# Patient Record
Sex: MALE | Race: WHITE | NOT HISPANIC OR LATINO | Employment: OTHER | URBAN - METROPOLITAN AREA
[De-identification: names, ages, dates, MRNs, and addresses within clinical notes are randomized per-mention and may not be internally consistent; named-entity substitution may affect disease eponyms.]

---

## 2017-01-03 RX ORDER — AMLODIPINE BESYLATE 5 MG/1
5 TABLET ORAL DAILY
Qty: 90 TABLET | Refills: 1 | Status: SHIPPED | OUTPATIENT
Start: 2017-01-03 | End: 2017-07-07 | Stop reason: SDUPTHER

## 2017-01-03 RX ORDER — GLIMEPIRIDE 4 MG/1
4 TABLET ORAL
Qty: 90 TABLET | Refills: 1 | Status: SHIPPED | OUTPATIENT
Start: 2017-01-03 | End: 2017-04-06 | Stop reason: SDUPTHER

## 2017-01-04 ENCOUNTER — HOSPITAL ENCOUNTER (OUTPATIENT)
Dept: CARDIOLOGY | Facility: HOSPITAL | Age: 72
Discharge: HOME | End: 2017-01-04
Attending: INTERNAL MEDICINE | Admitting: INTERNAL MEDICINE

## 2017-01-04 VITALS
DIASTOLIC BLOOD PRESSURE: 82 MMHG | SYSTOLIC BLOOD PRESSURE: 179 MMHG | WEIGHT: 213.85 LBS | BODY MASS INDEX: 30.61 KG/M2 | HEIGHT: 70 IN

## 2017-01-04 PROCEDURE — 93306 TTE W/DOPPLER COMPLETE: CPT

## 2017-01-27 ENCOUNTER — OFFICE VISIT (OUTPATIENT)
Dept: DIABETES SERVICES | Facility: HOSPITAL | Age: 72
End: 2017-01-27

## 2017-01-27 PROCEDURE — G0109 DIAB MANAGE TRN IND/GROUP: HCPCS | Performed by: DIETITIAN, REGISTERED

## 2017-02-22 ENCOUNTER — APPOINTMENT (OUTPATIENT)
Dept: PREADMISSION TESTING | Facility: HOSPITAL | Age: 72
End: 2017-02-22

## 2017-02-22 VITALS — WEIGHT: 216.93 LBS | HEIGHT: 69 IN | BODY MASS INDEX: 32.13 KG/M2

## 2017-02-22 LAB
ANION GAP SERPL CALCULATED.3IONS-SCNC: 7 MMOL/L (ref 3–11)
BACTERIA UR QL AUTO: ABNORMAL /HPF
BASOPHILS # BLD AUTO: 0.02 10*3/MM3 (ref 0–0.2)
BASOPHILS NFR BLD AUTO: 0.3 % (ref 0–1)
BILIRUB UR QL STRIP: NEGATIVE
BUN BLD-MCNC: 25 MG/DL (ref 9–23)
BUN/CREAT SERPL: 20.8 (ref 7–25)
CALCIUM SPEC-SCNC: 9.6 MG/DL (ref 8.7–10.4)
CHLORIDE SERPL-SCNC: 106 MMOL/L (ref 99–109)
CLARITY UR: CLEAR
CO2 SERPL-SCNC: 28 MMOL/L (ref 20–31)
COLOR UR: YELLOW
CREAT BLD-MCNC: 1.2 MG/DL (ref 0.6–1.3)
DEPRECATED RDW RBC AUTO: 45.3 FL (ref 37–54)
EOSINOPHIL # BLD AUTO: 0.13 10*3/MM3 (ref 0.1–0.3)
EOSINOPHIL NFR BLD AUTO: 1.8 % (ref 0–3)
ERYTHROCYTE [DISTWIDTH] IN BLOOD BY AUTOMATED COUNT: 13.8 % (ref 11.3–14.5)
GFR SERPL CREATININE-BSD FRML MDRD: 60 ML/MIN/1.73
GLUCOSE BLD-MCNC: 161 MG/DL (ref 70–100)
GLUCOSE UR STRIP-MCNC: ABNORMAL MG/DL
HBA1C MFR BLD: 7.6 % (ref 4.8–5.6)
HCT VFR BLD AUTO: 40.1 % (ref 38.9–50.9)
HGB BLD-MCNC: 13 G/DL (ref 13.1–17.5)
HGB UR QL STRIP.AUTO: NEGATIVE
HYALINE CASTS UR QL AUTO: ABNORMAL /LPF
IMM GRANULOCYTES # BLD: 0.02 10*3/MM3 (ref 0–0.03)
IMM GRANULOCYTES NFR BLD: 0.3 % (ref 0–0.6)
KETONES UR QL STRIP: NEGATIVE
LEUKOCYTE ESTERASE UR QL STRIP.AUTO: NEGATIVE
LYMPHOCYTES # BLD AUTO: 2.4 10*3/MM3 (ref 0.6–4.8)
LYMPHOCYTES NFR BLD AUTO: 33 % (ref 24–44)
MCH RBC QN AUTO: 29.1 PG (ref 27–31)
MCHC RBC AUTO-ENTMCNC: 32.4 G/DL (ref 32–36)
MCV RBC AUTO: 89.7 FL (ref 80–99)
MONOCYTES # BLD AUTO: 0.5 10*3/MM3 (ref 0–1)
MONOCYTES NFR BLD AUTO: 6.9 % (ref 0–12)
NEUTROPHILS # BLD AUTO: 4.2 10*3/MM3 (ref 1.5–8.3)
NEUTROPHILS NFR BLD AUTO: 57.7 % (ref 41–71)
NITRITE UR QL STRIP: NEGATIVE
PH UR STRIP.AUTO: <=5 [PH] (ref 5–8)
PLATELET # BLD AUTO: 158 10*3/MM3 (ref 150–450)
PMV BLD AUTO: 11.6 FL (ref 6–12)
POTASSIUM BLD-SCNC: 4.5 MMOL/L (ref 3.5–5.5)
PROT UR QL STRIP: ABNORMAL
RBC # BLD AUTO: 4.47 10*6/MM3 (ref 4.2–5.76)
RBC # UR: ABNORMAL /HPF
REF LAB TEST METHOD: ABNORMAL
SODIUM BLD-SCNC: 141 MMOL/L (ref 132–146)
SP GR UR STRIP: 1.03 (ref 1–1.03)
SQUAMOUS #/AREA URNS HPF: ABNORMAL /HPF
UROBILINOGEN UR QL STRIP: ABNORMAL
WBC NRBC COR # BLD: 7.27 10*3/MM3 (ref 3.5–10.8)
WBC UR QL AUTO: ABNORMAL /HPF

## 2017-02-22 PROCEDURE — 93010 ELECTROCARDIOGRAM REPORT: CPT | Performed by: INTERNAL MEDICINE

## 2017-02-22 PROCEDURE — 80048 BASIC METABOLIC PNL TOTAL CA: CPT | Performed by: ORTHOPAEDIC SURGERY

## 2017-02-22 PROCEDURE — 36415 COLL VENOUS BLD VENIPUNCTURE: CPT

## 2017-02-22 PROCEDURE — 93005 ELECTROCARDIOGRAM TRACING: CPT

## 2017-02-22 PROCEDURE — 81001 URINALYSIS AUTO W/SCOPE: CPT | Performed by: ORTHOPAEDIC SURGERY

## 2017-02-22 PROCEDURE — 83036 HEMOGLOBIN GLYCOSYLATED A1C: CPT | Performed by: ORTHOPAEDIC SURGERY

## 2017-02-22 PROCEDURE — 85025 COMPLETE CBC W/AUTO DIFF WBC: CPT | Performed by: ORTHOPAEDIC SURGERY

## 2017-02-22 RX ORDER — ACETAMINOPHEN 500 MG
500 TABLET ORAL EVERY 6 HOURS PRN
COMMUNITY
End: 2017-07-07

## 2017-02-22 NOTE — PAT
EKG CLEARED BY DR. CALLAHAN    A1C LEVEL JAN 2017 WAS 8.5-PT WAS STARTED ON INSULIN TO HELP BRING IT DOWN PRIOR TO SURGERY.

## 2017-03-01 RX ORDER — SIMVASTATIN 10 MG
TABLET ORAL
Qty: 90 TABLET | Refills: 0 | Status: SHIPPED | OUTPATIENT
Start: 2017-03-01 | End: 2017-05-22 | Stop reason: SDUPTHER

## 2017-03-07 ENCOUNTER — APPOINTMENT (OUTPATIENT)
Dept: GENERAL RADIOLOGY | Facility: HOSPITAL | Age: 72
End: 2017-03-07

## 2017-03-07 ENCOUNTER — ANESTHESIA EVENT (OUTPATIENT)
Dept: PERIOP | Facility: HOSPITAL | Age: 72
End: 2017-03-07

## 2017-03-07 ENCOUNTER — ANESTHESIA (OUTPATIENT)
Dept: PERIOP | Facility: HOSPITAL | Age: 72
End: 2017-03-07

## 2017-03-07 PROBLEM — M16.10 HIP ARTHRITIS: Status: ACTIVE | Noted: 2017-03-07

## 2017-03-07 PROBLEM — Z96.641 STATUS POST TOTAL REPLACEMENT OF RIGHT HIP: Status: ACTIVE | Noted: 2017-03-07

## 2017-03-07 PROCEDURE — 73502 X-RAY EXAM HIP UNI 2-3 VIEWS: CPT

## 2017-03-07 PROCEDURE — 25010000002 PHENYLEPHRINE PER 1 ML: Performed by: NURSE ANESTHETIST, CERTIFIED REGISTERED

## 2017-03-07 PROCEDURE — 25010000002 ONDANSETRON PER 1 MG: Performed by: NURSE ANESTHETIST, CERTIFIED REGISTERED

## 2017-03-07 PROCEDURE — 25010000002 DEXAMETHASONE PER 1 MG: Performed by: NURSE ANESTHETIST, CERTIFIED REGISTERED

## 2017-03-07 PROCEDURE — 25010000002 PROPOFOL 1000 MG/ML EMULSION: Performed by: NURSE ANESTHETIST, CERTIFIED REGISTERED

## 2017-03-07 PROCEDURE — 25010000003 CEFAZOLIN IN DEXTROSE 2-4 GM/100ML-% SOLUTION: Performed by: ORTHOPAEDIC SURGERY

## 2017-03-07 RX ORDER — DEXAMETHASONE SODIUM PHOSPHATE 4 MG/ML
INJECTION, SOLUTION INTRA-ARTICULAR; INTRALESIONAL; INTRAMUSCULAR; INTRAVENOUS; SOFT TISSUE AS NEEDED
Status: DISCONTINUED | OUTPATIENT
Start: 2017-03-07 | End: 2017-03-07 | Stop reason: SURG

## 2017-03-07 RX ORDER — ONDANSETRON 2 MG/ML
INJECTION INTRAMUSCULAR; INTRAVENOUS AS NEEDED
Status: DISCONTINUED | OUTPATIENT
Start: 2017-03-07 | End: 2017-03-07 | Stop reason: SURG

## 2017-03-07 RX ORDER — BUPIVACAINE HYDROCHLORIDE 5 MG/ML
INJECTION, SOLUTION EPIDURAL; INTRACAUDAL AS NEEDED
Status: DISCONTINUED | OUTPATIENT
Start: 2017-03-07 | End: 2017-03-07 | Stop reason: SURG

## 2017-03-07 RX ADMIN — TRANEXAMIC ACID 980 MG: 100 INJECTION, SOLUTION INTRAVENOUS at 08:02

## 2017-03-07 RX ADMIN — PHENYLEPHRINE HYDROCHLORIDE 0.2 MCG/KG/MIN: 10 INJECTION INTRAVENOUS at 08:12

## 2017-03-07 RX ADMIN — ONDANSETRON 4 MG: 2 INJECTION INTRAMUSCULAR; INTRAVENOUS at 09:23

## 2017-03-07 RX ADMIN — PROPOFOL 50 MCG/KG/MIN: 10 INJECTION, EMULSION INTRAVENOUS at 08:02

## 2017-03-07 RX ADMIN — TRANEXAMIC ACID 980 MG: 100 INJECTION, SOLUTION INTRAVENOUS at 09:43

## 2017-03-07 RX ADMIN — BUPIVACAINE HYDROCHLORIDE 2.2 ML: 5 INJECTION, SOLUTION EPIDURAL; INTRACAUDAL; PERINEURAL at 07:56

## 2017-03-07 RX ADMIN — CEFAZOLIN SODIUM 2 G: 2 INJECTION, SOLUTION INTRAVENOUS at 07:50

## 2017-03-07 RX ADMIN — DEXAMETHASONE SODIUM PHOSPHATE 8 MG: 4 INJECTION, SOLUTION INTRAMUSCULAR; INTRAVENOUS at 08:02

## 2017-03-08 PROBLEM — D62 ACUTE BLOOD LOSS ANEMIA: Status: ACTIVE | Noted: 2017-03-08

## 2017-03-08 PROBLEM — D72.829 LEUKOCYTOSIS: Status: ACTIVE | Noted: 2017-03-08

## 2017-03-10 ENCOUNTER — TRANSITIONAL CARE MANAGEMENT TELEPHONE ENCOUNTER (OUTPATIENT)
Dept: INTERNAL MEDICINE | Facility: CLINIC | Age: 72
End: 2017-03-10

## 2017-04-06 RX ORDER — GLIMEPIRIDE 4 MG/1
TABLET ORAL
Qty: 90 TABLET | Refills: 0 | Status: SHIPPED | OUTPATIENT
Start: 2017-04-06 | End: 2017-05-22 | Stop reason: SDUPTHER

## 2017-04-20 RX ORDER — LISINOPRIL 20 MG/1
TABLET ORAL
Qty: 180 TABLET | Refills: 1 | Status: SHIPPED | OUTPATIENT
Start: 2017-04-20 | End: 2017-07-07 | Stop reason: SDUPTHER

## 2017-04-27 ENCOUNTER — OFFICE VISIT (OUTPATIENT)
Dept: ORTHOPEDIC SURGERY | Facility: CLINIC | Age: 72
End: 2017-04-27

## 2017-04-27 VITALS
WEIGHT: 207 LBS | HEART RATE: 78 BPM | DIASTOLIC BLOOD PRESSURE: 63 MMHG | BODY MASS INDEX: 29.63 KG/M2 | HEIGHT: 70 IN | SYSTOLIC BLOOD PRESSURE: 160 MMHG

## 2017-04-27 DIAGNOSIS — Z47.1 AFTERCARE FOLLOWING LEFT HIP JOINT REPLACEMENT SURGERY: Primary | ICD-10-CM

## 2017-04-27 DIAGNOSIS — Z96.642 AFTERCARE FOLLOWING LEFT HIP JOINT REPLACEMENT SURGERY: Primary | ICD-10-CM

## 2017-04-27 PROCEDURE — 99024 POSTOP FOLLOW-UP VISIT: CPT | Performed by: PHYSICIAN ASSISTANT

## 2017-04-27 NOTE — PROGRESS NOTES
Subjective     Follow-up of the Left Hip (1 yr f/u (L) JOSE A)      Gabriela Mar is a 71 y.o. male.  Here for routine follow-up left total hip.     History of Present Illness   Patient returns today for routine follow-up left total hip March 7, 2017 with Dr. Suarez.  He reports no pain or problems from the hip he has been doing outpatient physical therapy with improved motion and strength.  He has been very pleased with his outcome.  Allergies   Allergen Reactions   • Bactrim [Sulfamethoxazole-Trimethoprim] Other (See Comments)     Joint pain   • Sulfa Antibiotics      Makes bones hurt       Current Outpatient Prescriptions on File Prior to Visit   Medication Sig Dispense Refill   • amLODIPine (NORVASC) 5 MG tablet Take 1 tablet by mouth Daily. 90 tablet 1   • aspirin (ASPIRIN LOW DOSE) 81 MG chewable tablet Chew 1 tablet Daily. Resume in 1 month     • Cholecalciferol (VITAMIN D) 1000 UNITS tablet Take 1 capsule by mouth Daily.     • Cyanocobalamin (VITAMIN B-12 ER) 1000 MCG tablet controlled-release Take 1,000 mcg by mouth Daily.     • glimepiride (AMARYL) 4 MG tablet TAKE ONE TABLET BY MOUTH IN THE MORNING BEFORE BREAKFEST. 90 tablet 0   • Insulin Glargine (TOUJEO SOLOSTAR) 300 UNIT/ML solution pen-injector Inject 10 Units under the skin every night.     • lisinopril (PRINIVIL,ZESTRIL) 20 MG tablet TAKE ONE TABLET BY MOUTH TWICE DAILY 180 tablet 1   • metFORMIN (GLUCOPHAGE) 1000 MG tablet TAKE ONE AND ONE-HALF TABLETS BY MOUTH IN THE MORNING AND ONE IN THE EVENING (Patient taking differently: TAKE ONE AND ONE-HALF TABLETS BY MOUTH IN THE EVENING  AND ONE IN THE MORNING) 235 tablet 0   • Multiple Vitamins-Minerals (MULTI VITAMIN/MINERALS PO) Take 1 tablet by mouth Daily.     • omeprazole (PRILOSEC) 20 MG capsule Take 1 capsule by mouth Daily.     • simvastatin (ZOCOR) 10 MG tablet TAKE ONE TABLET BY MOUTH AT BEDTIME 90 tablet 0   • SITagliptin (JANUVIA) 100 MG tablet Take 1 tablet by mouth Daily. 90 tablet 1   •  acetaminophen (TYLENOL) 500 MG tablet Take 500 mg by mouth Every 6 (Six) Hours As Needed for mild pain (1-3).     • aspirin  MG EC tablet Take 1 tablet by mouth Daily. For 1 month 30 tablet 0   • docusate sodium (COLACE) 100 MG capsule Take 1 capsule by mouth 2 (Two) Times a Day. 60 capsule 0     No current facility-administered medications on file prior to visit.      Social History     Social History   • Marital status:      Spouse name: N/A   • Number of children: N/A   • Years of education: N/A     Occupational History   • Not on file.     Social History Main Topics   • Smoking status: Former Smoker     Packs/day: 2.00     Years: 35.00     Types: Cigarettes   • Smokeless tobacco: Never Used      Comment: QUIT 20 PLUS YEARS AGO    • Alcohol use No      Comment: stopped drinking   • Drug use: No   • Sexual activity: Defer     Other Topics Concern   • Not on file     Social History Narrative     Past Surgical History:   Procedure Laterality Date   • COLONOSCOPY      2015   • ENDOSCOPY      2011   • JOINT REPLACEMENT      LEFT HIP 2016-MARCH    • SUBMAXILLARY CYST  EXCISION      Right back   • TONSILLECTOMY     • TOTAL HIP ARTHROPLASTY Right 3/7/2017    Procedure: RIGHT TOTAL HIP ARTHROPLASTY;  Surgeon: Reynaldo Suarez MD;  Location: [facility];  Service:    • VASECTOMY     • WRIST GANGLION EXCISION       Family History   Problem Relation Age of Onset   • Diabetes Mother    • Cancer Father      colon   • Diabetes Father    • Hypertension Father    • Kidney failure Sister    • Diabetes Sister    • Cancer Brother      lung   • Hypertension Brother    • Diabetes Brother    • Coronary artery disease Brother    • Cancer Brother      colon   • Hypertension Brother      Past Medical History:   Diagnosis Date   • Arthritis    • Diabetes mellitus     DIAGNOSED 10 YEARS AGO; CHECKS FSBG TID   • Esophagitis, reflux    • Hyperlipidemia    • Hypertension    • Obesity    • Pharyngitis    • Wears eyeglasses   "        Review of Systems   Constitutional: Negative.    HENT: Negative.    Eyes: Negative.    Respiratory: Negative.    Cardiovascular: Negative.    Gastrointestinal: Negative.    Endocrine: Negative.    Genitourinary: Negative.    Musculoskeletal: Positive for back pain.        Muscle weakness   Skin: Negative.    Allergic/Immunologic: Negative.    Neurological: Negative.    Hematological: Negative.    Psychiatric/Behavioral: Negative.        Objective   /63  Pulse 78  Ht 70\" (177.8 cm)  Wt 207 lb (93.9 kg)  BMI 29.7 kg/m2      Physical Exam  Body habitus: normal weight for height  Gait: normal  Ortho Exam  Left hip exam:  -----------------  Patient has 90 degrees flexion, 20° internal rotation 30° external rotation   5 out of 5 strength in hip flexors, adductor's, abductor's  5 out of 5 strength in anterior tib, gastroc/soleus  No lower extremity edema  Normal sensation  Ambulating with no assistive device    Assessment/Plan   The encounter diagnosis was Aftercare following left hip joint replacement surgery.   Doing well status post left total hip arthroplasty March 7, 2017.  Patient reports no pain or problems from the hip.  He has been very happy with the outcome.  On exam, he has no pain with hip rotation and good strength.  Recommendation today is that he finish up his formal physical therapy and I encouraged him to continue his exercises at home.  He'll return in 1 year with repeat x-rays of the left hip or sooner if needed.        "

## 2017-04-28 ENCOUNTER — OFFICE VISIT (OUTPATIENT)
Dept: INTERNAL MEDICINE | Facility: CLINIC | Age: 72
End: 2017-04-28

## 2017-04-28 VITALS
OXYGEN SATURATION: 99 % | SYSTOLIC BLOOD PRESSURE: 110 MMHG | BODY MASS INDEX: 30.26 KG/M2 | HEART RATE: 75 BPM | HEIGHT: 70 IN | WEIGHT: 211.4 LBS | DIASTOLIC BLOOD PRESSURE: 62 MMHG

## 2017-04-28 DIAGNOSIS — E78.2 MIXED HYPERLIPIDEMIA: ICD-10-CM

## 2017-04-28 DIAGNOSIS — Z23 ENCOUNTER FOR IMMUNIZATION: ICD-10-CM

## 2017-04-28 DIAGNOSIS — Z87.891 H/O TOBACCO USE, PRESENTING HAZARDS TO HEALTH: ICD-10-CM

## 2017-04-28 DIAGNOSIS — D62 ACUTE BLOOD LOSS ANEMIA: ICD-10-CM

## 2017-04-28 DIAGNOSIS — I10 ESSENTIAL HYPERTENSION: ICD-10-CM

## 2017-04-28 DIAGNOSIS — Z00.00 MEDICARE ANNUAL WELLNESS VISIT, SUBSEQUENT: Primary | ICD-10-CM

## 2017-04-28 DIAGNOSIS — IMO0002 UNCONTROLLED TYPE 2 DIABETES MELLITUS WITH OTHER SPECIFIED COMPLICATION: ICD-10-CM

## 2017-04-28 LAB
POC CREATININE URINE: 200
POC MICROALBUMIN URINE: 80

## 2017-04-28 PROCEDURE — 99212 OFFICE O/P EST SF 10 MIN: CPT | Performed by: INTERNAL MEDICINE

## 2017-04-28 PROCEDURE — 90732 PPSV23 VACC 2 YRS+ SUBQ/IM: CPT | Performed by: INTERNAL MEDICINE

## 2017-04-28 PROCEDURE — G0009 ADMIN PNEUMOCOCCAL VACCINE: HCPCS | Performed by: INTERNAL MEDICINE

## 2017-04-28 PROCEDURE — 82044 UR ALBUMIN SEMIQUANTITATIVE: CPT | Performed by: INTERNAL MEDICINE

## 2017-04-28 PROCEDURE — G0439 PPPS, SUBSEQ VISIT: HCPCS | Performed by: INTERNAL MEDICINE

## 2017-04-28 RX ORDER — INSULIN GLARGINE 100 [IU]/ML
10 INJECTION, SOLUTION SUBCUTANEOUS NIGHTLY
Qty: 3 PEN | Refills: 5 | Status: SHIPPED | OUTPATIENT
Start: 2017-04-28 | End: 2017-07-07 | Stop reason: SDUPTHER

## 2017-05-03 ENCOUNTER — HOSPITAL ENCOUNTER (OUTPATIENT)
Dept: ULTRASOUND IMAGING | Facility: HOSPITAL | Age: 72
Discharge: HOME | End: 2017-05-03
Attending: INTERNAL MEDICINE | Admitting: INTERNAL MEDICINE

## 2017-05-03 DIAGNOSIS — Z87.891 H/O TOBACCO USE, PRESENTING HAZARDS TO HEALTH: ICD-10-CM

## 2017-05-03 PROCEDURE — 76706 US ABDL AORTA SCREEN AAA: CPT

## 2017-05-22 RX ORDER — GLIMEPIRIDE 4 MG/1
TABLET ORAL
Qty: 90 TABLET | Refills: 0 | Status: SHIPPED | OUTPATIENT
Start: 2017-05-22 | End: 2017-07-07 | Stop reason: SDUPTHER

## 2017-05-22 RX ORDER — SIMVASTATIN 10 MG
TABLET ORAL
Qty: 90 TABLET | Refills: 0 | Status: SHIPPED | OUTPATIENT
Start: 2017-05-22 | End: 2017-09-02 | Stop reason: SDUPTHER

## 2017-07-07 ENCOUNTER — OFFICE VISIT (OUTPATIENT)
Dept: INTERNAL MEDICINE | Facility: CLINIC | Age: 72
End: 2017-07-07

## 2017-07-07 VITALS
HEART RATE: 79 BPM | WEIGHT: 213.4 LBS | OXYGEN SATURATION: 96 % | SYSTOLIC BLOOD PRESSURE: 142 MMHG | BODY MASS INDEX: 30.55 KG/M2 | DIASTOLIC BLOOD PRESSURE: 70 MMHG | HEIGHT: 70 IN

## 2017-07-07 DIAGNOSIS — G89.29 CHRONIC BILATERAL LOW BACK PAIN WITHOUT SCIATICA: ICD-10-CM

## 2017-07-07 DIAGNOSIS — IMO0002 UNCONTROLLED TYPE 2 DIABETES MELLITUS WITH OTHER SPECIFIED COMPLICATION: Primary | ICD-10-CM

## 2017-07-07 DIAGNOSIS — I10 ESSENTIAL HYPERTENSION: ICD-10-CM

## 2017-07-07 DIAGNOSIS — E78.2 MIXED HYPERLIPIDEMIA: ICD-10-CM

## 2017-07-07 DIAGNOSIS — M54.50 CHRONIC BILATERAL LOW BACK PAIN WITHOUT SCIATICA: ICD-10-CM

## 2017-07-07 LAB
GLUCOSE BLDC GLUCOMTR-MCNC: 206 MG/DL (ref 70–130)
HBA1C MFR BLD: 9.1 %

## 2017-07-07 PROCEDURE — 99213 OFFICE O/P EST LOW 20 MIN: CPT | Performed by: INTERNAL MEDICINE

## 2017-07-07 PROCEDURE — 83036 HEMOGLOBIN GLYCOSYLATED A1C: CPT | Performed by: INTERNAL MEDICINE

## 2017-07-07 PROCEDURE — 82947 ASSAY GLUCOSE BLOOD QUANT: CPT | Performed by: INTERNAL MEDICINE

## 2017-07-07 RX ORDER — INSULIN GLARGINE 100 [IU]/ML
20 INJECTION, SOLUTION SUBCUTANEOUS NIGHTLY
Qty: 3 PEN | Refills: 5 | Status: SHIPPED | OUTPATIENT
Start: 2017-07-07 | End: 2017-07-07 | Stop reason: SDUPTHER

## 2017-07-07 RX ORDER — AMLODIPINE BESYLATE 5 MG/1
5 TABLET ORAL DAILY
Qty: 90 TABLET | Refills: 1 | Status: SHIPPED | OUTPATIENT
Start: 2017-07-07 | End: 2017-10-09 | Stop reason: SDUPTHER

## 2017-07-07 RX ORDER — LISINOPRIL 20 MG/1
20 TABLET ORAL 2 TIMES DAILY
Qty: 180 TABLET | Refills: 1 | Status: SHIPPED | OUTPATIENT
Start: 2017-07-07 | End: 2017-10-09 | Stop reason: SDUPTHER

## 2017-07-07 RX ORDER — INSULIN GLARGINE 100 [IU]/ML
20 INJECTION, SOLUTION SUBCUTANEOUS NIGHTLY
Qty: 3 PEN | Refills: 5 | Status: SHIPPED | OUTPATIENT
Start: 2017-07-07 | End: 2017-10-20 | Stop reason: SDUPTHER

## 2017-07-07 RX ORDER — GLIMEPIRIDE 4 MG/1
4 TABLET ORAL
Qty: 90 TABLET | Refills: 1 | Status: SHIPPED | OUTPATIENT
Start: 2017-07-07 | End: 2017-10-09 | Stop reason: SDUPTHER

## 2017-07-07 NOTE — PROGRESS NOTES
Follow-up (10 week)  for diabetes.    Subjective   Gabriela Mar is a 71 y.o. male is here today for follow-up.    History of Present Illness   Mr. Mar is here for a follow up on his back pain, and DM2.  He is here for a follow up on his sugars, on the tresiba, , feels like its not working for him like the other insulins did.sugars > 200 for the most part.  Has moved and is closer to [facility]      Current Outpatient Prescriptions:   •  amLODIPine (NORVASC) 5 MG tablet, Take 1 tablet by mouth Daily., Disp: 90 tablet, Rfl: 1  •  aspirin (ASPIRIN LOW DOSE) 81 MG chewable tablet, Chew 1 tablet Daily. Resume in 1 month, Disp: , Rfl:   •  Cholecalciferol (VITAMIN D) 1000 UNITS tablet, Take 1 capsule by mouth Daily., Disp: , Rfl:   •  Cyanocobalamin (VITAMIN B-12 ER) 1000 MCG tablet controlled-release, Take 1,000 mcg by mouth Daily., Disp: , Rfl:   •  glimepiride (AMARYL) 4 MG tablet, Take 1 tablet by mouth Every Morning Before Breakfast., Disp: 90 tablet, Rfl: 1  •  Insulin Glargine (BASAGLAR KWIKPEN) 100 UNIT/ML injection pen, Inject 20 Units under the skin Every Night., Disp: 3 pen, Rfl: 5  •  lisinopril (PRINIVIL,ZESTRIL) 20 MG tablet, Take 1 tablet by mouth 2 (Two) Times a Day., Disp: 180 tablet, Rfl: 1  •  metFORMIN (GLUCOPHAGE) 1000 MG tablet, TAKE ONE AND ONE-HALF TABLETS BY MOUTH IN THE MORNING AND ONE IN THE EVENING, Disp: 235 tablet, Rfl: 0  •  Multiple Vitamins-Minerals (MULTI VITAMIN/MINERALS PO), Take 1 tablet by mouth Daily., Disp: , Rfl:   •  omeprazole (PRILOSEC) 20 MG capsule, Take 1 capsule by mouth Daily., Disp: , Rfl:   •  simvastatin (ZOCOR) 10 MG tablet, TAKE ONE TABLET BY MOUTH AT BEDTIME, Disp: 90 tablet, Rfl: 0  •  SITagliptin (JANUVIA) 100 MG tablet, Take 1 tablet by mouth Daily., Disp: 90 tablet, Rfl: 1      The following portions of the patient's history were reviewed and updated as appropriate: allergies, current medications, past family history, past medical history, past social  "history, past surgical history and problem list.    Review of Systems   Constitutional: Negative.  Negative for chills and fever.   HENT: Negative for ear discharge, ear pain, sinus pressure and sore throat.    Respiratory: Negative for cough, chest tightness and shortness of breath.    Cardiovascular: Negative for chest pain, palpitations and leg swelling.   Gastrointestinal: Negative for diarrhea, nausea and vomiting.   Musculoskeletal: Negative for arthralgias, back pain and myalgias.   Neurological: Negative for dizziness, syncope and headaches.   Psychiatric/Behavioral: Negative for confusion and sleep disturbance.       Objective   /70  Pulse 79  Ht 70\" (177.8 cm)  Wt 213 lb 6.4 oz (96.8 kg)  SpO2 96%  BMI 30.62 kg/m2  Physical Exam   Constitutional: He is oriented to person, place, and time. He appears well-developed and well-nourished.   HENT:   Head: Normocephalic and atraumatic.   Right Ear: External ear normal.   Left Ear: External ear normal.   Mouth/Throat: No oropharyngeal exudate.   Eyes: Conjunctivae are normal. Pupils are equal, round, and reactive to light.   Neck: Neck supple. No thyromegaly present.   Cardiovascular: Normal rate, regular rhythm and intact distal pulses.    Pulmonary/Chest: Effort normal and breath sounds normal.   Abdominal: Soft. Bowel sounds are normal. He exhibits no distension. There is no tenderness.   Musculoskeletal: He exhibits no edema.   Neurological: He is alert and oriented to person, place, and time. No cranial nerve deficit.   Skin: Skin is warm and dry.   Psychiatric: He has a normal mood and affect. Judgment normal.   Nursing note and vitals reviewed.        Results for orders placed or performed in visit on 07/07/17   POC Glycosylated Hemoglobin (Hb A1C)   Result Value Ref Range    Hemoglobin A1C 9.1 %   POC Glucose Fingerstick   Result Value Ref Range    Glucose 206 (A) 70 - 130 mg/dL             Assessment/Plan   Diagnoses and all orders for this " visit:    Uncontrolled type 2 diabetes mellitus with other specified complication  -     Discontinue: Insulin Glargine (BASAGLAR KWIKPEN) 100 UNIT/ML injection pen; Inject 20 Units under the skin Every Night.  -     glimepiride (AMARYL) 4 MG tablet; Take 1 tablet by mouth Every Morning Before Breakfast.  -     POC Glycosylated Hemoglobin (Hb A1C)  -     POC Glucose Fingerstick  -     Insulin Glargine (BASAGLAR KWIKPEN) 100 UNIT/ML injection pen; Inject 20 Units under the skin Every Night.    Essential hypertension  -     lisinopril (PRINIVIL,ZESTRIL) 20 MG tablet; Take 1 tablet by mouth 2 (Two) Times a Day.  -     amLODIPine (NORVASC) 5 MG tablet; Take 1 tablet by mouth Daily.    Mixed hyperlipidemia    Chronic bilateral low back pain without sciatica  Comments:  start exercises, stretches etc.                 Return in about 2 months (around 9/7/2017) for Next scheduled follow up with fasting labs.

## 2017-09-02 RX ORDER — SIMVASTATIN 10 MG
TABLET ORAL
Qty: 90 TABLET | Refills: 0 | Status: SHIPPED | OUTPATIENT
Start: 2017-09-02 | End: 2017-11-27 | Stop reason: SDUPTHER

## 2017-09-07 ENCOUNTER — OFFICE VISIT (OUTPATIENT)
Dept: INTERNAL MEDICINE | Facility: CLINIC | Age: 72
End: 2017-09-07

## 2017-09-07 VITALS
BODY MASS INDEX: 30.37 KG/M2 | HEART RATE: 81 BPM | OXYGEN SATURATION: 95 % | WEIGHT: 211.64 LBS | DIASTOLIC BLOOD PRESSURE: 64 MMHG | SYSTOLIC BLOOD PRESSURE: 150 MMHG

## 2017-09-07 DIAGNOSIS — I10 ESSENTIAL HYPERTENSION: ICD-10-CM

## 2017-09-07 DIAGNOSIS — E78.2 MIXED HYPERLIPIDEMIA: ICD-10-CM

## 2017-09-07 DIAGNOSIS — IMO0002 UNCONTROLLED TYPE 2 DIABETES MELLITUS WITH OTHER SPECIFIED COMPLICATION: Primary | ICD-10-CM

## 2017-09-07 LAB
ALBUMIN SERPL-MCNC: 4.4 G/DL (ref 3.2–4.8)
ALBUMIN/GLOB SERPL: 1.6 G/DL (ref 1.5–2.5)
ALP SERPL-CCNC: 83 U/L (ref 25–100)
ALT SERPL W P-5'-P-CCNC: 56 U/L (ref 7–40)
ANION GAP SERPL CALCULATED.3IONS-SCNC: 8 MMOL/L (ref 3–11)
ARTICHOKE IGE QN: 70 MG/DL (ref 0–130)
AST SERPL-CCNC: 53 U/L (ref 0–33)
BILIRUB SERPL-MCNC: 0.4 MG/DL (ref 0.3–1.2)
BUN BLD-MCNC: 16 MG/DL (ref 9–23)
BUN/CREAT SERPL: 14.5 (ref 7–25)
CALCIUM SPEC-SCNC: 9.3 MG/DL (ref 8.7–10.4)
CHLORIDE SERPL-SCNC: 102 MMOL/L (ref 99–109)
CHOLEST SERPL-MCNC: 137 MG/DL (ref 0–200)
CO2 SERPL-SCNC: 28 MMOL/L (ref 20–31)
CREAT BLD-MCNC: 1.1 MG/DL (ref 0.6–1.3)
GFR SERPL CREATININE-BSD FRML MDRD: 66 ML/MIN/1.73
GLOBULIN UR ELPH-MCNC: 2.8 GM/DL
GLUCOSE BLD-MCNC: 187 MG/DL (ref 70–100)
HDLC SERPL-MCNC: 38 MG/DL (ref 40–60)
POTASSIUM BLD-SCNC: 4.6 MMOL/L (ref 3.5–5.5)
PROT SERPL-MCNC: 7.2 G/DL (ref 5.7–8.2)
SODIUM BLD-SCNC: 138 MMOL/L (ref 132–146)
TRIGL SERPL-MCNC: 130 MG/DL (ref 0–150)

## 2017-09-07 PROCEDURE — 80053 COMPREHEN METABOLIC PANEL: CPT | Performed by: INTERNAL MEDICINE

## 2017-09-07 PROCEDURE — 99214 OFFICE O/P EST MOD 30 MIN: CPT | Performed by: INTERNAL MEDICINE

## 2017-09-07 PROCEDURE — 80061 LIPID PANEL: CPT | Performed by: INTERNAL MEDICINE

## 2017-09-07 NOTE — PROGRESS NOTES
Follow-up (hypertension, hyperlipidemia, Type 2 diabetes)    Subjective   Gabriela Mar is a 71 y.o. male is here today for follow-up.    History of Present Illness   Here for f/u on his DM2, has been a bad year, brother dxed with lymphoma, and on Chemo. Has run out of januvia and insulin.  SUgars have been running between 180- 220, but had lunch at [employer] plate made his sugar go up yo 318.  Reports that he had to fax his rx to [facility], and has not received his meds.  Here for f/u on his HLP and HTN.denies medication side effects.      Current Outpatient Prescriptions:   •  amLODIPine (NORVASC) 5 MG tablet, Take 1 tablet by mouth Daily., Disp: 90 tablet, Rfl: 1  •  aspirin (ASPIRIN LOW DOSE) 81 MG chewable tablet, Chew 1 tablet Daily. Resume in 1 month, Disp: , Rfl:   •  Cholecalciferol (VITAMIN D) 1000 UNITS tablet, Take 1 capsule by mouth Daily., Disp: , Rfl:   •  Cyanocobalamin (VITAMIN B-12 ER) 1000 MCG tablet controlled-release, Take 1,000 mcg by mouth Daily., Disp: , Rfl:   •  glimepiride (AMARYL) 4 MG tablet, Take 1 tablet by mouth Every Morning Before Breakfast., Disp: 90 tablet, Rfl: 1  •  Insulin Glargine (BASAGLAR KWIKPEN) 100 UNIT/ML injection pen, Inject 20 Units under the skin Every Night., Disp: 3 pen, Rfl: 5  •  lisinopril (PRINIVIL,ZESTRIL) 20 MG tablet, Take 1 tablet by mouth 2 (Two) Times a Day., Disp: 180 tablet, Rfl: 1  •  metFORMIN (GLUCOPHAGE) 1000 MG tablet, TAKE ONE AND ONE-HALF TABLETS BY MOUTH IN THE MORNING AND ONE IN THE EVENING, Disp: 235 tablet, Rfl: 0  •  Multiple Vitamins-Minerals (MULTI VITAMIN/MINERALS PO), Take 1 tablet by mouth Daily., Disp: , Rfl:   •  omeprazole (PRILOSEC) 20 MG capsule, Take 1 capsule by mouth Daily., Disp: , Rfl:   •  simvastatin (ZOCOR) 10 MG tablet, TAKE ONE TABLET BY MOUTH AT BEDTIME, Disp: 90 tablet, Rfl: 0  •  SITagliptin (JANUVIA) 100 MG tablet, Take 1 tablet by mouth Daily., Disp: 90 tablet, Rfl: 1      The following portions of  the patient's history were reviewed and updated as appropriate: allergies, current medications, past family history, past medical history, past social history, past surgical history and problem list.    Review of Systems   Constitutional: Positive for fatigue. Negative for chills and fever.   HENT: Negative for ear discharge, ear pain, sinus pressure and sore throat.    Respiratory: Negative for cough, chest tightness and shortness of breath.    Cardiovascular: Negative for chest pain, palpitations and leg swelling.   Gastrointestinal: Negative for diarrhea, nausea and vomiting.   Musculoskeletal: Positive for back pain. Negative for arthralgias and myalgias.   Neurological: Negative for dizziness, syncope and headaches.   Psychiatric/Behavioral: Negative for confusion and sleep disturbance.       Objective   /64  Pulse 81  Wt 211 lb 10.3 oz (96 kg)  SpO2 95%  BMI 30.37 kg/m2  Physical Exam   Constitutional: He is oriented to person, place, and time. He appears well-developed and well-nourished.   HENT:   Head: Normocephalic and atraumatic.   Right Ear: External ear normal.   Left Ear: External ear normal.   Mouth/Throat: No oropharyngeal exudate.   Eyes: Conjunctivae are normal. Pupils are equal, round, and reactive to light.   Neck: Neck supple. No thyromegaly present.   Cardiovascular: Normal rate and regular rhythm.    Pulmonary/Chest: Effort normal and breath sounds normal.   Abdominal: Soft. Bowel sounds are normal. He exhibits no distension. There is no tenderness.   Musculoskeletal: He exhibits no edema.   Neurological: He is alert and oriented to person, place, and time. No cranial nerve deficit.   Skin: Skin is warm and dry.   Psychiatric: He has a normal mood and affect. Judgment normal.   Nursing note and vitals reviewed.        Results for orders placed or performed in visit on 07/07/17   POC Glycosylated Hemoglobin (Hb A1C)   Result Value Ref Range    Hemoglobin A1C 9.1 %   POC Glucose  Fingerstick   Result Value Ref Range    Glucose 206 (A) 70 - 130 mg/dL             Assessment/Plan   Diagnoses and all orders for this visit:    Uncontrolled type 2 diabetes mellitus with other specified complication  Comments:  given Januvia 100mg x 6 weekis and toujeo x 1 mo, will be getting his rx from [facility] as well. Check A1C at follow up.    Mixed hyperlipidemia  -     Comprehensive Metabolic Panel  -     Lipid Panel    Essential hypertension  Comments:  better on recheck, 142/66, continue to monitor.,                 Return in about 6 weeks (around 10/19/2017).

## 2017-10-09 DIAGNOSIS — I10 ESSENTIAL HYPERTENSION: ICD-10-CM

## 2017-10-09 RX ORDER — GLIMEPIRIDE 4 MG/1
4 TABLET ORAL
Qty: 90 TABLET | Refills: 1 | Status: SHIPPED | OUTPATIENT
Start: 2017-10-09 | End: 2018-01-10 | Stop reason: SDUPTHER

## 2017-10-09 RX ORDER — LISINOPRIL 20 MG/1
20 TABLET ORAL 2 TIMES DAILY
Qty: 180 TABLET | Refills: 1 | Status: SHIPPED | OUTPATIENT
Start: 2017-10-09 | End: 2018-08-27

## 2017-10-09 RX ORDER — AMLODIPINE BESYLATE 5 MG/1
5 TABLET ORAL DAILY
Qty: 90 TABLET | Refills: 1 | Status: SHIPPED | OUTPATIENT
Start: 2017-10-09 | End: 2018-03-23 | Stop reason: SDUPTHER

## 2017-10-20 ENCOUNTER — TELEPHONE (OUTPATIENT)
Dept: INTERNAL MEDICINE | Facility: CLINIC | Age: 72
End: 2017-10-20

## 2017-10-20 ENCOUNTER — OFFICE VISIT (OUTPATIENT)
Dept: INTERNAL MEDICINE | Facility: CLINIC | Age: 72
End: 2017-10-20

## 2017-10-20 VITALS
OXYGEN SATURATION: 97 % | HEIGHT: 70 IN | RESPIRATION RATE: 18 BRPM | BODY MASS INDEX: 30.64 KG/M2 | DIASTOLIC BLOOD PRESSURE: 64 MMHG | HEART RATE: 89 BPM | WEIGHT: 214 LBS | SYSTOLIC BLOOD PRESSURE: 130 MMHG

## 2017-10-20 DIAGNOSIS — E11.69 UNCONTROLLED TYPE 2 DIABETES MELLITUS WITH OTHER SPECIFIED COMPLICATION, UNSPECIFIED LONG TERM INSULIN USE STATUS: Primary | ICD-10-CM

## 2017-10-20 DIAGNOSIS — E11.65 UNCONTROLLED TYPE 2 DIABETES MELLITUS WITH OTHER SPECIFIED COMPLICATION, UNSPECIFIED LONG TERM INSULIN USE STATUS: Primary | ICD-10-CM

## 2017-10-20 LAB — HBA1C MFR BLD: 9.2 %

## 2017-10-20 PROCEDURE — 83036 HEMOGLOBIN GLYCOSYLATED A1C: CPT | Performed by: INTERNAL MEDICINE

## 2017-10-20 PROCEDURE — 99213 OFFICE O/P EST LOW 20 MIN: CPT | Performed by: INTERNAL MEDICINE

## 2017-10-20 RX ORDER — INSULIN GLARGINE 100 [IU]/ML
15 INJECTION, SOLUTION SUBCUTANEOUS 2 TIMES DAILY
Qty: 4 PEN | Refills: 5 | Status: SHIPPED | OUTPATIENT
Start: 2017-10-20 | End: 2017-12-01

## 2017-10-20 NOTE — PROGRESS NOTES
Diabetes (6 week follow up . )    Subjective   Gabriela Mar is a 71 y.o. male is here today for follow-up.    History of Present Illness   Mr. Mar is here for a follow up on his Uncontrolled DM2.  He now has his Januvia and insulin. But notes his eating habits are nor compliant, he eats one big meal daily, and more carbs also.    Current Outpatient Prescriptions:   •  amLODIPine (NORVASC) 5 MG tablet, Take 1 tablet by mouth Daily., Disp: 90 tablet, Rfl: 1  •  aspirin (ASPIRIN LOW DOSE) 81 MG chewable tablet, Chew 1 tablet Daily. Resume in 1 month, Disp: , Rfl:   •  Cholecalciferol (VITAMIN D) 1000 UNITS tablet, Take 1 capsule by mouth Daily., Disp: , Rfl:   •  Cyanocobalamin (VITAMIN B-12 ER) 1000 MCG tablet controlled-release, Take 1,000 mcg by mouth Daily., Disp: , Rfl:   •  glimepiride (AMARYL) 4 MG tablet, Take 1 tablet by mouth Every Morning Before Breakfast., Disp: 90 tablet, Rfl: 1  •  Insulin Glargine (BASAGLAR KWIKPEN) 100 UNIT/ML injection pen, Inject 15 Units under the skin 2 (Two) Times a Day. With breakfast an dinner, Disp: 4 pen, Rfl: 5  •  lisinopril (PRINIVIL,ZESTRIL) 20 MG tablet, Take 1 tablet by mouth 2 (Two) Times a Day., Disp: 180 tablet, Rfl: 1  •  metFORMIN (GLUCOPHAGE) 1000 MG tablet, TAKE ONE AND ONE-HALF TABLETS BY MOUTH IN THE MORNING AND THEN TAKE ONE TABLET BY MOUTH IN THE EVENING, Disp: 235 tablet, Rfl: 0  •  Multiple Vitamins-Minerals (MULTI VITAMIN/MINERALS PO), Take 1 tablet by mouth Daily., Disp: , Rfl:   •  omeprazole (PRILOSEC) 20 MG capsule, Take 1 capsule by mouth Daily., Disp: , Rfl:   •  simvastatin (ZOCOR) 10 MG tablet, TAKE ONE TABLET BY MOUTH AT BEDTIME, Disp: 90 tablet, Rfl: 0  •  SITagliptin (JANUVIA) 100 MG tablet, Take 1 tablet by mouth Daily., Disp: 90 tablet, Rfl: 1      The following portions of the patient's history were reviewed and updated as appropriate: allergies, current medications, past family history, past medical history, past social history, past  "surgical history and problem list.    Review of Systems   Constitutional: Negative.  Negative for chills and fever.   HENT: Negative for ear discharge, ear pain, sinus pressure and sore throat.    Respiratory: Negative for cough, chest tightness and shortness of breath.    Cardiovascular: Negative for chest pain, palpitations and leg swelling.   Gastrointestinal: Negative for diarrhea, nausea and vomiting.   Musculoskeletal: Negative for arthralgias, back pain and myalgias.   Neurological: Negative for dizziness, syncope and headaches.   Psychiatric/Behavioral: Negative for confusion and sleep disturbance.       Objective   /64  Pulse 89  Resp 18  Ht 70\" (177.8 cm)  Wt 214 lb (97.1 kg)  SpO2 97%  BMI 30.71 kg/m2  Physical Exam   Constitutional: He is oriented to person, place, and time. He appears well-developed and well-nourished.   HENT:   Head: Normocephalic and atraumatic.   Mouth/Throat: No oropharyngeal exudate.   Eyes: Conjunctivae are normal. Pupils are equal, round, and reactive to light.   Neck: Neck supple. No thyromegaly present.   Cardiovascular: Normal rate and regular rhythm.    Pulmonary/Chest: Effort normal and breath sounds normal.   Abdominal: Soft. Bowel sounds are normal. He exhibits no distension. There is no tenderness.   Musculoskeletal: He exhibits tenderness (back). He exhibits no edema.   Neurological: He is alert and oriented to person, place, and time. No cranial nerve deficit.   Skin: Skin is warm and dry.   Psychiatric: He has a normal mood and affect. Judgment normal.   Nursing note and vitals reviewed.        Results for orders placed or performed in visit on 10/20/17   POC Glycosylated Hemoglobin (Hb A1C)   Result Value Ref Range    Hemoglobin A1C 9.2 %             Assessment/Plan   Diagnoses and all orders for this visit:    Uncontrolled type 2 diabetes mellitus with other specified complication, unspecified long term insulin use status  -     POC Glycosylated " Hemoglobin (Hb A1C)  -     Insulin Glargine (BASAGLAR KWIKPEN) 100 UNIT/ML injection pen; Inject 15 Units under the skin 2 (Two) Times a Day. With breakfast an dinner      Adv. To eat breakfast and dinner and not just 1 meal daily.Increase basaglar to bid, with meals, he will check if available at [facility].           Return in about 6 weeks (around 12/1/2017).

## 2017-10-20 NOTE — TELEPHONE ENCOUNTER
PHARMACY NEEDS CLARIFICATION ON PATIENTS INSULIN GLARGINE. THERE ARE TWO TYPES OF LANTUS AND BASAGLAR. THEY NEED TO KNOW WHICH ONE WE ARE GIVING PATIENT.

## 2017-11-27 RX ORDER — SIMVASTATIN 10 MG
TABLET ORAL
Qty: 90 TABLET | Refills: 0 | Status: SHIPPED | OUTPATIENT
Start: 2017-11-27 | End: 2017-12-01

## 2017-12-01 ENCOUNTER — OFFICE VISIT (OUTPATIENT)
Dept: INTERNAL MEDICINE | Facility: CLINIC | Age: 72
End: 2017-12-01

## 2017-12-01 VITALS
DIASTOLIC BLOOD PRESSURE: 70 MMHG | WEIGHT: 215.6 LBS | BODY MASS INDEX: 30.94 KG/M2 | OXYGEN SATURATION: 98 % | SYSTOLIC BLOOD PRESSURE: 140 MMHG | HEART RATE: 79 BPM

## 2017-12-01 DIAGNOSIS — E11.65 UNCONTROLLED TYPE 2 DIABETES MELLITUS WITH OTHER SPECIFIED COMPLICATION, UNSPECIFIED LONG TERM INSULIN USE STATUS: Primary | ICD-10-CM

## 2017-12-01 DIAGNOSIS — E11.69 UNCONTROLLED TYPE 2 DIABETES MELLITUS WITH OTHER SPECIFIED COMPLICATION, UNSPECIFIED LONG TERM INSULIN USE STATUS: Primary | ICD-10-CM

## 2017-12-01 DIAGNOSIS — E78.2 MIXED HYPERLIPIDEMIA: ICD-10-CM

## 2017-12-01 LAB — HBA1C MFR BLD: 9.4 %

## 2017-12-01 PROCEDURE — 83036 HEMOGLOBIN GLYCOSYLATED A1C: CPT | Performed by: INTERNAL MEDICINE

## 2017-12-01 PROCEDURE — 99214 OFFICE O/P EST MOD 30 MIN: CPT | Performed by: INTERNAL MEDICINE

## 2017-12-01 RX ORDER — LOVASTATIN 10 MG/1
10 TABLET ORAL NIGHTLY
Qty: 30 TABLET | Refills: 5 | Status: SHIPPED | OUTPATIENT
Start: 2017-12-01 | End: 2018-02-26

## 2017-12-01 RX ORDER — SYRINGE-NEEDLE,INSULIN,0.5 ML 27GX1/2"
1 SYRINGE, EMPTY DISPOSABLE MISCELLANEOUS 2 TIMES DAILY
Qty: 100 EACH | Refills: 5 | Status: SHIPPED | OUTPATIENT
Start: 2017-12-01 | End: 2022-05-26

## 2017-12-01 RX ORDER — PIOGLITAZONEHYDROCHLORIDE 15 MG/1
15 TABLET ORAL DAILY
Qty: 30 TABLET | Refills: 5 | Status: SHIPPED | OUTPATIENT
Start: 2017-12-01 | End: 2018-05-01

## 2017-12-01 NOTE — PROGRESS NOTES
"Diabetes (6 week fu with A1c)    Subjective   Gabriela Mar is a 72 y.o. male is here today for follow-up.    History of Present Illness   Having dental issues., stressed.  Has not been taking insulin x 10 days, as he ran out last week. Unable to get it from [facility].      Current Outpatient Prescriptions:   •  amLODIPine (NORVASC) 5 MG tablet, Take 1 tablet by mouth Daily., Disp: 90 tablet, Rfl: 1  •  aspirin (ASPIRIN LOW DOSE) 81 MG chewable tablet, Chew 1 tablet Daily. Resume in 1 month, Disp: , Rfl:   •  Cholecalciferol (VITAMIN D) 1000 UNITS tablet, Take 1 capsule by mouth Daily., Disp: , Rfl:   •  Cyanocobalamin (VITAMIN B-12 ER) 1000 MCG tablet controlled-release, Take 1,000 mcg by mouth Daily., Disp: , Rfl:   •  glimepiride (AMARYL) 4 MG tablet, Take 1 tablet by mouth Every Morning Before Breakfast., Disp: 90 tablet, Rfl: 1  •  lisinopril (PRINIVIL,ZESTRIL) 20 MG tablet, Take 1 tablet by mouth 2 (Two) Times a Day., Disp: 180 tablet, Rfl: 1  •  metFORMIN (GLUCOPHAGE) 1000 MG tablet, TAKE ONE AND ONE-HALF TABLETS BY MOUTH IN THE MORNING AND THEN TAKE ONE TABLET BY MOUTH IN THE EVENING, Disp: 235 tablet, Rfl: 0  •  Multiple Vitamins-Minerals (MULTI VITAMIN/MINERALS PO), Take 1 tablet by mouth Daily., Disp: , Rfl:   •  omeprazole (PRILOSEC) 20 MG capsule, Take 1 capsule by mouth Daily., Disp: , Rfl:   •  SITagliptin (JANUVIA) 100 MG tablet, Take 1 tablet by mouth Daily., Disp: 90 tablet, Rfl: 1  •  insulin NPH (humuLIN N,novoLIN N) 100 UNIT/ML injection, Inject 15 Units under the skin 2 (Two) Times a Day Before Meals., Disp: 10 mL, Rfl: 3  •  Insulin Syringe 28G X 1/2\" 1 ML misc, 1 each 2 (Two) Times a Day., Disp: 100 each, Rfl: 5  •  lovastatin (MEVACOR) 10 MG tablet, Take 1 tablet by mouth Every Night., Disp: 30 tablet, Rfl: 5  •  pioglitazone (ACTOS) 15 MG tablet, Take 1 tablet by mouth Daily., Disp: 30 tablet, Rfl: 5      The following portions of the patient's history were reviewed and updated as " appropriate: allergies, current medications, past family history, past medical history, past social history, past surgical history and problem list.    Review of Systems   Constitutional: Negative.  Negative for chills and fever.   HENT: Negative for ear discharge, ear pain, sinus pressure and sore throat.    Respiratory: Negative for cough, chest tightness and shortness of breath.    Cardiovascular: Negative for chest pain, palpitations and leg swelling.   Gastrointestinal: Negative for diarrhea, nausea and vomiting.   Musculoskeletal: Negative for arthralgias, back pain and myalgias.   Neurological: Negative for dizziness, syncope and headaches.   Psychiatric/Behavioral: Negative for confusion and sleep disturbance.       Objective   /70  Pulse 79  Wt 215 lb 9.6 oz (97.8 kg)  SpO2 98% Comment: ra  BMI 30.94 kg/m2  Physical Exam   Constitutional: He is oriented to person, place, and time. He appears well-developed and well-nourished.   HENT:   Head: Normocephalic and atraumatic.   Right Ear: External ear normal.   Left Ear: External ear normal.   Mouth/Throat: No oropharyngeal exudate.   Eyes: Conjunctivae are normal. Pupils are equal, round, and reactive to light.   Neck: Neck supple. No thyromegaly present.   Cardiovascular: Normal rate and regular rhythm.    Pulmonary/Chest: Effort normal and breath sounds normal.   Abdominal: Soft. Bowel sounds are normal. He exhibits no distension. There is no tenderness.   Musculoskeletal: He exhibits no edema.   Neurological: He is alert and oriented to person, place, and time. No cranial nerve deficit.   Skin: Skin is warm and dry.   Psychiatric: He has a normal mood and affect. Judgment normal.   Nursing note and vitals reviewed.        Results for orders placed or performed in visit on 12/01/17   POC Glycosylated Hemoglobin (Hb A1C)   Result Value Ref Range    Hemoglobin A1C 9.4 %             Assessment/Plan   Diagnoses and all orders for this  "visit:    Uncontrolled type 2 diabetes mellitus with other specified complication, unspecified long term insulin use status  -     POC Glycosylated Hemoglobin (Hb A1C)  -     pioglitazone (ACTOS) 15 MG tablet; Take 1 tablet by mouth Daily.  -     insulin NPH (humuLIN N,novoLIN N) 100 UNIT/ML injection; Inject 15 Units under the skin 2 (Two) Times a Day Before Meals.  -     Insulin Syringe 28G X 1/2\" 1 ML misc; 1 each 2 (Two) Times a Day.    Mixed hyperlipidemia  -     lovastatin (MEVACOR) 10 MG tablet; Take 1 tablet by mouth Every Night.             Start actos, change insulin to nph to make it more affordable.    Return in about 10 weeks (around 2/9/2018) for Recheck.  "

## 2018-01-10 RX ORDER — GLIMEPIRIDE 4 MG/1
TABLET ORAL
Qty: 90 TABLET | Refills: 0 | Status: SHIPPED | OUTPATIENT
Start: 2018-01-10 | End: 2018-02-23 | Stop reason: SDUPTHER

## 2018-02-09 ENCOUNTER — OFFICE VISIT (OUTPATIENT)
Dept: INTERNAL MEDICINE | Facility: CLINIC | Age: 73
End: 2018-02-09

## 2018-02-09 VITALS
DIASTOLIC BLOOD PRESSURE: 70 MMHG | BODY MASS INDEX: 31.88 KG/M2 | OXYGEN SATURATION: 99 % | WEIGHT: 222.2 LBS | SYSTOLIC BLOOD PRESSURE: 158 MMHG | HEART RATE: 88 BPM

## 2018-02-09 DIAGNOSIS — E11.69 UNCONTROLLED TYPE 2 DIABETES MELLITUS WITH OTHER SPECIFIED COMPLICATION, UNSPECIFIED LONG TERM INSULIN USE STATUS: Primary | ICD-10-CM

## 2018-02-09 DIAGNOSIS — E11.65 UNCONTROLLED TYPE 2 DIABETES MELLITUS WITH OTHER SPECIFIED COMPLICATION, UNSPECIFIED LONG TERM INSULIN USE STATUS: Primary | ICD-10-CM

## 2018-02-09 PROCEDURE — 99213 OFFICE O/P EST LOW 20 MIN: CPT | Performed by: INTERNAL MEDICINE

## 2018-02-09 NOTE — PROGRESS NOTES
"Follow-up and Diabetes    Subjective   Gabriela Mar is a 72 y.o. male is here today for follow-up.    History of Present Illness   He feels his sugars are staying high.  On the Novolin N, for cost reasons., started with 15 units bid, and increased to 20 , then 25 units bid now.  Sugars are better, this AM was 205. He just has 1 meal a day.    Current Outpatient Prescriptions:   •  amLODIPine (NORVASC) 5 MG tablet, Take 1 tablet by mouth Daily., Disp: 90 tablet, Rfl: 1  •  aspirin (ASPIRIN LOW DOSE) 81 MG chewable tablet, Chew 1 tablet Daily. Resume in 1 month, Disp: , Rfl:   •  Cholecalciferol (VITAMIN D) 1000 UNITS tablet, Take 1 capsule by mouth Daily., Disp: , Rfl:   •  Cyanocobalamin (VITAMIN B-12 ER) 1000 MCG tablet controlled-release, Take 1,000 mcg by mouth Daily., Disp: , Rfl:   •  glimepiride (AMARYL) 4 MG tablet, TAKE ONE TABLET BY MOUTH IN THE MORNING BEFORE  BREAKFAST, Disp: 90 tablet, Rfl: 0  •  insulin NPH (humuLIN N,novoLIN N) 100 UNIT/ML injection, Inject 15 Units under the skin 2 (Two) Times a Day Before Meals., Disp: 10 mL, Rfl: 3  •  Insulin Syringe 28G X 1/2\" 1 ML misc, 1 each 2 (Two) Times a Day., Disp: 100 each, Rfl: 5  •  lisinopril (PRINIVIL,ZESTRIL) 20 MG tablet, Take 1 tablet by mouth 2 (Two) Times a Day., Disp: 180 tablet, Rfl: 1  •  lovastatin (MEVACOR) 10 MG tablet, Take 1 tablet by mouth Every Night., Disp: 30 tablet, Rfl: 5  •  metFORMIN (GLUCOPHAGE) 1000 MG tablet, TAKE ONE AND ONE-HALF TABLETS BY MOUTH IN THE MORNING AND ONE IN THE EVENING, Disp: 235 tablet, Rfl: 0  •  Multiple Vitamins-Minerals (MULTI VITAMIN/MINERALS PO), Take 1 tablet by mouth Daily., Disp: , Rfl:   •  omeprazole (PRILOSEC) 20 MG capsule, Take 1 capsule by mouth Daily., Disp: , Rfl:   •  pioglitazone (ACTOS) 15 MG tablet, Take 1 tablet by mouth Daily., Disp: 30 tablet, Rfl: 5  •  SITagliptin (JANUVIA) 100 MG tablet, Take 1 tablet by mouth Daily., Disp: 90 tablet, Rfl: 1      The following portions of the " patient's history were reviewed and updated as appropriate: allergies, current medications, past family history, past medical history, past social history, past surgical history and problem list.    Review of Systems   Constitutional: Negative for activity change and appetite change.   Respiratory: Negative for cough and shortness of breath.    Cardiovascular: Negative for chest pain and leg swelling.       Objective   /70  Pulse 88  Wt 101 kg (222 lb 3.2 oz)  SpO2 99%  BMI 31.88 kg/m2  Physical Exam   Constitutional: He is oriented to person, place, and time. He appears well-developed and well-nourished.   HENT:   Head: Normocephalic and atraumatic.   Mouth/Throat: No oropharyngeal exudate.   Eyes: Conjunctivae are normal. Pupils are equal, round, and reactive to light.   Neck: Neck supple.   Cardiovascular: Normal rate and regular rhythm.    Pulmonary/Chest: Effort normal and breath sounds normal.   Musculoskeletal: He exhibits no edema.   Neurological: He is alert and oriented to person, place, and time. No cranial nerve deficit.   Skin: Skin is warm and dry.   Psychiatric: He has a normal mood and affect. Judgment normal.   Nursing note and vitals reviewed.        Results for orders placed or performed in visit on 12/01/17   POC Glycosylated Hemoglobin (Hb A1C)   Result Value Ref Range    Hemoglobin A1C 9.4 %             Assessment/Plan   Diagnoses and all orders for this visit:    Uncontrolled type 2 diabetes mellitus with other specified complication, unspecified long term insulin use status    Called pharmacy to have the pioglitazone rx ready, adv. Pt to start today.  Continue novolin N @ 25U bid  Recheck labs in 6 weeks.             Return in about 6 weeks (around 3/23/2018) for Next scheduled follow up.with fasting labs.

## 2018-02-23 RX ORDER — GLIMEPIRIDE 4 MG/1
TABLET ORAL
Qty: 90 TABLET | Refills: 0 | Status: SHIPPED | OUTPATIENT
Start: 2018-02-23 | End: 2018-04-09 | Stop reason: SDUPTHER

## 2018-02-26 RX ORDER — SIMVASTATIN 10 MG
TABLET ORAL
Qty: 90 TABLET | Refills: 1 | Status: SHIPPED | OUTPATIENT
Start: 2018-02-26 | End: 2018-08-27 | Stop reason: SDUPTHER

## 2018-02-26 NOTE — TELEPHONE ENCOUNTER
Called pt, per pt he is taking simvastatin not lovastatin and does need it refilled, med refilled electronically pt is within protocol, med list updated.

## 2018-03-23 ENCOUNTER — OFFICE VISIT (OUTPATIENT)
Dept: INTERNAL MEDICINE | Facility: CLINIC | Age: 73
End: 2018-03-23

## 2018-03-23 VITALS
SYSTOLIC BLOOD PRESSURE: 160 MMHG | DIASTOLIC BLOOD PRESSURE: 58 MMHG | BODY MASS INDEX: 32.07 KG/M2 | OXYGEN SATURATION: 98 % | HEART RATE: 95 BPM | WEIGHT: 224 LBS | HEIGHT: 70 IN

## 2018-03-23 DIAGNOSIS — E11.65 UNCONTROLLED TYPE 2 DIABETES MELLITUS WITH OTHER SPECIFIED COMPLICATION, UNSPECIFIED LONG TERM INSULIN USE STATUS: Primary | ICD-10-CM

## 2018-03-23 DIAGNOSIS — J40 BRONCHITIS: ICD-10-CM

## 2018-03-23 DIAGNOSIS — E78.2 MIXED HYPERLIPIDEMIA: ICD-10-CM

## 2018-03-23 DIAGNOSIS — R06.09 DYSPNEA ON EXERTION: ICD-10-CM

## 2018-03-23 DIAGNOSIS — E11.69 UNCONTROLLED TYPE 2 DIABETES MELLITUS WITH OTHER SPECIFIED COMPLICATION, UNSPECIFIED LONG TERM INSULIN USE STATUS: Primary | ICD-10-CM

## 2018-03-23 DIAGNOSIS — I10 ESSENTIAL HYPERTENSION: ICD-10-CM

## 2018-03-23 LAB
ALBUMIN SERPL-MCNC: 4.1 G/DL (ref 3.2–4.8)
ALBUMIN/GLOB SERPL: 1.5 G/DL (ref 1.5–2.5)
ALP SERPL-CCNC: 82 U/L (ref 25–100)
ALT SERPL W P-5'-P-CCNC: 29 U/L (ref 7–40)
ANION GAP SERPL CALCULATED.3IONS-SCNC: 10 MMOL/L (ref 3–11)
ARTICHOKE IGE QN: 47 MG/DL (ref 0–130)
AST SERPL-CCNC: 31 U/L (ref 0–33)
BILIRUB SERPL-MCNC: 0.3 MG/DL (ref 0.3–1.2)
BNP SERPL-MCNC: 24 PG/ML (ref 0–100)
BUN BLD-MCNC: 24 MG/DL (ref 9–23)
BUN/CREAT SERPL: 18.5 (ref 7–25)
CALCIUM SPEC-SCNC: 8.7 MG/DL (ref 8.7–10.4)
CHLORIDE SERPL-SCNC: 101 MMOL/L (ref 99–109)
CHOLEST SERPL-MCNC: 99 MG/DL (ref 0–200)
CO2 SERPL-SCNC: 28 MMOL/L (ref 20–31)
CREAT BLD-MCNC: 1.3 MG/DL (ref 0.6–1.3)
DEPRECATED RDW RBC AUTO: 46.6 FL (ref 37–54)
ERYTHROCYTE [DISTWIDTH] IN BLOOD BY AUTOMATED COUNT: 13.9 % (ref 11.3–14.5)
GFR SERPL CREATININE-BSD FRML MDRD: 54 ML/MIN/1.73
GLOBULIN UR ELPH-MCNC: 2.8 GM/DL
GLUCOSE BLD-MCNC: 80 MG/DL (ref 70–100)
GLUCOSE BLDC GLUCOMTR-MCNC: 75 MG/DL (ref 70–130)
HBA1C MFR BLD: 8.3 %
HCT VFR BLD AUTO: 37.3 % (ref 38.9–50.9)
HDLC SERPL-MCNC: 44 MG/DL (ref 40–60)
HGB BLD-MCNC: 11.7 G/DL (ref 13.1–17.5)
MCH RBC QN AUTO: 28.7 PG (ref 27–31)
MCHC RBC AUTO-ENTMCNC: 31.4 G/DL (ref 32–36)
MCV RBC AUTO: 91.4 FL (ref 80–99)
PLATELET # BLD AUTO: 228 10*3/MM3 (ref 150–450)
PMV BLD AUTO: 11.9 FL (ref 6–12)
POTASSIUM BLD-SCNC: 4.8 MMOL/L (ref 3.5–5.5)
PROT SERPL-MCNC: 6.9 G/DL (ref 5.7–8.2)
RBC # BLD AUTO: 4.08 10*6/MM3 (ref 4.2–5.76)
SODIUM BLD-SCNC: 139 MMOL/L (ref 132–146)
TRIGL SERPL-MCNC: 86 MG/DL (ref 0–150)
TSH SERPL DL<=0.05 MIU/L-ACNC: 1.66 MIU/ML (ref 0.35–5.35)
WBC NRBC COR # BLD: 10.61 10*3/MM3 (ref 3.5–10.8)

## 2018-03-23 PROCEDURE — 85027 COMPLETE CBC AUTOMATED: CPT | Performed by: INTERNAL MEDICINE

## 2018-03-23 PROCEDURE — 99214 OFFICE O/P EST MOD 30 MIN: CPT | Performed by: INTERNAL MEDICINE

## 2018-03-23 PROCEDURE — 80061 LIPID PANEL: CPT | Performed by: INTERNAL MEDICINE

## 2018-03-23 PROCEDURE — 83880 ASSAY OF NATRIURETIC PEPTIDE: CPT | Performed by: INTERNAL MEDICINE

## 2018-03-23 PROCEDURE — 84443 ASSAY THYROID STIM HORMONE: CPT | Performed by: INTERNAL MEDICINE

## 2018-03-23 PROCEDURE — 80053 COMPREHEN METABOLIC PANEL: CPT | Performed by: INTERNAL MEDICINE

## 2018-03-23 PROCEDURE — 83036 HEMOGLOBIN GLYCOSYLATED A1C: CPT | Performed by: INTERNAL MEDICINE

## 2018-03-23 PROCEDURE — 82947 ASSAY GLUCOSE BLOOD QUANT: CPT | Performed by: INTERNAL MEDICINE

## 2018-03-23 RX ORDER — AZITHROMYCIN 250 MG/1
TABLET, FILM COATED ORAL
Qty: 6 TABLET | Refills: 0 | Status: SHIPPED | OUTPATIENT
Start: 2018-03-23 | End: 2018-05-01

## 2018-03-23 RX ORDER — AMLODIPINE BESYLATE 5 MG/1
5 TABLET ORAL 2 TIMES DAILY
Qty: 180 TABLET | Refills: 1 | Status: SHIPPED | OUTPATIENT
Start: 2018-03-23 | End: 2018-08-27 | Stop reason: SDUPTHER

## 2018-03-23 RX ORDER — CETIRIZINE HYDROCHLORIDE 10 MG/1
10 TABLET ORAL DAILY
Qty: 30 TABLET | Refills: 2 | Status: SHIPPED | OUTPATIENT
Start: 2018-03-23 | End: 2018-11-29

## 2018-03-23 NOTE — PROGRESS NOTES
"Diabetes (type 2. 6 week fu)    Subjective   Gabriela Mar is a 72 y.o. male is here today for follow-up.    History of Present Illness   Here for follow up on his Diabetes, eating better. Sugars are low in the morning, appx 70-85.  Had increased his insulin to 25 Unts Bid, and also taking the pioglitazone.    Also notes his breathing is worse, more dyspnea on exertion.Unable to exercise or walk to the mailbox, has never had allergies before    Current Outpatient Prescriptions:   •  amLODIPine (NORVASC) 5 MG tablet, Take 1 tablet by mouth 2 (Two) Times a Day., Disp: 180 tablet, Rfl: 1  •  aspirin (ASPIRIN LOW DOSE) 81 MG chewable tablet, Chew 1 tablet Daily. Resume in 1 month, Disp: , Rfl:   •  Cholecalciferol (VITAMIN D) 1000 UNITS tablet, Take 1 capsule by mouth Daily., Disp: , Rfl:   •  Cyanocobalamin (VITAMIN B-12 ER) 1000 MCG tablet controlled-release, Take 1,000 mcg by mouth Daily., Disp: , Rfl:   •  glimepiride (AMARYL) 4 MG tablet, TAKE ONE TABLET BY MOUTH IN THE MORNING BEFORE  BREAKFAST, Disp: 90 tablet, Rfl: 0  •  insulin NPH (humuLIN N,novoLIN N) 100 UNIT/ML injection, Inject 25 Units under the skin 2 (Two) Times a Day Before Meals., Disp: 20 mL, Rfl: 5  •  Insulin Syringe 28G X 1/2\" 1 ML misc, 1 each 2 (Two) Times a Day., Disp: 100 each, Rfl: 5  •  lisinopril (PRINIVIL,ZESTRIL) 20 MG tablet, Take 1 tablet by mouth 2 (Two) Times a Day., Disp: 180 tablet, Rfl: 1  •  metFORMIN (GLUCOPHAGE) 1000 MG tablet, TAKE ONE AND ONE-HALF TABLETS BY MOUTH IN THE MORNING AND ONE IN THE EVENING, Disp: 235 tablet, Rfl: 0  •  Multiple Vitamins-Minerals (MULTI VITAMIN/MINERALS PO), Take 1 tablet by mouth Daily., Disp: , Rfl:   •  omeprazole (PRILOSEC) 20 MG capsule, Take 1 capsule by mouth Daily., Disp: , Rfl:   •  pioglitazone (ACTOS) 15 MG tablet, Take 1 tablet by mouth Daily., Disp: 30 tablet, Rfl: 5  •  simvastatin (ZOCOR) 10 MG tablet, TAKE ONE TABLET BY MOUTH AT BEDTIME, Disp: 90 tablet, Rfl: 1  •  azithromycin " "(ZITHROMAX Z-SCARLETT) 250 MG tablet, Take 2 tablets the first day, then 1 tablet daily for 4 days., Disp: 6 tablet, Rfl: 0  •  cetirizine (zyrTEC) 10 MG tablet, Take 1 tablet by mouth Daily., Disp: 30 tablet, Rfl: 2      The following portions of the patient's history were reviewed and updated as appropriate: allergies, current medications, past family history, past medical history, past social history, past surgical history and problem list.    Review of Systems   Constitutional: Positive for activity change and fatigue. Negative for chills and fever.   HENT: Negative for ear discharge, ear pain, sinus pressure and sore throat.    Respiratory: Positive for cough and shortness of breath. Negative for chest tightness.    Cardiovascular: Negative for chest pain, palpitations and leg swelling.   Gastrointestinal: Negative for diarrhea, nausea and vomiting.   Musculoskeletal: Negative for arthralgias, back pain and myalgias.   Neurological: Negative for dizziness and headaches.   Psychiatric/Behavioral: Negative for confusion and sleep disturbance.       Objective   /58   Pulse 95   Ht 177.8 cm (70\")   Wt 102 kg (224 lb)   SpO2 98%   BMI 32.14 kg/m²   Physical Exam   Constitutional: He is oriented to person, place, and time. He appears well-developed and well-nourished.   HENT:   Head: Normocephalic and atraumatic.   Right Ear: Tympanic membrane is bulging.   Left Ear: Tympanic membrane is bulging.   Mouth/Throat: Posterior oropharyngeal erythema present. No oropharyngeal exudate or tonsillar abscesses.   Eyes: Pupils are equal, round, and reactive to light.   Neck: Normal range of motion.   Cardiovascular: Normal rate and regular rhythm.    Pulmonary/Chest: Effort normal and breath sounds normal. No stridor.   Abdominal: Soft. Bowel sounds are normal.   Lymphadenopathy:     He has no cervical adenopathy.   Neurological: He is alert and oriented to person, place, and time.   Skin: Skin is warm and dry. "   Psychiatric: He has a normal mood and affect.         Results for orders placed or performed in visit on 03/23/18   CBC (No Diff)   Result Value Ref Range    WBC 10.61 3.50 - 10.80 10*3/mm3    RBC 4.08 (L) 4.20 - 5.76 10*6/mm3    Hemoglobin 11.7 (L) 13.1 - 17.5 g/dL    Hematocrit 37.3 (L) 38.9 - 50.9 %    MCV 91.4 80.0 - 99.0 fL    MCH 28.7 27.0 - 31.0 pg    MCHC 31.4 (L) 32.0 - 36.0 g/dL    RDW 13.9 11.3 - 14.5 %    RDW-SD 46.6 37.0 - 54.0 fl    MPV 11.9 6.0 - 12.0 fL    Platelets 228 150 - 450 10*3/mm3   Comprehensive Metabolic Panel   Result Value Ref Range    Glucose 80 70 - 100 mg/dL    BUN 24 (H) 9 - 23 mg/dL    Creatinine 1.30 0.60 - 1.30 mg/dL    Sodium 139 132 - 146 mmol/L    Potassium 4.8 3.5 - 5.5 mmol/L    Chloride 101 99 - 109 mmol/L    CO2 28.0 20.0 - 31.0 mmol/L    Calcium 8.7 8.7 - 10.4 mg/dL    Total Protein 6.9 5.7 - 8.2 g/dL    Albumin 4.10 3.20 - 4.80 g/dL    ALT (SGPT) 29 7 - 40 U/L    AST (SGOT) 31 0 - 33 U/L    Alkaline Phosphatase 82 25 - 100 U/L    Total Bilirubin 0.3 0.3 - 1.2 mg/dL    eGFR Non African Amer 54 (L) >60 mL/min/1.73    Globulin 2.8 gm/dL    A/G Ratio 1.5 1.5 - 2.5 g/dL    BUN/Creatinine Ratio 18.5 7.0 - 25.0    Anion Gap 10.0 3.0 - 11.0 mmol/L   Lipid Panel   Result Value Ref Range    Total Cholesterol 99 0 - 200 mg/dL    Triglycerides 86 0 - 150 mg/dL    HDL Cholesterol 44 40 - 60 mg/dL    LDL Cholesterol  47 0 - 130 mg/dL   TSH   Result Value Ref Range    TSH 1.659 0.350 - 5.350 mIU/mL   BNP   Result Value Ref Range    BNP 24.0 0.0 - 100.0 pg/mL   POC Glycosylated Hemoglobin (Hb A1C)   Result Value Ref Range    Hemoglobin A1C 8.3 %   POC Glucose Fingerstick   Result Value Ref Range    Glucose 75 70 - 130 mg/dL             Assessment/Plan   Diagnoses and all orders for this visit:    Uncontrolled type 2 diabetes mellitus with other specified complication, unspecified long term insulin use status  -     POC Glycosylated Hemoglobin (Hb A1C)  -     POC Glucose Fingerstick  -      insulin NPH (humuLIN N,novoLIN N) 100 UNIT/ML injection; Inject 25 Units under the skin 2 (Two) Times a Day Before Meals.    Bronchitis  Comments:  vs asthma/ allergies, meds as below.    Dyspnea on exertion  -     BNP    Essential hypertension  -     CBC (No Diff)  -     Comprehensive Metabolic Panel  -     amLODIPine (NORVASC) 5 MG tablet; Take 1 tablet by mouth 2 (Two) Times a Day.    Mixed hyperlipidemia  -     Lipid Panel  -     TSH    Other orders  -     azithromycin (ZITHROMAX Z-SCARLETT) 250 MG tablet; Take 2 tablets the first day, then 1 tablet daily for 4 days.  -     cetirizine (zyrTEC) 10 MG tablet; Take 1 tablet by mouth Daily.             Return in about 1 month (around 4/23/2018) for Next scheduled follow up.

## 2018-04-09 RX ORDER — GLIMEPIRIDE 4 MG/1
TABLET ORAL
Qty: 90 TABLET | Refills: 0 | Status: SHIPPED | OUTPATIENT
Start: 2018-04-09 | End: 2018-05-25 | Stop reason: SDUPTHER

## 2018-04-26 ENCOUNTER — OFFICE VISIT (OUTPATIENT)
Dept: ORTHOPEDIC SURGERY | Facility: CLINIC | Age: 73
End: 2018-04-26

## 2018-04-26 VITALS
HEIGHT: 70 IN | DIASTOLIC BLOOD PRESSURE: 70 MMHG | HEART RATE: 92 BPM | BODY MASS INDEX: 32.95 KG/M2 | SYSTOLIC BLOOD PRESSURE: 159 MMHG | WEIGHT: 230.16 LBS

## 2018-04-26 DIAGNOSIS — Z96.643 STATUS POST TOTAL REPLACEMENT OF BOTH HIPS: Primary | ICD-10-CM

## 2018-04-26 PROCEDURE — 99213 OFFICE O/P EST LOW 20 MIN: CPT | Performed by: PHYSICIAN ASSISTANT

## 2018-04-26 NOTE — PROGRESS NOTES
"        [facility] Note    Subjective     Patient: Gabriela Mar  : 1945    Primary Care Provider: Krystina Gee MD    Requesting Provider: As above    Follow-up of the Left Hip (1 year follow up, 2 years status post: Left total hip arthroplasty 3/1/2016) and Follow-up of the Right Hip (1 year status post: Right total hip arthroplasty 3/7/2017)      History    Chief Complaint:  comes in for follow-up bilateral total hip arthroplasty.    History of Present Illness: This is a very pleasant 72-year-old male presenting today for routine annual follow-up bilateral total hip arthroplasty with Dr. purnima Suarez.  He reports no pain or problems from the hips.  He is having back problems that is followed by Dr. Mark.  He has been very pleased with the outcome of his hip replacements.    Current Outpatient Prescriptions on File Prior to Visit   Medication Sig Dispense Refill   • amLODIPine (NORVASC) 5 MG tablet Take 1 tablet by mouth 2 (Two) Times a Day. 180 tablet 1   • aspirin (ASPIRIN LOW DOSE) 81 MG chewable tablet Chew 1 tablet Daily. Resume in 1 month     • azithromycin (ZITHROMAX Z-SCARLETT) 250 MG tablet Take 2 tablets the first day, then 1 tablet daily for 4 days. 6 tablet 0   • cetirizine (zyrTEC) 10 MG tablet Take 1 tablet by mouth Daily. 30 tablet 2   • Cholecalciferol (VITAMIN D) 1000 UNITS tablet Take 1 capsule by mouth Daily.     • Cyanocobalamin (VITAMIN B-12 ER) 1000 MCG tablet controlled-release Take 1,000 mcg by mouth Daily.     • glimepiride (AMARYL) 4 MG tablet TAKE 1 TABLET BY MOUTH IN THE MORNING BEFORE  BREAKFAST 90 tablet 0   • insulin NPH (humuLIN N,novoLIN N) 100 UNIT/ML injection Inject 25 Units under the skin 2 (Two) Times a Day Before Meals. 20 mL 5   • Insulin Syringe 28G X 1/2\" 1 ML misc 1 each 2 (Two) Times a Day. 100 each 5   • lisinopril (PRINIVIL,ZESTRIL) 20 MG tablet Take 1 tablet by mouth 2 (Two) Times a Day. 180 tablet 1   • metFORMIN (GLUCOPHAGE) 1000 MG " tablet TAKE ONE AND ONE-HALF TABLETS BY MOUTH IN THE MORNING AND ONE IN THE EVENING 235 tablet 0   • Multiple Vitamins-Minerals (MULTI VITAMIN/MINERALS PO) Take 1 tablet by mouth Daily.     • omeprazole (PRILOSEC) 20 MG capsule Take 1 capsule by mouth Daily.     • pioglitazone (ACTOS) 15 MG tablet Take 1 tablet by mouth Daily. 30 tablet 5   • simvastatin (ZOCOR) 10 MG tablet TAKE ONE TABLET BY MOUTH AT BEDTIME 90 tablet 1     No current facility-administered medications on file prior to visit.       Allergies   Allergen Reactions   • Bactrim [Sulfamethoxazole-Trimethoprim] Other (See Comments)     Joint pain   • Sulfa Antibiotics      Makes bones hurt        Past Medical History:   Diagnosis Date   • Arthritis    • Diabetes mellitus     DIAGNOSED 10 YEARS AGO; CHECKS FSBG TID   • Esophagitis, reflux    • Hyperlipidemia    • Hypertension    • Obesity    • Pharyngitis    • Wears eyeglasses      Past Surgical History:   Procedure Laterality Date   • COLONOSCOPY      2015   • ENDOSCOPY      2011   • JOINT REPLACEMENT      LEFT HIP 2016-MARCH    • SUBMAXILLARY CYST  EXCISION      Right back   • TONSILLECTOMY     • TOTAL HIP ARTHROPLASTY Right 3/7/2017    Procedure: RIGHT TOTAL HIP ARTHROPLASTY;  Surgeon: Reynaldo Suarez MD;  Location: [facility];  Service:    • VASECTOMY     • WRIST GANGLION EXCISION       Family History   Problem Relation Age of Onset   • Diabetes Mother    • Cancer Father      colon   • Diabetes Father    • Hypertension Father    • Kidney failure Sister    • Diabetes Sister    • Cancer Brother      lung   • Hypertension Brother    • Diabetes Brother    • Coronary artery disease Brother    • Cancer Brother      colon   • Hypertension Brother       Social History     Social History   • Marital status:      Spouse name: N/A   • Number of children: N/A   • Years of education: N/A     Occupational History   • Not on file.     Social History Main Topics   • Smoking status: Former Smoker      "Packs/day: 2.00     Years: 35.00     Types: Cigarettes   • Smokeless tobacco: Never Used      Comment: QUIT 20 PLUS YEARS AGO    • Alcohol use No      Comment: stopped drinking   • Drug use: No   • Sexual activity: Defer     Other Topics Concern   • Not on file     Social History Narrative   • No narrative on file        Review of Systems    The following portions of the patient's history were reviewed and updated as appropriate: allergies, current medications, past family history, past medical history, past social history, past surgical history and problem list.      Objective      Physical Exam  /70   Pulse 92   Ht 177.8 cm (70\")   Wt 104 kg (230 lb 2.6 oz)   BMI 33.02 kg/m²     Body mass index is 33.02 kg/m².    GENERAL: Body habitus: obese    Lower extremity edema: Left: trace; Right: trace    Varicose veins:  Left: moderate; Right: moderate    Gait: normal     Mental Status:  awake and alert; oriented to person, place, and time    Voice:  clear  SKIN:  Normal    Hair Growth:  Right:normal; Left:  normal  HEENT: Head: Normocephalic, atraumatic,  without obvious abnormality.  eye: normal external eye, no icterus   PULM:  Repiratory effort normal    Ortho Exam  Right  hip    RANGE OF MOTION:   FLEXION CONTRACTURE: None   FLEXION: 110 degrees   INTERNAL ROTATION: 20 degrees at 90 degrees of flexion   EXTERNAL ROTATION: 40 degrees at 90 degrees of flexion    PAIN WITH HIP MOTION: no  PAIN WITH LOGROLL: no  STINCHFIELD TEST: negative    STRENGTH:  5/5 hip adduction, abduction, flexion. 5/5 strength knee flexion, extension. 5/5 strength ankle dorsiflexion and plantarflexion.     GREATER TROCHANTER BURSAL PAIN:  No    SENSATION TO LIGHT TOUCH:  DEEP PERONEAL/SUPERFICIAL PERONEAL/SURAL/SAPHENOUS/TIBIAL:  intact      Left hip exam:    RANGE OF MOTION:   FLEXION CONTRACTURE: None   FLEXION: 110 degrees   INTERNAL ROTATION: 20 degrees at 90 degrees of flexion   EXTERNAL ROTATION: 40 degrees at 90 degrees of " flexion    PAIN WITH HIP MOTION: no  PAIN WITH LOGROLL: no  STINCHFIELD TEST: negative    STRENGTH:  5/5 hip adduction, abduction, flexion. 5/5 strength knee flexion, extension. 5/5 strength ankle dorsiflexion and plantarflexion.     GREATER TROCHANTER BURSAL PAIN:  No    SENSATION TO LIGHT TOUCH:  DEEP PERONEAL/SUPERFICIAL PERONEAL/SURAL/SAPHENOUS/TIBIAL:  intact      Medical Decision Making    Data Review:   ordered and reviewed x-rays today    Assessment:  1. Status post total replacement of both hips        Plan:  Doing well status post bilateral total hip arthroplasty.  I reviewed x-rays and clinical findings with the patient.  On exam, he has good range of motion both hips with no reproducible pain.  X-rays show well-positioned total hip arthroplasties with no evidence of osteolysis, subsidence or fracture.  Plan is continued observation.  He'll return in 2 years with repeat x-rays of bilateral hips or sooner if needed.      Devendra Caraballo MA  04/26/18  1:13 PM

## 2018-05-01 ENCOUNTER — OFFICE VISIT (OUTPATIENT)
Dept: INTERNAL MEDICINE | Facility: CLINIC | Age: 73
End: 2018-05-01

## 2018-05-01 ENCOUNTER — HOSPITAL ENCOUNTER (OUTPATIENT)
Dept: GENERAL RADIOLOGY | Facility: HOSPITAL | Age: 73
Discharge: HOME | End: 2018-05-01
Attending: INTERNAL MEDICINE | Admitting: INTERNAL MEDICINE

## 2018-05-01 ENCOUNTER — TELEPHONE (OUTPATIENT)
Dept: INTERNAL MEDICINE | Facility: CLINIC | Age: 73
End: 2018-05-01

## 2018-05-01 VITALS
BODY MASS INDEX: 33.93 KG/M2 | HEIGHT: 70 IN | OXYGEN SATURATION: 98 % | DIASTOLIC BLOOD PRESSURE: 56 MMHG | SYSTOLIC BLOOD PRESSURE: 138 MMHG | HEART RATE: 84 BPM | WEIGHT: 237 LBS

## 2018-05-01 DIAGNOSIS — E11.65 UNCONTROLLED TYPE 2 DIABETES MELLITUS WITH OTHER SPECIFIED COMPLICATION, UNSPECIFIED LONG TERM INSULIN USE STATUS: ICD-10-CM

## 2018-05-01 DIAGNOSIS — R06.09 DYSPNEA ON EXERTION: ICD-10-CM

## 2018-05-01 DIAGNOSIS — E11.69 UNCONTROLLED TYPE 2 DIABETES MELLITUS WITH OTHER SPECIFIED COMPLICATION, UNSPECIFIED LONG TERM INSULIN USE STATUS: Primary | ICD-10-CM

## 2018-05-01 DIAGNOSIS — E11.65 UNCONTROLLED TYPE 2 DIABETES MELLITUS WITH OTHER SPECIFIED COMPLICATION, UNSPECIFIED LONG TERM INSULIN USE STATUS: Primary | ICD-10-CM

## 2018-05-01 DIAGNOSIS — M54.50 CHRONIC RIGHT-SIDED LOW BACK PAIN WITHOUT SCIATICA: ICD-10-CM

## 2018-05-01 DIAGNOSIS — G89.29 CHRONIC RIGHT-SIDED LOW BACK PAIN WITHOUT SCIATICA: ICD-10-CM

## 2018-05-01 DIAGNOSIS — I10 ESSENTIAL HYPERTENSION: ICD-10-CM

## 2018-05-01 DIAGNOSIS — E11.69 UNCONTROLLED TYPE 2 DIABETES MELLITUS WITH OTHER SPECIFIED COMPLICATION, UNSPECIFIED LONG TERM INSULIN USE STATUS: ICD-10-CM

## 2018-05-01 LAB
HBA1C MFR BLD: 7.9 %
POC CREATININE URINE: 200
POC MICROALBUMIN URINE: 80

## 2018-05-01 PROCEDURE — 82570 ASSAY OF URINE CREATININE: CPT | Performed by: INTERNAL MEDICINE

## 2018-05-01 PROCEDURE — 83036 HEMOGLOBIN GLYCOSYLATED A1C: CPT | Performed by: INTERNAL MEDICINE

## 2018-05-01 PROCEDURE — 82044 UR ALBUMIN SEMIQUANTITATIVE: CPT | Performed by: INTERNAL MEDICINE

## 2018-05-01 PROCEDURE — 99213 OFFICE O/P EST LOW 20 MIN: CPT | Performed by: INTERNAL MEDICINE

## 2018-05-01 PROCEDURE — 71046 X-RAY EXAM CHEST 2 VIEWS: CPT

## 2018-05-01 RX ORDER — BACLOFEN 10 MG/1
10 TABLET ORAL 3 TIMES DAILY
Qty: 60 TABLET | Refills: 0 | Status: ON HOLD | OUTPATIENT
Start: 2018-05-01 | End: 2019-12-06 | Stop reason: SDUPTHER

## 2018-05-01 NOTE — TELEPHONE ENCOUNTER
LORRAINE FROM [facility] CALLED NEEDING VERIFICATION ON INSULIN DOASGE AMOUNT FOR THE INSULIN NPH PRESCRIPTION. BEST CALL BACK # [phone]

## 2018-05-01 NOTE — PROGRESS NOTES
"Diabetes (Type 2. 1 mo fu); Hypertension (1 mo fu); Hyperlipidemia (1 mo fu); Bronchitis (1 mo fu); and Dyspnea on exertion (1 mo fu)    Subjective   Gabriela Mar is a 72 y.o. male is here today for follow-up.    History of Present Illness   Wakes up in the morning with the sugars in the 50s and 60s.. Ate a sausage wrap with about 30gm, but his sugars are staying low.  Also has gained 15 lbs, feels like eating so much.  Was shaky in the am, and has to eat an AM snack.  Cough is better, still soa with activity.    Back now acting up, worse when standing in 1 spot.        Current Outpatient Prescriptions:   •  amLODIPine (NORVASC) 5 MG tablet, Take 1 tablet by mouth 2 (Two) Times a Day., Disp: 180 tablet, Rfl: 1  •  aspirin (ASPIRIN LOW DOSE) 81 MG chewable tablet, Chew 1 tablet Daily. Resume in 1 month, Disp: , Rfl:   •  baclofen (LIORESAL) 10 MG tablet, Take 1 tablet by mouth 3 (Three) Times a Day., Disp: 60 tablet, Rfl: 0  •  cetirizine (zyrTEC) 10 MG tablet, Take 1 tablet by mouth Daily., Disp: 30 tablet, Rfl: 2  •  Cholecalciferol (VITAMIN D) 1000 UNITS tablet, Take 1 capsule by mouth Daily., Disp: , Rfl:   •  Cyanocobalamin (VITAMIN B-12 ER) 1000 MCG tablet controlled-release, Take 1,000 mcg by mouth Daily., Disp: , Rfl:   •  glimepiride (AMARYL) 4 MG tablet, TAKE 1 TABLET BY MOUTH IN THE MORNING BEFORE  BREAKFAST, Disp: 90 tablet, Rfl: 0  •  insulin NPH (humuLIN N,novoLIN N) 100 UNIT/ML injection, 30 Units in the morning and 25 units in the evening., Disp: 20 mL, Rfl: 5  •  Insulin Syringe 28G X 1/2\" 1 ML misc, 1 each 2 (Two) Times a Day., Disp: 100 each, Rfl: 5  •  lisinopril (PRINIVIL,ZESTRIL) 20 MG tablet, Take 1 tablet by mouth 2 (Two) Times a Day., Disp: 180 tablet, Rfl: 1  •  metFORMIN (GLUCOPHAGE) 1000 MG tablet, TAKE ONE AND ONE-HALF TABLETS BY MOUTH IN THE MORNING AND ONE IN THE EVENING, Disp: 235 tablet, Rfl: 0  •  Multiple Vitamins-Minerals (MULTI VITAMIN/MINERALS PO), Take 1 tablet by mouth " "Daily., Disp: , Rfl:   •  omeprazole (PRILOSEC) 20 MG capsule, Take 1 capsule by mouth Daily., Disp: , Rfl:   •  simvastatin (ZOCOR) 10 MG tablet, TAKE ONE TABLET BY MOUTH AT BEDTIME, Disp: 90 tablet, Rfl: 1      The following portions of the patient's history were reviewed and updated as appropriate: allergies, current medications, past family history, past medical history, past social history, past surgical history and problem list.    Review of Systems   Constitutional: Positive for unexpected weight change (gain). Negative for chills and fever.   HENT: Negative for ear discharge, ear pain, sinus pressure and sore throat.    Respiratory: Negative for cough, chest tightness and shortness of breath.    Cardiovascular: Negative for chest pain, palpitations and leg swelling.   Gastrointestinal: Negative for diarrhea, nausea and vomiting.   Musculoskeletal: Positive for back pain. Negative for arthralgias and myalgias.   Neurological: Negative for dizziness, syncope and headaches.   Psychiatric/Behavioral: Negative for confusion and sleep disturbance.       Objective   /56   Pulse 84   Ht 177.8 cm (70\")   Wt 108 kg (237 lb)   SpO2 98%   BMI 34.01 kg/m²   Physical Exam   Constitutional: He is oriented to person, place, and time. He appears well-developed and well-nourished.   HENT:   Head: Normocephalic and atraumatic.   Mouth/Throat: No oropharyngeal exudate.   Eyes: Conjunctivae are normal. Pupils are equal, round, and reactive to light.   Neck: Neck supple. No thyromegaly present.   Cardiovascular: Normal rate and regular rhythm.    No murmur heard.  Pulmonary/Chest: Effort normal and breath sounds normal. He has no wheezes. He has no rales.   Abdominal: Soft. Bowel sounds are normal. He exhibits no distension. There is no tenderness.   Musculoskeletal: He exhibits edema (3+ edema) and tenderness.   Neurological: He is alert and oriented to person, place, and time. No cranial nerve deficit.   Skin: Skin " is warm and dry.   Psychiatric: He has a normal mood and affect. Judgment normal.   Nursing note and vitals reviewed.        Results for orders placed or performed in visit on 05/01/18   POCT microalbumin   Result Value Ref Range    Microalbumin, Urine 80     Creatinine, Urine 200    POC Glycosylated Hemoglobin (Hb A1C)   Result Value Ref Range    Hemoglobin A1C 7.9 %             Assessment/Plan   Diagnoses and all orders for this visit:    Uncontrolled type 2 diabetes mellitus with other specified complication, unspecified long term insulin use status  Comments:  D/C Actos, increase Insulin to 30 am and 25 pm, recheck in 3 mos and if not better, take 1/2 tab of actos.  Orders:  -     POCT microalbumin  -     POC Glycosylated Hemoglobin (Hb A1C)  -     Discontinue: insulin NPH (humuLIN N,novoLIN N) 100 UNIT/ML injection; 30 Units in the morning and 2 units in the evening.    Essential hypertension  Comments:  better on recheck.    Dyspnea on exertion  -     XR Chest PA & Lateral    Chronic right-sided low back pain without sciatica  Comments:  home exercise - sheet given, start aleve, baclofen 10 tid prn  Orders:  -     baclofen (LIORESAL) 10 MG tablet; Take 1 tablet by mouth 3 (Three) Times a Day.                 Return in about 3 months (around 8/1/2018) for Medicare Wellness 8/27/18 at 12.30 pm.

## 2018-05-06 RX ORDER — FUROSEMIDE 20 MG/1
20 TABLET ORAL DAILY
Qty: 7 TABLET | Refills: 0 | Status: SHIPPED | OUTPATIENT
Start: 2018-05-06 | End: 2018-05-21 | Stop reason: SDUPTHER

## 2018-05-21 ENCOUNTER — OFFICE VISIT (OUTPATIENT)
Dept: INTERNAL MEDICINE | Facility: CLINIC | Age: 73
End: 2018-05-21

## 2018-05-21 VITALS
SYSTOLIC BLOOD PRESSURE: 172 MMHG | DIASTOLIC BLOOD PRESSURE: 80 MMHG | HEART RATE: 82 BPM | OXYGEN SATURATION: 99 % | BODY MASS INDEX: 33.19 KG/M2 | WEIGHT: 231.3 LBS

## 2018-05-21 DIAGNOSIS — I50.23 ACUTE ON CHRONIC SYSTOLIC CONGESTIVE HEART FAILURE (HCC): Primary | ICD-10-CM

## 2018-05-21 LAB
ANION GAP SERPL CALCULATED.3IONS-SCNC: 9 MMOL/L (ref 3–11)
BUN BLD-MCNC: 19 MG/DL (ref 9–23)
BUN/CREAT SERPL: 15.8 (ref 7–25)
CALCIUM SPEC-SCNC: 9.2 MG/DL (ref 8.7–10.4)
CHLORIDE SERPL-SCNC: 105 MMOL/L (ref 99–109)
CO2 SERPL-SCNC: 27 MMOL/L (ref 20–31)
CREAT BLD-MCNC: 1.2 MG/DL (ref 0.6–1.3)
GFR SERPL CREATININE-BSD FRML MDRD: 59 ML/MIN/1.73
GLUCOSE BLD-MCNC: 113 MG/DL (ref 70–100)
POTASSIUM BLD-SCNC: 5.4 MMOL/L (ref 3.5–5.5)
SODIUM BLD-SCNC: 141 MMOL/L (ref 132–146)

## 2018-05-21 PROCEDURE — 80048 BASIC METABOLIC PNL TOTAL CA: CPT | Performed by: INTERNAL MEDICINE

## 2018-05-21 PROCEDURE — 99213 OFFICE O/P EST LOW 20 MIN: CPT | Performed by: INTERNAL MEDICINE

## 2018-05-21 RX ORDER — FUROSEMIDE 20 MG/1
20 TABLET ORAL DAILY
Qty: 10 TABLET | Refills: 0 | Status: SHIPPED | OUTPATIENT
Start: 2018-05-21 | End: 2018-07-05 | Stop reason: SDUPTHER

## 2018-05-21 NOTE — PROGRESS NOTES
"Shortness of Breath (Follow Up)    Subjective   Gabriela Mar is a 72 y.o. male is here today for follow-up.    History of Present Illness   Soa is significantly better, could tell from day 3 that his breathing was better on the lasix. Day 7 was even better . Today he reports breathing back to baseline, continues to have swelling in his ankles.  He tries to cook to some extent and does eat canned soup.    Current Outpatient Prescriptions:   •  amLODIPine (NORVASC) 5 MG tablet, Take 1 tablet by mouth 2 (Two) Times a Day., Disp: 180 tablet, Rfl: 1  •  aspirin (ASPIRIN LOW DOSE) 81 MG chewable tablet, Chew 1 tablet Daily. Resume in 1 month, Disp: , Rfl:   •  baclofen (LIORESAL) 10 MG tablet, Take 1 tablet by mouth 3 (Three) Times a Day., Disp: 60 tablet, Rfl: 0  •  cetirizine (zyrTEC) 10 MG tablet, Take 1 tablet by mouth Daily., Disp: 30 tablet, Rfl: 2  •  Cholecalciferol (VITAMIN D) 1000 UNITS tablet, Take 1 capsule by mouth Daily., Disp: , Rfl:   •  Cyanocobalamin (VITAMIN B-12 ER) 1000 MCG tablet controlled-release, Take 1,000 mcg by mouth Daily., Disp: , Rfl:   •  furosemide (LASIX) 20 MG tablet, Take 1 tablet by mouth Daily., Disp: 10 tablet, Rfl: 0  •  glimepiride (AMARYL) 4 MG tablet, TAKE 1 TABLET BY MOUTH IN THE MORNING BEFORE  BREAKFAST, Disp: 90 tablet, Rfl: 0  •  insulin NPH (humuLIN N,novoLIN N) 100 UNIT/ML injection, 30 Units in the morning and 25 units in the evening., Disp: 20 mL, Rfl: 5  •  Insulin Syringe 28G X 1/2\" 1 ML misc, 1 each 2 (Two) Times a Day., Disp: 100 each, Rfl: 5  •  lisinopril (PRINIVIL,ZESTRIL) 20 MG tablet, Take 1 tablet by mouth 2 (Two) Times a Day., Disp: 180 tablet, Rfl: 1  •  metFORMIN (GLUCOPHAGE) 1000 MG tablet, TAKE ONE AND ONE-HALF TABLETS BY MOUTH IN THE MORNING AND ONE IN THE EVENING, Disp: 235 tablet, Rfl: 0  •  Multiple Vitamins-Minerals (MULTI VITAMIN/MINERALS PO), Take 1 tablet by mouth Daily., Disp: , Rfl:   •  omeprazole (PRILOSEC) 20 MG capsule, Take 1 capsule by " mouth Daily., Disp: , Rfl:   •  simvastatin (ZOCOR) 10 MG tablet, TAKE ONE TABLET BY MOUTH AT BEDTIME, Disp: 90 tablet, Rfl: 1      The following portions of the patient's history were reviewed and updated as appropriate: allergies, current medications, past family history, past medical history, past social history, past surgical history and problem list.    Review of Systems   Constitutional: Negative.  Negative for chills and fever.   HENT: Negative for ear discharge, ear pain, sinus pressure and sore throat.    Respiratory: Positive for shortness of breath (better). Negative for cough and chest tightness.    Cardiovascular: Positive for leg swelling. Negative for chest pain and palpitations.   Gastrointestinal: Negative for diarrhea, nausea and vomiting.   Musculoskeletal: Negative for arthralgias, back pain and myalgias.   Neurological: Negative for dizziness, syncope and headaches.   Psychiatric/Behavioral: Negative for confusion and sleep disturbance.       Objective   /80   Pulse 82   Wt 105 kg (231 lb 4.8 oz)   SpO2 99%   BMI 33.19 kg/m²   Physical Exam   Constitutional: He is oriented to person, place, and time. He appears well-developed and well-nourished.   HENT:   Head: Normocephalic and atraumatic.   Mouth/Throat: No oropharyngeal exudate.   Eyes: Conjunctivae are normal. Pupils are equal, round, and reactive to light.   Neck: Neck supple. No thyromegaly present.   Cardiovascular: Normal rate and regular rhythm.    No murmur heard.  Pulmonary/Chest: Effort normal and breath sounds normal. He has no wheezes. He has no rales.   Musculoskeletal: He exhibits edema (3+ edema better) and tenderness.   Neurological: He is alert and oriented to person, place, and time. No cranial nerve deficit.   Psychiatric: He has a normal mood and affect. Judgment normal.   Nursing note and vitals reviewed.        Results for orders placed or performed in visit on 05/21/18   Basic Metabolic Panel   Result Value Ref  Range    Glucose 113 (H) 70 - 100 mg/dL    BUN 19 9 - 23 mg/dL    Creatinine 1.20 0.60 - 1.30 mg/dL    Sodium 141 132 - 146 mmol/L    Potassium 5.4 3.5 - 5.5 mmol/L    Chloride 105 99 - 109 mmol/L    CO2 27.0 20.0 - 31.0 mmol/L    Calcium 9.2 8.7 - 10.4 mg/dL    eGFR Non African Amer 59 (L) >60 mL/min/1.73    BUN/Creatinine Ratio 15.8 7.0 - 25.0    Anion Gap 9.0 3.0 - 11.0 mmol/L             Assessment/Plan   Diagnoses and all orders for this visit:    Acute on chronic systolic congestive heart failure  Comments:  Much better after his lasix, and is now back to baseline.  Orders:  -     furosemide (LASIX) 20 MG tablet; Take 1 tablet by mouth Daily.  -     Basic Metabolic Panel      Echo 12/16-, EF- 50%       Return for Next scheduled follow up.

## 2018-05-25 RX ORDER — GLIMEPIRIDE 4 MG/1
TABLET ORAL
Qty: 90 TABLET | Refills: 0 | Status: SHIPPED | OUTPATIENT
Start: 2018-05-25 | End: 2018-07-11 | Stop reason: SDUPTHER

## 2018-07-05 DIAGNOSIS — I50.23 ACUTE ON CHRONIC SYSTOLIC CONGESTIVE HEART FAILURE (HCC): ICD-10-CM

## 2018-07-05 RX ORDER — FUROSEMIDE 20 MG/1
TABLET ORAL
Qty: 10 TABLET | Refills: 0 | Status: SHIPPED | OUTPATIENT
Start: 2018-07-05 | End: 2018-08-27 | Stop reason: SDUPTHER

## 2018-07-05 NOTE — TELEPHONE ENCOUNTER
LOV: 05/21/18  Pls advise on refill, dispense quantity was listed as 10, is pt still supposed to be on this?

## 2018-07-11 RX ORDER — LISINOPRIL 20 MG/1
TABLET ORAL
Qty: 180 TABLET | Refills: 1 | Status: SHIPPED | OUTPATIENT
Start: 2018-07-11 | End: 2019-01-02 | Stop reason: SDUPTHER

## 2018-07-11 RX ORDER — GLIMEPIRIDE 4 MG/1
TABLET ORAL
Qty: 90 TABLET | Refills: 0 | Status: SHIPPED | OUTPATIENT
Start: 2018-07-11 | End: 2018-08-27 | Stop reason: SDUPTHER

## 2018-08-14 ENCOUNTER — OFFICE VISIT (OUTPATIENT)
Dept: INTERNAL MEDICINE | Facility: CLINIC | Age: 73
End: 2018-08-14

## 2018-08-14 VITALS
BODY MASS INDEX: 33.36 KG/M2 | WEIGHT: 233 LBS | DIASTOLIC BLOOD PRESSURE: 62 MMHG | HEART RATE: 104 BPM | SYSTOLIC BLOOD PRESSURE: 142 MMHG | OXYGEN SATURATION: 99 % | HEIGHT: 70 IN

## 2018-08-14 DIAGNOSIS — Z01.818 PRE-OP EXAMINATION: Primary | ICD-10-CM

## 2018-08-14 LAB
ALBUMIN SERPL-MCNC: 4.3 G/DL (ref 3.2–4.8)
ALBUMIN/GLOB SERPL: 1.7 G/DL (ref 1.5–2.5)
ALP SERPL-CCNC: 79 U/L (ref 25–100)
ALT SERPL W P-5'-P-CCNC: 45 U/L (ref 7–40)
ANION GAP SERPL CALCULATED.3IONS-SCNC: 12 MMOL/L (ref 3–11)
AST SERPL-CCNC: 41 U/L (ref 0–33)
BASOPHILS # BLD AUTO: 0.03 10*3/MM3 (ref 0–0.2)
BASOPHILS NFR BLD AUTO: 0.3 % (ref 0–1)
BILIRUB SERPL-MCNC: 0.3 MG/DL (ref 0.3–1.2)
BUN BLD-MCNC: 18 MG/DL (ref 9–23)
BUN/CREAT SERPL: 13 (ref 7–25)
CALCIUM SPEC-SCNC: 9.1 MG/DL (ref 8.7–10.4)
CHLORIDE SERPL-SCNC: 104 MMOL/L (ref 99–109)
CO2 SERPL-SCNC: 25 MMOL/L (ref 20–31)
CREAT BLD-MCNC: 1.38 MG/DL (ref 0.6–1.3)
DEPRECATED RDW RBC AUTO: 47.8 FL (ref 37–54)
EOSINOPHIL # BLD AUTO: 0.17 10*3/MM3 (ref 0–0.3)
EOSINOPHIL NFR BLD AUTO: 1.9 % (ref 0–3)
ERYTHROCYTE [DISTWIDTH] IN BLOOD BY AUTOMATED COUNT: 14.6 % (ref 11.3–14.5)
GFR SERPL CREATININE-BSD FRML MDRD: 51 ML/MIN/1.73
GLOBULIN UR ELPH-MCNC: 2.6 GM/DL
GLUCOSE BLD-MCNC: 82 MG/DL (ref 70–100)
HCT VFR BLD AUTO: 37.3 % (ref 38.9–50.9)
HGB BLD-MCNC: 11.5 G/DL (ref 13.1–17.5)
IMM GRANULOCYTES # BLD: 0.02 10*3/MM3 (ref 0–0.03)
IMM GRANULOCYTES NFR BLD: 0.2 % (ref 0–0.6)
LYMPHOCYTES # BLD AUTO: 2.29 10*3/MM3 (ref 0.6–4.8)
LYMPHOCYTES NFR BLD AUTO: 26.1 % (ref 24–44)
MCH RBC QN AUTO: 27.6 PG (ref 27–31)
MCHC RBC AUTO-ENTMCNC: 30.8 G/DL (ref 32–36)
MCV RBC AUTO: 89.7 FL (ref 80–99)
MONOCYTES # BLD AUTO: 0.78 10*3/MM3 (ref 0–1)
MONOCYTES NFR BLD AUTO: 8.9 % (ref 0–12)
NEUTROPHILS # BLD AUTO: 5.49 10*3/MM3 (ref 1.5–8.3)
NEUTROPHILS NFR BLD AUTO: 62.8 % (ref 41–71)
PLATELET # BLD AUTO: 157 10*3/MM3 (ref 150–450)
PMV BLD AUTO: 11.8 FL (ref 6–12)
POTASSIUM BLD-SCNC: 4.5 MMOL/L (ref 3.5–5.5)
PROT SERPL-MCNC: 6.9 G/DL (ref 5.7–8.2)
RBC # BLD AUTO: 4.16 10*6/MM3 (ref 4.2–5.76)
SODIUM BLD-SCNC: 141 MMOL/L (ref 132–146)
WBC NRBC COR # BLD: 8.76 10*3/MM3 (ref 3.5–10.8)

## 2018-08-14 PROCEDURE — 99213 OFFICE O/P EST LOW 20 MIN: CPT | Performed by: PHYSICIAN ASSISTANT

## 2018-08-14 PROCEDURE — 85025 COMPLETE CBC W/AUTO DIFF WBC: CPT | Performed by: PHYSICIAN ASSISTANT

## 2018-08-14 PROCEDURE — 80053 COMPREHEN METABOLIC PANEL: CPT | Performed by: PHYSICIAN ASSISTANT

## 2018-08-14 NOTE — PROGRESS NOTES
Chief Complaint   Patient presents with   • Pre-op Exam     Cataract surgery scheduled on 08/22/18 at [facility]       Subjective   Gabriela Mar is a 72 y.o. male.       History of Present Illness     Pre-Op Evaluation  Gabriela Mar is a 72 y.o. male who presents to the office today for a preoperative consultation at the request of surgeon Dr Hernández who plans on performing bilateral cataract surgery (right first, left to follow on August 22. This consultation is requested for the specific conditions prompting preoperative evaluation (i.e. because of potential affect on operative risk): DM type 2, hypertension. Planned anesthesia is IV sedation. The patient has the following known anesthesia issues: none. Patient has a bleeding risk of: no recent abnormal bleeding, no remote history of abnormal bleeding, use of Ca-channel blockers (see med list) and no DVT or PE.. Patient does have objections to receiving blood products if needed.    He checks his blood pressure at home and it is consistently controlled. His blood pressure was 135/75 before he left the house today.    Past Medical History:   Diagnosis Date   • Arthritis    • Diabetes mellitus (CMS/HCC)     DIAGNOSED 10 YEARS AGO; CHECKS FSBG TID   • Esophagitis, reflux    • Hyperlipidemia    • Hypertension    • Obesity    • Pharyngitis    • Wears eyeglasses      Family History   Problem Relation Age of Onset   • Diabetes Mother    • Cancer Father         colon   • Diabetes Father    • Hypertension Father    • Kidney failure Sister    • Diabetes Sister    • Cancer Brother         lung   • Hypertension Brother    • Diabetes Brother    • Coronary artery disease Brother    • Cancer Brother         colon   • Hypertension Brother      Social History     Social History   • Marital status:      Spouse name: N/A   • Number of children: N/A   • Years of education: N/A     Occupational History   • Not on file.     Social History Main Topics   • Smoking  "status: Former Smoker     Packs/day: 2.00     Years: 35.00     Types: Cigarettes   • Smokeless tobacco: Never Used      Comment: QUIT 20 PLUS YEARS AGO    • Alcohol use No      Comment: stopped drinking   • Drug use: No   • Sexual activity: Defer     Other Topics Concern   • Not on file     Social History Narrative   • No narrative on file         Current Outpatient Prescriptions:   •  amLODIPine (NORVASC) 5 MG tablet, Take 1 tablet by mouth 2 (Two) Times a Day., Disp: 180 tablet, Rfl: 1  •  aspirin (ASPIRIN LOW DOSE) 81 MG chewable tablet, Chew 1 tablet Daily. Resume in 1 month, Disp: , Rfl:   •  baclofen (LIORESAL) 10 MG tablet, Take 1 tablet by mouth 3 (Three) Times a Day., Disp: 60 tablet, Rfl: 0  •  cetirizine (zyrTEC) 10 MG tablet, Take 1 tablet by mouth Daily., Disp: 30 tablet, Rfl: 2  •  Cholecalciferol (VITAMIN D) 1000 UNITS tablet, Take 1 capsule by mouth Daily., Disp: , Rfl:   •  Cyanocobalamin (VITAMIN B-12 ER) 1000 MCG tablet controlled-release, Take 1,000 mcg by mouth Daily., Disp: , Rfl:   •  furosemide (LASIX) 20 MG tablet, TAKE 1 TABLET BY MOUTH ONCE DAILY, Disp: 10 tablet, Rfl: 0  •  glimepiride (AMARYL) 4 MG tablet, TAKE 1 TABLET BY MOUTH IN THE MORNING BEFORE  BREAKFAST, Disp: 90 tablet, Rfl: 0  •  insulin NPH (humuLIN N,novoLIN N) 100 UNIT/ML injection, 30 Units in the morning and 25 units in the evening., Disp: 20 mL, Rfl: 5  •  Insulin Syringe 28G X 1/2\" 1 ML misc, 1 each 2 (Two) Times a Day., Disp: 100 each, Rfl: 5  •  lisinopril (PRINIVIL,ZESTRIL) 20 MG tablet, Take 1 tablet by mouth 2 (Two) Times a Day., Disp: 180 tablet, Rfl: 1  •  lisinopril (PRINIVIL,ZESTRIL) 20 MG tablet, TAKE ONE TABLET BY MOUTH TWICE DAILY, Disp: 180 tablet, Rfl: 1  •  metFORMIN (GLUCOPHAGE) 1000 MG tablet, TAKE 1 & 1/2 (ONE & ONE-HALF) TABLETS BY MOUTH IN THE MORNING AND 1 IN THE EVENING, Disp: 235 tablet, Rfl: 0  •  Multiple Vitamins-Minerals (MULTI VITAMIN/MINERALS PO), Take 1 tablet by mouth Daily., Disp: , Rfl: " "  •  omeprazole (PRILOSEC) 20 MG capsule, Take 1 capsule by mouth Daily., Disp: , Rfl:   •  simvastatin (ZOCOR) 10 MG tablet, TAKE ONE TABLET BY MOUTH AT BEDTIME, Disp: 90 tablet, Rfl: 1     PMFSH  The following portions of the patient's history were reviewed and updated as appropriate: allergies, current medications, past family history, past medical history, past social history, past surgical history and problem list.    Review of Systems   Constitutional: Negative for appetite change, fever and unexpected weight change.   HENT: Negative for ear pain, facial swelling and sore throat.    Eyes: Negative for pain and visual disturbance.   Respiratory: Negative for chest tightness, shortness of breath and wheezing.    Cardiovascular: Negative for chest pain and palpitations.   Gastrointestinal: Negative for abdominal pain and blood in stool.   Endocrine: Negative.    Genitourinary: Negative for difficulty urinating and hematuria.   Musculoskeletal: Negative for joint swelling.   Neurological: Negative for tremors, seizures and syncope.   Hematological: Negative for adenopathy.   Psychiatric/Behavioral: Negative.        Objective   /62   Pulse 104   Ht 177.8 cm (70\")   Wt 106 kg (233 lb)   SpO2 99%   BMI 33.43 kg/m²       Physical Exam   Constitutional: He is oriented to person, place, and time. He appears well-developed and well-nourished. No distress.   HENT:   Head: Normocephalic and atraumatic. Hair is normal.   Right Ear: Hearing, tympanic membrane, external ear and ear canal normal.   Left Ear: Hearing, tympanic membrane, external ear and ear canal normal.   Nose: No sinus tenderness or nasal deformity.   Mouth/Throat: Uvula is midline, oropharynx is clear and moist and mucous membranes are normal. No oral lesions. No uvula swelling.   Eyes: Pupils are equal, round, and reactive to light. Conjunctivae, EOM and lids are normal. Right eye exhibits no discharge. Left eye exhibits no discharge. No scleral " icterus. Right eye exhibits normal extraocular motion and no nystagmus. Left eye exhibits normal extraocular motion and no nystagmus.   Fundoscopic exam:       The right eye shows red reflex.        The left eye shows red reflex.   Neck: Normal range of motion. Neck supple. No JVD present. No tracheal deviation present. No thyromegaly present.   Cardiovascular: Normal rate, regular rhythm, normal heart sounds, intact distal pulses and normal pulses.  Exam reveals no gallop.    No murmur heard.  Pulmonary/Chest: Effort normal and breath sounds normal. No respiratory distress. He has no wheezes. He has no rales. He exhibits no tenderness.   Abdominal: Soft. Bowel sounds are normal. He exhibits no distension and no mass. There is no tenderness. There is no guarding. No hernia.   Genitourinary: Rectum normal and prostate normal.   Musculoskeletal: Normal range of motion. He exhibits no edema, tenderness or deformity.   Lymphadenopathy:     He has no cervical adenopathy.   Neurological: He is alert and oriented to person, place, and time. He has normal reflexes. He displays normal reflexes. No cranial nerve deficit. He exhibits normal muscle tone. Coordination normal.   Skin: Skin is warm and dry. No rash noted. He is not diaphoretic.   Psychiatric: He has a normal mood and affect. His behavior is normal. Judgment and thought content normal.   Nursing note and vitals reviewed.      ECG 12 Lead  Date/Time: 8/16/2018 9:46 PM  Performed by: SIDDHARTHA CUELLAR  Authorized by: SIDDHARTHA CUELLAR   Comparison: compared with previous ECG from 2/22/2017  Similar to previous ECG  Rhythm: sinus rhythm  Rate: normal  Conduction: right bundle branch block  ST Segments: ST segments normal  T Waves: T waves normal  QRS axis: normal  Clinical impression: non-specific ECG            ASSESSMENT/PLAN    Problem List Items Addressed This Visit        Other    Pre-op examination - Primary     1. Patient is at low risk for trey-op cardiovascular  complications with stable BP and heart rate.  2. Patient is at low risk for trey-op pulmonary complications with no h/o lung disease nor tob use.  3. Labs stable as above; proceed with surgery as planned and please call with questions/concerns           Relevant Orders    CBC & Differential (Completed)    Comprehensive Metabolic Panel (Completed)    CBC Auto Differential (Completed)               No Follow-up on file.

## 2018-08-16 PROBLEM — Z01.818 PRE-OP EXAMINATION: Status: ACTIVE | Noted: 2018-08-16

## 2018-08-16 PROCEDURE — 93000 ELECTROCARDIOGRAM COMPLETE: CPT | Performed by: PHYSICIAN ASSISTANT

## 2018-08-17 NOTE — ASSESSMENT & PLAN NOTE
1. Patient is at low risk for trey-op cardiovascular complications with stable BP and heart rate.  2. Patient is at low risk for trey-op pulmonary complications with no h/o lung disease nor tob use.  3. Labs stable as above; proceed with surgery as planned and please call with questions/concerns

## 2018-08-20 NOTE — PROGRESS NOTES
I have reviewed the notes, assessments, and/or procedures performed by Kimberlyn Reynoso PA-C, I concur with her/his documentation of Gabriela Mar.

## 2018-08-27 ENCOUNTER — OFFICE VISIT (OUTPATIENT)
Dept: INTERNAL MEDICINE | Facility: CLINIC | Age: 73
End: 2018-08-27

## 2018-08-27 VITALS
BODY MASS INDEX: 33.93 KG/M2 | DIASTOLIC BLOOD PRESSURE: 60 MMHG | HEART RATE: 91 BPM | SYSTOLIC BLOOD PRESSURE: 138 MMHG | HEIGHT: 70 IN | WEIGHT: 237 LBS | OXYGEN SATURATION: 98 %

## 2018-08-27 DIAGNOSIS — E78.2 MIXED HYPERLIPIDEMIA: ICD-10-CM

## 2018-08-27 DIAGNOSIS — E11.65 UNCONTROLLED TYPE 2 DIABETES MELLITUS WITH OTHER SPECIFIED COMPLICATION, UNSPECIFIED LONG TERM INSULIN USE STATUS: ICD-10-CM

## 2018-08-27 DIAGNOSIS — I10 ESSENTIAL HYPERTENSION: ICD-10-CM

## 2018-08-27 DIAGNOSIS — E55.9 VITAMIN D DEFICIENCY: ICD-10-CM

## 2018-08-27 DIAGNOSIS — E11.69 UNCONTROLLED TYPE 2 DIABETES MELLITUS WITH OTHER SPECIFIED COMPLICATION, UNSPECIFIED LONG TERM INSULIN USE STATUS: ICD-10-CM

## 2018-08-27 DIAGNOSIS — Z00.00 MEDICARE ANNUAL WELLNESS VISIT, SUBSEQUENT: Primary | ICD-10-CM

## 2018-08-27 DIAGNOSIS — I50.23 ACUTE ON CHRONIC SYSTOLIC CONGESTIVE HEART FAILURE (HCC): ICD-10-CM

## 2018-08-27 LAB
25(OH)D3 SERPL-MCNC: 33.1 NG/ML
ARTICHOKE IGE QN: 52 MG/DL (ref 0–130)
CHOLEST SERPL-MCNC: 113 MG/DL (ref 0–200)
HBA1C MFR BLD: 8.3 %
HDLC SERPL-MCNC: 40 MG/DL (ref 40–60)
TRIGL SERPL-MCNC: 128 MG/DL (ref 0–150)
TSH SERPL DL<=0.05 MIU/L-ACNC: 1.41 MIU/ML (ref 0.35–5.35)

## 2018-08-27 PROCEDURE — 84443 ASSAY THYROID STIM HORMONE: CPT | Performed by: INTERNAL MEDICINE

## 2018-08-27 PROCEDURE — 99213 OFFICE O/P EST LOW 20 MIN: CPT | Performed by: INTERNAL MEDICINE

## 2018-08-27 PROCEDURE — 82306 VITAMIN D 25 HYDROXY: CPT | Performed by: INTERNAL MEDICINE

## 2018-08-27 PROCEDURE — G0439 PPPS, SUBSEQ VISIT: HCPCS | Performed by: INTERNAL MEDICINE

## 2018-08-27 PROCEDURE — 80061 LIPID PANEL: CPT | Performed by: INTERNAL MEDICINE

## 2018-08-27 PROCEDURE — 83036 HEMOGLOBIN GLYCOSYLATED A1C: CPT | Performed by: INTERNAL MEDICINE

## 2018-08-27 RX ORDER — FUROSEMIDE 20 MG/1
20 TABLET ORAL EVERY OTHER DAY
Qty: 15 TABLET | Refills: 5 | Status: SHIPPED | OUTPATIENT
Start: 2018-08-27 | End: 2019-02-26 | Stop reason: SDUPTHER

## 2018-08-27 RX ORDER — GLIMEPIRIDE 4 MG/1
TABLET ORAL
Qty: 90 TABLET | Refills: 0 | Status: SHIPPED | OUTPATIENT
Start: 2018-08-27 | End: 2018-08-27 | Stop reason: SDUPTHER

## 2018-08-27 RX ORDER — SIMVASTATIN 10 MG
TABLET ORAL
Qty: 90 TABLET | Refills: 1 | Status: SHIPPED | OUTPATIENT
Start: 2018-08-27 | End: 2019-02-25 | Stop reason: SDUPTHER

## 2018-08-27 RX ORDER — AMLODIPINE BESYLATE 5 MG/1
5 TABLET ORAL 2 TIMES DAILY
Qty: 180 TABLET | Refills: 1 | Status: SHIPPED | OUTPATIENT
Start: 2018-08-27 | End: 2019-02-26 | Stop reason: SDUPTHER

## 2018-08-27 RX ORDER — GLIMEPIRIDE 4 MG/1
4 TABLET ORAL
Qty: 90 TABLET | Refills: 1 | Status: SHIPPED | OUTPATIENT
Start: 2018-08-27 | End: 2018-08-29 | Stop reason: SDUPTHER

## 2018-08-27 RX ORDER — POTASSIUM CHLORIDE 750 MG/1
10 TABLET, FILM COATED, EXTENDED RELEASE ORAL EVERY OTHER DAY
Qty: 15 TABLET | Refills: 5 | Status: SHIPPED | OUTPATIENT
Start: 2018-08-27 | End: 2019-02-26 | Stop reason: SDUPTHER

## 2018-08-27 RX ORDER — FUROSEMIDE 20 MG/1
TABLET ORAL
Qty: 10 TABLET | Refills: 0 | Status: SHIPPED | OUTPATIENT
Start: 2018-08-27 | End: 2018-08-27 | Stop reason: SDUPTHER

## 2018-08-27 NOTE — PROGRESS NOTES
QUICK REFERENCE INFORMATION:  The ABCs of the Annual Wellness Visit    Subsequent Medicare Wellness Visit    HEALTH RISK ASSESSMENT    1945    Recent Hospitalizations:  No hospitalization(s) within the last year..        Current Medical Providers:  Patient Care Team:  Krystina Gee MD as PCP - General  Krystina Gee MD as PCP - Family Medicine  Krystina Gee MD as PCP - Claims Attributed        Smoking Status:  History   Smoking Status   • Former Smoker   • Packs/day: 2.00   • Years: 35.00   • Types: Cigarettes   Smokeless Tobacco   • Never Used     Comment: QUIT 20 PLUS YEARS AGO        Alcohol Consumption:  History   Alcohol Use No     Comment: stopped drinking       Depression Screen:   PHQ-2/PHQ-9 Depression Screening 8/27/2018   Little interest or pleasure in doing things 0   Feeling down, depressed, or hopeless 0   Trouble falling or staying asleep, or sleeping too much -   Feeling tired or having little energy -   Poor appetite or overeating -   Feeling bad about yourself - or that you are a failure or have let yourself or your family down -   Trouble concentrating on things, such as reading the newspaper or watching television -   Moving or speaking so slowly that other people could have noticed. Or the opposite - being so fidgety or restless that you have been moving around a lot more than usual -   Thoughts that you would be better off dead, or of hurting yourself in some way -   Total Score 0   If you checked off any problems, how difficult have these problems made it for you to do your work, take care of things at home, or get along with other people? -       Health Habits and Functional and Cognitive Screening:  Functional & Cognitive Status 8/27/2018   Do you have difficulty preparing food and eating? No   Do you have difficulty bathing yourself, getting dressed or grooming yourself? No   Do you have difficulty using the toilet? No   Do you have difficulty moving around from place to  place? No   Do you have trouble with steps or getting out of a bed or a chair? No   In the past year have you fallen or experienced a near fall? No   Current Diet Well Balanced Diet   Dental Exam Up to date   Eye Exam Up to date   Exercise (times per week) 0 times per week   Current Exercise Activities Include None   Do you need help using the phone?  No   Are you deaf or do you have serious difficulty hearing?  No   Do you need help with transportation? No   Do you need help shopping? No   Do you need help preparing meals?  No   Do you need help with housework?  No   Do you need help with laundry? No   Do you need help taking your medications? No   Do you need help managing money? No   Do you ever drive or ride in a car without wearing a seat belt? No   Have you felt unusual stress, anger or loneliness in the last month? No   Who do you live with? Alone   If you need help, do you have trouble finding someone available to you? No   Have you been bothered in the last four weeks by sexual problems? No   Do you have difficulty concentrating, remembering or making decisions? No           Does the patient have evidence of cognitive impairment? No    Aspirin use counseling: Taking ASA appropriately as indicated      Recent Lab Results:  CMP:  Lab Results   Component Value Date    BUN 18 08/14/2018    CREATININE 1.38 (H) 08/14/2018    EGFRIFNONA 51 (L) 08/14/2018    BCR 13.0 08/14/2018     08/14/2018    K 4.5 08/14/2018    CO2 25.0 08/14/2018    CALCIUM 9.1 08/14/2018    ALBUMIN 4.30 08/14/2018    BILITOT 0.3 08/14/2018    ALKPHOS 79 08/14/2018    AST 41 (H) 08/14/2018    ALT 45 (H) 08/14/2018     Lipid Panel:  Lab Results   Component Value Date    CHOL 113 08/27/2018    TRIG 128 08/27/2018    HDL 40 08/27/2018     HbA1c:  Lab Results   Component Value Date    HGBA1C 8.3 08/27/2018       Visual Acuity:  No exam data present    Age-appropriate Screening Schedule:  Refer to the list below for future screening  "recommendations based on patient's age, sex and/or medical conditions. Orders for these recommended tests are listed in the plan section. The patient has been provided with a written plan.    Health Maintenance   Topic Date Due   • INFLUENZA VACCINE  08/01/2018   • HEMOGLOBIN A1C  02/27/2019   • URINE MICROALBUMIN  05/01/2019   • DIABETIC EYE EXAM  07/12/2019   • DIABETIC FOOT EXAM  08/27/2019   • LIPID PANEL  08/27/2019   • COLONOSCOPY  01/08/2024   • PNEUMOCOCCAL VACCINES (65+ LOW/MEDIUM RISK)  Completed   • TDAP/TD VACCINES  Excluded   • ZOSTER VACCINE  Excluded        Subjective   History of Present Illness    Gabriela Mar is a 72 y.o. male who presents for an Subsequent Wellness Visit.  And follow up on his HTN, HLP, DM2, and is s/p recent cataract surgery.    The following portions of the patient's history were reviewed and updated as appropriate: allergies, current medications, past family history, past medical history, past social history, past surgical history and problem list.    Outpatient Medications Prior to Visit   Medication Sig Dispense Refill   • aspirin (ASPIRIN LOW DOSE) 81 MG chewable tablet Chew 1 tablet Daily. Resume in 1 month     • baclofen (LIORESAL) 10 MG tablet Take 1 tablet by mouth 3 (Three) Times a Day. 60 tablet 0   • cetirizine (zyrTEC) 10 MG tablet Take 1 tablet by mouth Daily. 30 tablet 2   • Cholecalciferol (VITAMIN D) 1000 UNITS tablet Take 1 capsule by mouth Daily.     • Cyanocobalamin (VITAMIN B-12 ER) 1000 MCG tablet controlled-release Take 1,000 mcg by mouth Daily.     • Insulin Syringe 28G X 1/2\" 1 ML misc 1 each 2 (Two) Times a Day. 100 each 5   • lisinopril (PRINIVIL,ZESTRIL) 20 MG tablet TAKE ONE TABLET BY MOUTH TWICE DAILY 180 tablet 1   • metFORMIN (GLUCOPHAGE) 1000 MG tablet TAKE 1 & 1/2 (ONE & ONE-HALF) TABLETS BY MOUTH IN THE MORNING AND 1 IN THE EVENING 235 tablet 0   • Multiple Vitamins-Minerals (MULTI VITAMIN/MINERALS PO) Take 1 tablet by mouth Daily.     • " omeprazole (PRILOSEC) 20 MG capsule Take 1 capsule by mouth Daily.     • simvastatin (ZOCOR) 10 MG tablet TAKE 1 TABLET BY MOUTH AT BEDTIME 90 tablet 1   • amLODIPine (NORVASC) 5 MG tablet Take 1 tablet by mouth 2 (Two) Times a Day. 180 tablet 1   • furosemide (LASIX) 20 MG tablet TAKE 1 TABLET BY MOUTH ONCE DAILY 10 tablet 0   • glimepiride (AMARYL) 4 MG tablet TAKE 1 TABLET BY MOUTH IN THE MORNING BEFORE  BREAKFAST 90 tablet 0   • insulin NPH (humuLIN N,novoLIN N) 100 UNIT/ML injection 30 Units in the morning and 25 units in the evening. 20 mL 5   • lisinopril (PRINIVIL,ZESTRIL) 20 MG tablet Take 1 tablet by mouth 2 (Two) Times a Day. 180 tablet 1     No facility-administered medications prior to visit.        Patient Active Problem List   Diagnosis   • Hyperlipidemia   • Sciatica   • Hypertension   • Esophageal reflux   • Obesity   • Osteoarthritis   • Type 2 diabetes mellitus, uncontrolled (CMS/HCC)   • Vitamin D deficiency   • Vitamin B deficiency   • Hip arthritis   • Status post total replacement of right hip   • Acute blood loss anemia, mild, asymptomatic   • Leukocytosis, mild, likely reactive   • Chronic bilateral low back pain without sciatica   • Acute on chronic systolic congestive heart failure (CMS/HCC)   • Pre-op examination       Advance Care Planning:  has an advance directive - a copy HAS NOT been provided. Have asked the patient to send this to us to add to record.    Identification of Risk Factors:  Risk factors include: weight , unhealthy diet, cardiovascular risk and increased fall risk.    Review of Systems   Constitutional: Positive for fatigue. Negative for chills and fever.   HENT: Negative for ear discharge, ear pain, sinus pressure and sore throat.    Eyes: Positive for redness (s/p cataract surgery). Negative for pain.   Respiratory: Negative for cough, chest tightness and shortness of breath.    Cardiovascular: Positive for leg swelling. Negative for chest pain and palpitations.    Gastrointestinal: Negative for diarrhea, nausea and vomiting.   Genitourinary: Negative for frequency and urgency.   Musculoskeletal: Negative for arthralgias, back pain and myalgias.   Neurological: Negative for dizziness, syncope and headaches.   Psychiatric/Behavioral: Negative for confusion and sleep disturbance.       Compared to one year ago, the patient feels his physical health is better.  Compared to one year ago, the patient feels his mental health is better.    Objective     Physical Exam   Constitutional: He is oriented to person, place, and time. He appears well-developed and well-nourished.   HENT:   Head: Normocephalic and atraumatic.   Right Ear: External ear normal.   Left Ear: External ear normal.   Mouth/Throat: No oropharyngeal exudate.   Eyes: Pupils are equal, round, and reactive to light. Conjunctivae are normal.   Rt eye s/p cataract surgery   Neck: Neck supple. No thyromegaly present.   Cardiovascular: Normal rate, regular rhythm and intact distal pulses.  Exam reveals no friction rub.    No murmur heard.  Pulmonary/Chest: Effort normal and breath sounds normal. He has no wheezes. He has no rales.   Abdominal: Soft. Bowel sounds are normal. He exhibits no distension. There is no tenderness. No hernia (shallow umbilicus).   Musculoskeletal: He exhibits edema. He exhibits no tenderness.    Gabriela had a diabetic foot exam performed today.   During the foot exam he had a monofilament test performed (normal).  Vascular Status -  His right foot exhibits abnormal foot vasculature  and abnormal foot edema. His left foot exhibits abnormal foot vasculature  and abnormal foot edema.  Lymphadenopathy:     He has no cervical adenopathy.   Neurological: He is alert and oriented to person, place, and time. He displays normal reflexes. No cranial nerve deficit. He exhibits normal muscle tone. Coordination normal.   Skin: Skin is warm and dry.   Psychiatric: He has a normal mood and affect. Judgment  "normal.   Nursing note and vitals reviewed.      Vitals:    08/27/18 1257   BP: 138/60   Pulse: 91   SpO2: 98%   Weight: 108 kg (237 lb)   Height: 177.8 cm (70\")       Patient's Body mass index is 34.01 kg/m². BMI is above normal parameters. Recommendations include: exercise counseling and nutrition counseling.    Gabriela was seen today for wellness exam.    Diagnoses and all orders for this visit:    Medicare annual wellness visit, subsequent    Essential hypertension  Comments:  stable, continue current meds.  Orders:  -     amLODIPine (NORVASC) 5 MG tablet; Take 1 tablet by mouth 2 (Two) Times a Day.    Mixed hyperlipidemia  -     Lipid Panel  -     TSH    Uncontrolled type 2 diabetes mellitus with other specified complication, unspecified long term insulin use status (CMS/formerly [facility])  Comments:  Off the Oni on Novolin, worse at 8.3.  Orders:  -     POC Glycosylated Hemoglobin (Hb A1C)  -     insulin NPH (humuLIN N,novoLIN N) 100 UNIT/ML injection; 35 Units in the morning and 40 units in the evening.  -     glimepiride (AMARYL) 4 MG tablet; Take 1 tablet by mouth Every Morning Before Breakfast.    Vitamin D deficiency  -     Vitamin D 25 Hydroxy    Uncontrolled type 2 diabetes mellitus with other specified complication, unspecified long term insulin use status (CMS/formerly [facility])  Comments:  increase Insulin to 35 am and 40 pm, as his pm is his post meal insulin.  recheck in 3 mos.  Orders:  -     POC Glycosylated Hemoglobin (Hb A1C)  -     insulin NPH (humuLIN N,novoLIN N) 100 UNIT/ML injection; 35 Units in the morning and 40 units in the evening.  -     glimepiride (AMARYL) 4 MG tablet; Take 1 tablet by mouth Every Morning Before Breakfast.    Acute on chronic systolic congestive heart failure (CMS/formerly [facility])  Comments:  Much better after his lasix, and is now back to baseline.  Orders:  -     furosemide (LASIX) 20 MG tablet; Take 1 tablet by mouth Every Other Day.  -     potassium chloride (K-DUR) 10 MEQ CR tablet; Take 1 tablet by " mouth Every Other Day. Take with lasix      Assessment/Plan   Patient Self-Management and Personalized Health Advice  The patient has been provided with information about: diet, exercise, weight management, prevention of cardiac or vascular disease, the relationship between weight and GERD, fall prevention and supplements and preventive services including:   · Advance directive, Counseling for cardiovascular disease risk reduction, Exercise counseling provided, Fall Risk assessment done, Glaucoma screening recommended, Nutrition counseling provided, Zostavax vaccine (Herpes Zoster).    Visit Diagnoses:    ICD-10-CM ICD-9-CM   1. Medicare annual wellness visit, subsequent Z00.00 V70.0   2. Essential hypertension I10 401.9   3. Mixed hyperlipidemia E78.2 272.2   4. Uncontrolled type 2 diabetes mellitus with other specified complication, unspecified long term insulin use status (CMS/[facility]) E11.69 250.82    E11.65    5. Vitamin D deficiency E55.9 268.9   6. Uncontrolled type 2 diabetes mellitus with other specified complication, unspecified long term insulin use status (CMS/HCC) E11.69 250.82    E11.65    7. Acute on chronic systolic congestive heart failure (CMS/[facility]) I50.23 428.23     428.0       Orders Placed This Encounter   Procedures   • Lipid Panel   • TSH   • Vitamin D 25 Hydroxy   • POC Glycosylated Hemoglobin (Hb A1C)       Outpatient Encounter Prescriptions as of 8/27/2018   Medication Sig Dispense Refill   • amLODIPine (NORVASC) 5 MG tablet Take 1 tablet by mouth 2 (Two) Times a Day. 180 tablet 1   • aspirin (ASPIRIN LOW DOSE) 81 MG chewable tablet Chew 1 tablet Daily. Resume in 1 month     • baclofen (LIORESAL) 10 MG tablet Take 1 tablet by mouth 3 (Three) Times a Day. 60 tablet 0   • cetirizine (zyrTEC) 10 MG tablet Take 1 tablet by mouth Daily. 30 tablet 2   • Cholecalciferol (VITAMIN D) 1000 UNITS tablet Take 1 capsule by mouth Daily.     • Cyanocobalamin (VITAMIN B-12 ER) 1000 MCG tablet controlled-release  "Take 1,000 mcg by mouth Daily.     • furosemide (LASIX) 20 MG tablet Take 1 tablet by mouth Every Other Day. 15 tablet 5   • glimepiride (AMARYL) 4 MG tablet Take 1 tablet by mouth Every Morning Before Breakfast. 90 tablet 1   • insulin NPH (humuLIN N,novoLIN N) 100 UNIT/ML injection 35 Units in the morning and 40 units in the evening. 30 mL 5   • Insulin Syringe 28G X 1/2\" 1 ML misc 1 each 2 (Two) Times a Day. 100 each 5   • lisinopril (PRINIVIL,ZESTRIL) 20 MG tablet TAKE ONE TABLET BY MOUTH TWICE DAILY 180 tablet 1   • metFORMIN (GLUCOPHAGE) 1000 MG tablet TAKE 1 & 1/2 (ONE & ONE-HALF) TABLETS BY MOUTH IN THE MORNING AND 1 IN THE EVENING 235 tablet 0   • Multiple Vitamins-Minerals (MULTI VITAMIN/MINERALS PO) Take 1 tablet by mouth Daily.     • omeprazole (PRILOSEC) 20 MG capsule Take 1 capsule by mouth Daily.     • simvastatin (ZOCOR) 10 MG tablet TAKE 1 TABLET BY MOUTH AT BEDTIME 90 tablet 1   • [DISCONTINUED] amLODIPine (NORVASC) 5 MG tablet Take 1 tablet by mouth 2 (Two) Times a Day. 180 tablet 1   • [DISCONTINUED] furosemide (LASIX) 20 MG tablet TAKE 1 TABLET BY MOUTH ONCE DAILY 10 tablet 0   • [DISCONTINUED] glimepiride (AMARYL) 4 MG tablet TAKE 1 TABLET BY MOUTH IN THE MORNING BEFORE  BREAKFAST 90 tablet 0   • [DISCONTINUED] insulin NPH (humuLIN N,novoLIN N) 100 UNIT/ML injection 30 Units in the morning and 25 units in the evening. 20 mL 5   • [DISCONTINUED] lisinopril (PRINIVIL,ZESTRIL) 20 MG tablet Take 1 tablet by mouth 2 (Two) Times a Day. 180 tablet 1   • potassium chloride (K-DUR) 10 MEQ CR tablet Take 1 tablet by mouth Every Other Day. Take with lasix 15 tablet 5   • [DISCONTINUED] furosemide (LASIX) 20 MG tablet TAKE 1 TABLET BY MOUTH ONCE DAILY 10 tablet 0   • [DISCONTINUED] glimepiride (AMARYL) 4 MG tablet TAKE 1 TABLET BY MOUTH IN THE MORNING BEFORE  BREAKFAST 90 tablet 0   • [DISCONTINUED] simvastatin (ZOCOR) 10 MG tablet TAKE ONE TABLET BY MOUTH AT BEDTIME 90 tablet 1     No " facility-administered encounter medications on file as of 8/27/2018.        Reviewed use of high risk medication in the elderly: yes  Reviewed for potential of harmful drug interactions in the elderly: yes    Follow Up:  Return in about 3 months (around 11/27/2018) for Next scheduled follow up.     An After Visit Summary and PPPS with all of these plans were given to the patient.

## 2018-08-29 DIAGNOSIS — E11.65 UNCONTROLLED TYPE 2 DIABETES MELLITUS WITH OTHER SPECIFIED COMPLICATION, UNSPECIFIED LONG TERM INSULIN USE STATUS: ICD-10-CM

## 2018-08-29 DIAGNOSIS — E11.69 UNCONTROLLED TYPE 2 DIABETES MELLITUS WITH OTHER SPECIFIED COMPLICATION, UNSPECIFIED LONG TERM INSULIN USE STATUS: ICD-10-CM

## 2018-08-29 RX ORDER — GLIMEPIRIDE 4 MG/1
4 TABLET ORAL
Qty: 90 TABLET | Refills: 1 | Status: SHIPPED | OUTPATIENT
Start: 2018-08-29 | End: 2019-05-17 | Stop reason: SDUPTHER

## 2018-08-29 RX ORDER — GLIMEPIRIDE 4 MG/1
4 TABLET ORAL
Qty: 90 TABLET | Refills: 1 | Status: CANCELLED | OUTPATIENT
Start: 2018-08-29

## 2018-11-29 ENCOUNTER — OFFICE VISIT (OUTPATIENT)
Dept: INTERNAL MEDICINE | Facility: CLINIC | Age: 73
End: 2018-11-29

## 2018-11-29 VITALS
OXYGEN SATURATION: 97 % | BODY MASS INDEX: 33.86 KG/M2 | WEIGHT: 236 LBS | DIASTOLIC BLOOD PRESSURE: 72 MMHG | HEART RATE: 85 BPM | SYSTOLIC BLOOD PRESSURE: 150 MMHG

## 2018-11-29 DIAGNOSIS — E11.65 UNCONTROLLED TYPE 2 DIABETES MELLITUS WITH HYPERGLYCEMIA (HCC): Primary | ICD-10-CM

## 2018-11-29 LAB
GLUCOSE BLDC GLUCOMTR-MCNC: 279 MG/DL (ref 70–130)
HBA1C MFR BLD: 8.5 %

## 2018-11-29 PROCEDURE — 83036 HEMOGLOBIN GLYCOSYLATED A1C: CPT | Performed by: INTERNAL MEDICINE

## 2018-11-29 PROCEDURE — 82947 ASSAY GLUCOSE BLOOD QUANT: CPT | Performed by: INTERNAL MEDICINE

## 2018-11-29 PROCEDURE — 99213 OFFICE O/P EST LOW 20 MIN: CPT | Performed by: INTERNAL MEDICINE

## 2019-01-02 RX ORDER — LISINOPRIL 20 MG/1
TABLET ORAL
Qty: 180 TABLET | Refills: 1 | Status: SHIPPED | OUTPATIENT
Start: 2019-01-02 | End: 2019-07-01 | Stop reason: SDUPTHER

## 2019-02-25 RX ORDER — SIMVASTATIN 10 MG
TABLET ORAL
Qty: 90 TABLET | Refills: 1 | Status: SHIPPED | OUTPATIENT
Start: 2019-02-25 | End: 2019-09-03 | Stop reason: SDUPTHER

## 2019-02-26 ENCOUNTER — OFFICE VISIT (OUTPATIENT)
Dept: INTERNAL MEDICINE | Facility: CLINIC | Age: 74
End: 2019-02-26

## 2019-02-26 VITALS
SYSTOLIC BLOOD PRESSURE: 130 MMHG | HEIGHT: 70 IN | HEART RATE: 75 BPM | OXYGEN SATURATION: 99 % | WEIGHT: 230.8 LBS | BODY MASS INDEX: 33.04 KG/M2 | DIASTOLIC BLOOD PRESSURE: 62 MMHG

## 2019-02-26 DIAGNOSIS — E78.49 OTHER HYPERLIPIDEMIA: ICD-10-CM

## 2019-02-26 DIAGNOSIS — E11.65 UNCONTROLLED TYPE 2 DIABETES MELLITUS WITH HYPERGLYCEMIA (HCC): Primary | ICD-10-CM

## 2019-02-26 DIAGNOSIS — I10 ESSENTIAL HYPERTENSION: ICD-10-CM

## 2019-02-26 DIAGNOSIS — I50.23 ACUTE ON CHRONIC SYSTOLIC CONGESTIVE HEART FAILURE (HCC): ICD-10-CM

## 2019-02-26 LAB
ALBUMIN SERPL-MCNC: 4.72 G/DL (ref 3.2–4.8)
ALBUMIN/GLOB SERPL: 2 G/DL (ref 1.5–2.5)
ALP SERPL-CCNC: 86 U/L (ref 25–100)
ALT SERPL W P-5'-P-CCNC: 38 U/L (ref 7–40)
ANION GAP SERPL CALCULATED.3IONS-SCNC: 12 MMOL/L (ref 3–11)
ARTICHOKE IGE QN: 56 MG/DL (ref 0–130)
AST SERPL-CCNC: 39 U/L (ref 0–33)
BILIRUB SERPL-MCNC: 0.4 MG/DL (ref 0.3–1.2)
BUN BLD-MCNC: 25 MG/DL (ref 9–23)
BUN/CREAT SERPL: 16.9 (ref 7–25)
CALCIUM SPEC-SCNC: 9.5 MG/DL (ref 8.7–10.4)
CHLORIDE SERPL-SCNC: 104 MMOL/L (ref 99–109)
CHOLEST SERPL-MCNC: 116 MG/DL (ref 0–200)
CO2 SERPL-SCNC: 26 MMOL/L (ref 20–31)
CREAT BLD-MCNC: 1.48 MG/DL (ref 0.6–1.3)
GFR SERPL CREATININE-BSD FRML MDRD: 46 ML/MIN/1.73
GLOBULIN UR ELPH-MCNC: 2.4 GM/DL
GLUCOSE BLD-MCNC: 58 MG/DL (ref 70–100)
GLUCOSE BLDC GLUCOMTR-MCNC: 99 MG/DL (ref 70–130)
HBA1C MFR BLD: 8.3 %
HDLC SERPL-MCNC: 36 MG/DL (ref 40–60)
POTASSIUM BLD-SCNC: 4.9 MMOL/L (ref 3.5–5.5)
PROT SERPL-MCNC: 7.1 G/DL (ref 5.7–8.2)
SODIUM BLD-SCNC: 142 MMOL/L (ref 132–146)
TRIGL SERPL-MCNC: 120 MG/DL (ref 0–150)

## 2019-02-26 PROCEDURE — 83036 HEMOGLOBIN GLYCOSYLATED A1C: CPT | Performed by: INTERNAL MEDICINE

## 2019-02-26 PROCEDURE — 80053 COMPREHEN METABOLIC PANEL: CPT | Performed by: INTERNAL MEDICINE

## 2019-02-26 PROCEDURE — 99214 OFFICE O/P EST MOD 30 MIN: CPT | Performed by: INTERNAL MEDICINE

## 2019-02-26 PROCEDURE — 82947 ASSAY GLUCOSE BLOOD QUANT: CPT | Performed by: INTERNAL MEDICINE

## 2019-02-26 PROCEDURE — 80061 LIPID PANEL: CPT | Performed by: INTERNAL MEDICINE

## 2019-02-26 RX ORDER — POTASSIUM CHLORIDE 750 MG/1
10 TABLET, FILM COATED, EXTENDED RELEASE ORAL EVERY OTHER DAY
Qty: 15 TABLET | Refills: 5 | Status: SHIPPED | OUTPATIENT
Start: 2019-02-26 | End: 2019-09-25 | Stop reason: SDUPTHER

## 2019-02-26 RX ORDER — FUROSEMIDE 20 MG/1
20 TABLET ORAL EVERY OTHER DAY
Qty: 15 TABLET | Refills: 5 | Status: SHIPPED | OUTPATIENT
Start: 2019-02-26 | End: 2019-09-25 | Stop reason: SDUPTHER

## 2019-02-26 RX ORDER — AMLODIPINE BESYLATE 10 MG/1
5 TABLET ORAL 2 TIMES DAILY
Qty: 60 TABLET | Refills: 5 | Status: SHIPPED | OUTPATIENT
Start: 2019-02-26 | End: 2019-05-17

## 2019-02-26 NOTE — PROGRESS NOTES
"Diabetes    Subjective   Gabriela Mar is a 73 y.o. male is here today for follow-up.    History of Present Illness   Here for a follow up on his DM2,HTN  and HLP.  Breathing better on the qod lasix.  Denies any edema or soa.  HAs been eating better and exercising more.      Current Outpatient Medications:   •  amLODIPine (NORVASC) 10 MG tablet, Take 0.5 tablets by mouth 2 (Two) Times a Day., Disp: 60 tablet, Rfl: 5  •  aspirin (ASPIRIN LOW DOSE) 81 MG chewable tablet, Chew 1 tablet Daily. Resume in 1 month, Disp: , Rfl:   •  baclofen (LIORESAL) 10 MG tablet, Take 1 tablet by mouth 3 (Three) Times a Day., Disp: 60 tablet, Rfl: 0  •  Cholecalciferol (VITAMIN D) 1000 UNITS tablet, Take 1 capsule by mouth Daily., Disp: , Rfl:   •  Cyanocobalamin (VITAMIN B-12 ER) 1000 MCG tablet controlled-release, Take 1,000 mcg by mouth Daily., Disp: , Rfl:   •  furosemide (LASIX) 20 MG tablet, Take 1 tablet by mouth Every Other Day., Disp: 15 tablet, Rfl: 5  •  glimepiride (AMARYL) 4 MG tablet, Take 1 tablet by mouth Every Morning Before Breakfast., Disp: 90 tablet, Rfl: 1  •  insulin NPH (humuLIN N,novoLIN N) 100 UNIT/ML injection, 35 Units in the morning and 45 units in the evening., Disp: 30 mL, Rfl: 5  •  Insulin Syringe 28G X 1/2\" 1 ML misc, 1 each 2 (Two) Times a Day., Disp: 100 each, Rfl: 5  •  lisinopril (PRINIVIL,ZESTRIL) 20 MG tablet, TAKE 1 TABLET BY MOUTH TWICE DAILY, Disp: 180 tablet, Rfl: 1  •  metFORMIN (GLUCOPHAGE) 1000 MG tablet, Take 1.5 tabs in the AM and 1 tab PO with dinner, Disp: 235 tablet, Rfl: 1  •  Multiple Vitamins-Minerals (MULTI VITAMIN/MINERALS PO), Take 1 tablet by mouth Daily., Disp: , Rfl:   •  omeprazole (PRILOSEC) 20 MG capsule, Take 1 capsule by mouth Daily., Disp: , Rfl:   •  potassium chloride (K-DUR) 10 MEQ CR tablet, Take 1 tablet by mouth Every Other Day. Take with lasix, Disp: 15 tablet, Rfl: 5  •  simvastatin (ZOCOR) 10 MG tablet, TAKE 1 TABLET BY MOUTH AT BEDTIME, Disp: 90 tablet, Rfl: " "1      The following portions of the patient's history were reviewed and updated as appropriate: allergies, current medications, past family history, past medical history, past social history, past surgical history and problem list.    Review of Systems   Constitutional: Negative.  Negative for chills and fever.   HENT: Negative for ear discharge, ear pain, sinus pressure and sore throat.    Respiratory: Negative for cough, chest tightness and shortness of breath.    Cardiovascular: Negative for chest pain, palpitations and leg swelling.   Gastrointestinal: Negative for diarrhea, nausea and vomiting.   Musculoskeletal: Negative for arthralgias, back pain and myalgias.   Neurological: Negative for dizziness, syncope and headaches.   Psychiatric/Behavioral: Negative for confusion and sleep disturbance.       Objective   /62   Pulse 75   Ht 177.8 cm (70\")   Wt 105 kg (230 lb 12.8 oz)   SpO2 99% Comment: ra  BMI 33.12 kg/m²   Physical Exam   Constitutional: He is oriented to person, place, and time. He appears well-developed and well-nourished.   HENT:   Head: Normocephalic and atraumatic.   Right Ear: External ear normal.   Left Ear: External ear normal.   Mouth/Throat: No oropharyngeal exudate.   Eyes: Conjunctivae are normal. Pupils are equal, round, and reactive to light.   Neck: Neck supple. No thyromegaly present.   Cardiovascular: Normal rate, regular rhythm and intact distal pulses.   Pulmonary/Chest: Effort normal and breath sounds normal.   Abdominal: Soft. Bowel sounds are normal. He exhibits no distension. There is no tenderness.   Musculoskeletal: He exhibits no edema.   Neurological: He is alert and oriented to person, place, and time. No cranial nerve deficit.   Skin: Skin is warm and dry.   Psychiatric: He has a normal mood and affect. Judgment normal.   Nursing note and vitals reviewed.        Results for orders placed or performed in visit on 02/26/19   POCT Glucose   Result Value Ref Range "    Glucose 99 70 - 130 mg/dL   POC Glycosylated Hemoglobin (Hb A1C)   Result Value Ref Range    Hemoglobin A1C 8.3 %             Assessment/Plan   Diagnoses and all orders for this visit:    Uncontrolled type 2 diabetes mellitus with hyperglycemia (CMS/[facility])  Comments:  increase insulin to 45 units with dinner, and 30 in AM.  Orders:  -     POCT Glucose  -     POC Glycosylated Hemoglobin (Hb A1C)  -     metFORMIN (GLUCOPHAGE) 1000 MG tablet; Take 1.5 tabs in the AM and 1 tab PO with dinner  -     insulin NPH (humuLIN N,novoLIN N) 100 UNIT/ML injection; 35 Units in the morning and 45 units in the evening.    Essential hypertension  Comments:  stable, continue current meds.  Orders:  -     amLODIPine (NORVASC) 10 MG tablet; Take 0.5 tablets by mouth 2 (Two) Times a Day.    Acute on chronic systolic congestive heart failure (CMS/[facility])  Comments:  stable, take lasix qod.  Orders:  -     furosemide (LASIX) 20 MG tablet; Take 1 tablet by mouth Every Other Day.  -     potassium chloride (K-DUR) 10 MEQ CR tablet; Take 1 tablet by mouth Every Other Day. Take with lasix    Other hyperlipidemia  -     Comprehensive Metabolic Panel  -     Lipid Panel                 Return in about 3 months (around 5/26/2019).

## 2019-03-25 DIAGNOSIS — I10 ESSENTIAL HYPERTENSION: ICD-10-CM

## 2019-03-25 RX ORDER — AMLODIPINE BESYLATE 5 MG/1
TABLET ORAL
Qty: 180 TABLET | Refills: 0 | Status: SHIPPED | OUTPATIENT
Start: 2019-03-25 | End: 2019-05-17 | Stop reason: SDUPTHER

## 2019-05-17 ENCOUNTER — OFFICE VISIT (OUTPATIENT)
Dept: INTERNAL MEDICINE | Facility: CLINIC | Age: 74
End: 2019-05-17

## 2019-05-17 VITALS
TEMPERATURE: 98.1 F | HEART RATE: 79 BPM | SYSTOLIC BLOOD PRESSURE: 132 MMHG | WEIGHT: 222 LBS | BODY MASS INDEX: 31.85 KG/M2 | DIASTOLIC BLOOD PRESSURE: 62 MMHG | OXYGEN SATURATION: 91 % | RESPIRATION RATE: 18 BRPM

## 2019-05-17 DIAGNOSIS — E11.65 TYPE 2 DIABETES MELLITUS WITH HYPERGLYCEMIA, WITHOUT LONG-TERM CURRENT USE OF INSULIN (HCC): ICD-10-CM

## 2019-05-17 DIAGNOSIS — E11.9 TYPE 2 DIABETES MELLITUS WITHOUT COMPLICATION, UNSPECIFIED WHETHER LONG TERM INSULIN USE (HCC): Primary | ICD-10-CM

## 2019-05-17 DIAGNOSIS — I10 ESSENTIAL HYPERTENSION: ICD-10-CM

## 2019-05-17 LAB — HBA1C MFR BLD: 7.7 %

## 2019-05-17 PROCEDURE — 83036 HEMOGLOBIN GLYCOSYLATED A1C: CPT | Performed by: INTERNAL MEDICINE

## 2019-05-17 PROCEDURE — 99213 OFFICE O/P EST LOW 20 MIN: CPT | Performed by: INTERNAL MEDICINE

## 2019-05-17 RX ORDER — AMLODIPINE BESYLATE 5 MG/1
5 TABLET ORAL 2 TIMES DAILY
Qty: 180 TABLET | Refills: 1 | Status: SHIPPED | OUTPATIENT
Start: 2019-05-17 | End: 2020-01-07

## 2019-05-17 RX ORDER — GLIMEPIRIDE 4 MG/1
4 TABLET ORAL
Qty: 90 TABLET | Refills: 1 | Status: SHIPPED | OUTPATIENT
Start: 2019-05-17 | End: 2019-12-20

## 2019-05-17 NOTE — PROGRESS NOTES
"Follow-up (3mo f/u); Diabetes; and Hypertension    Subjective   Gabriela Mar is a 73 y.o. male is here today for follow-up.    History of Present Illness    is here for a f/u on his HTN and Dm2. Reports its hard to cut his amlodipine to 1/2.  He lost weight in the last few mos. Visits his bro and family, who eat better.needs refills.      Current Outpatient Medications:   •  amLODIPine (NORVASC) 5 MG tablet, Take 1 tablet by mouth 2 (Two) Times a Day., Disp: 180 tablet, Rfl: 1  •  aspirin (ASPIRIN LOW DOSE) 81 MG chewable tablet, Chew 1 tablet Daily. Resume in 1 month, Disp: , Rfl:   •  baclofen (LIORESAL) 10 MG tablet, Take 1 tablet by mouth 3 (Three) Times a Day., Disp: 60 tablet, Rfl: 0  •  Cholecalciferol (VITAMIN D) 1000 UNITS tablet, Take 1 capsule by mouth Daily., Disp: , Rfl:   •  Cyanocobalamin (VITAMIN B-12 ER) 1000 MCG tablet controlled-release, Take 1,000 mcg by mouth Daily., Disp: , Rfl:   •  furosemide (LASIX) 20 MG tablet, Take 1 tablet by mouth Every Other Day., Disp: 15 tablet, Rfl: 5  •  glimepiride (AMARYL) 4 MG tablet, Take 1 tablet by mouth Daily With Dinner., Disp: 90 tablet, Rfl: 1  •  insulin NPH (humuLIN N,novoLIN N) 100 UNIT/ML injection, 35 Units in the morning and 45 units in the evening., Disp: 30 mL, Rfl: 5  •  Insulin Syringe 28G X 1/2\" 1 ML misc, 1 each 2 (Two) Times a Day., Disp: 100 each, Rfl: 5  •  lisinopril (PRINIVIL,ZESTRIL) 20 MG tablet, TAKE 1 TABLET BY MOUTH TWICE DAILY, Disp: 180 tablet, Rfl: 1  •  metFORMIN (GLUCOPHAGE) 1000 MG tablet, Take 1.5 tabs in the AM and 1 tab PO with dinner, Disp: 235 tablet, Rfl: 1  •  Multiple Vitamins-Minerals (MULTI VITAMIN/MINERALS PO), Take 1 tablet by mouth Daily., Disp: , Rfl:   •  omeprazole (PRILOSEC) 20 MG capsule, Take 1 capsule by mouth Daily., Disp: , Rfl:   •  potassium chloride (K-DUR) 10 MEQ CR tablet, Take 1 tablet by mouth Every Other Day. Take with lasix, Disp: 15 tablet, Rfl: 5  •  simvastatin (ZOCOR) 10 MG tablet, " TAKE 1 TABLET BY MOUTH AT BEDTIME, Disp: 90 tablet, Rfl: 1      The following portions of the patient's history were reviewed and updated as appropriate: allergies, current medications, past family history, past medical history, past social history, past surgical history and problem list.    Review of Systems   Constitutional: Negative.  Negative for chills and fever.   HENT: Negative for ear discharge, ear pain, sinus pressure and sore throat.    Respiratory: Negative for cough, chest tightness and shortness of breath.    Cardiovascular: Negative for chest pain, palpitations and leg swelling.   Gastrointestinal: Negative for diarrhea, nausea and vomiting.   Musculoskeletal: Negative for arthralgias, back pain and myalgias.   Neurological: Negative for dizziness, syncope and headaches.   Psychiatric/Behavioral: Negative for confusion and sleep disturbance.       Objective   /62 (BP Location: Right arm, Patient Position: Sitting, Cuff Size: Adult)   Pulse 79   Temp 98.1 °F (36.7 °C) (Oral)   Resp 18   Wt 101 kg (222 lb)   SpO2 91%   BMI 31.85 kg/m²   Physical Exam   Constitutional: He is oriented to person, place, and time. He appears well-developed and well-nourished.   HENT:   Head: Normocephalic and atraumatic.   Mouth/Throat: No oropharyngeal exudate.   Eyes: Conjunctivae are normal. Pupils are equal, round, and reactive to light.   Neck: Neck supple. No thyromegaly present.   Cardiovascular: Normal rate and regular rhythm.   Pulmonary/Chest: Effort normal and breath sounds normal.   Abdominal: Soft. Bowel sounds are normal. He exhibits no distension. There is no tenderness.   Musculoskeletal: He exhibits no edema.   Neurological: He is alert and oriented to person, place, and time. No cranial nerve deficit.   Skin: Skin is warm and dry.   Psychiatric: He has a normal mood and affect. Judgment normal.   Nursing note and vitals reviewed.        Results for orders placed or performed in visit on  05/17/19   POC Glycosylated Hemoglobin (Hb A1C)   Result Value Ref Range    Hemoglobin A1C 7.7 %             Assessment/Plan   Diagnoses and all orders for this visit:    Type 2 diabetes mellitus without complication, unspecified whether long term insulin use (CMS/[facility])  -     POC Glycosylated Hemoglobin (Hb A1C)    Essential hypertension  Comments:  stable, continue current meds.  Orders:  -     amLODIPine (NORVASC) 5 MG tablet; Take 1 tablet by mouth 2 (Two) Times a Day.    Type 2 diabetes mellitus with hyperglycemia, without long-term current use of insulin (CMS/[facility])  -     glimepiride (AMARYL) 4 MG tablet; Take 1 tablet by mouth Daily With Dinner.      Procedure c-scope due. Adv. To call and schedule (has received a letter)           Return for Medicare Wellness, please change to mid september from 8/30 as he will be out of town- ok for 2 x 15.

## 2019-06-24 DIAGNOSIS — Z12.11 SCREENING FOR COLON CANCER: Primary | ICD-10-CM

## 2019-07-01 ENCOUNTER — OUTSIDE FACILITY SERVICE (OUTPATIENT)
Dept: GASTROENTEROLOGY | Facility: CLINIC | Age: 74
End: 2019-07-01

## 2019-07-01 ENCOUNTER — LAB REQUISITION (OUTPATIENT)
Dept: LAB | Facility: HOSPITAL | Age: 74
End: 2019-07-01

## 2019-07-01 DIAGNOSIS — Z80.0 FAMILY HISTORY OF MALIGNANT NEOPLASM OF DIGESTIVE ORGAN: ICD-10-CM

## 2019-07-01 DIAGNOSIS — Z86.010 HISTORY OF COLONIC POLYPS: ICD-10-CM

## 2019-07-01 DIAGNOSIS — Z12.11 ENCOUNTER FOR SCREENING FOR MALIGNANT NEOPLASM OF COLON: ICD-10-CM

## 2019-07-01 DIAGNOSIS — D12.4 BENIGN NEOPLASM OF DESCENDING COLON: ICD-10-CM

## 2019-07-01 DIAGNOSIS — K57.30 DIVERTICULOSIS OF LARGE INTESTINE WITHOUT PERFORATION OR ABSCESS WITHOUT BLEEDING: ICD-10-CM

## 2019-07-01 PROCEDURE — G0500 MOD SEDAT ENDO SERVICE >5YRS: HCPCS | Performed by: INTERNAL MEDICINE

## 2019-07-01 PROCEDURE — 45385 COLONOSCOPY W/LESION REMOVAL: CPT | Performed by: INTERNAL MEDICINE

## 2019-07-01 PROCEDURE — 88305 TISSUE EXAM BY PATHOLOGIST: CPT | Performed by: INTERNAL MEDICINE

## 2019-07-01 RX ORDER — LISINOPRIL 20 MG/1
TABLET ORAL
Qty: 180 TABLET | Refills: 1 | Status: SHIPPED | OUTPATIENT
Start: 2019-07-01 | End: 2019-12-06 | Stop reason: HOSPADM

## 2019-07-02 LAB
CYTO UR: NORMAL
LAB AP CASE REPORT: NORMAL
LAB AP CLINICAL INFORMATION: NORMAL
PATH REPORT.FINAL DX SPEC: NORMAL
PATH REPORT.GROSS SPEC: NORMAL

## 2019-07-05 ENCOUNTER — TELEPHONE (OUTPATIENT)
Dept: GASTROENTEROLOGY | Facility: CLINIC | Age: 74
End: 2019-07-05

## 2019-07-05 NOTE — TELEPHONE ENCOUNTER
----- Message from Clifford Dorantes MD sent at 7/2/2019  7:41 PM EDT -----  Please call Gabriela and inform him he had an adenoma. Follow up in 5 years is indicated. Dr. Dorantes

## 2019-09-03 DIAGNOSIS — E11.65 UNCONTROLLED TYPE 2 DIABETES MELLITUS WITH HYPERGLYCEMIA (HCC): ICD-10-CM

## 2019-09-04 RX ORDER — SIMVASTATIN 10 MG
TABLET ORAL
Qty: 90 TABLET | Refills: 1 | Status: SHIPPED | OUTPATIENT
Start: 2019-09-04 | End: 2020-03-16 | Stop reason: SDUPTHER

## 2019-09-25 ENCOUNTER — OFFICE VISIT (OUTPATIENT)
Dept: INTERNAL MEDICINE | Facility: CLINIC | Age: 74
End: 2019-09-25

## 2019-09-25 VITALS
HEART RATE: 73 BPM | BODY MASS INDEX: 32.47 KG/M2 | HEIGHT: 70 IN | WEIGHT: 226.8 LBS | SYSTOLIC BLOOD PRESSURE: 124 MMHG | OXYGEN SATURATION: 99 % | DIASTOLIC BLOOD PRESSURE: 62 MMHG

## 2019-09-25 DIAGNOSIS — I10 ESSENTIAL HYPERTENSION: ICD-10-CM

## 2019-09-25 DIAGNOSIS — I50.23 ACUTE ON CHRONIC SYSTOLIC CONGESTIVE HEART FAILURE (HCC): ICD-10-CM

## 2019-09-25 DIAGNOSIS — N40.0 BENIGN PROSTATIC HYPERPLASIA WITHOUT LOWER URINARY TRACT SYMPTOMS: ICD-10-CM

## 2019-09-25 DIAGNOSIS — R18.8 OTHER ASCITES: ICD-10-CM

## 2019-09-25 DIAGNOSIS — E55.9 VITAMIN D DEFICIENCY: ICD-10-CM

## 2019-09-25 DIAGNOSIS — E11.65 UNCONTROLLED TYPE 2 DIABETES MELLITUS WITH HYPERGLYCEMIA (HCC): Primary | ICD-10-CM

## 2019-09-25 DIAGNOSIS — E78.2 MIXED HYPERLIPIDEMIA: ICD-10-CM

## 2019-09-25 PROBLEM — Z01.818 PRE-OP EXAMINATION: Status: RESOLVED | Noted: 2018-08-16 | Resolved: 2019-09-25

## 2019-09-25 LAB
ALBUMIN SERPL-MCNC: 4.5 G/DL (ref 3.5–5.2)
ALBUMIN/GLOB SERPL: 1.7 G/DL
ALP SERPL-CCNC: 80 U/L (ref 39–117)
ALT SERPL W P-5'-P-CCNC: 30 U/L (ref 1–41)
ANION GAP SERPL CALCULATED.3IONS-SCNC: 11.9 MMOL/L (ref 5–15)
AST SERPL-CCNC: 34 U/L (ref 1–40)
BILIRUB SERPL-MCNC: 0.3 MG/DL (ref 0.2–1.2)
BUN BLD-MCNC: 19 MG/DL (ref 8–23)
BUN/CREAT SERPL: 15.1 (ref 7–25)
CALCIUM SPEC-SCNC: 9.2 MG/DL (ref 8.6–10.5)
CHLORIDE SERPL-SCNC: 105 MMOL/L (ref 98–107)
CHOLEST SERPL-MCNC: 112 MG/DL (ref 0–200)
CO2 SERPL-SCNC: 27.1 MMOL/L (ref 22–29)
CREAT BLD-MCNC: 1.26 MG/DL (ref 0.76–1.27)
DEPRECATED RDW RBC AUTO: 47.2 FL (ref 37–54)
ERYTHROCYTE [DISTWIDTH] IN BLOOD BY AUTOMATED COUNT: 14.6 % (ref 12.3–15.4)
GFR SERPL CREATININE-BSD FRML MDRD: 56 ML/MIN/1.73
GLOBULIN UR ELPH-MCNC: 2.7 GM/DL
GLUCOSE BLD-MCNC: 72 MG/DL (ref 65–99)
GLUCOSE BLDC GLUCOMTR-MCNC: 100 MG/DL (ref 70–130)
HBA1C MFR BLD: 7.9 %
HCT VFR BLD AUTO: 36.4 % (ref 37.5–51)
HDLC SERPL-MCNC: 41 MG/DL (ref 40–60)
HGB BLD-MCNC: 11.5 G/DL (ref 13–17.7)
LDLC SERPL CALC-MCNC: 53 MG/DL (ref 0–100)
LDLC/HDLC SERPL: 1.29 {RATIO}
MCH RBC QN AUTO: 27.8 PG (ref 26.6–33)
MCHC RBC AUTO-ENTMCNC: 31.6 G/DL (ref 31.5–35.7)
MCV RBC AUTO: 88.1 FL (ref 79–97)
PLATELET # BLD AUTO: 147 10*3/MM3 (ref 140–450)
PMV BLD AUTO: 11.8 FL (ref 6–12)
POTASSIUM BLD-SCNC: 4.9 MMOL/L (ref 3.5–5.2)
PROT SERPL-MCNC: 7.2 G/DL (ref 6–8.5)
RBC # BLD AUTO: 4.13 10*6/MM3 (ref 4.14–5.8)
SODIUM BLD-SCNC: 144 MMOL/L (ref 136–145)
TRIGL SERPL-MCNC: 90 MG/DL (ref 0–150)
VLDLC SERPL-MCNC: 18 MG/DL (ref 5–40)
WBC NRBC COR # BLD: 7.09 10*3/MM3 (ref 3.4–10.8)

## 2019-09-25 PROCEDURE — 83880 ASSAY OF NATRIURETIC PEPTIDE: CPT | Performed by: INTERNAL MEDICINE

## 2019-09-25 PROCEDURE — 99214 OFFICE O/P EST MOD 30 MIN: CPT | Performed by: INTERNAL MEDICINE

## 2019-09-25 PROCEDURE — 80053 COMPREHEN METABOLIC PANEL: CPT | Performed by: INTERNAL MEDICINE

## 2019-09-25 PROCEDURE — 83036 HEMOGLOBIN GLYCOSYLATED A1C: CPT | Performed by: INTERNAL MEDICINE

## 2019-09-25 PROCEDURE — G0439 PPPS, SUBSEQ VISIT: HCPCS | Performed by: INTERNAL MEDICINE

## 2019-09-25 PROCEDURE — 82306 VITAMIN D 25 HYDROXY: CPT | Performed by: INTERNAL MEDICINE

## 2019-09-25 PROCEDURE — 84443 ASSAY THYROID STIM HORMONE: CPT | Performed by: INTERNAL MEDICINE

## 2019-09-25 PROCEDURE — 84153 ASSAY OF PSA TOTAL: CPT | Performed by: INTERNAL MEDICINE

## 2019-09-25 PROCEDURE — G0444 DEPRESSION SCREEN ANNUAL: HCPCS | Performed by: INTERNAL MEDICINE

## 2019-09-25 PROCEDURE — 80061 LIPID PANEL: CPT | Performed by: INTERNAL MEDICINE

## 2019-09-25 PROCEDURE — 85027 COMPLETE CBC AUTOMATED: CPT | Performed by: INTERNAL MEDICINE

## 2019-09-25 PROCEDURE — 82947 ASSAY GLUCOSE BLOOD QUANT: CPT | Performed by: INTERNAL MEDICINE

## 2019-09-25 RX ORDER — POTASSIUM CHLORIDE 750 MG/1
10 TABLET, FILM COATED, EXTENDED RELEASE ORAL EVERY OTHER DAY
Qty: 15 TABLET | Refills: 5 | Status: SHIPPED | OUTPATIENT
Start: 2019-09-25 | End: 2019-12-06 | Stop reason: HOSPADM

## 2019-09-25 RX ORDER — FUROSEMIDE 20 MG/1
TABLET ORAL
Qty: 20 TABLET | Refills: 5 | Status: SHIPPED | OUTPATIENT
Start: 2019-09-25 | End: 2019-12-06 | Stop reason: HOSPADM

## 2019-09-25 NOTE — PROGRESS NOTES
The ABCs of the Annual Wellness Visit  Subsequent Medicare Wellness Visit    Chief Complaint   Patient presents with   • Medicare Wellness-subsequent       Subjective   History of Present Illness:  Gabriela Mar is a 73 y.o. male who presents for a Subsequent Medicare Wellness Visit.    HEALTH RISK ASSESSMENT    Recent Hospitalizations:  No hospitalization(s) within the last year.    Current Medical Providers:  Patient Care Team:  Krystina Gee MD as PCP - General  Krystina Gee MD as PCP - Family Medicine  Krystina Gee MD as PCP - Claims Attributed  Evangelist Hernández MD as Consulting Physician (Ophthalmology)    Smoking Status:  Social History     Tobacco Use   Smoking Status Former Smoker   • Packs/day: 2.00   • Years: 35.00   • Pack years: 70.00   • Types: Cigarettes   Smokeless Tobacco Never Used   Tobacco Comment    QUIT 20 PLUS YEARS AGO        Alcohol Consumption:  Social History     Substance and Sexual Activity   Alcohol Use No    Comment: stopped drinking       Depression Screen:   PHQ-2/PHQ-9 Depression Screening 9/25/2019   Little interest or pleasure in doing things 0   Feeling down, depressed, or hopeless 0   Trouble falling or staying asleep, or sleeping too much -   Feeling tired or having little energy -   Poor appetite or overeating -   Feeling bad about yourself - or that you are a failure or have let yourself or your family down -   Trouble concentrating on things, such as reading the newspaper or watching television -   Moving or speaking so slowly that other people could have noticed. Or the opposite - being so fidgety or restless that you have been moving around a lot more than usual -   Thoughts that you would be better off dead, or of hurting yourself in some way -   Total Score 0   If you checked off any problems, how difficult have these problems made it for you to do your work, take care of things at home, or get along with other people? -       Fall Risk  Screen:  LEE Fall Risk Assessment was completed, and patient is at LOW risk for falls.Assessment completed on:9/25/2019    Health Habits and Functional and Cognitive Screening:  Functional & Cognitive Status 9/25/2019   Do you have difficulty preparing food and eating? No   Do you have difficulty bathing yourself, getting dressed or grooming yourself? No   Do you have difficulty using the toilet? No   Do you have difficulty moving around from place to place? No   Do you have trouble with steps or getting out of a bed or a chair? No   Current Diet Well Balanced Diet   Dental Exam Up to date   Eye Exam Up to date   Exercise (times per week) 0 times per week   Current Exercise Activities Include No Regular Exercise   Do you need help using the phone?  No   Are you deaf or do you have serious difficulty hearing?  No   Do you need help with transportation? No   Do you need help shopping? No   Do you need help preparing meals?  No   Do you need help with housework?  No   Do you need help with laundry? No   Do you need help taking your medications? No   Do you need help managing money? No   Do you ever drive or ride in a car without wearing a seat belt? No   Have you felt unusual stress, anger or loneliness in the last month? No   Who do you live with? -   If you need help, do you have trouble finding someone available to you? No   Have you been bothered in the last four weeks by sexual problems? No   Do you have difficulty concentrating, remembering or making decisions? No         Does the patient have evidence of cognitive impairment? No    Asprin use counseling:Does not need ASA (and currently is not on it)    Age-appropriate Screening Schedule:  Refer to the list below for future screening recommendations based on patient's age, sex and/or medical conditions. Orders for these recommended tests are listed in the plan section. The patient has been provided with a written plan.    Health Maintenance   Topic Date Due  "  • DIABETIC EYE EXAM  10/08/2019   • HEMOGLOBIN A1C  03/25/2020   • DIABETIC FOOT EXAM  09/25/2020   • LIPID PANEL  09/25/2020   • URINE MICROALBUMIN  09/25/2020   • COLONOSCOPY  07/01/2024   • PNEUMOCOCCAL VACCINES (65+ LOW/MEDIUM RISK)  Completed   • INFLUENZA VACCINE  Addressed   • TDAP/TD VACCINES  Discontinued   • ZOSTER VACCINE  Discontinued          The following portions of the patient's history were reviewed and updated as appropriate: allergies, current medications, past family history, past medical history, past social history, past surgical history and problem list.    Outpatient Medications Prior to Visit   Medication Sig Dispense Refill   • amLODIPine (NORVASC) 5 MG tablet Take 1 tablet by mouth 2 (Two) Times a Day. 180 tablet 1   • baclofen (LIORESAL) 10 MG tablet Take 1 tablet by mouth 3 (Three) Times a Day. 60 tablet 0   • Cholecalciferol (VITAMIN D) 1000 UNITS tablet Take 1 capsule by mouth Daily.     • Cyanocobalamin (VITAMIN B-12 ER) 1000 MCG tablet controlled-release Take 1,000 mcg by mouth Daily.     • glimepiride (AMARYL) 4 MG tablet Take 1 tablet by mouth Daily With Dinner. 90 tablet 1   • insulin NPH (humuLIN N,novoLIN N) 100 UNIT/ML injection 35 Units in the morning and 45 units in the evening. 30 mL 5   • Insulin Syringe 28G X 1/2\" 1 ML misc 1 each 2 (Two) Times a Day. 100 each 5   • lisinopril (PRINIVIL,ZESTRIL) 20 MG tablet TAKE 1 TABLET BY MOUTH TWICE DAILY 180 tablet 1   • metFORMIN (GLUCOPHAGE) 1000 MG tablet TAKE 1 & 1/2 (ONE & ONE-HALF) TABLETS BY MOUTH IN THE MORNING AND 1 WITH DINNER 235 tablet 1   • Multiple Vitamins-Minerals (MULTI VITAMIN/MINERALS PO) Take 1 tablet by mouth Daily.     • omeprazole (PRILOSEC) 20 MG capsule Take 1 capsule by mouth Daily.     • simvastatin (ZOCOR) 10 MG tablet TAKE 1 TABLET BY MOUTH AT BEDTIME 90 tablet 1   • aspirin (ASPIRIN LOW DOSE) 81 MG chewable tablet Chew 1 tablet Daily. Resume in 1 month     • furosemide (LASIX) 20 MG tablet Take 1 tablet " by mouth Every Other Day. 15 tablet 5   • potassium chloride (K-DUR) 10 MEQ CR tablet Take 1 tablet by mouth Every Other Day. Take with lasix 15 tablet 5   • Sod Picosulfate-Mag Ox-Cit Acd 10-3.5-12 MG-GM -GM/160ML solution Take 1 kit by mouth Take As Directed. Follow instructions that were mailed to your home. If you didn't receive these call [phone]. 2 bottle 0     No facility-administered medications prior to visit.        Patient Active Problem List   Diagnosis   • Hyperlipidemia   • Sciatica   • Hypertension   • Esophageal reflux   • Obesity   • Osteoarthritis   • Type 2 diabetes mellitus, uncontrolled (CMS/[facility])   • Vitamin D deficiency   • Vitamin B deficiency   • Hip arthritis   • Status post total replacement of right hip   • Acute blood loss anemia, mild, asymptomatic   • Leukocytosis, mild, likely reactive   • Chronic bilateral low back pain without sciatica   • Acute on chronic systolic congestive heart failure (CMS/[facility])       Advanced Care Planning:  Patient has an advance directive - a copy has not been provided. Have asked the patient to send this to us to add to record    Review of Systems   Constitutional: Negative for chills and fatigue.   HENT: Negative for congestion, ear pain and sore throat.    Eyes: Negative for pain, redness and visual disturbance.   Respiratory: Positive for chest tightness and shortness of breath. Negative for cough and wheezing.    Cardiovascular: Negative for chest pain, palpitations and leg swelling.        No orthopnea, or PND   Gastrointestinal: Negative for abdominal pain, diarrhea and nausea.   Endocrine: Negative for cold intolerance and heat intolerance.   Genitourinary: Negative for flank pain and urgency.   Musculoskeletal: Negative for arthralgias and gait problem.   Skin: Negative for pallor and rash.   Neurological: Negative for dizziness, weakness and headaches.   Psychiatric/Behavioral: Negative for dysphoric mood and sleep disturbance. The patient is  "not nervous/anxious.        Compared to one year ago, the patient feels his physical health is better.  Compared to one year ago, the patient feels his mental health is better.    Reviewed chart for potential of high risk medication in the elderly: yes  Reviewed chart for potential of harmful drug interactions in the elderly:yes    Objective         Vitals:    09/25/19 1017   BP: 124/62   Pulse: 73   SpO2: 99%  Comment: ra   Weight: 103 kg (226 lb 12.8 oz)   Height: 177.8 cm (70\")   PainSc: 0-No pain       Body mass index is 32.54 kg/m².  Discussed the patient's BMI with him. The BMI is above average; BMI management plan is completed.    Physical Exam   Constitutional: He is oriented to person, place, and time. He appears well-developed and well-nourished.   HENT:   Head: Normocephalic and atraumatic.   Right Ear: External ear normal.   Left Ear: External ear normal.   Mouth/Throat: No oropharyngeal exudate.   Eyes: Conjunctivae are normal. Pupils are equal, round, and reactive to light.   Neck: Neck supple. No thyromegaly present.   Cardiovascular: Normal rate, regular rhythm and intact distal pulses. Exam reveals no friction rub.   No murmur heard.  Pulmonary/Chest: Effort normal and breath sounds normal. No stridor. He has no wheezes.   Abdominal: Soft. Bowel sounds are normal. He exhibits no distension and no mass. There is no tenderness. There is no rebound.   Musculoskeletal: He exhibits no edema or tenderness.    Gabriela had a diabetic foot exam performed today.   During the foot exam he had a monofilament test performed (wnl).  Vascular Status -  His right foot exhibits normal foot vasculature  and no edema. His left foot exhibits normal foot vasculature  and no edema.  Lymphadenopathy:     He has no cervical adenopathy.   Neurological: He is alert and oriented to person, place, and time. He displays normal reflexes. No cranial nerve deficit. He exhibits normal muscle tone. Coordination normal.   Skin: Skin " is warm and dry. No erythema. No pallor.   Psychiatric: He has a normal mood and affect. His behavior is normal. Judgment and thought content normal.   Nursing note and vitals reviewed.      Lab Results   Component Value Date    TRIG 90 09/25/2019    HDL 41 09/25/2019    LDL 53 09/25/2019    VLDL 18 09/25/2019    HGBA1C 7.9 09/25/2019        Assessment/Plan   Medicare Risks and Personalized Health Plan  CMS Preventative Services Quick Reference  Advance Directive Discussion  Diabetic Lab Screening   Fall Risk  Glaucoma Risk  Hearing Problem  Immunizations Discussed/Encouraged (specific immunizations; Influenza )  Prostate Cancer Screening     The above risks/problems have been discussed with the patient.  Pertinent information has been shared with the patient in the After Visit Summary.  Follow up plans and orders are seen below in the Assessment/Plan Section.    Medicare Wellness-subsequent    Subjective   Gabriela Mar is a 73 y.o. male is here today for follow-up.  HPI  Feels it more soa. Thinks his chf is back.   Has been travelling more to help his brother who has health issues. Sugars are always higher when on the road trip.  Thinks it will be back to normal.    Current Outpatient Medications:   •  amLODIPine (NORVASC) 5 MG tablet, Take 1 tablet by mouth 2 (Two) Times a Day., Disp: 180 tablet, Rfl: 1  •  baclofen (LIORESAL) 10 MG tablet, Take 1 tablet by mouth 3 (Three) Times a Day., Disp: 60 tablet, Rfl: 0  •  Cholecalciferol (VITAMIN D) 1000 UNITS tablet, Take 1 capsule by mouth Daily., Disp: , Rfl:   •  Cyanocobalamin (VITAMIN B-12 ER) 1000 MCG tablet controlled-release, Take 1,000 mcg by mouth Daily., Disp: , Rfl:   •  furosemide (LASIX) 20 MG tablet, 1 PO daily for 7 days, then 1 PO QOD prn soa., Disp: 20 tablet, Rfl: 5  •  glimepiride (AMARYL) 4 MG tablet, Take 1 tablet by mouth Daily With Dinner., Disp: 90 tablet, Rfl: 1  •  insulin NPH (humuLIN N,novoLIN N) 100 UNIT/ML injection, 35 Units in the  "morning and 45 units in the evening., Disp: 30 mL, Rfl: 5  •  Insulin Syringe 28G X 1/2\" 1 ML misc, 1 each 2 (Two) Times a Day., Disp: 100 each, Rfl: 5  •  lisinopril (PRINIVIL,ZESTRIL) 20 MG tablet, TAKE 1 TABLET BY MOUTH TWICE DAILY, Disp: 180 tablet, Rfl: 1  •  metFORMIN (GLUCOPHAGE) 1000 MG tablet, TAKE 1 & 1/2 (ONE & ONE-HALF) TABLETS BY MOUTH IN THE MORNING AND 1 WITH DINNER, Disp: 235 tablet, Rfl: 1  •  Multiple Vitamins-Minerals (MULTI VITAMIN/MINERALS PO), Take 1 tablet by mouth Daily., Disp: , Rfl:   •  omeprazole (PRILOSEC) 20 MG capsule, Take 1 capsule by mouth Daily., Disp: , Rfl:   •  potassium chloride (K-DUR) 10 MEQ CR tablet, Take 1 tablet by mouth Every Other Day. Take with lasix, Disp: 15 tablet, Rfl: 5  •  simvastatin (ZOCOR) 10 MG tablet, TAKE 1 TABLET BY MOUTH AT BEDTIME, Disp: 90 tablet, Rfl: 1      The following portions of the patient's history were reviewed and updated as appropriate: allergies, current medications, past family history, past medical history, past social history, past surgical history and problem list.        Objective   /62   Pulse 73   Ht 177.8 cm (70\")   Wt 103 kg (226 lb 12.8 oz)   SpO2 99% Comment: ra  BMI 32.54 kg/m²         Results for orders placed or performed in visit on 09/25/19   CBC (No Diff)   Result Value Ref Range    WBC 7.09 3.40 - 10.80 10*3/mm3    RBC 4.13 (L) 4.14 - 5.80 10*6/mm3    Hemoglobin 11.5 (L) 13.0 - 17.7 g/dL    Hematocrit 36.4 (L) 37.5 - 51.0 %    MCV 88.1 79.0 - 97.0 fL    MCH 27.8 26.6 - 33.0 pg    MCHC 31.6 31.5 - 35.7 g/dL    RDW 14.6 12.3 - 15.4 %    RDW-SD 47.2 37.0 - 54.0 fl    MPV 11.8 6.0 - 12.0 fL    Platelets 147 140 - 450 10*3/mm3   Comprehensive Metabolic Panel   Result Value Ref Range    Glucose 72 65 - 99 mg/dL    BUN 19 8 - 23 mg/dL    Creatinine 1.26 0.76 - 1.27 mg/dL    Sodium 144 136 - 145 mmol/L    Potassium 4.9 3.5 - 5.2 mmol/L    Chloride 105 98 - 107 mmol/L    CO2 27.1 22.0 - 29.0 mmol/L    Calcium 9.2 8.6 - " 10.5 mg/dL    Total Protein 7.2 6.0 - 8.5 g/dL    Albumin 4.50 3.50 - 5.20 g/dL    ALT (SGPT) 30 1 - 41 U/L    AST (SGOT) 34 1 - 40 U/L    Alkaline Phosphatase 80 39 - 117 U/L    Total Bilirubin 0.3 0.2 - 1.2 mg/dL    eGFR Non African Amer 56 (L) >60 mL/min/1.73    Globulin 2.7 gm/dL    A/G Ratio 1.7 g/dL    BUN/Creatinine Ratio 15.1 7.0 - 25.0    Anion Gap 11.9 5.0 - 15.0 mmol/L   Lipid Panel   Result Value Ref Range    Total Cholesterol 112 0 - 200 mg/dL    Triglycerides 90 0 - 150 mg/dL    HDL Cholesterol 41 40 - 60 mg/dL    LDL Cholesterol  53 0 - 100 mg/dL    VLDL Cholesterol 18 5 - 40 mg/dL    LDL/HDL Ratio 1.29    Vitamin D 25 Hydroxy   Result Value Ref Range    25 Hydroxy, Vitamin D 44.8 30.0 - 100.0 ng/ml   TSH   Result Value Ref Range    TSH 2.130 0.270 - 4.200 uIU/mL   PSA DIAGNOSTIC   Result Value Ref Range    PSA 0.469 0.000 - 4.000 ng/mL   proBNP   Result Value Ref Range    proBNP 87.1 5.0 - 900.0 pg/mL   POCT Glucose   Result Value Ref Range    Glucose 100 70 - 130 mg/dL   POC Glycosylated Hemoglobin (Hb A1C)   Result Value Ref Range    Hemoglobin A1C 7.9 %             Assessment/Plan   Diagnoses and all orders for this visit:    Uncontrolled type 2 diabetes mellitus with hyperglycemia (CMS/[facility])  -     POCT Glucose  -     POC Glycosylated Hemoglobin (Hb A1C)    Acute on chronic systolic congestive heart failure (CMS/[facility])  Comments:  off lasix, restart at 1 daily for 7 days and then take qod prn.  Orders:  -     furosemide (LASIX) 20 MG tablet; 1 PO daily for 7 days, then 1 PO QOD prn soa.  -     CBC (No Diff)  -     Comprehensive Metabolic Panel  -     proBNP  -     potassium chloride (K-DUR) 10 MEQ CR tablet; Take 1 tablet by mouth Every Other Day. Take with lasix    Mixed hyperlipidemia  -     Lipid Panel  -     TSH    Essential hypertension    Vitamin D deficiency  -     Vitamin D 25 Hydroxy    Acute on chronic systolic congestive heart failure (CMS/[facility])  Comments:  stable, take lasix  qod.  Orders:  -     furosemide (LASIX) 20 MG tablet; 1 PO daily for 7 days, then 1 PO QOD prn soa.  -     CBC (No Diff)  -     Comprehensive Metabolic Panel  -     proBNP  -     potassium chloride (K-DUR) 10 MEQ CR tablet; Take 1 tablet by mouth Every Other Day. Take with lasix    Benign prostatic hyperplasia without lower urinary tract symptoms  -     PSA DIAGNOSTIC    Other ascites  -     US Abdomen Complete    exam worrisome for ascites, ? Due to chf vs other. Check US labs.       Last echo with EF 50% otw okay.      PHQ-2/PHQ-9 Depression screening reviewed with patient . Total time spent today for depression screening  with Gabriela Mar  was 15 minutes in counseling, along with plans for any diagnositc work-up and treatment.      Follow Up:  Return in about 3 months (around 12/25/2019) for Next scheduled follow up- 1st or 2nd week in January.     An After Visit Summary and PPPS were given to the patient.

## 2019-09-26 LAB
25(OH)D3 SERPL-MCNC: 44.8 NG/ML (ref 30–100)
NT-PROBNP SERPL-MCNC: 87.1 PG/ML (ref 5–900)
PSA SERPL-MCNC: 0.47 NG/ML (ref 0–4)
TSH SERPL DL<=0.05 MIU/L-ACNC: 2.13 UIU/ML (ref 0.27–4.2)

## 2019-10-15 ENCOUNTER — HOSPITAL ENCOUNTER (OUTPATIENT)
Dept: ULTRASOUND IMAGING | Facility: HOSPITAL | Age: 74
Discharge: HOME | End: 2019-10-15
Admitting: INTERNAL MEDICINE

## 2019-10-15 PROCEDURE — 76705 ECHO EXAM OF ABDOMEN: CPT

## 2019-10-28 DIAGNOSIS — E11.65 UNCONTROLLED TYPE 2 DIABETES MELLITUS WITH HYPERGLYCEMIA (HCC): ICD-10-CM

## 2019-12-04 ENCOUNTER — APPOINTMENT (OUTPATIENT)
Dept: CT IMAGING | Facility: HOSPITAL | Age: 74
End: 2019-12-04

## 2019-12-04 ENCOUNTER — APPOINTMENT (OUTPATIENT)
Dept: NUCLEAR MEDICINE | Facility: HOSPITAL | Age: 74
End: 2019-12-04

## 2019-12-04 ENCOUNTER — OFFICE VISIT (OUTPATIENT)
Dept: INTERNAL MEDICINE | Facility: CLINIC | Age: 74
End: 2019-12-04

## 2019-12-04 ENCOUNTER — APPOINTMENT (OUTPATIENT)
Dept: GENERAL RADIOLOGY | Facility: HOSPITAL | Age: 74
End: 2019-12-04

## 2019-12-04 ENCOUNTER — HOSPITAL ENCOUNTER (INPATIENT)
Facility: HOSPITAL | Age: 74
LOS: 2 days | Discharge: HOME | End: 2019-12-06
Attending: EMERGENCY MEDICINE | Admitting: INTERNAL MEDICINE

## 2019-12-04 VITALS
BODY MASS INDEX: 32.35 KG/M2 | TEMPERATURE: 98.2 F | OXYGEN SATURATION: 93 % | WEIGHT: 226 LBS | DIASTOLIC BLOOD PRESSURE: 60 MMHG | HEART RATE: 94 BPM | SYSTOLIC BLOOD PRESSURE: 140 MMHG | HEIGHT: 70 IN

## 2019-12-04 DIAGNOSIS — J18.9 PNEUMONIA OF BOTH LUNGS DUE TO INFECTIOUS ORGANISM, UNSPECIFIED PART OF LUNG: Primary | ICD-10-CM

## 2019-12-04 DIAGNOSIS — J06.9 UPPER RESPIRATORY TRACT INFECTION, UNSPECIFIED TYPE: Primary | ICD-10-CM

## 2019-12-04 DIAGNOSIS — J18.9 PNEUMONIA DUE TO INFECTIOUS ORGANISM, UNSPECIFIED LATERALITY, UNSPECIFIED PART OF LUNG: ICD-10-CM

## 2019-12-04 DIAGNOSIS — E11.9 TYPE 2 DIABETES MELLITUS WITHOUT COMPLICATION, UNSPECIFIED WHETHER LONG TERM INSULIN USE (HCC): ICD-10-CM

## 2019-12-04 DIAGNOSIS — Z76.89 REFERRED BY PRIMARY CARE PHYSICIAN: ICD-10-CM

## 2019-12-04 DIAGNOSIS — I50.23 ACUTE ON CHRONIC SYSTOLIC CONGESTIVE HEART FAILURE (HCC): ICD-10-CM

## 2019-12-04 DIAGNOSIS — M54.50 CHRONIC RIGHT-SIDED LOW BACK PAIN WITHOUT SCIATICA: ICD-10-CM

## 2019-12-04 DIAGNOSIS — R09.02 HYPOXIA: ICD-10-CM

## 2019-12-04 DIAGNOSIS — N28.9 RENAL INSUFFICIENCY: ICD-10-CM

## 2019-12-04 DIAGNOSIS — G89.29 CHRONIC RIGHT-SIDED LOW BACK PAIN WITHOUT SCIATICA: ICD-10-CM

## 2019-12-04 DIAGNOSIS — R77.8 ELEVATED TROPONIN: ICD-10-CM

## 2019-12-04 PROBLEM — J44.0 CHRONIC OBSTRUCTIVE PULMONARY DISEASE WITH ACUTE LOWER RESPIRATORY INFECTION (HCC): Status: ACTIVE | Noted: 2019-12-04

## 2019-12-04 LAB
ALBUMIN SERPL-MCNC: 3.9 G/DL (ref 3.5–5.2)
ALBUMIN/GLOB SERPL: 1.1 G/DL
ALP SERPL-CCNC: 77 U/L (ref 39–117)
ALT SERPL W P-5'-P-CCNC: 39 U/L (ref 1–41)
ANION GAP SERPL CALCULATED.3IONS-SCNC: 10 MMOL/L (ref 5–15)
AST SERPL-CCNC: 39 U/L (ref 1–40)
BASOPHILS # BLD AUTO: 0.05 10*3/MM3 (ref 0–0.2)
BASOPHILS NFR BLD AUTO: 0.6 % (ref 0–1.5)
BILIRUB SERPL-MCNC: 0.3 MG/DL (ref 0.2–1.2)
BUN BLD-MCNC: 33 MG/DL (ref 8–23)
BUN/CREAT SERPL: 18.3 (ref 7–25)
CALCIUM SPEC-SCNC: 8.6 MG/DL (ref 8.6–10.5)
CHLORIDE SERPL-SCNC: 104 MMOL/L (ref 98–107)
CO2 SERPL-SCNC: 22 MMOL/L (ref 22–29)
CREAT BLD-MCNC: 1.8 MG/DL (ref 0.76–1.27)
D-LACTATE SERPL-SCNC: 2.5 MMOL/L (ref 0.5–2)
DEPRECATED RDW RBC AUTO: 47.8 FL (ref 37–54)
EOSINOPHIL # BLD AUTO: 0.13 10*3/MM3 (ref 0–0.4)
EOSINOPHIL NFR BLD AUTO: 1.6 % (ref 0.3–6.2)
ERYTHROCYTE [DISTWIDTH] IN BLOOD BY AUTOMATED COUNT: 14.2 % (ref 12.3–15.4)
EXPIRATION DATE: NORMAL
FLUAV AG NPH QL: NEGATIVE
FLUBV AG NPH QL: NEGATIVE
GFR SERPL CREATININE-BSD FRML MDRD: 37 ML/MIN/1.73
GLOBULIN UR ELPH-MCNC: 3.4 GM/DL
GLUCOSE BLD-MCNC: 162 MG/DL (ref 65–99)
GLUCOSE BLDC GLUCOMTR-MCNC: 277 MG/DL (ref 70–130)
HCT VFR BLD AUTO: 39.2 % (ref 37.5–51)
HGB BLD-MCNC: 11.9 G/DL (ref 13–17.7)
HOLD SPECIMEN: NORMAL
HOLD SPECIMEN: NORMAL
IMM GRANULOCYTES # BLD AUTO: 0.02 10*3/MM3 (ref 0–0.05)
IMM GRANULOCYTES NFR BLD AUTO: 0.2 % (ref 0–0.5)
INTERNAL CONTROL: NORMAL
LYMPHOCYTES # BLD AUTO: 2.8 10*3/MM3 (ref 0.7–3.1)
LYMPHOCYTES NFR BLD AUTO: 34.8 % (ref 19.6–45.3)
Lab: NORMAL
MCH RBC QN AUTO: 28.1 PG (ref 26.6–33)
MCHC RBC AUTO-ENTMCNC: 30.4 G/DL (ref 31.5–35.7)
MCV RBC AUTO: 92.5 FL (ref 79–97)
MONOCYTES # BLD AUTO: 0.97 10*3/MM3 (ref 0.1–0.9)
MONOCYTES NFR BLD AUTO: 12 % (ref 5–12)
NEUTROPHILS # BLD AUTO: 4.08 10*3/MM3 (ref 1.7–7)
NEUTROPHILS NFR BLD AUTO: 50.8 % (ref 42.7–76)
NRBC BLD AUTO-RTO: 0 /100 WBC (ref 0–0.2)
NT-PROBNP SERPL-MCNC: 61.9 PG/ML (ref 5–1800)
PLATELET # BLD AUTO: 148 10*3/MM3 (ref 140–450)
PMV BLD AUTO: 11 FL (ref 6–12)
POTASSIUM BLD-SCNC: 4.4 MMOL/L (ref 3.5–5.2)
PROT SERPL-MCNC: 7.3 G/DL (ref 6–8.5)
RBC # BLD AUTO: 4.24 10*6/MM3 (ref 4.14–5.8)
SODIUM BLD-SCNC: 136 MMOL/L (ref 136–145)
TROPONIN T SERPL-MCNC: 0.03 NG/ML (ref 0–0.03)
TROPONIN T SERPL-MCNC: 0.04 NG/ML (ref 0–0.03)
WBC NRBC COR # BLD: 8.05 10*3/MM3 (ref 3.4–10.8)
WHOLE BLOOD HOLD SPECIMEN: NORMAL
WHOLE BLOOD HOLD SPECIMEN: NORMAL

## 2019-12-04 PROCEDURE — 94640 AIRWAY INHALATION TREATMENT: CPT

## 2019-12-04 PROCEDURE — 25010000002 METHYLPREDNISOLONE PER 125 MG: Performed by: NURSE PRACTITIONER

## 2019-12-04 PROCEDURE — 87804 INFLUENZA ASSAY W/OPTIC: CPT | Performed by: NURSE PRACTITIONER

## 2019-12-04 PROCEDURE — 0 XENON XE 133: Performed by: EMERGENCY MEDICINE

## 2019-12-04 PROCEDURE — 71250 CT THORAX DX C-: CPT

## 2019-12-04 PROCEDURE — 93005 ELECTROCARDIOGRAM TRACING: CPT | Performed by: EMERGENCY MEDICINE

## 2019-12-04 PROCEDURE — 71045 X-RAY EXAM CHEST 1 VIEW: CPT

## 2019-12-04 PROCEDURE — 87040 BLOOD CULTURE FOR BACTERIA: CPT | Performed by: NURSE PRACTITIONER

## 2019-12-04 PROCEDURE — 94640 AIRWAY INHALATION TREATMENT: CPT | Performed by: NURSE PRACTITIONER

## 2019-12-04 PROCEDURE — 80053 COMPREHEN METABOLIC PANEL: CPT | Performed by: EMERGENCY MEDICINE

## 2019-12-04 PROCEDURE — 93005 ELECTROCARDIOGRAM TRACING: CPT

## 2019-12-04 PROCEDURE — 83036 HEMOGLOBIN GLYCOSYLATED A1C: CPT | Performed by: NURSE PRACTITIONER

## 2019-12-04 PROCEDURE — 85025 COMPLETE CBC W/AUTO DIFF WBC: CPT | Performed by: EMERGENCY MEDICINE

## 2019-12-04 PROCEDURE — 99214 OFFICE O/P EST MOD 30 MIN: CPT | Performed by: NURSE PRACTITIONER

## 2019-12-04 PROCEDURE — 83605 ASSAY OF LACTIC ACID: CPT | Performed by: NURSE PRACTITIONER

## 2019-12-04 PROCEDURE — 25010000002 CEFTRIAXONE PER 250 MG: Performed by: NURSE PRACTITIONER

## 2019-12-04 PROCEDURE — 82947 ASSAY GLUCOSE BLOOD QUANT: CPT | Performed by: NURSE PRACTITIONER

## 2019-12-04 PROCEDURE — 99285 EMERGENCY DEPT VISIT HI MDM: CPT

## 2019-12-04 PROCEDURE — 0 TECHNETIUM ALBUMIN AGGREGATED: Performed by: EMERGENCY MEDICINE

## 2019-12-04 PROCEDURE — 83880 ASSAY OF NATRIURETIC PEPTIDE: CPT | Performed by: EMERGENCY MEDICINE

## 2019-12-04 PROCEDURE — 84484 ASSAY OF TROPONIN QUANT: CPT | Performed by: EMERGENCY MEDICINE

## 2019-12-04 PROCEDURE — 63710000001 INSULIN LISPRO (HUMAN) PER 5 UNITS: Performed by: NURSE PRACTITIONER

## 2019-12-04 PROCEDURE — A9540 TC99M MAA: HCPCS | Performed by: EMERGENCY MEDICINE

## 2019-12-04 PROCEDURE — 78582 LUNG VENTILAT&PERFUS IMAGING: CPT

## 2019-12-04 PROCEDURE — 99223 1ST HOSP IP/OBS HIGH 75: CPT | Performed by: FAMILY MEDICINE

## 2019-12-04 PROCEDURE — A9558 XE133 XENON 10MCI: HCPCS | Performed by: EMERGENCY MEDICINE

## 2019-12-04 RX ORDER — IPRATROPIUM BROMIDE AND ALBUTEROL SULFATE 2.5; .5 MG/3ML; MG/3ML
3 SOLUTION RESPIRATORY (INHALATION) ONCE
Status: DISCONTINUED | OUTPATIENT
Start: 2019-12-04 | End: 2019-12-04 | Stop reason: HOSPADM

## 2019-12-04 RX ORDER — IPRATROPIUM BROMIDE AND ALBUTEROL SULFATE 2.5; .5 MG/3ML; MG/3ML
3 SOLUTION RESPIRATORY (INHALATION) ONCE
Status: COMPLETED | OUTPATIENT
Start: 2019-12-04 | End: 2019-12-04

## 2019-12-04 RX ORDER — DEXTROSE MONOHYDRATE 25 G/50ML
25 INJECTION, SOLUTION INTRAVENOUS
Status: DISCONTINUED | OUTPATIENT
Start: 2019-12-04 | End: 2019-12-06 | Stop reason: HOSPADM

## 2019-12-04 RX ORDER — METHYLPREDNISOLONE SODIUM SUCCINATE 125 MG/2ML
125 INJECTION, POWDER, LYOPHILIZED, FOR SOLUTION INTRAMUSCULAR; INTRAVENOUS ONCE
Status: COMPLETED | OUTPATIENT
Start: 2019-12-04 | End: 2019-12-04

## 2019-12-04 RX ORDER — NICOTINE POLACRILEX 4 MG
15 LOZENGE BUCCAL
Status: DISCONTINUED | OUTPATIENT
Start: 2019-12-04 | End: 2019-12-06 | Stop reason: HOSPADM

## 2019-12-04 RX ORDER — SODIUM CHLORIDE 0.9 % (FLUSH) 0.9 %
10 SYRINGE (ML) INJECTION AS NEEDED
Status: DISCONTINUED | OUTPATIENT
Start: 2019-12-04 | End: 2019-12-06 | Stop reason: HOSPADM

## 2019-12-04 RX ADMIN — METHYLPREDNISOLONE SODIUM SUCCINATE 125 MG: 125 INJECTION, POWDER, FOR SOLUTION INTRAMUSCULAR; INTRAVENOUS at 19:26

## 2019-12-04 RX ADMIN — DOXYCYCLINE 100 MG: 100 INJECTION, POWDER, LYOPHILIZED, FOR SOLUTION INTRAVENOUS at 21:33

## 2019-12-04 RX ADMIN — IPRATROPIUM BROMIDE AND ALBUTEROL SULFATE 3 ML: 2.5; .5 SOLUTION RESPIRATORY (INHALATION) at 16:44

## 2019-12-04 RX ADMIN — Medication 1 DOSE: at 20:55

## 2019-12-04 RX ADMIN — IPRATROPIUM BROMIDE AND ALBUTEROL SULFATE 3 ML: 2.5; .5 SOLUTION RESPIRATORY (INHALATION) at 18:58

## 2019-12-04 RX ADMIN — CEFTRIAXONE 2 G: 2 INJECTION, POWDER, FOR SOLUTION INTRAMUSCULAR; INTRAVENOUS at 21:33

## 2019-12-04 RX ADMIN — XENON XE-133 7.48 MILLICURIE: 10 GAS RESPIRATORY (INHALATION) at 20:40

## 2019-12-04 NOTE — ED PROVIDER NOTES
Subjective   Gabriela Mar is a 74 y.o. male who presents to the ED with c/o shortness of breath. He states tat he was at the urgent care clinic when they noticed that his oxygen saturation was at 82% and advised him to come to the ED for evaluation of hypoxia. He states that his shortness of breath started approximately 3 days ago, but started worsening yesterday. He denies chest pain or pressure, but complains of rhinorrhea. The patient denies a medical history of COPD, lung disease, and emphysema. He also states that his last diagnosis of pneumonia was more than a decade ago. The patient has a history of a right bundle block. He denies any recent surgeries. He states that he quit smoking approximately 3 years ago, but lives with smokers. He notes that the nebulizer that he received today improved his symptoms. There are no other acute complaints at this time.          History provided by:  Patient  Shortness of Breath   Severity:  Moderate  Onset quality:  Sudden  Duration:  3 days  Timing:  Constant  Progression:  Worsening  Chronicity:  New  Relieved by: Nebulizer.  Associated symptoms: wheezing    Associated symptoms: no chest pain        Review of Systems   HENT: Positive for rhinorrhea.    Respiratory: Positive for shortness of breath and wheezing.    Cardiovascular: Negative for chest pain.   All other systems reviewed and are negative.      Past Medical History:   Diagnosis Date   • Arthritis    • Diabetes mellitus (CMS/HCC)     DIAGNOSED 10 YEARS AGO; CHECKS FSBG TID   • Esophagitis, reflux    • Hyperlipidemia    • Hypertension    • Obesity    • Pharyngitis    • Wears eyeglasses        Allergies   Allergen Reactions   • Bactrim [Sulfamethoxazole-Trimethoprim] Other (See Comments)     Joint pain   • Sulfa Antibiotics GI Intolerance     Makes bones hurt         Past Surgical History:   Procedure Laterality Date   • COLONOSCOPY      2015   • ENDOSCOPY      2011   • EYE SURGERY      cataract 8/22 and  10/18/18 - Dr. Hernández   • JOINT REPLACEMENT      LEFT HIP 2016-MARCH    • SUBMAXILLARY CYST  EXCISION      Right back   • TONSILLECTOMY     • TOTAL HIP ARTHROPLASTY Right 3/7/2017    Procedure: RIGHT TOTAL HIP ARTHROPLASTY;  Surgeon: Reynaldo Suarez MD;  Location: [facility];  Service:    • VASECTOMY     • WRIST GANGLION EXCISION         Family History   Problem Relation Age of Onset   • Diabetes Mother    • Cancer Father         colon   • Diabetes Father    • Hypertension Father    • Kidney failure Sister    • Diabetes Sister    • Cancer Brother         lung   • Hypertension Brother    • Diabetes Brother    • Coronary artery disease Brother    • Cancer Brother         colon   • Hypertension Brother        Social History     Socioeconomic History   • Marital status:      Spouse name: Not on file   • Number of children: Not on file   • Years of education: Not on file   • Highest education level: Not on file   Tobacco Use   • Smoking status: Former Smoker     Packs/day: 2.00     Years: 35.00     Pack years: 70.00     Types: Cigarettes   • Smokeless tobacco: Never Used   • Tobacco comment: QUIT 20 PLUS YEARS AGO    Substance and Sexual Activity   • Alcohol use: No     Comment: stopped drinking   • Drug use: No   • Sexual activity: Defer         Objective   Physical Exam   Constitutional: He is oriented to person, place, and time. He appears well-developed and well-nourished. No distress.   Frail elderly.   HENT:   Head: Normocephalic and atraumatic.   Nose: Nose normal.   Eyes: Conjunctivae are normal. No scleral icterus.   Neck: Normal range of motion. Neck supple.   Cardiovascular: Normal rate, regular rhythm and normal heart sounds.   Pulmonary/Chest: He has wheezes.   Bilateral wheezing   Abdominal: Soft. There is no tenderness.   Musculoskeletal: Normal range of motion.   Neurological: He is alert and oriented to person, place, and time.   Skin: Skin is warm and dry.   Psychiatric: He has a normal mood  and affect. His behavior is normal.   Nursing note and vitals reviewed.      Procedures         ED Course  ED Course as of Dec 04 2137   Wed Dec 04, 2019   2117 POC with Dr. Roman.    Hospitalist paged  []   2135 I spoke with Dr. Gomez and she will admit  [GABBY]      ED Course User Index  [JM] Marcos CALIN Dawson      Recent Results (from the past 24 hour(s))   POCT Influenza A/B    Collection Time: 12/04/19  3:56 PM   Result Value Ref Range    Rapid Influenza A Ag Negative Negative    Rapid Influenza B Ag Negative Negative    Internal Control Passed Passed    Lot Number [id number]     Expiration Date 12-    POC Glucose    Collection Time: 12/04/19  5:27 PM   Result Value Ref Range    Glucose 277 (A) 70 - 130 mg/dL   Comprehensive Metabolic Panel    Collection Time: 12/04/19  6:15 PM   Result Value Ref Range    Glucose 162 (H) 65 - 99 mg/dL    BUN 33 (H) 8 - 23 mg/dL    Creatinine 1.80 (H) 0.76 - 1.27 mg/dL    Sodium 136 136 - 145 mmol/L    Potassium 4.4 3.5 - 5.2 mmol/L    Chloride 104 98 - 107 mmol/L    CO2 22.0 22.0 - 29.0 mmol/L    Calcium 8.6 8.6 - 10.5 mg/dL    Total Protein 7.3 6.0 - 8.5 g/dL    Albumin 3.90 3.50 - 5.20 g/dL    ALT (SGPT) 39 1 - 41 U/L    AST (SGOT) 39 1 - 40 U/L    Alkaline Phosphatase 77 39 - 117 U/L    Total Bilirubin 0.3 0.2 - 1.2 mg/dL    eGFR Non African Amer 37 (L) >60 mL/min/1.73    Globulin 3.4 gm/dL    A/G Ratio 1.1 g/dL    BUN/Creatinine Ratio 18.3 7.0 - 25.0    Anion Gap 10.0 5.0 - 15.0 mmol/L   BNP    Collection Time: 12/04/19  6:15 PM   Result Value Ref Range    proBNP 61.9 5.0-1,800.0 pg/mL   Troponin    Collection Time: 12/04/19  6:15 PM   Result Value Ref Range    Troponin T 0.033 (C) 0.000 - 0.030 ng/mL   Light Blue Top    Collection Time: 12/04/19  6:15 PM   Result Value Ref Range    Extra Tube hold for add-on    Green Top (Gel)    Collection Time: 12/04/19  6:15 PM   Result Value Ref Range    Extra Tube Hold for add-ons.    Lavender Top    Collection Time: 12/04/19   6:15 PM   Result Value Ref Range    Extra Tube hold for add-on    Gold Top - SST    Collection Time: 12/04/19  6:15 PM   Result Value Ref Range    Extra Tube Hold for add-ons.    CBC Auto Differential    Collection Time: 12/04/19  6:15 PM   Result Value Ref Range    WBC 8.05 3.40 - 10.80 10*3/mm3    RBC 4.24 4.14 - 5.80 10*6/mm3    Hemoglobin 11.9 (L) 13.0 - 17.7 g/dL    Hematocrit 39.2 37.5 - 51.0 %    MCV 92.5 79.0 - 97.0 fL    MCH 28.1 26.6 - 33.0 pg    MCHC 30.4 (L) 31.5 - 35.7 g/dL    RDW 14.2 12.3 - 15.4 %    RDW-SD 47.8 37.0 - 54.0 fl    MPV 11.0 6.0 - 12.0 fL    Platelets 148 140 - 450 10*3/mm3    Neutrophil % 50.8 42.7 - 76.0 %    Lymphocyte % 34.8 19.6 - 45.3 %    Monocyte % 12.0 5.0 - 12.0 %    Eosinophil % 1.6 0.3 - 6.2 %    Basophil % 0.6 0.0 - 1.5 %    Immature Grans % 0.2 0.0 - 0.5 %    Neutrophils, Absolute 4.08 1.70 - 7.00 10*3/mm3    Lymphocytes, Absolute 2.80 0.70 - 3.10 10*3/mm3    Monocytes, Absolute 0.97 (H) 0.10 - 0.90 10*3/mm3    Eosinophils, Absolute 0.13 0.00 - 0.40 10*3/mm3    Basophils, Absolute 0.05 0.00 - 0.20 10*3/mm3    Immature Grans, Absolute 0.02 0.00 - 0.05 10*3/mm3    nRBC 0.0 0.0 - 0.2 /100 WBC   Troponin    Collection Time: 12/04/19  8:09 PM   Result Value Ref Range    Troponin T 0.040 (C) 0.000 - 0.030 ng/mL     Note: In addition to lab results from this visit, the labs listed above may include labs taken at another facility or during a different encounter within the last 24 hours. Please correlate lab times with ED admission and discharge times for further clarification of the services performed during this visit.    NM Lung Ventilation Perfusion   Final Result   Normal ventilation perfusion lung scan.      Signer Name: Mike Infante MD    Signed: 12/4/2019 9:11 PM    Workstation Name: [email or web address]     [facility]      CT Chest Without Contrast   Final Result   1.  Patchy bronchovascular airspace infiltrate in the right lower lobe. Minimal left  lower lobe infiltrate.   2.  No airspace consolidation or pleural effusion.   3.  Suspected chronic liver disease.      Signer Name: Mike Infante MD    Signed: 12/4/2019 8:39 PM    Workstation Name: [email or web address]     [facility]      XR Chest 1 View   Preliminary Result   No evidence of active chest disease.       DICTATED:   12/04/2019   EDITED/ls :   12/04/2019                 Vitals:    12/04/19 1911 12/04/19 1915 12/04/19 1930 12/04/19 1931   BP:  120/54 104/53    Pulse: 85 88  87   Resp:       Temp:       SpO2: 95%   92%   Weight:       Height:         Medications   sodium chloride 0.9 % flush 10 mL (not administered)   cefTRIAXone (ROCEPHIN) 2 g/100 mL 0.9% NS VTB (CISCO) (2 g Intravenous New Bag 12/4/19 2133)   doxycycline (VIBRAMYCIN) 100 mg/100 mL 0.9% NS MBP (100 mg Intravenous New Bag 12/4/19 2133)   ipratropium-albuterol (DUO-NEB) nebulizer solution 3 mL (3 mL Nebulization Given 12/4/19 1858)   methylPREDNISolone sodium succinate (SOLU-Medrol) injection 125 mg (125 mg Intravenous Given 12/4/19 1926)   xenon xe 133 gas 10 mCi 7.48 millicurie (7.48 millicuries Inhalation Given 12/4/19 2040)   technetium albumin aggregated (MAA) solution 1 dose (1 dose Intravenous Given 12/4/19 2055)     ECG/EMG Results (last 24 hours)     Procedure Component Value Units Date/Time    ECG 12 Lead [[id number]] Collected:  12/04/19 1808     Updated:  12/04/19 1847    ECG 12 Lead [[id number]] Collected:  12/04/19 1901     Updated:  12/04/19 1903        ECG 12 Lead   Final Result   Test Reason : SOA Protocol   Blood Pressure : **/** mmHG   Vent. Rate : 089 BPM     Atrial Rate : 089 BPM      P-R Int : 160 ms          QRS Dur : 130 ms       QT Int : 410 ms       P-R-T Axes : 080 -35 042 degrees      QTc Int : 498 ms      Sinus rhythm with fusion complexes   Left axis deviation   Right bundle branch block   Abnormal ECG   When compared with ECG of 22-FEB-2017 10:19,   fusion complexes are now present    Confirmed by ALEXIS HERMOSILLO MD ([id number]) on 12/4/2019 8:31:59 PM      Referred By:  ERMSHYAM           Confirmed By:ALEXIS HERMOSILLO MD      ECG 12 Lead         ECG 12 Lead                               MDM    Final diagnoses:   Pneumonia of both lungs due to infectious organism, unspecified part of lung   Hypoxia   Referred by primary care physician   Renal insufficiency   Elevated troponin       Documentation assistance provided by susana Gallegos.  Information recorded by the scribe was done at my direction and has been verified and validated by me.     Bhavna Gallegos  12/04/19 1908       Samir Rodríguez APRN  12/04/19 [id number]

## 2019-12-04 NOTE — PROGRESS NOTES
Chief Complaint   Patient presents with   • URI     x3 days   • Wheezing       History of Present Illness  74 y.o.male presents for URI, wheezing.   Onset sat sinus drainage frontal sinus headache cont couple days. Progressing worse sinuses running. Had Amoxil 500mg 1 daily for 3 days. Yesterday worse.  Chest congestion worse this morning. Low grade temp 99.5. Feel like can't cough up + soa.       Review of Systems   Constitutional: Positive for fever. Negative for chills.   HENT: Positive for congestion, sinus pressure and sore throat. Negative for ear pain, sneezing and swollen glands.    Respiratory: Positive for cough, shortness of breath and wheezing.    Cardiovascular: Negative for chest pain, palpitations and leg swelling.   Neurological: Negative for dizziness.         [facility]  The following portions of the patient's history were reviewed and updated as appropriate: allergies, current medications, past family history, past medical history, past social history, past surgical history and problem list.     Social hx:  Tobacco former smoker  Alcohol  Past Medical History:   Diagnosis Date   • Arthritis    • Diabetes mellitus (CMS/HCC)     DIAGNOSED 10 YEARS AGO; CHECKS FSBG TID   • Esophagitis, reflux    • Hyperlipidemia    • Hypertension    • Obesity    • Pharyngitis    • Wears eyeglasses       Past Surgical History:   Procedure Laterality Date   • COLONOSCOPY      2015   • ENDOSCOPY      2011   • EYE SURGERY      cataract 8/22 and 10/18/18 - Dr. Hernández   • JOINT REPLACEMENT      LEFT HIP 2016-MARCH    • SUBMAXILLARY CYST  EXCISION      Right back   • TONSILLECTOMY     • TOTAL HIP ARTHROPLASTY Right 3/7/2017    Procedure: RIGHT TOTAL HIP ARTHROPLASTY;  Surgeon: Reynaldo Suarez MD;  Location: [facility];  Service:    • VASECTOMY     • WRIST GANGLION EXCISION        Allergies   Allergen Reactions   • Bactrim [Sulfamethoxazole-Trimethoprim] Other (See Comments)     Joint pain   • Sulfa Antibiotics      Makes  "bones hurt        Family History   Problem Relation Age of Onset   • Diabetes Mother    • Cancer Father         colon   • Diabetes Father    • Hypertension Father    • Kidney failure Sister    • Diabetes Sister    • Cancer Brother         lung   • Hypertension Brother    • Diabetes Brother    • Coronary artery disease Brother    • Cancer Brother         colon   • Hypertension Brother             Current Outpatient Medications:   •  amLODIPine (NORVASC) 5 MG tablet, Take 1 tablet by mouth 2 (Two) Times a Day., Disp: 180 tablet, Rfl: 1  •  baclofen (LIORESAL) 10 MG tablet, Take 1 tablet by mouth 3 (Three) Times a Day., Disp: 60 tablet, Rfl: 0  •  Cholecalciferol (VITAMIN D) 1000 UNITS tablet, Take 1 capsule by mouth Daily., Disp: , Rfl:   •  Cyanocobalamin (VITAMIN B-12 ER) 1000 MCG tablet controlled-release, Take 1,000 mcg by mouth Daily., Disp: , Rfl:   •  furosemide (LASIX) 20 MG tablet, 1 PO daily for 7 days, then 1 PO QOD prn soa., Disp: 20 tablet, Rfl: 5  •  glimepiride (AMARYL) 4 MG tablet, Take 1 tablet by mouth Daily With Dinner., Disp: 90 tablet, Rfl: 1  •  insulin NPH (humuLIN N,novoLIN N) 100 UNIT/ML injection, 35 Units in the morning and 45 units in the evening., Disp: 30 mL, Rfl: 5  •  Insulin Syringe 28G X 1/2\" 1 ML misc, 1 each 2 (Two) Times a Day., Disp: 100 each, Rfl: 5  •  lisinopril (PRINIVIL,ZESTRIL) 20 MG tablet, TAKE 1 TABLET BY MOUTH TWICE DAILY, Disp: 180 tablet, Rfl: 1  •  metFORMIN (GLUCOPHAGE) 1000 MG tablet, TAKE 1 & 1/2 (ONE & ONE-HALF) TABLETS BY MOUTH IN THE MORNING AND 1 WITH DINNER, Disp: 235 tablet, Rfl: 1  •  metFORMIN (GLUCOPHAGE) 1000 MG tablet, TAKE 1 & 1/2 (ONE & ONE-HALF) TABLETS BY MOUTH IN THE MORNING AND 1 WITH DINNER, Disp: 235 tablet, Rfl: 1  •  Multiple Vitamins-Minerals (MULTI VITAMIN/MINERALS PO), Take 1 tablet by mouth Daily., Disp: , Rfl:   •  omeprazole (PRILOSEC) 20 MG capsule, Take 1 capsule by mouth Daily., Disp: , Rfl:   •  potassium chloride (K-DUR) 10 MEQ CR " "tablet, Take 1 tablet by mouth Every Other Day. Take with lasix, Disp: 15 tablet, Rfl: 5  •  simvastatin (ZOCOR) 10 MG tablet, TAKE 1 TABLET BY MOUTH AT BEDTIME, Disp: 90 tablet, Rfl: 1    VITALS:  /60   Pulse 94   Temp 98.2 °F (36.8 °C)   Ht 177.8 cm (70\")   Wt 103 kg (226 lb)   SpO2 93%   BMI 32.43 kg/m²     Physical Exam   Constitutional: He appears well-developed and well-nourished. He appears distressed.   HENT:   Head: Normocephalic.   Right Ear: Tympanic membrane, external ear and ear canal normal.   Left Ear: Tympanic membrane, external ear and ear canal normal.   Nose: Mucosal edema, rhinorrhea and congestion present. Right sinus exhibits no maxillary sinus tenderness and no frontal sinus tenderness. Left sinus exhibits no maxillary sinus tenderness and no frontal sinus tenderness.   Mouth/Throat: Oropharynx is clear and moist and mucous membranes are normal.   Eyes: Pupils are equal, round, and reactive to light.   Cardiovascular: Normal rate, regular rhythm and normal heart sounds.   Pulmonary/Chest: Effort normal. He has wheezes in the right middle field, the right lower field, the left middle field and the left lower field. He has rhonchi in the right middle field and the right lower field. He has rales in the right middle field, the right lower field, the left middle field and the left lower field.   Neurological: He is alert.   Skin: He is diaphoretic.   Vitals reviewed.      LABS  Results for orders placed or performed in visit on 12/04/19   POCT Influenza A/B   Result Value Ref Range    Rapid Influenza A Ag Negative Negative    Rapid Influenza B Ag Negative Negative    Internal Control Passed Passed    Lot Number [id number]     Expiration Date 12-        ASSESSMENT/PLAN  Gabriela was seen today for uri and wheezing.    Diagnoses and all orders for this visit:    Upper respiratory tract infection, unspecified type  -     POCT Influenza A/B  -     ipratropium-albuterol (DUO-NEB) " nebulizer solution 3 mL    Hypoxia    Pneumonia due to infectious organism, unspecified laterality, unspecified part of lung    presentation suspicious for pna; jordon rhonchi rales wheezing on initial presentation with initial o2 sat 84%. duoneb administered. Some improvement in sx. Ambulatory sat again down to 84%. Placed on 2 L nc. Encouraged pt need emergency room eval. He is hesitant to go.  Left on oxygen for 10-15 minutes. Removed. o2 sat stayed at 94% for about 80 feet. Then sat back down to 83%. Placed on o2 2lNC. Pt agreeable to emergency room eval but declines ambulance transport. Form signed by pt declined. Report called to [facility] ER. Pt called family member to come pick him up and take him directly to [facility] ED.    Discussed presentation and plan of care with pcp; Dr. Gee in to see pt. Agreeable with plan of care.    ADDENDUM: assisted pt to car by wheelchair.  Pt became more short of air wheezing shaky. Pt decided would be agreeable to go by ambulance. Assisted pt back to room; called 911. Placed pt back on 2 L NC. Transported by ambulance to [facility]. Blood glucose 277.    I discussed the patients findings and my recommendations with patient.  Patient was encouraged to keep me informed of any acute changes, lack of improvement, or any new concerning symptoms.    Patient voiced understanding of all instructions and denied further questions.      FOLLOW-UP  Return if symptoms worsen or fail to improve, for Recheck with pcp 2 days post hosp.    Electronically signed by:    CALIN Wolf  12/04/2019

## 2019-12-05 PROBLEM — N17.9 AKI (ACUTE KIDNEY INJURY): Status: ACTIVE | Noted: 2019-12-05

## 2019-12-05 PROBLEM — J18.9 CAP (COMMUNITY ACQUIRED PNEUMONIA): Status: ACTIVE | Noted: 2019-12-05

## 2019-12-05 PROBLEM — R77.8 ELEVATED TROPONIN: Status: ACTIVE | Noted: 2019-12-05

## 2019-12-05 PROBLEM — R79.89 ELEVATED TROPONIN: Status: ACTIVE | Noted: 2019-12-05

## 2019-12-05 LAB
ALBUMIN SERPL-MCNC: 3.7 G/DL (ref 3.5–5.2)
ALBUMIN/GLOB SERPL: 1.1 G/DL
ALP SERPL-CCNC: 72 U/L (ref 39–117)
ALT SERPL W P-5'-P-CCNC: 34 U/L (ref 1–41)
ANION GAP SERPL CALCULATED.3IONS-SCNC: 13 MMOL/L (ref 5–15)
ANION GAP SERPL CALCULATED.3IONS-SCNC: 14 MMOL/L (ref 5–15)
ARTERIAL PATENCY WRIST A: ABNORMAL
AST SERPL-CCNC: 31 U/L (ref 1–40)
ATMOSPHERIC PRESS: ABNORMAL MM[HG]
BASE EXCESS BLDA CALC-SCNC: -4.6 MMOL/L (ref 0–2)
BASOPHILS # BLD AUTO: 0.01 10*3/MM3 (ref 0–0.2)
BASOPHILS NFR BLD AUTO: 0.2 % (ref 0–1.5)
BDY SITE: ABNORMAL
BILIRUB SERPL-MCNC: 0.2 MG/DL (ref 0.2–1.2)
BODY TEMPERATURE: 37 C
BUN BLD-MCNC: 37 MG/DL (ref 8–23)
BUN BLD-MCNC: 40 MG/DL (ref 8–23)
BUN/CREAT SERPL: 17.5 (ref 7–25)
BUN/CREAT SERPL: 23.5 (ref 7–25)
CALCIUM SPEC-SCNC: 8.2 MG/DL (ref 8.6–10.5)
CALCIUM SPEC-SCNC: 8.5 MG/DL (ref 8.6–10.5)
CHLORIDE SERPL-SCNC: 102 MMOL/L (ref 98–107)
CHLORIDE SERPL-SCNC: 99 MMOL/L (ref 98–107)
CO2 BLDA-SCNC: 22.3 MMOL/L (ref 22–33)
CO2 SERPL-SCNC: 20 MMOL/L (ref 22–29)
CO2 SERPL-SCNC: 21 MMOL/L (ref 22–29)
COHGB MFR BLD: 0.1 % (ref 0–2)
CREAT BLD-MCNC: 1.7 MG/DL (ref 0.76–1.27)
CREAT BLD-MCNC: 2.11 MG/DL (ref 0.76–1.27)
D-LACTATE SERPL-SCNC: 2.8 MMOL/L (ref 0.5–2)
DEPRECATED RDW RBC AUTO: 47.6 FL (ref 37–54)
EOSINOPHIL # BLD AUTO: 0 10*3/MM3 (ref 0–0.4)
EOSINOPHIL NFR BLD AUTO: 0 % (ref 0.3–6.2)
ERYTHROCYTE [DISTWIDTH] IN BLOOD BY AUTOMATED COUNT: 14.1 % (ref 12.3–15.4)
GFR SERPL CREATININE-BSD FRML MDRD: 31 ML/MIN/1.73
GFR SERPL CREATININE-BSD FRML MDRD: 39 ML/MIN/1.73
GLOBULIN UR ELPH-MCNC: 3.4 GM/DL
GLUCOSE BLD-MCNC: 450 MG/DL (ref 65–99)
GLUCOSE BLD-MCNC: 459 MG/DL (ref 65–99)
GLUCOSE BLDC GLUCOMTR-MCNC: 250 MG/DL (ref 70–130)
GLUCOSE BLDC GLUCOMTR-MCNC: 372 MG/DL (ref 70–130)
GLUCOSE BLDC GLUCOMTR-MCNC: 375 MG/DL (ref 70–130)
GLUCOSE BLDC GLUCOMTR-MCNC: 378 MG/DL (ref 70–130)
GLUCOSE BLDC GLUCOMTR-MCNC: 421 MG/DL (ref 70–130)
GLUCOSE BLDC GLUCOMTR-MCNC: 456 MG/DL (ref 70–130)
GLUCOSE BLDC GLUCOMTR-MCNC: 459 MG/DL (ref 70–130)
GLUCOSE BLDC GLUCOMTR-MCNC: 463 MG/DL (ref 70–130)
GLUCOSE BLDC GLUCOMTR-MCNC: 497 MG/DL (ref 70–130)
HBA1C MFR BLD: 9.2 % (ref 4.8–5.6)
HCO3 BLDA-SCNC: 21 MMOL/L (ref 20–26)
HCT VFR BLD AUTO: 35.7 % (ref 37.5–51)
HCT VFR BLD CALC: 36.2 %
HGB BLD-MCNC: 11.1 G/DL (ref 13–17.7)
HGB BLDA-MCNC: 11.8 G/DL (ref 13.5–17.5)
HOLD SPECIMEN: NORMAL
HOROWITZ INDEX BLD+IHG-RTO: 28 %
IMM GRANULOCYTES # BLD AUTO: 0.02 10*3/MM3 (ref 0–0.05)
IMM GRANULOCYTES NFR BLD AUTO: 0.4 % (ref 0–0.5)
LYMPHOCYTES # BLD AUTO: 0.88 10*3/MM3 (ref 0.7–3.1)
LYMPHOCYTES NFR BLD AUTO: 17.3 % (ref 19.6–45.3)
MCH RBC QN AUTO: 28.4 PG (ref 26.6–33)
MCHC RBC AUTO-ENTMCNC: 31.1 G/DL (ref 31.5–35.7)
MCV RBC AUTO: 91.3 FL (ref 79–97)
METHGB BLD QL: 1.7 % (ref 0–1.5)
MODALITY: ABNORMAL
MONOCYTES # BLD AUTO: 0.07 10*3/MM3 (ref 0.1–0.9)
MONOCYTES NFR BLD AUTO: 1.4 % (ref 5–12)
NEUTROPHILS # BLD AUTO: 4.12 10*3/MM3 (ref 1.7–7)
NEUTROPHILS NFR BLD AUTO: 80.7 % (ref 42.7–76)
NOTE: ABNORMAL
NRBC BLD AUTO-RTO: 0 /100 WBC (ref 0–0.2)
OXYHGB MFR BLDV: 94.3 % (ref 94–99)
PCO2 BLDA: 40 MM HG
PCO2 TEMP ADJ BLD: 40 MM HG (ref 35–48)
PH BLDA: 7.33 PH UNITS (ref 7.35–7.45)
PH, TEMP CORRECTED: 7.33 PH UNITS
PLATELET # BLD AUTO: 127 10*3/MM3 (ref 140–450)
PMV BLD AUTO: 11.3 FL (ref 6–12)
PO2 BLDA: 91.3 MM HG (ref 83–108)
PO2 TEMP ADJ BLD: 91.3 MM HG (ref 83–108)
POTASSIUM BLD-SCNC: 5.6 MMOL/L (ref 3.5–5.2)
POTASSIUM BLD-SCNC: 6 MMOL/L (ref 3.5–5.2)
PROT SERPL-MCNC: 7.1 G/DL (ref 6–8.5)
RBC # BLD AUTO: 3.91 10*6/MM3 (ref 4.14–5.8)
SODIUM BLD-SCNC: 133 MMOL/L (ref 136–145)
SODIUM BLD-SCNC: 136 MMOL/L (ref 136–145)
TROPONIN T SERPL-MCNC: 0.02 NG/ML (ref 0–0.03)
TROPONIN T SERPL-MCNC: 0.02 NG/ML (ref 0–0.03)
TROPONIN T SERPL-MCNC: <0.01 NG/ML (ref 0–0.03)
VENTILATOR MODE: ABNORMAL
WBC NRBC COR # BLD: 5.1 10*3/MM3 (ref 3.4–10.8)

## 2019-12-05 PROCEDURE — 82962 GLUCOSE BLOOD TEST: CPT

## 2019-12-05 PROCEDURE — 85025 COMPLETE CBC W/AUTO DIFF WBC: CPT | Performed by: NURSE PRACTITIONER

## 2019-12-05 PROCEDURE — 25010000002 METHYLPREDNISOLONE PER 125 MG: Performed by: FAMILY MEDICINE

## 2019-12-05 PROCEDURE — 99232 SBSQ HOSP IP/OBS MODERATE 35: CPT | Performed by: INTERNAL MEDICINE

## 2019-12-05 PROCEDURE — 94799 UNLISTED PULMONARY SVC/PX: CPT

## 2019-12-05 PROCEDURE — 36600 WITHDRAWAL OF ARTERIAL BLOOD: CPT

## 2019-12-05 PROCEDURE — 25010000002 CEFTRIAXONE PER 250 MG: Performed by: NURSE PRACTITIONER

## 2019-12-05 PROCEDURE — 63710000001 INSULIN ISOPHANE HUMAN PER 5 UNITS: Performed by: NURSE PRACTITIONER

## 2019-12-05 PROCEDURE — 63710000001 INSULIN LISPRO (HUMAN) PER 5 UNITS: Performed by: NURSE PRACTITIONER

## 2019-12-05 PROCEDURE — 82805 BLOOD GASES W/O2 SATURATION: CPT

## 2019-12-05 PROCEDURE — 25010000002 METHYLPREDNISOLONE PER 125 MG: Performed by: INTERNAL MEDICINE

## 2019-12-05 PROCEDURE — 63710000001 PREDNISONE PER 1 MG: Performed by: INTERNAL MEDICINE

## 2019-12-05 PROCEDURE — 83605 ASSAY OF LACTIC ACID: CPT | Performed by: NURSE PRACTITIONER

## 2019-12-05 PROCEDURE — 63710000001 PREDNISONE PER 5 MG: Performed by: INTERNAL MEDICINE

## 2019-12-05 PROCEDURE — 84484 ASSAY OF TROPONIN QUANT: CPT | Performed by: FAMILY MEDICINE

## 2019-12-05 PROCEDURE — 80053 COMPREHEN METABOLIC PANEL: CPT | Performed by: NURSE PRACTITIONER

## 2019-12-05 PROCEDURE — 25010000002 HEPARIN (PORCINE) PER 1000 UNITS: Performed by: NURSE PRACTITIONER

## 2019-12-05 PROCEDURE — 63710000001 INSULIN DETEMIR PER 5 UNITS: Performed by: INTERNAL MEDICINE

## 2019-12-05 RX ORDER — IPRATROPIUM BROMIDE AND ALBUTEROL SULFATE 2.5; .5 MG/3ML; MG/3ML
3 SOLUTION RESPIRATORY (INHALATION)
Status: DISCONTINUED | OUTPATIENT
Start: 2019-12-05 | End: 2019-12-06 | Stop reason: HOSPADM

## 2019-12-05 RX ORDER — SODIUM CHLORIDE 0.9 % (FLUSH) 0.9 %
10 SYRINGE (ML) INJECTION AS NEEDED
Status: DISCONTINUED | OUTPATIENT
Start: 2019-12-05 | End: 2019-12-06 | Stop reason: HOSPADM

## 2019-12-05 RX ORDER — DOCUSATE SODIUM 100 MG/1
100 CAPSULE, LIQUID FILLED ORAL 2 TIMES DAILY PRN
Status: DISCONTINUED | OUTPATIENT
Start: 2019-12-05 | End: 2019-12-06 | Stop reason: HOSPADM

## 2019-12-05 RX ORDER — METHYLPREDNISOLONE SODIUM SUCCINATE 125 MG/2ML
80 INJECTION, POWDER, LYOPHILIZED, FOR SOLUTION INTRAMUSCULAR; INTRAVENOUS EVERY 8 HOURS
Status: DISCONTINUED | OUTPATIENT
Start: 2019-12-05 | End: 2019-12-05

## 2019-12-05 RX ORDER — ATORVASTATIN CALCIUM 10 MG/1
10 TABLET, FILM COATED ORAL NIGHTLY
Status: DISCONTINUED | OUTPATIENT
Start: 2019-12-05 | End: 2019-12-06 | Stop reason: HOSPADM

## 2019-12-05 RX ORDER — PANTOPRAZOLE SODIUM 40 MG/1
40 TABLET, DELAYED RELEASE ORAL EVERY MORNING
Status: DISCONTINUED | OUTPATIENT
Start: 2019-12-05 | End: 2019-12-06 | Stop reason: HOSPADM

## 2019-12-05 RX ORDER — METHYLPREDNISOLONE SODIUM SUCCINATE 125 MG/2ML
60 INJECTION, POWDER, LYOPHILIZED, FOR SOLUTION INTRAMUSCULAR; INTRAVENOUS EVERY 8 HOURS
Status: DISCONTINUED | OUTPATIENT
Start: 2019-12-05 | End: 2019-12-05

## 2019-12-05 RX ORDER — CHOLECALCIFEROL (VITAMIN D3) 125 MCG
5 CAPSULE ORAL NIGHTLY PRN
Status: DISCONTINUED | OUTPATIENT
Start: 2019-12-05 | End: 2019-12-06 | Stop reason: HOSPADM

## 2019-12-05 RX ORDER — METHYLPREDNISOLONE SODIUM SUCCINATE 40 MG/ML
40 INJECTION, POWDER, LYOPHILIZED, FOR SOLUTION INTRAMUSCULAR; INTRAVENOUS EVERY 8 HOURS
Status: DISCONTINUED | OUTPATIENT
Start: 2019-12-05 | End: 2019-12-05

## 2019-12-05 RX ORDER — ACETAMINOPHEN 160 MG/5ML
650 SOLUTION ORAL EVERY 4 HOURS PRN
Status: DISCONTINUED | OUTPATIENT
Start: 2019-12-05 | End: 2019-12-06 | Stop reason: HOSPADM

## 2019-12-05 RX ORDER — AMLODIPINE BESYLATE 5 MG/1
5 TABLET ORAL 2 TIMES DAILY
Status: DISCONTINUED | OUTPATIENT
Start: 2019-12-05 | End: 2019-12-06 | Stop reason: HOSPADM

## 2019-12-05 RX ORDER — SODIUM CHLORIDE 0.9 % (FLUSH) 0.9 %
10 SYRINGE (ML) INJECTION EVERY 12 HOURS SCHEDULED
Status: DISCONTINUED | OUTPATIENT
Start: 2019-12-05 | End: 2019-12-06 | Stop reason: HOSPADM

## 2019-12-05 RX ORDER — ALUMINA, MAGNESIA, AND SIMETHICONE 2400; 2400; 240 MG/30ML; MG/30ML; MG/30ML
15 SUSPENSION ORAL EVERY 6 HOURS PRN
Status: DISCONTINUED | OUTPATIENT
Start: 2019-12-05 | End: 2019-12-06 | Stop reason: HOSPADM

## 2019-12-05 RX ORDER — ALBUTEROL SULFATE 2.5 MG/3ML
2.5 SOLUTION RESPIRATORY (INHALATION) EVERY 4 HOURS PRN
Status: DISCONTINUED | OUTPATIENT
Start: 2019-12-05 | End: 2019-12-06 | Stop reason: HOSPADM

## 2019-12-05 RX ORDER — ACETAMINOPHEN 325 MG/1
650 TABLET ORAL EVERY 4 HOURS PRN
Status: DISCONTINUED | OUTPATIENT
Start: 2019-12-05 | End: 2019-12-06 | Stop reason: HOSPADM

## 2019-12-05 RX ORDER — ACETAMINOPHEN 650 MG/1
650 SUPPOSITORY RECTAL EVERY 4 HOURS PRN
Status: DISCONTINUED | OUTPATIENT
Start: 2019-12-05 | End: 2019-12-06 | Stop reason: HOSPADM

## 2019-12-05 RX ORDER — SODIUM CHLORIDE 9 MG/ML
100 INJECTION, SOLUTION INTRAVENOUS CONTINUOUS
Status: ACTIVE | OUTPATIENT
Start: 2019-12-05 | End: 2019-12-05

## 2019-12-05 RX ORDER — HEPARIN SODIUM 5000 [USP'U]/ML
5000 INJECTION, SOLUTION INTRAVENOUS; SUBCUTANEOUS EVERY 8 HOURS SCHEDULED
Status: DISCONTINUED | OUTPATIENT
Start: 2019-12-05 | End: 2019-12-06 | Stop reason: HOSPADM

## 2019-12-05 RX ADMIN — INSULIN HUMAN 15 UNITS: 100 INJECTION, SUSPENSION SUBCUTANEOUS at 09:25

## 2019-12-05 RX ADMIN — PREDNISONE 30 MG: 10 TABLET ORAL at 22:16

## 2019-12-05 RX ADMIN — INSULIN LISPRO 6 UNITS: 100 INJECTION, SOLUTION INTRAVENOUS; SUBCUTANEOUS at 09:25

## 2019-12-05 RX ADMIN — INSULIN LISPRO 10 UNITS: 100 INJECTION, SOLUTION INTRAVENOUS; SUBCUTANEOUS at 22:16

## 2019-12-05 RX ADMIN — INSULIN LISPRO 10 UNITS: 100 INJECTION, SOLUTION INTRAVENOUS; SUBCUTANEOUS at 17:26

## 2019-12-05 RX ADMIN — IPRATROPIUM BROMIDE AND ALBUTEROL SULFATE 3 ML: 2.5; .5 SOLUTION RESPIRATORY (INHALATION) at 12:29

## 2019-12-05 RX ADMIN — SODIUM CHLORIDE 100 ML/HR: 9 INJECTION, SOLUTION INTRAVENOUS at 01:10

## 2019-12-05 RX ADMIN — DOXYCYCLINE 100 MG: 100 INJECTION, POWDER, LYOPHILIZED, FOR SOLUTION INTRAVENOUS at 21:49

## 2019-12-05 RX ADMIN — HEPARIN SODIUM 5000 UNITS: 5000 INJECTION, SOLUTION INTRAVENOUS; SUBCUTANEOUS at 06:39

## 2019-12-05 RX ADMIN — METHYLPREDNISOLONE SODIUM SUCCINATE: 125 INJECTION, POWDER, FOR SOLUTION INTRAMUSCULAR; INTRAVENOUS at 06:39

## 2019-12-05 RX ADMIN — METHYLPREDNISOLONE SODIUM SUCCINATE 40 MG: 125 INJECTION, POWDER, FOR SOLUTION INTRAMUSCULAR; INTRAVENOUS at 14:30

## 2019-12-05 RX ADMIN — INSULIN LISPRO 4 UNITS: 100 INJECTION, SOLUTION INTRAVENOUS; SUBCUTANEOUS at 01:07

## 2019-12-05 RX ADMIN — ATORVASTATIN CALCIUM 10 MG: 10 TABLET, FILM COATED ORAL at 20:34

## 2019-12-05 RX ADMIN — SODIUM CHLORIDE, PRESERVATIVE FREE 10 ML: 5 INJECTION INTRAVENOUS at 01:11

## 2019-12-05 RX ADMIN — AMLODIPINE BESYLATE 5 MG: 5 TABLET ORAL at 20:33

## 2019-12-05 RX ADMIN — INSULIN LISPRO 6 UNITS: 100 INJECTION, SOLUTION INTRAVENOUS; SUBCUTANEOUS at 14:30

## 2019-12-05 RX ADMIN — DOXYCYCLINE 100 MG: 100 INJECTION, POWDER, LYOPHILIZED, FOR SOLUTION INTRAVENOUS at 09:24

## 2019-12-05 RX ADMIN — INSULIN DETEMIR 25 UNITS: 100 INJECTION, SOLUTION SUBCUTANEOUS at 20:35

## 2019-12-05 RX ADMIN — IPRATROPIUM BROMIDE AND ALBUTEROL SULFATE 3 ML: 2.5; .5 SOLUTION RESPIRATORY (INHALATION) at 03:34

## 2019-12-05 RX ADMIN — CEFTRIAXONE 1 G: 1 INJECTION, POWDER, FOR SOLUTION INTRAMUSCULAR; INTRAVENOUS at 20:33

## 2019-12-05 RX ADMIN — AMLODIPINE BESYLATE 5 MG: 5 TABLET ORAL at 09:25

## 2019-12-05 RX ADMIN — HEPARIN SODIUM 5000 UNITS: 5000 INJECTION, SOLUTION INTRAVENOUS; SUBCUTANEOUS at 14:29

## 2019-12-05 RX ADMIN — INSULIN LISPRO 7 UNITS: 100 INJECTION, SOLUTION INTRAVENOUS; SUBCUTANEOUS at 20:33

## 2019-12-05 RX ADMIN — PANTOPRAZOLE SODIUM 40 MG: 40 TABLET, DELAYED RELEASE ORAL at 06:40

## 2019-12-05 RX ADMIN — IPRATROPIUM BROMIDE AND ALBUTEROL SULFATE 3 ML: 2.5; .5 SOLUTION RESPIRATORY (INHALATION) at 17:08

## 2019-12-05 RX ADMIN — ACETAMINOPHEN 650 MG: 325 TABLET ORAL at 17:30

## 2019-12-05 RX ADMIN — HEPARIN SODIUM 5000 UNITS: 5000 INJECTION, SOLUTION INTRAVENOUS; SUBCUTANEOUS at 20:34

## 2019-12-05 RX ADMIN — IPRATROPIUM BROMIDE AND ALBUTEROL SULFATE 3 ML: 2.5; .5 SOLUTION RESPIRATORY (INHALATION) at 07:55

## 2019-12-05 NOTE — H&P
[facility] Medicine Services  HISTORY AND PHYSICAL    Patient Name: Gabriela Mar  : 1945  MRN: [id number]  Primary Care Physician: Krystina Gee MD  Date of admission: 2019      Subjective   Subjective     Chief Complaint:  Gabriela Mar is a 75 y/o male presenting to the ER from his PCP office due to oxygen sat of 82% on RA with increased shortness of breath, chills, sinus congestion, low grade fever,  and rhinorrhea for 4 days.     HPI:  Gabriela Mar is a 74 y.o. male presenting to the ER from his PCP office due to oxygen sat of 82% on RA with increased shortness of breath, chills, sinus congestion and rhinorrhea for 4 days. He reports dizziness with rising. Patient denies chest pain, heart palpitations, sweats, nausea, vomiting, abdominal pain, diarrhea. He has history of COPD, DM, HTN, Hyperlipidemia, Reflux. Patient reports pneumonia approximately 10 years ago. He is up to date on his pneumococcal vaccine. He quit smoking 3 years ago but is exposed to second hand smoke. Patient received a neb treatment that improved him shortness of breath.       Review of Systems   Constitutional: Positive for chills, fatigue and fever. Negative for diaphoresis.   HENT: Positive for congestion, rhinorrhea and sore throat. Negative for ear pain.    Eyes: Negative.    Respiratory: Positive for cough, shortness of breath and wheezing. Negative for chest tightness.    Cardiovascular: Positive for leg swelling (lasix and potassium PRN ). Negative for chest pain and palpitations.   Gastrointestinal: Negative for abdominal distention, abdominal pain, constipation, diarrhea, nausea and vomiting.   Endocrine: Negative.    Genitourinary: Negative for dysuria.   Musculoskeletal: Negative.    Skin: Negative.    Allergic/Immunologic: Negative.    Neurological: Positive for dizziness.   Hematological: Negative.    Psychiatric/Behavioral: Negative.           All other systems reviewed and  are negative.     Personal History     Past Medical History:   Diagnosis Date   • Arthritis    • Diabetes mellitus (CMS/HCC)     DIAGNOSED 10 YEARS AGO; CHECKS FSBG TID   • Esophagitis, reflux    • Hyperlipidemia    • Hypertension    • Obesity    • Pharyngitis    • Wears eyeglasses        Past Surgical History:   Procedure Laterality Date   • COLONOSCOPY      2015   • ENDOSCOPY      2011   • EYE SURGERY      cataract 8/22 and 10/18/18 - Dr. Hernández   • JOINT REPLACEMENT      LEFT HIP 2016-MARCH    • SUBMAXILLARY CYST  EXCISION      Right back   • TONSILLECTOMY     • TOTAL HIP ARTHROPLASTY Right 3/7/2017    Procedure: RIGHT TOTAL HIP ARTHROPLASTY;  Surgeon: Reynaldo Suarez MD;  Location: [facility];  Service:    • VASECTOMY     • WRIST GANGLION EXCISION         Family History: family history includes Cancer in his brother, brother, and father; Diabetes in his brother, father, mother, and sister; Hypertension in his brother, brother, and father; Kidney failure in his sister. Otherwise pertinent FHx was reviewed and unremarkable.     Social History:  reports that he has quit smoking. His smoking use included cigarettes. He has a 70.00 pack-year smoking history. He has never used smokeless tobacco. He reports that he does not drink alcohol or use drugs.  Social History     Social History Narrative   • Not on file       Medications:    Available home medication information reviewed.  Facility-Administered Medications Prior to Admission   Medication Dose Route Frequency Provider Last Rate Last Dose   • [COMPLETED] ipratropium-albuterol (DUO-NEB) nebulizer solution 3 mL  3 mL Nebulization Once Mirtha Kenney APRN   3 mL at 12/04/19 1644     Medications Prior to Admission   Medication Sig Dispense Refill Last Dose   • amLODIPine (NORVASC) 5 MG tablet Take 1 tablet by mouth 2 (Two) Times a Day. 180 tablet 1 Taking   • baclofen (LIORESAL) 10 MG tablet Take 1 tablet by mouth 3 (Three) Times a Day. 60 tablet 0 Taking   •  "Cholecalciferol (VITAMIN D) 1000 UNITS tablet Take 1 capsule by mouth Daily.   Taking   • Cyanocobalamin (VITAMIN B-12 ER) 1000 MCG tablet controlled-release Take 1,000 mcg by mouth Daily.   Taking   • furosemide (LASIX) 20 MG tablet 1 PO daily for 7 days, then 1 PO QOD prn soa. 20 tablet 5 Taking   • glimepiride (AMARYL) 4 MG tablet Take 1 tablet by mouth Daily With Dinner. 90 tablet 1 Taking   • insulin NPH (humuLIN N,novoLIN N) 100 UNIT/ML injection 35 Units in the morning and 45 units in the evening. 30 mL 5 Taking   • Insulin Syringe 28G X 1/2\" 1 ML misc 1 each 2 (Two) Times a Day. 100 each 5 Taking   • lisinopril (PRINIVIL,ZESTRIL) 20 MG tablet TAKE 1 TABLET BY MOUTH TWICE DAILY 180 tablet 1 Taking   • metFORMIN (GLUCOPHAGE) 1000 MG tablet TAKE 1 & 1/2 (ONE & ONE-HALF) TABLETS BY MOUTH IN THE MORNING AND 1 WITH DINNER 235 tablet 1 Taking   • metFORMIN (GLUCOPHAGE) 1000 MG tablet TAKE 1 & 1/2 (ONE & ONE-HALF) TABLETS BY MOUTH IN THE MORNING AND 1 WITH DINNER 235 tablet 1 Taking   • Multiple Vitamins-Minerals (MULTI VITAMIN/MINERALS PO) Take 1 tablet by mouth Daily.   Taking   • omeprazole (PRILOSEC) 20 MG capsule Take 1 capsule by mouth Daily.   Taking   • potassium chloride (K-DUR) 10 MEQ CR tablet Take 1 tablet by mouth Every Other Day. Take with lasix 15 tablet 5 Taking   • simvastatin (ZOCOR) 10 MG tablet TAKE 1 TABLET BY MOUTH AT BEDTIME 90 tablet 1 Taking       Allergies   Allergen Reactions   • Bactrim [Sulfamethoxazole-Trimethoprim] Other (See Comments)     Joint pain   • Sulfa Antibiotics GI Intolerance     Makes bones hurt         Objective   Objective     Vital Signs:   Temp:  [98.2 °F (36.8 °C)] 98.2 °F (36.8 °C)  Heart Rate:  [85-99] 86  Resp:  [18-22] 18  BP: ()/(53-69) 144/56        Physical Exam   Constitutional: He is oriented to person, place, and time. He appears well-developed and well-nourished.   HENT:   Head: Normocephalic and atraumatic.   Eyes: EOM are normal. Pupils are equal, " round, and reactive to light.   Neck: Neck supple. No JVD present.   Cardiovascular: Normal rate, regular rhythm, normal heart sounds and intact distal pulses. Exam reveals no gallop and no friction rub.   No murmur heard.  Pulmonary/Chest: Effort normal and breath sounds normal. He has no wheezes.   Abdominal: Soft. Bowel sounds are normal. There is no tenderness.   Musculoskeletal: Normal range of motion. He exhibits no edema.   Neurological: He is alert and oriented to person, place, and time.   Skin: Skin is warm and dry.   Psychiatric: He has a normal mood and affect.   Vitals reviewed.         Results Reviewed:  I have personally reviewed current lab and radiology data.    Results from last 7 days   Lab Units 12/04/19  1815   WBC 10*3/mm3 8.05   HEMOGLOBIN g/dL 11.9*   HEMATOCRIT % 39.2   PLATELETS 10*3/mm3 148     Results from last 7 days   Lab Units 12/04/19 2126 12/04/19 2009 12/04/19 1815   SODIUM mmol/L  --   --  136   POTASSIUM mmol/L  --   --  4.4   CHLORIDE mmol/L  --   --  104   CO2 mmol/L  --   --  22.0   BUN mg/dL  --   --  33*   CREATININE mg/dL  --   --  1.80*   GLUCOSE mg/dL  --   --  162*   CALCIUM mg/dL  --   --  8.6   ALT (SGPT) U/L  --   --  39   AST (SGOT) U/L  --   --  39   TROPONIN T ng/mL  --  0.040* 0.033*   PROBNP pg/mL  --   --  61.9   LACTATE mmol/L 2.5*  --   --      Estimated Creatinine Clearance: 42.6 mL/min (A) (by C-G formula based on SCr of 1.8 mg/dL (H)).  Brief Urine Lab Results     None        Imaging Results (Last 24 Hours)     Procedure Component Value Units Date/Time    XR Chest 1 View [[id number]] Collected:  12/04/19 1912     Updated:  12/04/19 2321    Narrative:          EXAMINATION: XR CHEST 1 VW - 12/04/2019     INDICATION: Shortness of air.     COMPARISON: 05/01/2018     FINDINGS: Heart is upper normal size. Vasculature appears normal. The  lungs appear well expanded and grossly clear. Slight coarsening of the  pulmonary interstitial markings is stable compared to  the 05/01/2018  study. No pneumothorax, edema or effusion is seen.           Impression:       No evidence of active chest disease.     DICTATED:   12/04/2019  EDITED/ls :   12/04/2019      This report was finalized on 12/4/2019 11:17 PM by DR. Bruce Monsalve MD.       NM Lung Ventilation Perfusion [[id number]] Collected:  12/04/19 2111     Updated:  12/04/19 2113    Narrative:       VENTILATION PERFUSION LUNG SCAN, 12/4/2019    HISTORY:   74-year-old male with 2-3 day history of shortness of air, cough and congestion.    DOSE:   *  7.48 mCi inhaled xenon-133.  *  5.33 mCi technetium labeled MAA intravenously.    COMPARISON:   CT chest and chest x-ray, 12/4/2019.    FINDINGS:   Physiologic distribution of activity throughout both lungs on ventilation and perfusion images. No unmatched perfusion defect to suggest pulmonary embolism.      Impression:       Normal ventilation perfusion lung scan.    Signer Name: Mike Infante MD   Signed: 12/4/2019 9:11 PM   Workstation Name: [facility]    [facility]    CT Chest Without Contrast [[id number]] Collected:  12/04/19 2039     Updated:  12/04/19 2041    Narrative:       CT CHEST, NONCONTRAST, 12/4/2019    HISTORY:  74-year-old male with 2-3 day history of shortness of air, cough and congestion.    TECHNIQUE:  CT imaging of the chest without IV contrast. Radiation dose reduction techniques included automated exposure control. Radiation audit for CT and nuclear cardiology exams in the last 12 months: 0.    FINDINGS:  Mild patchy airspace infiltrate is present in a bronchovascular distribution throughout the right lower lobe. Minimal infiltrate in the medial left lower lobe.    Remaining portions of both lungs are clear. No dense airspace consolidation or pleural effusion.    No suspicious mass or adenopathy within the mediastinum or pulmonary iesha. Heart size is normal, and there is no pericardial effusion. Dense atheromatous coronary artery  calcification. Lateral gynecomastia.    Limited upper abdominal images show lobulated hepatic contour suggesting potential chronic liver disease.      Impression:       1.  Patchy bronchovascular airspace infiltrate in the right lower lobe. Minimal left lower lobe infiltrate.  2.  No airspace consolidation or pleural effusion.  3.  Suspected chronic liver disease.    Signer Name: Mike Infante MD   Signed: 12/4/2019 8:39 PM   Workstation Name: [id number]    [facility]        Results for orders placed in visit on 12/14/16   Adult Transthoracic Echo Complete    Narrative · All left ventricular wall segments contract normally.  · Left ventricular function is normal. Estimated EF = 50%.          Assessment/Plan   Assessment / Plan     Active Hospital Problems    Diagnosis POA   • CAP (community acquired pneumonia) [J18.9] Unknown   • ZOHRA (acute kidney injury) (CMS/HCC) [N17.9] Unknown   • Pneumonia of both lungs due to infectious organism [J18.9] Yes   • Type 2 diabetes mellitus (CMS/HCC) [E11.9] Unknown   • Chronic obstructive pulmonary disease with acute lower respiratory infection (CMS/HCC) [J44.0] Unknown   • Type 2 diabetes mellitus, uncontrolled (CMS/HCC) [E11.65] Yes   • Essential hypertension [I10] Unknown     Impression: 12/20/2015 - reports due to stress of surgery and elevated sugars, adv. to stay on current regimen for now.  Impression: 09/16/2015 - check EKG- unchanged.  Impression: 09/05/2014 - increase lisinopril to bid.;        Community Acquired Pneumonia/COPD  -monitor lactic acid  -ABG  -doxycycline 100 mg IV BID  -Ceftriaxone 1 gram IV daily  -Blood cultures X2  - sputum culture  -duo nebs with albuterol nebs prn  -continuous pulse ox    ZOHRA  -monitor kidney function and avoid nephrotoxic medications.   -holding lasix and potassium for now  - ml per hour for 10 hours    Hypertension  -continue norvasc 5 mg daily  -holding lasix and potassium and lisinopril due  to acute kidney injury    DM  -A1c  -accucheck AC/HS  -lispro at the mild correction scale-adjust as needed  -hold all oral diabetes medications    Hyperlipidemia  -continue simvastatin 10 mg daily      DVT prophylaxis:  Heparin 5000 Units SQ q 8 hour and compression device    CODE STATUS:    Code Status and Medical Interventions:   Ordered at: 12/04/19 4062     Level Of Support Discussed With:    Patient     Code Status:    CPR     Medical Interventions (Level of Support Prior to Arrest):    Full       Admission Status:  I believe this patient meets INPATIENT status due to the need for care which can only be reasonably provided in an hospital setting due to acute hypoxic respiratory failure, pneumonia, acute kidney injury.  As such, I feel patient’s risk for adverse outcomes and need for care warrant INPATIENT evaluation and predict the patient’s care encounter to likely last beyond 2 midnights.      Electronically signed by CALIN Lane, 12/04/19, 11:38 PM.      Attending   Admission Attestation       I have seen and examined the patient, performing an independent face-to-face diagnostic evaluation with plan of care reviewed and developed with the advanced practice clinician (APC).      Brief Summary Statement:   Gabriela Mar is a 74 y.o. male with past medical history of COPD, T2 DM, HTN, osteoarthritis, and COPD who presented to [facility] ED with complaint of worsening shortness of breath.  Patient had been seen in outpatient clinic and noted to have O2 sats in the 83% with walking. And therefore was sent to [facility] ED for further evaluation.  CT of the chest was obtained and noted to have infiltrates and the right and left lower lobes.  Due to the level of hypoxemia VQ scan was obtained and was negative for PE. Had sinus drainage and headache and cough that started on Saturday. Worsened on Sunday night. Nonproductive cough worsened and developed wheezing. He smoked 2 ppd. Stopped smoking 23 years ago.  Elevated trop, will trend trop      Remainder of detailed HPI is as noted by APC and has been reviewed and/or edited by me for completeness.    Attending Physical Exam:  Constitutional: No acute distress, awake, alert  HENT: NCAT, mucous membranes moist  Respiratory: Clear to auscultation bilaterally, respiratory effort normal   Cardiovascular: RRR, no murmurs, rubs, or gallops, palpable pedal pulses bilaterally  Gastrointestinal: Positive bowel sounds, soft, nontender, nondistended  Musculoskeletal: No bilateral ankle edema  Psychiatric: Appropriate affect, cooperative  Neurologic: Oriented x 3, strength symmetric in all extremities, Cranial Nerves grossly intact to confrontation, speech clear  Skin: No rashes      Brief Assessment/Plan :  See detailed assessment and plan developed with APC which I have reviewed and/or edited for completeness.    Electronically signed by Lois Gomez DO, 12/05/19, 4:21 AM.

## 2019-12-05 NOTE — PROGRESS NOTES
[facility] Medicine Services  PROGRESS NOTE    Patient Name: Gabriela Mar  : 1945  MRN: [id number]    Date of Admission: 2019  Primary Care Physician: Krystina Gee MD    Subjective   Subjective     CC:  Dyspnea and cough    HPI:  Feels great.  Breathing well on RA at rest.  Nebs helped tremendously.     Review of Systems   Gen- No fevers, chills  CV- No chest pain, palpitations  Resp- No dyspnea. Mild cough.   GI- No N/V/D, abd pain          Objective   Objective     Vital Signs:   Temp:  [97.7 °F (36.5 °C)-98.2 °F (36.8 °C)] 97.8 °F (36.6 °C)  Heart Rate:  [] 83  Resp:  [16-22] 18  BP: ()/(53-69) 158/68        Physical Exam:  Gen:  WD/WN man up in chair, on RA, looks hearty and NAD  Neuro: alert and oriented, clear speech, follows commands, grossly nonfocal  HEENT:  NC/AT PERRL, OP benign  Neck:  Supple, no LAD  Heart RRR no murmur, rub, or gallop  Lungs No wheeze or crackles, nonlabored on RA, clear throughout.   Abd:  Soft, nontender, no rebound or guarding, pos BS  Extrem:  No c/c/e    Results Reviewed:    Results from last 7 days   Lab Units 19   WBC 10*3/mm3 5.10 8.05   HEMOGLOBIN g/dL 11.1* 11.9*   HEMATOCRIT % 35.7* 39.2   PLATELETS 10*3/mm3 127* 148     Results from last 7 days   Lab Units 19  0725 19  0423 12/04/19  2009 12/04/19  1815   SODIUM mmol/L  --  133*  --  136   POTASSIUM mmol/L  --  6.0*  --  4.4   CHLORIDE mmol/L  --  99  --  104   CO2 mmol/L  --  20.0*  --  22.0   BUN mg/dL  --  37*  --  33*   CREATININE mg/dL  --  2.11*  --  1.80*   GLUCOSE mg/dL  --  459*  --  162*   CALCIUM mg/dL  --  8.2*  --  8.6   ALT (SGPT) U/L  --  34  --  39   AST (SGOT) U/L  --  31  --  39   TROPONIN T ng/mL 0.020 0.023 0.040* 0.033*   PROBNP pg/mL  --   --   --  61.9     Estimated Creatinine Clearance: 35.9 mL/min (A) (by C-G formula based on SCr of 2.11 mg/dL (H)).    Microbiology Results Abnormal     None           Imaging Results (Last 24 Hours)     Procedure Component Value Units Date/Time    XR Chest 1 View [[id number]] Collected:  12/04/19 1912     Updated:  12/04/19 2321    Narrative:          EXAMINATION: XR CHEST 1 VW - 12/04/2019     INDICATION: Shortness of air.     COMPARISON: 05/01/2018     FINDINGS: Heart is upper normal size. Vasculature appears normal. The  lungs appear well expanded and grossly clear. Slight coarsening of the  pulmonary interstitial markings is stable compared to the 05/01/2018  study. No pneumothorax, edema or effusion is seen.           Impression:       No evidence of active chest disease.     DICTATED:   12/04/2019  EDITED/ls :   12/04/2019      This report was finalized on 12/4/2019 11:17 PM by DR. Bruce Monsalve MD.       NM Lung Ventilation Perfusion [[id number]] Collected:  12/04/19 2111     Updated:  12/04/19 2113    Narrative:       VENTILATION PERFUSION LUNG SCAN, 12/4/2019    HISTORY:   74-year-old male with 2-3 day history of shortness of air, cough and congestion.    DOSE:   *  7.48 mCi inhaled xenon-133.  *  5.33 mCi technetium labeled MAA intravenously.    COMPARISON:   CT chest and chest x-ray, 12/4/2019.    FINDINGS:   Physiologic distribution of activity throughout both lungs on ventilation and perfusion images. No unmatched perfusion defect to suggest pulmonary embolism.      Impression:       Normal ventilation perfusion lung scan.    Signer Name: Mike Infante MD   Signed: 12/4/2019 9:11 PM   Workstation Name: [facility]    [facility]    CT Chest Without Contrast [[id number]] Collected:  12/04/19 2039     Updated:  12/04/19 2041    Narrative:       CT CHEST, NONCONTRAST, 12/4/2019    HISTORY:  74-year-old male with 2-3 day history of shortness of air, cough and congestion.    TECHNIQUE:  CT imaging of the chest without IV contrast. Radiation dose reduction techniques included automated exposure control. Radiation audit for CT and nuclear  cardiology exams in the last 12 months: 0.    FINDINGS:  Mild patchy airspace infiltrate is present in a bronchovascular distribution throughout the right lower lobe. Minimal infiltrate in the medial left lower lobe.    Remaining portions of both lungs are clear. No dense airspace consolidation or pleural effusion.    No suspicious mass or adenopathy within the mediastinum or pulmonary iesha. Heart size is normal, and there is no pericardial effusion. Dense atheromatous coronary artery calcification. Lateral gynecomastia.    Limited upper abdominal images show lobulated hepatic contour suggesting potential chronic liver disease.      Impression:       1.  Patchy bronchovascular airspace infiltrate in the right lower lobe. Minimal left lower lobe infiltrate.  2.  No airspace consolidation or pleural effusion.  3.  Suspected chronic liver disease.    Signer Name: Mike Infante MD   Signed: 12/4/2019 8:39 PM   Workstation Name: [email or web address]    [facility]          Results for orders placed in visit on 12/14/16   Adult Transthoracic Echo Complete    Narrative · All left ventricular wall segments contract normally.  · Left ventricular function is normal. Estimated EF = 50%.          I have reviewed the medications:  Scheduled Meds:  amLODIPine 5 mg Oral BID   atorvastatin 10 mg Oral Nightly   cefTRIAXone 1 g Intravenous Q24H   And      doxycycline 100 mg Intravenous Q12H   heparin (porcine) 5,000 Units Subcutaneous Q8H   insulin lispro 0-7 Units Subcutaneous 4x Daily With Meals & Nightly   insulin NPH 15 Units Subcutaneous BID AC   ipratropium-albuterol 3 mL Nebulization Q4H - RT   methylPREDNISolone sodium succinate 40 mg Intravenous Q8H   pantoprazole 40 mg Oral QAM   sodium chloride 10 mL Intravenous Q12H     Continuous Infusions:   PRN Meds:.•  acetaminophen **OR** acetaminophen **OR** acetaminophen  •  albuterol  •  aluminum-magnesium hydroxide-simethicone  •  dextrose  •  dextrose  •   docusate sodium  •  glucagon (human recombinant)  •  melatonin  •  sodium chloride  •  sodium chloride      Assessment/Plan   Assessment / Plan     Active Hospital Problems    Diagnosis  POA   • CAP (community acquired pneumonia) [J18.9]  Unknown   • ZOHRA (acute kidney injury) (CMS/[facility]) [N17.9]  Unknown   • Elevated troponin [R79.89]  Unknown   • Pneumonia of both lungs due to infectious organism [J18.9]  Yes   • Type 2 diabetes mellitus (CMS/[facility]) [E11.9]  Unknown   • Chronic obstructive pulmonary disease with acute lower respiratory infection (CMS/[facility]) [J44.0]  Unknown   • Type 2 diabetes mellitus, uncontrolled (CMS/[facility]) [E11.65]  Yes   • Essential hypertension [I10]  Unknown      Resolved Hospital Problems   No resolved problems to display.        Brief Hospital Course to date:  Gabriela Mar is a 74 y.o. male with several days of chills, malaise, nasal congestion and dyspnea; sent from PCP for satsof 82%.      Community Acquired Pneumonia/COPD exac  CT scan:  Patchy infiltrate RLL  Tobacco history; quit 3 yrs ago  - doxy/Ceftriaxone day1  - cx pending  - much better after duonebs and steroids  - wean steroids as possible    ZOHRA and hyperkalemia  Home lisinopril, lasix, and Kcl:  holding  - baseline 1.3, now 2.0 with K 6.0  - recheck  - hydrate gently     Hypertension  -continue norvasc 5 mg daily  -holding lasix and potassium and lisinopril due to acute kidney injury     DM now on steroids  - increase bolus dosing      DVT prophylaxis:  Heparin 5000 Units SQ q 8 hour and compression device     Disposition: I expect the patient to be discharged tomorrow on steroids wean and oral abx.     CODE STATUS:   Code Status and Medical Interventions:   Ordered at: 12/04/19 1857     Level Of Support Discussed With:    Patient     Code Status:    CPR     Medical Interventions (Level of Support Prior to Arrest):    Full         Electronically signed by Sade Burgos MD, 12/05/19, 12:11 PM.

## 2019-12-05 NOTE — PROGRESS NOTES
Discharge Planning Assessment  [facility]     Patient Name: Gabriela Mar  MRN: [id number]  Today's Date: 12/5/2019    Admit Date: 12/4/2019    Discharge Needs Assessment     Row Name 12/05/19 0936       Living Environment    Lives With  child(tulio), adult    Name(s) of Who Lives With Patient  Son lives with patient    Current Living Arrangements  home/apartment/condo    Primary Care Provided by  self    Provides Primary Care For  no one    Family Caregiver if Needed  child(tulio), adult;other relative(s)    Family Caregiver Names  Orin Wolf [phone]    Quality of Family Relationships  supportive;helpful;involved    Able to Return to Prior Arrangements  yes       Resource/Environmental Concerns    Resource/Environmental Concerns  none    Transportation Concerns  car, none       Transition Planning    Patient/Family Anticipates Transition to  home    Patient/Family Anticipated Services at Transition  none    Transportation Anticipated  family or friend will provide;car, drives self       Discharge Needs Assessment    Readmission Within the Last 30 Days  no previous admission in last 30 days    Concerns to be Addressed  denies needs/concerns at this time    Equipment Currently Used at Home  none    Anticipated Changes Related to Illness  none    Provided post acute provider list?  Refused Patient established with his current PCP    Discharge Coordination/Progress  PCP is Krystina Marlen; [facility] [address]        Discharge Plan     Row Name 12/05/19 0938       Plan    Plan  Home    Patient/Family in Agreement with Plan  yes    Plan Comments  Spoke with patient.  He lives in [facility] [place], his son lives with him.  Independent, still drives.  No current DME or home care services.  Denied discharge needs, Orin Wolf [phone] to transport at discharge.     Final Discharge Disposition Code  01 - home or self-care        Destination      No service coordination in this encounter.      Durable  Medical Equipment      No service coordination in this encounter.      Dialysis/Infusion      No service coordination in this encounter.      Home Medical Care      No service coordination in this encounter.      Therapy      No service coordination in this encounter.      Community Resources      No service coordination in this encounter.          Demographic Summary     Row Name 12/05/19 0935       General Information    Arrived From  emergency department    Referral Source  admission list    Reason for Consult  discharge planning    Preferred Language  English     Used During This Interaction  no        Functional Status     Row Name 12/05/19 0935       Functional Status    Usual Activity Tolerance  good       Functional Status, IADL    Medications  independent    Meal Preparation  independent    Housekeeping  independent    Laundry  independent    Shopping  independent       Mental Status    General Appearance WDL  WDL       Employment/    Employment/ Comments  Medicare; [employer]        Psychosocial    No documentation.       Abuse/Neglect    No documentation.       Legal    No documentation.       Substance Abuse    No documentation.       Patient Forms    No documentation.           Rosa Isela Mae RN

## 2019-12-05 NOTE — NURSING NOTE
Call placed to APRN at 0235, advised critical Lactic Level of 2.8. Acknowledged, no new orders received.

## 2019-12-06 ENCOUNTER — TELEPHONE (OUTPATIENT)
Dept: INTERNAL MEDICINE | Facility: CLINIC | Age: 74
End: 2019-12-06

## 2019-12-06 VITALS
DIASTOLIC BLOOD PRESSURE: 70 MMHG | HEART RATE: 97 BPM | OXYGEN SATURATION: 94 % | SYSTOLIC BLOOD PRESSURE: 137 MMHG | RESPIRATION RATE: 16 BRPM | HEIGHT: 70 IN | BODY MASS INDEX: 32.38 KG/M2 | WEIGHT: 226.2 LBS | TEMPERATURE: 98 F

## 2019-12-06 PROBLEM — R79.89 ELEVATED TROPONIN: Status: RESOLVED | Noted: 2019-12-05 | Resolved: 2019-12-06

## 2019-12-06 PROBLEM — Z79.4 TYPE 2 DIABETES MELLITUS WITH HYPERGLYCEMIA, WITH LONG-TERM CURRENT USE OF INSULIN: Status: ACTIVE | Noted: 2019-12-06

## 2019-12-06 PROBLEM — E11.65 TYPE 2 DIABETES MELLITUS WITH HYPERGLYCEMIA, WITH LONG-TERM CURRENT USE OF INSULIN (HCC): Status: ACTIVE | Noted: 2019-12-06

## 2019-12-06 PROBLEM — R77.8 ELEVATED TROPONIN: Status: RESOLVED | Noted: 2019-12-05 | Resolved: 2019-12-06

## 2019-12-06 LAB
ANION GAP SERPL CALCULATED.3IONS-SCNC: 13 MMOL/L (ref 5–15)
BUN BLD-MCNC: 39 MG/DL (ref 8–23)
BUN/CREAT SERPL: 25.7 (ref 7–25)
CALCIUM SPEC-SCNC: 9 MG/DL (ref 8.6–10.5)
CHLORIDE SERPL-SCNC: 101 MMOL/L (ref 98–107)
CO2 SERPL-SCNC: 21 MMOL/L (ref 22–29)
CREAT BLD-MCNC: 1.52 MG/DL (ref 0.76–1.27)
DEPRECATED RDW RBC AUTO: 47.1 FL (ref 37–54)
ERYTHROCYTE [DISTWIDTH] IN BLOOD BY AUTOMATED COUNT: 14.3 % (ref 12.3–15.4)
GFR SERPL CREATININE-BSD FRML MDRD: 45 ML/MIN/1.73
GLUCOSE BLD-MCNC: 306 MG/DL (ref 65–99)
GLUCOSE BLDC GLUCOMTR-MCNC: 274 MG/DL (ref 70–130)
GLUCOSE BLDC GLUCOMTR-MCNC: 319 MG/DL (ref 70–130)
HCT VFR BLD AUTO: 37.6 % (ref 37.5–51)
HGB BLD-MCNC: 11.8 G/DL (ref 13–17.7)
MCH RBC QN AUTO: 28.2 PG (ref 26.6–33)
MCHC RBC AUTO-ENTMCNC: 31.4 G/DL (ref 31.5–35.7)
MCV RBC AUTO: 90 FL (ref 79–97)
PLATELET # BLD AUTO: 169 10*3/MM3 (ref 140–450)
PMV BLD AUTO: 11.6 FL (ref 6–12)
POTASSIUM BLD-SCNC: 4.6 MMOL/L (ref 3.5–5.2)
RBC # BLD AUTO: 4.18 10*6/MM3 (ref 4.14–5.8)
SODIUM BLD-SCNC: 135 MMOL/L (ref 136–145)
WBC NRBC COR # BLD: 14.85 10*3/MM3 (ref 3.4–10.8)

## 2019-12-06 PROCEDURE — 85027 COMPLETE CBC AUTOMATED: CPT | Performed by: PHYSICIAN ASSISTANT

## 2019-12-06 PROCEDURE — 99239 HOSP IP/OBS DSCHRG MGMT >30: CPT | Performed by: PHYSICIAN ASSISTANT

## 2019-12-06 PROCEDURE — 80048 BASIC METABOLIC PNL TOTAL CA: CPT | Performed by: PHYSICIAN ASSISTANT

## 2019-12-06 PROCEDURE — 63710000001 PREDNISONE PER 5 MG: Performed by: INTERNAL MEDICINE

## 2019-12-06 PROCEDURE — G0108 DIAB MANAGE TRN  PER INDIV: HCPCS

## 2019-12-06 PROCEDURE — 82962 GLUCOSE BLOOD TEST: CPT

## 2019-12-06 PROCEDURE — 63710000001 INSULIN DETEMIR PER 5 UNITS: Performed by: INTERNAL MEDICINE

## 2019-12-06 PROCEDURE — 63710000001 PREDNISONE PER 1 MG: Performed by: INTERNAL MEDICINE

## 2019-12-06 PROCEDURE — 25010000002 HEPARIN (PORCINE) PER 1000 UNITS: Performed by: NURSE PRACTITIONER

## 2019-12-06 PROCEDURE — 94799 UNLISTED PULMONARY SVC/PX: CPT

## 2019-12-06 RX ORDER — ALBUTEROL SULFATE 90 UG/1
2 AEROSOL, METERED RESPIRATORY (INHALATION) EVERY 4 HOURS PRN
Qty: 1 INHALER | Refills: 0 | Status: SHIPPED | OUTPATIENT
Start: 2019-12-06 | End: 2020-05-28

## 2019-12-06 RX ORDER — CEFUROXIME AXETIL 500 MG/1
500 TABLET ORAL 2 TIMES DAILY
Qty: 12 TABLET | Refills: 0 | Status: SHIPPED | OUTPATIENT
Start: 2019-12-06 | End: 2019-12-12

## 2019-12-06 RX ORDER — BACLOFEN 10 MG/1
10 TABLET ORAL 3 TIMES DAILY PRN
Qty: 60 TABLET | Refills: 0 | Status: SHIPPED | OUTPATIENT
Start: 2019-12-06 | End: 2022-03-28 | Stop reason: HOSPADM

## 2019-12-06 RX ORDER — PREDNISONE 10 MG/1
TABLET ORAL
Qty: 23 TABLET | Refills: 0 | Status: SHIPPED | OUTPATIENT
Start: 2019-12-06 | End: 2019-12-17

## 2019-12-06 RX ORDER — DOXYCYCLINE HYCLATE 100 MG/1
100 TABLET, DELAYED RELEASE ORAL 2 TIMES DAILY
Qty: 12 TABLET | Refills: 0 | Status: SHIPPED | OUTPATIENT
Start: 2019-12-06 | End: 2019-12-12

## 2019-12-06 RX ADMIN — IPRATROPIUM BROMIDE AND ALBUTEROL SULFATE 3 ML: 2.5; .5 SOLUTION RESPIRATORY (INHALATION) at 08:10

## 2019-12-06 RX ADMIN — INSULIN DETEMIR 25 UNITS: 100 INJECTION, SOLUTION SUBCUTANEOUS at 08:22

## 2019-12-06 RX ADMIN — INSULIN LISPRO 5 UNITS: 100 INJECTION, SOLUTION INTRAVENOUS; SUBCUTANEOUS at 08:21

## 2019-12-06 RX ADMIN — DOXYCYCLINE 100 MG: 100 INJECTION, POWDER, LYOPHILIZED, FOR SOLUTION INTRAVENOUS at 08:21

## 2019-12-06 RX ADMIN — PREDNISONE 30 MG: 10 TABLET ORAL at 08:21

## 2019-12-06 RX ADMIN — IPRATROPIUM BROMIDE AND ALBUTEROL SULFATE 3 ML: 2.5; .5 SOLUTION RESPIRATORY (INHALATION) at 12:04

## 2019-12-06 RX ADMIN — AMLODIPINE BESYLATE 5 MG: 5 TABLET ORAL at 08:21

## 2019-12-06 RX ADMIN — HEPARIN SODIUM 5000 UNITS: 5000 INJECTION, SOLUTION INTRAVENOUS; SUBCUTANEOUS at 05:35

## 2019-12-06 RX ADMIN — INSULIN LISPRO 5 UNITS: 100 INJECTION, SOLUTION INTRAVENOUS; SUBCUTANEOUS at 12:05

## 2019-12-06 RX ADMIN — PANTOPRAZOLE SODIUM 40 MG: 40 TABLET, DELAYED RELEASE ORAL at 05:35

## 2019-12-06 NOTE — CONSULTS
"Diabetes Education  Assessment/Teaching    Patient Name:  Gabriela Mar  YOB: 1945  MRN: [id number]  Admit Date:  12/4/2019      Assessment Date:  12/6/2019    Most Recent Value   General Information    Referral From:  A1c   Height  177.8 cm (70\")   Height Method  Stated   Weight  103 kg (226 lb 3.2 oz)   Weight Method  Standing scale   How would you rate your current health?  fair   Pregnancy Assessment   Diabetes History   What type of diabetes do you have?  Type 2   Length of Diabetes Diagnosis  10 + years   Current DM knowledge  fair   Have you had diabetes education/teaching in the past?  yes   When and where was your diabetes education?  per PCP office   Do you test your blood sugar at home?  yes   Frequency of checks  \" sometimes twice a day\"   Who performs the test?  self   Have you had low blood sugar? (<70mg/dl)  yes   How often do you have low blood sugar?  rare   Have you had high blood sugar? (>140mg/dl)  yes   How often do you have high blood sugar?  occasionally   How would you rate your diabetes control?  fair   Education Preferences   What areas of diabetes would you like to learn about?  avoiding high blood sugar, avoiding low blood sugar, diabetes complications, diet information, medications for diabetes, exercise information   Nutrition Information   Assessment Topics   Healthy Eating - Assessment  Needs education   Being Active - Assessment  Needs education   Taking Medication - Assessment  Needs education   Problem Solving - Assessment  Needs education   Reducing Risk - Assessment  Needs education   Healthy Coping - Assessment  Needs education   Monitoring - Assessment  Needs education   DM Goals            Most Recent Value   DM Education Needs   Meter  Has own   Frequency of Testing  AC   Blood Glucose Target Range  educated on blood glucose rangers per ADA recomendations    Medication  Oral, Insulin   Problem Solving  Hypoglycemia, Hyperglycemia, Sick days, Signs, Symptoms, " "Treatment   Reducing Risks  A1C testing   Physical Activity  Walking   Physical Activity Frequency  Rarely   Healthy Coping  Appropriate   Motivation  Moderate   Teaching Method  Explanation, Discussion, Handouts   Patient Response  Verbalized understanding, Needs reinforcement            Other Comments:  Patient consented to diabetes education.     Discussed and taught patient about type 2 diabetes self-management, risk factors, and importance of blood glucose control to reduce complications. Target blood glucose readings and A1c goals per ADA were reviewed. Reviewed with patient current A1c 9.2, its increase from.9 on 9/25/2019 and discussed its significance. Signs, symptoms, and treatment of hyperglycemia and hypoglycemia were discussed. Lifestyle changes such as physical activity with MD approval and healthy eating were encouraged. Stressed the importance of strict blood sugar control after surgery to prevent complications such as infection and to promote healing of incision. Encouraged pt to monitor blood sugar at home 4  times per day and to call PCP if blood sugar is trending rashmi. Encouraged to keep record of blood glucose readings to take to follow up appointment with PCP. Provided patient with copy of [email or web address]'s \"What is Diabetes\" handout, \"Blood Glucose Goals\" handout, and \"What is A1c\" handout. Additional information provided about the [facility] outpatient department and the services provided, along with ways of contacting us should the patient desire additional classes. Thank you for the consult, should patient needs change please re consult us. Thanks.        Electronically signed by:  Parag Ferrari RN  12/06/19 10:27 AM  "

## 2019-12-06 NOTE — PROGRESS NOTES
Continued Stay Note  [facility]     Patient Name: Gabriela Mar  MRN: [id number]  Today's Date: 12/6/2019    Admit Date: 12/4/2019    Discharge Plan     Row Name 12/06/19 1349       Plan    Plan  Home    Patient/Family in Agreement with Plan  yes    Plan Comments  Spoke with patient.  He denied further discharge needs, friend to transport.     Final Discharge Disposition Code  01 - home or self-care        Discharge Codes    No documentation.       Expected Discharge Date and Time     Expected Discharge Date Expected Discharge Time    Dec 6, 2019             Rosa Isela Mae RN

## 2019-12-06 NOTE — TELEPHONE ENCOUNTER
PATIENT WAS ADMITTED TO [facility] ON 12/4/19 FOR PNEUMONIA. SHE IS BEING DISCHARGED TODAY 12/6/19. PATIENT NEEDS A 1 WEEK HOSPITAL FOLLOW UP WITH DR. DUVAL. WHERE ON DR. DUVAL CAN WE SCHEDULE THIS PATIENT FOR A HOSPITAL FOLLOW UP. PHONE NUMBER FOR HOSPITAL [phone]. PATIENTS NUMBER [phone]

## 2019-12-06 NOTE — DISCHARGE SUMMARY
[facility] Medicine Services  DISCHARGE SUMMARY    Patient Name: Gabriela Mar  : 1945  MRN: [id number]    Date of Admission: 2019  6:01 PM  Date of Discharge: 2019  Primary Care Physician: Krystina Gee MD    Hospital Course     Presenting Problem:   Pneumonia of both lungs due to infectious organism, unspecified part of lung [J18.9]    Active Hospital Problems    Diagnosis  POA   • Type 2 diabetes mellitus with hyperglycemia, with long-term current use of insulin (CMS/[facility]) [E11.65, Z79.4]  Not Applicable   • CAP (community acquired pneumonia) [J18.9]  Yes   • ZOHRA (acute kidney injury) (CMS/[facility]) [N17.9]  Yes   • Pneumonia of both lungs due to infectious organism [J18.9]  Yes   • Chronic obstructive pulmonary disease with acute lower respiratory infection (CMS/[facility]) [J44.0]  Yes   • Essential hypertension [I10]  Yes      Resolved Hospital Problems    Diagnosis Date Resolved POA   • Elevated troponin [R79.89] 2019 Yes      Hospital Course:  Gabriela Mar is a 74 y.o. male with PMH significant for HTN, HLD, GERD, COPD (quit smoking in 2016 but is still exposed to secondhand smoke) and insulin-dependent DMII.  He presented to [facility] ED on 2019 after he was found to have an oxygen saturation of 82% on room air at his PCPs office.  Patient had presented to PCP for evaluation of rhinorrhea, sinus congestion, shortness of breath, chills and low-grade fever x4 days.    ED evaluation significant for troponin 0 0.33, creatinine 1.80 (baseline 1.0-1.2), WBCs 8.05 and 50% neutrophils. CXR showed no acute process.  A CT chest was obtained and showed a patchy bronchovascular airspace infiltrate in the right lower lobe as well as minimal left lower lobe infiltrate.  He was admitted to the hospital medicine service for bibasilar pneumonia and acute kidney injury.  He was started on IV doxycycline/Rocephin, steroids and nebulizer treatments.  He was  given IV fluids for ZOHRA.  He was placed on supplemental oxygen and this was weaned throughout his hospitalization.    Mr. kenyon has improved rapidly.  On day of discharge his creatinine is 1.52.  Recommend a repeat BMP in 5 to 7 days through PCPs office.  White blood cell count has increased to 14,000 however, this is felt to be steroid-induced and should be followed up in the next 5 to 7 days with PCP.    We will discharge home on 12/6/2019 in stable condition on oral Ceftin/doxycycline to complete a total 7 days of therapy. He will also discharge on PO steroid taper.     At discharge, I encouraged the patient to hold metformin, lisinopril and Lasix until follow-up with PCP to ensure that renal function had returned to normal prior to resuming nephrotoxic medications.    Hgb A1c 9.7% - will need to discuss this with PCP. Sugars were elevated throughout hospitalization, secondary to steroid therapy.     Day of Discharge     HPI:   Sitting up in chair. No fevers or chills. Shortness of breath is improved and he is on room air. He feels much better and is anxious to return home today.      Review of Systems  Gen- No fevers, chills  CV- No chest pain, palpitations  Resp- (+) cough and dyspnea are improving   GI- No N/V/D, abd pain    Otherwise ROS is negative except as mentioned in the HPI.    Vital Signs:   Temp:  [97.5 °F (36.4 °C)-98.3 °F (36.8 °C)] 98 °F (36.7 °C)  Heart Rate:  [] 100  Resp:  [16-18] 16  BP: (122-156)/(65-71) 137/70     Physical Exam:  Constitutional: No acute distress, awake, alert and conversant. Sitting upright in chair.   HENT: NCAT, mucous membranes moist  Respiratory: Mild bibasilar rales without wheezes or rhonchi. Normal respiratory effort on room air    Cardiovascular: RRR, no murmurs, rubs, or gallops, palpable pedal pulses bilaterally  Gastrointestinal: Positive bowel sounds, soft, nontender, nondistended  Musculoskeletal: No bilateral ankle edema  Psychiatric: Appropriate affect,  cooperative  Neurologic: Oriented x 3, strength symmetric in all extremities, Cranial Nerves grossly intact to confrontation, speech clear  Skin: No rashes    Pertinent  and/or Most Recent Results     Results from last 7 days   Lab Units 12/06/19  1037 12/05/19  1135 12/05/19  0423 12/04/19  1815   WBC 10*3/mm3 14.85*  --  5.10 8.05   HEMOGLOBIN g/dL 11.8*  --  11.1* 11.9*   HEMATOCRIT % 37.6  --  35.7* 39.2   PLATELETS 10*3/mm3 169  --  127* 148   SODIUM mmol/L 135* 136 133* 136   POTASSIUM mmol/L 4.6 5.6* 6.0* 4.4   CHLORIDE mmol/L 101 102 99 104   CO2 mmol/L 21.0* 21.0* 20.0* 22.0   BUN mg/dL 39* 40* 37* 33*   CREATININE mg/dL 1.52* 1.70* 2.11* 1.80*   GLUCOSE mg/dL 306* 450* 459* 162*   CALCIUM mg/dL 9.0 8.5* 8.2* 8.6     Results from last 7 days   Lab Units 12/05/19  0423 12/04/19  1815   BILIRUBIN mg/dL 0.2 0.3   ALK PHOS U/L 72 77   ALT (SGPT) U/L 34 39   AST (SGOT) U/L 31 39     Results from last 7 days   Lab Units 12/05/19  1135 12/05/19  0725 12/05/19 0423 12/05/19  0147 12/04/19 2126 12/04/19 2009 12/04/19  1815   HEMOGLOBIN A1C %  --   --   --   --   --   --  9.20*   PROBNP pg/mL  --   --   --   --   --   --  61.9   TROPONIN T ng/mL <0.010 0.020 0.023  --   --  0.040* 0.033*   LACTATE mmol/L  --   --   --  2.8* 2.5*  --   --      Brief Urine Lab Results     None        Microbiology Results Abnormal     Procedure Component Value - Date/Time    Blood Culture - Blood, Arm, Right [[id number]] Collected:  12/04/19 2101    Lab Status:  Preliminary result Specimen:  Blood from Arm, Right Updated:  12/05/19 2240     Blood Culture No growth at 24 hours    Blood Culture - Blood, Arm, Left [[id number]] Collected:  12/04/19 2128    Lab Status:  Preliminary result Specimen:  Blood from Arm, Left Updated:  12/05/19 2240     Blood Culture No growth at 24 hours        Imaging Results (All)     Procedure Component Value Units Date/Time    XR Chest 1 View [[id number]] Collected:  12/04/19 1912     Updated:  12/04/19 2321     Narrative:          EXAMINATION: XR CHEST 1 VW - 12/04/2019     INDICATION: Shortness of air.     COMPARISON: 05/01/2018     FINDINGS: Heart is upper normal size. Vasculature appears normal. The  lungs appear well expanded and grossly clear. Slight coarsening of the  pulmonary interstitial markings is stable compared to the 05/01/2018  study. No pneumothorax, edema or effusion is seen.           Impression:       No evidence of active chest disease.     DICTATED:   12/04/2019  EDITED/ls :   12/04/2019      This report was finalized on 12/4/2019 11:17 PM by DR. Bruce Monsalve MD.       NM Lung Ventilation Perfusion [[id number]] Collected:  12/04/19 2111     Updated:  12/04/19 2113    Narrative:       VENTILATION PERFUSION LUNG SCAN, 12/4/2019    HISTORY:   74-year-old male with 2-3 day history of shortness of air, cough and congestion.    DOSE:   *  7.48 mCi inhaled xenon-133.  *  5.33 mCi technetium labeled MAA intravenously.    COMPARISON:   CT chest and chest x-ray, 12/4/2019.    FINDINGS:   Physiologic distribution of activity throughout both lungs on ventilation and perfusion images. No unmatched perfusion defect to suggest pulmonary embolism.      Impression:       Normal ventilation perfusion lung scan.    Signer Name: Mike Infante MD   Signed: 12/4/2019 9:11 PM   Workstation Name: [facility]-    [facility]    CT Chest Without Contrast [[id number]] Collected:  12/04/19 2039     Updated:  12/04/19 2041    Narrative:       CT CHEST, NONCONTRAST, 12/4/2019    HISTORY:  74-year-old male with 2-3 day history of shortness of air, cough and congestion.    TECHNIQUE:  CT imaging of the chest without IV contrast. Radiation dose reduction techniques included automated exposure control. Radiation audit for CT and nuclear cardiology exams in the last 12 months: 0.    FINDINGS:  Mild patchy airspace infiltrate is present in a bronchovascular distribution throughout the right lower lobe.  Minimal infiltrate in the medial left lower lobe.    Remaining portions of both lungs are clear. No dense airspace consolidation or pleural effusion.    No suspicious mass or adenopathy within the mediastinum or pulmonary iesha. Heart size is normal, and there is no pericardial effusion. Dense atheromatous coronary artery calcification. Lateral gynecomastia.    Limited upper abdominal images show lobulated hepatic contour suggesting potential chronic liver disease.      Impression:       1.  Patchy bronchovascular airspace infiltrate in the right lower lobe. Minimal left lower lobe infiltrate.  2.  No airspace consolidation or pleural effusion.  3.  Suspected chronic liver disease.    Signer Name: Mike Infante MD   Signed: 12/4/2019 8:39 PM   Workstation Name: [email or web address]-    [facility]        Results for orders placed in visit on 12/14/16   Adult Transthoracic Echo Complete    Narrative · All left ventricular wall segments contract normally.  · Left ventricular function is normal. Estimated EF = 50%.         Order Current Status    Blood Culture - Blood, Arm, Left Preliminary result    Blood Culture - Blood, Arm, Right Preliminary result        Discharge Details        Discharge Medications      New Medications      Instructions Start Date   albuterol sulfate  (90 Base) MCG/ACT inhaler  Commonly known as:  PROVENTIL HFA;VENTOLIN HFA;PROAIR HFA   2 puffs, Inhalation, Every 4 Hours PRN      cefuroxime 500 MG tablet  Commonly known as:  CEFTIN   500 mg, Oral, 2 Times Daily      doxycycline 100 MG enteric coated tablet  Commonly known as:  DORYX   100 mg, Oral, 2 Times Daily      predniSONE 10 MG tablet  Commonly known as:  DELTASONE   3 tabs by mouth evening of 12/6, then 4 tabs daily x 2 days, 3 tabs daily x 2 days, 2 tabs daily x 2 days, 1 tab daily x 2 days then stop         Changes to Medications      Instructions Start Date   baclofen 10 MG tablet  Commonly known as:   "LIORESAL  What changed:    · when to take this  · reasons to take this   10 mg, Oral, 3 Times Daily PRN         Continue These Medications      Instructions Start Date   amLODIPine 5 MG tablet  Commonly known as:  NORVASC   5 mg, Oral, 2 Times Daily      glimepiride 4 MG tablet  Commonly known as:  AMARYL   4 mg, Oral, Daily With Dinner      insulin  UNIT/ML injection  Commonly known as:  humuLIN N,novoLIN N   35 Units in the morning and 45 units in the evening.      Insulin Syringe 28G X 1/2\" 1 ML misc   1 each, Does not apply, 2 Times Daily      MULTI VITAMIN/MINERALS PO   1 tablet, Oral, Daily      PrilOSEC 20 MG capsule  Generic drug:  omeprazole   1 capsule, Oral, Daily      simvastatin 10 MG tablet  Commonly known as:  ZOCOR   TAKE 1 TABLET BY MOUTH AT BEDTIME      Vitamin B-12 ER 1000 MCG tablet controlled-release   1,000 mcg, Oral, Daily      Vitamin D 1000 units tablet   1 capsule, Oral, Daily         Stop These Medications    furosemide 20 MG tablet  Commonly known as:  LASIX     lisinopril 20 MG tablet  Commonly known as:  PRINIVIL,ZESTRIL     metFORMIN 1000 MG tablet  Commonly known as:  GLUCOPHAGE     potassium chloride 10 MEQ CR tablet  Commonly known as:  K-DUR          Allergies   Allergen Reactions   • Bactrim [Sulfamethoxazole-Trimethoprim] Other (See Comments)     Joint pain   • Sulfa Antibiotics GI Intolerance     Makes bones hurt       Discharge Disposition:  Home or Self Care    Diet:  Hospital:  Diet Order   Procedures   • Diet Regular; Cardiac, Consistent Carbohydrate     CODE STATUS:    Code Status and Medical Interventions:   Ordered at: 12/04/19 6994     Level Of Support Discussed With:    Patient     Code Status:    CPR     Medical Interventions (Level of Support Prior to Arrest):    Full     Future Appointments   Date Time Provider Department Center   1/7/2020 10:15 AM Krystina Gee MD [facility] None   9/28/2020 10:45 AM Krystina Gee MD [facility] None     Time Spent " on Discharge: 40 minutes    Electronically signed by Fabiola Velásquez PA-C, 12/06/19, 11:49 AM.

## 2019-12-07 ENCOUNTER — READMISSION MANAGEMENT (OUTPATIENT)
Dept: CALL CENTER | Facility: HOSPITAL | Age: 74
End: 2019-12-07

## 2019-12-07 NOTE — OUTREACH NOTE
Prep Survey      Responses   Facility patient discharged from?  [facility]   Is patient eligible?  Yes   Discharge diagnosis  Pneumonia of both lungs due to infectious organism, unspecified part of    Does the patient have one of the following disease processes/diagnoses(primary or secondary)?  COPD/Pneumonia   Does the patient have Home health ordered?  No   Is there a DME ordered?  No   Prep survey completed?  Yes          Darling Tse RN

## 2019-12-09 ENCOUNTER — READMISSION MANAGEMENT (OUTPATIENT)
Dept: CALL CENTER | Facility: HOSPITAL | Age: 74
End: 2019-12-09

## 2019-12-09 ENCOUNTER — TRANSITIONAL CARE MANAGEMENT TELEPHONE ENCOUNTER (OUTPATIENT)
Dept: INTERNAL MEDICINE | Facility: CLINIC | Age: 74
End: 2019-12-09

## 2019-12-09 LAB
BACTERIA SPEC AEROBE CULT: NORMAL
BACTERIA SPEC AEROBE CULT: NORMAL

## 2019-12-09 NOTE — OUTREACH NOTE
"TCM call completed.  See TCM flowsheet for details.  Does have upcoming hospital follow up appt with Dr. Gee 12/17/19.  States \"doing great.\"  Is up and about and states got all medicines and has had no issues. Discussed also the medicines that were stopped.   Eating and drinking and no issues with bowel or bladder. Denies fever, chills, SOB, lightheadedness/dizziness, rapid heart rate/palpitations, n/v, or chest pain. States breathing is good.  Says blood sugars high since home.  Running high 200 or low 300. Encouraged to log blood sugars and bring to hosp f/u visit.   Denies any needs at present and appreciated the call.       "

## 2019-12-09 NOTE — OUTREACH NOTE
COPD/PN Week 1 Survey      Responses   Facility patient discharged from?  [place]   Does the patient have one of the following disease processes/diagnoses(primary or secondary)?  COPD/Pneumonia   Is there a successful TCM telephone encounter documented?  No   Was the primary reason for admission:  Pneumonia   Week 1 attempt successful?  Yes   Call start time  0903   Call end time  0906   Discharge diagnosis  Pneumonia of both lungs due to infectious organism, unspecified part of    Meds reviewed with patient/caregiver?  Yes   Is the patient having any side effects they believe may be caused by any medication additions or changes?  No   Does the patient have all medications ordered at discharge?  Yes   Is the patient taking all medications as directed (includes completed medication regime)?  Yes   Does the patient have a primary care provider?   Yes   Does the patient have an appointment with their PCP or pulmonologist within 7 days of discharge?  Yes   Has the patient kept scheduled appointments due by today?  Yes   Psychosocial issues?  No   Did the patient receive a copy of their discharge instructions?  Yes   Nursing interventions  Reviewed instructions with patient   What is the patient's perception of their health status since discharge?  Same   Nursing Interventions  Nurse provided patient education   Are the patient's immunizations up to date?   No   Nursing interventions  Educated on importance of maintaining up to date immunizations as advised by provider   Is the patient/caregiver able to teach back the hierarchy of who to call/visit for symptoms/problems? PCP, Specialist, Home health nurse, Urgent Care, ED, 911  Yes   Is the patient/caregiver able to teach back signs and symptoms of worsening condition:  Fever/chills, Shortness of breath, Chest pain   Is the patient/caregiver able to teach back importance of completing antibiotic course of treatment?  Yes   Week 1 call completed?  Yes   Wrap up  "additional comments  pt stated he is doing \"Great\" .           Colleen Burns RN  "

## 2019-12-17 ENCOUNTER — READMISSION MANAGEMENT (OUTPATIENT)
Dept: CALL CENTER | Facility: HOSPITAL | Age: 74
End: 2019-12-17

## 2019-12-17 ENCOUNTER — OFFICE VISIT (OUTPATIENT)
Dept: INTERNAL MEDICINE | Facility: CLINIC | Age: 74
End: 2019-12-17

## 2019-12-17 VITALS
BODY MASS INDEX: 31.41 KG/M2 | HEART RATE: 68 BPM | HEIGHT: 70 IN | SYSTOLIC BLOOD PRESSURE: 176 MMHG | WEIGHT: 219.4 LBS | OXYGEN SATURATION: 99 % | DIASTOLIC BLOOD PRESSURE: 80 MMHG

## 2019-12-17 DIAGNOSIS — I50.23 ACUTE ON CHRONIC SYSTOLIC CONGESTIVE HEART FAILURE (HCC): ICD-10-CM

## 2019-12-17 DIAGNOSIS — Z79.4 TYPE 2 DIABETES MELLITUS WITH HYPERGLYCEMIA, WITH LONG-TERM CURRENT USE OF INSULIN (HCC): ICD-10-CM

## 2019-12-17 DIAGNOSIS — E11.65 TYPE 2 DIABETES MELLITUS WITH HYPERGLYCEMIA, WITH LONG-TERM CURRENT USE OF INSULIN (HCC): ICD-10-CM

## 2019-12-17 DIAGNOSIS — J18.9 PNEUMONIA OF BOTH LUNGS DUE TO INFECTIOUS ORGANISM, UNSPECIFIED PART OF LUNG: ICD-10-CM

## 2019-12-17 DIAGNOSIS — I10 ESSENTIAL HYPERTENSION: ICD-10-CM

## 2019-12-17 DIAGNOSIS — N17.9 AKI (ACUTE KIDNEY INJURY) (HCC): Primary | ICD-10-CM

## 2019-12-17 LAB
ANION GAP SERPL CALCULATED.3IONS-SCNC: 11.4 MMOL/L (ref 5–15)
BUN BLD-MCNC: 19 MG/DL (ref 8–23)
BUN/CREAT SERPL: 11.7 (ref 7–25)
CALCIUM SPEC-SCNC: 9.4 MG/DL (ref 8.6–10.5)
CHLORIDE SERPL-SCNC: 96 MMOL/L (ref 98–107)
CO2 SERPL-SCNC: 28.6 MMOL/L (ref 22–29)
CREAT BLD-MCNC: 1.63 MG/DL (ref 0.76–1.27)
DEPRECATED RDW RBC AUTO: 44.1 FL (ref 37–54)
ERYTHROCYTE [DISTWIDTH] IN BLOOD BY AUTOMATED COUNT: 14 % (ref 12.3–15.4)
GFR SERPL CREATININE-BSD FRML MDRD: 41 ML/MIN/1.73
GLUCOSE BLD-MCNC: 66 MG/DL (ref 65–99)
HCT VFR BLD AUTO: 42.1 % (ref 37.5–51)
HGB BLD-MCNC: 14 G/DL (ref 13–17.7)
MCH RBC QN AUTO: 28.9 PG (ref 26.6–33)
MCHC RBC AUTO-ENTMCNC: 33.3 G/DL (ref 31.5–35.7)
MCV RBC AUTO: 86.8 FL (ref 79–97)
PLATELET # BLD AUTO: 170 10*3/MM3 (ref 140–450)
PMV BLD AUTO: 12.5 FL (ref 6–12)
POTASSIUM BLD-SCNC: 4.3 MMOL/L (ref 3.5–5.2)
RBC # BLD AUTO: 4.85 10*6/MM3 (ref 4.14–5.8)
SODIUM BLD-SCNC: 136 MMOL/L (ref 136–145)
WBC NRBC COR # BLD: 15.57 10*3/MM3 (ref 3.4–10.8)

## 2019-12-17 PROCEDURE — 85027 COMPLETE CBC AUTOMATED: CPT | Performed by: INTERNAL MEDICINE

## 2019-12-17 PROCEDURE — 80048 BASIC METABOLIC PNL TOTAL CA: CPT | Performed by: INTERNAL MEDICINE

## 2019-12-17 PROCEDURE — 99495 TRANSJ CARE MGMT MOD F2F 14D: CPT | Performed by: INTERNAL MEDICINE

## 2019-12-17 NOTE — PROGRESS NOTES
Transitional Care Follow Up Visit  Subjective     Gabriela Mar is a 74 y.o. male who presents for a transitional care management visit.    Within 48 business hours after discharge our office contacted him via telephone to coordinate his care and needs.      I reviewed and discussed the details of that call along with the discharge summary, hospital problems, inpatient lab results, inpatient diagnostic studies, and consultation reports with Gabriela.     Current outpatient and discharge medications have been reconciled for the patient.  Reviewed by: Krystina Gee MD      Date of TCM Phone Call 3/10/2017 12/9/2019   [facility]   Date of Admission 3/7/2017 12/4/2019   Date of Discharge 3/9/2017 12/6/2019   Discharge Disposition Home or Self Care Home or Self Care     Risk for Readmission (LACE) Score: 10 (12/6/2019  6:00 AM)      History of Present Illness   Sugars have been running high on the steroid taper, just got off 3 days ago.  Sugars this am was 200, and  163 around noon.  Has not eaten anything yet.   Breathing better.  Also called for Jury duty and has to call in for the same..   Brother has recurrent colon cancer.        Course During Hospital Stay:  presented to [facility] ED on 12/4/2019 after he was found to have an oxygen saturation of 82% on room air at his PCPs office.  Patient had presented to PCP for evaluation of rhinorrhea, sinus congestion, shortness of breath, chills and low-grade fever x4 days.     ED evaluation significant for troponin 0 0.33, creatinine 1.80 (baseline 1.0-1.2), WBCs 8.05 and 50% neutrophils. CXR showed no acute process.  A CT chest was obtained and showed a patchy bronchovascular airspace infiltrate in the right lower lobe as well as minimal left lower lobe infiltrate.  He was admitted to the hospital medicine service for bibasilar pneumonia and acute kidney injury.  He was started on IV doxycycline/Rocephin,  steroids and nebulizer treatments.  He was given IV fluids for ZOHRA.  He was placed on supplemental oxygen and this was weaned throughout his hospitalization.     Mr. kenyon has improved rapidly.  On day of discharge his creatinine is 1.52.  Recommend a repeat BMP in 5 to 7 days through PCPs office.  White blood cell count has increased to 14,000 however, this is felt to be steroid-induced and should be followed up in the next 5 to 7 days with PCP.     We will discharge home on 12/6/2019 in stable condition on oral Ceftin/doxycycline to complete a total 7 days of therapy. He will also discharge on PO steroid taper.      At discharge, I encouraged the patient to hold metformin, lisinopril and Lasix until follow-up with PCP to ensure that renal function had returned to normal prior to resuming nephrotoxic medications.     Hgb A1c 9.7% - will need to discuss this with PCP. Sugars were elevated throughout hospitalization, secondary to steroid therapy.         The following portions of the patient's history were reviewed and updated as appropriate: allergies, current medications, past family history, past medical history, past social history, past surgical history and problem list.    Review of Systems   Constitutional: Negative.  Negative for chills and fever.   HENT: Negative for ear discharge, ear pain, sinus pressure and sore throat.    Respiratory: Positive for cough and shortness of breath. Negative for chest tightness.    Cardiovascular: Negative for chest pain, palpitations and leg swelling.   Gastrointestinal: Negative for diarrhea, nausea and vomiting.   Musculoskeletal: Negative for arthralgias, back pain and myalgias.   Neurological: Negative for dizziness, syncope and headaches.   Psychiatric/Behavioral: Negative for confusion and sleep disturbance.       Objective   Physical Exam   Constitutional: He is oriented to person, place, and time. He appears well-developed and well-nourished.   HENT:   Head:  "Normocephalic and atraumatic.   Mouth/Throat: No oropharyngeal exudate.   Eyes: Pupils are equal, round, and reactive to light. Conjunctivae are normal.   Neck: Neck supple. No thyromegaly present.   Cardiovascular: Normal rate and regular rhythm.   Pulmonary/Chest: Effort normal and breath sounds normal.   Abdominal: Soft. Bowel sounds are normal. He exhibits no distension. There is no tenderness.   Musculoskeletal: He exhibits no edema.   Neurological: He is alert and oriented to person, place, and time. No cranial nerve deficit.   Skin: Skin is warm and dry.   Psychiatric: He has a normal mood and affect. Judgment normal.   Nursing note and vitals reviewed.          Current Outpatient Medications:   •  albuterol sulfate  (90 Base) MCG/ACT inhaler, Inhale 2 puffs Every 4 (Four) Hours As Needed for Wheezing or Shortness of Air., Disp: 1 inhaler, Rfl: 0  •  amLODIPine (NORVASC) 5 MG tablet, Take 1 tablet by mouth 2 (Two) Times a Day., Disp: 180 tablet, Rfl: 1  •  baclofen (LIORESAL) 10 MG tablet, Take 1 tablet by mouth 3 (Three) Times a Day As Needed for Muscle Spasms., Disp: 60 tablet, Rfl: 0  •  Cholecalciferol (VITAMIN D) 1000 UNITS tablet, Take 1 capsule by mouth Daily., Disp: , Rfl:   •  Cyanocobalamin (VITAMIN B-12 ER) 1000 MCG tablet controlled-release, Take 1,000 mcg by mouth Daily., Disp: , Rfl:   •  glimepiride (AMARYL) 4 MG tablet, Take 1 tablet by mouth Daily With Dinner., Disp: 90 tablet, Rfl: 1  •  insulin NPH (humuLIN N,novoLIN N) 100 UNIT/ML injection, 35 Units in the morning and 45 units in the evening., Disp: 30 mL, Rfl: 5  •  Insulin Syringe 28G X 1/2\" 1 ML misc, 1 each 2 (Two) Times a Day., Disp: 100 each, Rfl: 5  •  Multiple Vitamins-Minerals (MULTI VITAMIN/MINERALS PO), Take 1 tablet by mouth Daily., Disp: , Rfl:   •  omeprazole (PRILOSEC) 20 MG capsule, Take 1 capsule by mouth Daily., Disp: , Rfl:   •  simvastatin (ZOCOR) 10 MG tablet, TAKE 1 TABLET BY MOUTH AT BEDTIME, Disp: 90 tablet, " "Rfl: 1      /80   Pulse 68   Ht 177.8 cm (70\")   Wt 99.5 kg (219 lb 6.4 oz)   SpO2 99% Comment: ra  BMI 31.48 kg/m²       Results for orders placed or performed during the hospital encounter of 12/04/19   Blood Culture - Blood, Arm, Right   Result Value Ref Range    Blood Culture No growth at 5 days    Blood Culture - Blood, Arm, Left   Result Value Ref Range    Blood Culture No growth at 5 days    Comprehensive Metabolic Panel   Result Value Ref Range    Glucose 162 (H) 65 - 99 mg/dL    BUN 33 (H) 8 - 23 mg/dL    Creatinine 1.80 (H) 0.76 - 1.27 mg/dL    Sodium 136 136 - 145 mmol/L    Potassium 4.4 3.5 - 5.2 mmol/L    Chloride 104 98 - 107 mmol/L    CO2 22.0 22.0 - 29.0 mmol/L    Calcium 8.6 8.6 - 10.5 mg/dL    Total Protein 7.3 6.0 - 8.5 g/dL    Albumin 3.90 3.50 - 5.20 g/dL    ALT (SGPT) 39 1 - 41 U/L    AST (SGOT) 39 1 - 40 U/L    Alkaline Phosphatase 77 39 - 117 U/L    Total Bilirubin 0.3 0.2 - 1.2 mg/dL    eGFR Non African Amer 37 (L) >60 mL/min/1.73    Globulin 3.4 gm/dL    A/G Ratio 1.1 g/dL    BUN/Creatinine Ratio 18.3 7.0 - 25.0    Anion Gap 10.0 5.0 - 15.0 mmol/L   BNP   Result Value Ref Range    proBNP 61.9 5.0-1,800.0 pg/mL   Troponin   Result Value Ref Range    Troponin T 0.033 (C) 0.000 - 0.030 ng/mL   CBC Auto Differential   Result Value Ref Range    WBC 8.05 3.40 - 10.80 10*3/mm3    RBC 4.24 4.14 - 5.80 10*6/mm3    Hemoglobin 11.9 (L) 13.0 - 17.7 g/dL    Hematocrit 39.2 37.5 - 51.0 %    MCV 92.5 79.0 - 97.0 fL    MCH 28.1 26.6 - 33.0 pg    MCHC 30.4 (L) 31.5 - 35.7 g/dL    RDW 14.2 12.3 - 15.4 %    RDW-SD 47.8 37.0 - 54.0 fl    MPV 11.0 6.0 - 12.0 fL    Platelets 148 140 - 450 10*3/mm3    Neutrophil % 50.8 42.7 - 76.0 %    Lymphocyte % 34.8 19.6 - 45.3 %    Monocyte % 12.0 5.0 - 12.0 %    Eosinophil % 1.6 0.3 - 6.2 %    Basophil % 0.6 0.0 - 1.5 %    Immature Grans % 0.2 0.0 - 0.5 %    Neutrophils, Absolute 4.08 1.70 - 7.00 10*3/mm3    Lymphocytes, Absolute 2.80 0.70 - 3.10 10*3/mm3    " Monocytes, Absolute 0.97 (H) 0.10 - 0.90 10*3/mm3    Eosinophils, Absolute 0.13 0.00 - 0.40 10*3/mm3    Basophils, Absolute 0.05 0.00 - 0.20 10*3/mm3    Immature Grans, Absolute 0.02 0.00 - 0.05 10*3/mm3    nRBC 0.0 0.0 - 0.2 /100 WBC   Troponin   Result Value Ref Range    Troponin T 0.040 (C) 0.000 - 0.030 ng/mL   Lactic Acid, Plasma   Result Value Ref Range    Lactate 2.5 (C) 0.5 - 2.0 mmol/L   Lactic Acid, Reflex Timer (This will reflex a repeat order 3-3:15 hours after ordered.)   Result Value Ref Range    Extra Tube Hold for add-ons.    CBC Auto Differential   Result Value Ref Range    WBC 5.10 3.40 - 10.80 10*3/mm3    RBC 3.91 (L) 4.14 - 5.80 10*6/mm3    Hemoglobin 11.1 (L) 13.0 - 17.7 g/dL    Hematocrit 35.7 (L) 37.5 - 51.0 %    MCV 91.3 79.0 - 97.0 fL    MCH 28.4 26.6 - 33.0 pg    MCHC 31.1 (L) 31.5 - 35.7 g/dL    RDW 14.1 12.3 - 15.4 %    RDW-SD 47.6 37.0 - 54.0 fl    MPV 11.3 6.0 - 12.0 fL    Platelets 127 (L) 140 - 450 10*3/mm3    Neutrophil % 80.7 (H) 42.7 - 76.0 %    Lymphocyte % 17.3 (L) 19.6 - 45.3 %    Monocyte % 1.4 (L) 5.0 - 12.0 %    Eosinophil % 0.0 (L) 0.3 - 6.2 %    Basophil % 0.2 0.0 - 1.5 %    Immature Grans % 0.4 0.0 - 0.5 %    Neutrophils, Absolute 4.12 1.70 - 7.00 10*3/mm3    Lymphocytes, Absolute 0.88 0.70 - 3.10 10*3/mm3    Monocytes, Absolute 0.07 (L) 0.10 - 0.90 10*3/mm3    Eosinophils, Absolute 0.00 0.00 - 0.40 10*3/mm3    Basophils, Absolute 0.01 0.00 - 0.20 10*3/mm3    Immature Grans, Absolute 0.02 0.00 - 0.05 10*3/mm3    nRBC 0.0 0.0 - 0.2 /100 WBC   Comprehensive Metabolic Panel   Result Value Ref Range    Glucose 459 (C) 65 - 99 mg/dL    BUN 37 (H) 8 - 23 mg/dL    Creatinine 2.11 (H) 0.76 - 1.27 mg/dL    Sodium 133 (L) 136 - 145 mmol/L    Potassium 6.0 (H) 3.5 - 5.2 mmol/L    Chloride 99 98 - 107 mmol/L    CO2 20.0 (L) 22.0 - 29.0 mmol/L    Calcium 8.2 (L) 8.6 - 10.5 mg/dL    Total Protein 7.1 6.0 - 8.5 g/dL    Albumin 3.70 3.50 - 5.20 g/dL    ALT (SGPT) 34 1 - 41 U/L    AST  (SGOT) 31 1 - 40 U/L    Alkaline Phosphatase 72 39 - 117 U/L    Total Bilirubin 0.2 0.2 - 1.2 mg/dL    eGFR Non African Amer 31 (L) >60 mL/min/1.73    Globulin 3.4 gm/dL    A/G Ratio 1.1 g/dL    BUN/Creatinine Ratio 17.5 7.0 - 25.0    Anion Gap 14.0 5.0 - 15.0 mmol/L   Hemoglobin A1c   Result Value Ref Range    Hemoglobin A1C 9.20 (H) 4.80 - 5.60 %   Lactic Acid, Reflex   Result Value Ref Range    Lactate 2.8 (C) 0.5 - 2.0 mmol/L   Blood Gas, Arterial With Co-Ox   Result Value Ref Range    Site Right Radial     Sunny's Test N/A     pH, Arterial 7.329 (L) 7.350 - 7.450 pH units    pCO2, Arterial 40.0 mm Hg    pO2, Arterial 91.3 83.0 - 108.0 mm Hg    HCO3, Arterial 21.0 20.0 - 26.0 mmol/L    Base Excess, Arterial -4.6 (L) 0.0 - 2.0 mmol/L    Hemoglobin, Blood Gas 11.8 (L) 13.5 - 17.5 g/dL    Hematocrit, Blood Gas 36.2 %    Oxyhemoglobin 94.3 94 - 99 %    Methemoglobin 1.70 (H) 0.00 - 1.50 %    Carboxyhemoglobin 0.1 0 - 2 %    CO2 Content 22.3 22 - 33 mmol/L    Temperature 37.0 C    Barometric Pressure for Blood Gas      Modality Nasal Cannula     FIO2 28 %    Ventilator Mode        Note      pH, Temp Corrected 7.329 pH Units    pCO2, Temperature Corrected 40.0 35 - 48 mm Hg    pO2, Temperature Corrected 91.3 83 - 108 mm Hg   Troponin   Result Value Ref Range    Troponin T 0.023 0.000 - 0.030 ng/mL   Troponin   Result Value Ref Range    Troponin T 0.020 0.000 - 0.030 ng/mL   Troponin   Result Value Ref Range    Troponin T <0.010 0.000 - 0.030 ng/mL   Basic Metabolic Panel   Result Value Ref Range    Glucose 450 (C) 65 - 99 mg/dL    BUN 40 (H) 8 - 23 mg/dL    Creatinine 1.70 (H) 0.76 - 1.27 mg/dL    Sodium 136 136 - 145 mmol/L    Potassium 5.6 (H) 3.5 - 5.2 mmol/L    Chloride 102 98 - 107 mmol/L    CO2 21.0 (L) 22.0 - 29.0 mmol/L    Calcium 8.5 (L) 8.6 - 10.5 mg/dL    eGFR Non African Amer 39 (L) >60 mL/min/1.73    BUN/Creatinine Ratio 23.5 7.0 - 25.0    Anion Gap 13.0 5.0 - 15.0 mmol/L   Basic Metabolic Panel   Result  Value Ref Range    Glucose 306 (H) 65 - 99 mg/dL    BUN 39 (H) 8 - 23 mg/dL    Creatinine 1.52 (H) 0.76 - 1.27 mg/dL    Sodium 135 (L) 136 - 145 mmol/L    Potassium 4.6 3.5 - 5.2 mmol/L    Chloride 101 98 - 107 mmol/L    CO2 21.0 (L) 22.0 - 29.0 mmol/L    Calcium 9.0 8.6 - 10.5 mg/dL    eGFR Non African Amer 45 (L) >60 mL/min/1.73    BUN/Creatinine Ratio 25.7 (H) 7.0 - 25.0    Anion Gap 13.0 5.0 - 15.0 mmol/L   CBC (No Diff)   Result Value Ref Range    WBC 14.85 (H) 3.40 - 10.80 10*3/mm3    RBC 4.18 4.14 - 5.80 10*6/mm3    Hemoglobin 11.8 (L) 13.0 - 17.7 g/dL    Hematocrit 37.6 37.5 - 51.0 %    MCV 90.0 79.0 - 97.0 fL    MCH 28.2 26.6 - 33.0 pg    MCHC 31.4 (L) 31.5 - 35.7 g/dL    RDW 14.3 12.3 - 15.4 %    RDW-SD 47.1 37.0 - 54.0 fl    MPV 11.6 6.0 - 12.0 fL    Platelets 169 140 - 450 10*3/mm3   POC Glucose Once   Result Value Ref Range    Glucose 250 (H) 70 - 130 mg/dL   POC Glucose Once   Result Value Ref Range    Glucose 459 (C) 70 - 130 mg/dL   POC Glucose Once   Result Value Ref Range    Glucose 372 (H) 70 - 130 mg/dL   POC Glucose Once   Result Value Ref Range    Glucose 375 (H) 70 - 130 mg/dL   POC Glucose Once   Result Value Ref Range    Glucose 456 (C) 70 - 130 mg/dL   POC Glucose Once   Result Value Ref Range    Glucose 463 (C) 70 - 130 mg/dL   POC Glucose Once   Result Value Ref Range    Glucose 497 (C) 70 - 130 mg/dL   POC Glucose Once   Result Value Ref Range    Glucose 421 (C) 70 - 130 mg/dL   POC Glucose Once   Result Value Ref Range    Glucose 378 (H) 70 - 130 mg/dL   POC Glucose Once   Result Value Ref Range    Glucose 319 (H) 70 - 130 mg/dL   POC Glucose Once   Result Value Ref Range    Glucose 274 (H) 70 - 130 mg/dL   Light Blue Top   Result Value Ref Range    Extra Tube hold for add-on    Green Top (Gel)   Result Value Ref Range    Extra Tube Hold for add-ons.    Lavender Top   Result Value Ref Range    Extra Tube hold for add-on    Gold Top - SST   Result Value Ref Range    Extra Tube Hold for  add-ons.              Assessment/Plan   Diagnoses and all orders for this visit:    ZOHRA (acute kidney injury) (CMS/[facility])  Comments:  check labs  Orders:  -     Basic Metabolic Panel    Pneumonia of both lungs due to infectious organism, unspecified part of lung  -     CBC (No Diff)    Essential hypertension  Comments:  off lisinopril, restart once cr better.  Orders:  -     Basic Metabolic Panel    Acute on chronic systolic congestive heart failure (CMS/[facility])  Comments:  off lasix, reinstate if needed  Orders:  -     Basic Metabolic Panel    Type 2 diabetes mellitus with hyperglycemia, with long-term current use of insulin (CMS/[facility])  Comments:  continue 50 units bid and cut back if sugars start trending down, < 180 am and < 200 om.            Current outpatient and discharge medications have been reconciled for the patient.  Reviewed by: Krystina Gee MD      Return for Next scheduled follow up.

## 2019-12-20 DIAGNOSIS — E11.65 TYPE 2 DIABETES MELLITUS WITH HYPERGLYCEMIA, WITHOUT LONG-TERM CURRENT USE OF INSULIN (HCC): ICD-10-CM

## 2019-12-20 RX ORDER — GLIMEPIRIDE 4 MG/1
TABLET ORAL
Qty: 90 TABLET | Refills: 0 | Status: SHIPPED | OUTPATIENT
Start: 2019-12-20 | End: 2020-03-16 | Stop reason: SDUPTHER

## 2019-12-21 ENCOUNTER — TELEPHONE (OUTPATIENT)
Dept: INTERNAL MEDICINE | Facility: CLINIC | Age: 74
End: 2019-12-21

## 2019-12-21 NOTE — TELEPHONE ENCOUNTER
----- Message from Gabriela Mar sent at 12/18/2019  6:00 PM EST -----  Regarding: Non-Urgent Medical Question  Contact: [phone]  Attn:Dr. Gee--Requests to be excused from jury duty are to be faxed to [phone]--my jury id # is [id number]-this is the only way they can identify my name--Thank You

## 2019-12-26 ENCOUNTER — READMISSION MANAGEMENT (OUTPATIENT)
Dept: CALL CENTER | Facility: HOSPITAL | Age: 74
End: 2019-12-26

## 2019-12-26 NOTE — OUTREACH NOTE
COPD/PN Week 3 Survey      Responses   Facility patient discharged from?  [facility]   Does the patient have one of the following disease processes/diagnoses(primary or secondary)?  COPD/Pneumonia   Was the primary reason for admission:  Pneumonia   Week 3 attempt successful?  Yes   Call start time  1418   Call end time  1424   Discharge diagnosis  Pneumonia of both lungs due to infectious organism, unspecified part of    Is patient permission given to speak with other caregiver?  No   Meds reviewed with patient/caregiver?  Yes   Is the patient having any side effects they believe may be caused by any medication additions or changes?  No   Does the patient have all medications ordered at discharge?  Yes   Is the patient taking all medications as directed (includes completed medication regime)?  Yes   Does the patient have a primary care provider?   Yes   Does the patient have an appointment with their PCP or pulmonologist within 7 days of discharge?  Yes   Comments regarding PCP  Dr. Gee   Has the patient kept scheduled appointments due by today?  Yes   Did the patient receive a copy of their discharge instructions?  Yes   Nursing interventions  Reviewed instructions with patient   What is the patient's perception of their health status since discharge?  Improving   Nursing Interventions  Nurse provided patient education   Are the patient's immunizations up to date?   Yes [no to the flu vaccine. ]   Nursing interventions  Educated on importance of maintaining up to date immunizations as advised by provider   Is the patient/caregiver able to teach back the hierarchy of who to call/visit for symptoms/problems? PCP, Specialist, Home health nurse, Urgent Care, ED, 911  Yes   Is the patient/caregiver able to teach back signs and symptoms of worsening condition:  Fever/chills, Shortness of breath, Chest pain   Is the patient/caregiver able to teach back importance of completing antibiotic course of treatment?  Yes    Week 3 call completed?  Yes   Revoked  No further contact(revokes)-requires comment [He states he is doing very well. ]          Arlet Lu RN

## 2019-12-27 ENCOUNTER — EPISODE CHANGES (OUTPATIENT)
Dept: CASE MANAGEMENT | Facility: OTHER | Age: 74
End: 2019-12-27

## 2020-01-07 ENCOUNTER — OFFICE VISIT (OUTPATIENT)
Dept: INTERNAL MEDICINE | Facility: CLINIC | Age: 75
End: 2020-01-07

## 2020-01-07 ENCOUNTER — APPOINTMENT (OUTPATIENT)
Dept: LAB | Facility: HOSPITAL | Age: 75
End: 2020-01-07

## 2020-01-07 VITALS
OXYGEN SATURATION: 99 % | SYSTOLIC BLOOD PRESSURE: 158 MMHG | HEART RATE: 89 BPM | HEIGHT: 70 IN | DIASTOLIC BLOOD PRESSURE: 62 MMHG | WEIGHT: 232 LBS | BODY MASS INDEX: 33.21 KG/M2

## 2020-01-07 DIAGNOSIS — E11.65 TYPE 2 DIABETES MELLITUS WITH HYPERGLYCEMIA, WITH LONG-TERM CURRENT USE OF INSULIN (HCC): ICD-10-CM

## 2020-01-07 DIAGNOSIS — I10 ESSENTIAL HYPERTENSION: ICD-10-CM

## 2020-01-07 DIAGNOSIS — I10 ESSENTIAL HYPERTENSION: Primary | ICD-10-CM

## 2020-01-07 DIAGNOSIS — Z79.4 TYPE 2 DIABETES MELLITUS WITH HYPERGLYCEMIA, WITH LONG-TERM CURRENT USE OF INSULIN (HCC): ICD-10-CM

## 2020-01-07 DIAGNOSIS — J18.9 PNEUMONIA OF BOTH LUNGS DUE TO INFECTIOUS ORGANISM, UNSPECIFIED PART OF LUNG: ICD-10-CM

## 2020-01-07 DIAGNOSIS — N17.9 AKI (ACUTE KIDNEY INJURY) (HCC): ICD-10-CM

## 2020-01-07 LAB
ANION GAP SERPL CALCULATED.3IONS-SCNC: 12.6 MMOL/L (ref 5–15)
BASOPHILS # BLD AUTO: 0.05 10*3/MM3 (ref 0–0.2)
BASOPHILS NFR BLD AUTO: 0.7 % (ref 0–1.5)
BUN BLD-MCNC: 15 MG/DL (ref 8–23)
BUN/CREAT SERPL: 11.5 (ref 7–25)
CALCIUM SPEC-SCNC: 9.5 MG/DL (ref 8.6–10.5)
CHLORIDE SERPL-SCNC: 102 MMOL/L (ref 98–107)
CO2 SERPL-SCNC: 25.4 MMOL/L (ref 22–29)
CREAT BLD-MCNC: 1.31 MG/DL (ref 0.76–1.27)
DEPRECATED RDW RBC AUTO: 45.1 FL (ref 37–54)
EOSINOPHIL # BLD AUTO: 0.12 10*3/MM3 (ref 0–0.4)
EOSINOPHIL NFR BLD AUTO: 1.7 % (ref 0.3–6.2)
ERYTHROCYTE [DISTWIDTH] IN BLOOD BY AUTOMATED COUNT: 14 % (ref 12.3–15.4)
GFR SERPL CREATININE-BSD FRML MDRD: 53 ML/MIN/1.73
GLUCOSE BLD-MCNC: 64 MG/DL (ref 65–99)
HCT VFR BLD AUTO: 36.1 % (ref 37.5–51)
HGB BLD-MCNC: 11.7 G/DL (ref 13–17.7)
IMM GRANULOCYTES # BLD AUTO: 0.04 10*3/MM3 (ref 0–0.05)
IMM GRANULOCYTES NFR BLD AUTO: 0.6 % (ref 0–0.5)
LYMPHOCYTES # BLD AUTO: 2.37 10*3/MM3 (ref 0.7–3.1)
LYMPHOCYTES NFR BLD AUTO: 33.4 % (ref 19.6–45.3)
MCH RBC QN AUTO: 28.8 PG (ref 26.6–33)
MCHC RBC AUTO-ENTMCNC: 32.4 G/DL (ref 31.5–35.7)
MCV RBC AUTO: 88.9 FL (ref 79–97)
MONOCYTES # BLD AUTO: 0.72 10*3/MM3 (ref 0.1–0.9)
MONOCYTES NFR BLD AUTO: 10.1 % (ref 5–12)
NEUTROPHILS # BLD AUTO: 3.8 10*3/MM3 (ref 1.7–7)
NEUTROPHILS NFR BLD AUTO: 53.5 % (ref 42.7–76)
NRBC BLD AUTO-RTO: 0 /100 WBC (ref 0–0.2)
PLATELET # BLD AUTO: 147 10*3/MM3 (ref 140–450)
PMV BLD AUTO: 11.8 FL (ref 6–12)
POTASSIUM BLD-SCNC: 4.5 MMOL/L (ref 3.5–5.2)
RBC # BLD AUTO: 4.06 10*6/MM3 (ref 4.14–5.8)
SODIUM BLD-SCNC: 140 MMOL/L (ref 136–145)
WBC NRBC COR # BLD: 7.1 10*3/MM3 (ref 3.4–10.8)

## 2020-01-07 PROCEDURE — 85025 COMPLETE CBC W/AUTO DIFF WBC: CPT | Performed by: INTERNAL MEDICINE

## 2020-01-07 PROCEDURE — 99214 OFFICE O/P EST MOD 30 MIN: CPT | Performed by: INTERNAL MEDICINE

## 2020-01-07 PROCEDURE — 80048 BASIC METABOLIC PNL TOTAL CA: CPT | Performed by: INTERNAL MEDICINE

## 2020-01-07 RX ORDER — LISINOPRIL 20 MG/1
20 TABLET ORAL 2 TIMES DAILY
Qty: 180 TABLET | Refills: 1 | Status: SHIPPED | OUTPATIENT
Start: 2020-01-07 | End: 2020-01-20 | Stop reason: SDUPTHER

## 2020-01-07 RX ORDER — AMLODIPINE BESYLATE 10 MG/1
5 TABLET ORAL 2 TIMES DAILY
Qty: 90 TABLET | Refills: 1 | Status: SHIPPED | OUTPATIENT
Start: 2020-01-07 | End: 2020-04-15 | Stop reason: SDUPTHER

## 2020-01-07 NOTE — PROGRESS NOTES
"Diabetes (last A1c 1 month ago 9.2)    Subjective   Gabriela Mar is a 74 y.o. male is here today for follow-up.    History of Present Illness   Daughter and his ex wife, caught the flu. He has been doing okay though.  Was holding metformin, lisinopril, potassium and lasix when he got out of the hospital. After labs, he went back on metformin and lisinopril, not on lasix.  Brother passed away Saturday.   Sugars was 68 this am, and drank coffee and ate a cookie- and took 35 units.    Current Outpatient Medications:   •  albuterol sulfate  (90 Base) MCG/ACT inhaler, Inhale 2 puffs Every 4 (Four) Hours As Needed for Wheezing or Shortness of Air., Disp: 1 inhaler, Rfl: 0  •  amLODIPine (NORVASC) 10 MG tablet, Take 0.5 tablets by mouth 2 (Two) Times a Day., Disp: 90 tablet, Rfl: 1  •  baclofen (LIORESAL) 10 MG tablet, Take 1 tablet by mouth 3 (Three) Times a Day As Needed for Muscle Spasms., Disp: 60 tablet, Rfl: 0  •  Cholecalciferol (VITAMIN D) 1000 UNITS tablet, Take 1 capsule by mouth Daily., Disp: , Rfl:   •  Cyanocobalamin (VITAMIN B-12 ER) 1000 MCG tablet controlled-release, Take 1,000 mcg by mouth Daily., Disp: , Rfl:   •  glimepiride (AMARYL) 4 MG tablet, TAKE 1 TABLET BY MOUTH ONCE DAILY WITH  DINNER, Disp: 90 tablet, Rfl: 0  •  insulin NPH (humuLIN N,novoLIN N) 100 UNIT/ML injection, 35 Units in the morning and 45 units in the evening., Disp: 30 mL, Rfl: 5  •  Insulin Syringe 28G X 1/2\" 1 ML misc, 1 each 2 (Two) Times a Day., Disp: 100 each, Rfl: 5  •  Multiple Vitamins-Minerals (MULTI VITAMIN/MINERALS PO), Take 1 tablet by mouth Daily., Disp: , Rfl:   •  omeprazole (PRILOSEC) 20 MG capsule, Take 1 capsule by mouth Daily., Disp: , Rfl:   •  simvastatin (ZOCOR) 10 MG tablet, TAKE 1 TABLET BY MOUTH AT BEDTIME, Disp: 90 tablet, Rfl: 1  •  lisinopril (PRINIVIL,ZESTRIL) 20 MG tablet, Take 1 tablet by mouth 2 (Two) Times a Day., Disp: 180 tablet, Rfl: 1  •  metFORMIN (GLUCOPHAGE) 1000 MG tablet, Take 1.5 PO " "hs and 1 PO am, Disp: 75 tablet, Rfl: 5      The following portions of the patient's history were reviewed and updated as appropriate: allergies, current medications, past family history, past medical history, past social history, past surgical history and problem list.    Review of Systems   Constitutional: Negative.  Negative for chills and fever.   HENT: Negative for ear discharge, ear pain, sinus pressure and sore throat.    Respiratory: Negative for cough, chest tightness and shortness of breath.    Cardiovascular: Negative for chest pain, palpitations and leg swelling.   Gastrointestinal: Negative for diarrhea, nausea and vomiting.   Musculoskeletal: Negative for arthralgias, back pain and myalgias.   Neurological: Negative for dizziness, syncope and headaches.   Psychiatric/Behavioral: Negative for confusion and sleep disturbance.       Objective   /62   Pulse 89   Ht 177.8 cm (70\")   Wt 105 kg (232 lb)   SpO2 99% Comment: ra  BMI 33.29 kg/m²   Physical Exam   Constitutional: He is oriented to person, place, and time. He appears well-developed and well-nourished.   HENT:   Head: Normocephalic and atraumatic.   Right Ear: External ear normal.   Left Ear: External ear normal.   Mouth/Throat: No oropharyngeal exudate.   Eyes: Pupils are equal, round, and reactive to light. Conjunctivae are normal.   Neck: Neck supple. No thyromegaly present.   Cardiovascular: Normal rate and regular rhythm.   Pulmonary/Chest: Effort normal and breath sounds normal.   Abdominal: Soft. Bowel sounds are normal. He exhibits no distension. There is no tenderness.   Musculoskeletal: He exhibits no edema.   Neurological: He is alert and oriented to person, place, and time. No cranial nerve deficit.   Skin: Skin is warm and dry.   Psychiatric: He has a normal mood and affect. Judgment normal.   Nursing note and vitals reviewed.        Results for orders placed or performed in visit on 01/07/20   CBC Auto Differential   Result " Value Ref Range    WBC 7.10 3.40 - 10.80 10*3/mm3    RBC 4.06 (L) 4.14 - 5.80 10*6/mm3    Hemoglobin 11.7 (L) 13.0 - 17.7 g/dL    Hematocrit 36.1 (L) 37.5 - 51.0 %    MCV 88.9 79.0 - 97.0 fL    MCH 28.8 26.6 - 33.0 pg    MCHC 32.4 31.5 - 35.7 g/dL    RDW 14.0 12.3 - 15.4 %    RDW-SD 45.1 37.0 - 54.0 fl    MPV 11.8 6.0 - 12.0 fL    Platelets 147 140 - 450 10*3/mm3    Neutrophil % 53.5 42.7 - 76.0 %    Lymphocyte % 33.4 19.6 - 45.3 %    Monocyte % 10.1 5.0 - 12.0 %    Eosinophil % 1.7 0.3 - 6.2 %    Basophil % 0.7 0.0 - 1.5 %    Immature Grans % 0.6 (H) 0.0 - 0.5 %    Neutrophils, Absolute 3.80 1.70 - 7.00 10*3/mm3    Lymphocytes, Absolute 2.37 0.70 - 3.10 10*3/mm3    Monocytes, Absolute 0.72 0.10 - 0.90 10*3/mm3    Eosinophils, Absolute 0.12 0.00 - 0.40 10*3/mm3    Basophils, Absolute 0.05 0.00 - 0.20 10*3/mm3    Immature Grans, Absolute 0.04 0.00 - 0.05 10*3/mm3    nRBC 0.0 0.0 - 0.2 /100 WBC             Assessment/Plan   Diagnoses and all orders for this visit:    Essential hypertension  -     lisinopril (PRINIVIL,ZESTRIL) 20 MG tablet; Take 1 tablet by mouth 2 (Two) Times a Day.  -     amLODIPine (NORVASC) 10 MG tablet; Take 0.5 tablets by mouth 2 (Two) Times a Day.    Pneumonia of both lungs due to infectious organism, unspecified part of lung  -     CBC & Differential  -     CBC Auto Differential    Type 2 diabetes mellitus with hyperglycemia, with long-term current use of insulin (CMS/[facility])  Comments:  AM- 40 or 50 for glu > or < 200  PM 50 or 55 for glu < or >400  change glimepiride to with meal.continue metformin.  Orders:  -     metFORMIN (GLUCOPHAGE) 1000 MG tablet; Take 1.5 PO hs and 1 PO am    Essential hypertension  Comments:  stable, continue current meds.  Orders:  -     lisinopril (PRINIVIL,ZESTRIL) 20 MG tablet; Take 1 tablet by mouth 2 (Two) Times a Day.  -     amLODIPine (NORVASC) 10 MG tablet; Take 0.5 tablets by mouth 2 (Two) Times a Day.    ZOHRA (acute kidney injury) (CMS/[facility])  -     Basic  Metabolic Panel    wants to change pharmacy to [facility], as will become a member. Amlodipine was $48 at [facility].    Cont to hold lasix and potassium pending kidney injury, restart when returns to baseline.       cxr next visit with labs.    Return in about 3 weeks (around 1/28/2020) for Recheck, Labs today.

## 2020-01-20 DIAGNOSIS — I10 ESSENTIAL HYPERTENSION: ICD-10-CM

## 2020-01-20 RX ORDER — LISINOPRIL 20 MG/1
20 TABLET ORAL 2 TIMES DAILY
Qty: 180 TABLET | Refills: 1 | Status: SHIPPED | OUTPATIENT
Start: 2020-01-20 | End: 2020-08-03 | Stop reason: SDUPTHER

## 2020-01-29 ENCOUNTER — OFFICE VISIT (OUTPATIENT)
Dept: INTERNAL MEDICINE | Facility: CLINIC | Age: 75
End: 2020-01-29

## 2020-01-29 ENCOUNTER — APPOINTMENT (OUTPATIENT)
Dept: LAB | Facility: HOSPITAL | Age: 75
End: 2020-01-29

## 2020-01-29 VITALS
BODY MASS INDEX: 32.61 KG/M2 | HEIGHT: 70 IN | SYSTOLIC BLOOD PRESSURE: 132 MMHG | HEART RATE: 81 BPM | OXYGEN SATURATION: 98 % | WEIGHT: 227.8 LBS | DIASTOLIC BLOOD PRESSURE: 62 MMHG

## 2020-01-29 DIAGNOSIS — J18.9 PNEUMONIA OF BOTH LUNGS DUE TO INFECTIOUS ORGANISM, UNSPECIFIED PART OF LUNG: Primary | ICD-10-CM

## 2020-01-29 DIAGNOSIS — N17.9 AKI (ACUTE KIDNEY INJURY) (HCC): ICD-10-CM

## 2020-01-29 LAB
ANION GAP SERPL CALCULATED.3IONS-SCNC: 15.7 MMOL/L (ref 5–15)
BUN BLD-MCNC: 21 MG/DL (ref 8–23)
BUN/CREAT SERPL: 17.4 (ref 7–25)
CALCIUM SPEC-SCNC: 9.9 MG/DL (ref 8.6–10.5)
CHLORIDE SERPL-SCNC: 97 MMOL/L (ref 98–107)
CO2 SERPL-SCNC: 27.3 MMOL/L (ref 22–29)
CREAT BLD-MCNC: 1.21 MG/DL (ref 0.76–1.27)
GFR SERPL CREATININE-BSD FRML MDRD: 58 ML/MIN/1.73
GLUCOSE BLD-MCNC: 86 MG/DL (ref 65–99)
POTASSIUM BLD-SCNC: 5.1 MMOL/L (ref 3.5–5.2)
SODIUM BLD-SCNC: 140 MMOL/L (ref 136–145)

## 2020-01-29 PROCEDURE — 85027 COMPLETE CBC AUTOMATED: CPT | Performed by: INTERNAL MEDICINE

## 2020-01-29 PROCEDURE — 80048 BASIC METABOLIC PNL TOTAL CA: CPT | Performed by: INTERNAL MEDICINE

## 2020-01-29 PROCEDURE — 99213 OFFICE O/P EST LOW 20 MIN: CPT | Performed by: INTERNAL MEDICINE

## 2020-01-30 LAB
DEPRECATED RDW RBC AUTO: 43.9 FL (ref 37–54)
ERYTHROCYTE [DISTWIDTH] IN BLOOD BY AUTOMATED COUNT: 13.8 % (ref 12.3–15.4)
HCT VFR BLD AUTO: 39.7 % (ref 37.5–51)
HGB BLD-MCNC: 12.8 G/DL (ref 13–17.7)
MCH RBC QN AUTO: 28.1 PG (ref 26.6–33)
MCHC RBC AUTO-ENTMCNC: 32.2 G/DL (ref 31.5–35.7)
MCV RBC AUTO: 87.3 FL (ref 79–97)
PLATELET # BLD AUTO: 171 10*3/MM3 (ref 140–450)
PMV BLD AUTO: 11.8 FL (ref 6–12)
RBC # BLD AUTO: 4.55 10*6/MM3 (ref 4.14–5.8)
WBC NRBC COR # BLD: 9.18 10*3/MM3 (ref 3.4–10.8)

## 2020-02-11 ENCOUNTER — HOSPITAL ENCOUNTER (OUTPATIENT)
Dept: GENERAL RADIOLOGY | Facility: HOSPITAL | Age: 75
Discharge: HOME | End: 2020-02-11
Admitting: INTERNAL MEDICINE

## 2020-02-11 PROCEDURE — 71046 X-RAY EXAM CHEST 2 VIEWS: CPT

## 2020-02-14 ENCOUNTER — OFFICE VISIT (OUTPATIENT)
Dept: INTERNAL MEDICINE | Facility: CLINIC | Age: 75
End: 2020-02-14

## 2020-02-14 VITALS
OXYGEN SATURATION: 96 % | SYSTOLIC BLOOD PRESSURE: 168 MMHG | BODY MASS INDEX: 33.99 KG/M2 | HEART RATE: 80 BPM | WEIGHT: 237.4 LBS | DIASTOLIC BLOOD PRESSURE: 60 MMHG | HEIGHT: 70 IN

## 2020-02-14 DIAGNOSIS — J18.9 PERSISTENT PNEUMONIA: Primary | ICD-10-CM

## 2020-02-14 PROCEDURE — 99213 OFFICE O/P EST LOW 20 MIN: CPT | Performed by: INTERNAL MEDICINE

## 2020-02-14 PROCEDURE — 96372 THER/PROPH/DIAG INJ SC/IM: CPT | Performed by: INTERNAL MEDICINE

## 2020-02-14 RX ORDER — LEVOFLOXACIN 500 MG/1
500 TABLET, FILM COATED ORAL DAILY
Qty: 10 TABLET | Refills: 0 | Status: SHIPPED | OUTPATIENT
Start: 2020-02-14 | End: 2020-03-04

## 2020-02-14 RX ORDER — CEFTRIAXONE 1 G/1
1 INJECTION, POWDER, FOR SOLUTION INTRAMUSCULAR; INTRAVENOUS ONCE
Status: COMPLETED | OUTPATIENT
Start: 2020-02-14 | End: 2020-02-14

## 2020-02-14 RX ADMIN — CEFTRIAXONE 1 G: 1 INJECTION, POWDER, FOR SOLUTION INTRAMUSCULAR; INTRAVENOUS at 13:40

## 2020-02-14 NOTE — PROGRESS NOTES
"Pneumonia (fu cxr)    Subjective   Gabriela Mar is a 74 y.o. male is here today for follow-up.    History of Present Illness   S/p CXR, for a f/u on his pneumonia.  Reports he is not back to his baseline.  Completely forgot about his chest x-ray and finally had it 3 days ago.    Current Outpatient Medications:   •  albuterol sulfate  (90 Base) MCG/ACT inhaler, Inhale 2 puffs Every 4 (Four) Hours As Needed for Wheezing or Shortness of Air., Disp: 1 inhaler, Rfl: 0  •  amLODIPine (NORVASC) 10 MG tablet, Take 0.5 tablets by mouth 2 (Two) Times a Day., Disp: 90 tablet, Rfl: 1  •  baclofen (LIORESAL) 10 MG tablet, Take 1 tablet by mouth 3 (Three) Times a Day As Needed for Muscle Spasms., Disp: 60 tablet, Rfl: 0  •  Cholecalciferol (VITAMIN D) 1000 UNITS tablet, Take 1 capsule by mouth Daily., Disp: , Rfl:   •  Cyanocobalamin (VITAMIN B-12 ER) 1000 MCG tablet controlled-release, Take 1,000 mcg by mouth Daily., Disp: , Rfl:   •  glimepiride (AMARYL) 4 MG tablet, TAKE 1 TABLET BY MOUTH ONCE DAILY WITH  DINNER, Disp: 90 tablet, Rfl: 0  •  insulin NPH (humuLIN N,novoLIN N) 100 UNIT/ML injection, 35 Units in the morning and 45 units in the evening., Disp: 30 mL, Rfl: 5  •  Insulin Syringe 28G X 1/2\" 1 ML misc, 1 each 2 (Two) Times a Day., Disp: 100 each, Rfl: 5  •  lisinopril (PRINIVIL,ZESTRIL) 20 MG tablet, Take 1 tablet by mouth 2 (Two) Times a Day., Disp: 180 tablet, Rfl: 1  •  metFORMIN (GLUCOPHAGE) 1000 MG tablet, Take 1.5 PO hs and 1 PO am, Disp: 75 tablet, Rfl: 5  •  Multiple Vitamins-Minerals (MULTI VITAMIN/MINERALS PO), Take 1 tablet by mouth Daily., Disp: , Rfl:   •  omeprazole (PRILOSEC) 20 MG capsule, Take 1 capsule by mouth Daily., Disp: , Rfl:   •  simvastatin (ZOCOR) 10 MG tablet, TAKE 1 TABLET BY MOUTH AT BEDTIME, Disp: 90 tablet, Rfl: 1  •  levoFLOXacin (LEVAQUIN) 500 MG tablet, Take 1 tablet by mouth Daily., Disp: 10 tablet, Rfl: 0      The following portions of the patient's history were reviewed " "and updated as appropriate: allergies, current medications, past family history, past medical history, past social history, past surgical history and problem list.    Review of Systems   Constitutional: Negative.  Negative for chills and fever.   HENT: Negative for ear discharge, ear pain, sinus pressure and sore throat.    Respiratory: Positive for chest tightness and shortness of breath. Negative for cough.    Cardiovascular: Negative for chest pain, palpitations and leg swelling.   Gastrointestinal: Negative for diarrhea, nausea and vomiting.   Musculoskeletal: Negative for arthralgias, back pain and myalgias.   Neurological: Negative for dizziness, syncope and headaches.   Psychiatric/Behavioral: Negative for confusion and sleep disturbance.       Objective   /60   Pulse 80   Ht 177.8 cm (70\")   Wt 108 kg (237 lb 6.4 oz)   SpO2 96% Comment: ra  BMI 34.06 kg/m²   Physical Exam   Constitutional: He is oriented to person, place, and time. He appears well-developed and well-nourished.   HENT:   Head: Normocephalic and atraumatic.   Right Ear: External ear normal.   Left Ear: External ear normal.   Mouth/Throat: No oropharyngeal exudate.   Neck: Neck supple. No thyromegaly present.   Cardiovascular: Normal rate and regular rhythm.   Pulmonary/Chest: Effort normal. He has rales.   Abdominal: Soft. Bowel sounds are normal. He exhibits no distension. There is no tenderness.   Musculoskeletal: He exhibits no edema.   Neurological: He is alert and oriented to person, place, and time. No cranial nerve deficit.   Skin: Skin is warm and dry.   Psychiatric: He has a normal mood and affect. Judgment normal.   Nursing note and vitals reviewed.        Results for orders placed or performed in visit on 01/29/20   Basic Metabolic Panel   Result Value Ref Range    Glucose 86 65 - 99 mg/dL    BUN 21 8 - 23 mg/dL    Creatinine 1.21 0.76 - 1.27 mg/dL    Sodium 140 136 - 145 mmol/L    Potassium 5.1 3.5 - 5.2 mmol/L    " Chloride 97 (L) 98 - 107 mmol/L    CO2 27.3 22.0 - 29.0 mmol/L    Calcium 9.9 8.6 - 10.5 mg/dL    eGFR Non African Amer 58 (L) >60 mL/min/1.73    BUN/Creatinine Ratio 17.4 7.0 - 25.0    Anion Gap 15.7 (H) 5.0 - 15.0 mmol/L   CBC (No Diff)   Result Value Ref Range    WBC 9.18 3.40 - 10.80 10*3/mm3    RBC 4.55 4.14 - 5.80 10*6/mm3    Hemoglobin 12.8 (L) 13.0 - 17.7 g/dL    Hematocrit 39.7 37.5 - 51.0 %    MCV 87.3 79.0 - 97.0 fL    MCH 28.1 26.6 - 33.0 pg    MCHC 32.2 31.5 - 35.7 g/dL    RDW 13.8 12.3 - 15.4 %    RDW-SD 43.9 37.0 - 54.0 fl    MPV 11.8 6.0 - 12.0 fL    Platelets 171 140 - 450 10*3/mm3       Oxygen sats dropped to 90% from 96 after a short walk down the hallway.      Assessment/Plan   Diagnoses and all orders for this visit:    Persistent pneumonia  -     levoFLOXacin (LEVAQUIN) 500 MG tablet; Take 1 tablet by mouth Daily.  -     CT Chest Without Contrast  -     cefTRIAXone (ROCEPHIN) injection 1 g    refer to pulmonary if CT with persistent pneumonia.         Hold glimepiride when on levaquin.    Return for Next scheduled follow up.

## 2020-02-24 ENCOUNTER — HOSPITAL ENCOUNTER (OUTPATIENT)
Dept: CT IMAGING | Facility: HOSPITAL | Age: 75
Discharge: HOME | End: 2020-02-24
Admitting: INTERNAL MEDICINE

## 2020-02-24 PROCEDURE — 71250 CT THORAX DX C-: CPT

## 2020-03-04 ENCOUNTER — OFFICE VISIT (OUTPATIENT)
Dept: INTERNAL MEDICINE | Facility: CLINIC | Age: 75
End: 2020-03-04

## 2020-03-04 VITALS
SYSTOLIC BLOOD PRESSURE: 146 MMHG | OXYGEN SATURATION: 94 % | HEIGHT: 70 IN | TEMPERATURE: 97.7 F | BODY MASS INDEX: 32.24 KG/M2 | HEART RATE: 86 BPM | WEIGHT: 225.2 LBS | DIASTOLIC BLOOD PRESSURE: 62 MMHG

## 2020-03-04 DIAGNOSIS — J18.9 PERSISTENT PNEUMONIA: Primary | ICD-10-CM

## 2020-03-04 LAB
EXPIRATION DATE: NORMAL
FLUAV AG NPH QL: NEGATIVE
FLUBV AG NPH QL: NEGATIVE
INTERNAL CONTROL: NORMAL
Lab: NORMAL

## 2020-03-04 PROCEDURE — 96372 THER/PROPH/DIAG INJ SC/IM: CPT | Performed by: INTERNAL MEDICINE

## 2020-03-04 PROCEDURE — 99213 OFFICE O/P EST LOW 20 MIN: CPT | Performed by: INTERNAL MEDICINE

## 2020-03-04 PROCEDURE — 87804 INFLUENZA ASSAY W/OPTIC: CPT | Performed by: INTERNAL MEDICINE

## 2020-03-04 RX ORDER — GUAIFENESIN/DEXTROMETHORPHAN 100-10MG/5
5 SYRUP ORAL 3 TIMES DAILY PRN
Qty: 200 ML | Refills: 1 | Status: SHIPPED | OUTPATIENT
Start: 2020-03-04 | End: 2020-03-17

## 2020-03-04 RX ORDER — AZITHROMYCIN 250 MG/1
TABLET, FILM COATED ORAL
Qty: 6 TABLET | Refills: 0 | Status: SHIPPED | OUTPATIENT
Start: 2020-03-04 | End: 2020-03-16

## 2020-03-04 RX ORDER — METHYLPREDNISOLONE 4 MG/1
TABLET ORAL
Qty: 21 TABLET | Refills: 0 | Status: SHIPPED | OUTPATIENT
Start: 2020-03-04 | End: 2020-03-16

## 2020-03-04 RX ORDER — CEFDINIR 300 MG/1
300 CAPSULE ORAL 2 TIMES DAILY
Qty: 20 CAPSULE | Refills: 0 | Status: SHIPPED | OUTPATIENT
Start: 2020-03-04 | End: 2020-03-16

## 2020-03-04 RX ORDER — METHYLPREDNISOLONE ACETATE 40 MG/ML
40 INJECTION, SUSPENSION INTRA-ARTICULAR; INTRALESIONAL; INTRAMUSCULAR; SOFT TISSUE ONCE
Status: COMPLETED | OUTPATIENT
Start: 2020-03-04 | End: 2020-03-04

## 2020-03-04 RX ADMIN — METHYLPREDNISOLONE ACETATE 40 MG: 40 INJECTION, SUSPENSION INTRA-ARTICULAR; INTRALESIONAL; INTRAMUSCULAR; SOFT TISSUE at 16:55

## 2020-03-04 NOTE — PROGRESS NOTES
"Wheezing (since Saturday) and Fatigue    Subjective   Gabriela Mar is a 74 y.o. male is here today for follow-up.    History of Present Illness   Son recently dxed with pnemonia.  Was visiting him in the hospital last week.  He then started having congestion and cough + wheezing since sat- 3 days.  Hard time breathing with a moderate amount of activity.    Current Outpatient Medications:   •  albuterol sulfate  (90 Base) MCG/ACT inhaler, Inhale 2 puffs Every 4 (Four) Hours As Needed for Wheezing or Shortness of Air., Disp: 1 inhaler, Rfl: 0  •  amLODIPine (NORVASC) 10 MG tablet, Take 0.5 tablets by mouth 2 (Two) Times a Day., Disp: 90 tablet, Rfl: 1  •  baclofen (LIORESAL) 10 MG tablet, Take 1 tablet by mouth 3 (Three) Times a Day As Needed for Muscle Spasms., Disp: 60 tablet, Rfl: 0  •  Cholecalciferol (VITAMIN D) 1000 UNITS tablet, Take 1 capsule by mouth Daily., Disp: , Rfl:   •  Cyanocobalamin (VITAMIN B-12 ER) 1000 MCG tablet controlled-release, Take 1,000 mcg by mouth Daily., Disp: , Rfl:   •  glimepiride (AMARYL) 4 MG tablet, TAKE 1 TABLET BY MOUTH ONCE DAILY WITH  DINNER, Disp: 90 tablet, Rfl: 0  •  insulin NPH (humuLIN N,novoLIN N) 100 UNIT/ML injection, 35 Units in the morning and 45 units in the evening., Disp: 30 mL, Rfl: 5  •  Insulin Syringe 28G X 1/2\" 1 ML misc, 1 each 2 (Two) Times a Day., Disp: 100 each, Rfl: 5  •  lisinopril (PRINIVIL,ZESTRIL) 20 MG tablet, Take 1 tablet by mouth 2 (Two) Times a Day., Disp: 180 tablet, Rfl: 1  •  metFORMIN (GLUCOPHAGE) 1000 MG tablet, Take 1.5 PO hs and 1 PO am, Disp: 75 tablet, Rfl: 5  •  Multiple Vitamins-Minerals (MULTI VITAMIN/MINERALS PO), Take 1 tablet by mouth Daily., Disp: , Rfl:   •  omeprazole (PRILOSEC) 20 MG capsule, Take 1 capsule by mouth Daily., Disp: , Rfl:   •  simvastatin (ZOCOR) 10 MG tablet, TAKE 1 TABLET BY MOUTH AT BEDTIME, Disp: 90 tablet, Rfl: 1  •  azithromycin (ZITHROMAX) 250 MG tablet, Take 2 tablets the first day, then 1 tablet " "daily for 4 days., Disp: 6 tablet, Rfl: 0  •  cefdinir (OMNICEF) 300 MG capsule, Take 1 capsule by mouth 2 (Two) Times a Day., Disp: 20 capsule, Rfl: 0  •  guaiFENesin-dextromethorphan (ROBITUSSIN DM) 100-10 MG/5ML syrup, Take 5 mL by mouth 3 (Three) Times a Day As Needed for Cough., Disp: 200 mL, Rfl: 1  •  methylPREDNISolone (MEDROL, SCARLETT,) 4 MG tablet, follow package directions, Disp: 21 tablet, Rfl: 0      The following portions of the patient's history were reviewed and updated as appropriate: allergies, current medications, past family history, past medical history, past social history, past surgical history and problem list.    Review of Systems   Constitutional: Positive for activity change and appetite change. Negative for chills and fever.   HENT: Positive for congestion, sinus pressure and voice change. Negative for ear discharge, ear pain and sore throat.    Respiratory: Positive for cough, chest tightness and shortness of breath.    Cardiovascular: Negative for chest pain, palpitations and leg swelling.   Gastrointestinal: Negative for diarrhea, nausea and vomiting.   Musculoskeletal: Negative for arthralgias, back pain and myalgias.   Neurological: Negative for dizziness, syncope and headaches.   Psychiatric/Behavioral: Negative for confusion and sleep disturbance.       Objective   /62   Pulse 86   Temp 97.7 °F (36.5 °C)   Ht 177.8 cm (70\")   Wt 102 kg (225 lb 3.2 oz)   SpO2 94% Comment: ra  BMI 32.31 kg/m²   Physical Exam   Constitutional: He is oriented to person, place, and time. He appears well-developed and well-nourished.   HENT:   Head: Normocephalic and atraumatic.   Right Ear: External ear normal. Tympanic membrane is bulging.   Left Ear: External ear normal. Tympanic membrane is bulging.   Mouth/Throat: Posterior oropharyngeal erythema present. No oropharyngeal exudate or tonsillar abscesses.   Eyes: Pupils are equal, round, and reactive to light. Conjunctivae are normal.   Neck: " Normal range of motion. Neck supple. No thyromegaly present.   Cardiovascular: Normal rate and regular rhythm.   Pulmonary/Chest: Effort normal. Stridor present. He has wheezes. He has rales.   Abdominal: Soft. Bowel sounds are normal.   Musculoskeletal: He exhibits no edema.   Lymphadenopathy:     He has no cervical adenopathy.   Neurological: He is alert and oriented to person, place, and time. No cranial nerve deficit.   Skin: Skin is warm and dry.   Psychiatric: He has a normal mood and affect. Judgment normal.   Nursing note and vitals reviewed.        Results for orders placed or performed in visit on 03/04/20   POC Influenza A / B   Result Value Ref Range    Rapid Influenza A Ag Negative Negative    Rapid Influenza B Ag Negative Negative    Internal Control Passed Passed    Lot Number [id number]     Expiration Date 12/11/2021              Assessment/Plan   Diagnoses and all orders for this visit:    Persistent pneumonia  -     cefdinir (OMNICEF) 300 MG capsule; Take 1 capsule by mouth 2 (Two) Times a Day.  -     azithromycin (ZITHROMAX) 250 MG tablet; Take 2 tablets the first day, then 1 tablet daily for 4 days.  -     guaiFENesin-dextromethorphan (ROBITUSSIN DM) 100-10 MG/5ML syrup; Take 5 mL by mouth 3 (Three) Times a Day As Needed for Cough.  -     methylPREDNISolone acetate (DEPO-medrol) injection 40 mg  -     methylPREDNISolone (MEDROL, SCARLETT,) 4 MG tablet; follow package directions  -     POC Influenza A / B      S/p recent CT with clearing of airspace disease.  Hold of cxr, as not cleared from his last pneumonia.    meds as above. Use albuterol inhaler- qid and prn             Return in about 10 days (around 3/14/2020).

## 2020-03-16 ENCOUNTER — OFFICE VISIT (OUTPATIENT)
Dept: INTERNAL MEDICINE | Facility: CLINIC | Age: 75
End: 2020-03-16

## 2020-03-16 VITALS
BODY MASS INDEX: 32.78 KG/M2 | TEMPERATURE: 98.1 F | WEIGHT: 229 LBS | DIASTOLIC BLOOD PRESSURE: 68 MMHG | HEIGHT: 70 IN | HEART RATE: 90 BPM | OXYGEN SATURATION: 98 % | SYSTOLIC BLOOD PRESSURE: 132 MMHG

## 2020-03-16 DIAGNOSIS — J18.9 PNEUMONIA DUE TO INFECTIOUS ORGANISM, UNSPECIFIED LATERALITY, UNSPECIFIED PART OF LUNG: ICD-10-CM

## 2020-03-16 DIAGNOSIS — E11.65 TYPE 2 DIABETES MELLITUS WITH HYPERGLYCEMIA, WITHOUT LONG-TERM CURRENT USE OF INSULIN (HCC): ICD-10-CM

## 2020-03-16 DIAGNOSIS — E78.2 MIXED HYPERLIPIDEMIA: Primary | ICD-10-CM

## 2020-03-16 DIAGNOSIS — I10 ESSENTIAL HYPERTENSION: ICD-10-CM

## 2020-03-16 PROCEDURE — 99213 OFFICE O/P EST LOW 20 MIN: CPT | Performed by: INTERNAL MEDICINE

## 2020-03-16 RX ORDER — GLIMEPIRIDE 4 MG/1
4 TABLET ORAL
Qty: 90 TABLET | Refills: 1 | Status: SHIPPED | OUTPATIENT
Start: 2020-03-16 | End: 2020-09-21 | Stop reason: SDUPTHER

## 2020-03-16 RX ORDER — SIMVASTATIN 10 MG
10 TABLET ORAL
Qty: 90 TABLET | Refills: 1 | Status: SHIPPED | OUTPATIENT
Start: 2020-03-16 | End: 2020-09-08 | Stop reason: SDUPTHER

## 2020-03-16 NOTE — PROGRESS NOTES
"Pneumonia (feeling well)    Subjective   Gabriela Mar is a 74 y.o. male is here today for follow-up.    History of Present Illness   Feels better, cough and congestion is better.  Walking is okay- not soa any more.    Current Outpatient Medications:   •  albuterol sulfate  (90 Base) MCG/ACT inhaler, Inhale 2 puffs Every 4 (Four) Hours As Needed for Wheezing or Shortness of Air., Disp: 1 inhaler, Rfl: 0  •  amLODIPine (NORVASC) 10 MG tablet, Take 0.5 tablets by mouth 2 (Two) Times a Day., Disp: 90 tablet, Rfl: 1  •  baclofen (LIORESAL) 10 MG tablet, Take 1 tablet by mouth 3 (Three) Times a Day As Needed for Muscle Spasms., Disp: 60 tablet, Rfl: 0  •  Cholecalciferol (VITAMIN D) 1000 UNITS tablet, Take 1 capsule by mouth Daily., Disp: , Rfl:   •  Cyanocobalamin (VITAMIN B-12 ER) 1000 MCG tablet controlled-release, Take 1,000 mcg by mouth Daily., Disp: , Rfl:   •  glimepiride (AMARYL) 4 MG tablet, Take 1 tablet by mouth Daily With Dinner., Disp: 90 tablet, Rfl: 1  •  insulin NPH (humuLIN N,novoLIN N) 100 UNIT/ML injection, 35 Units in the morning and 45 units in the evening., Disp: 30 mL, Rfl: 5  •  Insulin Syringe 28G X 1/2\" 1 ML misc, 1 each 2 (Two) Times a Day., Disp: 100 each, Rfl: 5  •  lisinopril (PRINIVIL,ZESTRIL) 20 MG tablet, Take 1 tablet by mouth 2 (Two) Times a Day., Disp: 180 tablet, Rfl: 1  •  metFORMIN (GLUCOPHAGE) 1000 MG tablet, Take 1.5 PO hs and 1 PO am, Disp: 75 tablet, Rfl: 5  •  Multiple Vitamins-Minerals (MULTI VITAMIN/MINERALS PO), Take 1 tablet by mouth Daily., Disp: , Rfl:   •  omeprazole (PRILOSEC) 20 MG capsule, Take 1 capsule by mouth Daily., Disp: , Rfl:   •  simvastatin (ZOCOR) 10 MG tablet, Take 1 tablet by mouth every night at bedtime., Disp: 90 tablet, Rfl: 1  •  guaiFENesin-dextromethorphan (ROBITUSSIN DM) 100-10 MG/5ML syrup, Take 5 mL by mouth 3 (Three) Times a Day As Needed for Cough., Disp: 200 mL, Rfl: 1      The following portions of the patient's history were reviewed " "and updated as appropriate: allergies, current medications, past family history, past medical history, past social history, past surgical history and problem list.    Review of Systems   Constitutional: Positive for fatigue. Negative for chills and fever.   HENT: Negative for ear discharge, ear pain, sinus pressure and sore throat.    Respiratory: Negative for cough, chest tightness and shortness of breath (better).    Cardiovascular: Negative for chest pain, palpitations and leg swelling.   Gastrointestinal: Negative for diarrhea, nausea and vomiting.   Musculoskeletal: Negative for arthralgias, back pain and myalgias.   Neurological: Negative for dizziness, syncope and headaches.   Psychiatric/Behavioral: Negative for confusion and sleep disturbance.       Objective   /68   Pulse 90   Temp 98.1 °F (36.7 °C)   Ht 177.8 cm (70\")   Wt 104 kg (229 lb)   SpO2 98% Comment: ra  BMI 32.86 kg/m²   Physical Exam   Constitutional: He is oriented to person, place, and time. He appears well-developed and well-nourished.   HENT:   Head: Normocephalic and atraumatic.   Right Ear: Tympanic membrane is bulging.   Left Ear: Tympanic membrane is bulging.   Mouth/Throat: Posterior oropharyngeal erythema present. No oropharyngeal exudate or tonsillar abscesses.   Eyes: Pupils are equal, round, and reactive to light. Conjunctivae are normal.   Neck: Normal range of motion. Neck supple. No thyromegaly present.   Cardiovascular: Normal rate and regular rhythm.   Pulmonary/Chest: Effort normal and breath sounds normal. No stridor.   Abdominal: Soft. Bowel sounds are normal. He exhibits no distension. There is no tenderness.   Musculoskeletal: He exhibits no edema.   Lymphadenopathy:     He has cervical adenopathy.   Neurological: He is alert and oriented to person, place, and time. No cranial nerve deficit.   Skin: Skin is warm and dry.   Psychiatric: He has a normal mood and affect. Judgment normal.   Nursing note and vitals " reviewed.        Results for orders placed or performed in visit on 03/04/20   POC Influenza A / B   Result Value Ref Range    Rapid Influenza A Ag Negative Negative    Rapid Influenza B Ag Negative Negative    Internal Control Passed Passed    Lot Number [id number]     Expiration Date 12/11/2021              Assessment/Plan   Diagnoses and all orders for this visit:    Mixed hyperlipidemia  -     simvastatin (ZOCOR) 10 MG tablet; Take 1 tablet by mouth every night at bedtime.    Type 2 diabetes mellitus with hyperglycemia, without long-term current use of insulin (CMS/[facility])  -     glimepiride (AMARYL) 4 MG tablet; Take 1 tablet by mouth Daily With Dinner.    Essential hypertension  Comments:  better on recheck     Pneumonia due to infectious organism, unspecified laterality, unspecified part of lung  Comments:  much better, soa better. continue inhalers                 Return in about 4 weeks (around 4/13/2020) for Next scheduled follow up- please change 3/25 appt to next month.

## 2020-04-15 DIAGNOSIS — I10 ESSENTIAL HYPERTENSION: ICD-10-CM

## 2020-04-15 RX ORDER — AMLODIPINE BESYLATE 10 MG/1
5 TABLET ORAL 2 TIMES DAILY
Qty: 90 TABLET | Refills: 1 | Status: SHIPPED | OUTPATIENT
Start: 2020-04-15 | End: 2020-09-28 | Stop reason: SDUPTHER

## 2020-04-15 NOTE — TELEPHONE ENCOUNTER
amLODIPine (NORVASC) 10 MG tablet    R[facility]AN [place] [id number] - [facility] [address]

## 2020-04-21 DIAGNOSIS — I10 ESSENTIAL HYPERTENSION: ICD-10-CM

## 2020-04-21 RX ORDER — AMLODIPINE BESYLATE 10 MG/1
5 TABLET ORAL 2 TIMES DAILY
Qty: 90 TABLET | Refills: 1 | OUTPATIENT
Start: 2020-04-21

## 2020-05-27 ENCOUNTER — OFFICE VISIT (OUTPATIENT)
Dept: INTERNAL MEDICINE | Facility: CLINIC | Age: 75
End: 2020-05-27

## 2020-05-27 DIAGNOSIS — E78.2 MIXED HYPERLIPIDEMIA: ICD-10-CM

## 2020-05-27 DIAGNOSIS — I10 ESSENTIAL HYPERTENSION: ICD-10-CM

## 2020-05-27 DIAGNOSIS — G56.01 CARPAL TUNNEL SYNDROME OF RIGHT WRIST: Primary | ICD-10-CM

## 2020-05-27 DIAGNOSIS — Z79.4 TYPE 2 DIABETES MELLITUS WITH HYPERGLYCEMIA, WITH LONG-TERM CURRENT USE OF INSULIN (HCC): ICD-10-CM

## 2020-05-27 DIAGNOSIS — E11.65 TYPE 2 DIABETES MELLITUS WITH HYPERGLYCEMIA, WITH LONG-TERM CURRENT USE OF INSULIN (HCC): ICD-10-CM

## 2020-05-27 PROCEDURE — 99443 PR PHYS/QHP TELEPHONE EVALUATION 21-30 MIN: CPT | Performed by: INTERNAL MEDICINE

## 2020-05-27 NOTE — PROGRESS NOTES
"Hyperlipidemia; Hypertension; Diabetes; and Numbness (tingeling in thumb)    Subjective   Gabriela Mar is a 74 y.o. male is here today for follow-up.    History of Present Illness   Here for a telephone follow up on his DM2., htn, hlp. Notes right thumb and next 3 finger tips tingling. All the time, he notes he is on his computer a lot, using the mouse.  Sugars 80- 145 or 150, and at night stays < 170 after he eats.  BP is staying from 130- 145 systolic.      Current Outpatient Medications:   •  amLODIPine (NORVASC) 10 MG tablet, Take 0.5 tablets by mouth 2 (Two) Times a Day., Disp: 90 tablet, Rfl: 1  •  baclofen (LIORESAL) 10 MG tablet, Take 1 tablet by mouth 3 (Three) Times a Day As Needed for Muscle Spasms., Disp: 60 tablet, Rfl: 0  •  Cholecalciferol (VITAMIN D) 1000 UNITS tablet, Take 1 capsule by mouth Daily., Disp: , Rfl:   •  Cyanocobalamin (VITAMIN B-12 ER) 1000 MCG tablet controlled-release, Take 1,000 mcg by mouth Daily., Disp: , Rfl:   •  glimepiride (AMARYL) 4 MG tablet, Take 1 tablet by mouth Daily With Dinner., Disp: 90 tablet, Rfl: 1  •  insulin NPH (humuLIN N,novoLIN N) 100 UNIT/ML injection, 35 Units in the morning and 45 units in the evening., Disp: 30 mL, Rfl: 5  •  Insulin Syringe 28G X 1/2\" 1 ML misc, 1 each 2 (Two) Times a Day., Disp: 100 each, Rfl: 5  •  lisinopril (PRINIVIL,ZESTRIL) 20 MG tablet, Take 1 tablet by mouth 2 (Two) Times a Day., Disp: 180 tablet, Rfl: 1  •  metFORMIN (GLUCOPHAGE) 1000 MG tablet, Take 1.5 PO hs and 1 PO am, Disp: 75 tablet, Rfl: 5  •  Multiple Vitamins-Minerals (MULTI VITAMIN/MINERALS PO), Take 1 tablet by mouth Daily., Disp: , Rfl:   •  omeprazole (PRILOSEC) 20 MG capsule, Take 1 capsule by mouth Daily., Disp: , Rfl:   •  simvastatin (ZOCOR) 10 MG tablet, Take 1 tablet by mouth every night at bedtime., Disp: 90 tablet, Rfl: 1  •  albuterol sulfate  (90 Base) MCG/ACT inhaler, Inhale 2 puffs Every 4 (Four) Hours As Needed for Wheezing or Shortness of " Air., Disp: 1 inhaler, Rfl: 0      The following portions of the patient's history were reviewed and updated as appropriate: allergies, current medications, past family history, past medical history, past social history, past surgical history and problem list.    Review of Systems   Constitutional: Negative.  Negative for chills and fever.   HENT: Negative for ear discharge, ear pain, sinus pressure and sore throat.    Respiratory: Negative for cough, chest tightness and shortness of breath.    Cardiovascular: Negative for chest pain, palpitations and leg swelling.   Gastrointestinal: Negative for diarrhea, nausea and vomiting.   Musculoskeletal: Negative for arthralgias, back pain and myalgias.   Neurological: Positive for numbness (rt 1,2,3 finger tips). Negative for dizziness, syncope and headaches.   Psychiatric/Behavioral: Negative for confusion and sleep disturbance.       Objective   There were no vitals taken for this visit.  Physical Exam   Constitutional: He is oriented to person, place, and time.   Pulmonary/Chest: Effort normal. No respiratory distress.   Neurological: He is alert and oriented to person, place, and time.   Speech wnl   Psychiatric: He has a normal mood and affect. Judgment normal.         Results for orders placed or performed in visit on 03/04/20   POC Influenza A / B   Result Value Ref Range    Rapid Influenza A Ag Negative Negative    Rapid Influenza B Ag Negative Negative    Internal Control Passed Passed    Lot Number [id number]     Expiration Date 12/11/2021              Assessment/Plan   Diagnoses and all orders for this visit:    Carpal tunnel syndrome of right wrist  Comments:  wear splint and take tylenol for a week.  Splint when using mouse and bedtime. cinb.    Type 2 diabetes mellitus with hyperglycemia, with long-term current use of insulin (CMS/[facility])  -     Hemoglobin A1c    Mixed hyperlipidemia  -     Comprehensive Metabolic Panel  -     Lipid Panel    Essential  hypertension  -     Comprehensive Metabolic Panel    [facility], will proceed with neck and wrist xrays and hand surgery referral if needed.       This visit has been rescheduled as a phone visit to comply with patient safety concerns in accordance with CDC recommendations. Total time of discussion was 23 minutes.        Return in about 3 months (around 8/27/2020) for Next scheduled follow up.

## 2020-06-01 ENCOUNTER — APPOINTMENT (OUTPATIENT)
Dept: LAB | Facility: HOSPITAL | Age: 75
End: 2020-06-01

## 2020-06-01 LAB
ALBUMIN SERPL-MCNC: 4.5 G/DL (ref 3.5–5.2)
ALBUMIN/GLOB SERPL: 2 G/DL
ALP SERPL-CCNC: 66 U/L (ref 39–117)
ALT SERPL W P-5'-P-CCNC: 30 U/L (ref 1–41)
ANION GAP SERPL CALCULATED.3IONS-SCNC: 12.5 MMOL/L (ref 5–15)
AST SERPL-CCNC: 31 U/L (ref 1–40)
BILIRUB SERPL-MCNC: 0.3 MG/DL (ref 0.2–1.2)
BUN BLD-MCNC: 20 MG/DL (ref 8–23)
BUN/CREAT SERPL: 15.7 (ref 7–25)
CALCIUM SPEC-SCNC: 9.2 MG/DL (ref 8.6–10.5)
CHLORIDE SERPL-SCNC: 102 MMOL/L (ref 98–107)
CHOLEST SERPL-MCNC: 107 MG/DL (ref 0–200)
CO2 SERPL-SCNC: 26.5 MMOL/L (ref 22–29)
CREAT BLD-MCNC: 1.27 MG/DL (ref 0.76–1.27)
GFR SERPL CREATININE-BSD FRML MDRD: 55 ML/MIN/1.73
GLOBULIN UR ELPH-MCNC: 2.3 GM/DL
GLUCOSE BLD-MCNC: 155 MG/DL (ref 65–99)
HBA1C MFR BLD: 8.38 % (ref 4.8–5.6)
HDLC SERPL-MCNC: 36 MG/DL (ref 40–60)
LDLC SERPL CALC-MCNC: 42 MG/DL (ref 0–100)
LDLC/HDLC SERPL: 1.17 {RATIO}
POTASSIUM BLD-SCNC: 4.6 MMOL/L (ref 3.5–5.2)
PROT SERPL-MCNC: 6.8 G/DL (ref 6–8.5)
SODIUM BLD-SCNC: 141 MMOL/L (ref 136–145)
TRIGL SERPL-MCNC: 145 MG/DL (ref 0–150)
VLDLC SERPL-MCNC: 29 MG/DL (ref 5–40)

## 2020-06-01 PROCEDURE — 80053 COMPREHEN METABOLIC PANEL: CPT | Performed by: INTERNAL MEDICINE

## 2020-06-01 PROCEDURE — 80061 LIPID PANEL: CPT | Performed by: INTERNAL MEDICINE

## 2020-06-01 PROCEDURE — 83036 HEMOGLOBIN GLYCOSYLATED A1C: CPT | Performed by: INTERNAL MEDICINE

## 2020-08-03 DIAGNOSIS — I10 ESSENTIAL HYPERTENSION: ICD-10-CM

## 2020-08-03 RX ORDER — LISINOPRIL 20 MG/1
20 TABLET ORAL 2 TIMES DAILY
Qty: 180 TABLET | Refills: 1 | Status: SHIPPED | OUTPATIENT
Start: 2020-08-03 | End: 2020-09-08 | Stop reason: SDUPTHER

## 2020-08-03 NOTE — TELEPHONE ENCOUNTER
PATIENT IS REQUESTING A REFILL    lisinopril (PRINIVIL,ZESTRIL) 20 MG tablet    RANJAN [facility] PH: [phone]    HE IS OUT OF THE MEDICATION

## 2020-08-19 DIAGNOSIS — Z79.4 TYPE 2 DIABETES MELLITUS WITH HYPERGLYCEMIA, WITH LONG-TERM CURRENT USE OF INSULIN (HCC): ICD-10-CM

## 2020-08-19 DIAGNOSIS — E11.65 TYPE 2 DIABETES MELLITUS WITH HYPERGLYCEMIA, WITH LONG-TERM CURRENT USE OF INSULIN (HCC): ICD-10-CM

## 2020-09-08 ENCOUNTER — TELEPHONE (OUTPATIENT)
Dept: INTERNAL MEDICINE | Facility: CLINIC | Age: 75
End: 2020-09-08

## 2020-09-08 DIAGNOSIS — I10 ESSENTIAL HYPERTENSION: ICD-10-CM

## 2020-09-08 DIAGNOSIS — E78.2 MIXED HYPERLIPIDEMIA: ICD-10-CM

## 2020-09-08 RX ORDER — LISINOPRIL 20 MG/1
20 TABLET ORAL 2 TIMES DAILY
Qty: 180 TABLET | Refills: 1 | Status: SHIPPED | OUTPATIENT
Start: 2020-09-08 | End: 2021-01-06 | Stop reason: SDUPTHER

## 2020-09-08 RX ORDER — SIMVASTATIN 10 MG
10 TABLET ORAL
Qty: 90 TABLET | Refills: 1 | Status: SHIPPED | OUTPATIENT
Start: 2020-09-08 | End: 2020-11-24 | Stop reason: SDUPTHER

## 2020-09-08 NOTE — TELEPHONE ENCOUNTER
Requesting refill on:  1. lisinopril (PRINIVIL,ZESTRIL) 20 MG tablet  2. simvastatin (ZOCOR) 10 MG tablet    Send to:  RANJAN GUTIERREZ [address] [place] [address] [address] AT [facility] & [address] - [phone]  - [phone] FX     Patient is out of Lisinopril. The pharmacy that it was sent to previously told him there was an issue - please send new prescriptions of the 90 day supply.

## 2020-09-21 DIAGNOSIS — I10 ESSENTIAL HYPERTENSION: ICD-10-CM

## 2020-09-21 DIAGNOSIS — Z79.4 TYPE 2 DIABETES MELLITUS WITH HYPERGLYCEMIA, WITH LONG-TERM CURRENT USE OF INSULIN (HCC): ICD-10-CM

## 2020-09-21 DIAGNOSIS — E11.65 TYPE 2 DIABETES MELLITUS WITH HYPERGLYCEMIA, WITH LONG-TERM CURRENT USE OF INSULIN (HCC): ICD-10-CM

## 2020-09-21 DIAGNOSIS — E11.65 TYPE 2 DIABETES MELLITUS WITH HYPERGLYCEMIA, WITHOUT LONG-TERM CURRENT USE OF INSULIN (HCC): ICD-10-CM

## 2020-09-21 NOTE — TELEPHONE ENCOUNTER
Caller: Gabriela Mar    Relationship: Self    Best call back number:     Medication needed:   Requested Prescriptions     Pending Prescriptions Disp Refills   • metFORMIN (GLUCOPHAGE) 1000 MG tablet 75 tablet 0     Sig: TAKE ONE TABLET BY MOUTH IN THE MORNING AND 1 AND 1/2 TABLETS AT BEDTIME   • glimepiride (AMARYL) 4 MG tablet 90 tablet 1     Sig: Take 1 tablet by mouth Daily With Dinner.       When do you need the refill by: ASAP    What details did the patient provide when requesting the medication: PATIENT STATES THAT HE IS OUT OF THE MEDICATION.    Does the patient have less than a 3 day supply:  [x] Yes  [] No    What is the patient's preferred pharmacy: RANJ[facility] [address] [place] [address] [address] AT [facility] [address] - [phone] PH - [phone] FX

## 2020-09-22 RX ORDER — GLIMEPIRIDE 4 MG/1
4 TABLET ORAL
Qty: 90 TABLET | Refills: 1 | Status: SHIPPED | OUTPATIENT
Start: 2020-09-22 | End: 2020-11-24 | Stop reason: SDUPTHER

## 2020-09-28 ENCOUNTER — OFFICE VISIT (OUTPATIENT)
Dept: INTERNAL MEDICINE | Facility: CLINIC | Age: 75
End: 2020-09-28

## 2020-09-28 VITALS
DIASTOLIC BLOOD PRESSURE: 62 MMHG | OXYGEN SATURATION: 97 % | BODY MASS INDEX: 33.07 KG/M2 | HEIGHT: 70 IN | HEART RATE: 86 BPM | TEMPERATURE: 98.6 F | WEIGHT: 231 LBS | SYSTOLIC BLOOD PRESSURE: 148 MMHG

## 2020-09-28 DIAGNOSIS — R94.39 ABNORMAL NUCLEAR STRESS TEST: ICD-10-CM

## 2020-09-28 DIAGNOSIS — Z79.4 TYPE 2 DIABETES MELLITUS WITH HYPERGLYCEMIA, WITH LONG-TERM CURRENT USE OF INSULIN (HCC): ICD-10-CM

## 2020-09-28 DIAGNOSIS — E11.65 TYPE 2 DIABETES MELLITUS WITH HYPERGLYCEMIA, WITH LONG-TERM CURRENT USE OF INSULIN (HCC): ICD-10-CM

## 2020-09-28 DIAGNOSIS — Z00.00 MEDICARE ANNUAL WELLNESS VISIT, SUBSEQUENT: Primary | ICD-10-CM

## 2020-09-28 DIAGNOSIS — Z11.59 SCREENING FOR VIRAL DISEASE: ICD-10-CM

## 2020-09-28 DIAGNOSIS — E78.2 MIXED HYPERLIPIDEMIA: ICD-10-CM

## 2020-09-28 DIAGNOSIS — N40.0 BENIGN PROSTATIC HYPERPLASIA WITHOUT LOWER URINARY TRACT SYMPTOMS: ICD-10-CM

## 2020-09-28 DIAGNOSIS — E55.9 VITAMIN D DEFICIENCY: ICD-10-CM

## 2020-09-28 DIAGNOSIS — N17.9 AKI (ACUTE KIDNEY INJURY) (HCC): ICD-10-CM

## 2020-09-28 DIAGNOSIS — E11.65 UNCONTROLLED TYPE 2 DIABETES MELLITUS WITH HYPERGLYCEMIA (HCC): ICD-10-CM

## 2020-09-28 DIAGNOSIS — I45.4 BUNDLE BRANCH BLOCK: ICD-10-CM

## 2020-09-28 DIAGNOSIS — R06.09 DYSPNEA ON EXERTION: ICD-10-CM

## 2020-09-28 DIAGNOSIS — I10 ESSENTIAL HYPERTENSION: ICD-10-CM

## 2020-09-28 LAB
HBA1C MFR BLD: 8.3 %
POC CREATININE URINE: 300
POC MICROALBUMIN URINE: 30

## 2020-09-28 PROCEDURE — 99213 OFFICE O/P EST LOW 20 MIN: CPT | Performed by: INTERNAL MEDICINE

## 2020-09-28 PROCEDURE — 82044 UR ALBUMIN SEMIQUANTITATIVE: CPT | Performed by: INTERNAL MEDICINE

## 2020-09-28 PROCEDURE — G0444 DEPRESSION SCREEN ANNUAL: HCPCS | Performed by: INTERNAL MEDICINE

## 2020-09-28 PROCEDURE — 83036 HEMOGLOBIN GLYCOSYLATED A1C: CPT | Performed by: INTERNAL MEDICINE

## 2020-09-28 PROCEDURE — G0439 PPPS, SUBSEQ VISIT: HCPCS | Performed by: INTERNAL MEDICINE

## 2020-09-28 RX ORDER — AMLODIPINE BESYLATE 10 MG/1
5 TABLET ORAL 2 TIMES DAILY
Qty: 90 TABLET | Refills: 1 | Status: SHIPPED | OUTPATIENT
Start: 2020-09-28 | End: 2021-01-06 | Stop reason: SDUPTHER

## 2020-09-28 NOTE — PROGRESS NOTES
The ABCs of the Annual Wellness Visit  Subsequent Medicare Wellness Visit    Chief Complaint   Patient presents with   • Medicare Wellness-subsequent       Subjective   History of Present Illness:  Gabriela Mar is a 74 y.o. male who presents for a Subsequent Medicare Wellness Visit.    HEALTH RISK ASSESSMENT    Recent Hospitalizations:  No hospitalization(s) within the last year.    Current Medical Providers:  Patient Care Team:  Krystina Gee MD as PCP - General  Krystina Gee MD as PCP - Family Medicine  Krystina Gee MD as PCP - Claims Attributed  Evangelist Hernández MD as Consulting Physician (Ophthalmology)    Smoking Status:  Social History     Tobacco Use   Smoking Status Former Smoker   • Packs/day: 2.00   • Years: 35.00   • Pack years: 70.00   • Types: Cigarettes   Smokeless Tobacco Never Used   Tobacco Comment    QUIT 20 PLUS YEARS AGO        Alcohol Consumption:  Social History     Substance and Sexual Activity   Alcohol Use No    Comment: stopped drinking       Depression Screen:   PHQ-2/PHQ-9 Depression Screening 9/28/2020   Little interest or pleasure in doing things 0   Feeling down, depressed, or hopeless 0   Trouble falling or staying asleep, or sleeping too much -   Feeling tired or having little energy -   Poor appetite or overeating -   Feeling bad about yourself - or that you are a failure or have let yourself or your family down -   Trouble concentrating on things, such as reading the newspaper or watching television -   Moving or speaking so slowly that other people could have noticed. Or the opposite - being so fidgety or restless that you have been moving around a lot more than usual -   Thoughts that you would be better off dead, or of hurting yourself in some way -   Total Score 0   If you checked off any problems, how difficult have these problems made it for you to do your work, take care of things at home, or get along with other people? -       Fall Risk  Screen:  LEE Fall Risk Assessment was completed, and patient is at LOW risk for falls.Assessment completed on:9/28/2020    Health Habits and Functional and Cognitive Screening:  Functional & Cognitive Status 9/28/2020   Do you have difficulty preparing food and eating? No   Do you have difficulty bathing yourself, getting dressed or grooming yourself? No   Do you have difficulty using the toilet? No   Do you have difficulty moving around from place to place? No   Do you have trouble with steps or getting out of a bed or a chair? No   Current Diet Limited Junk Food   Dental Exam Not up to date   Eye Exam Up to date        Eye Exam Comment -   Exercise (times per week) 0 times per week   Current Exercise Activities Include None   Do you need help using the phone?  No   Are you deaf or do you have serious difficulty hearing?  No   Do you need help with transportation? No   Do you need help shopping? No   Do you need help preparing meals?  No   Do you need help with housework?  No   Do you need help with laundry? No   Do you need help taking your medications? No   Do you need help managing money? No   Do you ever drive or ride in a car without wearing a seat belt? No   Have you felt unusual stress, anger or loneliness in the last month? No   Who do you live with? Child   If you need help, do you have trouble finding someone available to you? No   Have you been bothered in the last four weeks by sexual problems? No   Do you have difficulty concentrating, remembering or making decisions? No         Does the patient have evidence of cognitive impairment? No    Asprin use counseling:Start ASA 81 mg daily     Age-appropriate Screening Schedule:  Refer to the list below for future screening recommendations based on patient's age, sex and/or medical conditions. Orders for these recommended tests are listed in the plan section. The patient has been provided with a written plan.    Health Maintenance   Topic Date Due   •  "DIABETIC EYE EXAM  12/22/2020 (Originally 10/8/2019)   • INFLUENZA VACCINE  09/28/2021 (Originally 8/1/2020)   • HEMOGLOBIN A1C  03/28/2021   • LIPID PANEL  06/01/2021   • DIABETIC FOOT EXAM  09/28/2021   • URINE MICROALBUMIN  09/28/2021   • COLONOSCOPY  07/01/2024   • TDAP/TD VACCINES  Discontinued   • ZOSTER VACCINE  Discontinued          The following portions of the patient's history were reviewed and updated as appropriate: allergies, current medications, past family history, past medical history, past social history, past surgical history and problem list.    Outpatient Medications Prior to Visit   Medication Sig Dispense Refill   • baclofen (LIORESAL) 10 MG tablet Take 1 tablet by mouth 3 (Three) Times a Day As Needed for Muscle Spasms. 60 tablet 0   • Cholecalciferol (VITAMIN D) 1000 UNITS tablet Take 1 capsule by mouth Daily.     • Cyanocobalamin (VITAMIN B-12 ER) 1000 MCG tablet controlled-release Take 1,000 mcg by mouth Daily.     • glimepiride (AMARYL) 4 MG tablet Take 1 tablet by mouth Daily With Dinner. 90 tablet 1   • Insulin Syringe 28G X 1/2\" 1 ML misc 1 each 2 (Two) Times a Day. 100 each 5   • lisinopril (PRINIVIL,ZESTRIL) 20 MG tablet Take 1 tablet by mouth 2 (Two) Times a Day. 180 tablet 1   • metFORMIN (GLUCOPHAGE) 1000 MG tablet TAKE ONE TABLET BY MOUTH IN THE MORNING AND 1 AND 1/2 TABLETS AT BEDTIME 75 tablet 5   • Multiple Vitamins-Minerals (MULTI VITAMIN/MINERALS PO) Take 1 tablet by mouth Daily.     • omeprazole (PRILOSEC) 20 MG capsule Take 1 capsule by mouth Daily.     • simvastatin (ZOCOR) 10 MG tablet Take 1 tablet by mouth every night at bedtime. 90 tablet 1   • amLODIPine (NORVASC) 10 MG tablet Take 0.5 tablets by mouth 2 (Two) Times a Day. 90 tablet 1   • insulin NPH (humuLIN N,novoLIN N) 100 UNIT/ML injection 35 Units in the morning and 45 units in the evening. 30 mL 5     No facility-administered medications prior to visit.        Patient Active Problem List   Diagnosis   • " Hyperlipidemia   • Sciatica   • Essential hypertension   • Esophageal reflux   • Obesity   • Osteoarthritis   • Vitamin D deficiency   • Vitamin B deficiency   • Hip arthritis   • Status post total replacement of right hip   • Acute blood loss anemia, mild, asymptomatic   • Leukocytosis, mild, likely reactive   • Chronic bilateral low back pain without sciatica   • Acute on chronic systolic congestive heart failure (CMS/HCC)   • Pneumonia of both lungs due to infectious organism   • Chronic obstructive pulmonary disease with acute lower respiratory infection (CMS/[facility])   • CAP (community acquired pneumonia)   • ZOHRA (acute kidney injury) (CMS/[facility])   • Type 2 diabetes mellitus with hyperglycemia, with long-term current use of insulin (CMS/[facility])       Advanced Care Planning:  ACP discussion was held with the patient during this visit. Patient does not have an advance directive, declines further assistance.    Review of Systems   Constitutional: Negative for chills and fatigue.   HENT: Negative for congestion, ear pain and sore throat.    Eyes: Negative for pain, redness and visual disturbance.   Respiratory: Positive for shortness of breath. Negative for cough.    Cardiovascular: Negative for chest pain, palpitations and leg swelling.   Gastrointestinal: Negative for abdominal pain, diarrhea and nausea.   Endocrine: Negative for cold intolerance and heat intolerance.   Genitourinary: Negative for flank pain and urgency.   Musculoskeletal: Negative for arthralgias and gait problem.   Skin: Negative for pallor and rash.   Neurological: Negative for dizziness, weakness and headaches.   Psychiatric/Behavioral: Negative for dysphoric mood and sleep disturbance. The patient is not nervous/anxious.        Compared to one year ago, the patient feels his physical health is better.  Compared to one year ago, the patient feels his mental health is better.    Reviewed chart for potential of high risk medication in the elderly:  "yes  Reviewed chart for potential of harmful drug interactions in the elderly:yes    Objective         Vitals:    09/28/20 1126   BP: 148/62   Pulse: 86   Temp: 98.6 °F (37 °C)   SpO2: 97%  Comment: ra   Weight: 105 kg (231 lb)   Height: 177.8 cm (70\")   PainSc: 0-No pain   PainLoc: Back       Body mass index is 33.15 kg/m².  Discussed the patient's BMI with him. The BMI is above average; BMI management plan is completed.    Physical Exam  Vitals signs and nursing note reviewed.   Constitutional:       Appearance: He is well-developed. He is obese.   HENT:      Head: Normocephalic and atraumatic.      Right Ear: Tympanic membrane and external ear normal.      Left Ear: Tympanic membrane and external ear normal.      Nose: Nose normal. No rhinorrhea.      Mouth/Throat:      Pharynx: No oropharyngeal exudate.   Eyes:      Conjunctiva/sclera: Conjunctivae normal.      Pupils: Pupils are equal, round, and reactive to light.   Neck:      Musculoskeletal: Neck supple.      Thyroid: No thyromegaly.      Vascular: No carotid bruit.   Cardiovascular:      Rate and Rhythm: Normal rate and regular rhythm.      Heart sounds: No murmur. No friction rub. No gallop.    Pulmonary:      Effort: Pulmonary effort is normal.      Breath sounds: Normal breath sounds. No wheezing or rales.   Chest:      Chest wall: No tenderness.   Abdominal:      General: Bowel sounds are normal. There is no distension.      Palpations: Abdomen is soft.      Tenderness: There is no abdominal tenderness.   Lymphadenopathy:      Cervical: No cervical adenopathy.   Skin:     General: Skin is warm and dry.      Findings: No lesion or rash.      Comments: DM foot exam and monofilament test done- wnl   Neurological:      Mental Status: He is alert and oriented to person, place, and time.      Cranial Nerves: No cranial nerve deficit.      Sensory: No sensory deficit.      Coordination: Coordination normal.      Gait: Gait normal.      Deep Tendon Reflexes: " "Reflexes normal.   Psychiatric:         Mood and Affect: Mood normal.         Behavior: Behavior normal.         Judgment: Judgment normal.         Lab Results   Component Value Date    HGBA1C 8.3 09/28/2020        Assessment/Plan   Medicare Risks and Personalized Health Plan  CMS Preventative Services Quick Reference  Advance Directive Discussion  Cardiovascular risk  Diabetic Lab Screening   Fall Risk  Hearing Problem  Immunizations Discussed/Encouraged (specific immunizations; declines flu shot )  Obesity/Overweight   Prostate Cancer Screening     The above risks/problems have been discussed with the patient.  Pertinent information has been shared with the patient in the After Visit Summary.  Follow up plans and orders are seen below in the Assessment/Plan Section.    Medicare Wellness-subsequent    Subjective   Gabriela Mar is a 74 y.o. male is here today for follow-up.  HPI  Here for a follow up on his dm2, htn, hlp, and his chronic soa.  Has some chronic back pain.khai with house work, and goes away when he sits down.  Continues to have soa with activity. Walking to the mailbox appx 150 feet away.    Current Outpatient Medications:   •  amLODIPine (NORVASC) 10 MG tablet, Take 0.5 tablets by mouth 2 (Two) Times a Day., Disp: 90 tablet, Rfl: 1  •  baclofen (LIORESAL) 10 MG tablet, Take 1 tablet by mouth 3 (Three) Times a Day As Needed for Muscle Spasms., Disp: 60 tablet, Rfl: 0  •  Cholecalciferol (VITAMIN D) 1000 UNITS tablet, Take 1 capsule by mouth Daily., Disp: , Rfl:   •  Cyanocobalamin (VITAMIN B-12 ER) 1000 MCG tablet controlled-release, Take 1,000 mcg by mouth Daily., Disp: , Rfl:   •  glimepiride (AMARYL) 4 MG tablet, Take 1 tablet by mouth Daily With Dinner., Disp: 90 tablet, Rfl: 1  •  insulin NPH (humuLIN N,novoLIN N) 100 UNIT/ML injection, 50 Units in the morning and 50 units in the evening., Disp: 30 mL, Rfl: 5  •  Insulin Syringe 28G X 1/2\" 1 ML misc, 1 each 2 (Two) Times a Day., Disp: 100 each, " "Rfl: 5  •  lisinopril (PRINIVIL,ZESTRIL) 20 MG tablet, Take 1 tablet by mouth 2 (Two) Times a Day., Disp: 180 tablet, Rfl: 1  •  metFORMIN (GLUCOPHAGE) 1000 MG tablet, TAKE ONE TABLET BY MOUTH IN THE MORNING AND 1 AND 1/2 TABLETS AT BEDTIME, Disp: 75 tablet, Rfl: 5  •  Multiple Vitamins-Minerals (MULTI VITAMIN/MINERALS PO), Take 1 tablet by mouth Daily., Disp: , Rfl:   •  omeprazole (PRILOSEC) 20 MG capsule, Take 1 capsule by mouth Daily., Disp: , Rfl:   •  simvastatin (ZOCOR) 10 MG tablet, Take 1 tablet by mouth every night at bedtime., Disp: 90 tablet, Rfl: 1      The following portions of the patient's history were reviewed and updated as appropriate: allergies, current medications, past family history, past medical history, past social history, past surgical history and problem list.        Objective   /62   Pulse 86   Temp 98.6 °F (37 °C)   Ht 177.8 cm (70\")   Wt 105 kg (231 lb)   SpO2 97% Comment: ra  BMI 33.15 kg/m²         Results for orders placed or performed in visit on 09/28/20   POC Glycosylated Hemoglobin (Hb A1C)    Specimen: Blood   Result Value Ref Range    Hemoglobin A1C 8.3 %   POCT microalbumin    Specimen: Urine   Result Value Ref Range    Microalbumin, Urine 30     Creatinine, Urine 300          Diabetic Foot Exam Performed and Monofilament Test Performed    Assessment/Plan   Diagnoses and all orders for this visit:    Medicare annual wellness visit, subsequent    Essential hypertension  -     amLODIPine (NORVASC) 10 MG tablet; Take 0.5 tablets by mouth 2 (Two) Times a Day.    Type 2 diabetes mellitus with hyperglycemia, with long-term current use of insulin (CMS/[facility])  -     POC Glycosylated Hemoglobin (Hb A1C)  -     POCT microalbumin    Dyspnea on exertion  -     Stress Test With Myocardial Perfusion One Day; Future    Bundle branch block  -     Stress Test With Myocardial Perfusion One Day; Future    Mixed hyperlipidemia  -     TSH; Future  -     Lipid Panel; Future    Vitamin D " deficiency  -     Vitamin D 25 Hydroxy; Future    ZOHRA (acute kidney injury) (CMS/HCC)  -     CBC (No Diff); Future  -     Comprehensive Metabolic Panel; Future    Screening for viral disease  -     Hepatitis C Antibody; Future    Benign prostatic hyperplasia without lower urinary tract symptoms  -     PSA DIAGNOSTIC; Future    Uncontrolled type 2 diabetes mellitus with hyperglycemia (CMS/HCC)  Comments:  increase insulin to 45 units with dinner, and 30 in AM.  Orders:  -     insulin NPH (humuLIN N,novoLIN N) 100 UNIT/ML injection; 50 Units in the morning and 50 units in the evening.               PHQ-2/PHQ-9 Depression screening reviewed with patient . Total time spent today for depression screening  with Gabriela Mar  was 15 minutes in counseling, along with plans for any diagnositc work-up and treatment.    Follow Up:  Return in about 3 months (around 12/28/2020) for Next scheduled follow up.     An After Visit Summary and PPPS were given to the patient.

## 2020-10-01 ENCOUNTER — LAB (OUTPATIENT)
Dept: LAB | Facility: HOSPITAL | Age: 75
End: 2020-10-01

## 2020-10-01 DIAGNOSIS — N17.9 AKI (ACUTE KIDNEY INJURY) (HCC): ICD-10-CM

## 2020-10-01 DIAGNOSIS — N40.0 BENIGN PROSTATIC HYPERPLASIA WITHOUT LOWER URINARY TRACT SYMPTOMS: ICD-10-CM

## 2020-10-01 DIAGNOSIS — E78.2 MIXED HYPERLIPIDEMIA: ICD-10-CM

## 2020-10-01 DIAGNOSIS — E55.9 VITAMIN D DEFICIENCY: ICD-10-CM

## 2020-10-01 DIAGNOSIS — Z11.59 SCREENING FOR VIRAL DISEASE: ICD-10-CM

## 2020-10-01 LAB
ALBUMIN SERPL-MCNC: 3.9 G/DL (ref 3.5–5.2)
ALBUMIN/GLOB SERPL: 1.3 G/DL
ALP SERPL-CCNC: 81 U/L (ref 39–117)
ALT SERPL W P-5'-P-CCNC: 39 U/L (ref 1–41)
ANION GAP SERPL CALCULATED.3IONS-SCNC: 12.3 MMOL/L (ref 5–15)
AST SERPL-CCNC: 45 U/L (ref 1–40)
BILIRUB SERPL-MCNC: 0.2 MG/DL (ref 0–1.2)
BUN SERPL-MCNC: 21 MG/DL (ref 8–23)
BUN/CREAT SERPL: 14.1 (ref 7–25)
CALCIUM SPEC-SCNC: 9.3 MG/DL (ref 8.6–10.5)
CHLORIDE SERPL-SCNC: 104 MMOL/L (ref 98–107)
CHOLEST SERPL-MCNC: 111 MG/DL (ref 0–200)
CO2 SERPL-SCNC: 26.7 MMOL/L (ref 22–29)
CREAT SERPL-MCNC: 1.49 MG/DL (ref 0.76–1.27)
DEPRECATED RDW RBC AUTO: 43.3 FL (ref 37–54)
ERYTHROCYTE [DISTWIDTH] IN BLOOD BY AUTOMATED COUNT: 13.8 % (ref 12.3–15.4)
GFR SERPL CREATININE-BSD FRML MDRD: 46 ML/MIN/1.73
GLOBULIN UR ELPH-MCNC: 3 GM/DL
GLUCOSE SERPL-MCNC: 177 MG/DL (ref 65–99)
HCT VFR BLD AUTO: 37.1 % (ref 37.5–51)
HDLC SERPL-MCNC: 38 MG/DL (ref 40–60)
HGB BLD-MCNC: 12.4 G/DL (ref 13–17.7)
LDLC SERPL CALC-MCNC: 44 MG/DL (ref 0–100)
LDLC/HDLC SERPL: 1.15 {RATIO}
MCH RBC QN AUTO: 29.1 PG (ref 26.6–33)
MCHC RBC AUTO-ENTMCNC: 33.4 G/DL (ref 31.5–35.7)
MCV RBC AUTO: 87.1 FL (ref 79–97)
PLATELET # BLD AUTO: 150 10*3/MM3 (ref 140–450)
PMV BLD AUTO: 12.3 FL (ref 6–12)
POTASSIUM SERPL-SCNC: 5.1 MMOL/L (ref 3.5–5.2)
PROT SERPL-MCNC: 6.9 G/DL (ref 6–8.5)
PSA SERPL-MCNC: 0.36 NG/ML (ref 0–4)
RBC # BLD AUTO: 4.26 10*6/MM3 (ref 4.14–5.8)
SODIUM SERPL-SCNC: 143 MMOL/L (ref 136–145)
TRIGL SERPL-MCNC: 146 MG/DL (ref 0–150)
TSH SERPL DL<=0.05 MIU/L-ACNC: 1.73 UIU/ML (ref 0.27–4.2)
VLDLC SERPL-MCNC: 29.2 MG/DL (ref 5–40)
WBC # BLD AUTO: 8.07 10*3/MM3 (ref 3.4–10.8)

## 2020-10-01 PROCEDURE — 80061 LIPID PANEL: CPT | Performed by: INTERNAL MEDICINE

## 2020-10-01 PROCEDURE — 84443 ASSAY THYROID STIM HORMONE: CPT | Performed by: INTERNAL MEDICINE

## 2020-10-01 PROCEDURE — 86803 HEPATITIS C AB TEST: CPT | Performed by: INTERNAL MEDICINE

## 2020-10-01 PROCEDURE — 82306 VITAMIN D 25 HYDROXY: CPT | Performed by: INTERNAL MEDICINE

## 2020-10-01 PROCEDURE — 84153 ASSAY OF PSA TOTAL: CPT | Performed by: INTERNAL MEDICINE

## 2020-10-01 PROCEDURE — 85027 COMPLETE CBC AUTOMATED: CPT | Performed by: INTERNAL MEDICINE

## 2020-10-01 PROCEDURE — 36415 COLL VENOUS BLD VENIPUNCTURE: CPT

## 2020-10-01 PROCEDURE — 80053 COMPREHEN METABOLIC PANEL: CPT | Performed by: INTERNAL MEDICINE

## 2020-10-02 LAB
25(OH)D3 SERPL-MCNC: 46.9 NG/ML (ref 30–100)
HCV AB SER DONR QL: NORMAL

## 2020-10-15 DIAGNOSIS — I10 ESSENTIAL HYPERTENSION: ICD-10-CM

## 2020-10-15 RX ORDER — AMLODIPINE BESYLATE 10 MG/1
5 TABLET ORAL 2 TIMES DAILY
Qty: 90 TABLET | Refills: 1 | OUTPATIENT
Start: 2020-10-15

## 2020-10-15 NOTE — TELEPHONE ENCOUNTER
PATIENT CALLED NEEDING REFILL ON AMLODIPINE 10 MG    [facility]

## 2020-10-18 ENCOUNTER — APPOINTMENT (OUTPATIENT)
Dept: PREADMISSION TESTING | Facility: HOSPITAL | Age: 75
End: 2020-10-18

## 2020-10-18 PROCEDURE — C9803 HOPD COVID-19 SPEC COLLECT: HCPCS

## 2020-10-18 PROCEDURE — U0004 COV-19 TEST NON-CDC HGH THRU: HCPCS

## 2020-10-19 LAB — SARS-COV-2 RNA RESP QL NAA+PROBE: NOT DETECTED

## 2020-10-21 ENCOUNTER — HOSPITAL ENCOUNTER (OUTPATIENT)
Dept: CARDIOLOGY | Facility: HOSPITAL | Age: 75
Discharge: HOME | End: 2020-10-21
Admitting: INTERNAL MEDICINE

## 2020-10-21 VITALS
WEIGHT: 231 LBS | SYSTOLIC BLOOD PRESSURE: 144 MMHG | HEART RATE: 84 BPM | DIASTOLIC BLOOD PRESSURE: 58 MMHG | BODY MASS INDEX: 33.07 KG/M2 | HEIGHT: 70 IN

## 2020-10-21 DIAGNOSIS — R06.09 DYSPNEA ON EXERTION: ICD-10-CM

## 2020-10-21 DIAGNOSIS — I45.4 BUNDLE BRANCH BLOCK: ICD-10-CM

## 2020-10-21 PROBLEM — I25.119 CORONARY ARTERY DISEASE INVOLVING NATIVE CORONARY ARTERY OF NATIVE HEART WITH ANGINA PECTORIS: Status: ACTIVE | Noted: 2020-10-21

## 2020-10-21 LAB
BH CV STRESS BP STAGE 1: NORMAL
BH CV STRESS DURATION MIN STAGE 1: 3
BH CV STRESS DURATION MIN STAGE 2: 3
BH CV STRESS DURATION SEC STAGE 1: 0
BH CV STRESS DURATION SEC STAGE 2: 0
BH CV STRESS GRADE STAGE 1: 10
BH CV STRESS GRADE STAGE 2: 12
BH CV STRESS HR STAGE 1: 114
BH CV STRESS METS STAGE 1: 5
BH CV STRESS METS STAGE 2: 7.5
BH CV STRESS O2 STAGE 1: 91
BH CV STRESS PROTOCOL 1: NORMAL
BH CV STRESS PROTOCOL 2 BP STAGE 3: 99
BH CV STRESS PROTOCOL 2 BP STAGE 4: 98
BH CV STRESS PROTOCOL 2 COMMENTS STAGE 1: NORMAL
BH CV STRESS PROTOCOL 2 DOSE REGADENOSON STAGE 1: 0.4
BH CV STRESS PROTOCOL 2 DURATION MIN STAGE 1: 1
BH CV STRESS PROTOCOL 2 DURATION MIN STAGE 2: 1
BH CV STRESS PROTOCOL 2 DURATION MIN STAGE 3: 1
BH CV STRESS PROTOCOL 2 DURATION MIN STAGE 4: 1
BH CV STRESS PROTOCOL 2 DURATION SEC STAGE 2: 0
BH CV STRESS PROTOCOL 2 HR STAGE 1: 104
BH CV STRESS PROTOCOL 2 HR STAGE 2: 100
BH CV STRESS PROTOCOL 2 HR STAGE 3: NORMAL
BH CV STRESS PROTOCOL 2 HR STAGE 4: NORMAL
BH CV STRESS PROTOCOL 2 O2 STAGE 1: 96
BH CV STRESS PROTOCOL 2 O2 STAGE 2: 97
BH CV STRESS PROTOCOL 2 O2 STAGE 3: 97
BH CV STRESS PROTOCOL 2 STAGE 1: 1
BH CV STRESS PROTOCOL 2 STAGE 2: 2
BH CV STRESS PROTOCOL 2 STAGE 3: 3
BH CV STRESS PROTOCOL 2 STAGE 4: 4
BH CV STRESS PROTOCOL 2: NORMAL
BH CV STRESS RECOVERY BP: NORMAL MMHG
BH CV STRESS RECOVERY HR: 96 BPM
BH CV STRESS SPEED STAGE 1: 1.7
BH CV STRESS SPEED STAGE 2: 2.5
BH CV STRESS STAGE 1: 1
BH CV STRESS STAGE 2: 2
LV EF NUC BP: 68 %
MAXIMAL PREDICTED HEART RATE: 145 BPM
PERCENT MAX PREDICTED HR: 82.76 %
STRESS BASELINE BP: NORMAL MMHG
STRESS BASELINE HR: 83 BPM
STRESS O2 SAT REST: 94 %
STRESS PERCENT HR: 97 %
STRESS POST EXERCISE DUR MIN: 4 MIN
STRESS POST EXERCISE DUR SEC: 0 SEC
STRESS POST O2 SAT PEAK: 89 %
STRESS POST PEAK BP: NORMAL MMHG
STRESS POST PEAK HR: 120 BPM
STRESS TARGET HR: 123 BPM

## 2020-10-21 PROCEDURE — 93017 CV STRESS TEST TRACING ONLY: CPT

## 2020-10-21 PROCEDURE — 78452 HT MUSCLE IMAGE SPECT MULT: CPT | Performed by: INTERNAL MEDICINE

## 2020-10-21 PROCEDURE — 25010000002 REGADENOSON 0.4 MG/5ML SOLUTION: Performed by: INTERNAL MEDICINE

## 2020-10-21 PROCEDURE — A9500 TC99M SESTAMIBI: HCPCS | Performed by: INTERNAL MEDICINE

## 2020-10-21 PROCEDURE — 78452 HT MUSCLE IMAGE SPECT MULT: CPT

## 2020-10-21 PROCEDURE — 93018 CV STRESS TEST I&R ONLY: CPT | Performed by: INTERNAL MEDICINE

## 2020-10-21 PROCEDURE — 0 TECHNETIUM SESTAMIBI: Performed by: INTERNAL MEDICINE

## 2020-10-21 RX ORDER — ASPIRIN 81 MG/1
81 TABLET ORAL DAILY
COMMUNITY
End: 2020-11-24

## 2020-10-21 RX ADMIN — TECHNETIUM TC 99M SESTAMIBI 1 DOSE: 1 INJECTION INTRAVENOUS at 10:55

## 2020-10-21 RX ADMIN — TECHNETIUM TC 99M SESTAMIBI 1 DOSE: 1 INJECTION INTRAVENOUS at 08:20

## 2020-10-21 RX ADMIN — REGADENOSON 0.4 MG: 0.08 INJECTION, SOLUTION INTRAVENOUS at 10:52

## 2020-10-23 ENCOUNTER — TELEPHONE (OUTPATIENT)
Dept: INTERNAL MEDICINE | Facility: CLINIC | Age: 75
End: 2020-10-23

## 2020-10-23 NOTE — TELEPHONE ENCOUNTER
----- Message from Krystina Gee MD sent at 10/23/2020  1:36 AM EDT -----  Please make sure patient is aware of the following [employer] message:    Your stress test is abnormal and shows that you may have a blockage.  You may need a heart cath, therefore I have placed a referral with the cardiologist ASAP.  Please discuss further evaluation with him.    thanks

## 2020-11-23 NOTE — PROGRESS NOTES
"[facility]  Cardiology Consultation Note     DATE: 11/24/2020  Requesting Provider: Krystina Gee MD  PCP: Krystina Gee MD    IDENTIFICATION: Gabriela Mar is a 75 y.o. male who resides in [place], [place].    REASON FOR CONSULTATION: Abnormal stress test         Dear Dr. Gee,    Thank you for referring Gabriela Mar to my office for evaluation of abnormal stress test.  The patient is a 75-year-old gentleman with a history of diabetes, hypertension, chronic kidney disease, and hyperlipidemia.  He reports that he has had shortness of breath over the last year which occurs with moderate exertion.  He has avoided certain activities knowing they will cause breathlessness.  He denies any chest pressure, jaw discomfort while active.  He denies orthopnea or PND.  The patient takes furosemide 20 mg as needed for swelling.  He is also taking high-dose amlodipine for blood pressure.    Due to his symptoms, you ordered a stress test.  This suggested apical ischemia.  Notably, coronary calcification was noted on CT attenuation correction images confirming the presence of coronary artery disease.  The patient has been taken off aspirin to \"protect his kidneys\".      Past Medical History, Past Surgical History, Family history, Social History, and Medications were all reviewed with the patient today and updated as necessary.       Current Outpatient Medications:   •  amLODIPine (NORVASC) 10 MG tablet, Take 0.5 tablets by mouth 2 (Two) Times a Day., Disp: 90 tablet, Rfl: 1  •  baclofen (LIORESAL) 10 MG tablet, Take 1 tablet by mouth 3 (Three) Times a Day As Needed for Muscle Spasms., Disp: 60 tablet, Rfl: 0  •  Cholecalciferol (VITAMIN D) 1000 UNITS tablet, Take 1 capsule by mouth Daily., Disp: , Rfl:   •  Cyanocobalamin (VITAMIN B-12 ER) 1000 MCG tablet controlled-release, Take 1,000 mcg by mouth Daily., Disp: , Rfl:   •  furosemide (LASIX) 20 MG tablet, Take 1 tablet by mouth As " "Needed (swelling)., Disp: , Rfl:   •  glimepiride (AMARYL) 4 MG tablet, Take 1 tablet by mouth Daily With Dinner., Disp: 90 tablet, Rfl: 1  •  insulin NPH (humuLIN N,novoLIN N) 100 UNIT/ML injection, 50 Units in the morning and 50 units in the evening., Disp: 30 mL, Rfl: 5  •  Insulin Syringe 28G X 1/2\" 1 ML misc, 1 each 2 (Two) Times a Day., Disp: 100 each, Rfl: 5  •  lisinopril (PRINIVIL,ZESTRIL) 20 MG tablet, Take 1 tablet by mouth 2 (Two) Times a Day., Disp: 180 tablet, Rfl: 1  •  metFORMIN (GLUCOPHAGE) 1000 MG tablet, TAKE ONE TABLET BY MOUTH IN THE MORNING AND 1 AND 1/2 TABLETS AT BEDTIME, Disp: 75 tablet, Rfl: 5  •  Multiple Vitamins-Minerals (MULTI VITAMIN/MINERALS PO), Take 1 tablet by mouth Daily., Disp: , Rfl:   •  omeprazole (PRILOSEC) 20 MG capsule, Take 1 capsule by mouth Daily., Disp: , Rfl:   •  potassium chloride (K-DUR,KLOR-CON) 10 MEQ CR tablet, Take 1 tablet by mouth As Needed (swelling)., Disp: , Rfl:   •  simvastatin (ZOCOR) 10 MG tablet, Take 1 tablet by mouth every night at bedtime., Disp: 90 tablet, Rfl: 1  •  metoprolol succinate XL (TOPROL-XL) 25 MG 24 hr tablet, Take 1 tablet by mouth Daily., Disp: 90 tablet, Rfl: 3    Allergies   Allergen Reactions   • Bactrim [Sulfamethoxazole-Trimethoprim] Other (See Comments)     Joint pain   • Sulfa Antibiotics GI Intolerance     Makes bones hurt           Past Medical History:   Diagnosis Date   • Arthritis    • CKD (chronic kidney disease), stage III    • Diabetes mellitus (CMS/HCC)    • Esophagitis, reflux    • Hyperlipidemia    • Hypertension    • Obesity    • Pharyngitis    • Wears eyeglasses        Past Surgical History:   Procedure Laterality Date   • COLONOSCOPY      2015   • ENDOSCOPY      2011   • EYE SURGERY      cataract 8/22 and 10/18/18 - Dr. Hernández   • JOINT REPLACEMENT      LEFT HIP 2016-MARCH    • SUBMAXILLARY CYST  EXCISION      Right back   • TONSILLECTOMY     • TOTAL HIP ARTHROPLASTY Right 3/7/2017    Procedure: RIGHT TOTAL HIP " "ARTHROPLASTY;  Surgeon: Reynaldo Suarez MD;  Location: [facility];  Service:    • VASECTOMY     • WRIST GANGLION EXCISION         Family History   Problem Relation Age of Onset   • Diabetes Mother    • Colon cancer Father    • Kidney failure Sister    • Diabetes Sister    • Colon cancer Brother    • Colon cancer Brother    • Lung cancer Brother    • Squamous cell carcinoma Brother         side of face   • Diabetes Sister        Social History     Tobacco Use   • Smoking status: Former Smoker     Packs/day: 2.00     Years: 35.00     Pack years: 70.00     Types: Cigarettes   • Smokeless tobacco: Never Used   • Tobacco comment: QUIT 20 PLUS YEARS AGO    Substance Use Topics   • Alcohol use: No     Comment: stopped drinking       Review of Systems   Constitution: Negative for fever.   HENT: Negative for ear pain and sore throat.    Cardiovascular: Positive for claudication. Negative for chest pain, leg swelling, orthopnea, paroxysmal nocturnal dyspnea and syncope.   Respiratory: Positive for shortness of breath. Negative for hemoptysis, sputum production and wheezing.    Skin: Negative for rash.   Musculoskeletal: Positive for neck pain.   Gastrointestinal: Negative for abdominal pain and vomiting.   Neurological: Negative for headaches.               /62 (BP Location: Right arm, Patient Position: Sitting)   Pulse 84   Ht 177.8 cm (70\")   Wt 107 kg (235 lb 3.2 oz)   SpO2 96%   BMI 33.75 kg/m²        Constitutional:       Appearance: Healthy appearance. Well-developed.   Eyes:      General: Lids are normal. No scleral icterus.     Conjunctiva/sclera: Conjunctivae normal.   HENT:      Head: Normocephalic and atraumatic.   Neck:      Musculoskeletal: Normal range of motion.      Thyroid: No thyromegaly.      Vascular: No carotid bruit or JVD.   Pulmonary:      Effort: Pulmonary effort is normal.      Breath sounds: Normal breath sounds. No wheezing. No rhonchi. No rales.   Cardiovascular:      Normal rate. " Regular rhythm.      Murmurs: There is no murmur.      No gallop. No rub.   Pulses:     Intact distal pulses.   Abdominal:      General: There is no distension.      Palpations: Abdomen is soft. There is no abdominal mass.   Skin:     General: Skin is warm and dry.      Findings: No rash.   Neurological:      General: No focal deficit present.      Mental Status: Alert and oriented to person, place, and time.      Gait: Gait is intact.   Psychiatric:         Attention and Perception: Attention normal.         Mood and Affect: Mood normal.         Behavior: Behavior normal.             ECG 12 Lead    Date/Time: 11/24/2020 2:03 PM  Performed by: Hay Singer IV, MD  Authorized by: Hay Singer IV, MD   Rhythm: sinus rhythm  Rate: normal  BPM: 84  Conduction: right bundle branch block  QRS axis: left    Clinical impression: abnormal EKG            Lab Results   Component Value Date    CHOL 111 10/01/2020    TRIG 146 10/01/2020    HDL 38 (L) 10/01/2020    LDL 44 10/01/2020     Lab Results   Component Value Date    HGBA1C 8.3 09/28/2020      Lab Results   Component Value Date    GLUCOSE 177 (H) 10/01/2020    BUN 21 10/01/2020    CREATININE 1.49 (H) 10/01/2020    EGFRIFNONA 46 (L) 10/01/2020    BCR 14.1 10/01/2020    K 5.1 10/01/2020    CO2 26.7 10/01/2020    CALCIUM 9.3 10/01/2020    ALBUMIN 3.90 10/01/2020    ALKPHOS 81 10/01/2020    AST 45 (H) 10/01/2020    ALT 39 10/01/2020      Lab Results   Component Value Date    WBC 8.07 10/01/2020    RBC 4.26 10/01/2020    HGB 12.4 (L) 10/01/2020    HCT 37.1 (L) 10/01/2020    MCV 87.1 10/01/2020     10/01/2020     Lab Results   Component Value Date    TSH 1.730 10/01/2020             Problem List Items Addressed This Visit        Cardiology Problems    Coronary artery disease involving native coronary artery of native heart with angina pectoris (CMS/HCC) - Primary    Overview     · Pharmacologic nuclear stress (10/21/2020): apical ischemia.   Normal LVEF.  Coronary calcification on CT attenuation correction images.         Current Assessment & Plan     · No anginal symptoms, but shortness of breath symptoms may be anginal equivalent  · Low risk stress test findings  · Will intensify antianginal medical regimen with addition of metoprolol succinate 25 mg daily  · Continue amlodipine  · Patient to contact office in several weeks to report changes in symptoms  · If symptoms worsening, recommend cardiac catheterization         Relevant Medications    metoprolol succinate XL (TOPROL-XL) 25 MG 24 hr tablet    Other Relevant Orders    CBC (No Diff)    Chronic diastolic congestive heart failure (CMS/HCC)    Overview     · Echocardiogram (01/04/2017): LVEF 50%. Trace MR and TR.          Current Assessment & Plan     · NYHA III symptoms for last year  · Obtain echocardiogram  · Start furosemide 20 mg daily if renal function allows  · Obtain CMP, proBNP today  · Patient report symptoms to my nurse in 2 weeks         Relevant Medications    furosemide (LASIX) 20 MG tablet    potassium chloride (K-DUR,KLOR-CON) 10 MEQ CR tablet    metoprolol succinate XL (TOPROL-XL) 25 MG 24 hr tablet    Other Relevant Orders    proBNP    Adult Transthoracic Echo Complete W/ Cont if Necessary Per Protocol    Hyperlipidemia LDL goal <70    Overview     • High intensity statin therapy indicated given the presence of CAD         Current Assessment & Plan     · Obtain CMP and lipids  · Consider changing statin therapy from simvastatin to atorvastatin given interaction with simvastatin and amlodipine         Relevant Orders    Comprehensive Metabolic Panel    Essential hypertension    Overview     • Target blood pressure <130/80 mmHg         Current Assessment & Plan     · Elevated at today's visit  · Start metoprolol succinate 25 mg daily         Relevant Medications    furosemide (LASIX) 20 MG tablet    metoprolol succinate XL (TOPROL-XL) 25 MG 24 hr tablet       Other    Type 2  diabetes mellitus with hyperglycemia, with long-term current use of insulin (CMS/[facility])    Current Assessment & Plan     · Consider GLP-1 receptor agonist therapy or SGLT2 inhibitor therapy for CV risk reduction  · Continue ACE inhibitor and statin therapy         Relevant Medications    metFORMIN (GLUCOPHAGE) 1000 MG tablet    glimepiride (AMARYL) 4 MG tablet    insulin NPH (humuLIN N,novoLIN N) 100 UNIT/ML injection    Other Relevant Orders    Hemoglobin A1c                   · Obtain proBNP, CMP, lipids, hemoglobin A1c  · Obtain echo  · Start metoprolol succinate 25 mg daily  · Consider switching simvastatin to atorvastatin due to interaction with amlodipine  · Increase furosemide dosing to 20 mg daily  · Patient to contact office in 2 weeks with update on symptoms  · If symptomatically not improving, will consider cardiac catheterization  Return in about 3 months (around 2/24/2021).          SORAYA Singer MD [facility], [facility]  Interventional and General Cardiology    11/24/20  17:50 EST

## 2020-11-24 ENCOUNTER — LAB (OUTPATIENT)
Dept: LAB | Facility: HOSPITAL | Age: 75
End: 2020-11-24

## 2020-11-24 ENCOUNTER — CONSULT (OUTPATIENT)
Dept: CARDIOLOGY | Facility: CLINIC | Age: 75
End: 2020-11-24

## 2020-11-24 VITALS
WEIGHT: 235.2 LBS | OXYGEN SATURATION: 96 % | HEART RATE: 84 BPM | SYSTOLIC BLOOD PRESSURE: 164 MMHG | DIASTOLIC BLOOD PRESSURE: 62 MMHG | HEIGHT: 70 IN | BODY MASS INDEX: 33.67 KG/M2

## 2020-11-24 DIAGNOSIS — E78.5 HYPERLIPIDEMIA LDL GOAL <70: ICD-10-CM

## 2020-11-24 DIAGNOSIS — Z79.4 TYPE 2 DIABETES MELLITUS WITH HYPERGLYCEMIA, WITH LONG-TERM CURRENT USE OF INSULIN (HCC): ICD-10-CM

## 2020-11-24 DIAGNOSIS — I10 ESSENTIAL HYPERTENSION: ICD-10-CM

## 2020-11-24 DIAGNOSIS — E11.65 TYPE 2 DIABETES MELLITUS WITH HYPERGLYCEMIA, WITH LONG-TERM CURRENT USE OF INSULIN (HCC): ICD-10-CM

## 2020-11-24 DIAGNOSIS — I50.32 CHRONIC DIASTOLIC CONGESTIVE HEART FAILURE (HCC): ICD-10-CM

## 2020-11-24 DIAGNOSIS — E78.2 MIXED HYPERLIPIDEMIA: ICD-10-CM

## 2020-11-24 DIAGNOSIS — I25.119 CORONARY ARTERY DISEASE INVOLVING NATIVE CORONARY ARTERY OF NATIVE HEART WITH ANGINA PECTORIS (HCC): Primary | ICD-10-CM

## 2020-11-24 DIAGNOSIS — I25.119 CORONARY ARTERY DISEASE INVOLVING NATIVE CORONARY ARTERY OF NATIVE HEART WITH ANGINA PECTORIS (HCC): ICD-10-CM

## 2020-11-24 PROBLEM — N17.9 AKI (ACUTE KIDNEY INJURY): Status: RESOLVED | Noted: 2019-12-05 | Resolved: 2020-11-24

## 2020-11-24 PROBLEM — I50.33 ACUTE ON CHRONIC DIASTOLIC CONGESTIVE HEART FAILURE (HCC): Status: ACTIVE | Noted: 2018-05-21

## 2020-11-24 PROBLEM — D62 ACUTE BLOOD LOSS ANEMIA: Status: RESOLVED | Noted: 2017-03-08 | Resolved: 2020-11-24

## 2020-11-24 PROBLEM — J18.9 PNEUMONIA OF BOTH LUNGS DUE TO INFECTIOUS ORGANISM: Status: RESOLVED | Noted: 2019-12-04 | Resolved: 2020-11-24

## 2020-11-24 PROBLEM — J18.9 CAP (COMMUNITY ACQUIRED PNEUMONIA): Status: RESOLVED | Noted: 2019-12-05 | Resolved: 2020-11-24

## 2020-11-24 PROBLEM — Z96.641 STATUS POST TOTAL REPLACEMENT OF RIGHT HIP: Status: RESOLVED | Noted: 2017-03-07 | Resolved: 2020-11-24

## 2020-11-24 PROBLEM — D72.829 LEUKOCYTOSIS: Status: RESOLVED | Noted: 2017-03-08 | Resolved: 2020-11-24

## 2020-11-24 PROCEDURE — 99204 OFFICE O/P NEW MOD 45 MIN: CPT | Performed by: INTERNAL MEDICINE

## 2020-11-24 PROCEDURE — 93000 ELECTROCARDIOGRAM COMPLETE: CPT | Performed by: INTERNAL MEDICINE

## 2020-11-24 PROCEDURE — 83880 ASSAY OF NATRIURETIC PEPTIDE: CPT

## 2020-11-24 PROCEDURE — 80053 COMPREHEN METABOLIC PANEL: CPT

## 2020-11-24 PROCEDURE — 85027 COMPLETE CBC AUTOMATED: CPT

## 2020-11-24 PROCEDURE — 36415 COLL VENOUS BLD VENIPUNCTURE: CPT

## 2020-11-24 PROCEDURE — 83036 HEMOGLOBIN GLYCOSYLATED A1C: CPT

## 2020-11-24 RX ORDER — METOPROLOL SUCCINATE 25 MG/1
25 TABLET, EXTENDED RELEASE ORAL DAILY
Qty: 90 TABLET | Refills: 3 | Status: SHIPPED | OUTPATIENT
Start: 2020-11-24 | End: 2021-01-06 | Stop reason: SDUPTHER

## 2020-11-24 RX ORDER — SIMVASTATIN 10 MG
10 TABLET ORAL NIGHTLY
Qty: 90 TABLET | Refills: 1
Start: 2020-11-24 | End: 2021-03-16

## 2020-11-24 RX ORDER — POTASSIUM CHLORIDE 750 MG/1
10 TABLET, EXTENDED RELEASE ORAL AS NEEDED
Start: 2020-11-24 | End: 2021-01-19 | Stop reason: SDUPTHER

## 2020-11-24 RX ORDER — FUROSEMIDE 20 MG/1
20 TABLET ORAL AS NEEDED
Start: 2020-11-24 | End: 2021-01-19 | Stop reason: SDUPTHER

## 2020-11-24 RX ORDER — POTASSIUM CHLORIDE 750 MG/1
10 TABLET, EXTENDED RELEASE ORAL AS NEEDED
COMMUNITY
End: 2020-11-24 | Stop reason: SDUPTHER

## 2020-11-24 RX ORDER — GLIMEPIRIDE 4 MG/1
4 TABLET ORAL
Qty: 90 TABLET | Refills: 1 | Status: SHIPPED | OUTPATIENT
Start: 2020-11-24 | End: 2021-09-20

## 2020-11-24 RX ORDER — FUROSEMIDE 20 MG/1
20 TABLET ORAL AS NEEDED
COMMUNITY
End: 2020-11-24 | Stop reason: SDUPTHER

## 2020-11-24 NOTE — ASSESSMENT & PLAN NOTE
· NYHA III symptoms for last year  · Obtain echocardiogram  · Start furosemide 20 mg daily if renal function allows  · Obtain CMP, proBNP today  · Patient report symptoms to my nurse in 2 weeks

## 2020-11-24 NOTE — ASSESSMENT & PLAN NOTE
· No anginal symptoms, but shortness of breath symptoms may be anginal equivalent  · Low risk stress test findings  · Will intensify antianginal medical regimen with addition of metoprolol succinate 25 mg daily  · Continue amlodipine  · Patient to contact office in several weeks to report changes in symptoms  · If symptoms worsening, recommend cardiac catheterization

## 2020-11-24 NOTE — ASSESSMENT & PLAN NOTE
· Obtain CMP and lipids  · Consider changing statin therapy from simvastatin to atorvastatin given interaction with simvastatin and amlodipine

## 2020-11-24 NOTE — ASSESSMENT & PLAN NOTE
· Consider GLP-1 receptor agonist therapy or SGLT2 inhibitor therapy for CV risk reduction  · Continue ACE inhibitor and statin therapy

## 2020-11-25 DIAGNOSIS — R79.89 ELEVATED SERUM CREATININE: Primary | ICD-10-CM

## 2020-11-25 LAB
ALBUMIN SERPL-MCNC: 4.1 G/DL (ref 3.5–5.2)
ALBUMIN/GLOB SERPL: 1.4 G/DL
ALP SERPL-CCNC: 79 U/L (ref 39–117)
ALT SERPL W P-5'-P-CCNC: 29 U/L (ref 1–41)
ANION GAP SERPL CALCULATED.3IONS-SCNC: 12 MMOL/L (ref 5–15)
AST SERPL-CCNC: 38 U/L (ref 1–40)
BILIRUB SERPL-MCNC: 0.3 MG/DL (ref 0–1.2)
BUN SERPL-MCNC: 21 MG/DL (ref 8–23)
BUN/CREAT SERPL: 15 (ref 7–25)
CALCIUM SPEC-SCNC: 9.3 MG/DL (ref 8.6–10.5)
CHLORIDE SERPL-SCNC: 105 MMOL/L (ref 98–107)
CO2 SERPL-SCNC: 25 MMOL/L (ref 22–29)
CREAT SERPL-MCNC: 1.4 MG/DL (ref 0.76–1.27)
DEPRECATED RDW RBC AUTO: 44.2 FL (ref 37–54)
ERYTHROCYTE [DISTWIDTH] IN BLOOD BY AUTOMATED COUNT: 13.9 % (ref 12.3–15.4)
GFR SERPL CREATININE-BSD FRML MDRD: 49 ML/MIN/1.73
GLOBULIN UR ELPH-MCNC: 3 GM/DL
GLUCOSE SERPL-MCNC: 78 MG/DL (ref 65–99)
HBA1C MFR BLD: 8.16 % (ref 4.8–5.6)
HCT VFR BLD AUTO: 38 % (ref 37.5–51)
HGB BLD-MCNC: 12.5 G/DL (ref 13–17.7)
MCH RBC QN AUTO: 29.1 PG (ref 26.6–33)
MCHC RBC AUTO-ENTMCNC: 32.9 G/DL (ref 31.5–35.7)
MCV RBC AUTO: 88.4 FL (ref 79–97)
NT-PROBNP SERPL-MCNC: 41.6 PG/ML (ref 0–1800)
PLATELET # BLD AUTO: 146 10*3/MM3 (ref 140–450)
PMV BLD AUTO: 12.1 FL (ref 6–12)
POTASSIUM SERPL-SCNC: 4.8 MMOL/L (ref 3.5–5.2)
PROT SERPL-MCNC: 7.1 G/DL (ref 6–8.5)
RBC # BLD AUTO: 4.3 10*6/MM3 (ref 4.14–5.8)
SODIUM SERPL-SCNC: 142 MMOL/L (ref 136–145)
WBC # BLD AUTO: 9.22 10*3/MM3 (ref 3.4–10.8)

## 2020-11-25 RX ORDER — EMPAGLIFLOZIN 10 MG/1
10 TABLET, FILM COATED ORAL DAILY
Qty: 90 TABLET | Refills: 1 | Status: SHIPPED | OUTPATIENT
Start: 2020-11-25 | End: 2021-07-23

## 2020-11-25 NOTE — TELEPHONE ENCOUNTER
----- Message from Hay Singer IV, MD sent at 11/25/2020  1:09 PM EST -----  Start Jardiance 10 mg daily.  Repeat BMP in 3 weeks.  Will hold off on starting furosemide

## 2020-12-15 ENCOUNTER — TELEPHONE (OUTPATIENT)
Dept: CARDIOLOGY | Facility: CLINIC | Age: 75
End: 2020-12-15

## 2020-12-15 NOTE — TELEPHONE ENCOUNTER
Patient called to report that he has not had any improvement in shortness of breath with exertion after beginning metoprolol 2 weeks ago. Patient denies any other cardiac symptoms. He was told to call office if there were no improvement in shortness of breath.     Please advise. Thanks.

## 2021-01-06 ENCOUNTER — OFFICE VISIT (OUTPATIENT)
Dept: INTERNAL MEDICINE | Facility: CLINIC | Age: 76
End: 2021-01-06

## 2021-01-06 VITALS
SYSTOLIC BLOOD PRESSURE: 160 MMHG | OXYGEN SATURATION: 97 % | HEART RATE: 92 BPM | HEIGHT: 70 IN | DIASTOLIC BLOOD PRESSURE: 62 MMHG | BODY MASS INDEX: 33.07 KG/M2 | WEIGHT: 231 LBS | TEMPERATURE: 98.8 F

## 2021-01-06 DIAGNOSIS — E11.65 TYPE 2 DIABETES MELLITUS WITH HYPERGLYCEMIA, WITH LONG-TERM CURRENT USE OF INSULIN (HCC): ICD-10-CM

## 2021-01-06 DIAGNOSIS — I50.32 CHRONIC DIASTOLIC CONGESTIVE HEART FAILURE (HCC): ICD-10-CM

## 2021-01-06 DIAGNOSIS — I25.119 CORONARY ARTERY DISEASE INVOLVING NATIVE CORONARY ARTERY OF NATIVE HEART WITH ANGINA PECTORIS (HCC): ICD-10-CM

## 2021-01-06 DIAGNOSIS — Z79.4 TYPE 2 DIABETES MELLITUS WITH HYPERGLYCEMIA, WITH LONG-TERM CURRENT USE OF INSULIN (HCC): ICD-10-CM

## 2021-01-06 DIAGNOSIS — I10 ESSENTIAL HYPERTENSION: ICD-10-CM

## 2021-01-06 DIAGNOSIS — R06.09 DYSPNEA ON EXERTION: Primary | ICD-10-CM

## 2021-01-06 LAB — HBA1C MFR BLD: 7.5 %

## 2021-01-06 PROCEDURE — 83036 HEMOGLOBIN GLYCOSYLATED A1C: CPT | Performed by: INTERNAL MEDICINE

## 2021-01-06 PROCEDURE — 99214 OFFICE O/P EST MOD 30 MIN: CPT | Performed by: INTERNAL MEDICINE

## 2021-01-06 RX ORDER — LISINOPRIL 20 MG/1
20 TABLET ORAL 2 TIMES DAILY
Qty: 180 TABLET | Refills: 1 | Status: SHIPPED | OUTPATIENT
Start: 2021-01-06 | End: 2021-04-07

## 2021-01-06 RX ORDER — AMLODIPINE BESYLATE 10 MG/1
5 TABLET ORAL 2 TIMES DAILY
Qty: 90 TABLET | Refills: 1 | Status: SHIPPED | OUTPATIENT
Start: 2021-01-06 | End: 2021-04-07

## 2021-01-06 RX ORDER — ALBUTEROL SULFATE 90 UG/1
2 AEROSOL, METERED RESPIRATORY (INHALATION) EVERY 4 HOURS PRN
Qty: 8 G | Refills: 3 | Status: SHIPPED | OUTPATIENT
Start: 2021-01-06 | End: 2021-03-22 | Stop reason: SDUPTHER

## 2021-01-06 RX ORDER — METOPROLOL SUCCINATE 25 MG/1
25 TABLET, EXTENDED RELEASE ORAL DAILY
Qty: 90 TABLET | Refills: 3 | Status: SHIPPED | OUTPATIENT
Start: 2021-01-06 | End: 2021-04-07

## 2021-01-06 NOTE — PROGRESS NOTES
"Hypertension and Diabetes (last A1c 11/24/20 at 8.16)    Subjective   Gabriela Mar is a 75 y.o. male is here today for follow-up.    History of Present Illness   Mr. Mar is here for a f/u on htn and dm2. Had a bug after thanksgiving. Soa, and diarrhea.   Took some amox that he had and used his inhaler, and recovered well.    Notes he was seen by Dr. Singer, and was started on toprol and Jardiance. Did not get the latter as too expensive( he doesn't have prescription coverage).  Also stopped the toprol as he didn't feel it did anything.        Current Outpatient Medications:   •  amLODIPine (NORVASC) 10 MG tablet, Take 0.5 tablets by mouth 2 (Two) Times a Day., Disp: 90 tablet, Rfl: 1  •  baclofen (LIORESAL) 10 MG tablet, Take 1 tablet by mouth 3 (Three) Times a Day As Needed for Muscle Spasms., Disp: 60 tablet, Rfl: 0  •  Cholecalciferol (VITAMIN D) 1000 UNITS tablet, Take 1 capsule by mouth Daily., Disp: , Rfl:   •  Cyanocobalamin (VITAMIN B-12 ER) 1000 MCG tablet controlled-release, Take 1,000 mcg by mouth Daily., Disp: , Rfl:   •  Empagliflozin (Jardiance) 10 MG tablet, Take 10 mg by mouth Daily., Disp: 90 tablet, Rfl: 1  •  furosemide (LASIX) 20 MG tablet, Take 1 tablet by mouth As Needed (swelling)., Disp: , Rfl:   •  glimepiride (AMARYL) 4 MG tablet, Take 1 tablet by mouth Daily With Dinner., Disp: 90 tablet, Rfl: 1  •  insulin NPH (humuLIN N,novoLIN N) 100 UNIT/ML injection, 50 Units in the morning and 50 units in the evening., Disp: 30 mL, Rfl: 5  •  Insulin Syringe 28G X 1/2\" 1 ML misc, 1 each 2 (Two) Times a Day., Disp: 100 each, Rfl: 5  •  lisinopril (PRINIVIL,ZESTRIL) 20 MG tablet, Take 1 tablet by mouth 2 (Two) Times a Day., Disp: 180 tablet, Rfl: 1  •  metFORMIN (GLUCOPHAGE) 1000 MG tablet, TAKE ONE TABLET BY MOUTH IN THE MORNING AND 1 AND 1/2 TABLETS AT BEDTIME, Disp: 75 tablet, Rfl: 5  •  metoprolol succinate XL (TOPROL-XL) 25 MG 24 hr tablet, Take 1 tablet by mouth Daily., Disp: 90 tablet, " "Rfl: 3  •  Multiple Vitamins-Minerals (MULTI VITAMIN/MINERALS PO), Take 1 tablet by mouth Daily., Disp: , Rfl:   •  omeprazole (PRILOSEC) 20 MG capsule, Take 1 capsule by mouth Daily., Disp: , Rfl:   •  potassium chloride (K-DUR,KLOR-CON) 10 MEQ CR tablet, Take 1 tablet by mouth As Needed (swelling)., Disp: , Rfl:   •  simvastatin (ZOCOR) 10 MG tablet, Take 1 tablet by mouth Every Night., Disp: 90 tablet, Rfl: 1  •  albuterol sulfate  (90 Base) MCG/ACT inhaler, Inhale 2 puffs Every 4 (Four) Hours As Needed for Wheezing., Disp: 8 g, Rfl: 3      The following portions of the patient's history were reviewed and updated as appropriate: allergies, current medications, past family history, past medical history, past social history, past surgical history and problem list.    Review of Systems   Constitutional: Negative.  Negative for chills and fever.   HENT: Negative for ear discharge, ear pain, sinus pressure and sore throat.    Respiratory: Positive for shortness of breath (better). Negative for cough and chest tightness.    Cardiovascular: Negative for chest pain, palpitations and leg swelling.   Gastrointestinal: Negative for diarrhea, nausea and vomiting.   Musculoskeletal: Negative for arthralgias, back pain and myalgias.   Neurological: Negative for dizziness, syncope and headaches.   Psychiatric/Behavioral: Negative for confusion and sleep disturbance.       Objective   /62   Pulse 92   Temp 98.8 °F (37.1 °C)   Ht 177.8 cm (70\")   Wt 105 kg (231 lb)   SpO2 97% Comment: RA  BMI 33.15 kg/m²   Physical Exam  Vitals signs and nursing note reviewed.   Constitutional:       Appearance: He is well-developed.   HENT:      Head: Normocephalic and atraumatic.      Right Ear: External ear normal.      Left Ear: External ear normal.      Mouth/Throat:      Pharynx: No oropharyngeal exudate.   Eyes:      Conjunctiva/sclera: Conjunctivae normal.      Pupils: Pupils are equal, round, and reactive to light. "   Neck:      Musculoskeletal: Neck supple.      Thyroid: No thyromegaly.   Cardiovascular:      Rate and Rhythm: Normal rate and regular rhythm.   Pulmonary:      Effort: Pulmonary effort is normal.      Breath sounds: Normal breath sounds.   Abdominal:      General: Bowel sounds are normal. There is no distension.      Palpations: Abdomen is soft.      Tenderness: There is no abdominal tenderness.   Skin:     General: Skin is warm and dry.   Neurological:      Mental Status: He is alert and oriented to person, place, and time.      Cranial Nerves: No cranial nerve deficit.   Psychiatric:         Judgment: Judgment normal.           Results for orders placed or performed in visit on 01/06/21   POC Glycosylated Hemoglobin (Hb A1C)    Specimen: Blood   Result Value Ref Range    Hemoglobin A1C 7.5 %             Assessment/Plan   Diagnoses and all orders for this visit:    Dyspnea on exertion  -     albuterol sulfate  (90 Base) MCG/ACT inhaler; Inhale 2 puffs Every 4 (Four) Hours As Needed for Wheezing.    Essential hypertension  -     amLODIPine (NORVASC) 10 MG tablet; Take 0.5 tablets by mouth 2 (Two) Times a Day.  -     lisinopril (PRINIVIL,ZESTRIL) 20 MG tablet; Take 1 tablet by mouth 2 (Two) Times a Day.    Type 2 diabetes mellitus with hyperglycemia, with long-term current use of insulin (CMS/[facility])  Comments:  was high, but unable to get Jardiance, recheck today- commended on improvement.  Orders:  -     POC Glycosylated Hemoglobin (Hb A1C)    Chronic diastolic congestive heart failure (CMS/[facility])  Comments:  off jardiance, adv. to restart toprol, and take lasix prn.    Coronary artery disease involving native coronary artery of native heart with angina pectoris (CMS/[facility])  -     metoprolol succinate XL (TOPROL-XL) 25 MG 24 hr tablet; Take 1 tablet by mouth Daily.                 Return in about 3 months (around 4/6/2021) for Next scheduled follow up with fasting labs.

## 2021-01-19 DIAGNOSIS — I50.32 CHRONIC DIASTOLIC CONGESTIVE HEART FAILURE (HCC): ICD-10-CM

## 2021-01-19 RX ORDER — POTASSIUM CHLORIDE 750 MG/1
10 TABLET, EXTENDED RELEASE ORAL
Qty: 10 TABLET | Refills: 0 | Status: SHIPPED | OUTPATIENT
Start: 2021-01-19 | End: 2021-02-22

## 2021-01-19 RX ORDER — FUROSEMIDE 20 MG/1
20 TABLET ORAL
Qty: 10 TABLET | Refills: 0 | Status: SHIPPED | OUTPATIENT
Start: 2021-01-19 | End: 2021-02-22

## 2021-01-19 NOTE — TELEPHONE ENCOUNTER
Caller: Gabriela Mar    Relationship: Self    Best call back number: [phone]    Medication needed:   Requested Prescriptions     Pending Prescriptions Disp Refills   • furosemide (LASIX) 20 MG tablet        Sig: Take 1 tablet by mouth As Needed (swelling).   • potassium chloride (K-DUR,KLOR-CON) 10 MEQ CR tablet        Sig: Take 1 tablet by mouth As Needed (swelling).       When do you need the refill by: 01/21/2021    What details did the patient provide when requesting the medication: PATIENT STATES THAT HE ONLY HAS ENOUGH MEDICATION FOR ONE MORE DAY     Does the patient have less than a 3 day supply:  [x] Yes  [] No    What is the patient's preferred pharmacy: [address] [phone] [facility] [phone]

## 2021-01-27 ENCOUNTER — HOSPITAL ENCOUNTER (OUTPATIENT)
Dept: CARDIOLOGY | Facility: HOSPITAL | Age: 76
Discharge: HOME | End: 2021-01-27
Admitting: INTERNAL MEDICINE

## 2021-01-27 VITALS — BODY MASS INDEX: 33.07 KG/M2 | HEIGHT: 70 IN | WEIGHT: 231 LBS

## 2021-01-27 DIAGNOSIS — I50.32 CHRONIC DIASTOLIC CONGESTIVE HEART FAILURE (HCC): ICD-10-CM

## 2021-01-27 PROCEDURE — 93306 TTE W/DOPPLER COMPLETE: CPT | Performed by: INTERNAL MEDICINE

## 2021-01-27 PROCEDURE — 93306 TTE W/DOPPLER COMPLETE: CPT

## 2021-01-28 LAB
BH CV ECHO MEAS - AO MAX PG (FULL): 0.89 MMHG
BH CV ECHO MEAS - AO MAX PG: 6 MMHG
BH CV ECHO MEAS - AO MEAN PG (FULL): 0 MMHG
BH CV ECHO MEAS - AO MEAN PG: 3 MMHG
BH CV ECHO MEAS - AO ROOT AREA (BSA CORRECTED): 1.5
BH CV ECHO MEAS - AO ROOT AREA: 9.1 CM^2
BH CV ECHO MEAS - AO ROOT DIAM: 3.4 CM
BH CV ECHO MEAS - AO V2 MAX: 119 CM/SEC
BH CV ECHO MEAS - AO V2 MEAN: 76.9 CM/SEC
BH CV ECHO MEAS - AO V2 VTI: 29.7 CM
BH CV ECHO MEAS - AVA(I,A): 3.3 CM^2
BH CV ECHO MEAS - AVA(I,D): 3.3 CM^2
BH CV ECHO MEAS - AVA(V,A): 3.3 CM^2
BH CV ECHO MEAS - AVA(V,D): 3.3 CM^2
BH CV ECHO MEAS - BSA(HAYCOCK): 2.3 M^2
BH CV ECHO MEAS - BSA: 2.2 M^2
BH CV ECHO MEAS - BZI_BMI: 33.1 KILOGRAMS/M^2
BH CV ECHO MEAS - BZI_METRIC_HEIGHT: 177.8 CM
BH CV ECHO MEAS - BZI_METRIC_WEIGHT: 104.8 KG
BH CV ECHO MEAS - EDV(CUBED): 150.6 ML
BH CV ECHO MEAS - EDV(MOD-SP2): 59 ML
BH CV ECHO MEAS - EDV(MOD-SP4): 66 ML
BH CV ECHO MEAS - EDV(TEICH): 136.5 ML
BH CV ECHO MEAS - EF(CUBED): 83.5 %
BH CV ECHO MEAS - EF(MOD-BP): 74 %
BH CV ECHO MEAS - EF(MOD-SP2): 78 %
BH CV ECHO MEAS - EF(MOD-SP4): 69.7 %
BH CV ECHO MEAS - EF(TEICH): 76.1 %
BH CV ECHO MEAS - ESV(CUBED): 24.8 ML
BH CV ECHO MEAS - ESV(MOD-SP2): 13 ML
BH CV ECHO MEAS - ESV(MOD-SP4): 20 ML
BH CV ECHO MEAS - ESV(TEICH): 32.6 ML
BH CV ECHO MEAS - FS: 45.2 %
BH CV ECHO MEAS - IVS/LVPW: 1
BH CV ECHO MEAS - IVSD: 0.85 CM
BH CV ECHO MEAS - LA DIMENSION: 3.1 CM
BH CV ECHO MEAS - LA/AO: 0.91
BH CV ECHO MEAS - LAD MAJOR: 5.2 CM
BH CV ECHO MEAS - LAT PEAK E' VEL: 6.2 CM/SEC
BH CV ECHO MEAS - LATERAL E/E' RATIO: 15.7
BH CV ECHO MEAS - LV DIASTOLIC VOL/BSA (35-75): 29.7 ML/M^2
BH CV ECHO MEAS - LV MASS(C)D: 163.9 GRAMS
BH CV ECHO MEAS - LV MASS(C)DI: 73.9 GRAMS/M^2
BH CV ECHO MEAS - LV MAX PG: 5.1 MMHG
BH CV ECHO MEAS - LV MEAN PG: 3 MMHG
BH CV ECHO MEAS - LV SYSTOLIC VOL/BSA (12-30): 9 ML/M^2
BH CV ECHO MEAS - LV V1 MAX: 113 CM/SEC
BH CV ECHO MEAS - LV V1 MEAN: 77.5 CM/SEC
BH CV ECHO MEAS - LV V1 VTI: 28.4 CM
BH CV ECHO MEAS - LVIDD: 5.3 CM
BH CV ECHO MEAS - LVIDS: 2.9 CM
BH CV ECHO MEAS - LVLD AP2: 6.5 CM
BH CV ECHO MEAS - LVLD AP4: 7.2 CM
BH CV ECHO MEAS - LVLS AP2: 5.1 CM
BH CV ECHO MEAS - LVLS AP4: 5.5 CM
BH CV ECHO MEAS - LVOT AREA (M): 3.5 CM^2
BH CV ECHO MEAS - LVOT AREA: 3.5 CM^2
BH CV ECHO MEAS - LVOT DIAM: 2.1 CM
BH CV ECHO MEAS - LVPWD: 0.85 CM
BH CV ECHO MEAS - MED PEAK E' VEL: 7.1 CM/SEC
BH CV ECHO MEAS - MEDIAL E/E' RATIO: 13.8
BH CV ECHO MEAS - MV A MAX VEL: 73.3 CM/SEC
BH CV ECHO MEAS - MV DEC TIME: 0.22 SEC
BH CV ECHO MEAS - MV E MAX VEL: 97.9 CM/SEC
BH CV ECHO MEAS - MV E/A: 1.3
BH CV ECHO MEAS - PA ACC SLOPE: 528 CM/SEC^2
BH CV ECHO MEAS - PA ACC TIME: 0.14 SEC
BH CV ECHO MEAS - PA PR(ACCEL): 15.6 MMHG
BH CV ECHO MEAS - PULM DIAS VEL: 57.8 CM/SEC
BH CV ECHO MEAS - PULM S/D: 1.2
BH CV ECHO MEAS - PULM SYS VEL: 67.5 CM/SEC
BH CV ECHO MEAS - RAP SYSTOLE: 3 MMHG
BH CV ECHO MEAS - RVSP: 30 MMHG
BH CV ECHO MEAS - SI(AO): 121.5 ML/M^2
BH CV ECHO MEAS - SI(CUBED): 56.7 ML/M^2
BH CV ECHO MEAS - SI(LVOT): 44.3 ML/M^2
BH CV ECHO MEAS - SI(MOD-SP2): 20.7 ML/M^2
BH CV ECHO MEAS - SI(MOD-SP4): 20.7 ML/M^2
BH CV ECHO MEAS - SI(TEICH): 46.8 ML/M^2
BH CV ECHO MEAS - SV(AO): 269.7 ML
BH CV ECHO MEAS - SV(CUBED): 125.8 ML
BH CV ECHO MEAS - SV(LVOT): 98.4 ML
BH CV ECHO MEAS - SV(MOD-SP2): 46 ML
BH CV ECHO MEAS - SV(MOD-SP4): 46 ML
BH CV ECHO MEAS - SV(TEICH): 103.9 ML
BH CV ECHO MEAS - TAPSE (>1.6): 2.6 CM
BH CV ECHO MEAS - TR MAX PG: 27 MMHG
BH CV ECHO MEAS - TR MAX VEL: 262 CM/SEC
BH CV ECHO MEASUREMENTS AVERAGE E/E' RATIO: 14.72
BH CV VAS BP LEFT ARM: NORMAL MMHG
BH CV XLRA - RV BASE: 4 CM
BH CV XLRA - RV LENGTH: 8.3 CM
BH CV XLRA - RV MID: 3.9 CM
BH CV XLRA - TDI S': 12.8 CM/SEC
LEFT ATRIUM VOLUME INDEX: 16.2 ML/M^2
LEFT ATRIUM VOLUME: 36 ML
LV EF 2D ECHO EST: 70 %

## 2021-02-21 DIAGNOSIS — I50.32 CHRONIC DIASTOLIC CONGESTIVE HEART FAILURE (HCC): ICD-10-CM

## 2021-02-22 RX ORDER — FUROSEMIDE 20 MG/1
20 TABLET ORAL
Qty: 10 TABLET | Refills: 0 | Status: SHIPPED | OUTPATIENT
Start: 2021-02-22 | End: 2021-03-12

## 2021-02-22 RX ORDER — POTASSIUM CHLORIDE 750 MG/1
10 TABLET, EXTENDED RELEASE ORAL
Qty: 10 TABLET | Refills: 0 | Status: SHIPPED | OUTPATIENT
Start: 2021-02-22 | End: 2021-03-12

## 2021-02-22 NOTE — TELEPHONE ENCOUNTER
Last Office Visit: 1/6/21  Next Office Visit:4/7/21    Labs completed in past 6 months? yes  Labs completed in past year? yes    Last Refill Date: 1/19/21  Quantity:10  Refills:0    Pharmacy:

## 2021-03-10 DIAGNOSIS — I50.32 CHRONIC DIASTOLIC CONGESTIVE HEART FAILURE (HCC): ICD-10-CM

## 2021-03-10 NOTE — TELEPHONE ENCOUNTER
Last Office Visit: 01/06/21  Next Office Visit:04/07/21    Labs completed in past 6 months? yes  Labs completed in past year? yes    Last Refill Date: 02/22/21  Quantity:10  Refills:0    Pharmacy:

## 2021-03-12 RX ORDER — POTASSIUM CHLORIDE 750 MG/1
10 TABLET, FILM COATED, EXTENDED RELEASE ORAL
Qty: 10 TABLET | Refills: 1 | Status: SHIPPED | OUTPATIENT
Start: 2021-03-12 | End: 2021-11-05 | Stop reason: SDUPTHER

## 2021-03-12 RX ORDER — FUROSEMIDE 20 MG/1
20 TABLET ORAL
Qty: 10 TABLET | Refills: 1 | Status: SHIPPED | OUTPATIENT
Start: 2021-03-12 | End: 2021-11-05 | Stop reason: SDUPTHER

## 2021-03-12 NOTE — TELEPHONE ENCOUNTER
Please let Pt. Know-  He should only take the lasix if his weight is up > 3 lbs or if he is soa.  Edema could be from the amlodipine.  His kidney function could worsen if lasix is taken too frequently.    Refill sent.    thanks

## 2021-03-16 DIAGNOSIS — E78.5 HYPERLIPIDEMIA LDL GOAL <70: ICD-10-CM

## 2021-03-16 RX ORDER — SIMVASTATIN 10 MG
TABLET ORAL
Qty: 90 TABLET | Refills: 0 | Status: SHIPPED | OUTPATIENT
Start: 2021-03-16 | End: 2021-04-07 | Stop reason: SDUPTHER

## 2021-03-16 NOTE — TELEPHONE ENCOUNTER
Last Office Visit: 1/6/21  Next Office Visit: 4/7/21    Labs completed in past 6 months? yes  Labs completed in past year? yes    Last Refill Date:11/24/20  Quantity:90  Refills:1    Pharmacy:

## 2021-03-22 DIAGNOSIS — R06.09 DYSPNEA ON EXERTION: ICD-10-CM

## 2021-03-22 NOTE — TELEPHONE ENCOUNTER
Last Office Visit: 1/6/21  Next Office Visit:4/7/21    Labs completed in past 6 months? yes  Labs completed in past year? yes    Last Refill Date:1/6/21  Quantity:1  Refills:3    Pharmacy:

## 2021-03-22 NOTE — TELEPHONE ENCOUNTER
Caller: Gabriela Mar    Relationship: Self    Best call back number: [phone]    Medication needed:   Requested Prescriptions     Pending Prescriptions Disp Refills   • albuterol sulfate  (90 Base) MCG/ACT inhaler 8 g 3     Sig: Inhale 2 puffs Every 4 (Four) Hours As Needed for Wheezing.       When do you need the refill by: 03/22/2021    What additional details did the patient provide when requesting the medication: PATIENT HAS 2 DAY SLEFT    Does the patient have less than a 3 day supply:  [x] Yes  [] No    What is the patient's preferred pharmacy:      [facility]

## 2021-03-23 RX ORDER — ALBUTEROL SULFATE 90 UG/1
2 AEROSOL, METERED RESPIRATORY (INHALATION) EVERY 4 HOURS PRN
Qty: 8 G | Refills: 3 | Status: SHIPPED | OUTPATIENT
Start: 2021-03-23 | End: 2021-07-23 | Stop reason: SDUPTHER

## 2021-04-07 ENCOUNTER — LAB (OUTPATIENT)
Dept: LAB | Facility: HOSPITAL | Age: 76
End: 2021-04-07

## 2021-04-07 ENCOUNTER — OFFICE VISIT (OUTPATIENT)
Dept: INTERNAL MEDICINE | Facility: CLINIC | Age: 76
End: 2021-04-07

## 2021-04-07 VITALS
HEIGHT: 70 IN | WEIGHT: 234.4 LBS | TEMPERATURE: 99.1 F | BODY MASS INDEX: 33.56 KG/M2 | SYSTOLIC BLOOD PRESSURE: 158 MMHG | DIASTOLIC BLOOD PRESSURE: 60 MMHG | OXYGEN SATURATION: 99 % | HEART RATE: 83 BPM

## 2021-04-07 DIAGNOSIS — E78.5 HYPERLIPIDEMIA LDL GOAL <70: ICD-10-CM

## 2021-04-07 DIAGNOSIS — I10 ESSENTIAL HYPERTENSION: ICD-10-CM

## 2021-04-07 DIAGNOSIS — I87.2 VENOUS STASIS DERMATITIS OF BOTH LOWER EXTREMITIES: ICD-10-CM

## 2021-04-07 DIAGNOSIS — I25.119 CORONARY ARTERY DISEASE INVOLVING NATIVE CORONARY ARTERY OF NATIVE HEART WITH ANGINA PECTORIS (HCC): ICD-10-CM

## 2021-04-07 DIAGNOSIS — E11.65 TYPE 2 DIABETES MELLITUS WITH HYPERGLYCEMIA, WITH LONG-TERM CURRENT USE OF INSULIN (HCC): Primary | ICD-10-CM

## 2021-04-07 DIAGNOSIS — Z79.4 TYPE 2 DIABETES MELLITUS WITH HYPERGLYCEMIA, WITH LONG-TERM CURRENT USE OF INSULIN (HCC): Primary | ICD-10-CM

## 2021-04-07 LAB
ALBUMIN SERPL-MCNC: 4.3 G/DL (ref 3.5–5.2)
ALBUMIN/GLOB SERPL: 1.3 G/DL
ALP SERPL-CCNC: 83 U/L (ref 39–117)
ALT SERPL W P-5'-P-CCNC: 26 U/L (ref 1–41)
ANION GAP SERPL CALCULATED.3IONS-SCNC: 7.2 MMOL/L (ref 5–15)
AST SERPL-CCNC: 28 U/L (ref 1–40)
BILIRUB SERPL-MCNC: 0.3 MG/DL (ref 0–1.2)
BUN SERPL-MCNC: 17 MG/DL (ref 8–23)
BUN/CREAT SERPL: 12.2 (ref 7–25)
CALCIUM SPEC-SCNC: 9.3 MG/DL (ref 8.6–10.5)
CHLORIDE SERPL-SCNC: 101 MMOL/L (ref 98–107)
CHOLEST SERPL-MCNC: 111 MG/DL (ref 0–200)
CO2 SERPL-SCNC: 27.8 MMOL/L (ref 22–29)
CREAT SERPL-MCNC: 1.39 MG/DL (ref 0.76–1.27)
GFR SERPL CREATININE-BSD FRML MDRD: 50 ML/MIN/1.73
GLOBULIN UR ELPH-MCNC: 3.4 GM/DL
GLUCOSE SERPL-MCNC: 76 MG/DL (ref 65–99)
HBA1C MFR BLD: 7.3 %
HDLC SERPL-MCNC: 39 MG/DL (ref 40–60)
LDLC SERPL CALC-MCNC: 47 MG/DL (ref 0–100)
LDLC/HDLC SERPL: 1.1 {RATIO}
POTASSIUM SERPL-SCNC: 4.8 MMOL/L (ref 3.5–5.2)
PROT SERPL-MCNC: 7.7 G/DL (ref 6–8.5)
SODIUM SERPL-SCNC: 136 MMOL/L (ref 136–145)
TRIGL SERPL-MCNC: 145 MG/DL (ref 0–150)
VLDLC SERPL-MCNC: 25 MG/DL (ref 5–40)

## 2021-04-07 PROCEDURE — 80061 LIPID PANEL: CPT

## 2021-04-07 PROCEDURE — 99214 OFFICE O/P EST MOD 30 MIN: CPT | Performed by: INTERNAL MEDICINE

## 2021-04-07 PROCEDURE — 83036 HEMOGLOBIN GLYCOSYLATED A1C: CPT | Performed by: INTERNAL MEDICINE

## 2021-04-07 PROCEDURE — 80053 COMPREHEN METABOLIC PANEL: CPT

## 2021-04-07 RX ORDER — VALSARTAN 160 MG/1
160 TABLET ORAL 2 TIMES DAILY
Qty: 60 TABLET | Refills: 5 | Status: SHIPPED | OUTPATIENT
Start: 2021-04-07 | End: 2021-10-11

## 2021-04-07 RX ORDER — VIT C/E/ZN/COPPR/LUTEIN/ZEAXAN 250MG-90MG
1 CAPSULE ORAL 2 TIMES DAILY
COMMUNITY

## 2021-04-07 RX ORDER — AMLODIPINE BESYLATE 5 MG/1
5 TABLET ORAL DAILY
Qty: 90 TABLET | Refills: 1 | Status: SHIPPED | OUTPATIENT
Start: 2021-04-07 | End: 2021-10-11

## 2021-04-07 RX ORDER — AMLODIPINE BESYLATE 10 MG/1
5 TABLET ORAL 2 TIMES DAILY
Qty: 90 TABLET | Refills: 1 | Status: CANCELLED | OUTPATIENT
Start: 2021-04-07

## 2021-04-07 RX ORDER — METOPROLOL SUCCINATE 25 MG/1
25 TABLET, EXTENDED RELEASE ORAL DAILY
Qty: 90 TABLET | Refills: 3 | Status: CANCELLED | OUTPATIENT
Start: 2021-04-07

## 2021-04-07 RX ORDER — SIMVASTATIN 10 MG
10 TABLET ORAL
Qty: 90 TABLET | Refills: 1 | Status: SHIPPED | OUTPATIENT
Start: 2021-04-07 | End: 2021-11-05 | Stop reason: SDUPTHER

## 2021-04-07 RX ORDER — AMMONIUM LACTATE 12 G/100G
LOTION TOPICAL 2 TIMES DAILY
Qty: 400 G | Refills: 5 | Status: SHIPPED | OUTPATIENT
Start: 2021-04-07 | End: 2022-03-28 | Stop reason: HOSPADM

## 2021-04-07 RX ORDER — LISINOPRIL 20 MG/1
20 TABLET ORAL 2 TIMES DAILY
Qty: 180 TABLET | Refills: 1 | Status: CANCELLED | OUTPATIENT
Start: 2021-04-07

## 2021-04-07 NOTE — PROGRESS NOTES
"Hypertension (has been running a little high at home) and Hyperlipidemia    Subjective   Gabriela Mar is a 75 y.o. male is here today for follow-up.    History of Present Illness   Served in [email or web address], and was in [facility].  Reports he is breathing better now.   But reports that when he was on the toprol, he gained weight  appx 8-10 lbs, felt like he had fluid in his legs and feet.  Looked p the toprol, and felt related to that. Stopped it and has felt better since, except for fluid in his feet. S/p 2 rounds of lasix and leg edema better but not swelling in his feet.  .   B/l ankle swelling and dry skin on it.  Using support hose.  Taking Insulin BID- checks sugars twice daily.      Current Outpatient Medications:   •  albuterol sulfate  (90 Base) MCG/ACT inhaler, Inhale 2 puffs Every 4 (Four) Hours As Needed for Wheezing., Disp: 8 g, Rfl: 3  •  amLODIPine (NORVASC) 5 MG tablet, Take 1 tablet by mouth Daily., Disp: 90 tablet, Rfl: 1  •  baclofen (LIORESAL) 10 MG tablet, Take 1 tablet by mouth 3 (Three) Times a Day As Needed for Muscle Spasms., Disp: 60 tablet, Rfl: 0  •  Cholecalciferol (VITAMIN D) 1000 UNITS tablet, Take 1 capsule by mouth Daily., Disp: , Rfl:   •  Cyanocobalamin (VITAMIN B-12 ER) 1000 MCG tablet controlled-release, Take 1,000 mcg by mouth Daily., Disp: , Rfl:   •  Empagliflozin (Jardiance) 10 MG tablet, Take 10 mg by mouth Daily., Disp: 90 tablet, Rfl: 1  •  furosemide (Lasix) 20 MG tablet, Take 1 tablet by mouth Every Other Day As Needed (dyspnea)., Disp: 10 tablet, Rfl: 1  •  glimepiride (AMARYL) 4 MG tablet, Take 1 tablet by mouth Daily With Dinner., Disp: 90 tablet, Rfl: 1  •  insulin NPH (humuLIN N,novoLIN N) 100 UNIT/ML injection, Inject  under the skin into the appropriate area as directed 2 (Two) Times a Day Before Meals., Disp: , Rfl:   •  Insulin Syringe 28G X 1/2\" 1 ML misc, 1 each 2 (Two) Times a Day., Disp: 100 each, Rfl: 5  •  metFORMIN (GLUCOPHAGE) 1000 MG tablet, TAKE " "ONE TABLET BY MOUTH IN THE MORNING AND 1 AND 1/2 TABLETS AT BEDTIME, Disp: 75 tablet, Rfl: 5  •  Multiple Vitamins-Minerals (MULTI VITAMIN/MINERALS PO), Take 1 tablet by mouth Daily., Disp: , Rfl:   •  Multiple Vitamins-Minerals (PRESERVISION AREDS 2 PO), Take  by mouth., Disp: , Rfl:   •  omeprazole (PRILOSEC) 20 MG capsule, Take 1 capsule by mouth Daily., Disp: , Rfl:   •  potassium chloride 10 MEQ CR tablet, Take 1 tablet by mouth Every Other Day As Needed (with lasix)., Disp: 10 tablet, Rfl: 1  •  simvastatin (ZOCOR) 10 MG tablet, Take 1 tablet by mouth every night at bedtime., Disp: 90 tablet, Rfl: 1  •  ammonium lactate (AmLactin) 12 % lotion, Apply  topically to the appropriate area as directed 2 (two) times a day. For dry skin, Disp: 400 g, Rfl: 5  •  valsartan (Diovan) 160 MG tablet, Take 1 tablet by mouth 2 (Two) Times a Day. Replaces lisinopril, Disp: 60 tablet, Rfl: 5      The following portions of the patient's history were reviewed and updated as appropriate: allergies, current medications, past family history, past medical history, past social history, past surgical history and problem list.    Review of Systems    Objective   /60   Pulse 83   Temp 99.1 °F (37.3 °C)   Ht 177.8 cm (70\")   Wt 106 kg (234 lb 6.4 oz)   SpO2 99% Comment: ra  BMI 33.63 kg/m²   Physical Exam      Results for orders placed or performed in visit on 04/07/21   POC Glycosylated Hemoglobin (Hb A1C)    Specimen: Blood   Result Value Ref Range    Hemoglobin A1C 7.3 %             Assessment/Plan   Diagnoses and all orders for this visit:    Type 2 diabetes mellitus with hyperglycemia, with long-term current use of insulin (CMS/[facility])  Comments:  on novolin N bid -40 U 170, 50 u for 170- 200 and 55 for 200- 230.  A1C better, continue current regimen.  Orders:  -     POC Glycosylated Hemoglobin (Hb A1C)    Essential hypertension  -     valsartan (Diovan) 160 MG tablet; Take 1 tablet by mouth 2 (Two) Times a Day. Replaces " lisinopril  -     amLODIPine (NORVASC) 5 MG tablet; Take 1 tablet by mouth Daily.    Coronary artery disease involving native coronary artery of native heart with angina pectoris (CMS/HCC)  -     Comprehensive Metabolic Panel; Future  -     Lipid Panel; Future    Hyperlipidemia LDL goal <70  -     simvastatin (ZOCOR) 10 MG tablet; Take 1 tablet by mouth every night at bedtime.  -     Comprehensive Metabolic Panel; Future  -     Lipid Panel; Future    Venous stasis dermatitis of both lower extremities  Comments:  use lasix for CHF and not for edema.  Orders:  -     ammonium lactate (AmLactin) 12 % lotion; Apply  topically to the appropriate area as directed 2 (two) times a day. For dry skin    Other orders  -     Multiple Vitamins-Minerals (PRESERVISION AREDS 2 PO); Take  by mouth.  -     Cancel: amLODIPine (NORVASC) 10 MG tablet; Take 0.5 tablets by mouth 2 (Two) Times a Day.  -     Cancel: lisinopril (PRINIVIL,ZESTRIL) 20 MG tablet; Take 1 tablet by mouth 2 (Two) Times a Day.  -     Cancel: metoprolol succinate XL (TOPROL-XL) 25 MG 24 hr tablet; Take 1 tablet by mouth Daily.  -     insulin NPH (humuLIN N,novoLIN N) 100 UNIT/ML injection; Inject  under the skin into the appropriate area as directed 2 (Two) Times a Day Before Meals.                 Return in about 3 months (around 7/7/2021).

## 2021-04-28 NOTE — PROGRESS NOTES
"[facility]      Identification: Gabriela Mar is a 75 y.o. male who resides in [place], [place]    Reason for visit:  · Coronary artery disease  · Heart failure  · Hypertension    Subjective      Gabriela Mar presents to [facility] for followup.    History of Present Illness  Patient is a 75-year-old male who returns today for follow-up of his coronary artery disease, chronic heart failure and hypertension.  Patient was seen last November for evaluation of shortness of breath and an abnormal stress test.  Stress test that had been performed showed apical ischemia and coronary calcification was noted on CT imaging.  He was started on antianginal therapy with metoprolol and also given Lasix.  An echocardiogram was performed which showed normal LVEF, grade 2 diastolic dysfunction and no valvular abnormality.  The patient was contacted via telephone for reassessment and his breathing had improved but he was instructed to contact our office if his symptoms progressed or worsened as we would proceed with cardiac catheterization but it was deferred due to the improvement in his symptoms.  Patient was also started on Jardiance.  Since his last visit he stopped taking his metoprolol because he thought it was causing lower extremity edema.  He followed up with his primary care provider who decreased his amlodipine, discontinued his lisinopril and placed him on valsartan.  He has not been using his Lasix as he has not been really experiencing any lower extremity edema since.  He thinks his breathing is better since his last visit and he denies any chest pain or progressive anginal symptoms..      Objective     /50 (BP Location: Right arm, Patient Position: Sitting, Cuff Size: Adult)   Pulse 92   Ht 177.8 cm (70\")   Wt 106 kg (234 lb)   SpO2 96%   BMI 33.58 kg/m²       Constitutional:       Appearance: Healthy appearance. Well-developed.   Eyes:      General: Lids are normal. No " scleral icterus.     Conjunctiva/sclera: Conjunctivae normal.   HENT:      Head: Normocephalic and atraumatic.   Neck:      Thyroid: No thyromegaly.      Vascular: No carotid bruit or JVD.   Pulmonary:      Effort: Pulmonary effort is normal.      Breath sounds: Normal breath sounds. No wheezing. No rhonchi. No rales.   Cardiovascular:      Normal rate. Irregularly irregular rhythm.      Murmurs: There is no murmur.      No gallop. No rub.   Pulses:     Intact distal pulses.   Edema:     Peripheral edema absent.   Abdominal:      General: There is no distension.      Palpations: Abdomen is soft. There is no abdominal mass.   Musculoskeletal:      Cervical back: Normal range of motion. Skin:     General: Skin is warm and dry.      Findings: No rash.   Neurological:      General: No focal deficit present.      Mental Status: Alert and oriented to person, place, and time.      Gait: Gait is intact.   Psychiatric:         Attention and Perception: Attention normal.         Mood and Affect: Mood normal.         Behavior: Behavior normal.         Result Review :    Lab Results   Component Value Date    GLUCOSE 76 04/07/2021    BUN 17 04/07/2021    CREATININE 1.39 (H) 04/07/2021    EGFRIFNONA 50 (L) 04/07/2021    BCR 12.2 04/07/2021    K 4.8 04/07/2021    CO2 27.8 04/07/2021    CALCIUM 9.3 04/07/2021    ALBUMIN 4.30 04/07/2021    AST 28 04/07/2021    ALT 26 04/07/2021     Lab Results   Component Value Date    WBC 9.22 11/24/2020    HGB 12.5 (L) 11/24/2020    HCT 38.0 11/24/2020    MCV 88.4 11/24/2020     11/24/2020     Lab Results   Component Value Date    CHOL 111 04/07/2021    CHLPL 125 06/06/2016    TRIG 145 04/07/2021    HDL 39 (L) 04/07/2021    LDL 47 04/07/2021     Lab Results   Component Value Date    HGBA1C 7.3 04/07/2021             Assessment     Problem List Items Addressed This Visit        Cardiac and Vasculature    Hyperlipidemia LDL goal <70 - Primary    Overview     • High intensity statin therapy  indicated given the presence of CAD         Current Assessment & Plan     · Continue Zocor 10 mg nightly  · LDL 47         Essential hypertension    Overview     • Target blood pressure <130/80 mmHg         Current Assessment & Plan     · Hypertension is not well controlled  · Start metoprolol succinate 25 mg daily  · Continue valsartan 160 mg twice daily  · Continue amlodipine 5 mg daily         Relevant Medications    metoprolol succinate XL (TOPROL-XL) 25 MG 24 hr tablet    Coronary artery disease involving native coronary artery of native heart with angina pectoris (CMS/[facility])    Overview     · Pharmacologic nuclear stress (10/21/2020): apical ischemia.  Normal LVEF.  Coronary calcification on CT attenuation correction images.         Current Assessment & Plan     · Patient denies any anginal symptoms and reports breathing has improved  · Restart metoprolol succinate 25 mg daily         Relevant Medications    metoprolol succinate XL (TOPROL-XL) 25 MG 24 hr tablet    Chronic diastolic congestive heart failure (CMS/[facility])    Overview     · Echocardiogram (01/04/2017): LVEF 50%. Trace MR and TR.   · Echo (1/27/2021):LVEF = 70%.  Grade 2 diastolic dysfunction.  Cardiac valves anatomically and functionally normal.         Current Assessment & Plan     · Stable NYHA class II symptoms  · Start metoprolol succinate 25 mg daily  · Lasix as needed for any increased lower extremity edema weight gain of 1 to 2 pounds in 24 to 48 hours         Relevant Medications    metoprolol succinate XL (TOPROL-XL) 25 MG 24 hr tablet        Patient has no signs or symptoms of angina or heart failure and he reports his breathing has improved and is back to baseline.  His systolic blood pressure is elevated at 160.  He is agreeable to retrying metoprolol succinate 25 mg daily as I do feel his lower extremity edema was from the higher dose of amlodipine.  If he is unable to tolerate he should contact us and we will discontinue it.  We will  continue all of his other current medications and he will follow-up 6 months or sooner if needed.    Plan   • Start metoprolol succinate 25 mg daily  • Follow-up in 6 months or sooner if needed      Follow-up   Return in about 6 months (around 10/29/2021), or if symptoms worsen or fail to improve, for Follow-up with Dr. Singer next visit.        Mirtha Wolf, CALIN  4/29/2021

## 2021-04-29 ENCOUNTER — OFFICE VISIT (OUTPATIENT)
Dept: CARDIOLOGY | Facility: CLINIC | Age: 76
End: 2021-04-29

## 2021-04-29 VITALS
DIASTOLIC BLOOD PRESSURE: 50 MMHG | HEART RATE: 92 BPM | OXYGEN SATURATION: 96 % | SYSTOLIC BLOOD PRESSURE: 160 MMHG | HEIGHT: 70 IN | BODY MASS INDEX: 33.5 KG/M2 | WEIGHT: 234 LBS

## 2021-04-29 DIAGNOSIS — E78.5 HYPERLIPIDEMIA LDL GOAL <70: Primary | ICD-10-CM

## 2021-04-29 DIAGNOSIS — I50.32 CHRONIC DIASTOLIC CONGESTIVE HEART FAILURE (HCC): ICD-10-CM

## 2021-04-29 DIAGNOSIS — I25.119 CORONARY ARTERY DISEASE INVOLVING NATIVE CORONARY ARTERY OF NATIVE HEART WITH ANGINA PECTORIS (HCC): ICD-10-CM

## 2021-04-29 DIAGNOSIS — I10 ESSENTIAL HYPERTENSION: ICD-10-CM

## 2021-04-29 PROCEDURE — 99214 OFFICE O/P EST MOD 30 MIN: CPT | Performed by: NURSE PRACTITIONER

## 2021-04-29 RX ORDER — METOPROLOL SUCCINATE 25 MG/1
25 TABLET, EXTENDED RELEASE ORAL DAILY
Qty: 30 TABLET | Refills: 11 | Status: SHIPPED | OUTPATIENT
Start: 2021-04-29 | End: 2022-03-28 | Stop reason: HOSPADM

## 2021-04-29 NOTE — ASSESSMENT & PLAN NOTE
· Stable NYHA class II symptoms  · Start metoprolol succinate 25 mg daily  · Lasix as needed for any increased lower extremity edema weight gain of 1 to 2 pounds in 24 to 48 hours

## 2021-04-29 NOTE — ASSESSMENT & PLAN NOTE
· Hypertension is not well controlled  · Start metoprolol succinate 25 mg daily  · Continue valsartan 160 mg twice daily  · Continue amlodipine 5 mg daily

## 2021-04-29 NOTE — ASSESSMENT & PLAN NOTE
· Patient denies any anginal symptoms and reports breathing has improved  · Restart metoprolol succinate 25 mg daily

## 2021-05-10 NOTE — TELEPHONE ENCOUNTER
RE: glimepiride 4mg, Patient is requesting to have this Rx sent to [facility] as [facility] will not fill it until Sept 25. He is out of this medication. Refill was sent on 8/27 to [facility] but he was unable to pick it up due to having a prescription discount card on file with [facility].       Notification Instructions: Patient will be notified of biopsy results. However, patient instructed to call the office if not contacted within 2 weeks. Curettage Text: The wound bed was treated with curettage after the biopsy was performed. Anesthesia Volume In Cc: 0.5 Information: Selecting Yes will display possible errors in your note based on the variables you have selected. This validation is only offered as a suggestion for you. PLEASE NOTE THAT THE VALIDATION TEXT WILL BE REMOVED WHEN YOU FINALIZE YOUR NOTE. IF YOU WANT TO FAX A PRELIMINARY NOTE YOU WILL NEED TO TOGGLE THIS TO 'NO' IF YOU DO NOT WANT IT IN YOUR FAXED NOTE. Detail Level: Detailed Size Of Lesion In Cm: 0 Hide Second Anesthesia?: No Electrodesiccation And Curettage Text: The wound bed was treated with electrodesiccation and curettage after the biopsy was performed. Dressing: bandage Was A Bandage Applied: Yes Billing Type: Third-Party Bill Wound Care: Vaseline Biopsy Type: H and E Biopsy Method: Dermablade Type Of Destruction Used: Curettage Anesthesia Type: 1% lidocaine with epinephrine and a 1:10 solution of 8.4% sodium bicarbonate Post-Care Instructions: I reviewed with the patient in detail post-care instructions. Patient is to keep the biopsy site dry overnight, and then apply bacitracin twice daily until healed. Patient may apply hydrogen peroxide soaks to remove any crusting. Electrodesiccation Text: The wound bed was treated with electrodesiccation after the biopsy was performed. Depth Of Biopsy: dermis Consent: Written consent was obtained and risks were reviewed including but not limited to scarring, infection, bleeding, scabbing, incomplete removal, nerve damage and allergy to anesthesia. Hemostasis: Wayne's Cryotherapy Text: The wound bed was treated with cryotherapy after the biopsy was performed. Silver Nitrate Text: The wound bed was treated with silver nitrate after the biopsy was performed.

## 2021-06-03 ENCOUNTER — TELEPHONE (OUTPATIENT)
Dept: INTERNAL MEDICINE | Facility: CLINIC | Age: 76
End: 2021-06-03

## 2021-06-03 NOTE — TELEPHONE ENCOUNTER
Caller: Gabriela Mar    Relationship: Self    Best call back number: [phone]    Which medication are you concerned about: cephalexin (KEFLEX) 500 MG capsule    Who prescribed you this medication: H. AUGUSTUS    What are your concerns: PATIENT STATED HE SAW ON THE INTERNET THAT THIS MEDICATION HAS SULFA IN IT AND HE IS ALLERGIC TO SULFA ANTIBIOTICS. PATIENT WOULD LIKE TO KNOW IF THIS MEDICATION IS SAFE TO TAKE

## 2021-07-23 ENCOUNTER — OFFICE VISIT (OUTPATIENT)
Dept: INTERNAL MEDICINE | Facility: CLINIC | Age: 76
End: 2021-07-23

## 2021-07-23 VITALS
DIASTOLIC BLOOD PRESSURE: 78 MMHG | BODY MASS INDEX: 34.65 KG/M2 | HEIGHT: 70 IN | WEIGHT: 242 LBS | HEART RATE: 69 BPM | OXYGEN SATURATION: 96 % | SYSTOLIC BLOOD PRESSURE: 150 MMHG

## 2021-07-23 DIAGNOSIS — R06.09 DYSPNEA ON EXERTION: ICD-10-CM

## 2021-07-23 DIAGNOSIS — G89.29 CHRONIC BILATERAL LOW BACK PAIN WITHOUT SCIATICA: ICD-10-CM

## 2021-07-23 DIAGNOSIS — M54.50 CHRONIC BILATERAL LOW BACK PAIN WITHOUT SCIATICA: ICD-10-CM

## 2021-07-23 DIAGNOSIS — I88.9 ADENITIS: ICD-10-CM

## 2021-07-23 DIAGNOSIS — Z79.4 TYPE 2 DIABETES MELLITUS WITH HYPERGLYCEMIA, WITH LONG-TERM CURRENT USE OF INSULIN (HCC): Primary | ICD-10-CM

## 2021-07-23 DIAGNOSIS — E11.65 TYPE 2 DIABETES MELLITUS WITH HYPERGLYCEMIA, WITH LONG-TERM CURRENT USE OF INSULIN (HCC): Primary | ICD-10-CM

## 2021-07-23 DIAGNOSIS — K11.20 PAROTITIS NOT DUE TO MUMPS: ICD-10-CM

## 2021-07-23 LAB — HBA1C MFR BLD: 7.8 %

## 2021-07-23 PROCEDURE — 83036 HEMOGLOBIN GLYCOSYLATED A1C: CPT | Performed by: INTERNAL MEDICINE

## 2021-07-23 PROCEDURE — 99214 OFFICE O/P EST MOD 30 MIN: CPT | Performed by: INTERNAL MEDICINE

## 2021-07-23 RX ORDER — CEFDINIR 300 MG/1
300 CAPSULE ORAL 2 TIMES DAILY
Qty: 20 CAPSULE | Refills: 0 | Status: SHIPPED | OUTPATIENT
Start: 2021-07-23 | End: 2021-11-05

## 2021-07-23 RX ORDER — ALBUTEROL SULFATE 90 UG/1
2 AEROSOL, METERED RESPIRATORY (INHALATION) EVERY 4 HOURS PRN
Qty: 8 G | Refills: 3 | Status: SHIPPED | OUTPATIENT
Start: 2021-07-23 | End: 2021-11-05 | Stop reason: SDUPTHER

## 2021-07-23 NOTE — PROGRESS NOTES
"Diabetes    Subjective   Gabriela Mar is a 75 y.o. male is here today for follow-up.    History of Present Illness   Here for a follow up on his dm2, has noted weight gain, and has been adjusting insulin due to elevated sugars.    Has been eating vegetable soup.  BP has been high, ? Related, thinks from a rt facial swelling, for which he went to [facility] a few weeks ago. dxed with lymphadenitis, and was  Given abx. Helped it some, but is persisting.  Back continues to bother him.        Current Outpatient Medications:   •  albuterol sulfate  (90 Base) MCG/ACT inhaler, Inhale 2 puffs Every 4 (Four) Hours As Needed for Wheezing., Disp: 8 g, Rfl: 3  •  amLODIPine (NORVASC) 5 MG tablet, Take 1 tablet by mouth Daily., Disp: 90 tablet, Rfl: 1  •  ammonium lactate (AmLactin) 12 % lotion, Apply  topically to the appropriate area as directed 2 (two) times a day. For dry skin, Disp: 400 g, Rfl: 5  •  baclofen (LIORESAL) 10 MG tablet, Take 1 tablet by mouth 3 (Three) Times a Day As Needed for Muscle Spasms., Disp: 60 tablet, Rfl: 0  •  Cholecalciferol (VITAMIN D) 1000 UNITS tablet, Take 1 capsule by mouth Daily., Disp: , Rfl:   •  Cyanocobalamin (VITAMIN B-12 ER) 1000 MCG tablet controlled-release, Take 1,000 mcg by mouth Daily., Disp: , Rfl:   •  furosemide (Lasix) 20 MG tablet, Take 1 tablet by mouth Every Other Day As Needed (dyspnea)., Disp: 10 tablet, Rfl: 1  •  glimepiride (AMARYL) 4 MG tablet, Take 1 tablet by mouth Daily With Dinner., Disp: 90 tablet, Rfl: 1  •  insulin NPH (humuLIN N,novoLIN N) 100 UNIT/ML injection, Inject  under the skin into the appropriate area as directed 2 (Two) Times a Day Before Meals., Disp: , Rfl:   •  Insulin Syringe 28G X 1/2\" 1 ML misc, 1 each 2 (Two) Times a Day., Disp: 100 each, Rfl: 5  •  metFORMIN (GLUCOPHAGE) 1000 MG tablet, TAKE ONE TABLET BY MOUTH IN THE MORNING AND 1 AND 1/2 TABLETS AT BEDTIME, Disp: 75 tablet, Rfl: 5  •  metoprolol succinate XL (TOPROL-XL) 25 MG 24 hr " "tablet, Take 1 tablet by mouth Daily., Disp: 30 tablet, Rfl: 11  •  Multiple Vitamins-Minerals (MULTI VITAMIN/MINERALS PO), Take 1 tablet by mouth Daily., Disp: , Rfl:   •  Multiple Vitamins-Minerals (PRESERVISION AREDS 2 PO), Take  by mouth Daily., Disp: , Rfl:   •  omeprazole (PRILOSEC) 20 MG capsule, Take 1 capsule by mouth Daily., Disp: , Rfl:   •  potassium chloride 10 MEQ CR tablet, Take 1 tablet by mouth Every Other Day As Needed (with lasix)., Disp: 10 tablet, Rfl: 1  •  simvastatin (ZOCOR) 10 MG tablet, Take 1 tablet by mouth every night at bedtime., Disp: 90 tablet, Rfl: 1  •  valsartan (Diovan) 160 MG tablet, Take 1 tablet by mouth 2 (Two) Times a Day. Replaces lisinopril, Disp: 60 tablet, Rfl: 5  •  cefdinir (OMNICEF) 300 MG capsule, Take 1 capsule by mouth 2 (Two) Times a Day., Disp: 20 capsule, Rfl: 0      The following portions of the patient's history were reviewed and updated as appropriate: allergies, current medications, past family history, past medical history, past social history, past surgical history and problem list.    Review of Systems   Constitutional: Negative.  Negative for chills and fever.   HENT: Positive for facial swelling. Negative for ear discharge, ear pain, sinus pressure and sore throat.    Respiratory: Positive for shortness of breath. Negative for cough and chest tightness.    Cardiovascular: Negative for chest pain, palpitations and leg swelling.   Gastrointestinal: Negative for diarrhea, nausea and vomiting.   Musculoskeletal: Negative for arthralgias, back pain and myalgias.   Neurological: Negative for dizziness, syncope and headaches.   Hematological: Positive for adenopathy.   Psychiatric/Behavioral: Negative for confusion and sleep disturbance.       Objective   /78   Pulse 69   Ht 177.8 cm (70\")   Wt 110 kg (242 lb)   SpO2 96% Comment: ra  BMI 34.72 kg/m²   Physical Exam  Vitals and nursing note reviewed.   Constitutional:       Appearance: He is " well-developed.   HENT:      Head: Normocephalic and atraumatic.      Right Ear: External ear normal.      Left Ear: External ear normal.      Mouth/Throat:      Pharynx: No oropharyngeal exudate.   Eyes:      Conjunctiva/sclera: Conjunctivae normal.      Pupils: Pupils are equal, round, and reactive to light.   Neck:      Thyroid: No thyromegaly.     Cardiovascular:      Rate and Rhythm: Normal rate and regular rhythm.      Pulses: Normal pulses.      Heart sounds: Normal heart sounds. No murmur heard.   No friction rub. No gallop.    Pulmonary:      Effort: Pulmonary effort is normal.      Breath sounds: Normal breath sounds.   Abdominal:      General: Bowel sounds are normal. There is no distension.      Palpations: Abdomen is soft.      Tenderness: There is no abdominal tenderness.   Musculoskeletal:         General: Tenderness (mid lower back) present.      Cervical back: Neck supple.   Lymphadenopathy:      Cervical: Cervical adenopathy present.      Right cervical: Superficial cervical adenopathy (rt parotid swelling ) present.   Skin:     General: Skin is warm and dry.   Neurological:      Mental Status: He is alert and oriented to person, place, and time.      Cranial Nerves: No cranial nerve deficit.   Psychiatric:         Judgment: Judgment normal.           Results for orders placed or performed in visit on 07/23/21   POC Glycosylated Hemoglobin (Hb A1C)    Specimen: Blood   Result Value Ref Range    Hemoglobin A1C 7.8 %             Assessment/Plan   Diagnoses and all orders for this visit:    Type 2 diabetes mellitus with hyperglycemia, with long-term current use of insulin (CMS/[facility])  -     POC Glycosylated Hemoglobin (Hb A1C)  -     metFORMIN (GLUCOPHAGE) 1000 MG tablet; TAKE ONE TABLET BY MOUTH IN THE MORNING AND 1 AND 1/2 TABLETS AT BEDTIME    Dyspnea on exertion  -     albuterol sulfate  (90 Base) MCG/ACT inhaler; Inhale 2 puffs Every 4 (Four) Hours As Needed for Wheezing.    Parotitis not  due to mumps  -     cefdinir (OMNICEF) 300 MG capsule; Take 1 capsule by mouth 2 (Two) Times a Day.  -     US Head Neck Soft Tissue; Future    Adenitis  -     cefdinir (OMNICEF) 300 MG capsule; Take 1 capsule by mouth 2 (Two) Times a Day.  -     US Head Neck Soft Tissue; Future    Chronic bilateral low back pain without sciatica  -     Ambulatory Referral to Physical Therapy Evaluate and treat    increase insulin to 55 u, and increase exercise.             Return for Next scheduled follow up.    Electronically signed by:    Krystina Gee MD

## 2021-07-28 ENCOUNTER — HOSPITAL ENCOUNTER (OUTPATIENT)
Dept: ULTRASOUND IMAGING | Facility: HOSPITAL | Age: 76
Discharge: HOME | End: 2021-07-28
Admitting: INTERNAL MEDICINE

## 2021-07-28 DIAGNOSIS — K11.20 PAROTITIS NOT DUE TO MUMPS: ICD-10-CM

## 2021-07-28 DIAGNOSIS — I88.9 ADENITIS: ICD-10-CM

## 2021-07-28 PROCEDURE — 76536 US EXAM OF HEAD AND NECK: CPT

## 2021-08-13 ENCOUNTER — TREATMENT (OUTPATIENT)
Dept: PHYSICAL THERAPY | Facility: CLINIC | Age: 76
End: 2021-08-13

## 2021-08-13 DIAGNOSIS — G89.29 CHRONIC MIDLINE LOW BACK PAIN WITHOUT SCIATICA: Primary | ICD-10-CM

## 2021-08-13 DIAGNOSIS — M54.50 CHRONIC MIDLINE LOW BACK PAIN WITHOUT SCIATICA: Primary | ICD-10-CM

## 2021-08-13 DIAGNOSIS — R53.1 WEAKNESS: ICD-10-CM

## 2021-08-13 PROCEDURE — 97161 PT EVAL LOW COMPLEX 20 MIN: CPT | Performed by: PHYSICAL THERAPIST

## 2021-08-13 PROCEDURE — 97110 THERAPEUTIC EXERCISES: CPT | Performed by: PHYSICAL THERAPIST

## 2021-08-13 NOTE — PROGRESS NOTES
Physical Therapy Initial Evaluation and Plan of Care      Patient: Gabriela Mar   : 1945  Diagnosis/ICD-10 Code:  Chronic midline low back pain without sciatica [M54.5, G89.29]  Referring practitioner: Krystina Gee MD  Date of Initial Visit: 2021  Today's Date: 2021  Patient seen for 1 sessions           Subjective Questionnaire: Oswestry:  (incomplete)      Subjective Evaluation    History of Present Illness  Mechanism of injury: The pt reported a 9 year history of LBP that began with acute onset of sciatic nerve pain. He was treated with injections and recovered well. He reported incidental findings of a lumbar disc herniation on an MRI at that time that he was told was not causing the issue.     Pain has worsened in the last 6-8 months which he attributes to decreased activity levels since the beginning of the pandemic. He stated he does not leave the house often because he does not feel safe with the ongoing pandemic. He spends appx 5-6 hours a day at his computer.     Pain is midline in the lumbar spine and is worsened with increased activity around the house, standing to cook, and with prolonged walking. Symptoms are quickly relieved with sitting. He has onset of LBP within a couple of minutes of unsupported walking but is able to walk for hours with minimal pain if he holds on to a shopping cart. He denies distal symptoms.     Pain  Current pain ratin  At best pain ratin  At worst pain ratin  Location: LBP              Objective          Palpation   Left   Hypertonic in the lumbar paraspinals and quadratus lumborum.   Tenderness of the erector spinae, lumbar paraspinals and quadratus lumborum.     Right   Hypertonic in the lumbar paraspinals and quadratus lumborum. Tenderness of the erector spinae, lumbar paraspinals and quadratus lumborum.     Additional Palpation Details  Minimal TTP in mm    Tenderness     Lumbar Spine  No tenderness in the spinous process.      Active Range of Motion     Additional Active Range of Motion Details  Lumbar flexion: 16 cm to the floor   Lumbar extension: 25%  R Lateral flexion: 5 cm to KJL  L Lateral flexion: 5 cm to KJL  R Rotation: 25 deg  L Rotation: 20 deg      Strength/Myotome Testing     Left Hip   Planes of Motion   Flexion: 4+  Extension: 4-  Abduction: 3    Right Hip   Planes of Motion   Flexion: 4+  Extension: 4-  Abduction: 4-    Left Knee   Flexion: 5  Extension: 5    Right Knee   Flexion: 5  Extension: 5    Left Ankle/Foot   Dorsiflexion: 5  Plantar flexion: 5    Right Ankle/Foot   Dorsiflexion: 5  Plantar flexion: 5          Assessment & Plan     Assessment  Impairments: abnormal muscle firing, abnormal or restricted ROM, activity intolerance, impaired physical strength, lacks appropriate home exercise program and pain with function  Assessment details: The patient is a 74 yo male who presented to PT with evolving characteristics of chronic LBP with low complexity. Signs and symptoms are consistent with lumbar stenosis. He lived an active lifestyle and was otherwise healthy until the pandemic began last year and his activity level has significantly declined since that time and he is now deconditioned. He became SOA with exam procedures and will benefit from aerobic and mm endurance exercise. His biggest complaint is pain with walking but he does not have pain when using a shopping cart, so he will likely benefit from flexion based mobs and core/glute strengthening. I expect the patient to make a timely recovery with skilled PT intervention.     Prognosis: good  Functional Limitations: carrying objects, lifting, walking, uncomfortable because of pain, stooping and unable to perform repetitive tasks  Goals  Plan Goals: Short Term Goals (4 weeks):     1. The patient will be independent and compliant with initial HEP.     2. The patient will report pain at rest 0/10 or less and worst pain 4/10 or less.    3. The patient will  display decreased TTP in the lumbar spine and dec mm tension in the surrounding musculature.    4. B hip strength will improve to 4/5 in abd and ext.     5. WILLIAM will improve by 6 points or more.       Long Term Goals (8 weeks):     1. The patient will be appropriate for independent management and compliant with progressed HEP.     2. The patient will report pain at rest 0/10 or less and worst pain 3/10 or less.    3. The patient will return to work duties and/or ADLs with no limitations due to LBP.     4. The patient will return to recreational and community activities with no limitations due to LBP.         Plan  Therapy options: will be seen for skilled physical therapy services  Planned modality interventions: cryotherapy, electrical stimulation/Russian stimulation, TENS, iontophoresis, thermotherapy (hydrocollator packs) and traction  Planned therapy interventions: abdominal trunk stabilization, manual therapy, motor coordination training, neuromuscular re-education, ADL retraining, body mechanics training, flexibility, functional ROM exercises, home exercise program, joint mobilization, postural training, soft tissue mobilization, spinal/joint mobilization, strengthening, stretching and therapeutic activities  Frequency: 1x week  Duration in visits: 8  Duration in weeks: 8  Treatment plan discussed with: patient  Plan details: The patient will likely benefit from TE/NMED to include postural reeducation and core strengthening, MT for improved joint mobility, and TA for activity modification and lifting mechanics. Modalities will be utilized as needed for pain modulation. Dry needling as indicated.        Visit Diagnoses:    ICD-10-CM ICD-9-CM   1. Chronic midline low back pain without sciatica  M54.5 724.2    G89.29 338.29   2. Weakness  R53.1 780.79       Timed:  Manual Therapy:    0     mins  03395;  Therapeutic Exercise:    10     mins  64197;     Neuromuscular Sena:    0    mins  34411;    Therapeutic  Activity:     0     mins  75699;     Gait Trainin     mins  13981;     Ultrasound:     0     mins  85207;    Electrical Stimulation:    0     mins  20983 ( );    Untimed:  Electrical Stimulation:    0     mins  91059 ( );  Mechanical Traction:    0     mins  61759;     Timed Treatment:   10   mins   Total Treatment:     40   mins    PT SIGNATURE: Gil Yanez PT         Initial Certification  Certification Period: 2021 thru 2021  I certify that the therapy services are furnished while this patient is under my care.  The services outlined above are required by this patient, and will be reviewed every 90 days.     PHYSICIAN: Krystina Gee MD      DATE:     Please sign and return via fax to [email or web address] . Thank you, [facility].

## 2021-08-25 ENCOUNTER — TELEPHONE (OUTPATIENT)
Dept: PHYSICAL THERAPY | Facility: CLINIC | Age: 76
End: 2021-08-25

## 2021-09-20 DIAGNOSIS — E11.65 TYPE 2 DIABETES MELLITUS WITH HYPERGLYCEMIA, WITH LONG-TERM CURRENT USE OF INSULIN (HCC): ICD-10-CM

## 2021-09-20 DIAGNOSIS — Z79.4 TYPE 2 DIABETES MELLITUS WITH HYPERGLYCEMIA, WITH LONG-TERM CURRENT USE OF INSULIN (HCC): ICD-10-CM

## 2021-09-20 RX ORDER — GLIMEPIRIDE 4 MG/1
TABLET ORAL
Qty: 90 TABLET | Refills: 1 | Status: SHIPPED | OUTPATIENT
Start: 2021-09-20 | End: 2022-04-18 | Stop reason: SDUPTHER

## 2021-10-11 DIAGNOSIS — I10 ESSENTIAL HYPERTENSION: ICD-10-CM

## 2021-10-11 RX ORDER — AMLODIPINE BESYLATE 5 MG/1
TABLET ORAL
Qty: 90 TABLET | Refills: 1 | Status: SHIPPED | OUTPATIENT
Start: 2021-10-11 | End: 2022-03-28 | Stop reason: HOSPADM

## 2021-10-11 RX ORDER — VALSARTAN 160 MG/1
TABLET ORAL
Qty: 60 TABLET | Refills: 0 | Status: SHIPPED | OUTPATIENT
Start: 2021-10-11 | End: 2021-11-05 | Stop reason: SDUPTHER

## 2021-11-05 ENCOUNTER — LAB (OUTPATIENT)
Dept: LAB | Facility: HOSPITAL | Age: 76
End: 2021-11-05

## 2021-11-05 ENCOUNTER — OFFICE VISIT (OUTPATIENT)
Dept: INTERNAL MEDICINE | Facility: CLINIC | Age: 76
End: 2021-11-05

## 2021-11-05 VITALS
DIASTOLIC BLOOD PRESSURE: 84 MMHG | HEIGHT: 70 IN | HEART RATE: 83 BPM | BODY MASS INDEX: 34.13 KG/M2 | OXYGEN SATURATION: 94 % | WEIGHT: 238.4 LBS | SYSTOLIC BLOOD PRESSURE: 164 MMHG

## 2021-11-05 DIAGNOSIS — E78.2 MIXED HYPERLIPIDEMIA: ICD-10-CM

## 2021-11-05 DIAGNOSIS — R06.09 DYSPNEA ON EXERTION: ICD-10-CM

## 2021-11-05 DIAGNOSIS — I10 ESSENTIAL HYPERTENSION: ICD-10-CM

## 2021-11-05 DIAGNOSIS — J44.1 COPD WITH EXACERBATION (HCC): ICD-10-CM

## 2021-11-05 DIAGNOSIS — E11.65 UNCONTROLLED TYPE 2 DIABETES MELLITUS WITH HYPERGLYCEMIA (HCC): ICD-10-CM

## 2021-11-05 DIAGNOSIS — E11.65 TYPE 2 DIABETES MELLITUS WITH HYPERGLYCEMIA, WITH LONG-TERM CURRENT USE OF INSULIN (HCC): Primary | ICD-10-CM

## 2021-11-05 DIAGNOSIS — Z20.822 CLOSE EXPOSURE TO COVID-19 VIRUS: ICD-10-CM

## 2021-11-05 DIAGNOSIS — R18.8 OTHER ASCITES: ICD-10-CM

## 2021-11-05 DIAGNOSIS — E78.5 HYPERLIPIDEMIA LDL GOAL <70: ICD-10-CM

## 2021-11-05 DIAGNOSIS — E55.9 VITAMIN D DEFICIENCY: ICD-10-CM

## 2021-11-05 DIAGNOSIS — I50.32 CHRONIC DIASTOLIC CONGESTIVE HEART FAILURE (HCC): ICD-10-CM

## 2021-11-05 DIAGNOSIS — Z00.00 MEDICARE ANNUAL WELLNESS VISIT, SUBSEQUENT: ICD-10-CM

## 2021-11-05 DIAGNOSIS — K11.8 MASS OF RIGHT PAROTID GLAND: ICD-10-CM

## 2021-11-05 DIAGNOSIS — Z79.4 TYPE 2 DIABETES MELLITUS WITH HYPERGLYCEMIA, WITH LONG-TERM CURRENT USE OF INSULIN (HCC): Primary | ICD-10-CM

## 2021-11-05 LAB
25(OH)D3 SERPL-MCNC: 54.7 NG/ML
ALBUMIN SERPL-MCNC: 3.9 G/DL (ref 3.5–5.2)
ALBUMIN/GLOB SERPL: 1.1 G/DL
ALP SERPL-CCNC: 91 U/L (ref 39–117)
ALT SERPL W P-5'-P-CCNC: 16 U/L (ref 1–41)
ANION GAP SERPL CALCULATED.3IONS-SCNC: 9.1 MMOL/L (ref 5–15)
AST SERPL-CCNC: 23 U/L (ref 1–40)
BASOPHILS # BLD AUTO: 0.07 10*3/MM3 (ref 0–0.2)
BASOPHILS NFR BLD AUTO: 0.7 % (ref 0–1.5)
BILIRUB SERPL-MCNC: 0.4 MG/DL (ref 0–1.2)
BUN SERPL-MCNC: 20 MG/DL (ref 8–23)
BUN/CREAT SERPL: 12.3 (ref 7–25)
CALCIUM SPEC-SCNC: 9.3 MG/DL (ref 8.6–10.5)
CHLORIDE SERPL-SCNC: 99 MMOL/L (ref 98–107)
CHOLEST SERPL-MCNC: 127 MG/DL (ref 0–200)
CO2 SERPL-SCNC: 29.9 MMOL/L (ref 22–29)
CREAT SERPL-MCNC: 1.62 MG/DL (ref 0.76–1.27)
DEPRECATED RDW RBC AUTO: 42.5 FL (ref 37–54)
EOSINOPHIL # BLD AUTO: 0.27 10*3/MM3 (ref 0–0.4)
EOSINOPHIL NFR BLD AUTO: 2.7 % (ref 0.3–6.2)
ERYTHROCYTE [DISTWIDTH] IN BLOOD BY AUTOMATED COUNT: 13.1 % (ref 12.3–15.4)
EXPIRATION DATE: ABNORMAL
GFR SERPL CREATININE-BSD FRML MDRD: 42 ML/MIN/1.73
GLOBULIN UR ELPH-MCNC: 3.6 GM/DL
GLUCOSE SERPL-MCNC: 187 MG/DL (ref 65–99)
HBA1C MFR BLD: 8.2 %
HCT VFR BLD AUTO: 42.2 % (ref 37.5–51)
HDLC SERPL-MCNC: 42 MG/DL (ref 40–60)
HGB BLD-MCNC: 13.5 G/DL (ref 13–17.7)
IMM GRANULOCYTES # BLD AUTO: 0.03 10*3/MM3 (ref 0–0.05)
IMM GRANULOCYTES NFR BLD AUTO: 0.3 % (ref 0–0.5)
LDLC SERPL CALC-MCNC: 65 MG/DL (ref 0–100)
LDLC/HDLC SERPL: 1.51 {RATIO}
LYMPHOCYTES # BLD AUTO: 3.04 10*3/MM3 (ref 0.7–3.1)
LYMPHOCYTES NFR BLD AUTO: 30.2 % (ref 19.6–45.3)
Lab: ABNORMAL
MCH RBC QN AUTO: 28.7 PG (ref 26.6–33)
MCHC RBC AUTO-ENTMCNC: 32 G/DL (ref 31.5–35.7)
MCV RBC AUTO: 89.6 FL (ref 79–97)
MONOCYTES # BLD AUTO: 0.83 10*3/MM3 (ref 0.1–0.9)
MONOCYTES NFR BLD AUTO: 8.3 % (ref 5–12)
NEUTROPHILS NFR BLD AUTO: 5.81 10*3/MM3 (ref 1.7–7)
NEUTROPHILS NFR BLD AUTO: 57.8 % (ref 42.7–76)
NRBC BLD AUTO-RTO: 0 /100 WBC (ref 0–0.2)
PLATELET # BLD AUTO: 156 10*3/MM3 (ref 140–450)
PMV BLD AUTO: 11.6 FL (ref 6–12)
POC CREATININE URINE: 200
POC MICROALBUMIN URINE: 150
POTASSIUM SERPL-SCNC: 5.1 MMOL/L (ref 3.5–5.2)
PROT SERPL-MCNC: 7.5 G/DL (ref 6–8.5)
RBC # BLD AUTO: 4.71 10*6/MM3 (ref 4.14–5.8)
SODIUM SERPL-SCNC: 138 MMOL/L (ref 136–145)
TRIGL SERPL-MCNC: 107 MG/DL (ref 0–150)
TSH SERPL DL<=0.05 MIU/L-ACNC: 2.35 UIU/ML (ref 0.27–4.2)
VIT B12 BLD-MCNC: 914 PG/ML (ref 211–946)
VLDLC SERPL-MCNC: 20 MG/DL (ref 5–40)
WBC # BLD AUTO: 10.05 10*3/MM3 (ref 3.4–10.8)

## 2021-11-05 PROCEDURE — 1160F RVW MEDS BY RX/DR IN RCRD: CPT | Performed by: INTERNAL MEDICINE

## 2021-11-05 PROCEDURE — 85025 COMPLETE CBC W/AUTO DIFF WBC: CPT

## 2021-11-05 PROCEDURE — G0444 DEPRESSION SCREEN ANNUAL: HCPCS | Performed by: INTERNAL MEDICINE

## 2021-11-05 PROCEDURE — 83036 HEMOGLOBIN GLYCOSYLATED A1C: CPT | Performed by: INTERNAL MEDICINE

## 2021-11-05 PROCEDURE — 86769 SARS-COV-2 COVID-19 ANTIBODY: CPT

## 2021-11-05 PROCEDURE — 3052F HG A1C>EQUAL 8.0%<EQUAL 9.0%: CPT | Performed by: INTERNAL MEDICINE

## 2021-11-05 PROCEDURE — G0439 PPPS, SUBSEQ VISIT: HCPCS | Performed by: INTERNAL MEDICINE

## 2021-11-05 PROCEDURE — 99214 OFFICE O/P EST MOD 30 MIN: CPT | Performed by: INTERNAL MEDICINE

## 2021-11-05 PROCEDURE — 82044 UR ALBUMIN SEMIQUANTITATIVE: CPT | Performed by: INTERNAL MEDICINE

## 2021-11-05 PROCEDURE — 82607 VITAMIN B-12: CPT

## 2021-11-05 PROCEDURE — 80061 LIPID PANEL: CPT

## 2021-11-05 PROCEDURE — 84443 ASSAY THYROID STIM HORMONE: CPT

## 2021-11-05 PROCEDURE — 1170F FXNL STATUS ASSESSED: CPT | Performed by: INTERNAL MEDICINE

## 2021-11-05 PROCEDURE — 1126F AMNT PAIN NOTED NONE PRSNT: CPT | Performed by: INTERNAL MEDICINE

## 2021-11-05 PROCEDURE — 36415 COLL VENOUS BLD VENIPUNCTURE: CPT

## 2021-11-05 PROCEDURE — 80053 COMPREHEN METABOLIC PANEL: CPT

## 2021-11-05 PROCEDURE — 82306 VITAMIN D 25 HYDROXY: CPT

## 2021-11-05 RX ORDER — VALSARTAN 160 MG/1
160 TABLET ORAL 2 TIMES DAILY
Qty: 180 TABLET | Refills: 1 | Status: SHIPPED | OUTPATIENT
Start: 2021-11-05 | End: 2022-03-23

## 2021-11-05 RX ORDER — DOXYCYCLINE HYCLATE 100 MG/1
100 CAPSULE ORAL 2 TIMES DAILY
Qty: 20 CAPSULE | Refills: 0 | Status: ON HOLD | OUTPATIENT
Start: 2021-11-05 | End: 2022-03-25

## 2021-11-05 RX ORDER — FUROSEMIDE 20 MG/1
20 TABLET ORAL
Qty: 10 TABLET | Refills: 1 | Status: SHIPPED | OUTPATIENT
Start: 2021-11-05 | End: 2022-03-23

## 2021-11-05 RX ORDER — POTASSIUM CHLORIDE 750 MG/1
10 TABLET, FILM COATED, EXTENDED RELEASE ORAL
Qty: 10 TABLET | Refills: 1 | Status: SHIPPED | OUTPATIENT
Start: 2021-11-05 | End: 2022-03-28 | Stop reason: HOSPADM

## 2021-11-05 RX ORDER — ALBUTEROL SULFATE 90 UG/1
2 AEROSOL, METERED RESPIRATORY (INHALATION) EVERY 4 HOURS PRN
Qty: 8 G | Refills: 3 | Status: SHIPPED | OUTPATIENT
Start: 2021-11-05

## 2021-11-05 RX ORDER — SIMVASTATIN 10 MG
10 TABLET ORAL
Qty: 90 TABLET | Refills: 1 | Status: SHIPPED | OUTPATIENT
Start: 2021-11-05 | End: 2022-06-20

## 2021-11-05 NOTE — PROGRESS NOTES
"The ABCs of the Annual Wellness Visit  Subsequent Medicare Wellness Visit    Chief Complaint   Patient presents with   • Medicare Wellness-subsequent   • Cough     since Aug      Subjective    History of Present Illness:  Gabriela Mar is a 76 y.o. male who presents for a Subsequent Medicare Wellness Visit.    The following portions of the patient's history were reviewed and   updated as appropriate: allergies, current medications, past family history, past medical history, past social history, past surgical history and problem list.    Compared to one year ago, the patient feels his physical   health is worse.    Compared to one year ago, the patient feels his mental   health is the same.    Recent Hospitalizations:  He was not admitted to the hospital during the last year.       Current Medical Providers:  Patient Care Team:  Krystina Gee MD as PCP - General  Krystina Gee MD as PCP - Family Medicine  Evangelist Hernández MD as Consulting Physician (Ophthalmology)    Outpatient Medications Prior to Visit   Medication Sig Dispense Refill   • amLODIPine (NORVASC) 5 MG tablet TAKE ONE TABLET BY MOUTH DAILY 90 tablet 1   • ammonium lactate (AmLactin) 12 % lotion Apply  topically to the appropriate area as directed 2 (two) times a day. For dry skin 400 g 5   • baclofen (LIORESAL) 10 MG tablet Take 1 tablet by mouth 3 (Three) Times a Day As Needed for Muscle Spasms. 60 tablet 0   • Cholecalciferol (VITAMIN D) 1000 UNITS tablet Take 1 capsule by mouth Daily.     • Cyanocobalamin (VITAMIN B-12 ER) 1000 MCG tablet controlled-release Take 1,000 mcg by mouth Daily.     • glimepiride (AMARYL) 4 MG tablet TAKE ONE TABLET BY MOUTH DAILY WITH DINNER 90 tablet 1   • insulin NPH (humuLIN N,novoLIN N) 100 UNIT/ML injection Inject  under the skin into the appropriate area as directed 2 (Two) Times a Day Before Meals.     • Insulin Syringe 28G X 1/2\" 1 ML misc 1 each 2 (Two) Times a Day. 100 each 5   • metFORMIN " (GLUCOPHAGE) 1000 MG tablet TAKE ONE TABLET BY MOUTH IN THE MORNING AND 1 AND 1/2 TABLETS AT BEDTIME 75 tablet 5   • metoprolol succinate XL (TOPROL-XL) 25 MG 24 hr tablet Take 1 tablet by mouth Daily. 30 tablet 11   • Multiple Vitamins-Minerals (MULTI VITAMIN/MINERALS PO) Take 1 tablet by mouth Daily.     • Multiple Vitamins-Minerals (PRESERVISION AREDS 2 PO) Take  by mouth Daily.     • omeprazole (PRILOSEC) 20 MG capsule Take 1 capsule by mouth Daily.     • albuterol sulfate  (90 Base) MCG/ACT inhaler Inhale 2 puffs Every 4 (Four) Hours As Needed for Wheezing. 8 g 3   • furosemide (Lasix) 20 MG tablet Take 1 tablet by mouth Every Other Day As Needed (dyspnea). 10 tablet 1   • potassium chloride 10 MEQ CR tablet Take 1 tablet by mouth Every Other Day As Needed (with lasix). 10 tablet 1   • simvastatin (ZOCOR) 10 MG tablet Take 1 tablet by mouth every night at bedtime. 90 tablet 1   • valsartan (DIOVAN) 160 MG tablet TAKE ONE TABLET BY MOUTH TWICE A DAY *THIS REPLACES LISINOPRIL* 60 tablet 0   • cefdinir (OMNICEF) 300 MG capsule Take 1 capsule by mouth 2 (Two) Times a Day. 20 capsule 0     No facility-administered medications prior to visit.       No opioid medication identified on active medication list. I have reviewed chart for other potential  high risk medication/s and harmful drug interactions in the elderly.          Aspirin is not on active medication list.  Aspirin use is indicated based on review of current medical condition/s. Pros and cons of this therapy have been discussed with this patient. Benefits of this medication outweigh potential harm.  Patient has been instructed to start taking this medication..    Patient Active Problem List   Diagnosis   • Hyperlipidemia LDL goal <70   • Sciatica   • Essential hypertension   • Esophageal reflux   • Obesity   • Osteoarthritis   • Vitamin D deficiency   • Vitamin B deficiency   • Hip arthritis   • Chronic bilateral low back pain without sciatica   •  "Chronic diastolic congestive heart failure (HCC)   • Chronic obstructive pulmonary disease with acute lower respiratory infection (HCC)   • Type 2 diabetes mellitus with hyperglycemia, with long-term current use of insulin (HCC)   • Coronary artery disease involving native coronary artery of native heart with angina pectoris (HCC)     Advance Care Planning  Advance Directive is not on file.  ACP discussion was held with the patient during this visit. Patient does not have an advance directive, declines further assistance.    Review of Systems   Constitutional: Negative for chills and fever.   HENT: Positive for congestion. Negative for ear pain and sore throat.         Rt facial mass extending toward temple   Eyes: Negative for pain, redness and visual disturbance.   Respiratory: Positive for cough and shortness of breath.         Orthopnea   Cardiovascular: Negative for chest pain, palpitations and leg swelling.   Gastrointestinal: Negative for abdominal pain, diarrhea and nausea.   Endocrine: Negative for cold intolerance and heat intolerance.   Genitourinary: Negative for flank pain and urgency.   Musculoskeletal: Negative for arthralgias and gait problem.   Skin: Negative for pallor and rash.   Neurological: Negative for dizziness and weakness.   Psychiatric/Behavioral: Negative for dysphoric mood and sleep disturbance. The patient is not nervous/anxious.         Objective    Vitals:    11/05/21 1116   BP: 164/84   Pulse: 83   SpO2: 94%  Comment: ra   Weight: 108 kg (238 lb 6.4 oz)   Height: 177.8 cm (70\")   PainSc: 0-No pain     BMI Readings from Last 1 Encounters:   11/05/21 34.21 kg/m²   BMI is above normal parameters. Recommendations include: exercise counseling and nutrition counseling    Does the patient have evidence of cognitive impairment? No    Physical Exam  Constitutional:       General: He is not in acute distress.     Appearance: Normal appearance. He is obese. He is ill-appearing.   HENT:      " Head: Normocephalic and atraumatic.      Right Ear: Tympanic membrane and external ear normal.      Left Ear: Tympanic membrane and external ear normal.      Nose: Nose normal.      Mouth/Throat:      Mouth: Mucous membranes are moist.   Eyes:      General: No scleral icterus.  Neck:      Vascular: No carotid bruit.   Cardiovascular:      Rate and Rhythm: Normal rate and regular rhythm.      Pulses: Normal pulses.      Heart sounds: Normal heart sounds. No murmur heard.  No friction rub. No gallop.    Pulmonary:      Effort: Pulmonary effort is normal.      Breath sounds: Wheezing present. No rhonchi or rales.   Abdominal:      General: Bowel sounds are normal. There is distension (with umbilical eversion, + fluid thrill).      Palpations: Abdomen is soft.      Tenderness: There is no abdominal tenderness. There is no right CVA tenderness, left CVA tenderness, guarding or rebound.      Hernia: No hernia is present.   Musculoskeletal:         General: No tenderness. Normal range of motion.      Cervical back: Normal range of motion.      Right lower leg: No edema.      Left lower leg: No edema.   Lymphadenopathy:      Cervical: No cervical adenopathy.   Skin:     General: Skin is warm.      Findings: Erythema and lesion (Rt facial mass extending toward temple- mild erythema, non ttender with soft to firm texture not adherant) present. No rash.   Neurological:      General: No focal deficit present.      Mental Status: He is alert and oriented to person, place, and time. Mental status is at baseline.      Cranial Nerves: No cranial nerve deficit.      Sensory: No sensory deficit.      Coordination: Coordination normal.      Gait: Gait normal.      Deep Tendon Reflexes: Reflexes normal.   Psychiatric:         Mood and Affect: Mood normal.         Behavior: Behavior normal.       Lab Results   Component Value Date    TRIG 107 11/05/2021    HDL 42 11/05/2021    LDL 65 11/05/2021    VLDL 20 11/05/2021    HGBA1C 8.2  11/05/2021            HEALTH RISK ASSESSMENT    Smoking Status:  Social History     Tobacco Use   Smoking Status Former Smoker   • Packs/day: 2.00   • Years: 35.00   • Pack years: 70.00   • Types: Cigarettes   Smokeless Tobacco Never Used   Tobacco Comment    QUIT 20 PLUS YEARS AGO      Alcohol Consumption:  Social History     Substance and Sexual Activity   Alcohol Use No    Comment: stopped drinking     Fall Risk Screen:    LEE Fall Risk Assessment was completed, and patient is at LOW risk for falls.Assessment completed on:11/5/2021    Depression Screening:  PHQ-2/PHQ-9 Depression Screening 11/5/2021   Little interest or pleasure in doing things 0   Feeling down, depressed, or hopeless 0   Trouble falling or staying asleep, or sleeping too much -   Feeling tired or having little energy -   Poor appetite or overeating -   Feeling bad about yourself - or that you are a failure or have let yourself or your family down -   Trouble concentrating on things, such as reading the newspaper or watching television -   Moving or speaking so slowly that other people could have noticed. Or the opposite - being so fidgety or restless that you have been moving around a lot more than usual -   Thoughts that you would be better off dead, or of hurting yourself in some way -   Total Score 0   If you checked off any problems, how difficult have these problems made it for you to do your work, take care of things at home, or get along with other people? -       Health Habits and Functional and Cognitive Screening:  Functional & Cognitive Status 11/5/2021   Do you have difficulty preparing food and eating? No   Do you have difficulty bathing yourself, getting dressed or grooming yourself? No   Do you have difficulty using the toilet? No   Do you have difficulty moving around from place to place? No   Do you have trouble with steps or getting out of a bed or a chair? No   Current Diet Unhealthy Diet   Dental Exam Up to date   Eye Exam  Up to date        Eye Exam Comment -   Exercise (times per week) -   Current Exercises Include No Regular Exercise   Current Exercise Activities Include -   Do you need help using the phone?  No   Are you deaf or do you have serious difficulty hearing?  No   Do you need help with transportation? No   Do you need help shopping? No   Do you need help preparing meals?  No   Do you need help with housework?  No   Do you need help with laundry? No   Do you need help taking your medications? No   Do you need help managing money? No   Do you ever drive or ride in a car without wearing a seat belt? No   Have you felt unusual stress, anger or loneliness in the last month? No   Who do you live with? Child   If you need help, do you have trouble finding someone available to you? No   Have you been bothered in the last four weeks by sexual problems? No   Do you have difficulty concentrating, remembering or making decisions? No       Age-appropriate Screening Schedule:  Refer to the list below for future screening recommendations based on patient's age, sex and/or medical conditions. Orders for these recommended tests are listed in the plan section. The patient has been provided with a written plan.    Health Maintenance   Topic Date Due   • DIABETIC EYE EXAM  10/12/2021   • INFLUENZA VACCINE  11/05/2022 (Originally 8/1/2021)   • HEMOGLOBIN A1C  05/05/2022   • LIPID PANEL  11/05/2022   • URINE MICROALBUMIN  11/05/2022   • TDAP/TD VACCINES  Discontinued   • ZOSTER VACCINE  Discontinued              Assessment/Plan   CMS Preventative Services Quick Reference  Risk Factors Identified During Encounter  Cardiovascular Disease  Fall Risk-High or Moderate  Glaucoma or Family History of Glaucoma  Immunizations Discussed/Encouraged (specific Immunizations; Influenza, Shingrix and COVID19  Obesity/Overweight   non compliance  The above risks/problems have been discussed with the patient.  Follow up actions/plans if indicated are seen  "below in the Assessment/Plan Section.  Pertinent information has been shared with the patient in the After Visit Summary.    Medicare Wellness-subsequent and Cough (since Aug)    Subjective   Gabriela Mar is a 76 y.o. male is here today for follow-up.  HPI  Was exposed to covid in august- 2 of his friends , went to the [facility], but they couldn't test him.  Got a test off of [employer].but did not use it, as it said that it would be negative after 5 days.  He started having worsening cough, dyspnea and orthopnea., increased wheezing.  Feels better now, but gets oob when he walks from his kitchen to the front door.    Current Outpatient Medications:   •  albuterol sulfate  (90 Base) MCG/ACT inhaler, Inhale 2 puffs Every 4 (Four) Hours As Needed for Wheezing., Disp: 8 g, Rfl: 3  •  amLODIPine (NORVASC) 5 MG tablet, TAKE ONE TABLET BY MOUTH DAILY, Disp: 90 tablet, Rfl: 1  •  ammonium lactate (AmLactin) 12 % lotion, Apply  topically to the appropriate area as directed 2 (two) times a day. For dry skin, Disp: 400 g, Rfl: 5  •  baclofen (LIORESAL) 10 MG tablet, Take 1 tablet by mouth 3 (Three) Times a Day As Needed for Muscle Spasms., Disp: 60 tablet, Rfl: 0  •  Cholecalciferol (VITAMIN D) 1000 UNITS tablet, Take 1 capsule by mouth Daily., Disp: , Rfl:   •  Cyanocobalamin (VITAMIN B-12 ER) 1000 MCG tablet controlled-release, Take 1,000 mcg by mouth Daily., Disp: , Rfl:   •  furosemide (Lasix) 20 MG tablet, Take 1 tablet by mouth Every Other Day As Needed (dyspnea)., Disp: 10 tablet, Rfl: 1  •  glimepiride (AMARYL) 4 MG tablet, TAKE ONE TABLET BY MOUTH DAILY WITH DINNER, Disp: 90 tablet, Rfl: 1  •  insulin NPH (humuLIN N,novoLIN N) 100 UNIT/ML injection, Inject  under the skin into the appropriate area as directed 2 (Two) Times a Day Before Meals., Disp: , Rfl:   •  Insulin Syringe 28G X 1/2\" 1 ML misc, 1 each 2 (Two) Times a Day., Disp: 100 each, Rfl: 5  •  metFORMIN (GLUCOPHAGE) 1000 MG tablet, TAKE ONE TABLET BY MOUTH " "IN THE MORNING AND 1 AND 1/2 TABLETS AT BEDTIME, Disp: 75 tablet, Rfl: 5  •  metoprolol succinate XL (TOPROL-XL) 25 MG 24 hr tablet, Take 1 tablet by mouth Daily., Disp: 30 tablet, Rfl: 11  •  Multiple Vitamins-Minerals (MULTI VITAMIN/MINERALS PO), Take 1 tablet by mouth Daily., Disp: , Rfl:   •  Multiple Vitamins-Minerals (PRESERVISION AREDS 2 PO), Take  by mouth Daily., Disp: , Rfl:   •  omeprazole (PRILOSEC) 20 MG capsule, Take 1 capsule by mouth Daily., Disp: , Rfl:   •  potassium chloride 10 MEQ CR tablet, Take 1 tablet by mouth Every Other Day As Needed (with lasix)., Disp: 10 tablet, Rfl: 1  •  simvastatin (ZOCOR) 10 MG tablet, Take 1 tablet by mouth every night at bedtime., Disp: 90 tablet, Rfl: 1  •  valsartan (DIOVAN) 160 MG tablet, Take 1 tablet by mouth 2 (Two) Times a Day., Disp: 180 tablet, Rfl: 1  •  doxycycline (VIBRAMYCIN) 100 MG capsule, Take 1 capsule by mouth 2 (Two) Times a Day., Disp: 20 capsule, Rfl: 0  •  fluticasone-salmeterol (Advair Diskus) 250-50 MCG/DOSE DISKUS, Inhale 1 puff 2 (Two) Times a Day., Disp: 60 each, Rfl: 2      The following portions of the patient's history were reviewed and updated as appropriate: allergies, current medications, past family history, past medical history, past social history, past surgical history and problem list.        Objective   /84   Pulse 83   Ht 177.8 cm (70\")   Wt 108 kg (238 lb 6.4 oz)   SpO2 94% Comment: ra  BMI 34.21 kg/m²         Results for orders placed or performed in visit on 11/05/21   POC Glycosylated Hemoglobin (Hb A1C)    Specimen: Blood   Result Value Ref Range    Hemoglobin A1C 8.2 %    Lot Number [id number]     Expiration Date 6/28/23    POCT microalbumin    Specimen: Urine   Result Value Ref Range    Microalbumin, Urine 150     Creatinine, Urine 200              Assessment/Plan   Diagnoses and all orders for this visit:    Type 2 diabetes mellitus with hyperglycemia, with long-term current use of insulin (HCC)  -     POC " Glycosylated Hemoglobin (Hb A1C)  -     POCT microalbumin    Dyspnea on exertion  -     albuterol sulfate  (90 Base) MCG/ACT inhaler; Inhale 2 puffs Every 4 (Four) Hours As Needed for Wheezing.    Chronic diastolic congestive heart failure (HCC)  -     furosemide (Lasix) 20 MG tablet; Take 1 tablet by mouth Every Other Day As Needed (dyspnea).  -     potassium chloride 10 MEQ CR tablet; Take 1 tablet by mouth Every Other Day As Needed (with lasix).    Hyperlipidemia LDL goal <70  -     simvastatin (ZOCOR) 10 MG tablet; Take 1 tablet by mouth every night at bedtime.    Essential hypertension  -     valsartan (DIOVAN) 160 MG tablet; Take 1 tablet by mouth 2 (Two) Times a Day.    COPD with exacerbation (HCC)  -     XR Chest PA & Lateral  -     doxycycline (VIBRAMYCIN) 100 MG capsule; Take 1 capsule by mouth 2 (Two) Times a Day.  -     fluticasone-salmeterol (Advair Diskus) 250-50 MCG/DOSE DISKUS; Inhale 1 puff 2 (Two) Times a Day.  -     Cancel: CBC (No Diff); Future  -     CBC & Differential; Future    Medicare annual wellness visit, subsequent    Uncontrolled type 2 diabetes mellitus with hyperglycemia (HCC)    Mixed hyperlipidemia  -     Comprehensive Metabolic Panel; Future  -     Lipid Panel; Future  -     TSH; Future    Vitamin D deficiency  -     Vitamin D 25 Hydroxy; Future  -     Vitamin B12; Future    Close exposure to COVID-19 virus  -     SARS-CoV-2 Antibodies (Roche); Future    Mass of right parotid gland  -     CT soft tissue neck wo contrast; Future    Other ascites  -     CT Abdomen Pelvis Without Contrast; Future    urgent ct scan orders placed, as never got the go ahead from patient after his last US in July.  Reiterated compliance with his medical regimen and following up on his tests and results.  soa ? chf vs copd, treat as above           PHQ-2/PHQ-9 Depression screening reviewed with patient . Total time spent today for depression screening  with Gabriela Mar  was 15 minutes in  counseling, along with plans for any diagnositc work-up and treatment.        Follow Up:   Return in about 4 weeks (around 12/3/2021).     An After Visit Summary and PPPS were made available to the patient.        I spent 35 minutes caring for Gabriela on this date of service. This time includes time spent by me in the following activities:obtaining and/or reviewing a separately obtained history, performing a medically appropriate examination and/or evaluation , counseling and educating the patient/family/caregiver, ordering medications, tests, or procedures, documenting information in the medical record and independently interpreting results and communicating that information with the patient/family/caregiver           Electronically signed by:    Krystina Gee MD

## 2021-11-06 LAB — SARS-COV-2 AB SERPL QL IA: POSITIVE

## 2021-11-08 ENCOUNTER — HOSPITAL ENCOUNTER (OUTPATIENT)
Dept: CT IMAGING | Facility: HOSPITAL | Age: 76
Discharge: HOME | End: 2021-11-08
Admitting: INTERNAL MEDICINE

## 2021-11-08 DIAGNOSIS — K11.8 MASS OF RIGHT PAROTID GLAND: ICD-10-CM

## 2021-11-08 DIAGNOSIS — R18.8 OTHER ASCITES: ICD-10-CM

## 2021-11-08 PROCEDURE — 74176 CT ABD & PELVIS W/O CONTRAST: CPT

## 2021-11-08 PROCEDURE — 70490 CT SOFT TISSUE NECK W/O DYE: CPT

## 2021-11-11 DIAGNOSIS — K11.8 PAROTID MASS: Primary | ICD-10-CM

## 2021-11-11 RX ORDER — CEFDINIR 300 MG/1
300 CAPSULE ORAL 2 TIMES DAILY
Qty: 20 CAPSULE | Refills: 0 | Status: SHIPPED | OUTPATIENT
Start: 2021-11-11 | End: 2022-03-17

## 2021-11-16 ENCOUNTER — TELEPHONE (OUTPATIENT)
Dept: INTERNAL MEDICINE | Facility: CLINIC | Age: 76
End: 2021-11-16

## 2021-11-16 NOTE — TELEPHONE ENCOUNTER
PT REFERRAL HAS LATOYA SENT TO [facility] ON 11/15/2021; PT WAS CALLED ON 11/16/2021; SEE DOCUMENTATION ON REFERRAL

## 2021-11-16 NOTE — TELEPHONE ENCOUNTER
Pt states he did not  th doxy on 11/5/21, but did get the cefdinir on the 11th.  Pt state that he is breathing and sleeping well now.  Do you want him to still take the doxy?    Pt also asking about ENT appt, has not heard anything, please call pt with appt info.

## 2021-11-16 NOTE — TELEPHONE ENCOUNTER
He does not need the doxycycline anymore..  Cefdinir should be enough to help with his breathing and the cyst on his face.    ENT has called him today to schedule but his voicemail box was not set up.    Please have him call [phone] to set up the appointment.

## 2021-11-16 NOTE — TELEPHONE ENCOUNTER
"Caller: Bonita Marchio LEWIS    Relationship: Self    Best call back number: [phone]    What medications are you currently taking:   Current Outpatient Medications on File Prior to Visit   Medication Sig Dispense Refill   • albuterol sulfate  (90 Base) MCG/ACT inhaler Inhale 2 puffs Every 4 (Four) Hours As Needed for Wheezing. 8 g 3   • amLODIPine (NORVASC) 5 MG tablet TAKE ONE TABLET BY MOUTH DAILY 90 tablet 1   • ammonium lactate (AmLactin) 12 % lotion Apply  topically to the appropriate area as directed 2 (two) times a day. For dry skin 400 g 5   • baclofen (LIORESAL) 10 MG tablet Take 1 tablet by mouth 3 (Three) Times a Day As Needed for Muscle Spasms. 60 tablet 0   • cefdinir (OMNICEF) 300 MG capsule Take 1 capsule by mouth 2 (Two) Times a Day. 20 capsule 0   • Cholecalciferol (VITAMIN D) 1000 UNITS tablet Take 1 capsule by mouth Daily.     • Cyanocobalamin (VITAMIN B-12 ER) 1000 MCG tablet controlled-release Take 1,000 mcg by mouth Daily.     • doxycycline (VIBRAMYCIN) 100 MG capsule Take 1 capsule by mouth 2 (Two) Times a Day. 20 capsule 0   • fluticasone-salmeterol (Advair Diskus) 250-50 MCG/DOSE DISKUS Inhale 1 puff 2 (Two) Times a Day. 60 each 2   • furosemide (Lasix) 20 MG tablet Take 1 tablet by mouth Every Other Day As Needed (dyspnea). 10 tablet 1   • glimepiride (AMARYL) 4 MG tablet TAKE ONE TABLET BY MOUTH DAILY WITH DINNER 90 tablet 1   • insulin NPH (humuLIN N,novoLIN N) 100 UNIT/ML injection Inject  under the skin into the appropriate area as directed 2 (Two) Times a Day Before Meals.     • Insulin Syringe 28G X 1/2\" 1 ML misc 1 each 2 (Two) Times a Day. 100 each 5   • metFORMIN (GLUCOPHAGE) 1000 MG tablet TAKE ONE TABLET BY MOUTH IN THE MORNING AND 1 AND 1/2 TABLETS AT BEDTIME 75 tablet 5   • metoprolol succinate XL (TOPROL-XL) 25 MG 24 hr tablet Take 1 tablet by mouth Daily. 30 tablet 11   • Multiple Vitamins-Minerals (MULTI VITAMIN/MINERALS PO) Take 1 tablet by mouth Daily.     • " Multiple Vitamins-Minerals (PRESERVISION AREDS 2 PO) Take  by mouth Daily.     • omeprazole (PRILOSEC) 20 MG capsule Take 1 capsule by mouth Daily.     • potassium chloride 10 MEQ CR tablet Take 1 tablet by mouth Every Other Day As Needed (with lasix). 10 tablet 1   • simvastatin (ZOCOR) 10 MG tablet Take 1 tablet by mouth every night at bedtime. 90 tablet 1   • valsartan (DIOVAN) 160 MG tablet Take 1 tablet by mouth 2 (Two) Times a Day. 180 tablet 1     No current facility-administered medications on file prior to visit.      Which medication are you concerned about:doxycycline (VIBRAMYCIN) 100 MG capsule    Who prescribed you this medication: RUY     What are your concerns: PATIENT STATED THAT HE GOT A N ALERT FORM HIS PHARMACY STATING HE HAD THE MEDICATION TO .  PATIENT IS ASKING IF DR. DUVAL CALLED IT IN

## 2021-11-29 ENCOUNTER — TRANSCRIBE ORDERS (OUTPATIENT)
Dept: ADMINISTRATIVE | Facility: HOSPITAL | Age: 76
End: 2021-11-29

## 2021-11-29 DIAGNOSIS — R22.1 LUMP IN NECK: ICD-10-CM

## 2021-11-29 DIAGNOSIS — R22.1 LOCALIZED SWELLING, MASS AND LUMP, NECK: Primary | ICD-10-CM

## 2021-12-03 ENCOUNTER — TRANSCRIBE ORDERS (OUTPATIENT)
Dept: ADMINISTRATIVE | Facility: HOSPITAL | Age: 76
End: 2021-12-03

## 2021-12-03 DIAGNOSIS — R22.1 NECK MASS: Primary | ICD-10-CM

## 2021-12-17 ENCOUNTER — HOSPITAL ENCOUNTER (OUTPATIENT)
Dept: CARDIOLOGY | Facility: HOSPITAL | Age: 76
Discharge: HOME | End: 2021-12-17

## 2021-12-17 ENCOUNTER — LAB (OUTPATIENT)
Dept: LAB | Facility: HOSPITAL | Age: 76
End: 2021-12-17

## 2021-12-17 ENCOUNTER — TRANSCRIBE ORDERS (OUTPATIENT)
Dept: CARDIOLOGY | Facility: HOSPITAL | Age: 76
End: 2021-12-17

## 2021-12-17 ENCOUNTER — TRANSCRIBE ORDERS (OUTPATIENT)
Dept: LAB | Facility: HOSPITAL | Age: 76
End: 2021-12-17

## 2021-12-17 DIAGNOSIS — R22.1 NECK MASS: Primary | ICD-10-CM

## 2021-12-17 DIAGNOSIS — R22.1 NECK MASS: ICD-10-CM

## 2021-12-17 DIAGNOSIS — Z01.811 PRE-OP CHEST EXAM: ICD-10-CM

## 2021-12-17 DIAGNOSIS — Z01.811 PRE-OP CHEST EXAM: Primary | ICD-10-CM

## 2021-12-17 LAB
ANION GAP SERPL CALCULATED.3IONS-SCNC: 10.1 MMOL/L (ref 5–15)
BUN SERPL-MCNC: 17 MG/DL (ref 8–23)
BUN/CREAT SERPL: 11.6 (ref 7–25)
CALCIUM SPEC-SCNC: 8.6 MG/DL (ref 8.6–10.5)
CHLORIDE SERPL-SCNC: 103 MMOL/L (ref 98–107)
CO2 SERPL-SCNC: 27.9 MMOL/L (ref 22–29)
CREAT SERPL-MCNC: 1.46 MG/DL (ref 0.76–1.27)
DEPRECATED RDW RBC AUTO: 45.7 FL (ref 37–54)
ERYTHROCYTE [DISTWIDTH] IN BLOOD BY AUTOMATED COUNT: 13.8 % (ref 12.3–15.4)
GFR SERPL CREATININE-BSD FRML MDRD: 47 ML/MIN/1.73
GLUCOSE SERPL-MCNC: 250 MG/DL (ref 65–99)
HCT VFR BLD AUTO: 38.7 % (ref 37.5–51)
HGB BLD-MCNC: 12.4 G/DL (ref 13–17.7)
MCH RBC QN AUTO: 29.2 PG (ref 26.6–33)
MCHC RBC AUTO-ENTMCNC: 32 G/DL (ref 31.5–35.7)
MCV RBC AUTO: 91.3 FL (ref 79–97)
PLATELET # BLD AUTO: 142 10*3/MM3 (ref 140–450)
PMV BLD AUTO: 11.6 FL (ref 6–12)
POTASSIUM SERPL-SCNC: 5 MMOL/L (ref 3.5–5.2)
QT INTERVAL: 438 MS
QTC INTERVAL: 473 MS
RBC # BLD AUTO: 4.24 10*6/MM3 (ref 4.14–5.8)
SODIUM SERPL-SCNC: 141 MMOL/L (ref 136–145)
WBC NRBC COR # BLD: 6.99 10*3/MM3 (ref 3.4–10.8)

## 2021-12-17 PROCEDURE — 93005 ELECTROCARDIOGRAM TRACING: CPT | Performed by: OTOLARYNGOLOGY

## 2021-12-17 PROCEDURE — 93010 ELECTROCARDIOGRAM REPORT: CPT | Performed by: INTERNAL MEDICINE

## 2021-12-17 PROCEDURE — 36415 COLL VENOUS BLD VENIPUNCTURE: CPT

## 2021-12-17 PROCEDURE — 80048 BASIC METABOLIC PNL TOTAL CA: CPT

## 2021-12-17 PROCEDURE — 85027 COMPLETE CBC AUTOMATED: CPT

## 2021-12-21 ENCOUNTER — APPOINTMENT (OUTPATIENT)
Dept: ULTRASOUND IMAGING | Facility: HOSPITAL | Age: 76
End: 2021-12-21

## 2022-01-04 ENCOUNTER — TRANSCRIBE ORDERS (OUTPATIENT)
Dept: ADMINISTRATIVE | Facility: HOSPITAL | Age: 77
End: 2022-01-04

## 2022-01-04 DIAGNOSIS — C85.91 NON-HODGKIN LYMPHOMA, UNSPECIFIED, LYMPH NODES OF HEAD, FACE, AND NECK: ICD-10-CM

## 2022-01-04 DIAGNOSIS — C84.Z0: Primary | ICD-10-CM

## 2022-01-10 ENCOUNTER — TRANSCRIBE ORDERS (OUTPATIENT)
Dept: ADMINISTRATIVE | Facility: HOSPITAL | Age: 77
End: 2022-01-10

## 2022-01-10 DIAGNOSIS — C84.Z1: Primary | ICD-10-CM

## 2022-01-11 ENCOUNTER — HOSPITAL ENCOUNTER (OUTPATIENT)
Dept: PET IMAGING | Facility: HOSPITAL | Age: 77
End: 2022-01-11

## 2022-01-11 ENCOUNTER — APPOINTMENT (OUTPATIENT)
Dept: PET IMAGING | Facility: HOSPITAL | Age: 77
End: 2022-01-11

## 2022-01-11 ENCOUNTER — HOSPITAL ENCOUNTER (OUTPATIENT)
Dept: PET IMAGING | Facility: HOSPITAL | Age: 77
Discharge: HOME | End: 2022-01-11

## 2022-01-11 DIAGNOSIS — C84.Z1: ICD-10-CM

## 2022-01-11 LAB — GLUCOSE BLDC GLUCOMTR-MCNC: 61 MG/DL (ref 70–130)

## 2022-01-11 PROCEDURE — 82962 GLUCOSE BLOOD TEST: CPT

## 2022-01-11 PROCEDURE — A9552 F18 FDG: HCPCS | Performed by: OTOLARYNGOLOGY

## 2022-01-11 PROCEDURE — 78815 PET IMAGE W/CT SKULL-THIGH: CPT

## 2022-01-11 PROCEDURE — 0 FLUDEOXYGLUCOSE F18 SOLUTION: Performed by: OTOLARYNGOLOGY

## 2022-01-11 RX ADMIN — FLUDEOXYGLUCOSE F18 1 DOSE: 300 INJECTION INTRAVENOUS at 14:38

## 2022-01-17 ENCOUNTER — DOCUMENTATION (OUTPATIENT)
Dept: PHYSICAL THERAPY | Facility: CLINIC | Age: 77
End: 2022-01-17

## 2022-01-17 ENCOUNTER — TELEPHONE (OUTPATIENT)
Dept: ONCOLOGY | Facility: CLINIC | Age: 77
End: 2022-01-17

## 2022-01-17 DIAGNOSIS — M54.50 CHRONIC MIDLINE LOW BACK PAIN WITHOUT SCIATICA: Primary | ICD-10-CM

## 2022-01-17 DIAGNOSIS — R53.1 WEAKNESS: ICD-10-CM

## 2022-01-17 DIAGNOSIS — G89.29 CHRONIC MIDLINE LOW BACK PAIN WITHOUT SCIATICA: Primary | ICD-10-CM

## 2022-01-17 NOTE — PROGRESS NOTES
Discharge Summary  Discharge Summary from Physical Therapy Report      Date of initial PT visit: 8/13/21    Number of Visits: 1     Goals: Not Met    Discharge Plan: Continue with current home exercise program as instructed    Comments: The pt was evaluated for back pain and did not return.    Date of Discharge: 1/17/21        Gil Yanez PT  Physical Therapist

## 2022-01-17 NOTE — TELEPHONE ENCOUNTER
Caller: HARIS    Relationship to patient: SELF    Best call back number: [phone]    Patient is needing: TO R/S 1- NP APPT.

## 2022-01-18 ENCOUNTER — OFFICE VISIT (OUTPATIENT)
Dept: INTERNAL MEDICINE | Facility: CLINIC | Age: 77
End: 2022-01-18

## 2022-01-18 VITALS
BODY MASS INDEX: 33.61 KG/M2 | SYSTOLIC BLOOD PRESSURE: 162 MMHG | HEART RATE: 74 BPM | TEMPERATURE: 97.3 F | HEIGHT: 70 IN | WEIGHT: 234.8 LBS | OXYGEN SATURATION: 92 % | DIASTOLIC BLOOD PRESSURE: 70 MMHG | RESPIRATION RATE: 16 BRPM

## 2022-01-18 DIAGNOSIS — Z79.4 TYPE 2 DIABETES MELLITUS WITH HYPERGLYCEMIA, WITH LONG-TERM CURRENT USE OF INSULIN: Primary | ICD-10-CM

## 2022-01-18 DIAGNOSIS — E11.65 TYPE 2 DIABETES MELLITUS WITH HYPERGLYCEMIA, WITH LONG-TERM CURRENT USE OF INSULIN: Primary | ICD-10-CM

## 2022-01-18 DIAGNOSIS — C85.81 LARGE CELL LYMPHOMA OF LYMPH NODES OF HEAD, FACE, AND NECK: ICD-10-CM

## 2022-01-18 LAB
EXPIRATION DATE: ABNORMAL
HBA1C MFR BLD: 8.1 %
Lab: ABNORMAL

## 2022-01-18 PROCEDURE — 3052F HG A1C>EQUAL 8.0%<EQUAL 9.0%: CPT | Performed by: INTERNAL MEDICINE

## 2022-01-18 PROCEDURE — 99214 OFFICE O/P EST MOD 30 MIN: CPT | Performed by: INTERNAL MEDICINE

## 2022-01-18 PROCEDURE — 83036 HEMOGLOBIN GLYCOSYLATED A1C: CPT | Performed by: INTERNAL MEDICINE

## 2022-01-18 NOTE — PROGRESS NOTES
"Diabetes (4 week follow up)    Subjective   Gabriela aMr is a 76 y.o. male is here today for follow-up.    History of Present Illness   His Rt facial mass was dxed with Anaplastic large cell lymphoma, s/p surgical removal - partly. Plan for chemo and XRT.  Has appointment with Dr. Herr on 1/20/22.  Has been reading up on this .  His son knows about this, but has not told his daughter and sister yet.  Sugars have been erratic due to having ot skip doses dueing certain events.    Current Outpatient Medications:   •  albuterol sulfate  (90 Base) MCG/ACT inhaler, Inhale 2 puffs Every 4 (Four) Hours As Needed for Wheezing., Disp: 8 g, Rfl: 3  •  amLODIPine (NORVASC) 5 MG tablet, TAKE ONE TABLET BY MOUTH DAILY, Disp: 90 tablet, Rfl: 1  •  ammonium lactate (AmLactin) 12 % lotion, Apply  topically to the appropriate area as directed 2 (two) times a day. For dry skin, Disp: 400 g, Rfl: 5  •  baclofen (LIORESAL) 10 MG tablet, Take 1 tablet by mouth 3 (Three) Times a Day As Needed for Muscle Spasms., Disp: 60 tablet, Rfl: 0  •  cefdinir (OMNICEF) 300 MG capsule, Take 1 capsule by mouth 2 (Two) Times a Day., Disp: 20 capsule, Rfl: 0  •  Cholecalciferol (VITAMIN D) 1000 UNITS tablet, Take 1 capsule by mouth Daily., Disp: , Rfl:   •  Cyanocobalamin (VITAMIN B-12 ER) 1000 MCG tablet controlled-release, Take 1,000 mcg by mouth Daily., Disp: , Rfl:   •  doxycycline (VIBRAMYCIN) 100 MG capsule, Take 1 capsule by mouth 2 (Two) Times a Day., Disp: 20 capsule, Rfl: 0  •  furosemide (Lasix) 20 MG tablet, Take 1 tablet by mouth Every Other Day As Needed (dyspnea)., Disp: 10 tablet, Rfl: 1  •  glimepiride (AMARYL) 4 MG tablet, TAKE ONE TABLET BY MOUTH DAILY WITH DINNER, Disp: 90 tablet, Rfl: 1  •  insulin NPH (humuLIN N,novoLIN N) 100 UNIT/ML injection, Inject  under the skin into the appropriate area as directed 2 (Two) Times a Day Before Meals., Disp: , Rfl:   •  Insulin Syringe 28G X 1/2\" 1 ML misc, 1 each 2 (Two) Times a Day., " "Disp: 100 each, Rfl: 5  •  metFORMIN (GLUCOPHAGE) 1000 MG tablet, TAKE ONE TABLET BY MOUTH IN THE MORNING AND 1 AND 1/2 TABLETS AT BEDTIME, Disp: 75 tablet, Rfl: 5  •  metoprolol succinate XL (TOPROL-XL) 25 MG 24 hr tablet, Take 1 tablet by mouth Daily., Disp: 30 tablet, Rfl: 11  •  Multiple Vitamins-Minerals (MULTI VITAMIN/MINERALS PO), Take 1 tablet by mouth Daily., Disp: , Rfl:   •  Multiple Vitamins-Minerals (PRESERVISION AREDS 2 PO), Take  by mouth Daily., Disp: , Rfl:   •  omeprazole (PRILOSEC) 20 MG capsule, Take 1 capsule by mouth Daily., Disp: , Rfl:   •  potassium chloride 10 MEQ CR tablet, Take 1 tablet by mouth Every Other Day As Needed (with lasix)., Disp: 10 tablet, Rfl: 1  •  simvastatin (ZOCOR) 10 MG tablet, Take 1 tablet by mouth every night at bedtime., Disp: 90 tablet, Rfl: 1  •  valsartan (DIOVAN) 160 MG tablet, Take 1 tablet by mouth 2 (Two) Times a Day., Disp: 180 tablet, Rfl: 1      The following portions of the patient's history were reviewed and updated as appropriate: allergies, current medications, past family history, past medical history, past social history, past surgical history and problem list.    Review of Systems   Constitutional: Negative.  Negative for chills and fever.   HENT: Negative for ear discharge, ear pain, sinus pressure and sore throat.    Respiratory: Negative for cough, chest tightness and shortness of breath.    Cardiovascular: Negative for chest pain, palpitations and leg swelling.   Gastrointestinal: Negative for diarrhea, nausea and vomiting.   Musculoskeletal: Positive for arthralgias. Negative for back pain and myalgias.   Skin: Positive for wound. Negative for color change.   Neurological: Negative for dizziness, syncope and headaches.   Psychiatric/Behavioral: Positive for dysphoric mood. Negative for confusion and sleep disturbance.       Objective   /70   Pulse 74   Temp 97.3 °F (36.3 °C)   Resp 16   Ht 177.8 cm (70\")   Wt 107 kg (234 lb 12.8 oz)  "  SpO2 92%   BMI 33.69 kg/m²   Physical Exam  Vitals and nursing note reviewed.   Constitutional:       Appearance: He is well-developed.   HENT:      Head: Normocephalic and atraumatic.      Salivary Glands: Right salivary gland is diffusely enlarged. Right salivary gland is not tender.      Right Ear: External ear normal.      Left Ear: External ear normal.      Mouth/Throat:      Pharynx: No oropharyngeal exudate.   Eyes:      Conjunctiva/sclera: Conjunctivae normal.      Pupils: Pupils are equal, round, and reactive to light.   Neck:      Thyroid: No thyromegaly.   Cardiovascular:      Rate and Rhythm: Normal rate and regular rhythm.      Pulses: Normal pulses.      Heart sounds: Normal heart sounds. No murmur heard.  No friction rub. No gallop.    Pulmonary:      Effort: Pulmonary effort is normal.      Breath sounds: Normal breath sounds.   Abdominal:      General: Bowel sounds are normal. There is no distension.      Palpations: Abdomen is soft.      Tenderness: There is no abdominal tenderness.   Musculoskeletal:         General: Swelling present.      Cervical back: Neck supple.   Skin:     General: Skin is warm and dry.      Findings: Lesion present.   Neurological:      Mental Status: He is alert and oriented to person, place, and time.      Cranial Nerves: No cranial nerve deficit.   Psychiatric:         Judgment: Judgment normal.           Results for orders placed or performed in visit on 01/18/22   POC Glycosylated Hemoglobin (Hb A1C)    Specimen: Blood   Result Value Ref Range    Hemoglobin A1C 8.1 %    Lot Number [id number]     Expiration Date 9/13/23              Assessment/Plan   Diagnoses and all orders for this visit:    Type 2 diabetes mellitus with hyperglycemia, with long-term current use of insulin (HCC)  -     POC Glycosylated Hemoglobin (Hb A1C)    Large cell lymphoma of lymph nodes of head, face, and neck (HCC)  Comments:  Anaplastic per pathology. Counseled after reviewing ENT  records.H/O appt day after.                 Return in about 3 months (around 4/18/2022) for Next scheduled follow up.    Electronically signed by:    Krystina Gee MD

## 2022-01-20 ENCOUNTER — CONSULT (OUTPATIENT)
Dept: ONCOLOGY | Facility: CLINIC | Age: 77
End: 2022-01-20

## 2022-01-20 VITALS
TEMPERATURE: 96.9 F | WEIGHT: 237.2 LBS | BODY MASS INDEX: 33.96 KG/M2 | HEART RATE: 86 BPM | SYSTOLIC BLOOD PRESSURE: 194 MMHG | HEIGHT: 70 IN | RESPIRATION RATE: 16 BRPM | OXYGEN SATURATION: 95 % | DIASTOLIC BLOOD PRESSURE: 72 MMHG

## 2022-01-20 DIAGNOSIS — C84.71 ANAPLASTIC ALK-NEGATIVE LARGE CELL LYMPHOMA OF LYMPH NODE OF HEAD: Primary | ICD-10-CM

## 2022-01-20 DIAGNOSIS — T45.1X5A ADVERSE EFFECT OF ANTINEOPLASTIC AND IMMUNOSUPPRESSIVE DRUGS, INITIAL ENCOUNTER: ICD-10-CM

## 2022-01-20 DIAGNOSIS — C84.70 ANAPLASTIC ALK-NEGATIVE LARGE CELL LYMPHOMA, UNSPECIFIED BODY REGION: Primary | ICD-10-CM

## 2022-01-20 PROCEDURE — 99205 OFFICE O/P NEW HI 60 MIN: CPT | Performed by: INTERNAL MEDICINE

## 2022-01-20 RX ORDER — PALONOSETRON 0.05 MG/ML
0.25 INJECTION, SOLUTION INTRAVENOUS ONCE
Status: CANCELLED | OUTPATIENT
Start: 2022-02-03

## 2022-01-20 RX ORDER — PREDNISONE 50 MG/1
100 TABLET ORAL DAILY
Qty: 30 TABLET | Refills: 5 | Status: SHIPPED | OUTPATIENT
Start: 2022-01-20 | End: 2022-01-25

## 2022-01-20 RX ORDER — ONDANSETRON HYDROCHLORIDE 8 MG/1
8 TABLET, FILM COATED ORAL 3 TIMES DAILY PRN
Qty: 30 TABLET | Refills: 5 | Status: SHIPPED | OUTPATIENT
Start: 2022-01-20 | End: 2022-05-26

## 2022-01-20 RX ORDER — OLANZAPINE 5 MG/1
5 TABLET ORAL ONCE
Status: CANCELLED | OUTPATIENT
Start: 2022-02-03 | End: 2022-02-03

## 2022-01-20 RX ORDER — DOXORUBICIN HYDROCHLORIDE 2 MG/ML
50 INJECTION, SOLUTION INTRAVENOUS ONCE
Status: CANCELLED | OUTPATIENT
Start: 2022-02-03

## 2022-01-20 RX ORDER — SODIUM CHLORIDE 9 MG/ML
250 INJECTION, SOLUTION INTRAVENOUS ONCE
Status: CANCELLED | OUTPATIENT
Start: 2022-02-03

## 2022-01-20 NOTE — PROGRESS NOTES
ID: 76 y.o. 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 Mar today.  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  He has a obvious mass on his right side of his face.  This was biopsied and the diagnosis was out negative anaplastic large cell lymphoma.  He underwent a PET scan that did not show any other site of disease.  Clinically he has swelling in the region but otherwise seems to be doing reasonably well.  He has chronic medical issues with diabetes which seems to be controlled with insulin.  But otherwise he is actually in reasonable health for his age.  [place] the area.  He denies any night sweats of fevers.  No issues with nausea or vomiting.  No recent weight loss.        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) 1/20/2022   • Arthritis    • CKD (chronic kidney disease), stage III (HCC)    • Diabetes mellitus (HCC)    • Esophagitis, reflux    • Hyperlipidemia    • Hypertension    • Obesity    • Pharyngitis    • Wears eyeglasses        Past Surgical History:   Procedure Laterality Date   • COLONOSCOPY      2015   • ENDOSCOPY      2011   • EYE SURGERY      cataract 8/22 and 10/18/18 - Dr. Hernández   • JOINT REPLACEMENT      LEFT HIP 2016-MARCH    • SUBMAXILLARY CYST  EXCISION      Right back   • TONSILLECTOMY     • TOTAL HIP ARTHROPLASTY Right 3/7/2017    Procedure: RIGHT TOTAL HIP ARTHROPLASTY;  Surgeon: Reynaldo Suarez MD;  Location: [facility];  Service:    • VASECTOMY     • WRIST GANGLION EXCISION         Social History     Socioeconomic History   • Marital status:    Tobacco Use   •  Smoking status: Former Smoker     Packs/day: 2.00     Years: 35.00     Pack years: 70.00     Types: Cigarettes   • Smokeless tobacco: Never Used   • Tobacco comment: QUIT 20 PLUS YEARS AGO    Vaping Use   • Vaping Use: Never used   Substance and Sexual Activity   • Alcohol use: No     Comment: stopped drinking   • Drug use: No   • Sexual activity: Defer       Family History   Problem Relation Age of Onset   • Diabetes Mother    • Colon cancer Father    • Kidney failure Sister    • Diabetes Sister    • Colon cancer Brother    • Colon cancer Brother    • Lung cancer Brother    • Squamous cell carcinoma Brother         side of face   • Diabetes Sister        Review of Systems:    16 point review of systems was performed and reviewed and scanned into the EMR    Review of Systems - Oncology      Current Outpatient Medications:   •  albuterol sulfate  (90 Base) MCG/ACT inhaler, Inhale 2 puffs Every 4 (Four) Hours As Needed for Wheezing., Disp: 8 g, Rfl: 3  •  amLODIPine (NORVASC) 5 MG tablet, TAKE ONE TABLET BY MOUTH DAILY, Disp: 90 tablet, Rfl: 1  •  ammonium lactate (AmLactin) 12 % lotion, Apply  topically to the appropriate area as directed 2 (two) times a day. For dry skin, Disp: 400 g, Rfl: 5  •  baclofen (LIORESAL) 10 MG tablet, Take 1 tablet by mouth 3 (Three) Times a Day As Needed for Muscle Spasms., Disp: 60 tablet, Rfl: 0  •  cefdinir (OMNICEF) 300 MG capsule, Take 1 capsule by mouth 2 (Two) Times a Day., Disp: 20 capsule, Rfl: 0  •  Cholecalciferol (VITAMIN D) 1000 UNITS tablet, Take 1 capsule by mouth Daily., Disp: , Rfl:   •  Cyanocobalamin (VITAMIN B-12 ER) 1000 MCG tablet controlled-release, Take 1,000 mcg by mouth Daily., Disp: , Rfl:   •  doxycycline (VIBRAMYCIN) 100 MG capsule, Take 1 capsule by mouth 2 (Two) Times a Day., Disp: 20 capsule, Rfl: 0  •  furosemide (Lasix) 20 MG tablet, Take 1 tablet by mouth Every Other Day As Needed (dyspnea)., Disp: 10 tablet, Rfl: 1  •  glimepiride (AMARYL) 4 MG  "tablet, TAKE ONE TABLET BY MOUTH DAILY WITH DINNER, Disp: 90 tablet, Rfl: 1  •  insulin NPH (humuLIN N,novoLIN N) 100 UNIT/ML injection, Inject  under the skin into the appropriate area as directed 2 (Two) Times a Day Before Meals., Disp: , Rfl:   •  Insulin Syringe 28G X 1/2\" 1 ML misc, 1 each 2 (Two) Times a Day., Disp: 100 each, Rfl: 5  •  metFORMIN (GLUCOPHAGE) 1000 MG tablet, TAKE ONE TABLET BY MOUTH IN THE MORNING AND 1 AND 1/2 TABLETS AT BEDTIME, Disp: 75 tablet, Rfl: 5  •  metoprolol succinate XL (TOPROL-XL) 25 MG 24 hr tablet, Take 1 tablet by mouth Daily., Disp: 30 tablet, Rfl: 11  •  Multiple Vitamins-Minerals (MULTI VITAMIN/MINERALS PO), Take 1 tablet by mouth Daily., Disp: , Rfl:   •  Multiple Vitamins-Minerals (PRESERVISION AREDS 2 PO), Take  by mouth Daily., Disp: , Rfl:   •  omeprazole (PRILOSEC) 20 MG capsule, Take 1 capsule by mouth Daily., Disp: , Rfl:   •  potassium chloride 10 MEQ CR tablet, Take 1 tablet by mouth Every Other Day As Needed (with lasix)., Disp: 10 tablet, Rfl: 1  •  simvastatin (ZOCOR) 10 MG tablet, Take 1 tablet by mouth every night at bedtime., Disp: 90 tablet, Rfl: 1  •  valsartan (DIOVAN) 160 MG tablet, Take 1 tablet by mouth 2 (Two) Times a Day., Disp: 180 tablet, Rfl: 1    Pain Medications             baclofen (LIORESAL) 10 MG tablet Take 1 tablet by mouth 3 (Three) Times a Day As Needed for Muscle Spasms.           Allergies   Allergen Reactions   • Bactrim [Sulfamethoxazole-Trimethoprim] Other (See Comments)     Joint pain   • Sulfa Antibiotics GI Intolerance     Makes bones hurt           ECOG score: 0           Objective     Vitals:    01/20/22 1110   BP: (!) 194/72   Pulse: 86   Resp: 16   Temp: 96.9 °F (36.1 °C)   SpO2: 95%     Body mass index is 34.03 kg/m².  Body surface area is 2.25 meters squared.        01/20/22 1110   Weight: 108 kg (237 lb 3.2 oz)     Pain Score    01/20/22 1110   PainSc: 0-No pain          Physical Exam    General: well appearing, in no acute " distress  HEENT: sclera anicteric, swelling on the right side of his face with some erythema  Lymphatics: no cervical, supraclavicular, or axillary adenopathy  Cardiovascular: regular rate and rhythm, no murmurs, rubs or gallops  Lungs: clear to auscultation bilaterally  Abdomen: soft, nontender, nondistended.  No palpable organomegaly  Extremities: no lower extremity edema  Skin: no rashes, lesions, bruising, or petechiae  Msk:  Shows no weakness of the large muscle groups  Psych: Mood is stable        Lab Results   Component Value Date    GLUCOSE 250 (H) 12/17/2021    BUN 17 12/17/2021    CREATININE 1.46 (H) 12/17/2021     12/17/2021    K 5.0 12/17/2021     12/17/2021    CO2 27.9 12/17/2021    CALCIUM 8.6 12/17/2021    PROTEINTOT 7.5 11/05/2021    ALBUMIN 3.90 11/05/2021    BILITOT 0.4 11/05/2021    ALKPHOS 91 11/05/2021    AST 23 11/05/2021    ALT 16 11/05/2021       Lab Results   Component Value Date    HGB 12.4 (L) 12/17/2021    HCT 38.7 12/17/2021    MCV 91.3 12/17/2021     12/17/2021    WBC 6.99 12/17/2021    NEUTROABS 5.81 11/05/2021    LYMPHSABS 3.04 11/05/2021    MONOSABS 0.83 11/05/2021    EOSABS 0.27 11/05/2021    BASOSABS 0.07 11/05/2021       CT Abdomen Pelvis Without Contrast    Result Date: 11/10/2021  No CT evidence of acute intra-abdominal or pelvic abnormality. There is stool scattered throughout the colon with no signs of obvious obstruction or gross free intraperitoneal air. No evidence of ascites.  D:  11/08/2021 E:  11/08/2021  This report was finalized on 11/10/2021 4:34 PM by Dr. Lissy Dean MD.      CT Soft Tissue Neck Without Contrast    Result Date: 11/9/2021  2 areas of interstitial marked on the right. The more superior marker along the right temporalis region corresponds to a 16mm soft tissue nodule within the superficial subcutaneous soft tissues. This appearance is strictly nonspecific possibly small abscess or hyperdense cyst. Neoplastic process is not  excluded entirely and this lesion would be amenable to ultrasound-guided soft tissue sampling, as indicated.  No distinct multicystic nodule noted corresponding to the prior ultrasound finding in the area of the marked palpable finding adjacent to the right parotid gland. Several small nonspecific 1 to 2 cm right parotid nodules are present, potentially intraparotid lymph nodes. Consider dedicated ultrasound of the right parotid gland to further evaluate.   This report was finalized on 11/9/2021 11:37 AM by Lazaro Carrero.      NM PET/CT Skull Base to Mid Thigh    Result Date: 1/13/2022  Area of postsurgical change right facial soft tissues overlying the right temporalis musculature inferiorly. On the posterior aspect of the surgical region and near the angle of the mandible on the lateral margin is a somewhat indeterminate intermediate soft tissue density focus mass with SUV 3.2 with attention on followup to exclude residual or local disease. Additionally, asymmetric likely intraparotid node of the anterior superficial lobe parotid gland right maximum SUV 3.6 without separate abnormal hypermetabolism.  D:  01/11/2022 E:  01/12/2022    This report was finalized on 1/13/2022 8:20 AM by Dr. Damian Zaidi.        Pathology : Performed at the [facility] shows a anaplastic large cell lymphoma that is ALK negative IHC staining shows a strongly CD30 positive malignancy.       Assessment/Plan      1.  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  I discussed the pathology and reviewed scans with him and his daughter today.  He has fairly minimal disease.  He is fairly asymptomatic.  The NCCN guidelines suggest treating him at 6 cycles of Cytoxan, Adriamycin and prednisone along with Brentuximab followed by ISRT.  Had a long discussion with him regarding side effects of the treatment and expectations of the disease.  This is a fairly high risk disease.  My plan is to image him after 3-4 cycles.   CAT scans should suffice since his disease is not very PET avid.  Clinically I can feel the area that is swollen.  I am hopeful that it will improve with treatment.  I plan to put a port in him and get a baseline echo prior to initiating therapy.  I will see him prior to starting cycle #1 to answer any further questions that may present prior to that.    Total time of patient care on day of service including time prior to, face to face with patient, and following visit spent in reviewing records, lab results, imaging studies, discussion with patient, and documentation/charting was > 62 minutes.        Thank you for allowing me to participate in the care of this patient.    Yours sincerely,    Ai Henderson MD  [facility]  Hematology and Oncology    Return on: 02/03/22  Return in (Approximately): 2 weeks, Schedule with next infusion    No orders of the defined types were placed in this encounter.

## 2022-01-26 ENCOUNTER — HOSPITAL ENCOUNTER (OUTPATIENT)
Dept: CARDIOLOGY | Facility: HOSPITAL | Age: 77
Discharge: HOME | End: 2022-01-26
Admitting: INTERNAL MEDICINE

## 2022-01-26 VITALS
WEIGHT: 237 LBS | HEIGHT: 70 IN | BODY MASS INDEX: 33.93 KG/M2 | SYSTOLIC BLOOD PRESSURE: 179 MMHG | DIASTOLIC BLOOD PRESSURE: 72 MMHG

## 2022-01-26 DIAGNOSIS — T45.1X5A ADVERSE EFFECT OF ANTINEOPLASTIC AND IMMUNOSUPPRESSIVE DRUGS, INITIAL ENCOUNTER: ICD-10-CM

## 2022-01-26 DIAGNOSIS — C84.70 ANAPLASTIC ALK-NEGATIVE LARGE CELL LYMPHOMA, UNSPECIFIED BODY REGION: ICD-10-CM

## 2022-01-26 LAB
BH CV ECHO MEAS - AO MAX PG (FULL): 1.7 MMHG
BH CV ECHO MEAS - AO MAX PG: 7 MMHG
BH CV ECHO MEAS - AO MEAN PG (FULL): 0.5 MMHG
BH CV ECHO MEAS - AO MEAN PG: 3.5 MMHG
BH CV ECHO MEAS - AO ROOT AREA (BSA CORRECTED): 1.6
BH CV ECHO MEAS - AO ROOT AREA: 9.6 CM^2
BH CV ECHO MEAS - AO ROOT DIAM: 3.5 CM
BH CV ECHO MEAS - AO V2 MAX: 129.5 CM/SEC
BH CV ECHO MEAS - AO V2 MEAN: 89 CM/SEC
BH CV ECHO MEAS - AO V2 VTI: 31.3 CM
BH CV ECHO MEAS - AVA(I,A): 3 CM^2
BH CV ECHO MEAS - AVA(I,D): 3 CM^2
BH CV ECHO MEAS - AVA(V,A): 3.1 CM^2
BH CV ECHO MEAS - AVA(V,D): 3.1 CM^2
BH CV ECHO MEAS - BSA(HAYCOCK): 2.3 M^2
BH CV ECHO MEAS - BSA: 2.2 M^2
BH CV ECHO MEAS - BZI_BMI: 34 KILOGRAMS/M^2
BH CV ECHO MEAS - BZI_METRIC_HEIGHT: 177.8 CM
BH CV ECHO MEAS - BZI_METRIC_WEIGHT: 107.5 KG
BH CV ECHO MEAS - EDV(CUBED): 110.6 ML
BH CV ECHO MEAS - EDV(MOD-SP4): 76 ML
BH CV ECHO MEAS - EDV(TEICH): 107.5 ML
BH CV ECHO MEAS - EF(CUBED): 17.6 %
BH CV ECHO MEAS - EF(MOD-BP): 53 %
BH CV ECHO MEAS - EF(MOD-SP4): 53.7 %
BH CV ECHO MEAS - EF(TEICH): 14 %
BH CV ECHO MEAS - ESV(CUBED): 91.1 ML
BH CV ECHO MEAS - ESV(MOD-SP4): 35.2 ML
BH CV ECHO MEAS - ESV(TEICH): 92.4 ML
BH CV ECHO MEAS - FS: 6.3 %
BH CV ECHO MEAS - IVS/LVPW: 1
BH CV ECHO MEAS - IVSD: 1.3 CM
BH CV ECHO MEAS - LA DIMENSION: 3.3 CM
BH CV ECHO MEAS - LA/AO: 0.94
BH CV ECHO MEAS - LAD MAJOR: 7.4 CM
BH CV ECHO MEAS - LAT PEAK E' VEL: 9.9 CM/SEC
BH CV ECHO MEAS - LATERAL E/E' RATIO: 10
BH CV ECHO MEAS - LV DIASTOLIC VOL/BSA (35-75): 33.9 ML/M^2
BH CV ECHO MEAS - LV MASS(C)D: 245.7 GRAMS
BH CV ECHO MEAS - LV MASS(C)DI: 109.5 GRAMS/M^2
BH CV ECHO MEAS - LV MAX PG: 5.3 MMHG
BH CV ECHO MEAS - LV MEAN PG: 3 MMHG
BH CV ECHO MEAS - LV SYSTOLIC VOL/BSA (12-30): 15.7 ML/M^2
BH CV ECHO MEAS - LV V1 MAX: 115 CM/SEC
BH CV ECHO MEAS - LV V1 MEAN: 75.8 CM/SEC
BH CV ECHO MEAS - LV V1 VTI: 27.5 CM
BH CV ECHO MEAS - LVIDD: 4.8 CM
BH CV ECHO MEAS - LVIDS: 4.5 CM
BH CV ECHO MEAS - LVLD AP4: 7.7 CM
BH CV ECHO MEAS - LVLS AP4: 6.6 CM
BH CV ECHO MEAS - LVOT AREA (M): 3.5 CM^2
BH CV ECHO MEAS - LVOT AREA: 3.5 CM^2
BH CV ECHO MEAS - LVOT DIAM: 2.1 CM
BH CV ECHO MEAS - LVPWD: 1.3 CM
BH CV ECHO MEAS - MED PEAK E' VEL: 9.6 CM/SEC
BH CV ECHO MEAS - MEDIAL E/E' RATIO: 10.4
BH CV ECHO MEAS - MV A MAX VEL: 90.8 CM/SEC
BH CV ECHO MEAS - MV DEC SLOPE: 459 CM/SEC^2
BH CV ECHO MEAS - MV DEC TIME: 0.18 SEC
BH CV ECHO MEAS - MV E MAX VEL: 99.4 CM/SEC
BH CV ECHO MEAS - MV E/A: 1.1
BH CV ECHO MEAS - MV P1/2T MAX VEL: 111 CM/SEC
BH CV ECHO MEAS - MV P1/2T: 70.8 MSEC
BH CV ECHO MEAS - MVA P1/2T LCG: 2 CM^2
BH CV ECHO MEAS - MVA(P1/2T): 3.1 CM^2
BH CV ECHO MEAS - PA ACC TIME: 0.18 SEC
BH CV ECHO MEAS - PA MAX PG: 3.3 MMHG
BH CV ECHO MEAS - PA PR(ACCEL): -3.4 MMHG
BH CV ECHO MEAS - PA V2 MAX: 90.8 CM/SEC
BH CV ECHO MEAS - RAP SYSTOLE: 8 MMHG
BH CV ECHO MEAS - RVSP: 17 MMHG
BH CV ECHO MEAS - SI(AO): 134 ML/M^2
BH CV ECHO MEAS - SI(CUBED): 8.7 ML/M^2
BH CV ECHO MEAS - SI(LVOT): 42.5 ML/M^2
BH CV ECHO MEAS - SI(MOD-SP4): 18.2 ML/M^2
BH CV ECHO MEAS - SI(TEICH): 6.7 ML/M^2
BH CV ECHO MEAS - SV(AO): 300.7 ML
BH CV ECHO MEAS - SV(CUBED): 19.5 ML
BH CV ECHO MEAS - SV(LVOT): 95.2 ML
BH CV ECHO MEAS - SV(MOD-SP4): 40.8 ML
BH CV ECHO MEAS - SV(TEICH): 15.1 ML
BH CV ECHO MEAS - TAPSE (>1.6): 2 CM
BH CV ECHO MEAS - TR MAX PG: 9 MMHG
BH CV ECHO MEAS - TR MAX VEL: 152 CM/SEC
BH CV ECHO MEASUREMENTS AVERAGE E/E' RATIO: 10.19
BH CV XLRA - RV BASE: 4.2 CM
BH CV XLRA - RV LENGTH: 6.8 CM
BH CV XLRA - RV MID: 2.8 CM
BH CV XLRA - TDI S': 11 CM/SEC
LEFT ATRIUM VOLUME INDEX: 20.2 ML/M2
LV EF 2D ECHO EST: 60 %
MAXIMAL PREDICTED HEART RATE: 143 BPM
STRESS TARGET HR: 122 BPM

## 2022-01-26 PROCEDURE — 93306 TTE W/DOPPLER COMPLETE: CPT | Performed by: INTERNAL MEDICINE

## 2022-01-26 PROCEDURE — 93306 TTE W/DOPPLER COMPLETE: CPT

## 2022-01-26 PROCEDURE — 93356 MYOCRD STRAIN IMG SPCKL TRCK: CPT

## 2022-01-26 PROCEDURE — 93356 MYOCRD STRAIN IMG SPCKL TRCK: CPT | Performed by: INTERNAL MEDICINE

## 2022-01-31 ENCOUNTER — HOSPITAL ENCOUNTER (OUTPATIENT)
Dept: INTERVENTIONAL RADIOLOGY/VASCULAR | Facility: HOSPITAL | Age: 77
Discharge: HOME | End: 2022-01-31
Admitting: RADIOLOGY

## 2022-01-31 VITALS
WEIGHT: 240 LBS | DIASTOLIC BLOOD PRESSURE: 43 MMHG | HEIGHT: 70 IN | HEART RATE: 76 BPM | OXYGEN SATURATION: 92 % | BODY MASS INDEX: 34.36 KG/M2 | SYSTOLIC BLOOD PRESSURE: 124 MMHG | TEMPERATURE: 97.6 F | RESPIRATION RATE: 18 BRPM

## 2022-01-31 DIAGNOSIS — C84.70 ANAPLASTIC ALK-NEGATIVE LARGE CELL LYMPHOMA, UNSPECIFIED BODY REGION: ICD-10-CM

## 2022-01-31 LAB
ANION GAP SERPL CALCULATED.3IONS-SCNC: 11 MMOL/L (ref 5–15)
BASOPHILS # BLD AUTO: 0.05 10*3/MM3 (ref 0–0.2)
BASOPHILS NFR BLD AUTO: 0.7 % (ref 0–1.5)
BUN SERPL-MCNC: 17 MG/DL (ref 8–23)
BUN/CREAT SERPL: 15.2 (ref 7–25)
CALCIUM SPEC-SCNC: 9 MG/DL (ref 8.6–10.5)
CHLORIDE SERPL-SCNC: 101 MMOL/L (ref 98–107)
CO2 SERPL-SCNC: 28 MMOL/L (ref 22–29)
CREAT SERPL-MCNC: 1.12 MG/DL (ref 0.76–1.27)
DEPRECATED RDW RBC AUTO: 50.3 FL (ref 37–54)
EOSINOPHIL # BLD AUTO: 0.23 10*3/MM3 (ref 0–0.4)
EOSINOPHIL NFR BLD AUTO: 3.3 % (ref 0.3–6.2)
ERYTHROCYTE [DISTWIDTH] IN BLOOD BY AUTOMATED COUNT: 14.4 % (ref 12.3–15.4)
GFR SERPL CREATININE-BSD FRML MDRD: 64 ML/MIN/1.73
GLUCOSE BLDC GLUCOMTR-MCNC: 190 MG/DL (ref 70–130)
GLUCOSE SERPL-MCNC: 175 MG/DL (ref 65–99)
HCT VFR BLD AUTO: 41.4 % (ref 37.5–51)
HGB BLD-MCNC: 12.9 G/DL (ref 13–17.7)
IMM GRANULOCYTES # BLD AUTO: 0.03 10*3/MM3 (ref 0–0.05)
IMM GRANULOCYTES NFR BLD AUTO: 0.4 % (ref 0–0.5)
INR PPP: 1.02 (ref 0.85–1.16)
LYMPHOCYTES # BLD AUTO: 2.26 10*3/MM3 (ref 0.7–3.1)
LYMPHOCYTES NFR BLD AUTO: 32.6 % (ref 19.6–45.3)
MCH RBC QN AUTO: 29.7 PG (ref 26.6–33)
MCHC RBC AUTO-ENTMCNC: 31.2 G/DL (ref 31.5–35.7)
MCV RBC AUTO: 95.2 FL (ref 79–97)
MONOCYTES # BLD AUTO: 0.66 10*3/MM3 (ref 0.1–0.9)
MONOCYTES NFR BLD AUTO: 9.5 % (ref 5–12)
NEUTROPHILS NFR BLD AUTO: 3.71 10*3/MM3 (ref 1.7–7)
NEUTROPHILS NFR BLD AUTO: 53.5 % (ref 42.7–76)
NRBC BLD AUTO-RTO: 0 /100 WBC (ref 0–0.2)
PLATELET # BLD AUTO: 128 10*3/MM3 (ref 140–450)
PMV BLD AUTO: 11.6 FL (ref 6–12)
POTASSIUM SERPL-SCNC: 4.7 MMOL/L (ref 3.5–5.2)
PROTHROMBIN TIME: 13.1 SECONDS (ref 11.4–14.4)
RBC # BLD AUTO: 4.35 10*6/MM3 (ref 4.14–5.8)
SODIUM SERPL-SCNC: 140 MMOL/L (ref 136–145)
WBC NRBC COR # BLD: 6.94 10*3/MM3 (ref 3.4–10.8)

## 2022-01-31 PROCEDURE — 85610 PROTHROMBIN TIME: CPT | Performed by: RADIOLOGY

## 2022-01-31 PROCEDURE — 85025 COMPLETE CBC W/AUTO DIFF WBC: CPT | Performed by: RADIOLOGY

## 2022-01-31 PROCEDURE — C1788 PORT, INDWELLING, IMP: HCPCS

## 2022-01-31 PROCEDURE — 25010000002 HEPARIN LOCK FLUSH PER 10 UNITS

## 2022-01-31 PROCEDURE — 25010000002 MIDAZOLAM PER 1 MG: Performed by: RADIOLOGY

## 2022-01-31 PROCEDURE — 77001 FLUOROGUIDE FOR VEIN DEVICE: CPT

## 2022-01-31 PROCEDURE — C1894 INTRO/SHEATH, NON-LASER: HCPCS

## 2022-01-31 PROCEDURE — 99152 MOD SED SAME PHYS/QHP 5/>YRS: CPT

## 2022-01-31 PROCEDURE — 82962 GLUCOSE BLOOD TEST: CPT

## 2022-01-31 PROCEDURE — 76937 US GUIDE VASCULAR ACCESS: CPT

## 2022-01-31 PROCEDURE — 25010000002 FENTANYL CITRATE (PF) 50 MCG/ML SOLUTION: Performed by: RADIOLOGY

## 2022-01-31 PROCEDURE — 80048 BASIC METABOLIC PNL TOTAL CA: CPT | Performed by: RADIOLOGY

## 2022-01-31 RX ORDER — SODIUM BICARBONATE 42 MG/ML
INJECTION, SOLUTION INTRAVENOUS
Status: DISCONTINUED
Start: 2022-01-31 | End: 2022-01-31 | Stop reason: WASHOUT

## 2022-01-31 RX ORDER — FENTANYL CITRATE 50 UG/ML
INJECTION, SOLUTION INTRAMUSCULAR; INTRAVENOUS
Status: COMPLETED | OUTPATIENT
Start: 2022-01-31 | End: 2022-01-31

## 2022-01-31 RX ORDER — SODIUM CHLORIDE 0.9 % (FLUSH) 0.9 %
3 SYRINGE (ML) INJECTION EVERY 12 HOURS SCHEDULED
Status: DISCONTINUED | OUTPATIENT
Start: 2022-01-31 | End: 2022-02-01 | Stop reason: HOSPADM

## 2022-01-31 RX ORDER — HYDROCODONE BITARTRATE AND ACETAMINOPHEN 5; 325 MG/1; MG/1
1 TABLET ORAL EVERY 4 HOURS PRN
Status: DISCONTINUED | OUTPATIENT
Start: 2022-01-31 | End: 2022-02-01 | Stop reason: HOSPADM

## 2022-01-31 RX ORDER — MIDAZOLAM HYDROCHLORIDE 1 MG/ML
INJECTION INTRAMUSCULAR; INTRAVENOUS
Status: COMPLETED | OUTPATIENT
Start: 2022-01-31 | End: 2022-01-31

## 2022-01-31 RX ORDER — HEPARIN SODIUM (PORCINE) LOCK FLUSH IV SOLN 100 UNIT/ML 100 UNIT/ML
SOLUTION INTRAVENOUS
Status: COMPLETED
Start: 2022-01-31 | End: 2022-01-31

## 2022-01-31 RX ORDER — MIDAZOLAM HYDROCHLORIDE 1 MG/ML
INJECTION INTRAMUSCULAR; INTRAVENOUS
Status: DISPENSED
Start: 2022-01-31 | End: 2022-01-31

## 2022-01-31 RX ORDER — FENTANYL CITRATE 50 UG/ML
INJECTION, SOLUTION INTRAMUSCULAR; INTRAVENOUS
Status: DISPENSED
Start: 2022-01-31 | End: 2022-01-31

## 2022-01-31 RX ORDER — LIDOCAINE HYDROCHLORIDE AND EPINEPHRINE 10; 10 MG/ML; UG/ML
INJECTION, SOLUTION INFILTRATION; PERINEURAL
Status: COMPLETED
Start: 2022-01-31 | End: 2022-01-31

## 2022-01-31 RX ORDER — HEPARIN SODIUM 200 [USP'U]/100ML
INJECTION, SOLUTION INTRAVENOUS
Status: DISPENSED
Start: 2022-01-31 | End: 2022-01-31

## 2022-01-31 RX ORDER — SODIUM CHLORIDE 0.9 % (FLUSH) 0.9 %
10 SYRINGE (ML) INJECTION AS NEEDED
Status: DISCONTINUED | OUTPATIENT
Start: 2022-01-31 | End: 2022-02-01 | Stop reason: HOSPADM

## 2022-01-31 RX ADMIN — HEPARIN 500 UNITS: 100 SYRINGE at 11:40

## 2022-01-31 RX ADMIN — MIDAZOLAM HYDROCHLORIDE 1 MG: 1 INJECTION, SOLUTION INTRAMUSCULAR; INTRAVENOUS at 11:23

## 2022-01-31 RX ADMIN — LIDOCAINE HYDROCHLORIDE AND EPINEPHRINE 18 ML: 10; 10 INJECTION, SOLUTION INFILTRATION; PERINEURAL at 11:41

## 2022-01-31 RX ADMIN — FENTANYL CITRATE 50 MCG: 50 INJECTION, SOLUTION INTRAMUSCULAR; INTRAVENOUS at 11:23

## 2022-01-31 RX ADMIN — MIDAZOLAM HYDROCHLORIDE 1 MG: 1 INJECTION, SOLUTION INTRAMUSCULAR; INTRAVENOUS at 11:21

## 2022-01-31 RX ADMIN — FENTANYL CITRATE 50 MCG: 50 INJECTION, SOLUTION INTRAMUSCULAR; INTRAVENOUS at 11:20

## 2022-02-01 ENCOUNTER — HOSPITAL ENCOUNTER (OUTPATIENT)
Dept: ONCOLOGY | Facility: HOSPITAL | Age: 77
Discharge: HOME | End: 2022-02-01

## 2022-02-01 ENCOUNTER — OFFICE VISIT (OUTPATIENT)
Dept: ONCOLOGY | Facility: CLINIC | Age: 77
End: 2022-02-01

## 2022-02-01 ENCOUNTER — TELEPHONE (OUTPATIENT)
Dept: INFUSION THERAPY | Facility: HOSPITAL | Age: 77
End: 2022-02-01

## 2022-02-01 ENCOUNTER — EDUCATION (OUTPATIENT)
Dept: ONCOLOGY | Facility: HOSPITAL | Age: 77
End: 2022-02-01

## 2022-02-01 ENCOUNTER — LAB (OUTPATIENT)
Dept: LAB | Facility: HOSPITAL | Age: 77
End: 2022-02-01

## 2022-02-01 VITALS
WEIGHT: 234.3 LBS | BODY MASS INDEX: 33.54 KG/M2 | OXYGEN SATURATION: 93 % | HEART RATE: 79 BPM | RESPIRATION RATE: 16 BRPM | TEMPERATURE: 97.7 F | HEIGHT: 70 IN | SYSTOLIC BLOOD PRESSURE: 154 MMHG | DIASTOLIC BLOOD PRESSURE: 74 MMHG

## 2022-02-01 DIAGNOSIS — C84.71 ANAPLASTIC ALK-NEGATIVE LARGE CELL LYMPHOMA OF LYMPH NODE OF HEAD: ICD-10-CM

## 2022-02-01 DIAGNOSIS — C84.71 ANAPLASTIC ALK-NEGATIVE LARGE CELL LYMPHOMA OF LYMPH NODE OF HEAD: Primary | ICD-10-CM

## 2022-02-01 LAB
ALBUMIN SERPL-MCNC: 4.2 G/DL (ref 3.5–5.2)
ALBUMIN/GLOB SERPL: 1.6 G/DL
ALP SERPL-CCNC: 89 U/L (ref 39–117)
ALT SERPL W P-5'-P-CCNC: 30 U/L (ref 1–41)
ANION GAP SERPL CALCULATED.3IONS-SCNC: 10 MMOL/L (ref 5–15)
AST SERPL-CCNC: 29 U/L (ref 1–40)
BILIRUB SERPL-MCNC: 0.3 MG/DL (ref 0–1.2)
BUN SERPL-MCNC: 17 MG/DL (ref 8–23)
BUN/CREAT SERPL: 13.7 (ref 7–25)
CALCIUM SPEC-SCNC: 9.4 MG/DL (ref 8.6–10.5)
CHLORIDE SERPL-SCNC: 99 MMOL/L (ref 98–107)
CO2 SERPL-SCNC: 29 MMOL/L (ref 22–29)
CREAT SERPL-MCNC: 1.24 MG/DL (ref 0.76–1.27)
ERYTHROCYTE [DISTWIDTH] IN BLOOD BY AUTOMATED COUNT: 15.5 % (ref 12.3–15.4)
GFR SERPL CREATININE-BSD FRML MDRD: 57 ML/MIN/1.73
GLOBULIN UR ELPH-MCNC: 2.6 GM/DL
GLUCOSE SERPL-MCNC: 199 MG/DL (ref 65–99)
HCT VFR BLD AUTO: 41.4 % (ref 37.5–51)
HGB BLD-MCNC: 13 G/DL (ref 13–17.7)
LYMPHOCYTES # BLD AUTO: 2.8 10*3/MM3 (ref 0.7–3.1)
LYMPHOCYTES NFR BLD AUTO: 33.9 % (ref 19.6–45.3)
MCH RBC QN AUTO: 29.1 PG (ref 26.6–33)
MCHC RBC AUTO-ENTMCNC: 31.4 G/DL (ref 31.5–35.7)
MCV RBC AUTO: 92.6 FL (ref 79–97)
MONOCYTES # BLD AUTO: 0.6 10*3/MM3 (ref 0.1–0.9)
MONOCYTES NFR BLD AUTO: 7.1 % (ref 5–12)
NEUTROPHILS NFR BLD AUTO: 4.8 10*3/MM3 (ref 1.7–7)
NEUTROPHILS NFR BLD AUTO: 59 % (ref 42.7–76)
PLATELET # BLD AUTO: 139 10*3/MM3 (ref 140–450)
PMV BLD AUTO: 7.8 FL (ref 6–12)
POTASSIUM SERPL-SCNC: 4.5 MMOL/L (ref 3.5–5.2)
PROT SERPL-MCNC: 6.8 G/DL (ref 6–8.5)
RBC # BLD AUTO: 4.47 10*6/MM3 (ref 4.14–5.8)
SODIUM SERPL-SCNC: 138 MMOL/L (ref 136–145)
WBC NRBC COR # BLD: 8.2 10*3/MM3 (ref 3.4–10.8)

## 2022-02-01 PROCEDURE — 80053 COMPREHEN METABOLIC PANEL: CPT

## 2022-02-01 PROCEDURE — 99214 OFFICE O/P EST MOD 30 MIN: CPT | Performed by: NURSE PRACTITIONER

## 2022-02-01 PROCEDURE — 85025 COMPLETE CBC W/AUTO DIFF WBC: CPT

## 2022-02-01 PROCEDURE — 36415 COLL VENOUS BLD VENIPUNCTURE: CPT

## 2022-02-01 RX ORDER — LIDOCAINE AND PRILOCAINE 25; 25 MG/G; MG/G
1 CREAM TOPICAL AS NEEDED
Qty: 30 G | Refills: 3 | Status: SHIPPED | OUTPATIENT
Start: 2022-02-01 | End: 2022-05-26

## 2022-02-01 NOTE — TREATMENT PLAN
Outpatient Infusion • [address] [facility] • [address] • [place] [id number] • [phone]      CHEMOTHERAPY EDUCATION    NAME:  Gabriela Mar      : 1945           DATE: 22    Medication Education Sheets:  Brentuximab Vedotin, Cyclophosphamide, Doxorubicin, Pegfilgrastim and Prednisone    Other Education Sheets:   CINV, Constipation, Diarrhea and Neulasta OnPro Patient Guide    Chemotherapy Regimen:   OP LYMPHOMA A + CHP (Doxorubicin / Cyclophosphamide / Brentuximab vedotin  / Prednisone)  every 21 days     TOPICS EDUCATION PROVIDED COMMENTS   ANEMIA:  role of RBC, cause, s/s, ways to manage, role of transfusion [x] Reviewed the role of RBC and the use of transfusions if hemoglobin decreases too much.  Patient to notify us if they experience shortness of breath, dizziness, or palpitations.  Also let patient know they could feel more tired than usual and to try to stay active, but rest if they need to.   THROMBOCYTOPENIA:  role of platelet, cause, s/s, ways to prevent bleeding, things to avoid, when to seek help [x] Reviewed the role of platelets in blood clotting and when to call clinic (bloody nose that bleeds for 5 mins despite pressure, a cut that won't stop bleeding despite pressure, gums that bleed excessively with brushing or flossing). Recommended using an electric razor, soft bristle toothbrush, and blowing your nose gently.   NEUTROPENIA:  role of WBC, cause, infection precautions, s/s of infection, when to call MD [x] Reviewed the role of WBC, good infection prevention practices, and when to call the clinic (temperature 100.4F, sore throat, burning urination, etc)  COVID Vaccines: 1 dose received   Flu Vaccine: No vaccine   NUTRITION & APPETITE CHANGES:  importance of maintaining healthy diet & weight, ways to manage to improve intake, dietary consult, exercise regimen [x] Discussed risk of decreased appetite, reviewed plan for small more frequent meals. Discussed with patient that the  use of high dose steroids may increase his appetite.    DIARRHEA:  causes, s/s of dehydration, ways to manage, dietary changes, when to call MD [x] Chemotherapy - Discussed risk of diarrhea. Instructed patient that they can use OTC loperamide at first presentation of diarrhea, but call MD if 4-6 episodes in 24 hours not relieved by OTC loperamide.   CONSTIPATION:  causes, ways to manage, dietary changes, when to call MD [x] Provided supplementary handout with instructions for use of docusate and other OTC therapies to manage constipation.  Instructed to call us if medications aren't working.   NAUSEA & VOMITING:  cause, use of antiemetics, dietary changes, when to call MD [x] Emetic risk: High  Premeds: Dexamethasone, Fosaprepitant, Olanzapine and Palonosetron  At home meds: Ondansetron  Pharm[facility] PRN med[facility] [place], [place].   Instructed the patient to take a dose of the PRN medication at the first onset of nausea and if it's not working to call us for additional medications.  Also provided non-drug measures to mitigate nausea. Patient stated that he has already picked this up from the pharmacy.    MOUTH SORES:  causes, oral care, ways to manage [x] Mouth sores can be prevented by making a mouth wash mixture of salt, baking soda, and water. The patient was instructed to swish and spit four times daily after meals and before bedtime.  Use of a soft bristle toothbrush was recommended.  The patient was instructed to avoid alcohol-containing OTC mouthwashes.   ALOPECIA:  cause, ways to manage, resources [x] Discussed the possibility of hair loss with the patient. Discussed with patient that he may notice hair thinning or loss around the second cycle of treatment.    INFERTILITY & SEXUALITY:    causes, fertility preservation options, sexuality changes, ways to manage, importance of birth control [x] Discussed safe sex practices.   NERVOUS SYSTEM CHANGES:  causes, s/s,  neuropathies, cognitive changes, ways to manage [x] Discussed the adverse effect of peripheral neuropathy and signs/symptoms associated with this adverse effect. Instructed patient to call if symptoms become more frequent or worsen. Discussed with patient that he may experience peripheral neuropathy and how to recognize these symptoms. Discussed the chance of insomnia from the high doses of prednisone and how to prevent this by taking the steroid early in the morning, no later than noon, if possible.    PAIN:  causes, ways to manage [x] Chemo/Neulasta - Discussed muscle and joint aches/pains with chemotherapy, and recommended the use of OTC pain relief with ibuprofen or acetaminophen if needed. Discussed bone pain related to growth factors, use of OTC claritin, and when to contact their MD.    SKIN & NAIL CHANGES:  cause, s/s, ways to manage [x] Chemotherapy - Informed patient that they may see dark or white lines on finger and toenails during treatment, and that their nails may become brittle and even fall off in extreme cases. But that this would likely reverse after treatment concludes. Discussed Cleaning-Nik syndrome and how to recognize if this were to occur.    ORGAN TOXICITIES:  cause, s/s, need for diagnostic tests, labs, when to notify MD [x] Discussed potential effects on organ systems, monitoring, diagnostic tests, labs, and when to notify their MD. Discussed the signs/symptoms of the following: hepatotoxicity, nephrotoxicity, hemorrhagic cystitis, cardiotoxicity and skin changes. Discussed with patient that his urine may appear red/brown for ~48 hours following the doxorubicin infusion. Discussed that if he notices red/brown urine longer than ~48 hours following the infusion to contact the MD office as that could be blood in the urine.    SURVIVORSHIP:  distress, distress assessment, secondary malignancies, early/late effects, follow-up, social issues, social support [x] Discussed the risk of  secondary cancers developing later in life.    HOME CARE:  use of spill kits, storing of PO chemo, how to manage bodily fluids [x] IV - Counseled on management of soiled linens and proper flush technique.  Discussed how to manage all the side effects at home and advised when to contact the MD office   INFUSION RELATED REACTIONS:  Cause, s/s, anaphylaxis, vesicant, etc. [x] Premeds: Dexamethasone  Discussed risks of infusion related reactions, s/s, management plan   MISCELLANEOUS:  drug interactions, administration, labs, etc. [x] Discussed chemotherapy schedule, lab draws, infusion times, and total expected visit time.   DDIs: cyclophosphamide and glimepiride: may result in increased blood glucose lowering effect and risk of hypoglycemia. Discussed the risks of hypoglycemia vs. hyperglycemia with patient as he takes metformin, glimepiride, and insulin. Patient will also be receiving high doses of prednisone which can increase his blood glucose levels. Discussed with patient to contact his primary care provider and let them know he will be taking high doses of steroids and seek advice on how to manage his diabetes.   Lab draws: day 1 of each 21 day cycle  Discussed the Neulasta OnPro device in depth with the patient and his daughter.      Medications:  Prior to Admission medications    Medication Sig Start Date Authorizing Provider   albuterol sulfate  (90 Base) MCG/ACT inhaler Inhale 2 puffs Every 4 (Four) Hours As Needed for Wheezing. 11/5/21 Krystina Gee MD   amLODIPine (NORVASC) 5 MG tablet TAKE ONE TABLET BY MOUTH DAILY 10/11/21 Krystina Gee MD   ammonium lactate (AmLactin) 12 % lotion Apply  topically to the appropriate area as directed 2 (two) times a day. For dry skin 4/7/21 Krystina Gee MD   baclofen (LIORESAL) 10 MG tablet Take 1 tablet by mouth 3 (Three) Times a Day As Needed for Muscle Spasms. 12/6/19 Fabiola Velásquez PA-C   cefdinir (OMNICEF) 300 MG capsule Take 1 capsule by  mouth 2 (Two) Times a Day. 11/11/21 Krystina Gee MD   Cholecalciferol (VITAMIN D) 1000 UNITS tablet Take 1 capsule by mouth Daily. 8/29/13 Lucero Burgess MD   Cyanocobalamin (VITAMIN B-12 ER) 1000 MCG tablet controlled-release Take 1,000 mcg by mouth Daily. 8/29/13 Lucero Burgess MD   doxycycline (VIBRAMYCIN) 100 MG capsule Take 1 capsule by mouth 2 (Two) Times a Day. 11/5/21 Krystina Gee MD   furosemide (Lasix) 20 MG tablet Take 1 tablet by mouth Every Other Day As Needed (dyspnea). 11/5/21 Krystina Gee MD   glimepiride (AMARYL) 4 MG tablet TAKE ONE TABLET BY MOUTH DAILY WITH DINNER 9/20/21 Krystina Gee MD   insulin NPH (humuLIN N,novoLIN N) 100 UNIT/ML injection Inject 50 Units under the skin into the appropriate area as directed 2 (Two) Times a Day Before Meals.  Lucero Burgess MD   metFORMIN (GLUCOPHAGE) 1000 MG tablet TAKE ONE TABLET BY MOUTH IN THE MORNING AND 1 AND 1/2 TABLETS AT BEDTIME 7/23/21 Krystina Gee MD   metoprolol succinate XL (TOPROL-XL) 25 MG 24 hr tablet Take 1 tablet by mouth Daily. 4/29/21 Mirtha Wolf APRN   Multiple Vitamins-Minerals (MULTI VITAMIN/MINERALS PO) Take 1 tablet by mouth Daily. 8/29/13 Lucero Burgess MD   Multiple Vitamins-Minerals (PRESERVISION AREDS 2 PO) Take  by mouth Daily.  Lucero Burgess MD   omeprazole (PRILOSEC) 20 MG capsule Take 1 capsule by mouth Daily. 8/29/13 Lucero Burgess MD   ondansetron (ZOFRAN) 8 MG tablet Take 1 tablet by mouth 3 (Three) Times a Day As Needed for Nausea or Vomiting. 1/20/22 Ai Henderson MD   potassium chloride 10 MEQ CR tablet Take 1 tablet by mouth Every Other Day As Needed (with lasix). 11/5/21 Krystina Gee MD   simvastatin (ZOCOR) 10 MG tablet Take 1 tablet by mouth every night at bedtime. 11/5/21 Krystina Gee MD   valsartan (DIOVAN) 160 MG tablet Take 1 tablet by mouth 2 (Two) Times a Day. 11/5/21 Krystina Gee MD     Notes: All questions and  concerns were addressed. Provided a personalized treatment calendar to patient (includes treatment and lab schedule). Provided patient with contact information for the pharmacist and clinic while instructing them to call if any questions or concerns arise. Informed consent for treatment was obtained. Patient was receptive to information and expressed understanding.    Thank you,    Debbi Puente, PharmD  Pharmacy Resident   2/2/2022  11:13 EST     Note reviewed and edited by: Salina Conrad, Lilly, BCOP - Oncology Clinical Pharmacist [phone]

## 2022-02-01 NOTE — PROGRESS NOTES
CHEMOTHERAPY PREPARATION    Gabriela Mar  [id number]  1945    Chief Complaint: Treatment preparation and needs assessment    History of present illness:  Gabriela Mar is a 76 y.o. year old male who is here today for treatment preparation and needs assessment.  The patient has been diagnosed with lymphoma and is scheduled to begin treatment with Doxorubicin / Cyclophosphamide / Brentuximab vedotin  / Prednisone.     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     Past Medical History, Past Surgical History, Social History, Family History have been reviewed and are without significant changes except as mentioned.    Review of Systems:    Review of Systems   Constitutional: Negative for appetite change, fatigue, fever and unexpected weight change.   HENT: Negative for mouth sores, sore throat and trouble swallowing.    Respiratory: Negative for cough, shortness of breath and wheezing.    Cardiovascular: Negative for chest pain, palpitations and leg swelling.   Gastrointestinal: Negative for abdominal distention, abdominal pain, constipation, diarrhea, nausea and vomiting.   Genitourinary: Negative for difficulty urinating, dysuria and frequency.   Musculoskeletal: Negative for arthralgias.   Skin: Negative for pallor, rash and wound.   Neurological: Negative for dizziness and weakness.   Hematological: Does not bruise/bleed easily.   Psychiatric/Behavioral:  Negative for confusion and sleep disturbance. The patient is not nervous/anxious.        Physical Exam:    Vitals:    02/01/22 1012   BP: 154/74   Pulse: 79   Resp: 16   Temp: 97.7 °F (36.5 °C)   SpO2: 93%     Vitals:    02/01/22 1012   PainSc: 0-No pain          ECOG: (0) Fully Active - Able to Carry On All Pre-disease Performance Without Restriction    General: well appearing, in no acute distress  Psych: Mood is stable            NEEDS ASSESSMENTS    Genetics  The patient's new diagnosis and family history have been reviewed for genetic counseling needs. A genetic referral is not recommended.     Psychosocial  The patient has completed a PHQ-9 Depression Screening and the Distress Thermometer (DT) today.   PHQ-9 results show 0 (No Depression). The patient scored their distress today as 1 on a scale of 0-10 with 0 being no distress and 10 being extreme distress.   Problems marked by the patient as being an issue for them within the last week include no problems.   Results were reviewed along with psychosocial resources offered by our cancer center.  Our oncology social worker will be flagged for a DT score of 4 or above, and a same day call will be made for a score of 9 or 10.  A mental health referral is offered at this time. The patient is not interested in a referral to [facility].   Copies of patient's questionnaires will be scanned into EMR for details and further reference.    Barriers to care  A barriers form was also completed by the patient today. We discussed services offered by our facility to help him have adequate access to care. The patient was given the name and contact information for our Oncology Social Worker, Molly Beltran.  Based upon barriers assessment today, the patient will not require a follow-up call from the  to further discuss needs.   A copy of the barriers form will also be scanned into EMR for details and further reference.     VAD Assessment  The patient and I  "discussed planned intervenous chemotherapy as well as other IV treatments that are often needed throughout the course of treatment. These may include, but are not limited to blood transfusions, antibiotics, and IV hydration. The vasculature does appear to be adequate for multiple peripheral IVs throughout their treatment course.  Patient had port placed yesterday.      Advanced Care Planning  The patient and I discussed advanced care planning, \"Conversations that Matter\".   This service was offered, free of charge, for development of advance directives with a certified ACP facilitator.  The patient does not have an up-to-date advanced directive. The patient is not interested in an appointment with one of our facilitators to create or update their advanced directives.      Palliative Care  The patient and I discussed palliative care services. Palliative care is not the same as Hospice care. This is specialized medical care for people living with serious illness with the goal of improving quality of life for the patient and their family. [facility] has partnered with [facility] to offer our patients outpatient palliative care early along with their treatment to assist in coordination of care, symptom management, pain management, and medical decision making.  Oncology criteria for palliative care referral is not met at this time. The patient is not interested in a palliative care consultation.     Additional Referral needs  none      CHEMOTHERAPY EDUCATION    Chemotherapy education completed and consent obtained per oncology pharmacist.  See their documentation for further details.    Booklets Given: Chemotherapy and You [x]  Nutrition for the Patient with Cancer During Treatment [x]    Sexuality/Fertility Books []     Chemotherapy Regimen:   Treatment Plans     Name Type Plan Dates Plan Provider         Active    OP LYMPHOMA A + CHP (Doxorubicin / Cyclophosphamide / Brentuximab vedotin  / Prednisone) " ONCOLOGY TREATMENT  1/20/2022 - Present Ai Henderson MD                    Chemotherapy education comprehension reviewed. Questions answered.      Assessment and Plan:    Diagnoses and all orders for this visit:    1. 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  Cover with saran/plastic wrap.).  Dispense: 30 g; Refill: 3      The patient and I have reviewed their cancer diagnosis and scheduled treatment plan. Needs assessment was completed including genetics, psychosocial needs, barriers to care, VAD evaluation, advanced care planning, and palliative care services. Referrals have been ordered as appropriate based upon our evaluation and patient desires.     Chemotherapy teaching was also completed today per pharmacy.  Adequate time was given to answer questions.  Patient and family are aware of their care team members and contact information if they have questions or problems throughout the treatment course. The patient is adequately prepared to begin treatment as scheduled on 2/3/2022.     Reviewed with patient education regarding Zofran prescriptions sent to pharmacy.       Patient had pretreatment labs drawn today.  He had port placed yesterday.  He had echocardiogram on 1/26/2022 that showed ejection fraction 60%.    I spent 30 minutes caring for Gabriela on this date of service. This time includes time spent by me in the following activities: preparing for the visit, reviewing tests, performing a medically appropriate examination and/or evaluation, counseling and educating the patient/family/caregiver, documenting information in the medical record and care coordination.     Andreina Conrad, [facility]  02/01/22

## 2022-02-03 ENCOUNTER — DOCUMENTATION (OUTPATIENT)
Dept: NUTRITION | Facility: HOSPITAL | Age: 77
End: 2022-02-03

## 2022-02-03 ENCOUNTER — OFFICE VISIT (OUTPATIENT)
Dept: ONCOLOGY | Facility: CLINIC | Age: 77
End: 2022-02-03

## 2022-02-03 ENCOUNTER — HOSPITAL ENCOUNTER (OUTPATIENT)
Dept: ONCOLOGY | Facility: HOSPITAL | Age: 77
Setting detail: INFUSION SERIES
Discharge: HOME | End: 2022-02-03

## 2022-02-03 VITALS
OXYGEN SATURATION: 96 % | SYSTOLIC BLOOD PRESSURE: 189 MMHG | WEIGHT: 240.7 LBS | HEART RATE: 81 BPM | RESPIRATION RATE: 20 BRPM | BODY MASS INDEX: 34.46 KG/M2 | DIASTOLIC BLOOD PRESSURE: 71 MMHG | TEMPERATURE: 98 F | HEIGHT: 70 IN

## 2022-02-03 DIAGNOSIS — Z95.828 PORT-A-CATH IN PLACE: ICD-10-CM

## 2022-02-03 DIAGNOSIS — C84.71 ANAPLASTIC ALK-NEGATIVE LARGE CELL LYMPHOMA OF LYMPH NODE OF HEAD: Primary | ICD-10-CM

## 2022-02-03 PROCEDURE — 25010000002 DOXORUBICIN PER 10 MG: Performed by: INTERNAL MEDICINE

## 2022-02-03 PROCEDURE — 25010000002 PALONOSETRON 0.25 MG/5ML SOLUTION PREFILLED SYRINGE: Performed by: INTERNAL MEDICINE

## 2022-02-03 PROCEDURE — 96411 CHEMO IV PUSH ADDL DRUG: CPT

## 2022-02-03 PROCEDURE — 25010000002 BRENTUXIMAB 50 MG RECONSTITUTED SOLUTION 1 EACH VIAL: Performed by: INTERNAL MEDICINE

## 2022-02-03 PROCEDURE — 96367 TX/PROPH/DG ADDL SEQ IV INF: CPT

## 2022-02-03 PROCEDURE — 25010000002 HEPARIN LOCK FLUSH PER 10 UNITS: Performed by: INTERNAL MEDICINE

## 2022-02-03 PROCEDURE — 96413 CHEMO IV INFUSION 1 HR: CPT

## 2022-02-03 PROCEDURE — 25010000002 FOSAPREPITANT PER 1 MG: Performed by: INTERNAL MEDICINE

## 2022-02-03 PROCEDURE — 25010000002 DEXAMETHASONE SODIUM PHOSPHATE 100 MG/10ML SOLUTION: Performed by: INTERNAL MEDICINE

## 2022-02-03 PROCEDURE — 25010000002 PEGFILGRASTIM 6 MG/0.6ML PREFILLED SYRINGE KIT: Performed by: INTERNAL MEDICINE

## 2022-02-03 PROCEDURE — 96377 APPLICATON ON-BODY INJECTOR: CPT

## 2022-02-03 PROCEDURE — 96375 TX/PRO/DX INJ NEW DRUG ADDON: CPT

## 2022-02-03 PROCEDURE — 96409 CHEMO IV PUSH SNGL DRUG: CPT

## 2022-02-03 PROCEDURE — 25010000002 CYCLOPHOSPHAMIDE 1 GM/5ML SOLUTION 5 ML VIAL: Performed by: INTERNAL MEDICINE

## 2022-02-03 PROCEDURE — 96417 CHEMO IV INFUS EACH ADDL SEQ: CPT

## 2022-02-03 PROCEDURE — 99214 OFFICE O/P EST MOD 30 MIN: CPT | Performed by: INTERNAL MEDICINE

## 2022-02-03 PROCEDURE — 96365 THER/PROPH/DIAG IV INF INIT: CPT

## 2022-02-03 RX ORDER — PALONOSETRON 0.05 MG/ML
0.25 INJECTION, SOLUTION INTRAVENOUS ONCE
Status: COMPLETED | OUTPATIENT
Start: 2022-02-03 | End: 2022-02-03

## 2022-02-03 RX ORDER — SODIUM CHLORIDE 0.9 % (FLUSH) 0.9 %
10 SYRINGE (ML) INJECTION AS NEEDED
Status: CANCELLED | OUTPATIENT
Start: 2022-02-03

## 2022-02-03 RX ORDER — PALONOSETRON 0.05 MG/ML
0.25 INJECTION, SOLUTION INTRAVENOUS ONCE
Status: CANCELLED | OUTPATIENT
Start: 2022-02-24

## 2022-02-03 RX ORDER — HEPARIN SODIUM (PORCINE) LOCK FLUSH IV SOLN 100 UNIT/ML 100 UNIT/ML
500 SOLUTION INTRAVENOUS AS NEEDED
Status: CANCELLED | OUTPATIENT
Start: 2022-02-03

## 2022-02-03 RX ORDER — HEPARIN SODIUM (PORCINE) LOCK FLUSH IV SOLN 100 UNIT/ML 100 UNIT/ML
500 SOLUTION INTRAVENOUS AS NEEDED
Status: DISCONTINUED | OUTPATIENT
Start: 2022-02-03 | End: 2022-02-04 | Stop reason: HOSPADM

## 2022-02-03 RX ORDER — DOXORUBICIN HYDROCHLORIDE 2 MG/ML
50 INJECTION, SOLUTION INTRAVENOUS ONCE
Status: CANCELLED | OUTPATIENT
Start: 2022-02-24

## 2022-02-03 RX ORDER — OLANZAPINE 5 MG/1
5 TABLET ORAL ONCE
Status: COMPLETED | OUTPATIENT
Start: 2022-02-03 | End: 2022-02-03

## 2022-02-03 RX ORDER — DOXORUBICIN HYDROCHLORIDE 2 MG/ML
50 INJECTION, SOLUTION INTRAVENOUS ONCE
Status: COMPLETED | OUTPATIENT
Start: 2022-02-03 | End: 2022-02-03

## 2022-02-03 RX ORDER — SODIUM CHLORIDE 9 MG/ML
250 INJECTION, SOLUTION INTRAVENOUS ONCE
Status: COMPLETED | OUTPATIENT
Start: 2022-02-03 | End: 2022-02-03

## 2022-02-03 RX ORDER — OLANZAPINE 5 MG/1
5 TABLET ORAL ONCE
Status: CANCELLED | OUTPATIENT
Start: 2022-02-24 | End: 2022-02-24

## 2022-02-03 RX ORDER — SODIUM CHLORIDE 9 MG/ML
250 INJECTION, SOLUTION INTRAVENOUS ONCE
Status: CANCELLED | OUTPATIENT
Start: 2022-02-24

## 2022-02-03 RX ORDER — SODIUM CHLORIDE 0.9 % (FLUSH) 0.9 %
10 SYRINGE (ML) INJECTION AS NEEDED
Status: DISCONTINUED | OUTPATIENT
Start: 2022-02-03 | End: 2022-02-04 | Stop reason: HOSPADM

## 2022-02-03 RX ADMIN — Medication 500 UNITS: at 12:51

## 2022-02-03 RX ADMIN — DEXAMETHASONE SODIUM PHOSPHATE 12 MG: 10 INJECTION, SOLUTION INTRAMUSCULAR; INTRAVENOUS at 10:31

## 2022-02-03 RX ADMIN — PEGFILGRASTIM 6 MG: KIT SUBCUTANEOUS at 12:49

## 2022-02-03 RX ADMIN — SODIUM CHLORIDE 150 MG: 9 INJECTION, SOLUTION INTRAVENOUS at 10:27

## 2022-02-03 RX ADMIN — CYCLOPHOSPHAMIDE 1690 MG: 200 INJECTION, SOLUTION INTRAVENOUS at 11:29

## 2022-02-03 RX ADMIN — OLANZAPINE 5 MG: 5 TABLET, FILM COATED ORAL at 10:32

## 2022-02-03 RX ADMIN — DOXORUBICIN HYDROCHLORIDE 56 MG: 2 INJECTION, SOLUTION INTRAVENOUS at 11:19

## 2022-02-03 RX ADMIN — BRENTUXIMAB VEDOTIN 180 MG: 50 INJECTION, POWDER, LYOPHILIZED, FOR SOLUTION INTRAVENOUS at 12:05

## 2022-02-03 RX ADMIN — SODIUM CHLORIDE, PRESERVATIVE FREE 10 ML: 5 INJECTION INTRAVENOUS at 12:51

## 2022-02-03 RX ADMIN — PALONOSETRON 0.25 MG: 0.25 INJECTION, SOLUTION INTRAVENOUS at 10:34

## 2022-02-03 RX ADMIN — SODIUM CHLORIDE 250 ML: 9 INJECTION, SOLUTION INTRAVENOUS at 10:27

## 2022-02-03 NOTE — PROGRESS NOTES
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  male presents today for follow-up of his lymphoma.  He is starting chemotherapy today.  Clinically still has considerable swelling in his right side of his face.  He has a new port in place.  Denies any night sweats or fever.  He did get 1 shot of the Covid vaccination last week.  Denies any recent infections.    Past medical history, social history and family history was reviewed 02/03/22 and unchanged from prior visit.    Review of Systems:    Review of Systems - Oncology         Medications:        Current Outpatient Medications:   •  albuterol sulfate  (90 Base) MCG/ACT inhaler, Inhale 2 puffs Every 4 (Four) Hours As Needed for Wheezing., Disp: 8 g, Rfl: 3  •  amLODIPine (NORVASC) 5 MG tablet, TAKE ONE TABLET BY MOUTH DAILY, Disp: 90 tablet, Rfl: 1  •  ammonium lactate (AmLactin) 12 % lotion, Apply  topically to the appropriate area as directed 2 (two) times a day. For dry skin, Disp: 400 g, Rfl: 5  •  baclofen (LIORESAL) 10 MG tablet, Take 1 tablet by mouth 3 (Three) Times a Day As Needed for Muscle Spasms., Disp: 60 tablet, Rfl: 0  •  cefdinir (OMNICEF) 300 MG capsule, Take 1 capsule by mouth 2 (Two) Times a Day., Disp: 20 capsule, Rfl: 0  •  Cholecalciferol (VITAMIN D) 1000 UNITS tablet, Take 1 capsule by mouth Daily., Disp: , Rfl:   •  Cyanocobalamin (VITAMIN B-12 ER) 1000 MCG tablet  "controlled-release, Take 1,000 mcg by mouth Daily., Disp: , Rfl:   •  doxycycline (VIBRAMYCIN) 100 MG capsule, Take 1 capsule by mouth 2 (Two) Times a Day., Disp: 20 capsule, Rfl: 0  •  furosemide (Lasix) 20 MG tablet, Take 1 tablet by mouth Every Other Day As Needed (dyspnea)., Disp: 10 tablet, Rfl: 1  •  glimepiride (AMARYL) 4 MG tablet, TAKE ONE TABLET BY MOUTH DAILY WITH DINNER, Disp: 90 tablet, Rfl: 1  •  insulin NPH (humuLIN N,novoLIN N) 100 UNIT/ML injection, Inject 50 Units under the skin into the appropriate area as directed 2 (Two) Times a Day Before Meals., Disp: , Rfl:   •  Insulin Syringe 28G X 1/2\" 1 ML misc, 1 each 2 (Two) Times a Day., Disp: 100 each, Rfl: 5  •  lidocaine-prilocaine (EMLA) 2.5-2.5 % cream, Apply 1 application topically to the appropriate area as directed As Needed (45-60 minutes prior to port access.  Cover with saran/plastic wrap.)., Disp: 30 g, Rfl: 3  •  metFORMIN (GLUCOPHAGE) 1000 MG tablet, TAKE ONE TABLET BY MOUTH IN THE MORNING AND 1 AND 1/2 TABLETS AT BEDTIME, Disp: 75 tablet, Rfl: 5  •  metoprolol succinate XL (TOPROL-XL) 25 MG 24 hr tablet, Take 1 tablet by mouth Daily., Disp: 30 tablet, Rfl: 11  •  Multiple Vitamins-Minerals (MULTI VITAMIN/MINERALS PO), Take 1 tablet by mouth Daily., Disp: , Rfl:   •  Multiple Vitamins-Minerals (PRESERVISION AREDS 2 PO), Take  by mouth Daily., Disp: , Rfl:   •  omeprazole (PRILOSEC) 20 MG capsule, Take 1 capsule by mouth Daily., Disp: , Rfl:   •  ondansetron (ZOFRAN) 8 MG tablet, Take 1 tablet by mouth 3 (Three) Times a Day As Needed for Nausea or Vomiting., Disp: 30 tablet, Rfl: 5  •  potassium chloride 10 MEQ CR tablet, Take 1 tablet by mouth Every Other Day As Needed (with lasix)., Disp: 10 tablet, Rfl: 1  •  simvastatin (ZOCOR) 10 MG tablet, Take 1 tablet by mouth every night at bedtime., Disp: 90 tablet, Rfl: 1  •  valsartan (DIOVAN) 160 MG tablet, Take 1 tablet by mouth 2 (Two) Times a Day., Disp: 180 tablet, Rfl: 1    Pain " "Medications             baclofen (LIORESAL) 10 MG tablet Take 1 tablet by mouth 3 (Three) Times a Day As Needed for Muscle Spasms.             ALLERGIES:    Allergies   Allergen Reactions   • Bactrim [Sulfamethoxazole-Trimethoprim] Other (See Comments)     Joint pain   • Sulfa Antibiotics GI Intolerance     Makes bones hurt           Physical Exam    VITAL SIGNS:  BP (!) 189/71   Pulse 81   Temp 98 °F (36.7 °C) (Temporal)   Resp 20   Ht 177.8 cm (70\")   Wt 109 kg (240 lb 11.2 oz)   SpO2 96%   BMI 34.54 kg/m²     ECOG score: 0           Wt Readings from Last 3 Encounters:   02/03/22 109 kg (240 lb 11.2 oz)   02/01/22 106 kg (234 lb 4.8 oz)   01/31/22 109 kg (240 lb)       Body mass index is 34.54 kg/m². Body surface area is 2.26 meters squared.    Pain Score    02/03/22 0939   PainSc: 0-No pain           Performance Status: 0    General: well appearing, in no acute distress  HEENT: Swelling on the right side of his face consistent with his lymphomatous process.  Lymphatics: no cervical, supraclavicular, or axillary adenopathy  Cardiovascular: regular rate and rhythm, no murmurs, rubs or gallops  Lungs: clear to auscultation bilaterally  Abdomen: soft, nontender, nondistended.  No palpable organomegaly  Extremities: no lower extremity edema  Skin: no rashes, lesions, bruising, or petechiae  Msk:  Shows no weakness of the large muscle groups  Psych: Mood is stable        RECENT LABS:    Lab Results   Component Value Date    HGB 13.0 02/01/2022    HCT 41.4 02/01/2022    MCV 92.6 02/01/2022     (L) 02/01/2022    WBC 8.20 02/01/2022    NEUTROABS 4.80 02/01/2022    LYMPHSABS 2.80 02/01/2022    MONOSABS 0.60 02/01/2022    EOSABS 0.23 01/31/2022    BASOSABS 0.05 01/31/2022       Lab Results   Component Value Date    GLUCOSE 199 (H) 02/01/2022    BUN 17 02/01/2022    CREATININE 1.24 02/01/2022     02/01/2022    K 4.5 02/01/2022    CL 99 02/01/2022    CO2 29.0 02/01/2022    CALCIUM 9.4 02/01/2022    " PROTEINTOT 6.8 02/01/2022    ALBUMIN 4.20 02/01/2022    BILITOT 0.3 02/01/2022    ALKPHOS 89 02/01/2022    AST 29 02/01/2022    ALT 30 02/01/2022       CT Abdomen Pelvis Without Contrast    Result Date: 11/10/2021  No CT evidence of acute intra-abdominal or pelvic abnormality. There is stool scattered throughout the colon with no signs of obvious obstruction or gross free intraperitoneal air. No evidence of ascites.  D:  11/08/2021 E:  11/08/2021  This report was finalized on 11/10/2021 4:34 PM by Dr. Lissy Dean MD.      CT Soft Tissue Neck Without Contrast    Result Date: 11/9/2021  2 areas of interstitial marked on the right. The more superior marker along the right temporalis region corresponds to a 16mm soft tissue nodule within the superficial subcutaneous soft tissues. This appearance is strictly nonspecific possibly small abscess or hyperdense cyst. Neoplastic process is not excluded entirely and this lesion would be amenable to ultrasound-guided soft tissue sampling, as indicated.  No distinct multicystic nodule noted corresponding to the prior ultrasound finding in the area of the marked palpable finding adjacent to the right parotid gland. Several small nonspecific 1 to 2 cm right parotid nodules are present, potentially intraparotid lymph nodes. Consider dedicated ultrasound of the right parotid gland to further evaluate.   This report was finalized on 11/9/2021 11:37 AM by Lazaro Carrero.      IR Port Placement    Result Date: 1/31/2022  Impression:    Successful ultrasound and fluoroscopic guided right internal jugular vein route single lumen Port-A-Cath placement as described above.  Thank you for the opportunity to assist in the care of your patient.  This report was finalized on 1/31/2022 11:41 AM by Sixto Bangura MD.      NM PET/CT Skull Base to Mid Thigh    Result Date: 1/13/2022  Area of postsurgical change right facial soft tissues overlying the right temporalis musculature inferiorly.  On the posterior aspect of the surgical region and near the angle of the mandible on the lateral margin is a somewhat indeterminate intermediate soft tissue density focus mass with SUV 3.2 with attention on followup to exclude residual or local disease. Additionally, asymmetric likely intraparotid node of the anterior superficial lobe parotid gland right maximum SUV 3.6 without separate abnormal hypermetabolism.  D:  01/11/2022 E:  01/12/2022    This report was finalized on 1/13/2022 8:20 AM by Dr. Damian Zaidi.            Assessment/Plan    1.  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  I plan to treat him with CHP-Brentuximab.  He will get cycle #1 today.  My plan is to treat him with 6 cycles followed by ISRT.  At this time we will plan to image him after 3 cycles.  I think CAT scans should suffice since his disease appears to be not very PET avid.    2.  Immunocompromise state.  He got 1 dose of his Covid vaccination.  I am concerned that he won mount very good response while he is on chemotherapy.  I am going to treat him with 1 dose of Evushield.        Total time of patient care on day of service including time prior to, face to face with patient, and following visit spent in reviewing records, lab results, imaging studies, discussion with patient, and documentation/charting was > 32 minutes.     Ai Henderson MD  [facility] Hematology and Oncology    Return on: 02/24/22  Return in (Approximately): 3 weeks, Schedule with next infusion    Orders Placed This Encounter   Procedures   • Comprehensive metabolic panel   • CBC and Differential       2/3/2022

## 2022-02-03 NOTE — PROGRESS NOTES
"Outpatient Oncology Nutrition     Reason for Visit:     Oncology Nutrition Screening and Patient Education / Met with patient during his initial chemotherapy infusion appointment.     Patient Name:  Gabriela Mar    :  1945    MRN:  [id number]    Date of Encounter: 2022    Nutrition Assessment     Diagnosis: Stage I ALK negative anaplastic T-cell large cell lymphoma of the face    Chemotherapy: OP LYMPHOMA A + CHP (Doxorubicin / Cyclophosphamide / Brentuximab vedotin  / Prednisone) - every 21 days x 6 cycles     Patient Active Problem List:    Patient Active Problem List   Diagnosis   • Hyperlipidemia LDL goal <70   • Sciatica   • Essential hypertension   • Esophageal reflux   • Obesity   • Osteoarthritis   • Vitamin D deficiency   • Vitamin B deficiency   • Hip arthritis   • Chronic bilateral low back pain without sciatica   • Chronic diastolic congestive heart failure (HCC)   • Chronic obstructive pulmonary disease with acute lower respiratory infection (HCC)   • Type 2 diabetes mellitus with hyperglycemia, with long-term current use of insulin (HCC)   • Coronary artery disease involving native coronary artery of native heart with angina pectoris (HCC)   • 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  He reports checking his blood sugars twice daily and take sliding scale insulin as well as metformin and amaryl to aid with glycemic control.  Provided and reviewed written diet material \"Blood Glucose Management\".  Reviewed diabetes diet basics, the effects of steroids on blood sugars, and recommended he continue monitor his blood sugars regularly and take his medications as prescribed.    Hydration Status   Discussed the importance of hydration and recommended he increase his intake of water.    Goal: at least 64 ounces     How many 8 ounces glasses of water do you consume per day? ~6    Enteral Feeding " "      Anthropometric Measurements     Height:    Ht Readings from Last 1 Encounters:   02/03/22 177.8 cm (70\")       Weight:    Wt Readings from Last 1 Encounters:   02/03/22 109 kg (240 lb 11.2 oz)       BMI:  34.5 - Obese     Weight Change: weight has been stable     Review of Lab Data (Time Frame - 1 month / 2 month)   Labs reviewed - 2/1/22 - elevated glucose noted & 1/18/22 - HbA1c - 8.1    Medication Review   MAR reviewed - Metformin, Amaryl and Insulin noted     Nutrition Focused Physical Findings       Nutrition Impact Symptoms   No problems with eating     Physical Activity   Not my normal self, but able to be up and about with fairly normal activities    Current Nutritional Intake     Oral diet:  Diabetic     Intake: patient reports his oral intake has been normal     Malnutrition Risk Assessment     Recent weight loss over the past 6 months:  0 = No    Eating poorly because of a decreased appetite:  0 = No    Malnutrition Screening Score:     MST = 0 or 1 Patient not at risk for malnutrition    Nutrition Diagnosis     Problem    Etiology    Signs / Symptoms      Nutrition Intervention   Discussed the importance of good nutrition during his treatment course focusing on adequate calorie, protein, nutrient and fluid intake.  Advised him to be consuming smaller more frequent meals/snacks throughout the day to aid with potential nausea management.  Emphasized the importance of protein and its role in the diet; reviewed high protein foods; and recommended he have a protein source at each meal/snack.       Goal   To achieve adequate nutritional and hydration intake during treatment.  To aid with nutrition impact symptom management as needed.     Monitoring / Evaluation   Answered his questions and he voiced understanding of information discussed.  RD's contact information provided and encouraged to call with questions.  Will monitor as needed during his treatment course.    Lissy Collazo MS, RD, LD   "

## 2022-02-07 ENCOUNTER — TELEPHONE (OUTPATIENT)
Dept: ONCOLOGY | Facility: CLINIC | Age: 77
End: 2022-02-07

## 2022-02-07 NOTE — TELEPHONE ENCOUNTER
Caller: Haris Mar    Relationship: Self    Best call back number: [phone]    What was the call regarding: HARIS CALLED REGARDING HIS INFUSION FROM 02/03. HE SAYS THAT HIS NEULASTA MACHINE ON HIS STOMACH STOPPED WORKING DURING THE INFUSION. HE WANTS TO KNOW WHAT HE SHOULD DO ABOUT THAT.    Do you require a callback: YES

## 2022-02-07 NOTE — TELEPHONE ENCOUNTER
[place] is here for a postpartum checkup.  Pregnancy was: Uncomplicated.    She had a   Obstetric History       T1      L1     SAB0   TAB0   Ectopic0   Multiple0   Live Births1       # Outcome Date GA Lbr Yariel/2nd Weight Sex Delivery Anes PTL Lv   1 Term 17 38w2d  8 lb 13 oz (3.997 kg) M    MAN         Since delivery, she has been breast feeding.  She has not had a normal menses.  She has had intercourse.  Patient screened for postpartum depression and complaints are NEGATIVE. Screening has also been completed for intimate partner violence. She would like to discuss contraception.    O: This is a well appearing female in no acute distress. Answers questions and maintains eye contact appropriately. Vital signs noted.  RESPIRATORY: Clear to auscultation bilaterally.  CV: Regular rate and rhythm without murmur, gallop, rub  ABDOMEN: Soft, nontender, nondistended, normoactive bowel sounds. No hepatosplenomegaly. No guarding, rebounding, or rigidity.  Vulva: No external lesions, normal hair distribution, no adenopathy  BUS:  Normal, no masses noted  Vagina: Moist, pink, no abnormal discharge, well rugated, no lesions  Cervix: Pink, parous, midline. Without cervical motion tenderness.  Uterus: Normal size and shape, non-tender, mobile  Ovaries: No masses, non-tender, mobile    A/P  Routine Postpartum     - I discussed the new pap recommendations regarding screening.  Explained the rationale for increased intervals between paps.  Questions asked and answered.  She does agree to this regiment.   - Pap was not performed   - Contraception: reviewed options compatible with nursing, patient would like to do a pill. Prescription sent for Micronor. Discussed when to start, possible side effects, effectiveness. To take it very regularly. Call when done nursing and will send prescription for combined oral contraceptive pills.    Jessie FINLEY CNP  I have reviewed this encounter and agree.  Scottie TRUONG  Returned call to patient. At this time discussing with him what his Neulasta on pro was doing. Patient reporting that he heard a beep and the light turned green. Patient reported that after 20 min he went back to look at the on pro and it was still stated full. Informing patient to bring Neulasta on pro when he comes back into the clinic. Informing him that we don't do any other treatment if the on pro doesn't infuse. Patient stating he under stood.    MD Kym FACOG

## 2022-02-14 ENCOUNTER — TELEPHONE (OUTPATIENT)
Dept: ONCOLOGY | Facility: CLINIC | Age: 77
End: 2022-02-14

## 2022-02-14 DIAGNOSIS — C84.71 ANAPLASTIC ALK-NEGATIVE LARGE CELL LYMPHOMA OF LYMPH NODE OF HEAD: Primary | ICD-10-CM

## 2022-02-14 RX ORDER — TEMAZEPAM 15 MG/1
15 CAPSULE ORAL NIGHTLY PRN
Qty: 30 CAPSULE | Refills: 5 | Status: SHIPPED | OUTPATIENT
Start: 2022-02-14 | End: 2022-02-24

## 2022-02-14 NOTE — TELEPHONE ENCOUNTER
Caller: Gabriela Mar    Relationship: Self    Best call back number: [phone]    What medication are you requesting: SLEEP AID    What are your current symptoms: HAVING ISSUES SLEEPING    How long have you been experiencing symptoms: 5-6 DAYS    Have you had these symptoms before:    [] Yes  [x] No    Have you been treated for these symptoms before:   [] Yes  [x] No    If a prescription is needed, what is your preferred pharmacy and phone number:      [facility] [address] AT [facility] & [address] [place] [address] - [phone] PH - [phone] FX

## 2022-02-14 NOTE — TELEPHONE ENCOUNTER
Returned call to go over patients two different request. Informing patient that EVU shield is to increase patient's immune system so that if he gets covid then he will have a defense against covid. Informing patient also that we will also send him medication to help him fall asleep at night.

## 2022-02-14 NOTE — TELEPHONE ENCOUNTER
Caller: Gabriela Mar    Relationship: Self    Best call back number: [phone]      Who are you requesting to speak with (clinical staff, provider,  specific staff member): CIINICAL    What was the call regarding: PATIENT HAD QUESTIONS ON THE INJECTION DR PETIT WANTED HIM TO SCHEDULE    Do you require a callback: YES

## 2022-02-16 DIAGNOSIS — C84.71 ANAPLASTIC ALK-NEGATIVE LARGE CELL LYMPHOMA OF LYMPH NODE OF HEAD: Primary | ICD-10-CM

## 2022-02-16 RX ORDER — TRAMADOL HYDROCHLORIDE 50 MG/1
50 TABLET ORAL EVERY 8 HOURS PRN
Qty: 60 TABLET | Refills: 2 | Status: SHIPPED | OUTPATIENT
Start: 2022-02-16 | End: 2022-05-26

## 2022-02-16 NOTE — TELEPHONE ENCOUNTER
Caller: Haris Mar    Relationship: Self    Best call back number: [phone]    What is the best time to reach you: ANYTIME     Who are you requesting to speak with (clinical staff, provider,  specific staff member):   DR MARISABEL GERMAIN (NELLY)    Do you know the name of the person who called: HARIS    What was the call regarding:   NEEDING TO SPEAK WITH NELLY REGARDING MEDICATION TALKED ABOUT ON Monday     Do you require a callback: YES

## 2022-02-16 NOTE — TELEPHONE ENCOUNTER
Returned call to patient informing him that Dr. Herr wants patient to increase Restoril to 2 tabs at 30 mg tabs. Informing patient Dr. Herr was going to send patient in Tramadol. Informing him that we will send that in with refill.

## 2022-02-16 NOTE — TELEPHONE ENCOUNTER
Returned call to patient. At this time patient reports that Restoril is not working. He also states that he is having muscle aches. When nurse discussed taking Ibuprophen patient reports that his pcp has told him not to take it. Informing patient that after nurse discusses with dr. Herr. Nurse will call patient and follow up with him.

## 2022-02-23 ENCOUNTER — HOSPITAL ENCOUNTER (OUTPATIENT)
Dept: ONCOLOGY | Facility: HOSPITAL | Age: 77
Setting detail: INFUSION SERIES
Discharge: HOME | End: 2022-02-23

## 2022-02-23 DIAGNOSIS — Z95.828 PORT-A-CATH IN PLACE: ICD-10-CM

## 2022-02-23 DIAGNOSIS — C84.71 ANAPLASTIC ALK-NEGATIVE LARGE CELL LYMPHOMA OF LYMPH NODE OF HEAD: Primary | ICD-10-CM

## 2022-02-23 LAB
ALBUMIN SERPL-MCNC: 3.9 G/DL (ref 3.5–5.2)
ALBUMIN/GLOB SERPL: 1.5 G/DL
ALP SERPL-CCNC: 80 U/L (ref 39–117)
ALT SERPL W P-5'-P-CCNC: 42 U/L (ref 1–41)
ANION GAP SERPL CALCULATED.3IONS-SCNC: 12 MMOL/L (ref 5–15)
AST SERPL-CCNC: 47 U/L (ref 1–40)
BILIRUB SERPL-MCNC: 0.5 MG/DL (ref 0–1.2)
BUN SERPL-MCNC: 11 MG/DL (ref 8–23)
BUN/CREAT SERPL: 9.8 (ref 7–25)
CALCIUM SPEC-SCNC: 7.7 MG/DL (ref 8.6–10.5)
CHLORIDE SERPL-SCNC: 97 MMOL/L (ref 98–107)
CO2 SERPL-SCNC: 25 MMOL/L (ref 22–29)
CREAT SERPL-MCNC: 1.12 MG/DL (ref 0.76–1.27)
ERYTHROCYTE [DISTWIDTH] IN BLOOD BY AUTOMATED COUNT: 15.7 % (ref 12.3–15.4)
GFR SERPL CREATININE-BSD FRML MDRD: 64 ML/MIN/1.73
GLOBULIN UR ELPH-MCNC: 2.6 GM/DL
GLUCOSE SERPL-MCNC: 193 MG/DL (ref 65–99)
HCT VFR BLD AUTO: 37.3 % (ref 37.5–51)
HGB BLD-MCNC: 12.1 G/DL (ref 13–17.7)
LYMPHOCYTES # BLD AUTO: 1.9 10*3/MM3 (ref 0.7–3.1)
LYMPHOCYTES NFR BLD AUTO: 31.4 % (ref 19.6–45.3)
MCH RBC QN AUTO: 29.6 PG (ref 26.6–33)
MCHC RBC AUTO-ENTMCNC: 32.5 G/DL (ref 31.5–35.7)
MCV RBC AUTO: 91 FL (ref 79–97)
MONOCYTES # BLD AUTO: 0.6 10*3/MM3 (ref 0.1–0.9)
MONOCYTES NFR BLD AUTO: 10.8 % (ref 5–12)
NEUTROPHILS NFR BLD AUTO: 3.4 10*3/MM3 (ref 1.7–7)
NEUTROPHILS NFR BLD AUTO: 57.8 % (ref 42.7–76)
PLATELET # BLD AUTO: 346 10*3/MM3 (ref 140–450)
PMV BLD AUTO: 8.1 FL (ref 6–12)
POTASSIUM SERPL-SCNC: 4.7 MMOL/L (ref 3.5–5.2)
PROT SERPL-MCNC: 6.5 G/DL (ref 6–8.5)
RBC # BLD AUTO: 4.1 10*6/MM3 (ref 4.14–5.8)
SODIUM SERPL-SCNC: 134 MMOL/L (ref 136–145)
WBC NRBC COR # BLD: 5.9 10*3/MM3 (ref 3.4–10.8)

## 2022-02-23 PROCEDURE — 80053 COMPREHEN METABOLIC PANEL: CPT | Performed by: INTERNAL MEDICINE

## 2022-02-23 PROCEDURE — 85025 COMPLETE CBC W/AUTO DIFF WBC: CPT | Performed by: INTERNAL MEDICINE

## 2022-02-23 PROCEDURE — 25010000002 HEPARIN LOCK FLUSH PER 10 UNITS: Performed by: INTERNAL MEDICINE

## 2022-02-23 PROCEDURE — 36591 DRAW BLOOD OFF VENOUS DEVICE: CPT

## 2022-02-23 RX ORDER — HEPARIN SODIUM (PORCINE) LOCK FLUSH IV SOLN 100 UNIT/ML 100 UNIT/ML
500 SOLUTION INTRAVENOUS AS NEEDED
Status: DISCONTINUED | OUTPATIENT
Start: 2022-02-23 | End: 2022-02-24 | Stop reason: HOSPADM

## 2022-02-23 RX ORDER — SODIUM CHLORIDE 0.9 % (FLUSH) 0.9 %
10 SYRINGE (ML) INJECTION AS NEEDED
Status: DISCONTINUED | OUTPATIENT
Start: 2022-02-23 | End: 2022-02-24 | Stop reason: HOSPADM

## 2022-02-23 RX ORDER — SODIUM CHLORIDE 0.9 % (FLUSH) 0.9 %
10 SYRINGE (ML) INJECTION AS NEEDED
Status: CANCELLED | OUTPATIENT
Start: 2022-02-23

## 2022-02-23 RX ORDER — HEPARIN SODIUM (PORCINE) LOCK FLUSH IV SOLN 100 UNIT/ML 100 UNIT/ML
500 SOLUTION INTRAVENOUS AS NEEDED
Status: CANCELLED | OUTPATIENT
Start: 2022-02-23

## 2022-02-23 RX ADMIN — HEPARIN SODIUM (PORCINE) LOCK FLUSH IV SOLN 100 UNIT/ML 500 UNITS: 100 SOLUTION at 13:43

## 2022-02-24 ENCOUNTER — OFFICE VISIT (OUTPATIENT)
Dept: ONCOLOGY | Facility: CLINIC | Age: 77
End: 2022-02-24

## 2022-02-24 ENCOUNTER — HOSPITAL ENCOUNTER (OUTPATIENT)
Dept: ONCOLOGY | Facility: HOSPITAL | Age: 77
Setting detail: INFUSION SERIES
Discharge: HOME | End: 2022-02-24

## 2022-02-24 VITALS
HEIGHT: 70 IN | WEIGHT: 229.7 LBS | OXYGEN SATURATION: 93 % | HEART RATE: 88 BPM | RESPIRATION RATE: 16 BRPM | TEMPERATURE: 98.4 F | DIASTOLIC BLOOD PRESSURE: 67 MMHG | SYSTOLIC BLOOD PRESSURE: 156 MMHG | BODY MASS INDEX: 32.88 KG/M2

## 2022-02-24 DIAGNOSIS — Z95.828 PORT-A-CATH IN PLACE: Primary | ICD-10-CM

## 2022-02-24 DIAGNOSIS — C84.71 ANAPLASTIC ALK-NEGATIVE LARGE CELL LYMPHOMA OF LYMPH NODE OF HEAD: Primary | ICD-10-CM

## 2022-02-24 DIAGNOSIS — C84.71 ANAPLASTIC ALK-NEGATIVE LARGE CELL LYMPHOMA OF LYMPH NODE OF HEAD: ICD-10-CM

## 2022-02-24 PROCEDURE — 96375 TX/PRO/DX INJ NEW DRUG ADDON: CPT

## 2022-02-24 PROCEDURE — 96411 CHEMO IV PUSH ADDL DRUG: CPT

## 2022-02-24 PROCEDURE — 25010000002 HEPARIN LOCK FLUSH PER 10 UNITS: Performed by: INTERNAL MEDICINE

## 2022-02-24 PROCEDURE — 25010000002 CYCLOPHOSPHAMIDE 1 GM/5ML SOLUTION 5 ML VIAL: Performed by: INTERNAL MEDICINE

## 2022-02-24 PROCEDURE — 25010000002 PEGFILGRASTIM 6 MG/0.6ML PREFILLED SYRINGE KIT: Performed by: INTERNAL MEDICINE

## 2022-02-24 PROCEDURE — 25010000002 PALONOSETRON 0.25 MG/5ML SOLUTION PREFILLED SYRINGE: Performed by: INTERNAL MEDICINE

## 2022-02-24 PROCEDURE — 96417 CHEMO IV INFUS EACH ADDL SEQ: CPT

## 2022-02-24 PROCEDURE — 25010000002 BRENTUXIMAB 50 MG RECONSTITUTED SOLUTION 1 EACH VIAL: Performed by: INTERNAL MEDICINE

## 2022-02-24 PROCEDURE — 25010000002 FOSAPREPITANT PER 1 MG: Performed by: INTERNAL MEDICINE

## 2022-02-24 PROCEDURE — 25010000002 DOXORUBICIN PER 10 MG: Performed by: INTERNAL MEDICINE

## 2022-02-24 PROCEDURE — 96413 CHEMO IV INFUSION 1 HR: CPT

## 2022-02-24 PROCEDURE — 96367 TX/PROPH/DG ADDL SEQ IV INF: CPT

## 2022-02-24 PROCEDURE — 25010000002 DEXAMETHASONE SODIUM PHOSPHATE 100 MG/10ML SOLUTION: Performed by: INTERNAL MEDICINE

## 2022-02-24 PROCEDURE — 96377 APPLICATON ON-BODY INJECTOR: CPT

## 2022-02-24 PROCEDURE — 99214 OFFICE O/P EST MOD 30 MIN: CPT | Performed by: INTERNAL MEDICINE

## 2022-02-24 RX ORDER — DOXORUBICIN HYDROCHLORIDE 2 MG/ML
50 INJECTION, SOLUTION INTRAVENOUS ONCE
Status: COMPLETED | OUTPATIENT
Start: 2022-02-24 | End: 2022-02-24

## 2022-02-24 RX ORDER — OLANZAPINE 5 MG/1
5 TABLET ORAL ONCE
Status: CANCELLED | OUTPATIENT
Start: 2022-03-17 | End: 2022-03-17

## 2022-02-24 RX ORDER — PALONOSETRON 0.05 MG/ML
0.25 INJECTION, SOLUTION INTRAVENOUS ONCE
Status: CANCELLED | OUTPATIENT
Start: 2022-03-17

## 2022-02-24 RX ORDER — SODIUM CHLORIDE 9 MG/ML
250 INJECTION, SOLUTION INTRAVENOUS ONCE
Status: CANCELLED | OUTPATIENT
Start: 2022-03-17

## 2022-02-24 RX ORDER — SODIUM CHLORIDE 9 MG/ML
250 INJECTION, SOLUTION INTRAVENOUS ONCE
Status: COMPLETED | OUTPATIENT
Start: 2022-02-24 | End: 2022-02-24

## 2022-02-24 RX ORDER — SULFAMETHOXAZOLE AND TRIMETHOPRIM 800; 160 MG/1; MG/1
1 TABLET ORAL 2 TIMES DAILY
Qty: 14 TABLET | Refills: 0 | Status: ON HOLD | OUTPATIENT
Start: 2022-02-24 | End: 2022-03-25

## 2022-02-24 RX ORDER — SODIUM CHLORIDE 0.9 % (FLUSH) 0.9 %
10 SYRINGE (ML) INJECTION AS NEEDED
Status: DISCONTINUED | OUTPATIENT
Start: 2022-02-24 | End: 2022-02-25 | Stop reason: HOSPADM

## 2022-02-24 RX ORDER — ZOLPIDEM TARTRATE 5 MG/1
5 TABLET ORAL NIGHTLY PRN
Qty: 30 TABLET | Refills: 3 | Status: ON HOLD | OUTPATIENT
Start: 2022-02-24 | End: 2022-03-25

## 2022-02-24 RX ORDER — HEPARIN SODIUM (PORCINE) LOCK FLUSH IV SOLN 100 UNIT/ML 100 UNIT/ML
500 SOLUTION INTRAVENOUS AS NEEDED
Status: CANCELLED | OUTPATIENT
Start: 2022-02-24

## 2022-02-24 RX ORDER — HEPARIN SODIUM (PORCINE) LOCK FLUSH IV SOLN 100 UNIT/ML 100 UNIT/ML
500 SOLUTION INTRAVENOUS AS NEEDED
Status: DISCONTINUED | OUTPATIENT
Start: 2022-02-24 | End: 2022-02-25 | Stop reason: HOSPADM

## 2022-02-24 RX ORDER — PALONOSETRON 0.05 MG/ML
0.25 INJECTION, SOLUTION INTRAVENOUS ONCE
Status: COMPLETED | OUTPATIENT
Start: 2022-02-24 | End: 2022-02-24

## 2022-02-24 RX ORDER — OLANZAPINE 5 MG/1
5 TABLET ORAL ONCE
Status: COMPLETED | OUTPATIENT
Start: 2022-02-24 | End: 2022-02-24

## 2022-02-24 RX ORDER — DOXORUBICIN HYDROCHLORIDE 2 MG/ML
50 INJECTION, SOLUTION INTRAVENOUS ONCE
Status: CANCELLED | OUTPATIENT
Start: 2022-03-17

## 2022-02-24 RX ORDER — SODIUM CHLORIDE 0.9 % (FLUSH) 0.9 %
10 SYRINGE (ML) INJECTION AS NEEDED
Status: CANCELLED | OUTPATIENT
Start: 2022-02-24

## 2022-02-24 RX ADMIN — Medication 10 ML: at 13:20

## 2022-02-24 RX ADMIN — OLANZAPINE 5 MG: 5 TABLET, FILM COATED ORAL at 10:58

## 2022-02-24 RX ADMIN — SODIUM CHLORIDE 250 ML: 9 INJECTION, SOLUTION INTRAVENOUS at 10:55

## 2022-02-24 RX ADMIN — SODIUM CHLORIDE 150 MG: 9 INJECTION, SOLUTION INTRAVENOUS at 11:01

## 2022-02-24 RX ADMIN — DOXORUBICIN HYDROCHLORIDE 56 MG: 2 INJECTION, SOLUTION INTRAVENOUS at 11:41

## 2022-02-24 RX ADMIN — PALONOSETRON 0.25 MG: 0.25 INJECTION, SOLUTION INTRAVENOUS at 11:00

## 2022-02-24 RX ADMIN — BRENTUXIMAB VEDOTIN 180 MG: 50 INJECTION, POWDER, LYOPHILIZED, FOR SOLUTION INTRAVENOUS at 12:42

## 2022-02-24 RX ADMIN — CYCLOPHOSPHAMIDE 1690 MG: 200 INJECTION, SOLUTION INTRAVENOUS at 11:59

## 2022-02-24 RX ADMIN — DEXAMETHASONE SODIUM PHOSPHATE 12 MG: 10 INJECTION, SOLUTION INTRAMUSCULAR; INTRAVENOUS at 11:05

## 2022-02-24 RX ADMIN — PEGFILGRASTIM 6 MG: KIT SUBCUTANEOUS at 13:23

## 2022-02-24 RX ADMIN — HEPARIN 500 UNITS: 100 SYRINGE at 13:20

## 2022-02-24 NOTE — PROGRESS NOTES
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  male presents today for follow-up of his lymphoma.  He completed 1 cycle of CHP-Brentuximab.  His biggest issue is insomnia.  He also has some food taste changes.  He had occasional constipation.  No fevers or chills.  No nausea or vomiting.    Past medical history, social history and family history was reviewed 02/24/22 and unchanged from prior visit.    Review of Systems:    Review of Systems   Constitutional: Positive for appetite change and fatigue.   HENT:  Negative.    Eyes: Negative.    Respiratory: Negative.    Cardiovascular: Negative.    Gastrointestinal: Negative.    Endocrine: Negative.    Genitourinary: Negative.     Musculoskeletal: Negative.    Skin: Negative.    Neurological: Negative.    Hematological: Negative.    Psychiatric/Behavioral: Positive for sleep disturbance.            Medications:        Current Outpatient Medications:   •  albuterol sulfate  (90 Base) MCG/ACT inhaler, Inhale 2 puffs Every 4 (Four) Hours As Needed for Wheezing., Disp: 8 g, Rfl: 3  •  amLODIPine (NORVASC) 5 MG tablet, TAKE ONE TABLET BY MOUTH DAILY, Disp: 90 tablet, Rfl: 1  •  ammonium lactate (AmLactin) 12 % lotion, Apply  topically to the appropriate area as directed 2 (two) times a day. For dry skin, Disp: 400 g, Rfl: 5  •  baclofen (LIORESAL) 10 MG tablet, Take 1 tablet by mouth 3 (Three) Times a Day As  "Needed for Muscle Spasms., Disp: 60 tablet, Rfl: 0  •  cefdinir (OMNICEF) 300 MG capsule, Take 1 capsule by mouth 2 (Two) Times a Day., Disp: 20 capsule, Rfl: 0  •  Cholecalciferol (VITAMIN D) 1000 UNITS tablet, Take 1 capsule by mouth Daily., Disp: , Rfl:   •  Cyanocobalamin (VITAMIN B-12 ER) 1000 MCG tablet controlled-release, Take 1,000 mcg by mouth Daily., Disp: , Rfl:   •  doxycycline (VIBRAMYCIN) 100 MG capsule, Take 1 capsule by mouth 2 (Two) Times a Day., Disp: 20 capsule, Rfl: 0  •  furosemide (Lasix) 20 MG tablet, Take 1 tablet by mouth Every Other Day As Needed (dyspnea)., Disp: 10 tablet, Rfl: 1  •  glimepiride (AMARYL) 4 MG tablet, TAKE ONE TABLET BY MOUTH DAILY WITH DINNER, Disp: 90 tablet, Rfl: 1  •  insulin NPH (humuLIN N,novoLIN N) 100 UNIT/ML injection, Inject 50 Units under the skin into the appropriate area as directed 2 (Two) Times a Day Before Meals., Disp: , Rfl:   •  Insulin Syringe 28G X 1/2\" 1 ML misc, 1 each 2 (Two) Times a Day., Disp: 100 each, Rfl: 5  •  lidocaine-prilocaine (EMLA) 2.5-2.5 % cream, Apply 1 application topically to the appropriate area as directed As Needed (45-60 minutes prior to port access.  Cover with saran/plastic wrap.)., Disp: 30 g, Rfl: 3  •  metFORMIN (GLUCOPHAGE) 1000 MG tablet, TAKE ONE TABLET BY MOUTH IN THE MORNING AND 1 AND 1/2 TABLETS AT BEDTIME, Disp: 75 tablet, Rfl: 5  •  metoprolol succinate XL (TOPROL-XL) 25 MG 24 hr tablet, Take 1 tablet by mouth Daily., Disp: 30 tablet, Rfl: 11  •  Multiple Vitamins-Minerals (MULTI VITAMIN/MINERALS PO), Take 1 tablet by mouth Daily., Disp: , Rfl:   •  Multiple Vitamins-Minerals (PRESERVISION AREDS 2 PO), Take  by mouth Daily., Disp: , Rfl:   •  omeprazole (PRILOSEC) 20 MG capsule, Take 1 capsule by mouth Daily., Disp: , Rfl:   •  ondansetron (ZOFRAN) 8 MG tablet, Take 1 tablet by mouth 3 (Three) Times a Day As Needed for Nausea or Vomiting., Disp: 30 tablet, Rfl: 5  •  potassium chloride 10 MEQ CR tablet, Take 1 " tablet by mouth Every Other Day As Needed (with lasix)., Disp: 10 tablet, Rfl: 1  •  simvastatin (ZOCOR) 10 MG tablet, Take 1 tablet by mouth every night at bedtime., Disp: 90 tablet, Rfl: 1  •  temazepam (RESTORIL) 15 MG capsule, Take 1 capsule by mouth At Night As Needed for Sleep., Disp: 30 capsule, Rfl: 5  •  traMADol (ULTRAM) 50 MG tablet, Take 1 tablet by mouth Every 8 (Eight) Hours As Needed for Moderate Pain ., Disp: 60 tablet, Rfl: 2  •  valsartan (DIOVAN) 160 MG tablet, Take 1 tablet by mouth 2 (Two) Times a Day., Disp: 180 tablet, Rfl: 1  No current facility-administered medications for this visit.    Pain Medications             baclofen (LIORESAL) 10 MG tablet Take 1 tablet by mouth 3 (Three) Times a Day As Needed for Muscle Spasms.    traMADol (ULTRAM) 50 MG tablet Take 1 tablet by mouth Every 8 (Eight) Hours As Needed for Moderate Pain .             ALLERGIES:    Allergies   Allergen Reactions   • Bactrim [Sulfamethoxazole-Trimethoprim] Other (See Comments)     Joint pain   • Sulfa Antibiotics GI Intolerance     Makes bones hurt     • Tegaderm Chg Dressing [Chlorhexidine] Itching and Other (See Comments)     redness         Physical Exam    VITAL SIGNS:  There were no vitals taken for this visit.                Wt Readings from Last 3 Encounters:   02/03/22 109 kg (240 lb 11.2 oz)   02/01/22 106 kg (234 lb 4.8 oz)   01/31/22 109 kg (240 lb)       There is no height or weight on file to calculate BMI. There is no height or weight on file to calculate BSA.    There were no vitals filed for this visit.        Performance Status: 0    General: well appearing, in no acute distress  HEENT: Swelling on the right side of his face consistent with his lymphomatous process.  Lymphatics: no cervical, supraclavicular, or axillary adenopathy  Cardiovascular: regular rate and rhythm, no murmurs, rubs or gallops  Lungs: clear to auscultation bilaterally  Abdomen: soft, nontender, nondistended.  No palpable  organomegaly  Extremities: no lower extremity edema  Skin: no rashes, lesions, bruising, or petechiae  Msk:  Shows no weakness of the large muscle groups  Psych: Mood is stable        RECENT LABS:    Lab Results   Component Value Date    HGB 12.1 (L) 02/23/2022    HCT 37.3 (L) 02/23/2022    MCV 91.0 02/23/2022     02/23/2022    WBC 5.90 02/23/2022    NEUTROABS 3.40 02/23/2022    LYMPHSABS 1.90 02/23/2022    MONOSABS 0.60 02/23/2022    EOSABS 0.23 01/31/2022    BASOSABS 0.05 01/31/2022       Lab Results   Component Value Date    GLUCOSE 193 (H) 02/23/2022    BUN 11 02/23/2022    CREATININE 1.12 02/23/2022     (L) 02/23/2022    K 4.7 02/23/2022    CL 97 (L) 02/23/2022    CO2 25.0 02/23/2022    CALCIUM 7.7 (L) 02/23/2022    PROTEINTOT 6.5 02/23/2022    ALBUMIN 3.90 02/23/2022    BILITOT 0.5 02/23/2022    ALKPHOS 80 02/23/2022    AST 47 (H) 02/23/2022    ALT 42 (H) 02/23/2022       CT Abdomen Pelvis Without Contrast    Result Date: 11/10/2021  No CT evidence of acute intra-abdominal or pelvic abnormality. There is stool scattered throughout the colon with no signs of obvious obstruction or gross free intraperitoneal air. No evidence of ascites.  D:  11/08/2021 E:  11/08/2021  This report was finalized on 11/10/2021 4:34 PM by Dr. Lissy Dean MD.      CT Soft Tissue Neck Without Contrast    Result Date: 11/9/2021  2 areas of interstitial marked on the right. The more superior marker along the right temporalis region corresponds to a 16mm soft tissue nodule within the superficial subcutaneous soft tissues. This appearance is strictly nonspecific possibly small abscess or hyperdense cyst. Neoplastic process is not excluded entirely and this lesion would be amenable to ultrasound-guided soft tissue sampling, as indicated.  No distinct multicystic nodule noted corresponding to the prior ultrasound finding in the area of the marked palpable finding adjacent to the right parotid gland. Several small  nonspecific 1 to 2 cm right parotid nodules are present, potentially intraparotid lymph nodes. Consider dedicated ultrasound of the right parotid gland to further evaluate.   This report was finalized on 11/9/2021 11:37 AM by Lazaro Carrero.      IR Port Placement    Result Date: 1/31/2022  Impression:    Successful ultrasound and fluoroscopic guided right internal jugular vein route single lumen Port-A-Cath placement as described above.  Thank you for the opportunity to assist in the care of your patient.  This report was finalized on 1/31/2022 11:41 AM by Sixto Bangura MD.      NM PET/CT Skull Base to Mid Thigh    Result Date: 1/13/2022  Area of postsurgical change right facial soft tissues overlying the right temporalis musculature inferiorly. On the posterior aspect of the surgical region and near the angle of the mandible on the lateral margin is a somewhat indeterminate intermediate soft tissue density focus mass with SUV 3.2 with attention on followup to exclude residual or local disease. Additionally, asymmetric likely intraparotid node of the anterior superficial lobe parotid gland right maximum SUV 3.6 without separate abnormal hypermetabolism.  D:  01/11/2022 E:  01/12/2022    This report was finalized on 1/13/2022 8:20 AM by Dr. Damian Zaidi.            Assessment/Plan    1.  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  Continue with CHP-Brentuximab.  No dose changes today.  He will get cycle #2 today.  My plan is to treat him with 6 cycles followed by ISRT.  At this time we will plan to image him after 3 cycles.  I think CAT scans should suffice since his disease appears to be not very PET avid.    2.  Immunocompromise state.  He got 1 dose of his Covid vaccination.  I am concerned that he won't mount very good response while he is on chemotherapy so I have ordered a dose of Evushield.      Total time of patient care on day of service including time prior to, face to face  with patient, and following visit spent in reviewing records, lab results, imaging studies, discussion with patient, and documentation/charting was > 31 minutes.     Ai Henderson MD  [facility] On[facility]         No orders of the defined types were placed in this encounter.      2/24/2022

## 2022-02-25 ENCOUNTER — TELEPHONE (OUTPATIENT)
Dept: ONCOLOGY | Facility: CLINIC | Age: 77
End: 2022-02-25

## 2022-02-25 NOTE — TELEPHONE ENCOUNTER
Called patient about appointment to have port looked out. Also discussed with patient if he develops fever or incision opens any larger that he will need to go to the ER. Patient stating he understood.

## 2022-02-25 NOTE — TELEPHONE ENCOUNTER
----- Message from Sanket Herbert sent at 2/25/2022  3:54 PM EST -----  Thank you Gisela.  I will put him on the schedule for 130 on Monday.    Ellen, can you inform the patient of this appointment and if he develops a fever or worsening symptoms over the weekend to report to the ER?  ----- Message -----  From: Nithya Herron RN  Sent: 2/25/2022   3:41 PM EST  To: Sanket Herbert, Sixto Bangura, MD Col[place],   I have spoken with MD Bangura, and he is willing to see him Monday. Can you please schedule him Monday 2/28. He should of course go to the ED if he develops fever or worsening symptoms. Thank you!   ----- Message -----  From: Sanket Herbert  Sent: 2/24/2022   2:00 PM EST  To: Fely Quiroz RN, Ellen Thomas RN, #    Fely or Gisela, please see below.  Would Dr. Bangura need to examine this or another provider?  Thanks,Sanket  ----- Message -----  From: Ellen Thomas, FABRIZIO  Sent: 2/24/2022   1:42 PM EST  To: Staci Avelar, FABRIZIO Small and Sanket,    Patient was seen in infusion and his port has some drainage with redness. Dr. Herr ordered Bactrim. Who do I send patient to have port looked at?    Thanks,Edward

## 2022-02-28 ENCOUNTER — HOSPITAL ENCOUNTER (OUTPATIENT)
Dept: INTERVENTIONAL RADIOLOGY/VASCULAR | Facility: HOSPITAL | Age: 77
Discharge: HOME | End: 2022-02-28
Admitting: RADIOLOGY

## 2022-02-28 DIAGNOSIS — T80.212A PORT OR RESERVOIR INFECTION, INITIAL ENCOUNTER: ICD-10-CM

## 2022-02-28 PROCEDURE — G0463 HOSPITAL OUTPT CLINIC VISIT: HCPCS

## 2022-02-28 RX ORDER — CEPHALEXIN 500 MG/1
500 CAPSULE ORAL 3 TIMES DAILY
Qty: 15 CAPSULE | Refills: 0 | Status: SHIPPED | OUTPATIENT
Start: 2022-02-28 | End: 2022-03-05

## 2022-03-07 ENCOUNTER — TELEPHONE (OUTPATIENT)
Dept: ONCOLOGY | Facility: CLINIC | Age: 77
End: 2022-03-07

## 2022-03-07 NOTE — TELEPHONE ENCOUNTER
Provider: DR PETIT    Caller: HARIS    Relationship to Patient: SELF    Pharmacy: [facility]    Phone Number: [phone]    Reason for Call: HARIS IS CALLING STATES THAT THE DEVICE THAT IS ON HIS STOMACH IS NOT WORKING.    ALSO THE MEDICATION HE IS ON FOR SLEEP IS NOT WORKING EITHER AND WOULD LIKE SOMETHING DIFFERENT CALLED IN.    PLEASE ADVISE

## 2022-03-07 NOTE — TELEPHONE ENCOUNTER
Returned call to patient after discussing with Dr. Herr. Informing patient that since his Ambien 5 mg didn't help him sleep we will try Ambien 10 mg so he can take two Ambiens at bedtime. Patient also reports that his on pro never went off the day after his chemo. Stating he didn't understand why it didn't go off. Patient also stating that the one previously didn't go off. Informing patient to bring on pro in with him at his next visit. Patient stating he will bring it in.     Informing Dr. Herr that on pro didn't go off. Patient in agreement.

## 2022-03-16 ENCOUNTER — HOSPITAL ENCOUNTER (OUTPATIENT)
Dept: ONCOLOGY | Facility: HOSPITAL | Age: 77
Setting detail: INFUSION SERIES
Discharge: HOME | End: 2022-03-16

## 2022-03-16 DIAGNOSIS — Z95.828 PORT-A-CATH IN PLACE: Primary | ICD-10-CM

## 2022-03-16 DIAGNOSIS — C84.71 ANAPLASTIC ALK-NEGATIVE LARGE CELL LYMPHOMA OF LYMPH NODE OF HEAD: ICD-10-CM

## 2022-03-16 LAB
ALBUMIN SERPL-MCNC: 4.2 G/DL (ref 3.5–5.2)
ALBUMIN/GLOB SERPL: 1.5 G/DL
ALP SERPL-CCNC: 83 U/L (ref 39–117)
ALT SERPL W P-5'-P-CCNC: 38 U/L (ref 1–41)
ANION GAP SERPL CALCULATED.3IONS-SCNC: 11 MMOL/L (ref 5–15)
AST SERPL-CCNC: 39 U/L (ref 1–40)
BILIRUB SERPL-MCNC: 0.4 MG/DL (ref 0–1.2)
BUN SERPL-MCNC: 28 MG/DL (ref 8–23)
BUN/CREAT SERPL: 17.6 (ref 7–25)
CALCIUM SPEC-SCNC: 8.4 MG/DL (ref 8.6–10.5)
CHLORIDE SERPL-SCNC: 102 MMOL/L (ref 98–107)
CO2 SERPL-SCNC: 25 MMOL/L (ref 22–29)
CREAT SERPL-MCNC: 1.59 MG/DL (ref 0.76–1.27)
EGFRCR SERPLBLD CKD-EPI 2021: 44.4 ML/MIN/1.73
ERYTHROCYTE [DISTWIDTH] IN BLOOD BY AUTOMATED COUNT: 17.7 % (ref 12.3–15.4)
GLOBULIN UR ELPH-MCNC: 2.8 GM/DL
GLUCOSE SERPL-MCNC: 116 MG/DL (ref 65–99)
HCT VFR BLD AUTO: 38.7 % (ref 37.5–51)
HGB BLD-MCNC: 12.7 G/DL (ref 13–17.7)
LYMPHOCYTES # BLD AUTO: 2.8 10*3/MM3 (ref 0.7–3.1)
LYMPHOCYTES NFR BLD AUTO: 26.3 % (ref 19.6–45.3)
MCH RBC QN AUTO: 30.5 PG (ref 26.6–33)
MCHC RBC AUTO-ENTMCNC: 32.9 G/DL (ref 31.5–35.7)
MCV RBC AUTO: 92.8 FL (ref 79–97)
MONOCYTES # BLD AUTO: 1 10*3/MM3 (ref 0.1–0.9)
MONOCYTES NFR BLD AUTO: 9 % (ref 5–12)
NEUTROPHILS NFR BLD AUTO: 64.7 % (ref 42.7–76)
NEUTROPHILS NFR BLD AUTO: 7 10*3/MM3 (ref 1.7–7)
PLATELET # BLD AUTO: 259 10*3/MM3 (ref 140–450)
PMV BLD AUTO: 8.3 FL (ref 6–12)
POTASSIUM SERPL-SCNC: 5 MMOL/L (ref 3.5–5.2)
PROT SERPL-MCNC: 7 G/DL (ref 6–8.5)
RBC # BLD AUTO: 4.18 10*6/MM3 (ref 4.14–5.8)
SODIUM SERPL-SCNC: 138 MMOL/L (ref 136–145)
WBC NRBC COR # BLD: 10.8 10*3/MM3 (ref 3.4–10.8)

## 2022-03-16 PROCEDURE — 85025 COMPLETE CBC W/AUTO DIFF WBC: CPT | Performed by: INTERNAL MEDICINE

## 2022-03-16 PROCEDURE — 36591 DRAW BLOOD OFF VENOUS DEVICE: CPT

## 2022-03-16 PROCEDURE — 25010000002 HEPARIN LOCK FLUSH PER 10 UNITS: Performed by: INTERNAL MEDICINE

## 2022-03-16 PROCEDURE — 80053 COMPREHEN METABOLIC PANEL: CPT | Performed by: INTERNAL MEDICINE

## 2022-03-16 RX ORDER — SODIUM CHLORIDE 0.9 % (FLUSH) 0.9 %
10 SYRINGE (ML) INJECTION AS NEEDED
Status: CANCELLED | OUTPATIENT
Start: 2022-03-16

## 2022-03-16 RX ORDER — HEPARIN SODIUM (PORCINE) LOCK FLUSH IV SOLN 100 UNIT/ML 100 UNIT/ML
500 SOLUTION INTRAVENOUS AS NEEDED
Status: DISCONTINUED | OUTPATIENT
Start: 2022-03-16 | End: 2022-03-17 | Stop reason: HOSPADM

## 2022-03-16 RX ORDER — HEPARIN SODIUM (PORCINE) LOCK FLUSH IV SOLN 100 UNIT/ML 100 UNIT/ML
500 SOLUTION INTRAVENOUS AS NEEDED
Status: CANCELLED | OUTPATIENT
Start: 2022-03-16

## 2022-03-16 RX ADMIN — Medication 500 UNITS: at 14:33

## 2022-03-17 ENCOUNTER — OFFICE VISIT (OUTPATIENT)
Dept: ONCOLOGY | Facility: CLINIC | Age: 77
End: 2022-03-17

## 2022-03-17 ENCOUNTER — HOSPITAL ENCOUNTER (OUTPATIENT)
Dept: ONCOLOGY | Facility: HOSPITAL | Age: 77
Setting detail: INFUSION SERIES
Discharge: HOME | End: 2022-03-17

## 2022-03-17 VITALS
TEMPERATURE: 97.3 F | BODY MASS INDEX: 29.49 KG/M2 | WEIGHT: 206 LBS | RESPIRATION RATE: 18 BRPM | HEART RATE: 95 BPM | HEIGHT: 70 IN | SYSTOLIC BLOOD PRESSURE: 117 MMHG | OXYGEN SATURATION: 99 % | DIASTOLIC BLOOD PRESSURE: 61 MMHG

## 2022-03-17 DIAGNOSIS — Z95.828 PORT-A-CATH IN PLACE: ICD-10-CM

## 2022-03-17 DIAGNOSIS — C84.71 ANAPLASTIC ALK-NEGATIVE LARGE CELL LYMPHOMA OF LYMPH NODE OF HEAD: Primary | ICD-10-CM

## 2022-03-17 PROCEDURE — 25010000002 DEXAMETHASONE SODIUM PHOSPHATE 100 MG/10ML SOLUTION: Performed by: INTERNAL MEDICINE

## 2022-03-17 PROCEDURE — 96411 CHEMO IV PUSH ADDL DRUG: CPT

## 2022-03-17 PROCEDURE — 25010000002 CYCLOPHOSPHAMIDE 1 GM/5ML SOLUTION 5 ML VIAL: Performed by: INTERNAL MEDICINE

## 2022-03-17 PROCEDURE — 25010000002 DOXORUBICIN PER 10 MG: Performed by: INTERNAL MEDICINE

## 2022-03-17 PROCEDURE — 96368 THER/DIAG CONCURRENT INF: CPT

## 2022-03-17 PROCEDURE — 96367 TX/PROPH/DG ADDL SEQ IV INF: CPT

## 2022-03-17 PROCEDURE — 96409 CHEMO IV PUSH SNGL DRUG: CPT

## 2022-03-17 PROCEDURE — 25010000002 BRENTUXIMAB 50 MG RECONSTITUTED SOLUTION 1 EACH VIAL: Performed by: INTERNAL MEDICINE

## 2022-03-17 PROCEDURE — 96375 TX/PRO/DX INJ NEW DRUG ADDON: CPT

## 2022-03-17 PROCEDURE — 96413 CHEMO IV INFUSION 1 HR: CPT

## 2022-03-17 PROCEDURE — 96417 CHEMO IV INFUS EACH ADDL SEQ: CPT

## 2022-03-17 PROCEDURE — 25010000002 FOSAPREPITANT PER 1 MG: Performed by: INTERNAL MEDICINE

## 2022-03-17 PROCEDURE — 99214 OFFICE O/P EST MOD 30 MIN: CPT | Performed by: INTERNAL MEDICINE

## 2022-03-17 PROCEDURE — 25010000002 HEPARIN LOCK FLUSH PER 10 UNITS: Performed by: INTERNAL MEDICINE

## 2022-03-17 PROCEDURE — 25010000002 PALONOSETRON 0.25 MG/5ML SOLUTION PREFILLED SYRINGE: Performed by: INTERNAL MEDICINE

## 2022-03-17 RX ORDER — ONDANSETRON 4 MG/1
TABLET, ORALLY DISINTEGRATING ORAL
COMMUNITY
End: 2022-03-28 | Stop reason: HOSPADM

## 2022-03-17 RX ORDER — SODIUM CHLORIDE 0.9 % (FLUSH) 0.9 %
10 SYRINGE (ML) INJECTION AS NEEDED
Status: DISCONTINUED | OUTPATIENT
Start: 2022-03-17 | End: 2022-03-18 | Stop reason: HOSPADM

## 2022-03-17 RX ORDER — SODIUM CHLORIDE 9 MG/ML
250 INJECTION, SOLUTION INTRAVENOUS ONCE
Status: COMPLETED | OUTPATIENT
Start: 2022-03-17 | End: 2022-03-17

## 2022-03-17 RX ORDER — SODIUM CHLORIDE 0.9 % (FLUSH) 0.9 %
10 SYRINGE (ML) INJECTION AS NEEDED
Status: CANCELLED | OUTPATIENT
Start: 2022-03-17

## 2022-03-17 RX ORDER — PALONOSETRON 0.05 MG/ML
0.25 INJECTION, SOLUTION INTRAVENOUS ONCE
Status: COMPLETED | OUTPATIENT
Start: 2022-03-17 | End: 2022-03-17

## 2022-03-17 RX ORDER — OLANZAPINE 5 MG/1
5 TABLET ORAL ONCE
Status: COMPLETED | OUTPATIENT
Start: 2022-03-17 | End: 2022-03-17

## 2022-03-17 RX ORDER — HEPARIN SODIUM (PORCINE) LOCK FLUSH IV SOLN 100 UNIT/ML 100 UNIT/ML
500 SOLUTION INTRAVENOUS AS NEEDED
Status: CANCELLED | OUTPATIENT
Start: 2022-03-17

## 2022-03-17 RX ORDER — HYDROCODONE BITARTRATE AND ACETAMINOPHEN 7.5; 325 MG/1; MG/1
TABLET ORAL
COMMUNITY
End: 2022-05-26

## 2022-03-17 RX ORDER — HEPARIN SODIUM (PORCINE) LOCK FLUSH IV SOLN 100 UNIT/ML 100 UNIT/ML
500 SOLUTION INTRAVENOUS AS NEEDED
Status: DISCONTINUED | OUTPATIENT
Start: 2022-03-17 | End: 2022-03-18 | Stop reason: HOSPADM

## 2022-03-17 RX ORDER — POTASSIUM CHLORIDE 750 MG/1
TABLET, EXTENDED RELEASE ORAL
COMMUNITY
End: 2022-03-28 | Stop reason: HOSPADM

## 2022-03-17 RX ORDER — DOXORUBICIN HYDROCHLORIDE 2 MG/ML
50 INJECTION, SOLUTION INTRAVENOUS ONCE
Status: COMPLETED | OUTPATIENT
Start: 2022-03-17 | End: 2022-03-17

## 2022-03-17 RX ADMIN — OLANZAPINE 5 MG: 5 TABLET, FILM COATED ORAL at 10:29

## 2022-03-17 RX ADMIN — DOXORUBICIN HYDROCHLORIDE 56 MG: 2 INJECTION, SOLUTION INTRAVENOUS at 11:33

## 2022-03-17 RX ADMIN — BRENTUXIMAB VEDOTIN 180 MG: 50 INJECTION, POWDER, LYOPHILIZED, FOR SOLUTION INTRAVENOUS at 12:25

## 2022-03-17 RX ADMIN — SODIUM CHLORIDE 150 MG: 9 INJECTION, SOLUTION INTRAVENOUS at 10:31

## 2022-03-17 RX ADMIN — PALONOSETRON 0.25 MG: 0.25 INJECTION, SOLUTION INTRAVENOUS at 10:29

## 2022-03-17 RX ADMIN — SODIUM CHLORIDE 250 ML: 9 INJECTION, SOLUTION INTRAVENOUS at 10:27

## 2022-03-17 RX ADMIN — Medication 500 UNITS: at 13:08

## 2022-03-17 RX ADMIN — DEXAMETHASONE SODIUM PHOSPHATE 12 MG: 10 INJECTION, SOLUTION INTRAMUSCULAR; INTRAVENOUS at 10:31

## 2022-03-17 RX ADMIN — CYCLOPHOSPHAMIDE 1690 MG: 200 INJECTION, SOLUTION INTRAVENOUS at 11:44

## 2022-03-17 NOTE — PROGRESS NOTES
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  male presents today for follow-up of his lymphoma.  He completed 2 cycle of CHP-Brentuximab. He also has some food taste changes.  He had occasional constipation.  No fevers or chills.  No nausea or vomiting.  He is having lightheadedness at times.  He is also having issues with his neulasta onpro device not working.    Past medical history, social history and family history was reviewed 03/17/22 and unchanged from prior visit.    Review of Systems:    Review of Systems   Constitutional: Positive for appetite change and fatigue.   HENT:  Negative.    Eyes: Negative.    Respiratory: Negative.    Cardiovascular: Negative.    Gastrointestinal: Negative.    Endocrine: Negative.    Genitourinary: Negative.     Musculoskeletal: Negative.    Skin: Negative.    Neurological: Negative.    Hematological: Negative.    Psychiatric/Behavioral: Positive for sleep disturbance.            Medications:        Current Outpatient Medications:   •  albuterol sulfate  (90 Base) MCG/ACT inhaler, Inhale 2 puffs Every 4 (Four) Hours As Needed for Wheezing., Disp: 8 g, Rfl: 3  •  amLODIPine (NORVASC) 5 MG tablet, TAKE ONE TABLET BY MOUTH DAILY, Disp: 90 tablet, Rfl: 1  •  ammonium lactate (AmLactin) 12 % lotion, Apply  topically to the appropriate area as directed 2 (two) times a day. For dry skin, Disp: 400 g, Rfl: 5  •  baclofen  "(LIORESAL) 10 MG tablet, Take 1 tablet by mouth 3 (Three) Times a Day As Needed for Muscle Spasms., Disp: 60 tablet, Rfl: 0  •  cefdinir (OMNICEF) 300 MG capsule, Take 1 capsule by mouth 2 (Two) Times a Day., Disp: 20 capsule, Rfl: 0  •  Cholecalciferol (VITAMIN D) 1000 UNITS tablet, Take 1 capsule by mouth Daily., Disp: , Rfl:   •  Cyanocobalamin (VITAMIN B-12 ER) 1000 MCG tablet controlled-release, Take 1,000 mcg by mouth Daily., Disp: , Rfl:   •  doxycycline (VIBRAMYCIN) 100 MG capsule, Take 1 capsule by mouth 2 (Two) Times a Day., Disp: 20 capsule, Rfl: 0  •  furosemide (Lasix) 20 MG tablet, Take 1 tablet by mouth Every Other Day As Needed (dyspnea)., Disp: 10 tablet, Rfl: 1  •  glimepiride (AMARYL) 4 MG tablet, TAKE ONE TABLET BY MOUTH DAILY WITH DINNER, Disp: 90 tablet, Rfl: 1  •  HYDROcodone-acetaminophen (Norco) 7.5-325 MG per tablet, Norco 7.5 mg-325 mg tablet  Take 1 tablet(s) EVERY 6 HOURS by oral route as needed for severe pain for 7 days, Disp: , Rfl:   •  insulin NPH (humuLIN N,novoLIN N) 100 UNIT/ML injection, Inject 50 Units under the skin into the appropriate area as directed 2 (Two) Times a Day Before Meals., Disp: , Rfl:   •  Insulin Syringe 28G X 1/2\" 1 ML misc, 1 each 2 (Two) Times a Day., Disp: 100 each, Rfl: 5  •  lidocaine-prilocaine (EMLA) 2.5-2.5 % cream, Apply 1 application topically to the appropriate area as directed As Needed (45-60 minutes prior to port access.  Cover with saran/plastic wrap.)., Disp: 30 g, Rfl: 3  •  metFORMIN (GLUCOPHAGE) 1000 MG tablet, TAKE ONE TABLET BY MOUTH IN THE MORNING AND 1 AND 1/2 TABLETS AT BEDTIME, Disp: 75 tablet, Rfl: 5  •  metoprolol succinate XL (TOPROL-XL) 25 MG 24 hr tablet, Take 1 tablet by mouth Daily., Disp: 30 tablet, Rfl: 11  •  Multiple Vitamins-Minerals (MULTI VITAMIN/MINERALS PO), Take 1 tablet by mouth Daily., Disp: , Rfl:   •  Multiple Vitamins-Minerals (PRESERVISION AREDS 2 PO), Take  by mouth Daily., Disp: , Rfl:   •  omeprazole " (PRILOSEC) 20 MG capsule, Take 1 capsule by mouth Daily., Disp: , Rfl:   •  ondansetron (ZOFRAN) 8 MG tablet, Take 1 tablet by mouth 3 (Three) Times a Day As Needed for Nausea or Vomiting., Disp: 30 tablet, Rfl: 5  •  ondansetron ODT (ZOFRAN-ODT) 4 MG disintegrating tablet, ondansetron 4 mg disintegrating tablet  Place 1 tablet(s)  by translingual route every 8 hours as needed for nausea., Disp: , Rfl:   •  potassium chloride (K-DUR,KLOR-CON) 10 MEQ CR tablet, potassium chloride ER 10 mEq tablet,extended release(part/cryst), Disp: , Rfl:   •  potassium chloride 10 MEQ CR tablet, Take 1 tablet by mouth Every Other Day As Needed (with lasix)., Disp: 10 tablet, Rfl: 1  •  simvastatin (ZOCOR) 10 MG tablet, Take 1 tablet by mouth every night at bedtime., Disp: 90 tablet, Rfl: 1  •  sulfamethoxazole-trimethoprim (Bactrim DS) 800-160 MG per tablet, Take 1 tablet by mouth 2 (Two) Times a Day., Disp: 14 tablet, Rfl: 0  •  traMADol (ULTRAM) 50 MG tablet, Take 1 tablet by mouth Every 8 (Eight) Hours As Needed for Moderate Pain ., Disp: 60 tablet, Rfl: 2  •  valsartan (DIOVAN) 160 MG tablet, Take 1 tablet by mouth 2 (Two) Times a Day., Disp: 180 tablet, Rfl: 1  •  zolpidem (AMBIEN) 5 MG tablet, Take 1 tablet by mouth At Night As Needed for Sleep., Disp: 30 tablet, Rfl: 3  No current facility-administered medications for this visit.    Pain Medications             baclofen (LIORESAL) 10 MG tablet Take 1 tablet by mouth 3 (Three) Times a Day As Needed for Muscle Spasms.    HYDROcodone-acetaminophen (Norco) 7.5-325 MG per tablet Norco 7.5 mg-325 mg tablet   Take 1 tablet(s) EVERY 6 HOURS by oral route as needed for severe pain for 7 days    traMADol (ULTRAM) 50 MG tablet Take 1 tablet by mouth Every 8 (Eight) Hours As Needed for Moderate Pain .             ALLERGIES:    Allergies   Allergen Reactions   • Bactrim [Sulfamethoxazole-Trimethoprim] Other (See Comments)     Joint pain   • Sulfa Antibiotics GI Intolerance     Makes bones  "hurt     • Tegaderm Chg Dressing [Chlorhexidine] Itching and Other (See Comments)     redness         Physical Exam    VITAL SIGNS:  /61   Pulse 95   Temp 97.3 °F (36.3 °C) (Temporal)   Resp 18   Ht 177.8 cm (70\")   Wt 93.4 kg (206 lb)   SpO2 99%   BMI 29.56 kg/m²     ECOG score: 0           Wt Readings from Last 3 Encounters:   03/17/22 93.4 kg (206 lb)   02/24/22 104 kg (229 lb 11.2 oz)   02/03/22 109 kg (240 lb 11.2 oz)       Body mass index is 29.56 kg/m². Body surface area is 2.11 meters squared.    Pain Score    03/17/22 0937   PainSc: 0-No pain           Performance Status: 0    General: well appearing, in no acute distress  HEENT: Swelling on the right side of his face consistent with his lymphomatous process.  Lymphatics: no cervical, supraclavicular, or axillary adenopathy  Cardiovascular: regular rate and rhythm, no murmurs, rubs or gallops  Lungs: clear to auscultation bilaterally  Abdomen: soft, nontender, nondistended.  No palpable organomegaly  Extremities: no lower extremity edema  Skin: no rashes, lesions, bruising, or petechiae  Msk:  Shows no weakness of the large muscle groups  Psych: Mood is stable        RECENT LABS:    Lab Results   Component Value Date    HGB 12.7 (L) 03/16/2022    HCT 38.7 03/16/2022    MCV 92.8 03/16/2022     03/16/2022    WBC 10.80 03/16/2022    NEUTROABS 7.00 03/16/2022    LYMPHSABS 2.80 03/16/2022    MONOSABS 1.00 (H) 03/16/2022    EOSABS 0.23 01/31/2022    BASOSABS 0.05 01/31/2022       Lab Results   Component Value Date    GLUCOSE 116 (H) 03/16/2022    BUN 28 (H) 03/16/2022    CREATININE 1.59 (H) 03/16/2022     03/16/2022    K 5.0 03/16/2022     03/16/2022    CO2 25.0 03/16/2022    CALCIUM 8.4 (L) 03/16/2022    PROTEINTOT 7.0 03/16/2022    ALBUMIN 4.20 03/16/2022    BILITOT 0.4 03/16/2022    ALKPHOS 83 03/16/2022    AST 39 03/16/2022    ALT 38 03/16/2022       CT Abdomen Pelvis Without Contrast    Result Date: 11/10/2021  No CT evidence " of acute intra-abdominal or pelvic abnormality. There is stool scattered throughout the colon with no signs of obvious obstruction or gross free intraperitoneal air. No evidence of ascites.  D:  11/08/2021 E:  11/08/2021  This report was finalized on 11/10/2021 4:34 PM by Dr. Lissy Dean MD.      CT Soft Tissue Neck Without Contrast    Result Date: 11/9/2021  2 areas of interstitial marked on the right. The more superior marker along the right temporalis region corresponds to a 16mm soft tissue nodule within the superficial subcutaneous soft tissues. This appearance is strictly nonspecific possibly small abscess or hyperdense cyst. Neoplastic process is not excluded entirely and this lesion would be amenable to ultrasound-guided soft tissue sampling, as indicated.  No distinct multicystic nodule noted corresponding to the prior ultrasound finding in the area of the marked palpable finding adjacent to the right parotid gland. Several small nonspecific 1 to 2 cm right parotid nodules are present, potentially intraparotid lymph nodes. Consider dedicated ultrasound of the right parotid gland to further evaluate.   This report was finalized on 11/9/2021 11:37 AM by Lazaro Carrero.      IR Port Placement    Result Date: 1/31/2022  Impression:    Successful ultrasound and fluoroscopic guided right internal jugular vein route single lumen Port-A-Cath placement as described above.  Thank you for the opportunity to assist in the care of your patient.  This report was finalized on 1/31/2022 11:41 AM by Sixto Bangura MD.      NM PET/CT Skull Base to Mid Thigh    Result Date: 1/13/2022  Area of postsurgical change right facial soft tissues overlying the right temporalis musculature inferiorly. On the posterior aspect of the surgical region and near the angle of the mandible on the lateral margin is a somewhat indeterminate intermediate soft tissue density focus mass with SUV 3.2 with attention on followup to exclude  residual or local disease. Additionally, asymmetric likely intraparotid node of the anterior superficial lobe parotid gland right maximum SUV 3.6 without separate abnormal hypermetabolism.  D:  01/11/2022 E:  01/12/2022    This report was finalized on 1/13/2022 8:20 AM by Dr. Damian Zaidi.            Assessment/Plan    1.  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  Continue with CHP-Brentuximab.  No dose changes today.  He will get cycle #3 today.  My plan is to treat him with 6 cycles followed by ISRT.  At this time we will plan to image him after 3 cycles.      2.  Immunocompromise state.  He got 1 dose of his Covid vaccination.  I am concerned that he won't mount very good response while he is on chemotherapy so I have ordered a dose of Evushield.    3.  Orthostatic.  This is likely due to his low blood pressure and taking his blood pressure meds.    4.  We will plan on switching him to Neulasta injection rather than on pro device.      Total time of patient care on day of service including time prior to, face to face with patient, and following visit spent in reviewing records, lab results, imaging studies, discussion with patient, and documentation/charting was > 31 minutes.     Ai Henderson MD  [facility] Hematology and Oncology    Return on: 04/07/22  Return in (Approximately): Schedule with next infusion, 3 weeks    Orders Placed This Encounter   Procedures   • CT Head With Contrast       3/17/2022

## 2022-03-18 ENCOUNTER — HOSPITAL ENCOUNTER (OUTPATIENT)
Dept: ONCOLOGY | Facility: HOSPITAL | Age: 77
Setting detail: INFUSION SERIES
Discharge: HOME | End: 2022-03-18

## 2022-03-18 VITALS
DIASTOLIC BLOOD PRESSURE: 57 MMHG | RESPIRATION RATE: 20 BRPM | HEART RATE: 110 BPM | BODY MASS INDEX: 29.35 KG/M2 | SYSTOLIC BLOOD PRESSURE: 125 MMHG | TEMPERATURE: 97.2 F | WEIGHT: 205 LBS | HEIGHT: 70 IN

## 2022-03-18 DIAGNOSIS — C84.71 ANAPLASTIC ALK-NEGATIVE LARGE CELL LYMPHOMA OF LYMPH NODE OF HEAD: Primary | ICD-10-CM

## 2022-03-18 PROCEDURE — 96372 THER/PROPH/DIAG INJ SC/IM: CPT

## 2022-03-18 PROCEDURE — 25010000002 PEGFILGRASTIM 6 MG/0.6ML SOLUTION PREFILLED SYRINGE: Performed by: INTERNAL MEDICINE

## 2022-03-18 RX ADMIN — PEGFILGRASTIM 6 MG: 6 INJECTION SUBCUTANEOUS at 13:16

## 2022-03-23 ENCOUNTER — TELEPHONE (OUTPATIENT)
Dept: ONCOLOGY | Facility: CLINIC | Age: 77
End: 2022-03-23

## 2022-03-23 ENCOUNTER — OFFICE VISIT (OUTPATIENT)
Dept: INTERNAL MEDICINE | Facility: CLINIC | Age: 77
End: 2022-03-23

## 2022-03-23 VITALS
SYSTOLIC BLOOD PRESSURE: 100 MMHG | BODY MASS INDEX: 28.98 KG/M2 | DIASTOLIC BLOOD PRESSURE: 48 MMHG | HEART RATE: 105 BPM | TEMPERATURE: 97.7 F | OXYGEN SATURATION: 97 % | WEIGHT: 202 LBS

## 2022-03-23 DIAGNOSIS — C84.71 ANAPLASTIC ALK-NEGATIVE LARGE CELL LYMPHOMA OF LYMPH NODE OF HEAD: Primary | ICD-10-CM

## 2022-03-23 DIAGNOSIS — R42 DIZZINESS: Primary | ICD-10-CM

## 2022-03-23 LAB
BILIRUB BLD-MCNC: NEGATIVE MG/DL
CLARITY, POC: CLEAR
COLOR UR: ABNORMAL
EXPIRATION DATE: ABNORMAL
GLUCOSE UR STRIP-MCNC: ABNORMAL MG/DL
KETONES UR QL: NEGATIVE
LEUKOCYTE EST, POC: NEGATIVE
Lab: ABNORMAL
NITRITE UR-MCNC: NEGATIVE MG/ML
PH UR: 6 [PH] (ref 5–8)
PROT UR STRIP-MCNC: ABNORMAL MG/DL
RBC # UR STRIP: NEGATIVE /UL
SP GR UR: 1.03 (ref 1–1.03)
UROBILINOGEN UR QL: NORMAL

## 2022-03-23 PROCEDURE — 99214 OFFICE O/P EST MOD 30 MIN: CPT | Performed by: NURSE PRACTITIONER

## 2022-03-23 PROCEDURE — 81003 URINALYSIS AUTO W/O SCOPE: CPT | Performed by: NURSE PRACTITIONER

## 2022-03-23 NOTE — TELEPHONE ENCOUNTER
Caller: Gabriela Mar    Relationship: Self    Best call back number: [phone]      Who are you requesting to speak with (clinical staff, provider,  specific staff member): CLINICAL      What was the call regarding: PATIENT CALLED STATED AFTER YOUR LAST INFUSION, HE HAS BEEN HAVING SOME LIGHT HEADED FEELINGS    Do you require a callback: YES

## 2022-03-23 NOTE — TELEPHONE ENCOUNTER
Called patient back to discuss further. He denies nausea, vomiting, diarrhea, vision changes, headaches, falls. He states he is drinking normally but does have a decreased appetite due to taste changes. Discussed making a referral to our dietician to discuss potential interventions and he would like to have this done. He states that the light headedness is intermittent and usually occurs after he stands up or turns around quickly. He has had weight loss since starting treatment and is currently on 3 different BP medications. Instructed patient that he should contact his PCP to see if they would like to adjust one or more of those medications due to his weight loss, educated him they may need to check orthostatic vital signs on him to see if his BP is dropping when standing, etc. He verbalized understanding he is going to contact his PCP to make an appointment. He will call back with any new side effects/concerns.

## 2022-03-23 NOTE — PATIENT INSTRUCTIONS
Check your blood pressure in the morning before you take any blood pressure medications.    Take your metoprolol if your  Blood pressure is above 110.  For now hold your valsartan.    Only resume the amlodipine if you blood pressure is running above 130.

## 2022-03-23 NOTE — PROGRESS NOTES
Chief Complaint   Patient presents with   • Dizziness     Since last chemo infusion around March 17, getting worse each day       History of Present Illness    76 y.o.male presents for dizziness.  Dizziness occurring after chemo infusions for lymphoma.  Last chemo was mar 17 and feels like dizziness got worse.  Have to hold on to things or sit down. Next chemo schedule apil 7th. No change in vision, no chest pain not sure if any palpitaiotns sometimes short of air.   Not eating much. Can't find anything that taste good. But reports is drinking water and fluids ok.  Has lost weight from 229-202  Occasional diarrhea.  Urine ok.    Review of Systems   Constitutional: Positive for appetite change and fatigue. Negative for chills and fever.   Respiratory: Positive for shortness of breath. Negative for cough.    Cardiovascular: Negative for chest pain, palpitations and leg swelling.   Neurological: Positive for dizziness and light-headedness.       PMSFH  The following portions of the patient's history were reviewed and updated as appropriate: allergies, current medications, past family history, past medical history, past social history, past surgical history and problem list.     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00     Types: Cigarettes   • Smokeless tobacco:  Never Used   • Tobacco comment: QUIT 20 PLUS YEARS AGO    Vaping Use   • Vaping Use: Never used   Substance Use Topics   • Alcohol use: No     Comment: stopped drinking   • Drug use: No     Past Surgical History:   Procedure Laterality Date   • COLONOSCOPY      2015   • ENDOSCOPY      2011   • EYE SURGERY      cataract 8/22 and 10/18/18 - Dr. Hernández   • JOINT REPLACEMENT      LEFT HIP 2016-MARCH    • SUBMAXILLARY CYST  EXCISION      Right back   • TONSILLECTOMY     • TOTAL HIP ARTHROPLASTY Right 3/7/2017    Procedure: RIGHT TOTAL HIP ARTHROPLASTY;  Surgeon: Reynaldo Suarez MD;  Location: [facility];  Service:    • VASECTOMY     • WRIST GANGLION EXCISION        Family History   Problem Relation Age of Onset   • Diabetes Mother    • Colon cancer Father    • Kidney failure Sister    • Diabetes Sister    • Colon cancer Brother    • Colon cancer Brother    • Lung cancer Brother    • Squamous cell carcinoma Brother         side of face   • Diabetes Sister            Current Outpatient Medications:   •  albuterol sulfate  (90 Base) MCG/ACT inhaler, Inhale 2 puffs Every 4 (Four) Hours As Needed for Wheezing., Disp: 8 g, Rfl: 3  •  amLODIPine (NORVASC) 5 MG tablet, TAKE ONE TABLET BY MOUTH DAILY, Disp: 90 tablet, Rfl: 1  •  ammonium lactate (AmLactin) 12 % lotion, Apply  topically to the appropriate area as directed 2 (two) times a day. For dry skin, Disp: 400 g, Rfl: 5  •  baclofen (LIORESAL) 10 MG tablet, Take 1 tablet by mouth 3 (Three) Times a Day As Needed for Muscle Spasms., Disp: 60 tablet, Rfl: 0  •  Cholecalciferol (VITAMIN D) 1000 UNITS tablet, Take 1 capsule by mouth Daily., Disp: , Rfl:   •  Cyanocobalamin (VITAMIN B-12 ER) 1000 MCG tablet controlled-release, Take 1,000 mcg by mouth Daily., Disp: , Rfl:   •  doxycycline (VIBRAMYCIN) 100 MG capsule, Take 1 capsule by mouth 2 (Two) Times a Day., Disp: 20 capsule, Rfl: 0  •  furosemide (Lasix) 20 MG tablet, Take 1 tablet by mouth Every Other Day As Needed  "(dyspnea)., Disp: 10 tablet, Rfl: 1  •  glimepiride (AMARYL) 4 MG tablet, TAKE ONE TABLET BY MOUTH DAILY WITH DINNER, Disp: 90 tablet, Rfl: 1  •  HYDROcodone-acetaminophen (NORCO) 7.5-325 MG per tablet, Norco 7.5 mg-325 mg tablet  Take 1 tablet(s) EVERY 6 HOURS by oral route as needed for severe pain for 7 days, Disp: , Rfl:   •  insulin NPH (humuLIN N,novoLIN N) 100 UNIT/ML injection, Inject 50 Units under the skin into the appropriate area as directed 2 (Two) Times a Day Before Meals., Disp: , Rfl:   •  Insulin Syringe 28G X 1/2\" 1 ML misc, 1 each 2 (Two) Times a Day., Disp: 100 each, Rfl: 5  •  lidocaine-prilocaine (EMLA) 2.5-2.5 % cream, Apply 1 application topically to the appropriate area as directed As Needed (45-60 minutes prior to port access.  Cover with saran/plastic wrap.)., Disp: 30 g, Rfl: 3  •  metFORMIN (GLUCOPHAGE) 1000 MG tablet, TAKE ONE TABLET BY MOUTH IN THE MORNING AND 1 AND 1/2 TABLETS AT BEDTIME, Disp: 75 tablet, Rfl: 5  •  metoprolol succinate XL (TOPROL-XL) 25 MG 24 hr tablet, Take 1 tablet by mouth Daily., Disp: 30 tablet, Rfl: 11  •  Multiple Vitamins-Minerals (MULTI VITAMIN/MINERALS PO), Take 1 tablet by mouth Daily., Disp: , Rfl:   •  Multiple Vitamins-Minerals (PRESERVISION AREDS 2 PO), Take  by mouth Daily., Disp: , Rfl:   •  omeprazole (priLOSEC) 20 MG capsule, Take 1 capsule by mouth Daily., Disp: , Rfl:   •  ondansetron (ZOFRAN) 8 MG tablet, Take 1 tablet by mouth 3 (Three) Times a Day As Needed for Nausea or Vomiting., Disp: 30 tablet, Rfl: 5  •  ondansetron ODT (ZOFRAN-ODT) 4 MG disintegrating tablet, ondansetron 4 mg disintegrating tablet  Place 1 tablet(s)  by translingual route every 8 hours as needed for nausea., Disp: , Rfl:   •  potassium chloride (K-DUR,KLOR-CON) 10 MEQ CR tablet, potassium chloride ER 10 mEq tablet,extended release(part/cryst), Disp: , Rfl:   •  potassium chloride 10 MEQ CR tablet, Take 1 tablet by mouth Every Other Day As Needed (with lasix)., Disp: 10 " tablet, Rfl: 1  •  simvastatin (ZOCOR) 10 MG tablet, Take 1 tablet by mouth every night at bedtime., Disp: 90 tablet, Rfl: 1  •  sulfamethoxazole-trimethoprim (Bactrim DS) 800-160 MG per tablet, Take 1 tablet by mouth 2 (Two) Times a Day., Disp: 14 tablet, Rfl: 0  •  traMADol (ULTRAM) 50 MG tablet, Take 1 tablet by mouth Every 8 (Eight) Hours As Needed for Moderate Pain ., Disp: 60 tablet, Rfl: 2  •  valsartan (DIOVAN) 160 MG tablet, Take 1 tablet by mouth 2 (Two) Times a Day., Disp: 180 tablet, Rfl: 1  •  zolpidem (AMBIEN) 5 MG tablet, Take 1 tablet by mouth At Night As Needed for Sleep., Disp: 30 tablet, Rfl: 3    VITALS:  /48   Pulse (!) 37   Temp 97.7 °F (36.5 °C)   Wt 91.6 kg (202 lb)   SpO2 97%   BMI 28.98 kg/m²   Orthostatic bp  Sitting 102/56  Standing 100/58  Physical Exam  HENT:      Head: Normocephalic.      Mouth/Throat:      Mouth: Mucous membranes are moist.   Eyes:      Extraocular Movements: Extraocular movements intact.   Cardiovascular:      Rate and Rhythm: Normal rate and regular rhythm.      Heart sounds: Normal heart sounds.   Pulmonary:      Effort: Pulmonary effort is normal. No respiratory distress.      Breath sounds: Normal breath sounds.   Skin:     General: Skin is warm and dry.   Neurological:      General: No focal deficit present.      Mental Status: He is alert and oriented to person, place, and time.   Psychiatric:         Mood and Affect: Mood normal.         Behavior: Behavior normal.         Result Review :            Assessment and Plan    Diagnoses and all orders for this visit:    1. Dizziness (Primary)  -     POC Urinalysis Dipstick, Automated  -     CBC (No Diff); Future  -     Comprehensive Metabolic Panel; Future    dizziness likely related to low blood pressure with decreased oral intake. He is also on three blood pressure medications valsartan, metoprolol, amlodipine, and has taken all 3 meds already today.  Check urine labs. Talkative up walking without  assistance at appt today. I have instructed him to hold his afternoon valsartan. Focus on increasing oral intake, consider boost drinks. In the morning check blood pressure before taking any bp medications. Take your metoprolol if your  Blood pressure is above 110.  For now hold your valsartan.  Only resume the amlodipine if you blood pressure is running above 130.  Any worsening dizziness seek emergency care.    I discussed the patients findings and my recommendations with patient.  Patient was encouraged to keep me informed of any acute changes, lack of improvement, or any new concerning symptoms.  Patient voiced understanding of all instructions and denied further questions.      Follow Up   Return in about 1 week (around 3/30/2022), or if symptoms worsen or fail to improve, for Recheck with pcp.      Electronically signed by:    CALIN Wolf  03/23/2022

## 2022-03-24 ENCOUNTER — LAB (OUTPATIENT)
Dept: LAB | Facility: HOSPITAL | Age: 77
End: 2022-03-24

## 2022-03-24 ENCOUNTER — TELEPHONE (OUTPATIENT)
Dept: NUTRITION | Facility: HOSPITAL | Age: 77
End: 2022-03-24

## 2022-03-24 DIAGNOSIS — R42 DIZZY: ICD-10-CM

## 2022-03-24 LAB
ALBUMIN SERPL-MCNC: 4.1 G/DL (ref 3.5–5.2)
ALBUMIN/GLOB SERPL: 1.9 G/DL
ALP SERPL-CCNC: 82 U/L (ref 39–117)
ALT SERPL W P-5'-P-CCNC: 65 U/L (ref 1–41)
ANION GAP SERPL CALCULATED.3IONS-SCNC: 12.8 MMOL/L (ref 5–15)
AST SERPL-CCNC: 36 U/L (ref 1–40)
BILIRUB SERPL-MCNC: 0.6 MG/DL (ref 0–1.2)
BUN SERPL-MCNC: 38 MG/DL (ref 8–23)
BUN/CREAT SERPL: 21.6 (ref 7–25)
CALCIUM SPEC-SCNC: 9.1 MG/DL (ref 8.6–10.5)
CHLORIDE SERPL-SCNC: 102 MMOL/L (ref 98–107)
CO2 SERPL-SCNC: 21.2 MMOL/L (ref 22–29)
CREAT SERPL-MCNC: 1.76 MG/DL (ref 0.76–1.27)
DEPRECATED RDW RBC AUTO: 53.5 FL (ref 37–54)
EGFRCR SERPLBLD CKD-EPI 2021: 39.3 ML/MIN/1.73
ERYTHROCYTE [DISTWIDTH] IN BLOOD BY AUTOMATED COUNT: 16.7 % (ref 12.3–15.4)
GLOBULIN UR ELPH-MCNC: 2.2 GM/DL
GLUCOSE SERPL-MCNC: 186 MG/DL (ref 65–99)
HCT VFR BLD AUTO: 35.6 % (ref 37.5–51)
HGB BLD-MCNC: 11.9 G/DL (ref 13–17.7)
MCH RBC QN AUTO: 31.2 PG (ref 26.6–33)
MCHC RBC AUTO-ENTMCNC: 33.4 G/DL (ref 31.5–35.7)
MCV RBC AUTO: 93.2 FL (ref 79–97)
PLATELET # BLD AUTO: 48 10*3/MM3 (ref 140–450)
PMV BLD AUTO: 12.6 FL (ref 6–12)
POTASSIUM SERPL-SCNC: 5.3 MMOL/L (ref 3.5–5.2)
PROT SERPL-MCNC: 6.3 G/DL (ref 6–8.5)
RBC # BLD AUTO: 3.82 10*6/MM3 (ref 4.14–5.8)
SODIUM SERPL-SCNC: 136 MMOL/L (ref 136–145)
WBC NRBC COR # BLD: 1.18 10*3/MM3 (ref 3.4–10.8)

## 2022-03-24 PROCEDURE — 80053 COMPREHEN METABOLIC PANEL: CPT

## 2022-03-24 PROCEDURE — 85027 COMPLETE CBC AUTOMATED: CPT

## 2022-03-24 NOTE — PROGRESS NOTES
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     Referral received from Med Onc office regarding taste changes and decreased appetite.  Follow up with patient via phone call.  Patient complains of foods tasting bland and having a decreased appetite.  He denies other significant nutritional complaints at this time.  He states that he has recently purchased Ensure but hasn't tried it yet.  He also states his blood sugars elevated with the steroids, encouraged him to check his sugars regularly and to take his medications as prescribed.    Advised him to be consuming smaller portions of food at one time but to be eating more often throughout the day to aid with oral intake.  Reviewed the importance of protein in the diet and offered several high protein snack ideas he may find more appealing at this time.  Discussed tips to aid with taste changes including using the baking soda / salt water mouth rinse before eating.  Also discussed different types of ONS and their role in the diet.  Encouraged him to try the Ensure he purchased and recommended he drink 1-3 daily depending on his oral intake.  Also reviewed the importance of hydration and advised him to increase his fluid intake to at least 64 ounces daily.  Written diet materials sent via email per patient request.    Answered his questions and he voiced understanding of information discussed.  Encouraged to call RD with questions.  Will monitor as needed during his treatment course.    Lissy STALLINGS  VAN Collazo  03/24/22

## 2022-03-25 ENCOUNTER — HOSPITAL ENCOUNTER (INPATIENT)
Facility: HOSPITAL | Age: 77
LOS: 3 days | Discharge: HOME | End: 2022-03-28
Attending: EMERGENCY MEDICINE | Admitting: INTERNAL MEDICINE

## 2022-03-25 ENCOUNTER — TELEPHONE (OUTPATIENT)
Dept: INTERNAL MEDICINE | Facility: CLINIC | Age: 77
End: 2022-03-25

## 2022-03-25 ENCOUNTER — APPOINTMENT (OUTPATIENT)
Dept: GENERAL RADIOLOGY | Facility: HOSPITAL | Age: 77
End: 2022-03-25

## 2022-03-25 DIAGNOSIS — N17.9 ACUTE KIDNEY INJURY: Primary | ICD-10-CM

## 2022-03-25 DIAGNOSIS — R42 ORTHOSTATIC DIZZINESS: ICD-10-CM

## 2022-03-25 DIAGNOSIS — D84.9 IMMUNOCOMPROMISED STATE: ICD-10-CM

## 2022-03-25 PROBLEM — D70.9 NEUTROPENIC FEVER (HCC): Status: ACTIVE | Noted: 2022-03-25

## 2022-03-25 PROBLEM — R50.81 NEUTROPENIC FEVER (HCC): Status: ACTIVE | Noted: 2022-03-25

## 2022-03-25 PROBLEM — E86.0 DEHYDRATION: Status: ACTIVE | Noted: 2022-03-25

## 2022-03-25 PROBLEM — E11.65 TYPE 2 DIABETES MELLITUS WITH HYPERGLYCEMIA: Status: ACTIVE | Noted: 2022-03-25

## 2022-03-25 PROBLEM — D69.59 CHEMOTHERAPY-INDUCED THROMBOCYTOPENIA: Status: ACTIVE | Noted: 2022-03-25

## 2022-03-25 PROBLEM — I95.1 ORTHOSTATIC HYPOTENSION: Status: ACTIVE | Noted: 2022-03-25

## 2022-03-25 PROBLEM — T45.1X5A CHEMOTHERAPY-INDUCED THROMBOCYTOPENIA: Status: ACTIVE | Noted: 2022-03-25

## 2022-03-25 PROBLEM — G62.9 PERIPHERAL NEUROPATHY: Status: ACTIVE | Noted: 2022-03-25

## 2022-03-25 PROBLEM — E87.5 HYPERKALEMIA: Status: ACTIVE | Noted: 2022-03-25

## 2022-03-25 LAB
ALBUMIN SERPL-MCNC: 4 G/DL (ref 3.5–5.2)
ALBUMIN/GLOB SERPL: 1.7 G/DL
ALP SERPL-CCNC: 85 U/L (ref 39–117)
ALT SERPL W P-5'-P-CCNC: 70 U/L (ref 1–41)
ANION GAP SERPL CALCULATED.3IONS-SCNC: 11 MMOL/L (ref 5–15)
AST SERPL-CCNC: 35 U/L (ref 1–40)
BACTERIA UR QL AUTO: NORMAL /HPF
BASOPHILS # BLD AUTO: 0.01 10*3/MM3 (ref 0–0.2)
BASOPHILS NFR BLD AUTO: 1 % (ref 0–1.5)
BILIRUB SERPL-MCNC: 0.7 MG/DL (ref 0–1.2)
BILIRUB UR QL STRIP: ABNORMAL
BUN SERPL-MCNC: 41 MG/DL (ref 8–23)
BUN/CREAT SERPL: 26.8 (ref 7–25)
CALCIUM SPEC-SCNC: 9.7 MG/DL (ref 8.6–10.5)
CHLORIDE SERPL-SCNC: 100 MMOL/L (ref 98–107)
CK SERPL-CCNC: 32 U/L (ref 20–200)
CLARITY UR: CLEAR
CO2 SERPL-SCNC: 21 MMOL/L (ref 22–29)
COLOR UR: ABNORMAL
CORTIS SERPL-MCNC: 24.03 MCG/DL
CREAT SERPL-MCNC: 1.53 MG/DL (ref 0.76–1.27)
D-LACTATE SERPL-SCNC: 1.7 MMOL/L (ref 0.5–2)
D-LACTATE SERPL-SCNC: 2.1 MMOL/L (ref 0.5–2)
DEPRECATED RDW RBC AUTO: 51.7 FL (ref 37–54)
EGFRCR SERPLBLD CKD-EPI 2021: 46.5 ML/MIN/1.73
EOSINOPHIL # BLD AUTO: 0 10*3/MM3 (ref 0–0.4)
EOSINOPHIL NFR BLD AUTO: 0 % (ref 0.3–6.2)
ERYTHROCYTE [DISTWIDTH] IN BLOOD BY AUTOMATED COUNT: 15.7 % (ref 12.3–15.4)
FLUAV RNA RESP QL NAA+PROBE: NOT DETECTED
FLUBV RNA RESP QL NAA+PROBE: NOT DETECTED
GLOBULIN UR ELPH-MCNC: 2.3 GM/DL
GLUCOSE SERPL-MCNC: 329 MG/DL (ref 65–99)
GLUCOSE UR STRIP-MCNC: ABNORMAL MG/DL
HBA1C MFR BLD: 8.9 % (ref 4.8–5.6)
HCT VFR BLD AUTO: 33.4 % (ref 37.5–51)
HGB BLD-MCNC: 11.4 G/DL (ref 13–17.7)
HGB UR QL STRIP.AUTO: NEGATIVE
HOLD SPECIMEN: NORMAL
HYALINE CASTS UR QL AUTO: NORMAL /LPF
IMM GRANULOCYTES # BLD AUTO: 0.01 10*3/MM3 (ref 0–0.05)
IMM GRANULOCYTES NFR BLD AUTO: 1 % (ref 0–0.5)
KETONES UR QL STRIP: ABNORMAL
LEUKOCYTE ESTERASE UR QL STRIP.AUTO: NEGATIVE
LYMPHOCYTES # BLD AUTO: 0.7 10*3/MM3 (ref 0.7–3.1)
LYMPHOCYTES NFR BLD AUTO: 72.2 % (ref 19.6–45.3)
MAGNESIUM SERPL-MCNC: 1.3 MG/DL (ref 1.6–2.4)
MCH RBC QN AUTO: 31.1 PG (ref 26.6–33)
MCHC RBC AUTO-ENTMCNC: 34.1 G/DL (ref 31.5–35.7)
MCV RBC AUTO: 91 FL (ref 79–97)
MONOCYTES # BLD AUTO: 0.17 10*3/MM3 (ref 0.1–0.9)
MONOCYTES NFR BLD AUTO: 17.5 % (ref 5–12)
NEUTROPHILS NFR BLD AUTO: 0.08 10*3/MM3 (ref 1.7–7)
NEUTROPHILS NFR BLD AUTO: 8.3 % (ref 42.7–76)
NITRITE UR QL STRIP: NEGATIVE
NRBC BLD AUTO-RTO: 0 /100 WBC (ref 0–0.2)
PH UR STRIP.AUTO: <=5 [PH] (ref 5–8)
PLAT MORPH BLD: NORMAL
PLATELET # BLD AUTO: 38 10*3/MM3 (ref 140–450)
PMV BLD AUTO: 14.3 FL (ref 6–12)
POTASSIUM SERPL-SCNC: 5.4 MMOL/L (ref 3.5–5.2)
PROCALCITONIN SERPL-MCNC: 0.39 NG/ML (ref 0–0.25)
PROT SERPL-MCNC: 6.3 G/DL (ref 6–8.5)
PROT UR QL STRIP: ABNORMAL
RBC # BLD AUTO: 3.67 10*6/MM3 (ref 4.14–5.8)
RBC # UR STRIP: NORMAL /HPF
REF LAB TEST METHOD: NORMAL
ROULEAUX BLD QL SMEAR: NORMAL
SARS-COV-2 RNA RESP QL NAA+PROBE: NOT DETECTED
SODIUM SERPL-SCNC: 132 MMOL/L (ref 136–145)
SP GR UR STRIP: 1.02 (ref 1–1.03)
SQUAMOUS #/AREA URNS HPF: NORMAL /HPF
UROBILINOGEN UR QL STRIP: ABNORMAL
WBC # UR STRIP: NORMAL /HPF
WBC MORPH BLD: NORMAL
WBC NRBC COR # BLD: 0.97 10*3/MM3 (ref 3.4–10.8)
WHOLE BLOOD HOLD SPECIMEN: NORMAL
WHOLE BLOOD HOLD SPECIMEN: NORMAL

## 2022-03-25 PROCEDURE — 99223 1ST HOSP IP/OBS HIGH 75: CPT | Performed by: INTERNAL MEDICINE

## 2022-03-25 PROCEDURE — 81001 URINALYSIS AUTO W/SCOPE: CPT | Performed by: NURSE PRACTITIONER

## 2022-03-25 PROCEDURE — 80053 COMPREHEN METABOLIC PANEL: CPT

## 2022-03-25 PROCEDURE — 25010000002 MAGNESIUM SULFATE 2 GM/50ML SOLUTION: Performed by: INTERNAL MEDICINE

## 2022-03-25 PROCEDURE — 83605 ASSAY OF LACTIC ACID: CPT | Performed by: NURSE PRACTITIONER

## 2022-03-25 PROCEDURE — 71045 X-RAY EXAM CHEST 1 VIEW: CPT

## 2022-03-25 PROCEDURE — 87636 SARSCOV2 & INF A&B AMP PRB: CPT | Performed by: INTERNAL MEDICINE

## 2022-03-25 PROCEDURE — 82550 ASSAY OF CK (CPK): CPT | Performed by: NURSE PRACTITIONER

## 2022-03-25 PROCEDURE — 84145 PROCALCITONIN (PCT): CPT | Performed by: NURSE PRACTITIONER

## 2022-03-25 PROCEDURE — 99284 EMERGENCY DEPT VISIT MOD MDM: CPT

## 2022-03-25 PROCEDURE — 83735 ASSAY OF MAGNESIUM: CPT | Performed by: NURSE PRACTITIONER

## 2022-03-25 PROCEDURE — 87040 BLOOD CULTURE FOR BACTERIA: CPT | Performed by: NURSE PRACTITIONER

## 2022-03-25 PROCEDURE — 82533 TOTAL CORTISOL: CPT | Performed by: INTERNAL MEDICINE

## 2022-03-25 PROCEDURE — 83036 HEMOGLOBIN GLYCOSYLATED A1C: CPT | Performed by: INTERNAL MEDICINE

## 2022-03-25 PROCEDURE — 63710000001 INSULIN REGULAR HUMAN PER 5 UNITS: Performed by: INTERNAL MEDICINE

## 2022-03-25 PROCEDURE — 85007 BL SMEAR W/DIFF WBC COUNT: CPT

## 2022-03-25 PROCEDURE — 85025 COMPLETE CBC W/AUTO DIFF WBC: CPT

## 2022-03-25 RX ORDER — SODIUM CHLORIDE 9 MG/ML
100 INJECTION, SOLUTION INTRAVENOUS CONTINUOUS
Status: DISCONTINUED | OUTPATIENT
Start: 2022-03-26 | End: 2022-03-27

## 2022-03-25 RX ORDER — SODIUM CHLORIDE AND POTASSIUM CHLORIDE 150; 900 MG/100ML; MG/100ML
100 INJECTION, SOLUTION INTRAVENOUS CONTINUOUS
Status: DISCONTINUED | OUTPATIENT
Start: 2022-03-26 | End: 2022-03-25

## 2022-03-25 RX ORDER — TRAMADOL HYDROCHLORIDE 50 MG/1
50 TABLET ORAL EVERY 8 HOURS PRN
Status: DISCONTINUED | OUTPATIENT
Start: 2022-03-25 | End: 2022-03-28 | Stop reason: HOSPADM

## 2022-03-25 RX ORDER — LANOLIN ALCOHOL/MO/W.PET/CERES
1000 CREAM (GRAM) TOPICAL DAILY
Status: DISCONTINUED | OUTPATIENT
Start: 2022-03-26 | End: 2022-03-28 | Stop reason: HOSPADM

## 2022-03-25 RX ORDER — SODIUM CHLORIDE 9 MG/ML
125 INJECTION, SOLUTION INTRAVENOUS CONTINUOUS
Status: DISCONTINUED | OUTPATIENT
Start: 2022-03-25 | End: 2022-03-25

## 2022-03-25 RX ORDER — MAGNESIUM SULFATE HEPTAHYDRATE 40 MG/ML
2 INJECTION, SOLUTION INTRAVENOUS ONCE
Status: COMPLETED | OUTPATIENT
Start: 2022-03-25 | End: 2022-03-25

## 2022-03-25 RX ORDER — ATORVASTATIN CALCIUM 10 MG/1
10 TABLET, FILM COATED ORAL NIGHTLY
Refills: 1 | Status: DISCONTINUED | OUTPATIENT
Start: 2022-03-26 | End: 2022-03-28 | Stop reason: HOSPADM

## 2022-03-25 RX ORDER — SODIUM CHLORIDE 0.9 % (FLUSH) 0.9 %
10 SYRINGE (ML) INJECTION AS NEEDED
Status: DISCONTINUED | OUTPATIENT
Start: 2022-03-25 | End: 2022-03-28 | Stop reason: HOSPADM

## 2022-03-25 RX ADMIN — INSULIN HUMAN 5 UNITS: 100 INJECTION, SOLUTION PARENTERAL at 21:17

## 2022-03-25 RX ADMIN — SODIUM CHLORIDE 500 ML: 9 INJECTION, SOLUTION INTRAVENOUS at 21:37

## 2022-03-25 RX ADMIN — SODIUM CHLORIDE 500 ML: 9 INJECTION, SOLUTION INTRAVENOUS at 19:59

## 2022-03-25 RX ADMIN — MAGNESIUM SULFATE HEPTAHYDRATE 2 G: 2 INJECTION, SOLUTION INTRAVENOUS at 21:38

## 2022-03-25 NOTE — TELEPHONE ENCOUNTER
Please let pt know that his blood counts show very low white count and worsening kidney function.    If his dizziness is persisting, he needs to go to ER- asap.    If better, he should increase his fluids to 100 oz daily and follow up here in 1 week( ok to add on to an 8.15 am opening with me if needed)  He should also contact Dr. Herr to see what needs to be done re: blood counts.      Dilcia, unsure if his amlodipine or any other meds stopped. But if you have anything to add on since you saw the patient, please feel free to do so.    thanks

## 2022-03-26 LAB
ALBUMIN SERPL-MCNC: 3.1 G/DL (ref 3.5–5.2)
ALBUMIN/GLOB SERPL: 1.6 G/DL
ALP SERPL-CCNC: 65 U/L (ref 39–117)
ALT SERPL W P-5'-P-CCNC: 54 U/L (ref 1–41)
ANION GAP SERPL CALCULATED.3IONS-SCNC: 10 MMOL/L (ref 5–15)
AST SERPL-CCNC: 26 U/L (ref 1–40)
BASOPHILS # BLD AUTO: 0.01 10*3/MM3 (ref 0–0.2)
BASOPHILS NFR BLD AUTO: 1.4 % (ref 0–1.5)
BILIRUB SERPL-MCNC: 0.5 MG/DL (ref 0–1.2)
BUN SERPL-MCNC: 33 MG/DL (ref 8–23)
BUN/CREAT SERPL: 24.4 (ref 7–25)
CALCIUM SPEC-SCNC: 8.4 MG/DL (ref 8.6–10.5)
CHLORIDE SERPL-SCNC: 105 MMOL/L (ref 98–107)
CO2 SERPL-SCNC: 21 MMOL/L (ref 22–29)
CREAT SERPL-MCNC: 1.35 MG/DL (ref 0.76–1.27)
DEPRECATED RDW RBC AUTO: 51.8 FL (ref 37–54)
EGFRCR SERPLBLD CKD-EPI 2021: 54.1 ML/MIN/1.73
EOSINOPHIL # BLD AUTO: 0 10*3/MM3 (ref 0–0.4)
EOSINOPHIL NFR BLD AUTO: 0 % (ref 0.3–6.2)
ERYTHROCYTE [DISTWIDTH] IN BLOOD BY AUTOMATED COUNT: 15.7 % (ref 12.3–15.4)
GLOBULIN UR ELPH-MCNC: 2 GM/DL
GLUCOSE BLDC GLUCOMTR-MCNC: 222 MG/DL (ref 70–130)
GLUCOSE BLDC GLUCOMTR-MCNC: 224 MG/DL (ref 70–130)
GLUCOSE BLDC GLUCOMTR-MCNC: 276 MG/DL (ref 70–130)
GLUCOSE BLDC GLUCOMTR-MCNC: 283 MG/DL (ref 70–130)
GLUCOSE BLDC GLUCOMTR-MCNC: 285 MG/DL (ref 70–130)
GLUCOSE BLDC GLUCOMTR-MCNC: 313 MG/DL (ref 70–130)
GLUCOSE SERPL-MCNC: 223 MG/DL (ref 65–99)
HCT VFR BLD AUTO: 26.3 % (ref 37.5–51)
HGB BLD-MCNC: 9.1 G/DL (ref 13–17.7)
IMM GRANULOCYTES # BLD AUTO: 0.02 10*3/MM3 (ref 0–0.05)
IMM GRANULOCYTES NFR BLD AUTO: 2.9 % (ref 0–0.5)
LYMPHOCYTES # BLD AUTO: 0.42 10*3/MM3 (ref 0.7–3.1)
LYMPHOCYTES NFR BLD AUTO: 60 % (ref 19.6–45.3)
MAGNESIUM SERPL-MCNC: 1.6 MG/DL (ref 1.6–2.4)
MCH RBC QN AUTO: 31.6 PG (ref 26.6–33)
MCHC RBC AUTO-ENTMCNC: 34.6 G/DL (ref 31.5–35.7)
MCV RBC AUTO: 91.3 FL (ref 79–97)
MONOCYTES # BLD AUTO: 0.1 10*3/MM3 (ref 0.1–0.9)
MONOCYTES NFR BLD AUTO: 14.3 % (ref 5–12)
NEUTROPHILS NFR BLD AUTO: 0.15 10*3/MM3 (ref 1.7–7)
NEUTROPHILS NFR BLD AUTO: 21.4 % (ref 42.7–76)
NRBC BLD AUTO-RTO: 0 /100 WBC (ref 0–0.2)
PLAT MORPH BLD: NORMAL
PLATELET # BLD AUTO: 21 10*3/MM3 (ref 140–450)
PMV BLD AUTO: 14 FL (ref 6–12)
POTASSIUM SERPL-SCNC: 4.7 MMOL/L (ref 3.5–5.2)
PROT SERPL-MCNC: 5.1 G/DL (ref 6–8.5)
RBC # BLD AUTO: 2.88 10*6/MM3 (ref 4.14–5.8)
RBC MORPH BLD: NORMAL
SMUDGE CELLS BLD QL SMEAR: NORMAL
SODIUM SERPL-SCNC: 136 MMOL/L (ref 136–145)
TROPONIN T SERPL-MCNC: 0.04 NG/ML (ref 0–0.03)
WBC NRBC COR # BLD: 0.7 10*3/MM3 (ref 3.4–10.8)

## 2022-03-26 PROCEDURE — 25010000002 FILGRASTIM PER 480 MCG: Performed by: INTERNAL MEDICINE

## 2022-03-26 PROCEDURE — 85007 BL SMEAR W/DIFF WBC COUNT: CPT | Performed by: INTERNAL MEDICINE

## 2022-03-26 PROCEDURE — 82962 GLUCOSE BLOOD TEST: CPT

## 2022-03-26 PROCEDURE — 83735 ASSAY OF MAGNESIUM: CPT | Performed by: INTERNAL MEDICINE

## 2022-03-26 PROCEDURE — 97162 PT EVAL MOD COMPLEX 30 MIN: CPT

## 2022-03-26 PROCEDURE — 63710000001 INSULIN LISPRO (HUMAN) PER 5 UNITS: Performed by: INTERNAL MEDICINE

## 2022-03-26 PROCEDURE — 80053 COMPREHEN METABOLIC PANEL: CPT | Performed by: INTERNAL MEDICINE

## 2022-03-26 PROCEDURE — 63710000001 INSULIN DETEMIR PER 5 UNITS: Performed by: INTERNAL MEDICINE

## 2022-03-26 PROCEDURE — 99232 SBSQ HOSP IP/OBS MODERATE 35: CPT | Performed by: INTERNAL MEDICINE

## 2022-03-26 PROCEDURE — 84484 ASSAY OF TROPONIN QUANT: CPT | Performed by: INTERNAL MEDICINE

## 2022-03-26 PROCEDURE — 25010000002 CEFEPIME PER 500 MG

## 2022-03-26 PROCEDURE — 85025 COMPLETE CBC W/AUTO DIFF WBC: CPT | Performed by: INTERNAL MEDICINE

## 2022-03-26 PROCEDURE — 97530 THERAPEUTIC ACTIVITIES: CPT

## 2022-03-26 PROCEDURE — 99222 1ST HOSP IP/OBS MODERATE 55: CPT | Performed by: INTERNAL MEDICINE

## 2022-03-26 RX ADMIN — Medication 10 ML: at 00:43

## 2022-03-26 RX ADMIN — FILGRASTIM 480 MCG: 480 INJECTION, SOLUTION INTRAVENOUS; SUBCUTANEOUS at 00:42

## 2022-03-26 RX ADMIN — INSULIN LISPRO 5 UNITS: 100 INJECTION, SOLUTION INTRAVENOUS; SUBCUTANEOUS at 13:16

## 2022-03-26 RX ADMIN — ATORVASTATIN CALCIUM 10 MG: 10 TABLET, FILM COATED ORAL at 00:42

## 2022-03-26 RX ADMIN — INSULIN LISPRO 4 UNITS: 100 INJECTION, SOLUTION INTRAVENOUS; SUBCUTANEOUS at 21:50

## 2022-03-26 RX ADMIN — SODIUM CHLORIDE 100 ML/HR: 9 INJECTION, SOLUTION INTRAVENOUS at 00:43

## 2022-03-26 RX ADMIN — INSULIN LISPRO 3 UNITS: 100 INJECTION, SOLUTION INTRAVENOUS; SUBCUTANEOUS at 04:40

## 2022-03-26 RX ADMIN — SODIUM CHLORIDE 100 ML/HR: 9 INJECTION, SOLUTION INTRAVENOUS at 14:36

## 2022-03-26 RX ADMIN — INSULIN LISPRO 4 UNITS: 100 INJECTION, SOLUTION INTRAVENOUS; SUBCUTANEOUS at 17:36

## 2022-03-26 RX ADMIN — CYANOCOBALAMIN TAB 1000 MCG 1000 MCG: 1000 TAB at 08:56

## 2022-03-26 RX ADMIN — INSULIN DETEMIR 8 UNITS: 100 INJECTION, SOLUTION SUBCUTANEOUS at 21:50

## 2022-03-26 RX ADMIN — INSULIN LISPRO 3 UNITS: 100 INJECTION, SOLUTION INTRAVENOUS; SUBCUTANEOUS at 17:55

## 2022-03-26 RX ADMIN — ATORVASTATIN CALCIUM 10 MG: 10 TABLET, FILM COATED ORAL at 21:50

## 2022-03-26 RX ADMIN — INSULIN LISPRO 3 UNITS: 100 INJECTION, SOLUTION INTRAVENOUS; SUBCUTANEOUS at 08:56

## 2022-03-26 RX ADMIN — FILGRASTIM 480 MCG: 480 INJECTION, SOLUTION INTRAVENOUS; SUBCUTANEOUS at 14:36

## 2022-03-26 RX ADMIN — CEFEPIME HYDROCHLORIDE 1 G: 1 INJECTION, POWDER, FOR SOLUTION INTRAMUSCULAR; INTRAVENOUS at 13:16

## 2022-03-26 RX ADMIN — INSULIN LISPRO 4 UNITS: 100 INJECTION, SOLUTION INTRAVENOUS; SUBCUTANEOUS at 00:42

## 2022-03-26 RX ADMIN — CEFEPIME 2 G: 20 INJECTION, POWDER, FOR SOLUTION INTRAVENOUS at 02:26

## 2022-03-27 LAB
ABO GROUP BLD: NORMAL
ALBUMIN SERPL-MCNC: 3.2 G/DL (ref 3.5–5.2)
ALBUMIN/GLOB SERPL: 2 G/DL
ALP SERPL-CCNC: 70 U/L (ref 39–117)
ALT SERPL W P-5'-P-CCNC: 54 U/L (ref 1–41)
ANION GAP SERPL CALCULATED.3IONS-SCNC: 10 MMOL/L (ref 5–15)
AST SERPL-CCNC: 24 U/L (ref 1–40)
BASOPHILS # BLD MANUAL: 0 10*3/MM3 (ref 0–0.2)
BASOPHILS NFR BLD MANUAL: 0 % (ref 0–1.5)
BILIRUB SERPL-MCNC: 0.4 MG/DL (ref 0–1.2)
BLD GP AB SCN SERPL QL: NEGATIVE
BUN SERPL-MCNC: 20 MG/DL (ref 8–23)
BUN/CREAT SERPL: 18.3 (ref 7–25)
CALCIUM SPEC-SCNC: 8.5 MG/DL (ref 8.6–10.5)
CHLORIDE SERPL-SCNC: 104 MMOL/L (ref 98–107)
CO2 SERPL-SCNC: 22 MMOL/L (ref 22–29)
CREAT SERPL-MCNC: 1.09 MG/DL (ref 0.76–1.27)
DEPRECATED RDW RBC AUTO: 53.6 FL (ref 37–54)
EGFRCR SERPLBLD CKD-EPI 2021: 69.9 ML/MIN/1.73
EOSINOPHIL # BLD MANUAL: 0 10*3/MM3 (ref 0–0.4)
EOSINOPHIL NFR BLD MANUAL: 0 % (ref 0.3–6.2)
ERYTHROCYTE [DISTWIDTH] IN BLOOD BY AUTOMATED COUNT: 15.5 % (ref 12.3–15.4)
GLOBULIN UR ELPH-MCNC: 1.6 GM/DL
GLUCOSE BLDC GLUCOMTR-MCNC: 257 MG/DL (ref 70–130)
GLUCOSE BLDC GLUCOMTR-MCNC: 258 MG/DL (ref 70–130)
GLUCOSE BLDC GLUCOMTR-MCNC: 297 MG/DL (ref 70–130)
GLUCOSE BLDC GLUCOMTR-MCNC: 300 MG/DL (ref 70–130)
GLUCOSE BLDC GLUCOMTR-MCNC: 314 MG/DL (ref 70–130)
GLUCOSE SERPL-MCNC: 264 MG/DL (ref 65–99)
HCT VFR BLD AUTO: 28.9 % (ref 37.5–51)
HGB BLD-MCNC: 9.5 G/DL (ref 13–17.7)
LYMPHOCYTES # BLD MANUAL: 0.8 10*3/MM3 (ref 0.7–3.1)
LYMPHOCYTES NFR BLD MANUAL: 8 % (ref 5–12)
MCH RBC QN AUTO: 31.4 PG (ref 26.6–33)
MCHC RBC AUTO-ENTMCNC: 32.9 G/DL (ref 31.5–35.7)
MCV RBC AUTO: 95.4 FL (ref 79–97)
METAMYELOCYTES NFR BLD MANUAL: 1 % (ref 0–0)
MONOCYTES # BLD: 0.16 10*3/MM3 (ref 0.1–0.9)
MYELOCYTES NFR BLD MANUAL: 1 % (ref 0–0)
NEUTROPHILS # BLD AUTO: 1 10*3/MM3 (ref 1.7–7)
NEUTROPHILS NFR BLD MANUAL: 28 % (ref 42.7–76)
NEUTS BAND NFR BLD MANUAL: 22 % (ref 0–5)
NRBC SPEC MANUAL: 0 /100 WBC (ref 0–0.2)
PLATELET # BLD AUTO: 18 10*3/MM3 (ref 140–450)
POTASSIUM SERPL-SCNC: 5 MMOL/L (ref 3.5–5.2)
PROT SERPL-MCNC: 4.8 G/DL (ref 6–8.5)
RBC # BLD AUTO: 3.03 10*6/MM3 (ref 4.14–5.8)
RBC MORPH BLD: NORMAL
RH BLD: POSITIVE
SMALL PLATELETS BLD QL SMEAR: ABNORMAL
SMUDGE CELLS BLD QL SMEAR: ABNORMAL
SODIUM SERPL-SCNC: 136 MMOL/L (ref 136–145)
T&S EXPIRATION DATE: NORMAL
VARIANT LYMPHS NFR BLD MANUAL: 40 % (ref 19.6–45.3)
WBC NRBC COR # BLD: 2 10*3/MM3 (ref 3.4–10.8)

## 2022-03-27 PROCEDURE — 36430 TRANSFUSION BLD/BLD COMPNT: CPT

## 2022-03-27 PROCEDURE — 86901 BLOOD TYPING SEROLOGIC RH(D): CPT | Performed by: INTERNAL MEDICINE

## 2022-03-27 PROCEDURE — 86900 BLOOD TYPING SEROLOGIC ABO: CPT | Performed by: INTERNAL MEDICINE

## 2022-03-27 PROCEDURE — P9100 PATHOGEN TEST FOR PLATELETS: HCPCS

## 2022-03-27 PROCEDURE — 86850 RBC ANTIBODY SCREEN: CPT | Performed by: INTERNAL MEDICINE

## 2022-03-27 PROCEDURE — 25010000002 CEFEPIME PER 500 MG

## 2022-03-27 PROCEDURE — 82962 GLUCOSE BLOOD TEST: CPT

## 2022-03-27 PROCEDURE — 85007 BL SMEAR W/DIFF WBC COUNT: CPT | Performed by: INTERNAL MEDICINE

## 2022-03-27 PROCEDURE — P9035 PLATELET PHERES LEUKOREDUCED: HCPCS

## 2022-03-27 PROCEDURE — 99233 SBSQ HOSP IP/OBS HIGH 50: CPT | Performed by: INTERNAL MEDICINE

## 2022-03-27 PROCEDURE — 85025 COMPLETE CBC W/AUTO DIFF WBC: CPT | Performed by: INTERNAL MEDICINE

## 2022-03-27 PROCEDURE — 63710000001 INSULIN LISPRO (HUMAN) PER 5 UNITS: Performed by: INTERNAL MEDICINE

## 2022-03-27 PROCEDURE — 80053 COMPREHEN METABOLIC PANEL: CPT | Performed by: INTERNAL MEDICINE

## 2022-03-27 PROCEDURE — 63710000001 INSULIN DETEMIR PER 5 UNITS: Performed by: INTERNAL MEDICINE

## 2022-03-27 RX ADMIN — ATORVASTATIN CALCIUM 10 MG: 10 TABLET, FILM COATED ORAL at 21:49

## 2022-03-27 RX ADMIN — INSULIN DETEMIR 12 UNITS: 100 INJECTION, SOLUTION SUBCUTANEOUS at 21:50

## 2022-03-27 RX ADMIN — CEFEPIME HYDROCHLORIDE 1 G: 1 INJECTION, POWDER, FOR SOLUTION INTRAMUSCULAR; INTRAVENOUS at 00:17

## 2022-03-27 RX ADMIN — Medication 10 ML: at 08:29

## 2022-03-27 RX ADMIN — INSULIN LISPRO 3 UNITS: 100 INJECTION, SOLUTION INTRAVENOUS; SUBCUTANEOUS at 08:29

## 2022-03-27 RX ADMIN — INSULIN LISPRO 5 UNITS: 100 INJECTION, SOLUTION INTRAVENOUS; SUBCUTANEOUS at 12:50

## 2022-03-27 RX ADMIN — CYANOCOBALAMIN TAB 1000 MCG 1000 MCG: 1000 TAB at 08:29

## 2022-03-27 RX ADMIN — INSULIN LISPRO 4 UNITS: 100 INJECTION, SOLUTION INTRAVENOUS; SUBCUTANEOUS at 08:29

## 2022-03-27 RX ADMIN — CEFEPIME HYDROCHLORIDE 1 G: 1 INJECTION, POWDER, FOR SOLUTION INTRAMUSCULAR; INTRAVENOUS at 12:58

## 2022-03-27 RX ADMIN — SODIUM CHLORIDE 100 ML/HR: 9 INJECTION, SOLUTION INTRAVENOUS at 00:18

## 2022-03-27 RX ADMIN — INSULIN LISPRO 6 UNITS: 100 INJECTION, SOLUTION INTRAVENOUS; SUBCUTANEOUS at 18:08

## 2022-03-27 RX ADMIN — INSULIN LISPRO 5 UNITS: 100 INJECTION, SOLUTION INTRAVENOUS; SUBCUTANEOUS at 18:08

## 2022-03-27 RX ADMIN — INSULIN LISPRO 6 UNITS: 100 INJECTION, SOLUTION INTRAVENOUS; SUBCUTANEOUS at 12:49

## 2022-03-27 RX ADMIN — INSULIN LISPRO 4 UNITS: 100 INJECTION, SOLUTION INTRAVENOUS; SUBCUTANEOUS at 21:49

## 2022-03-28 ENCOUNTER — READMISSION MANAGEMENT (OUTPATIENT)
Dept: CALL CENTER | Facility: HOSPITAL | Age: 77
End: 2022-03-28

## 2022-03-28 VITALS
HEIGHT: 70 IN | RESPIRATION RATE: 18 BRPM | BODY MASS INDEX: 30.49 KG/M2 | OXYGEN SATURATION: 96 % | WEIGHT: 213 LBS | SYSTOLIC BLOOD PRESSURE: 158 MMHG | HEART RATE: 121 BPM | TEMPERATURE: 98.3 F | DIASTOLIC BLOOD PRESSURE: 73 MMHG

## 2022-03-28 PROBLEM — E43 SEVERE MALNUTRITION: Status: ACTIVE | Noted: 2022-03-28

## 2022-03-28 LAB
ANION GAP SERPL CALCULATED.3IONS-SCNC: 10 MMOL/L (ref 5–15)
BASOPHILS # BLD MANUAL: 0.07 10*3/MM3 (ref 0–0.2)
BASOPHILS NFR BLD MANUAL: 2 % (ref 0–1.5)
BH BB BLOOD EXPIRATION DATE: NORMAL
BH BB BLOOD EXPIRATION DATE: NORMAL
BH BB BLOOD TYPE BARCODE: 6200
BH BB BLOOD TYPE BARCODE: 6200
BH BB DISPENSE STATUS: NORMAL
BH BB DISPENSE STATUS: NORMAL
BH BB PRODUCT CODE: NORMAL
BH BB PRODUCT CODE: NORMAL
BH BB UNIT NUMBER: NORMAL
BH BB UNIT NUMBER: NORMAL
BUN SERPL-MCNC: 15 MG/DL (ref 8–23)
BUN/CREAT SERPL: 16 (ref 7–25)
CALCIUM SPEC-SCNC: 8.5 MG/DL (ref 8.6–10.5)
CHLORIDE SERPL-SCNC: 101 MMOL/L (ref 98–107)
CO2 SERPL-SCNC: 24 MMOL/L (ref 22–29)
CREAT SERPL-MCNC: 0.94 MG/DL (ref 0.76–1.27)
DEPRECATED RDW RBC AUTO: 51.8 FL (ref 37–54)
EGFRCR SERPLBLD CKD-EPI 2021: 83.5 ML/MIN/1.73
EOSINOPHIL # BLD MANUAL: 0 10*3/MM3 (ref 0–0.4)
EOSINOPHIL NFR BLD MANUAL: 0 % (ref 0.3–6.2)
ERYTHROCYTE [DISTWIDTH] IN BLOOD BY AUTOMATED COUNT: 15.2 % (ref 12.3–15.4)
GLUCOSE BLDC GLUCOMTR-MCNC: 287 MG/DL (ref 70–130)
GLUCOSE BLDC GLUCOMTR-MCNC: 379 MG/DL (ref 70–130)
GLUCOSE BLDC GLUCOMTR-MCNC: 454 MG/DL (ref 70–130)
GLUCOSE BLDC GLUCOMTR-MCNC: 472 MG/DL (ref 70–130)
GLUCOSE SERPL-MCNC: 293 MG/DL (ref 65–99)
HCT VFR BLD AUTO: 27.5 % (ref 37.5–51)
HGB BLD-MCNC: 9.2 G/DL (ref 13–17.7)
LYMPHOCYTES # BLD MANUAL: 0.86 10*3/MM3 (ref 0.7–3.1)
LYMPHOCYTES NFR BLD MANUAL: 11 % (ref 5–12)
MCH RBC QN AUTO: 31.4 PG (ref 26.6–33)
MCHC RBC AUTO-ENTMCNC: 33.5 G/DL (ref 31.5–35.7)
MCV RBC AUTO: 93.9 FL (ref 79–97)
METAMYELOCYTES NFR BLD MANUAL: 1 % (ref 0–0)
MONOCYTES # BLD: 0.39 10*3/MM3 (ref 0.1–0.9)
NEUTROPHILS # BLD AUTO: 2.23 10*3/MM3 (ref 1.7–7)
NEUTROPHILS NFR BLD MANUAL: 45 % (ref 42.7–76)
NEUTS BAND NFR BLD MANUAL: 17 % (ref 0–5)
PLAT MORPH BLD: NORMAL
PLATELET # BLD AUTO: 46 10*3/MM3 (ref 140–450)
PMV BLD AUTO: 12.3 FL (ref 6–12)
POTASSIUM SERPL-SCNC: 4.7 MMOL/L (ref 3.5–5.2)
RBC # BLD AUTO: 2.93 10*6/MM3 (ref 4.14–5.8)
RBC MORPH BLD: NORMAL
SMUDGE CELLS BLD QL SMEAR: ABNORMAL
SODIUM SERPL-SCNC: 135 MMOL/L (ref 136–145)
TOXIC GRANULATION: ABNORMAL
UNIT  ABO: NORMAL
UNIT  ABO: NORMAL
UNIT  RH: NORMAL
UNIT  RH: NORMAL
VARIANT LYMPHS NFR BLD MANUAL: 19 % (ref 19.6–45.3)
VARIANT LYMPHS NFR BLD MANUAL: 5 % (ref 0–5)
WBC NRBC COR # BLD: 3.59 10*3/MM3 (ref 3.4–10.8)

## 2022-03-28 PROCEDURE — 25010000002 CEFEPIME PER 500 MG: Performed by: INTERNAL MEDICINE

## 2022-03-28 PROCEDURE — 82962 GLUCOSE BLOOD TEST: CPT

## 2022-03-28 PROCEDURE — 63710000001 INSULIN LISPRO (HUMAN) PER 5 UNITS: Performed by: INTERNAL MEDICINE

## 2022-03-28 PROCEDURE — 80048 BASIC METABOLIC PNL TOTAL CA: CPT | Performed by: INTERNAL MEDICINE

## 2022-03-28 PROCEDURE — 85025 COMPLETE CBC W/AUTO DIFF WBC: CPT | Performed by: INTERNAL MEDICINE

## 2022-03-28 PROCEDURE — 99239 HOSP IP/OBS DSCHRG MGMT >30: CPT | Performed by: INTERNAL MEDICINE

## 2022-03-28 PROCEDURE — 85007 BL SMEAR W/DIFF WBC COUNT: CPT | Performed by: INTERNAL MEDICINE

## 2022-03-28 RX ADMIN — INSULIN LISPRO 5 UNITS: 100 INJECTION, SOLUTION INTRAVENOUS; SUBCUTANEOUS at 18:10

## 2022-03-28 RX ADMIN — CEFEPIME HYDROCHLORIDE 1 G: 1 INJECTION, POWDER, FOR SOLUTION INTRAMUSCULAR; INTRAVENOUS at 00:25

## 2022-03-28 RX ADMIN — INSULIN LISPRO 6 UNITS: 100 INJECTION, SOLUTION INTRAVENOUS; SUBCUTANEOUS at 18:10

## 2022-03-28 RX ADMIN — INSULIN LISPRO 6 UNITS: 100 INJECTION, SOLUTION INTRAVENOUS; SUBCUTANEOUS at 08:38

## 2022-03-28 RX ADMIN — INSULIN LISPRO 6 UNITS: 100 INJECTION, SOLUTION INTRAVENOUS; SUBCUTANEOUS at 12:54

## 2022-03-28 RX ADMIN — CEFEPIME HYDROCHLORIDE 1 G: 1 INJECTION, POWDER, FOR SOLUTION INTRAMUSCULAR; INTRAVENOUS at 12:54

## 2022-03-28 RX ADMIN — CYANOCOBALAMIN TAB 1000 MCG 1000 MCG: 1000 TAB at 08:35

## 2022-03-28 RX ADMIN — INSULIN LISPRO 4 UNITS: 100 INJECTION, SOLUTION INTRAVENOUS; SUBCUTANEOUS at 08:38

## 2022-03-28 NOTE — OUTREACH NOTE
Prep Survey    Flowsheet Row Responses   [facility] facility patient discharged from? [place]   Is LACE score < 7 ? No   Emergency Room discharge w/ pulse ox? No   Eligibility [facility]   Date of Admission 03/25/22   Date of Discharge 03/28/22   Discharge Disposition Home or Self Care   Discharge diagnosis syncope, dehydration ZOHRA, neutropenic fever,T2DM, severe malnutrition, chronic CHF   Does the patient have one of the following disease processes/diagnoses(primary or secondary)? Other   Does the patient have Home health ordered? No   Is there a DME ordered? No   General alerts for this patient Large cell lymphoma on chemo   Prep survey completed? Yes          LIANA VELAZQUEZ - Registered Nurse

## 2022-03-29 ENCOUNTER — TRANSITIONAL CARE MANAGEMENT TELEPHONE ENCOUNTER (OUTPATIENT)
Dept: CALL CENTER | Facility: HOSPITAL | Age: 77
End: 2022-03-29

## 2022-03-29 NOTE — OUTREACH NOTE
Call Center TCM Note    Flowsheet Row Responses   [facility] patient discharged from? [place]   Does the patient have one of the following disease processes/diagnoses(primary or secondary)? Other   TCM attempt successful? Yes   General alerts for this patient Large cell lymphoma on chemo   Discharge diagnosis syncope, dehydration ZOHRA, neutropenic fever,T2DM, severe malnutrition, chronic CHF   Meds reviewed with patient/caregiver? Yes   Does the patient have all medications ordered at discharge? N/A   Prescription comments Pt has d/c several medications per d/c orders, no new medications added at d/c.    Is the patient taking all medications as directed (includes completed medication regime)? Yes   Does the patient have a primary care provider?  Yes   Does the patient have an appointment with their PCP within 7 days of discharge? No   Comments regarding PCP Pt will keep sched appt with PCP Dr Gee on 04/18/2022, this is past TCM time frame. If PCP feels pt needs to be seen sooner (TCM would need to ezekiel 04/11/2022) please reach out to pt.    Has the patient kept scheduled appointments due by today? N/A   Has home health visited the patient within 72 hours of discharge? N/A   Psychosocial issues? No   Did the patient receive a copy of their discharge instructions? Yes   Nursing interventions Reviewed instructions with patient   What is the patient's perception of their health status since discharge? Improving   Is the patient/caregiver able to teach back signs and symptoms related to disease process for when to call PCP? Yes   Is the patient/caregiver able to teach back signs and symptoms related to disease process for when to call 911? Yes   Is the patient/caregiver able to teach back the hierarchy of who to call/visit for symptoms/problems? PCP, Specialist, Home health nurse, Urgent Care, ED, 911 Yes   If the patient is a current smoker, are they able to teach back resources for cessation? Not a smoker    TCM call completed? Yes   Wrap up additional comments This nice man feeling very well today. He is staying better hydrated & trying to eat. Diarrhea improving. Pt has d/c several medications per d/c orders, including BP meds, pt monitoring BP at home. This a.m. 120/66. No new medications added at d/c. Pt will keep sched appt with PCP Dr Gee on 04/18/2022, this is past TCM time frame. If PCP feels pt needs to be seen sooner (TCM would need to ezekiel 04/11/2022) please reach out to pt.           Shivani Potter MA    3/29/2022, 13:12 EDT

## 2022-03-30 DIAGNOSIS — C84.71 ANAPLASTIC ALK-NEGATIVE LARGE CELL LYMPHOMA OF LYMPH NODE OF HEAD: Primary | ICD-10-CM

## 2022-03-30 LAB
BACTERIA SPEC AEROBE CULT: NORMAL
BACTERIA SPEC AEROBE CULT: NORMAL

## 2022-03-30 RX ORDER — DOXORUBICIN HYDROCHLORIDE 2 MG/ML
50 INJECTION, SOLUTION INTRAVENOUS ONCE
Status: CANCELLED | OUTPATIENT
Start: 2022-04-07

## 2022-03-30 RX ORDER — OLANZAPINE 5 MG/1
5 TABLET ORAL ONCE
Status: CANCELLED | OUTPATIENT
Start: 2022-04-07 | End: 2022-04-07

## 2022-03-30 RX ORDER — PALONOSETRON 0.05 MG/ML
0.25 INJECTION, SOLUTION INTRAVENOUS ONCE
Status: CANCELLED | OUTPATIENT
Start: 2022-04-07

## 2022-03-30 RX ORDER — SODIUM CHLORIDE 9 MG/ML
250 INJECTION, SOLUTION INTRAVENOUS ONCE
Status: CANCELLED | OUTPATIENT
Start: 2022-04-07

## 2022-04-04 ENCOUNTER — HOSPITAL ENCOUNTER (OUTPATIENT)
Dept: CT IMAGING | Facility: HOSPITAL | Age: 77
Discharge: HOME | End: 2022-04-04
Admitting: INTERNAL MEDICINE

## 2022-04-04 DIAGNOSIS — C84.71 ANAPLASTIC ALK-NEGATIVE LARGE CELL LYMPHOMA OF LYMPH NODE OF HEAD: ICD-10-CM

## 2022-04-04 PROCEDURE — 70460 CT HEAD/BRAIN W/DYE: CPT

## 2022-04-04 PROCEDURE — 25010000002 IOPAMIDOL 61 % SOLUTION: Performed by: INTERNAL MEDICINE

## 2022-04-04 RX ADMIN — IOPAMIDOL 50 ML: 612 INJECTION, SOLUTION INTRAVENOUS at 11:38

## 2022-04-06 ENCOUNTER — HOSPITAL ENCOUNTER (OUTPATIENT)
Dept: ONCOLOGY | Facility: HOSPITAL | Age: 77
Setting detail: INFUSION SERIES
Discharge: HOME | End: 2022-04-06

## 2022-04-06 VITALS
DIASTOLIC BLOOD PRESSURE: 60 MMHG | HEIGHT: 70 IN | BODY MASS INDEX: 28.35 KG/M2 | TEMPERATURE: 97.8 F | WEIGHT: 198 LBS | HEART RATE: 122 BPM | SYSTOLIC BLOOD PRESSURE: 134 MMHG | RESPIRATION RATE: 18 BRPM

## 2022-04-06 DIAGNOSIS — Z95.828 PORT-A-CATH IN PLACE: ICD-10-CM

## 2022-04-06 DIAGNOSIS — C84.71 ANAPLASTIC ALK-NEGATIVE LARGE CELL LYMPHOMA OF LYMPH NODE OF HEAD: Primary | ICD-10-CM

## 2022-04-06 LAB
ALBUMIN SERPL-MCNC: 4 G/DL (ref 3.5–5.2)
ALBUMIN/GLOB SERPL: 1.5 G/DL
ALP SERPL-CCNC: 84 U/L (ref 39–117)
ALT SERPL W P-5'-P-CCNC: 45 U/L (ref 1–41)
ANION GAP SERPL CALCULATED.3IONS-SCNC: 14 MMOL/L (ref 5–15)
AST SERPL-CCNC: 41 U/L (ref 1–40)
BILIRUB SERPL-MCNC: 0.4 MG/DL (ref 0–1.2)
BUN SERPL-MCNC: 26 MG/DL (ref 8–23)
BUN/CREAT SERPL: 17.2 (ref 7–25)
CALCIUM SPEC-SCNC: 8.4 MG/DL (ref 8.6–10.5)
CHLORIDE SERPL-SCNC: 98 MMOL/L (ref 98–107)
CO2 SERPL-SCNC: 25 MMOL/L (ref 22–29)
CREAT SERPL-MCNC: 1.51 MG/DL (ref 0.76–1.27)
EGFRCR SERPLBLD CKD-EPI 2021: 47.3 ML/MIN/1.73
ERYTHROCYTE [DISTWIDTH] IN BLOOD BY AUTOMATED COUNT: 20.5 % (ref 12.3–15.4)
GLOBULIN UR ELPH-MCNC: 2.7 GM/DL
GLUCOSE SERPL-MCNC: 105 MG/DL (ref 65–99)
HCT VFR BLD AUTO: 36.1 % (ref 37.5–51)
HGB BLD-MCNC: 11.3 G/DL (ref 13–17.7)
LYMPHOCYTES # BLD AUTO: 2 10*3/MM3 (ref 0.7–3.1)
LYMPHOCYTES NFR BLD AUTO: 18.1 % (ref 19.6–45.3)
MCH RBC QN AUTO: 30.3 PG (ref 26.6–33)
MCHC RBC AUTO-ENTMCNC: 31.4 G/DL (ref 31.5–35.7)
MCV RBC AUTO: 96.5 FL (ref 79–97)
MONOCYTES # BLD AUTO: 1.1 10*3/MM3 (ref 0.1–0.9)
MONOCYTES NFR BLD AUTO: 10 % (ref 5–12)
NEUTROPHILS NFR BLD AUTO: 7.8 10*3/MM3 (ref 1.7–7)
NEUTROPHILS NFR BLD AUTO: 71.9 % (ref 42.7–76)
PLATELET # BLD AUTO: 203 10*3/MM3 (ref 140–450)
PMV BLD AUTO: 8.2 FL (ref 6–12)
POTASSIUM SERPL-SCNC: 4.3 MMOL/L (ref 3.5–5.2)
PROT SERPL-MCNC: 6.7 G/DL (ref 6–8.5)
RBC # BLD AUTO: 3.74 10*6/MM3 (ref 4.14–5.8)
SODIUM SERPL-SCNC: 137 MMOL/L (ref 136–145)
WBC NRBC COR # BLD: 10.8 10*3/MM3 (ref 3.4–10.8)

## 2022-04-06 PROCEDURE — 36591 DRAW BLOOD OFF VENOUS DEVICE: CPT

## 2022-04-06 PROCEDURE — 85025 COMPLETE CBC W/AUTO DIFF WBC: CPT | Performed by: INTERNAL MEDICINE

## 2022-04-06 PROCEDURE — 80053 COMPREHEN METABOLIC PANEL: CPT | Performed by: INTERNAL MEDICINE

## 2022-04-06 PROCEDURE — 25010000002 HEPARIN LOCK FLUSH PER 10 UNITS: Performed by: INTERNAL MEDICINE

## 2022-04-06 RX ORDER — HEPARIN SODIUM (PORCINE) LOCK FLUSH IV SOLN 100 UNIT/ML 100 UNIT/ML
500 SOLUTION INTRAVENOUS AS NEEDED
Status: DISCONTINUED | OUTPATIENT
Start: 2022-04-06 | End: 2022-04-07 | Stop reason: HOSPADM

## 2022-04-06 RX ORDER — SODIUM CHLORIDE 0.9 % (FLUSH) 0.9 %
10 SYRINGE (ML) INJECTION AS NEEDED
Status: DISCONTINUED | OUTPATIENT
Start: 2022-04-06 | End: 2022-04-07 | Stop reason: HOSPADM

## 2022-04-06 RX ORDER — SODIUM CHLORIDE 0.9 % (FLUSH) 0.9 %
10 SYRINGE (ML) INJECTION AS NEEDED
Status: CANCELLED | OUTPATIENT
Start: 2022-04-06

## 2022-04-06 RX ORDER — HEPARIN SODIUM (PORCINE) LOCK FLUSH IV SOLN 100 UNIT/ML 100 UNIT/ML
500 SOLUTION INTRAVENOUS AS NEEDED
Status: CANCELLED | OUTPATIENT
Start: 2022-04-06

## 2022-04-06 RX ADMIN — HEPARIN 500 UNITS: 100 SYRINGE at 13:53

## 2022-04-06 RX ADMIN — Medication 10 ML: at 13:53

## 2022-04-07 ENCOUNTER — OFFICE VISIT (OUTPATIENT)
Dept: ONCOLOGY | Facility: CLINIC | Age: 77
End: 2022-04-07

## 2022-04-07 ENCOUNTER — HOSPITAL ENCOUNTER (OUTPATIENT)
Dept: ONCOLOGY | Facility: HOSPITAL | Age: 77
Setting detail: INFUSION SERIES
Discharge: HOME | End: 2022-04-07

## 2022-04-07 ENCOUNTER — DOCUMENTATION (OUTPATIENT)
Dept: NUTRITION | Facility: HOSPITAL | Age: 77
End: 2022-04-07

## 2022-04-07 VITALS
WEIGHT: 198.1 LBS | OXYGEN SATURATION: 97 % | HEIGHT: 70 IN | BODY MASS INDEX: 28.36 KG/M2 | TEMPERATURE: 98.2 F | DIASTOLIC BLOOD PRESSURE: 77 MMHG | SYSTOLIC BLOOD PRESSURE: 153 MMHG | RESPIRATION RATE: 16 BRPM | HEART RATE: 118 BPM

## 2022-04-07 DIAGNOSIS — C84.71 ANAPLASTIC ALK-NEGATIVE LARGE CELL LYMPHOMA OF LYMPH NODE OF HEAD: Primary | ICD-10-CM

## 2022-04-07 DIAGNOSIS — Z95.828 PORT-A-CATH IN PLACE: ICD-10-CM

## 2022-04-07 PROCEDURE — 96413 CHEMO IV INFUSION 1 HR: CPT

## 2022-04-07 PROCEDURE — 25010000002 PALONOSETRON 0.25 MG/5ML SOLUTION PREFILLED SYRINGE: Performed by: INTERNAL MEDICINE

## 2022-04-07 PROCEDURE — 25010000002 CYCLOPHOSPHAMIDE 1 GM/5ML SOLUTION 5 ML VIAL: Performed by: NURSE PRACTITIONER

## 2022-04-07 PROCEDURE — 25010000002 DOXORUBICIN PER 10 MG: Performed by: NURSE PRACTITIONER

## 2022-04-07 PROCEDURE — 96375 TX/PRO/DX INJ NEW DRUG ADDON: CPT

## 2022-04-07 PROCEDURE — 99214 OFFICE O/P EST MOD 30 MIN: CPT | Performed by: INTERNAL MEDICINE

## 2022-04-07 PROCEDURE — 96411 CHEMO IV PUSH ADDL DRUG: CPT

## 2022-04-07 PROCEDURE — 25010000002 HEPARIN LOCK FLUSH PER 10 UNITS: Performed by: INTERNAL MEDICINE

## 2022-04-07 PROCEDURE — 25010000002 BRENTUXIMAB 50 MG RECONSTITUTED SOLUTION 1 EACH VIAL: Performed by: NURSE PRACTITIONER

## 2022-04-07 PROCEDURE — 25010000002 FOSAPREPITANT PER 1 MG: Performed by: INTERNAL MEDICINE

## 2022-04-07 PROCEDURE — 96367 TX/PROPH/DG ADDL SEQ IV INF: CPT

## 2022-04-07 PROCEDURE — 25010000002 DEXAMETHASONE SODIUM PHOSPHATE 100 MG/10ML SOLUTION: Performed by: INTERNAL MEDICINE

## 2022-04-07 PROCEDURE — 96417 CHEMO IV INFUS EACH ADDL SEQ: CPT

## 2022-04-07 RX ORDER — DOXORUBICIN HYDROCHLORIDE 2 MG/ML
50 INJECTION, SOLUTION INTRAVENOUS ONCE
Status: COMPLETED | OUTPATIENT
Start: 2022-04-07 | End: 2022-04-07

## 2022-04-07 RX ORDER — HEPARIN SODIUM (PORCINE) LOCK FLUSH IV SOLN 100 UNIT/ML 100 UNIT/ML
500 SOLUTION INTRAVENOUS AS NEEDED
Status: DISCONTINUED | OUTPATIENT
Start: 2022-04-07 | End: 2022-04-08 | Stop reason: HOSPADM

## 2022-04-07 RX ORDER — PALONOSETRON 0.05 MG/ML
0.25 INJECTION, SOLUTION INTRAVENOUS ONCE
Status: COMPLETED | OUTPATIENT
Start: 2022-04-07 | End: 2022-04-07

## 2022-04-07 RX ORDER — OLANZAPINE 5 MG/1
5 TABLET ORAL ONCE
Status: COMPLETED | OUTPATIENT
Start: 2022-04-07 | End: 2022-04-07

## 2022-04-07 RX ORDER — DOXORUBICIN HYDROCHLORIDE 2 MG/ML
50 INJECTION, SOLUTION INTRAVENOUS ONCE
Status: CANCELLED | OUTPATIENT
Start: 2022-04-07

## 2022-04-07 RX ORDER — SODIUM CHLORIDE 9 MG/ML
250 INJECTION, SOLUTION INTRAVENOUS ONCE
Status: COMPLETED | OUTPATIENT
Start: 2022-04-07 | End: 2022-04-07

## 2022-04-07 RX ORDER — DOXORUBICIN HYDROCHLORIDE 2 MG/ML
50 INJECTION, SOLUTION INTRAVENOUS ONCE
Status: DISCONTINUED | OUTPATIENT
Start: 2022-04-07 | End: 2022-04-07

## 2022-04-07 RX ORDER — SODIUM CHLORIDE 0.9 % (FLUSH) 0.9 %
10 SYRINGE (ML) INJECTION AS NEEDED
Status: DISCONTINUED | OUTPATIENT
Start: 2022-04-07 | End: 2022-04-08 | Stop reason: HOSPADM

## 2022-04-07 RX ORDER — SODIUM CHLORIDE 0.9 % (FLUSH) 0.9 %
10 SYRINGE (ML) INJECTION AS NEEDED
Status: CANCELLED | OUTPATIENT
Start: 2022-04-07

## 2022-04-07 RX ORDER — HEPARIN SODIUM (PORCINE) LOCK FLUSH IV SOLN 100 UNIT/ML 100 UNIT/ML
500 SOLUTION INTRAVENOUS AS NEEDED
Status: CANCELLED | OUTPATIENT
Start: 2022-04-07

## 2022-04-07 RX ADMIN — SODIUM CHLORIDE 250 ML: 9 INJECTION, SOLUTION INTRAVENOUS at 12:59

## 2022-04-07 RX ADMIN — BRENTUXIMAB VEDOTIN 110 MG: 50 INJECTION, POWDER, LYOPHILIZED, FOR SOLUTION INTRAVENOUS at 15:15

## 2022-04-07 RX ADMIN — OLANZAPINE 5 MG: 5 TABLET, FILM COATED ORAL at 12:57

## 2022-04-07 RX ADMIN — PALONOSETRON 0.25 MG: 0.25 INJECTION, SOLUTION INTRAVENOUS at 12:57

## 2022-04-07 RX ADMIN — CYCLOPHOSPHAMIDE 1560 MG: 200 INJECTION, SOLUTION INTRAVENOUS at 14:42

## 2022-04-07 RX ADMIN — HEPARIN 500 UNITS: 100 SYRINGE at 15:49

## 2022-04-07 RX ADMIN — FOSAPREPITANT 150 MG: 150 INJECTION, POWDER, LYOPHILIZED, FOR SOLUTION INTRAVENOUS at 13:01

## 2022-04-07 RX ADMIN — DOXORUBICIN HYDROCHLORIDE 52 MG: 2 INJECTION, SOLUTION INTRAVENOUS at 14:28

## 2022-04-07 RX ADMIN — DEXAMETHASONE SODIUM PHOSPHATE 12 MG: 10 INJECTION, SOLUTION INTRAMUSCULAR; INTRAVENOUS at 13:01

## 2022-04-07 NOTE — PROGRESS NOTES
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  male presents today for follow-up of his lymphoma.  He completed 3 cycle of CHP-Brentuximab.  His last cycle was complicated by significant diarrhea and also starting to have more neuropathy issues.  He denies infections of.  Though he did come with febrile neutropenia also.  He denies any issues with headaches.    Past medical history, social history and family history was reviewed 04/07/22 and unchanged from prior visit.    Review of Systems:    Review of Systems   Constitutional: Positive for appetite change and fatigue.   HENT:  Negative.    Eyes: Negative.    Respiratory: Negative.    Cardiovascular: Negative.    Gastrointestinal: Positive for diarrhea.   Endocrine: Negative.    Genitourinary: Negative.     Musculoskeletal: Negative.    Skin: Negative.    Neurological: Positive for numbness.   Hematological: Negative.    Psychiatric/Behavioral: Positive for sleep disturbance.            Medications:        Current Outpatient Medications:   •  albuterol sulfate  (90 Base) MCG/ACT inhaler, Inhale 2 puffs Every 4 (Four) Hours As Needed for Wheezing., Disp: 8 g, Rfl: 3  •  Cholecalciferol (VITAMIN D) 1000 UNITS tablet, Take 1 capsule by mouth Daily., Disp: , Rfl:   •  Cyanocobalamin (VITAMIN B-12 ER) 1000 MCG tablet controlled-release, Take 1,000 mcg by mouth Daily., Disp: , Rfl:   •  glimepiride (AMARYL) 4  "MG tablet, TAKE ONE TABLET BY MOUTH DAILY WITH DINNER, Disp: 90 tablet, Rfl: 1  •  HYDROcodone-acetaminophen (NORCO) 7.5-325 MG per tablet, Norco 7.5 mg-325 mg tablet  Take 1 tablet(s) EVERY 6 HOURS by oral route as needed for severe pain for 7 days, Disp: , Rfl:   •  Insulin Syringe 28G X 1/2\" 1 ML misc, 1 each 2 (Two) Times a Day., Disp: 100 each, Rfl: 5  •  lidocaine-prilocaine (EMLA) 2.5-2.5 % cream, Apply 1 application topically to the appropriate area as directed As Needed (45-60 minutes prior to port access.  Cover with saran/plastic wrap.)., Disp: 30 g, Rfl: 3  •  metFORMIN (GLUCOPHAGE) 1000 MG tablet, TAKE ONE TABLET BY MOUTH IN THE MORNING AND 1 AND 1/2 TABLETS AT BEDTIME, Disp: 75 tablet, Rfl: 5  •  Multiple Vitamins-Minerals (MULTI VITAMIN/MINERALS PO), Take 1 tablet by mouth Daily., Disp: , Rfl:   •  Multiple Vitamins-Minerals (PRESERVISION AREDS 2 PO), Take  by mouth 2 (Two) Times a Day., Disp: , Rfl:   •  omeprazole (priLOSEC) 20 MG capsule, Take 1 capsule by mouth Every Night., Disp: , Rfl:   •  ondansetron (ZOFRAN) 8 MG tablet, Take 1 tablet by mouth 3 (Three) Times a Day As Needed for Nausea or Vomiting., Disp: 30 tablet, Rfl: 5  •  simvastatin (ZOCOR) 10 MG tablet, Take 1 tablet by mouth every night at bedtime., Disp: 90 tablet, Rfl: 1  •  traMADol (ULTRAM) 50 MG tablet, Take 1 tablet by mouth Every 8 (Eight) Hours As Needed for Moderate Pain ., Disp: 60 tablet, Rfl: 2  No current facility-administered medications for this visit.    Facility-Administered Medications Ordered in Other Visits:   •  dexamethasone (DECADRON) IVPB 12 mg, 12 mg, Intravenous, Once, Ai Henderson MD, Last Rate: 200 mL/hr at 04/07/22 1301, 12 mg at 04/07/22 1301  •  fosaprepitant (EMEND) 150 mg/100mL NS, 150 mg, Intravenous, Once, Ai Henderson MD, Last Rate: 200 mL/hr at 04/07/22 1301, 150 mg at 04/07/22 1301  •  heparin injection 500 Units, 500 Units, Intravenous, PRN, Ai Henderson MD  •  sodium " "chloride 0.9 % flush 10 mL, 10 mL, Intravenous, PRN, Ai Henderson MD    Pain Medications             HYDROcodone-acetaminophen (NORCO) 7.5-325 MG per tablet Norco 7.5 mg-325 mg tablet   Take 1 tablet(s) EVERY 6 HOURS by oral route as needed for severe pain for 7 days    traMADol (ULTRAM) 50 MG tablet Take 1 tablet by mouth Every 8 (Eight) Hours As Needed for Moderate Pain .             ALLERGIES:    Allergies   Allergen Reactions   • Bactrim [Sulfamethoxazole-Trimethoprim] Other (See Comments)     Joint pain   • Sulfa Antibiotics GI Intolerance     Makes bones hurt     • Tegaderm Chg Dressing [Chlorhexidine] Itching and Other (See Comments)     redness         Physical Exam    VITAL SIGNS:  /77   Pulse 118   Temp 98.2 °F (36.8 °C) (Temporal)   Resp 16   Ht 177.8 cm (70\")   Wt 89.9 kg (198 lb 1.6 oz)   SpO2 97%   BMI 28.42 kg/m²     ECOG score: 0           Wt Readings from Last 3 Encounters:   04/07/22 89.9 kg (198 lb 1.6 oz)   04/06/22 89.8 kg (198 lb)   03/25/22 96.6 kg (213 lb)       Body mass index is 28.42 kg/m². Body surface area is 2.08 meters squared.    Pain Score    04/07/22 1140   PainSc: 0-No pain           Performance Status: 0    General: well appearing, in no acute distress  HEENT: Swelling on the right side of his face consistent with his lymphomatous process.  Lymphatics: no cervical, supraclavicular, or axillary adenopathy  Cardiovascular: regular rate and rhythm, no murmurs, rubs or gallops  Lungs: clear to auscultation bilaterally  Abdomen: soft, nontender, nondistended.  No palpable organomegaly  Extremities: no lower extremity edema  Skin: no rashes, lesions, bruising, or petechiae  Msk:  Shows no weakness of the large muscle groups  Psych: Mood is stable        RECENT LABS:    Lab Results   Component Value Date    HGB 11.3 (L) 04/06/2022    HCT 36.1 (L) 04/06/2022    MCV 96.5 04/06/2022     04/06/2022    WBC 10.80 04/06/2022    NEUTROABS 7.80 (H) 04/06/2022    " LYMPHSABS 2.00 04/06/2022    MONOSABS 1.10 (H) 04/06/2022    EOSABS 0.00 03/28/2022    BASOSABS 0.07 03/28/2022       Lab Results   Component Value Date    GLUCOSE 105 (H) 04/06/2022    BUN 26 (H) 04/06/2022    CREATININE 1.51 (H) 04/06/2022     04/06/2022    K 4.3 04/06/2022    CL 98 04/06/2022    CO2 25.0 04/06/2022    CALCIUM 8.4 (L) 04/06/2022    PROTEINTOT 6.7 04/06/2022    ALBUMIN 4.00 04/06/2022    BILITOT 0.4 04/06/2022    ALKPHOS 84 04/06/2022    AST 41 (H) 04/06/2022    ALT 45 (H) 04/06/2022       CT Head With Contrast    Result Date: 4/4/2022  1.  There are some white matter changes noted which could reflect more chronic small vessel ischemic change. 2.  Soft tissue nodule in the subcutaneous soft tissues of the right facial area which seems smaller as compared to prior study.  This report was finalized on 4/4/2022 12:19 PM by Howard Cervantes MD.            NM PET/CT Skull Base to Mid Thigh    Result Date: 1/13/2022  Area of postsurgical change right facial soft tissues overlying the right temporalis musculature inferiorly. On the posterior aspect of the surgical region and near the angle of the mandible on the lateral margin is a somewhat indeterminate intermediate soft tissue density focus mass with SUV 3.2 with attention on followup to exclude residual or local disease. Additionally, asymmetric likely intraparotid node of the anterior superficial lobe parotid gland right maximum SUV 3.6 without separate abnormal hypermetabolism.  D:  01/11/2022 E:  01/12/2022    This report was finalized on 1/13/2022 8:20 AM by Dr. Damian Zaidi.            Assessment/Plan    1.  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  I reviewed his scans which shows nice response.  Continue with CHP-Brentuximab.  I am going to reduce his Brentuximab to 1.2 mg/kg.  Otherwise I am going to maintain his regimen.  He is receiving his fourth of 6 cycles.  May consider radiotherapy to his facedisease  process.  Fsince we are dealing with a fairly significant      2.  Immunocompromise state.  He got 1 dose of his Covid vaccination.  He also received Evusheld.    3.  Orthostatic.  Seems much better.          Total time of patient care on day of service including time prior to, face to face with patient, and following visit spent in reviewing records, lab results, imaging studies, discussion with patient, and documentation/charting was > 32 minutes.     Ai Henderson MD  [facility]    Return on: 04/28/22  Return in (Approximately): Schedule with next infusion, 3 weeks    No orders of the defined types were placed in this encounter.      4/7/2022

## 2022-04-07 NOTE — PROGRESS NOTES
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     Follow up with patient during his infusion appointment.  Patient reports he received written diet materials.   He states he is using the baking soda / salt water mouth rinse regularly and that it is helping his taste changes.  He reports his oral intake / appetite remains decreased but he is drinking 1-2 ONS daily to aid with intake.  He reports having diarrhea intermittently but is able to manage it well with imodium.  He states his blood sugars remain elevated, especially with steroids.    Advised him to continue eating smaller portions more frequently throughout the day to aid with oral intake and potential diarrhea management.  Recommended he continue drinking ONS daily to aid with calorie and protein intake and to increase to 2-3 daily if appetite / oral intake decreases.  Discussed different types of ONS and provided samples of Ensure Complete as well as coupons for Ensure products.  Encouraged him to continue using the baking soda / salt multiple times throughout the day to aid with taste changes and oral care.  Advised him to increase his intake of hydrating fluids (water, G2 or Gatorade Zero) to at least 64 ounces daily.  Encouraged him to continue checking blood sugars regularly and take medications as prescribed.     He denies nutritional questions at this time.  Advised him to contact RD if questions arise.  He voiced understanding of information discussed.  Will continue to monitor as needed during his  treatment course.     Lissy Collazo, VAN  04/07/22

## 2022-04-08 ENCOUNTER — HOSPITAL ENCOUNTER (OUTPATIENT)
Dept: ONCOLOGY | Facility: HOSPITAL | Age: 77
Setting detail: INFUSION SERIES
Discharge: HOME | End: 2022-04-08

## 2022-04-08 VITALS
HEART RATE: 113 BPM | RESPIRATION RATE: 16 BRPM | DIASTOLIC BLOOD PRESSURE: 64 MMHG | BODY MASS INDEX: 28.55 KG/M2 | SYSTOLIC BLOOD PRESSURE: 139 MMHG | TEMPERATURE: 97.5 F | WEIGHT: 199 LBS

## 2022-04-08 DIAGNOSIS — C84.71 ANAPLASTIC ALK-NEGATIVE LARGE CELL LYMPHOMA OF LYMPH NODE OF HEAD: Primary | ICD-10-CM

## 2022-04-08 PROCEDURE — 25010000002 PEGFILGRASTIM 6 MG/0.6ML SOLUTION PREFILLED SYRINGE: Performed by: INTERNAL MEDICINE

## 2022-04-08 PROCEDURE — 96372 THER/PROPH/DIAG INJ SC/IM: CPT

## 2022-04-08 RX ADMIN — PEGFILGRASTIM 6 MG: 6 INJECTION SUBCUTANEOUS at 16:32

## 2022-04-08 NOTE — ADDENDUM NOTE
Encounter addended by: Lissy Rosario RN on: 4/8/2022 4:44 PM   Actions taken: Flowsheet accepted No Residual Tumor Seen Histology Text: There was no skin cancer in the sections examined.

## 2022-04-18 ENCOUNTER — OFFICE VISIT (OUTPATIENT)
Dept: INTERNAL MEDICINE | Facility: CLINIC | Age: 77
End: 2022-04-18

## 2022-04-18 VITALS
OXYGEN SATURATION: 98 % | BODY MASS INDEX: 26.94 KG/M2 | SYSTOLIC BLOOD PRESSURE: 100 MMHG | DIASTOLIC BLOOD PRESSURE: 52 MMHG | WEIGHT: 188.2 LBS | HEIGHT: 70 IN | HEART RATE: 133 BPM

## 2022-04-18 DIAGNOSIS — E11.65 TYPE 2 DIABETES MELLITUS WITH HYPERGLYCEMIA, WITH LONG-TERM CURRENT USE OF INSULIN: ICD-10-CM

## 2022-04-18 DIAGNOSIS — R25.1 TREMORS OF NERVOUS SYSTEM: Primary | ICD-10-CM

## 2022-04-18 DIAGNOSIS — T45.1X5A CHEMOTHERAPY-INDUCED NEUROPATHY: ICD-10-CM

## 2022-04-18 DIAGNOSIS — Z79.4 TYPE 2 DIABETES MELLITUS WITH HYPERGLYCEMIA, WITH LONG-TERM CURRENT USE OF INSULIN: ICD-10-CM

## 2022-04-18 DIAGNOSIS — G62.0 CHEMOTHERAPY-INDUCED NEUROPATHY: ICD-10-CM

## 2022-04-18 PROCEDURE — 99214 OFFICE O/P EST MOD 30 MIN: CPT | Performed by: INTERNAL MEDICINE

## 2022-04-18 RX ORDER — GABAPENTIN 300 MG/1
300 CAPSULE ORAL NIGHTLY
Qty: 30 CAPSULE | Refills: 2 | Status: SHIPPED | OUTPATIENT
Start: 2022-04-18 | End: 2022-07-18

## 2022-04-18 RX ORDER — PRIMIDONE 50 MG/1
50 TABLET ORAL 2 TIMES DAILY
Qty: 60 TABLET | Refills: 2 | Status: SHIPPED | OUTPATIENT
Start: 2022-04-18 | End: 2022-06-06

## 2022-04-18 RX ORDER — GLIMEPIRIDE 4 MG/1
4 TABLET ORAL
Qty: 90 TABLET | Refills: 1 | Status: SHIPPED | OUTPATIENT
Start: 2022-04-18 | End: 2022-10-24

## 2022-04-18 RX ORDER — DIPHENHYDRAMINE HCL 25 MG
50 CAPSULE ORAL NIGHTLY PRN
COMMUNITY
End: 2022-05-26

## 2022-04-18 NOTE — PROGRESS NOTES
"Diabetes (Last A1c was 8.9 on 3/25/22), Weakness - Generalized, and hand tremor    Subjective   Gabriela Mar is a 76 y.o. male is here today for follow-up.    History of Present Illness   Accompanied by his sister. Getting chemo and radiation. Was in the hospital for malnutrition and weakness.  Has been drinking liquid IV, off all the BP meds.  Taking insulin depending on his sugars, takes between 60- 70 units.  Increases it when he takes his steroids.  Sugars are better since his hospitalization. Was 149 today. Has taken an ensure.  Does not take insulin regularly.  Checks glucose in the morning, and takes insulin if sugars are up. And again between 6.30 and 7 pm, and if > 200, takes 40- 50 units.  Hands and feet have more numbness and tingling     Also tremors are worse, would like some help with that.    Current Outpatient Medications:   •  albuterol sulfate  (90 Base) MCG/ACT inhaler, Inhale 2 puffs Every 4 (Four) Hours As Needed for Wheezing., Disp: 8 g, Rfl: 3  •  Cholecalciferol (VITAMIN D) 1000 UNITS tablet, Take 1 capsule by mouth Daily., Disp: , Rfl:   •  Cyanocobalamin (VITAMIN B-12 ER) 1000 MCG tablet controlled-release, Take 1,000 mcg by mouth Daily., Disp: , Rfl:   •  diphenhydrAMINE (BENADRYL) 25 mg capsule, Take 50 mg by mouth At Night As Needed for Itching., Disp: , Rfl:   •  glimepiride (AMARYL) 4 MG tablet, Take 1 tablet by mouth Daily With Dinner., Disp: 90 tablet, Rfl: 1  •  HYDROcodone-acetaminophen (NORCO) 7.5-325 MG per tablet, Norco 7.5 mg-325 mg tablet  Take 1 tablet(s) EVERY 6 HOURS by oral route as needed for severe pain for 7 days, Disp: , Rfl:   •  Insulin Syringe 28G X 1/2\" 1 ML misc, 1 each 2 (Two) Times a Day., Disp: 100 each, Rfl: 5  •  lidocaine-prilocaine (EMLA) 2.5-2.5 % cream, Apply 1 application topically to the appropriate area as directed As Needed (45-60 minutes prior to port access.  Cover with saran/plastic wrap.)., Disp: 30 g, Rfl: 3  •  Multiple " Vitamins-Minerals (MULTI VITAMIN/MINERALS PO), Take 1 tablet by mouth Daily., Disp: , Rfl:   •  Multiple Vitamins-Minerals (PRESERVISION AREDS 2 PO), Take  by mouth 2 (Two) Times a Day., Disp: , Rfl:   •  omeprazole (priLOSEC) 20 MG capsule, Take 1 capsule by mouth Every Night., Disp: , Rfl:   •  ondansetron (ZOFRAN) 8 MG tablet, Take 1 tablet by mouth 3 (Three) Times a Day As Needed for Nausea or Vomiting., Disp: 30 tablet, Rfl: 5  •  simvastatin (ZOCOR) 10 MG tablet, Take 1 tablet by mouth every night at bedtime., Disp: 90 tablet, Rfl: 1  •  traMADol (ULTRAM) 50 MG tablet, Take 1 tablet by mouth Every 8 (Eight) Hours As Needed for Moderate Pain ., Disp: 60 tablet, Rfl: 2  •  gabapentin (NEURONTIN) 300 MG capsule, Take 1 capsule by mouth Every Night., Disp: 30 capsule, Rfl: 2  •  metFORMIN (GLUCOPHAGE) 1000 MG tablet, TAKE ONE TABLET BY MOUTH IN THE MORNING AND TAKE ONE AND ONE-HALF TABLETS BY MOUTH AT BEDTIME, Disp: 75 tablet, Rfl: 5  •  primidone (MYSOLINE) 50 MG tablet, Take 1 tablet by mouth 2 (Two) Times a Day., Disp: 60 tablet, Rfl: 2      The following portions of the patient's history were reviewed and updated as appropriate: allergies, current medications, past family history, past medical history, past social history, past surgical history and problem list.    Review of Systems   Constitutional: Positive for activity change, appetite change, fatigue and unexpected weight change. Negative for chills and fever.   HENT: Negative for ear discharge, ear pain, sinus pressure and sore throat.    Respiratory: Negative for cough, chest tightness and shortness of breath.    Cardiovascular: Negative for chest pain, palpitations and leg swelling.   Gastrointestinal: Positive for nausea and vomiting. Negative for diarrhea.   Musculoskeletal: Negative for arthralgias, back pain and myalgias.   Neurological: Positive for weakness. Negative for dizziness, syncope and headaches.   Psychiatric/Behavioral: Negative for  "confusion and sleep disturbance.       Objective   /52   Pulse (!) 133   Ht 177.8 cm (70\")   Wt 85.4 kg (188 lb 3.2 oz)   SpO2 98% Comment: ra  BMI 27.00 kg/m²   Physical Exam  Vitals and nursing note reviewed.   Constitutional:       Appearance: He is well-developed. He is ill-appearing.   HENT:      Head: Normocephalic and atraumatic.      Right Ear: External ear normal.      Left Ear: External ear normal.      Mouth/Throat:      Pharynx: No oropharyngeal exudate.   Eyes:      Conjunctiva/sclera: Conjunctivae normal.      Pupils: Pupils are equal, round, and reactive to light.   Neck:      Thyroid: No thyromegaly.   Cardiovascular:      Rate and Rhythm: Normal rate and regular rhythm.      Pulses: Normal pulses.      Heart sounds: Normal heart sounds. No murmur heard.    No friction rub. No gallop.   Pulmonary:      Effort: Pulmonary effort is normal.      Breath sounds: Normal breath sounds.   Abdominal:      General: Bowel sounds are normal. There is no distension.      Palpations: Abdomen is soft.      Tenderness: There is no abdominal tenderness.   Musculoskeletal:      Cervical back: Neck supple.   Skin:     General: Skin is warm and dry.      Coloration: Skin is pale.   Neurological:      Mental Status: He is alert and oriented to person, place, and time.      Cranial Nerves: No cranial nerve deficit.   Psychiatric:         Judgment: Judgment normal.           Results for orders placed or performed during the hospital encounter of 04/06/22   Comprehensive metabolic panel    Specimen: Blood   Result Value Ref Range    Glucose 105 (H) 65 - 99 mg/dL    BUN 26 (H) 8 - 23 mg/dL    Creatinine 1.51 (H) 0.76 - 1.27 mg/dL    Sodium 137 136 - 145 mmol/L    Potassium 4.3 3.5 - 5.2 mmol/L    Chloride 98 98 - 107 mmol/L    CO2 25.0 22.0 - 29.0 mmol/L    Calcium 8.4 (L) 8.6 - 10.5 mg/dL    Total Protein 6.7 6.0 - 8.5 g/dL    Albumin 4.00 3.50 - 5.20 g/dL    ALT (SGPT) 45 (H) 1 - 41 U/L    AST (SGOT) 41 (H) 1 - " 40 U/L    Alkaline Phosphatase 84 39 - 117 U/L    Total Bilirubin 0.4 0.0 - 1.2 mg/dL    Globulin 2.7 gm/dL    A/G Ratio 1.5 g/dL    BUN/Creatinine Ratio 17.2 7.0 - 25.0    Anion Gap 14.0 5.0 - 15.0 mmol/L    eGFR 47.3 (L) >60.0 mL/min/1.73   CBC Auto Differential    Specimen: Blood   Result Value Ref Range    WBC 10.80 3.40 - 10.80 10*3/mm3    RBC 3.74 (L) 4.14 - 5.80 10*6/mm3    Hemoglobin 11.3 (L) 13.0 - 17.7 g/dL    Hematocrit 36.1 (L) 37.5 - 51.0 %    RDW 20.5 (H) 12.3 - 15.4 %    MCV 96.5 79.0 - 97.0 fL    MCH 30.3 26.6 - 33.0 pg    MCHC 31.4 (L) 31.5 - 35.7 g/dL    MPV 8.2 6.0 - 12.0 fL    Platelets 203 140 - 450 10*3/mm3    Neutrophil % 71.9 42.7 - 76.0 %    Lymphocyte % 18.1 (L) 19.6 - 45.3 %    Monocyte % 10.0 5.0 - 12.0 %    Neutrophils, Absolute 7.80 (H) 1.70 - 7.00 10*3/mm3    Lymphocytes, Absolute 2.00 0.70 - 3.10 10*3/mm3    Monocytes, Absolute 1.10 (H) 0.10 - 0.90 10*3/mm3             Assessment/Plan   Diagnoses and all orders for this visit:    Tremors of nervous system  -     primidone (MYSOLINE) 50 MG tablet; Take 1 tablet by mouth 2 (Two) Times a Day.    Type 2 diabetes mellitus with hyperglycemia, with long-term current use of insulin (HCC)  -     glimepiride (AMARYL) 4 MG tablet; Take 1 tablet by mouth Daily With Dinner.    Chemotherapy-induced neuropathy (HCC)  -     gabapentin (NEURONTIN) 300 MG capsule; Take 1 capsule by mouth Every Night.    Other orders  -     diphenhydrAMINE (BENADRYL) 25 mg capsule; Take 50 mg by mouth At Night As Needed for Itching.             Adv to increase ensure to bid or tid.  Labs prior to next chemo infusion.    Adv to take his insulin more regularly.      Return in about 2 months (around 6/18/2022) for Next scheduled follow up, 2-3 months.    Electronically signed by:    Krystina Gee MD

## 2022-04-19 DIAGNOSIS — E11.65 TYPE 2 DIABETES MELLITUS WITH HYPERGLYCEMIA, WITH LONG-TERM CURRENT USE OF INSULIN: ICD-10-CM

## 2022-04-19 DIAGNOSIS — Z79.4 TYPE 2 DIABETES MELLITUS WITH HYPERGLYCEMIA, WITH LONG-TERM CURRENT USE OF INSULIN: ICD-10-CM

## 2022-04-26 DIAGNOSIS — C84.71 ANAPLASTIC ALK-NEGATIVE LARGE CELL LYMPHOMA OF LYMPH NODE OF HEAD: Primary | ICD-10-CM

## 2022-04-27 ENCOUNTER — HOSPITAL ENCOUNTER (OUTPATIENT)
Dept: ONCOLOGY | Facility: HOSPITAL | Age: 77
Setting detail: INFUSION SERIES
Discharge: HOME | End: 2022-04-27

## 2022-04-27 VITALS
SYSTOLIC BLOOD PRESSURE: 124 MMHG | RESPIRATION RATE: 16 BRPM | TEMPERATURE: 98.4 F | HEIGHT: 70 IN | WEIGHT: 198.6 LBS | HEART RATE: 108 BPM | BODY MASS INDEX: 28.43 KG/M2 | DIASTOLIC BLOOD PRESSURE: 55 MMHG

## 2022-04-27 DIAGNOSIS — C84.71 ANAPLASTIC ALK-NEGATIVE LARGE CELL LYMPHOMA OF LYMPH NODE OF HEAD: Primary | ICD-10-CM

## 2022-04-27 DIAGNOSIS — Z95.828 PORT-A-CATH IN PLACE: ICD-10-CM

## 2022-04-27 LAB
ALBUMIN SERPL-MCNC: 3.5 G/DL (ref 3.5–5.2)
ALBUMIN/GLOB SERPL: 1.5 G/DL
ALP SERPL-CCNC: 84 U/L (ref 39–117)
ALT SERPL W P-5'-P-CCNC: 23 U/L (ref 1–41)
ANION GAP SERPL CALCULATED.3IONS-SCNC: 11 MMOL/L (ref 5–15)
AST SERPL-CCNC: 31 U/L (ref 1–40)
BILIRUB SERPL-MCNC: 0.2 MG/DL (ref 0–1.2)
BUN SERPL-MCNC: 17 MG/DL (ref 8–23)
BUN/CREAT SERPL: 17 (ref 7–25)
CALCIUM SPEC-SCNC: 8.3 MG/DL (ref 8.6–10.5)
CHLORIDE SERPL-SCNC: 98 MMOL/L (ref 98–107)
CO2 SERPL-SCNC: 25 MMOL/L (ref 22–29)
CREAT SERPL-MCNC: 1 MG/DL (ref 0.76–1.27)
EGFRCR SERPLBLD CKD-EPI 2021: 77.5 ML/MIN/1.73
ERYTHROCYTE [DISTWIDTH] IN BLOOD BY AUTOMATED COUNT: 21.5 % (ref 12.3–15.4)
GLOBULIN UR ELPH-MCNC: 2.4 GM/DL
GLUCOSE SERPL-MCNC: 273 MG/DL (ref 65–99)
HCT VFR BLD AUTO: 29.5 % (ref 37.5–51)
HGB BLD-MCNC: 9.4 G/DL (ref 13–17.7)
LYMPHOCYTES # BLD AUTO: 0.9 10*3/MM3 (ref 0.7–3.1)
LYMPHOCYTES NFR BLD AUTO: 14.4 % (ref 19.6–45.3)
MCH RBC QN AUTO: 31.9 PG (ref 26.6–33)
MCHC RBC AUTO-ENTMCNC: 32 G/DL (ref 31.5–35.7)
MCV RBC AUTO: 99.7 FL (ref 79–97)
MONOCYTES # BLD AUTO: 0.5 10*3/MM3 (ref 0.1–0.9)
MONOCYTES NFR BLD AUTO: 8.3 % (ref 5–12)
NEUTROPHILS NFR BLD AUTO: 4.8 10*3/MM3 (ref 1.7–7)
NEUTROPHILS NFR BLD AUTO: 77.3 % (ref 42.7–76)
PLATELET # BLD AUTO: 120 10*3/MM3 (ref 140–450)
PMV BLD AUTO: 8.3 FL (ref 6–12)
POTASSIUM SERPL-SCNC: 4.7 MMOL/L (ref 3.5–5.2)
PROT SERPL-MCNC: 5.9 G/DL (ref 6–8.5)
RBC # BLD AUTO: 2.96 10*6/MM3 (ref 4.14–5.8)
SODIUM SERPL-SCNC: 134 MMOL/L (ref 136–145)
WBC NRBC COR # BLD: 6.2 10*3/MM3 (ref 3.4–10.8)

## 2022-04-27 PROCEDURE — 80053 COMPREHEN METABOLIC PANEL: CPT | Performed by: INTERNAL MEDICINE

## 2022-04-27 PROCEDURE — 36591 DRAW BLOOD OFF VENOUS DEVICE: CPT

## 2022-04-27 PROCEDURE — 85025 COMPLETE CBC W/AUTO DIFF WBC: CPT | Performed by: INTERNAL MEDICINE

## 2022-04-27 PROCEDURE — 25010000002 HEPARIN LOCK FLUSH PER 10 UNITS: Performed by: INTERNAL MEDICINE

## 2022-04-27 RX ORDER — HEPARIN SODIUM (PORCINE) LOCK FLUSH IV SOLN 100 UNIT/ML 100 UNIT/ML
500 SOLUTION INTRAVENOUS AS NEEDED
Status: CANCELLED | OUTPATIENT
Start: 2022-04-27

## 2022-04-27 RX ORDER — SODIUM CHLORIDE 0.9 % (FLUSH) 0.9 %
10 SYRINGE (ML) INJECTION AS NEEDED
Status: CANCELLED | OUTPATIENT
Start: 2022-04-27

## 2022-04-27 RX ORDER — HEPARIN SODIUM (PORCINE) LOCK FLUSH IV SOLN 100 UNIT/ML 100 UNIT/ML
500 SOLUTION INTRAVENOUS AS NEEDED
Status: DISCONTINUED | OUTPATIENT
Start: 2022-04-27 | End: 2022-04-28 | Stop reason: HOSPADM

## 2022-04-27 RX ADMIN — HEPARIN SODIUM (PORCINE) LOCK FLUSH IV SOLN 100 UNIT/ML 500 UNITS: 100 SOLUTION at 14:00

## 2022-04-28 ENCOUNTER — OFFICE VISIT (OUTPATIENT)
Dept: ONCOLOGY | Facility: CLINIC | Age: 77
End: 2022-04-28

## 2022-04-28 ENCOUNTER — HOSPITAL ENCOUNTER (OUTPATIENT)
Dept: ONCOLOGY | Facility: HOSPITAL | Age: 77
Setting detail: INFUSION SERIES
Discharge: HOME | End: 2022-04-28

## 2022-04-28 VITALS
WEIGHT: 200.5 LBS | SYSTOLIC BLOOD PRESSURE: 149 MMHG | DIASTOLIC BLOOD PRESSURE: 71 MMHG | OXYGEN SATURATION: 99 % | TEMPERATURE: 97.6 F | RESPIRATION RATE: 16 BRPM | BODY MASS INDEX: 28.71 KG/M2 | HEIGHT: 70 IN | HEART RATE: 103 BPM

## 2022-04-28 DIAGNOSIS — Z95.828 PORT-A-CATH IN PLACE: ICD-10-CM

## 2022-04-28 DIAGNOSIS — C84.71 ANAPLASTIC ALK-NEGATIVE LARGE CELL LYMPHOMA OF LYMPH NODE OF HEAD: Primary | ICD-10-CM

## 2022-04-28 PROCEDURE — 25010000002 CYCLOPHOSPHAMIDE 1 GM/5ML SOLUTION 5 ML VIAL: Performed by: INTERNAL MEDICINE

## 2022-04-28 PROCEDURE — 96413 CHEMO IV INFUSION 1 HR: CPT

## 2022-04-28 PROCEDURE — 25010000002 DEXAMETHASONE SODIUM PHOSPHATE 100 MG/10ML SOLUTION: Performed by: INTERNAL MEDICINE

## 2022-04-28 PROCEDURE — 96417 CHEMO IV INFUS EACH ADDL SEQ: CPT

## 2022-04-28 PROCEDURE — 25010000002 PALONOSETRON 0.25 MG/5ML SOLUTION PREFILLED SYRINGE: Performed by: INTERNAL MEDICINE

## 2022-04-28 PROCEDURE — 25010000002 HEPARIN LOCK FLUSH PER 10 UNITS: Performed by: INTERNAL MEDICINE

## 2022-04-28 PROCEDURE — 25010000002 FOSAPREPITANT PER 1 MG: Performed by: INTERNAL MEDICINE

## 2022-04-28 PROCEDURE — 25010000002 BRENTUXIMAB 50 MG RECONSTITUTED SOLUTION 1 EACH VIAL: Performed by: INTERNAL MEDICINE

## 2022-04-28 PROCEDURE — 96411 CHEMO IV PUSH ADDL DRUG: CPT

## 2022-04-28 PROCEDURE — 25010000002 DOXORUBICIN PER 10 MG: Performed by: INTERNAL MEDICINE

## 2022-04-28 PROCEDURE — 96367 TX/PROPH/DG ADDL SEQ IV INF: CPT

## 2022-04-28 PROCEDURE — 99214 OFFICE O/P EST MOD 30 MIN: CPT | Performed by: INTERNAL MEDICINE

## 2022-04-28 PROCEDURE — 96375 TX/PRO/DX INJ NEW DRUG ADDON: CPT

## 2022-04-28 RX ORDER — SODIUM CHLORIDE 9 MG/ML
250 INJECTION, SOLUTION INTRAVENOUS ONCE
Status: DISCONTINUED | OUTPATIENT
Start: 2022-04-28 | End: 2022-04-29 | Stop reason: HOSPADM

## 2022-04-28 RX ORDER — PALONOSETRON 0.05 MG/ML
0.25 INJECTION, SOLUTION INTRAVENOUS ONCE
Status: COMPLETED | OUTPATIENT
Start: 2022-04-28 | End: 2022-04-28

## 2022-04-28 RX ORDER — SODIUM CHLORIDE 9 MG/ML
250 INJECTION, SOLUTION INTRAVENOUS ONCE
Status: CANCELLED | OUTPATIENT
Start: 2022-05-19

## 2022-04-28 RX ORDER — OLANZAPINE 5 MG/1
5 TABLET ORAL ONCE
Status: CANCELLED | OUTPATIENT
Start: 2022-04-28 | End: 2022-04-28

## 2022-04-28 RX ORDER — HEPARIN SODIUM (PORCINE) LOCK FLUSH IV SOLN 100 UNIT/ML 100 UNIT/ML
500 SOLUTION INTRAVENOUS AS NEEDED
Status: CANCELLED | OUTPATIENT
Start: 2022-04-28

## 2022-04-28 RX ORDER — SODIUM CHLORIDE 9 MG/ML
250 INJECTION, SOLUTION INTRAVENOUS ONCE
Status: CANCELLED | OUTPATIENT
Start: 2022-04-28

## 2022-04-28 RX ORDER — HEPARIN SODIUM (PORCINE) LOCK FLUSH IV SOLN 100 UNIT/ML 100 UNIT/ML
500 SOLUTION INTRAVENOUS AS NEEDED
Status: DISCONTINUED | OUTPATIENT
Start: 2022-04-28 | End: 2022-04-29 | Stop reason: HOSPADM

## 2022-04-28 RX ORDER — DOXORUBICIN HYDROCHLORIDE 2 MG/ML
50 INJECTION, SOLUTION INTRAVENOUS ONCE
Status: CANCELLED | OUTPATIENT
Start: 2022-04-28

## 2022-04-28 RX ORDER — PALONOSETRON 0.05 MG/ML
0.25 INJECTION, SOLUTION INTRAVENOUS ONCE
Status: CANCELLED | OUTPATIENT
Start: 2022-05-19

## 2022-04-28 RX ORDER — OLANZAPINE 5 MG/1
5 TABLET ORAL ONCE
Status: CANCELLED | OUTPATIENT
Start: 2022-05-19 | End: 2022-05-19

## 2022-04-28 RX ORDER — OLANZAPINE 5 MG/1
5 TABLET ORAL ONCE
Status: COMPLETED | OUTPATIENT
Start: 2022-04-28 | End: 2022-04-28

## 2022-04-28 RX ORDER — DOXORUBICIN HYDROCHLORIDE 2 MG/ML
50 INJECTION, SOLUTION INTRAVENOUS ONCE
Status: COMPLETED | OUTPATIENT
Start: 2022-04-28 | End: 2022-04-28

## 2022-04-28 RX ORDER — PALONOSETRON 0.05 MG/ML
0.25 INJECTION, SOLUTION INTRAVENOUS ONCE
Status: CANCELLED | OUTPATIENT
Start: 2022-04-28

## 2022-04-28 RX ORDER — DOXORUBICIN HYDROCHLORIDE 2 MG/ML
50 INJECTION, SOLUTION INTRAVENOUS ONCE
Status: CANCELLED | OUTPATIENT
Start: 2022-05-19

## 2022-04-28 RX ORDER — SODIUM CHLORIDE 0.9 % (FLUSH) 0.9 %
10 SYRINGE (ML) INJECTION AS NEEDED
Status: CANCELLED | OUTPATIENT
Start: 2022-04-28

## 2022-04-28 RX ADMIN — PALONOSETRON 0.25 MG: 0.25 INJECTION, SOLUTION INTRAVENOUS at 12:42

## 2022-04-28 RX ADMIN — HEPARIN SODIUM (PORCINE) LOCK FLUSH IV SOLN 100 UNIT/ML 500 UNITS: 100 SOLUTION at 14:43

## 2022-04-28 RX ADMIN — OLANZAPINE 5 MG: 5 TABLET, FILM COATED ORAL at 12:42

## 2022-04-28 RX ADMIN — BRENTUXIMAB VEDOTIN 110 MG: 50 INJECTION, POWDER, LYOPHILIZED, FOR SOLUTION INTRAVENOUS at 14:06

## 2022-04-28 RX ADMIN — CYCLOPHOSPHAMIDE 1560 MG: 200 INJECTION, SOLUTION INTRAVENOUS at 13:31

## 2022-04-28 RX ADMIN — DOXORUBICIN HYDROCHLORIDE 52 MG: 2 INJECTION, SOLUTION INTRAVENOUS at 13:22

## 2022-04-28 RX ADMIN — SODIUM CHLORIDE 150 MG: 9 INJECTION, SOLUTION INTRAVENOUS at 12:42

## 2022-04-28 RX ADMIN — DEXAMETHASONE SODIUM PHOSPHATE 12 MG: 10 INJECTION, SOLUTION INTRAMUSCULAR; INTRAVENOUS at 12:41

## 2022-04-28 NOTE — PROGRESS NOTES
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  male presents today for follow-up of his lymphoma.  He completed 4 cycle of CHP-Brentuximab.   He denies infections. He denies any issues with headaches.    Past medical history, social history and family history was reviewed 04/28/22 and unchanged from prior visit.    Review of Systems:    Review of Systems   Constitutional: Positive for appetite change and fatigue.   HENT:  Negative.    Eyes: Negative.    Respiratory: Negative.    Cardiovascular: Negative.    Gastrointestinal: Negative.    Endocrine: Negative.    Genitourinary: Negative.     Musculoskeletal: Negative.    Skin: Negative.    Neurological: Positive for numbness.   Hematological: Negative.    Psychiatric/Behavioral: Positive for sleep disturbance.            Medications:        Current Outpatient Medications:   •  albuterol sulfate  (90 Base) MCG/ACT inhaler, Inhale 2 puffs Every 4 (Four) Hours As Needed for Wheezing., Disp: 8 g, Rfl: 3  •  Cholecalciferol (VITAMIN D) 1000 UNITS tablet, Take 1 capsule by mouth Daily., Disp: , Rfl:   •  Cyanocobalamin (VITAMIN B-12 ER) 1000 MCG tablet controlled-release, Take 1,000 mcg by mouth Daily., Disp: , Rfl:   •  diphenhydrAMINE (BENADRYL) 25 mg capsule, Take 50 mg by mouth At Night As Needed for Itching., Disp: , Rfl:   •  gabapentin (NEURONTIN) 300 MG capsule, Take 1 capsule by mouth Every Night.,  "Disp: 30 capsule, Rfl: 2  •  glimepiride (AMARYL) 4 MG tablet, Take 1 tablet by mouth Daily With Dinner., Disp: 90 tablet, Rfl: 1  •  HYDROcodone-acetaminophen (NORCO) 7.5-325 MG per tablet, Norco 7.5 mg-325 mg tablet  Take 1 tablet(s) EVERY 6 HOURS by oral route as needed for severe pain for 7 days, Disp: , Rfl:   •  Insulin Syringe 28G X 1/2\" 1 ML misc, 1 each 2 (Two) Times a Day., Disp: 100 each, Rfl: 5  •  lidocaine-prilocaine (EMLA) 2.5-2.5 % cream, Apply 1 application topically to the appropriate area as directed As Needed (45-60 minutes prior to port access.  Cover with saran/plastic wrap.)., Disp: 30 g, Rfl: 3  •  metFORMIN (GLUCOPHAGE) 1000 MG tablet, TAKE ONE TABLET BY MOUTH IN THE MORNING AND TAKE ONE AND ONE-HALF TABLETS BY MOUTH AT BEDTIME, Disp: 75 tablet, Rfl: 5  •  Multiple Vitamins-Minerals (MULTI VITAMIN/MINERALS PO), Take 1 tablet by mouth Daily., Disp: , Rfl:   •  Multiple Vitamins-Minerals (PRESERVISION AREDS 2 PO), Take  by mouth 2 (Two) Times a Day., Disp: , Rfl:   •  omeprazole (priLOSEC) 20 MG capsule, Take 1 capsule by mouth Every Night., Disp: , Rfl:   •  ondansetron (ZOFRAN) 8 MG tablet, Take 1 tablet by mouth 3 (Three) Times a Day As Needed for Nausea or Vomiting., Disp: 30 tablet, Rfl: 5  •  primidone (MYSOLINE) 50 MG tablet, Take 1 tablet by mouth 2 (Two) Times a Day., Disp: 60 tablet, Rfl: 2  •  simvastatin (ZOCOR) 10 MG tablet, Take 1 tablet by mouth every night at bedtime., Disp: 90 tablet, Rfl: 1  •  traMADol (ULTRAM) 50 MG tablet, Take 1 tablet by mouth Every 8 (Eight) Hours As Needed for Moderate Pain ., Disp: 60 tablet, Rfl: 2  No current facility-administered medications for this visit.    Facility-Administered Medications Ordered in Other Visits:   •  brentuximab (ADCETRIS) 110 mg in sodium chloride 0.9 % 132 mL chemo IVPB, 1.2 mg/kg (Treatment Plan Recorded), Intravenous, Once, Ai Henderson MD  •  Cyclophosphamide 1,560 mg in sodium chloride 0.9 % 282.8 mL chemo " "IVPB, 750 mg/m2 (Treatment Plan Recorded), Intravenous, Once, Ai Henderson MD  •  DOXOrubicin (ADRIAMYCIN) chemo injection 104 mg, 50 mg/m2 (Treatment Plan Recorded), Intravenous, Once, Ai Henderson MD, 52 mg at 04/28/22 1322  •  heparin injection 500 Units, 500 Units, Intravenous, PRN, Ai Henderson MD  •  sodium chloride 0.9 % infusion 250 mL, 250 mL, Intravenous, Once, Ai Henderson MD    Pain Medications             gabapentin (NEURONTIN) 300 MG capsule Take 1 capsule by mouth Every Night.    HYDROcodone-acetaminophen (NORCO) 7.5-325 MG per tablet Norco 7.5 mg-325 mg tablet   Take 1 tablet(s) EVERY 6 HOURS by oral route as needed for severe pain for 7 days    primidone (MYSOLINE) 50 MG tablet Take 1 tablet by mouth 2 (Two) Times a Day.    traMADol (ULTRAM) 50 MG tablet Take 1 tablet by mouth Every 8 (Eight) Hours As Needed for Moderate Pain .             ALLERGIES:    Allergies   Allergen Reactions   • Bactrim [Sulfamethoxazole-Trimethoprim] Other (See Comments)     Joint pain   • Sulfa Antibiotics GI Intolerance     Makes bones hurt     • Tegaderm Chg Dressing [Chlorhexidine] Itching and Other (See Comments)     redness         Physical Exam    VITAL SIGNS:  /71   Pulse 103   Temp 97.6 °F (36.4 °C) (Temporal)   Resp 16   Ht 177.8 cm (70\")   Wt 90.9 kg (200 lb 8 oz)   SpO2 99%   BMI 28.77 kg/m²     ECOG score: 0           Wt Readings from Last 3 Encounters:   04/28/22 90.9 kg (200 lb 8 oz)   04/27/22 90.1 kg (198 lb 9.6 oz)   04/18/22 85.4 kg (188 lb 3.2 oz)       Body mass index is 28.77 kg/m². Body surface area is 2.09 meters squared.    Pain Score    04/28/22 1147   PainSc: 0-No pain           Performance Status: 0    General: well appearing, in no acute distress  HEENT: Swelling on the right side of his face consistent with his lymphomatous process.  Lymphatics: no cervical, supraclavicular, or axillary adenopathy  Cardiovascular: regular rate and rhythm, no " murmurs, rubs or gallops  Lungs: clear to auscultation bilaterally  Abdomen: soft, nontender, nondistended.  No palpable organomegaly  Extremities: no lower extremity edema  Skin: no rashes, lesions, bruising, or petechiae  Msk:  Shows no weakness of the large muscle groups  Psych: Mood is stable        RECENT LABS:    Lab Results   Component Value Date    HGB 9.4 (L) 04/27/2022    HCT 29.5 (L) 04/27/2022    MCV 99.7 (H) 04/27/2022     (L) 04/27/2022    WBC 6.20 04/27/2022    NEUTROABS 4.80 04/27/2022    LYMPHSABS 0.90 04/27/2022    MONOSABS 0.50 04/27/2022    EOSABS 0.00 03/28/2022    BASOSABS 0.07 03/28/2022       Lab Results   Component Value Date    GLUCOSE 273 (H) 04/27/2022    BUN 17 04/27/2022    CREATININE 1.00 04/27/2022     (L) 04/27/2022    K 4.7 04/27/2022    CL 98 04/27/2022    CO2 25.0 04/27/2022    CALCIUM 8.3 (L) 04/27/2022    PROTEINTOT 5.9 (L) 04/27/2022    ALBUMIN 3.50 04/27/2022    BILITOT 0.2 04/27/2022    ALKPHOS 84 04/27/2022    AST 31 04/27/2022    ALT 23 04/27/2022       CT Head With Contrast    Result Date: 4/4/2022  1.  There are some white matter changes noted which could reflect more chronic small vessel ischemic change. 2.  Soft tissue nodule in the subcutaneous soft tissues of the right facial area which seems smaller as compared to prior study.  This report was finalized on 4/4/2022 12:19 PM by Howard Cervantes MD.            NM PET/CT Skull Base to Mid Thigh    Result Date: 1/13/2022  Area of postsurgical change right facial soft tissues overlying the right temporalis musculature inferiorly. On the posterior aspect of the surgical region and near the angle of the mandible on the lateral margin is a somewhat indeterminate intermediate soft tissue density focus mass with SUV 3.2 with attention on followup to exclude residual or local disease. Additionally, asymmetric likely intraparotid node of the anterior superficial lobe parotid gland right maximum SUV 3.6 without separate  abnormal hypermetabolism.  D:  01/11/2022 E:  01/12/2022    This report was finalized on 1/13/2022 8:20 AM by Dr. Damian Zaidi.            Assessment/Plan    1.  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  He has completed 4 cycles of treatment.  His fourth was dose reduced due to some toxicity of neuropathy.  However he is tolerating it better.  We will continue for 6 cycles and plan for involved field radiotherapy.  I will plan to repeat scans after 6 cycles.    2.  Immunocompromise state.  He got 1 dose of his Covid vaccination.  He also received Evusheld.    3.  Orthostatic.  Seems much better.          Total time of patient care on day of service including time prior to, face to face with patient, and following visit spent in reviewing records, lab results, imaging studies, discussion with patient, and documentation/charting was > 31 minutes.     Ai Henderson MD  [facility]ematology [facility] Oncology    Return on: 05/19/22  Return in (Approximately): Schedule with next infusion, 3 weeks    Orders Placed This Encounter   Procedures   • Comprehensive metabolic panel   • CBC and Differential       4/28/2022

## 2022-04-29 ENCOUNTER — HOSPITAL ENCOUNTER (OUTPATIENT)
Dept: ONCOLOGY | Facility: HOSPITAL | Age: 77
Setting detail: INFUSION SERIES
Discharge: HOME | End: 2022-04-29

## 2022-04-29 VITALS
HEART RATE: 104 BPM | SYSTOLIC BLOOD PRESSURE: 138 MMHG | BODY MASS INDEX: 29.2 KG/M2 | DIASTOLIC BLOOD PRESSURE: 63 MMHG | WEIGHT: 204 LBS | HEIGHT: 70 IN | TEMPERATURE: 98.4 F | RESPIRATION RATE: 18 BRPM

## 2022-04-29 DIAGNOSIS — C84.71 ANAPLASTIC ALK-NEGATIVE LARGE CELL LYMPHOMA OF LYMPH NODE OF HEAD: Primary | ICD-10-CM

## 2022-04-29 PROCEDURE — 96372 THER/PROPH/DIAG INJ SC/IM: CPT

## 2022-04-29 PROCEDURE — 25010000002 PEGFILGRASTIM 6 MG/0.6ML SOLUTION PREFILLED SYRINGE: Performed by: INTERNAL MEDICINE

## 2022-04-29 RX ADMIN — PEGFILGRASTIM 6 MG: 6 INJECTION SUBCUTANEOUS at 15:20

## 2022-05-18 ENCOUNTER — HOSPITAL ENCOUNTER (OUTPATIENT)
Dept: ONCOLOGY | Facility: HOSPITAL | Age: 77
Setting detail: INFUSION SERIES
Discharge: HOME | End: 2022-05-18

## 2022-05-18 VITALS
HEIGHT: 70 IN | WEIGHT: 190 LBS | DIASTOLIC BLOOD PRESSURE: 52 MMHG | TEMPERATURE: 97.4 F | SYSTOLIC BLOOD PRESSURE: 114 MMHG | HEART RATE: 120 BPM | BODY MASS INDEX: 27.2 KG/M2 | RESPIRATION RATE: 20 BRPM

## 2022-05-18 DIAGNOSIS — Z95.828 PORT-A-CATH IN PLACE: Primary | ICD-10-CM

## 2022-05-18 DIAGNOSIS — C84.71 ANAPLASTIC ALK-NEGATIVE LARGE CELL LYMPHOMA OF LYMPH NODE OF HEAD: ICD-10-CM

## 2022-05-18 LAB
ALBUMIN SERPL-MCNC: 3.4 G/DL (ref 3.5–5.2)
ALBUMIN/GLOB SERPL: 1.3 G/DL
ALP SERPL-CCNC: 94 U/L (ref 39–117)
ALT SERPL W P-5'-P-CCNC: 31 U/L (ref 1–41)
ANION GAP SERPL CALCULATED.3IONS-SCNC: 11 MMOL/L (ref 5–15)
AST SERPL-CCNC: 31 U/L (ref 1–40)
BILIRUB SERPL-MCNC: 0.4 MG/DL (ref 0–1.2)
BUN SERPL-MCNC: 19 MG/DL (ref 8–23)
BUN/CREAT SERPL: 20.2 (ref 7–25)
CALCIUM SPEC-SCNC: 8.8 MG/DL (ref 8.6–10.5)
CHLORIDE SERPL-SCNC: 95 MMOL/L (ref 98–107)
CO2 SERPL-SCNC: 25 MMOL/L (ref 22–29)
CREAT SERPL-MCNC: 0.94 MG/DL (ref 0.76–1.27)
EGFRCR SERPLBLD CKD-EPI 2021: 83.5 ML/MIN/1.73
ERYTHROCYTE [DISTWIDTH] IN BLOOD BY AUTOMATED COUNT: 17.3 % (ref 12.3–15.4)
GLOBULIN UR ELPH-MCNC: 2.6 GM/DL
GLUCOSE SERPL-MCNC: 170 MG/DL (ref 65–99)
HCT VFR BLD AUTO: 24.4 % (ref 37.5–51)
HGB BLD-MCNC: 7.6 G/DL (ref 13–17.7)
LYMPHOCYTES # BLD AUTO: 1 10*3/MM3 (ref 0.7–3.1)
LYMPHOCYTES NFR BLD AUTO: 16.6 % (ref 19.6–45.3)
MCH RBC QN AUTO: 31.6 PG (ref 26.6–33)
MCHC RBC AUTO-ENTMCNC: 31.2 G/DL (ref 31.5–35.7)
MCV RBC AUTO: 101.6 FL (ref 79–97)
MONOCYTES # BLD AUTO: 0.8 10*3/MM3 (ref 0.1–0.9)
MONOCYTES NFR BLD AUTO: 13.6 % (ref 5–12)
NEUTROPHILS NFR BLD AUTO: 4 10*3/MM3 (ref 1.7–7)
NEUTROPHILS NFR BLD AUTO: 69.8 % (ref 42.7–76)
PLATELET # BLD AUTO: 99 10*3/MM3 (ref 140–450)
PMV BLD AUTO: 8.3 FL (ref 6–12)
POTASSIUM SERPL-SCNC: 4.5 MMOL/L (ref 3.5–5.2)
PROT SERPL-MCNC: 6 G/DL (ref 6–8.5)
RBC # BLD AUTO: 2.4 10*6/MM3 (ref 4.14–5.8)
SODIUM SERPL-SCNC: 131 MMOL/L (ref 136–145)
WBC NRBC COR # BLD: 5.8 10*3/MM3 (ref 3.4–10.8)

## 2022-05-18 PROCEDURE — 85025 COMPLETE CBC W/AUTO DIFF WBC: CPT | Performed by: INTERNAL MEDICINE

## 2022-05-18 PROCEDURE — 36591 DRAW BLOOD OFF VENOUS DEVICE: CPT

## 2022-05-18 PROCEDURE — 25010000002 HEPARIN LOCK FLUSH PER 10 UNITS: Performed by: INTERNAL MEDICINE

## 2022-05-18 PROCEDURE — 80053 COMPREHEN METABOLIC PANEL: CPT | Performed by: INTERNAL MEDICINE

## 2022-05-18 RX ORDER — SODIUM CHLORIDE 0.9 % (FLUSH) 0.9 %
10 SYRINGE (ML) INJECTION AS NEEDED
Status: CANCELLED | OUTPATIENT
Start: 2022-05-18

## 2022-05-18 RX ORDER — HEPARIN SODIUM (PORCINE) LOCK FLUSH IV SOLN 100 UNIT/ML 100 UNIT/ML
500 SOLUTION INTRAVENOUS AS NEEDED
Status: DISCONTINUED | OUTPATIENT
Start: 2022-05-18 | End: 2022-05-19 | Stop reason: HOSPADM

## 2022-05-18 RX ORDER — HEPARIN SODIUM (PORCINE) LOCK FLUSH IV SOLN 100 UNIT/ML 100 UNIT/ML
500 SOLUTION INTRAVENOUS AS NEEDED
Status: CANCELLED | OUTPATIENT
Start: 2022-05-18

## 2022-05-18 RX ADMIN — HEPARIN SODIUM (PORCINE) LOCK FLUSH IV SOLN 100 UNIT/ML 500 UNITS: 100 SOLUTION at 14:02

## 2022-05-19 ENCOUNTER — DOCUMENTATION (OUTPATIENT)
Dept: NUTRITION | Facility: HOSPITAL | Age: 77
End: 2022-05-19

## 2022-05-19 ENCOUNTER — OFFICE VISIT (OUTPATIENT)
Dept: ONCOLOGY | Facility: CLINIC | Age: 77
End: 2022-05-19

## 2022-05-19 ENCOUNTER — HOSPITAL ENCOUNTER (OUTPATIENT)
Dept: ONCOLOGY | Facility: HOSPITAL | Age: 77
Setting detail: INFUSION SERIES
Discharge: HOME | End: 2022-05-19

## 2022-05-19 VITALS
SYSTOLIC BLOOD PRESSURE: 137 MMHG | WEIGHT: 190.1 LBS | RESPIRATION RATE: 18 BRPM | HEART RATE: 108 BPM | HEIGHT: 70 IN | TEMPERATURE: 97.5 F | BODY MASS INDEX: 27.22 KG/M2 | OXYGEN SATURATION: 98 % | DIASTOLIC BLOOD PRESSURE: 78 MMHG

## 2022-05-19 DIAGNOSIS — T45.1X5A CHEMOTHERAPY-INDUCED THROMBOCYTOPENIA: ICD-10-CM

## 2022-05-19 DIAGNOSIS — T45.1X5A CHEMOTHERAPY-INDUCED THROMBOCYTOPENIA: Primary | ICD-10-CM

## 2022-05-19 DIAGNOSIS — D69.59 CHEMOTHERAPY-INDUCED THROMBOCYTOPENIA: Primary | ICD-10-CM

## 2022-05-19 DIAGNOSIS — C84.71 ANAPLASTIC ALK-NEGATIVE LARGE CELL LYMPHOMA OF LYMPH NODE OF HEAD: Primary | ICD-10-CM

## 2022-05-19 DIAGNOSIS — Z95.828 PORT-A-CATH IN PLACE: ICD-10-CM

## 2022-05-19 DIAGNOSIS — D69.59 CHEMOTHERAPY-INDUCED THROMBOCYTOPENIA: ICD-10-CM

## 2022-05-19 LAB
ABO GROUP BLD: NORMAL
BLD GP AB SCN SERPL QL: NEGATIVE
RH BLD: POSITIVE
T&S EXPIRATION DATE: NORMAL

## 2022-05-19 PROCEDURE — 36591 DRAW BLOOD OFF VENOUS DEVICE: CPT

## 2022-05-19 PROCEDURE — 86901 BLOOD TYPING SEROLOGIC RH(D): CPT | Performed by: INTERNAL MEDICINE

## 2022-05-19 PROCEDURE — 25010000002 PALONOSETRON 0.25 MG/5ML SOLUTION PREFILLED SYRINGE: Performed by: INTERNAL MEDICINE

## 2022-05-19 PROCEDURE — 25010000002 DEXAMETHASONE SODIUM PHOSPHATE 100 MG/10ML SOLUTION: Performed by: INTERNAL MEDICINE

## 2022-05-19 PROCEDURE — 25010000002 CYCLOPHOSPHAMIDE 1 GM/5ML SOLUTION 5 ML VIAL: Performed by: INTERNAL MEDICINE

## 2022-05-19 PROCEDURE — 96366 THER/PROPH/DIAG IV INF ADDON: CPT

## 2022-05-19 PROCEDURE — 96413 CHEMO IV INFUSION 1 HR: CPT

## 2022-05-19 PROCEDURE — 25010000002 FOSAPREPITANT PER 1 MG: Performed by: INTERNAL MEDICINE

## 2022-05-19 PROCEDURE — 25010000002 DOXORUBICIN PER 10 MG: Performed by: INTERNAL MEDICINE

## 2022-05-19 PROCEDURE — 86850 RBC ANTIBODY SCREEN: CPT | Performed by: INTERNAL MEDICINE

## 2022-05-19 PROCEDURE — 25010000002 BRENTUXIMAB 50 MG RECONSTITUTED SOLUTION 1 EACH VIAL: Performed by: INTERNAL MEDICINE

## 2022-05-19 PROCEDURE — 96417 CHEMO IV INFUS EACH ADDL SEQ: CPT

## 2022-05-19 PROCEDURE — 25010000002 HEPARIN LOCK FLUSH PER 10 UNITS: Performed by: INTERNAL MEDICINE

## 2022-05-19 PROCEDURE — 96411 CHEMO IV PUSH ADDL DRUG: CPT

## 2022-05-19 PROCEDURE — 86900 BLOOD TYPING SEROLOGIC ABO: CPT | Performed by: INTERNAL MEDICINE

## 2022-05-19 PROCEDURE — 96367 TX/PROPH/DG ADDL SEQ IV INF: CPT

## 2022-05-19 PROCEDURE — 96375 TX/PRO/DX INJ NEW DRUG ADDON: CPT

## 2022-05-19 PROCEDURE — 86923 COMPATIBILITY TEST ELECTRIC: CPT

## 2022-05-19 PROCEDURE — 99214 OFFICE O/P EST MOD 30 MIN: CPT | Performed by: INTERNAL MEDICINE

## 2022-05-19 RX ORDER — DOXORUBICIN HYDROCHLORIDE 2 MG/ML
50 INJECTION, SOLUTION INTRAVENOUS ONCE
Status: COMPLETED | OUTPATIENT
Start: 2022-05-19 | End: 2022-05-19

## 2022-05-19 RX ORDER — PALONOSETRON 0.05 MG/ML
0.25 INJECTION, SOLUTION INTRAVENOUS ONCE
Status: COMPLETED | OUTPATIENT
Start: 2022-05-19 | End: 2022-05-19

## 2022-05-19 RX ORDER — SODIUM CHLORIDE 9 MG/ML
250 INJECTION, SOLUTION INTRAVENOUS ONCE
Status: COMPLETED | OUTPATIENT
Start: 2022-05-19 | End: 2022-05-19

## 2022-05-19 RX ORDER — HEPARIN SODIUM (PORCINE) LOCK FLUSH IV SOLN 100 UNIT/ML 100 UNIT/ML
500 SOLUTION INTRAVENOUS AS NEEDED
Status: DISCONTINUED | OUTPATIENT
Start: 2022-05-19 | End: 2022-05-20 | Stop reason: HOSPADM

## 2022-05-19 RX ORDER — OLANZAPINE 5 MG/1
5 TABLET ORAL ONCE
Status: COMPLETED | OUTPATIENT
Start: 2022-05-19 | End: 2022-05-19

## 2022-05-19 RX ORDER — HEPARIN SODIUM (PORCINE) LOCK FLUSH IV SOLN 100 UNIT/ML 100 UNIT/ML
500 SOLUTION INTRAVENOUS AS NEEDED
Status: CANCELLED | OUTPATIENT
Start: 2022-05-19

## 2022-05-19 RX ORDER — SODIUM CHLORIDE 9 MG/ML
250 INJECTION, SOLUTION INTRAVENOUS AS NEEDED
Status: CANCELLED | OUTPATIENT
Start: 2022-05-19

## 2022-05-19 RX ORDER — SODIUM CHLORIDE 0.9 % (FLUSH) 0.9 %
10 SYRINGE (ML) INJECTION AS NEEDED
Status: DISCONTINUED | OUTPATIENT
Start: 2022-05-19 | End: 2022-05-20 | Stop reason: HOSPADM

## 2022-05-19 RX ORDER — SODIUM CHLORIDE 0.9 % (FLUSH) 0.9 %
10 SYRINGE (ML) INJECTION AS NEEDED
Status: CANCELLED | OUTPATIENT
Start: 2022-05-19

## 2022-05-19 RX ADMIN — SODIUM CHLORIDE 150 MG: 9 INJECTION, SOLUTION INTRAVENOUS at 13:33

## 2022-05-19 RX ADMIN — BRENTUXIMAB VEDOTIN 110 MG: 50 INJECTION, POWDER, LYOPHILIZED, FOR SOLUTION INTRAVENOUS at 15:12

## 2022-05-19 RX ADMIN — OLANZAPINE 5 MG: 5 TABLET, FILM COATED ORAL at 13:32

## 2022-05-19 RX ADMIN — SODIUM CHLORIDE 250 ML: 9 INJECTION, SOLUTION INTRAVENOUS at 13:32

## 2022-05-19 RX ADMIN — HEPARIN SODIUM (PORCINE) LOCK FLUSH IV SOLN 100 UNIT/ML 500 UNITS: 100 SOLUTION at 15:52

## 2022-05-19 RX ADMIN — DEXAMETHASONE SODIUM PHOSPHATE 12 MG: 10 INJECTION, SOLUTION INTRAMUSCULAR; INTRAVENOUS at 13:33

## 2022-05-19 RX ADMIN — Medication 10 ML: at 15:52

## 2022-05-19 RX ADMIN — CYCLOPHOSPHAMIDE 1560 MG: 200 INJECTION, SOLUTION INTRAVENOUS at 14:27

## 2022-05-19 RX ADMIN — DOXORUBICIN HYDROCHLORIDE 52 MG: 2 INJECTION, SOLUTION INTRAVENOUS at 14:15

## 2022-05-19 RX ADMIN — PALONOSETRON 0.25 MG: 0.25 INJECTION, SOLUTION INTRAVENOUS at 13:32

## 2022-05-19 NOTE — PROGRESS NOTES
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  male presents today for follow-up of his lymphoma.  He completed 5 of 6 cycles of CHP-Brentuximab.   He denies infections. He denies any issues with headaches.  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  But overall he is actually doing remarkably well.    Past medical history, social history and family history was reviewed 05/19/22 and unchanged from prior visit.    Review of Systems:    Review of Systems   Constitutional: Positive for appetite change and fatigue.   HENT:  Negative.    Eyes: Negative.    Respiratory: Negative.    Cardiovascular: Negative.    Gastrointestinal: Negative.    Endocrine: Negative.    Genitourinary: Negative.     Musculoskeletal: Negative.    Skin: Negative.    Neurological: Positive for numbness.   Hematological: Negative.    Psychiatric/Behavioral: Positive for sleep disturbance.            Medications:        Current Outpatient Medications:   •  albuterol sulfate  (90 Base) MCG/ACT inhaler, Inhale 2 puffs Every 4 (Four) Hours As Needed for Wheezing., Disp: 8 g, Rfl: 3  •  Cholecalciferol (VITAMIN D) 1000 UNITS tablet, Take 1 capsule by mouth Daily., Disp: , Rfl:   •  Cyanocobalamin (VITAMIN B-12 ER) 1000 MCG tablet controlled-release, Take 1,000 mcg by mouth Daily., Disp: , Rfl:   •  diphenhydrAMINE (BENADRYL) 25 mg capsule, Take 50 mg by mouth At Night As Needed for Itching.,  "Disp: , Rfl:   •  gabapentin (NEURONTIN) 300 MG capsule, Take 1 capsule by mouth Every Night., Disp: 30 capsule, Rfl: 2  •  glimepiride (AMARYL) 4 MG tablet, Take 1 tablet by mouth Daily With Dinner., Disp: 90 tablet, Rfl: 1  •  HYDROcodone-acetaminophen (NORCO) 7.5-325 MG per tablet, Norco 7.5 mg-325 mg tablet  Take 1 tablet(s) EVERY 6 HOURS by oral route as needed for severe pain for 7 days, Disp: , Rfl:   •  Insulin Syringe 28G X 1/2\" 1 ML misc, 1 each 2 (Two) Times a Day., Disp: 100 each, Rfl: 5  •  lidocaine-prilocaine (EMLA) 2.5-2.5 % cream, Apply 1 application topically to the appropriate area as directed As Needed (45-60 minutes prior to port access.  Cover with saran/plastic wrap.)., Disp: 30 g, Rfl: 3  •  metFORMIN (GLUCOPHAGE) 1000 MG tablet, TAKE ONE TABLET BY MOUTH IN THE MORNING AND TAKE ONE AND ONE-HALF TABLETS BY MOUTH AT BEDTIME, Disp: 75 tablet, Rfl: 5  •  Multiple Vitamins-Minerals (MULTI VITAMIN/MINERALS PO), Take 1 tablet by mouth Daily., Disp: , Rfl:   •  Multiple Vitamins-Minerals (PRESERVISION AREDS 2 PO), Take  by mouth 2 (Two) Times a Day., Disp: , Rfl:   •  omeprazole (priLOSEC) 20 MG capsule, Take 1 capsule by mouth Every Night., Disp: , Rfl:   •  ondansetron (ZOFRAN) 8 MG tablet, Take 1 tablet by mouth 3 (Three) Times a Day As Needed for Nausea or Vomiting., Disp: 30 tablet, Rfl: 5  •  primidone (MYSOLINE) 50 MG tablet, Take 1 tablet by mouth 2 (Two) Times a Day., Disp: 60 tablet, Rfl: 2  •  simvastatin (ZOCOR) 10 MG tablet, Take 1 tablet by mouth every night at bedtime., Disp: 90 tablet, Rfl: 1  •  traMADol (ULTRAM) 50 MG tablet, Take 1 tablet by mouth Every 8 (Eight) Hours As Needed for Moderate Pain ., Disp: 60 tablet, Rfl: 2  No current facility-administered medications for this visit.    Pain Medications             gabapentin (NEURONTIN) 300 MG capsule Take 1 capsule by mouth Every Night.    HYDROcodone-acetaminophen (NORCO) 7.5-325 MG per tablet Norco 7.5 mg-325 mg tablet   Take 1 " "tablet(s) EVERY 6 HOURS by oral route as needed for severe pain for 7 days    primidone (MYSOLINE) 50 MG tablet Take 1 tablet by mouth 2 (Two) Times a Day.    traMADol (ULTRAM) 50 MG tablet Take 1 tablet by mouth Every 8 (Eight) Hours As Needed for Moderate Pain .             ALLERGIES:    Allergies   Allergen Reactions   • Bactrim [Sulfamethoxazole-Trimethoprim] Other (See Comments)     Joint pain   • Sulfa Antibiotics GI Intolerance     Makes bones hurt     • Tegaderm Chg Dressing [Chlorhexidine] Itching and Other (See Comments)     redness         Physical Exam    VITAL SIGNS:  /78   Pulse 108   Temp 97.5 °F (36.4 °C) (Temporal)   Resp 18   Ht 177.8 cm (70\")   Wt 86.2 kg (190 lb 1.6 oz)   SpO2 98%   BMI 27.28 kg/m²     ECOG score: 1     Karnofsky score: 80     Wt Readings from Last 3 Encounters:   05/19/22 86.2 kg (190 lb 1.6 oz)   05/18/22 86.2 kg (190 lb)   04/29/22 92.5 kg (204 lb)       Body mass index is 27.28 kg/m². Body surface area is 2.04 meters squared.    Pain Score    05/19/22 1146   PainSc: 0-No pain           Performance Status: 0    General: well appearing, in no acute distress  HEENT: Swelling on the right side of his face consistent with his lymphomatous process.  Lymphatics: no cervical, supraclavicular, or axillary adenopathy  Cardiovascular: regular rate and rhythm, no murmurs, rubs or gallops  Lungs: clear to auscultation bilaterally  Abdomen: soft, nontender, nondistended.  No palpable organomegaly  Extremities: no lower extremity edema  Skin: no rashes, lesions, bruising, or petechiae  Msk:  Shows no weakness of the large muscle groups  Psych: Mood is stable        RECENT LABS:    Lab Results   Component Value Date    HGB 7.6 (L) 05/18/2022    HCT 24.4 (L) 05/18/2022    .6 (H) 05/18/2022    PLT 99 (L) 05/18/2022    WBC 5.80 05/18/2022    NEUTROABS 4.00 05/18/2022    LYMPHSABS 1.00 05/18/2022    MONOSABS 0.80 05/18/2022    EOSABS 0.00 03/28/2022    BASOSABS 0.07 " 03/28/2022       Lab Results   Component Value Date    GLUCOSE 170 (H) 05/18/2022    BUN 19 05/18/2022    CREATININE 0.94 05/18/2022     (L) 05/18/2022    K 4.5 05/18/2022    CL 95 (L) 05/18/2022    CO2 25.0 05/18/2022    CALCIUM 8.8 05/18/2022    PROTEINTOT 6.0 05/18/2022    ALBUMIN 3.40 (L) 05/18/2022    BILITOT 0.4 05/18/2022    ALKPHOS 94 05/18/2022    AST 31 05/18/2022    ALT 31 05/18/2022       CT Head With Contrast    Result Date: 4/4/2022  1.  There are some white matter changes noted which could reflect more chronic small vessel ischemic change. 2.  Soft tissue nodule in the subcutaneous soft tissues of the right facial area which seems smaller as compared to prior study.  This report was finalized on 4/4/2022 12:19 PM by Howard Cervantes MD.            NM PET/CT Skull Base to Mid Thigh    Result Date: 1/13/2022  Area of postsurgical change right facial soft tissues overlying the right temporalis musculature inferiorly. On the posterior aspect of the surgical region and near the angle of the mandible on the lateral margin is a somewhat indeterminate intermediate soft tissue density focus mass with SUV 3.2 with attention on followup to exclude residual or local disease. Additionally, asymmetric likely intraparotid node of the anterior superficial lobe parotid gland right maximum SUV 3.6 without separate abnormal hypermetabolism.  D:  01/11/2022 E:  01/12/2022    This report was finalized on 1/13/2022 8:20 AM by Dr. Damian Zaidi.            Assessment/Plan    1.  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  He has completed 5 cycles of treatment.  I am going to give him his last dose of chemo today.  This will be cycle 6.  After which I am going to get CAT scans of his head chest abdomen pelvis.  If he has nice response to treatment may consider involved field radiotherapy to the lesion on his face.  I will see him in 1 month after imaging is done.    2.  Immunocompromise state.   He got 1 dose of his Covid vaccination.  He also received Evusheld.    3.  Orthostatic.  Seems much better.          Total time of patient care on day of service including time prior to, face to face with patient, and following visit spent in reviewing records, lab results, imaging studies, discussion with patient, and documentation/charting was > 32 minutes.     Ai Henderson MD  [facility] Hematology and Oncology    Return on: 06/16/22  Return in (Approximately): 1 month    Orders Placed This Encounter   Procedures   • CT Abdomen Pelvis With Contrast   • CT Chest With Contrast Diagnostic   • CT Head With & Without Contrast       5/19/2022

## 2022-05-19 NOTE — PROGRESS NOTES
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     Follow up with patient during his infusion appointment.  Patient continues to complain of taste changes but is using the baking soda / salt water mouth rinse which helps improve his sense of taste expect with beef.  He reports he is drinking 2 Boost daily but is uncertain which formula he currently has at home.  He denies other significant nutritional complaints at this time.  He states his blood sugars have been doing ok and reports he adjusts his insulin as needed.     Reviewed the importance of good nutrition and weight maintenance.  Advised him to be eating smaller more frequent snacks throughout the day and to \"watch the clock\" to remind himself to eat every 2-3 hours.  Encouraged him to be choosing high calorie / protein foods and offered snack ideas he may find more appealing at this time.  Recommended he choose Boost Plus, as it is more calorically / protein dense and to continue drinking at least 2 per day and suggested he increase to 3 per day.  Provided samples of Boost Very High Calorie as another ONS option for him to try.  Coupons for Boost products provided.  Also provided and reviewed written diet materials \"Increasing Calories and Protein\" and \"Fatigue\" to reinforce information discussed.    Answered his questions and he voiced understanding of information discussed.  Encouraged to call RD with questions.  Will monitor as needed during his treatment course.    Lissy Collazo, " RD  05/19/22

## 2022-05-20 ENCOUNTER — HOSPITAL ENCOUNTER (OUTPATIENT)
Dept: ONCOLOGY | Facility: HOSPITAL | Age: 77
Setting detail: INFUSION SERIES
Discharge: HOME | End: 2022-05-20

## 2022-05-20 VITALS
WEIGHT: 191.6 LBS | SYSTOLIC BLOOD PRESSURE: 129 MMHG | RESPIRATION RATE: 18 BRPM | HEART RATE: 84 BPM | BODY MASS INDEX: 27.49 KG/M2 | TEMPERATURE: 97.8 F | DIASTOLIC BLOOD PRESSURE: 62 MMHG

## 2022-05-20 DIAGNOSIS — T45.1X5A CHEMOTHERAPY-INDUCED THROMBOCYTOPENIA: ICD-10-CM

## 2022-05-20 DIAGNOSIS — D69.59 CHEMOTHERAPY-INDUCED THROMBOCYTOPENIA: ICD-10-CM

## 2022-05-20 DIAGNOSIS — C84.71 ANAPLASTIC ALK-NEGATIVE LARGE CELL LYMPHOMA OF LYMPH NODE OF HEAD: Primary | ICD-10-CM

## 2022-05-20 DIAGNOSIS — Z95.828 PORT-A-CATH IN PLACE: ICD-10-CM

## 2022-05-20 PROCEDURE — 96377 APPLICATON ON-BODY INJECTOR: CPT

## 2022-05-20 PROCEDURE — P9016 RBC LEUKOCYTES REDUCED: HCPCS

## 2022-05-20 PROCEDURE — 36430 TRANSFUSION BLD/BLD COMPNT: CPT

## 2022-05-20 PROCEDURE — 25010000002 HEPARIN LOCK FLUSH PER 10 UNITS: Performed by: INTERNAL MEDICINE

## 2022-05-20 PROCEDURE — 25010000002 PEGFILGRASTIM 6 MG/0.6ML SOLUTION PREFILLED SYRINGE: Performed by: INTERNAL MEDICINE

## 2022-05-20 PROCEDURE — 86900 BLOOD TYPING SEROLOGIC ABO: CPT

## 2022-05-20 RX ORDER — HEPARIN SODIUM (PORCINE) LOCK FLUSH IV SOLN 100 UNIT/ML 100 UNIT/ML
500 SOLUTION INTRAVENOUS AS NEEDED
Status: DISCONTINUED | OUTPATIENT
Start: 2022-05-20 | End: 2022-05-21 | Stop reason: HOSPADM

## 2022-05-20 RX ORDER — SODIUM CHLORIDE 0.9 % (FLUSH) 0.9 %
10 SYRINGE (ML) INJECTION AS NEEDED
Status: DISCONTINUED | OUTPATIENT
Start: 2022-05-20 | End: 2022-05-21 | Stop reason: HOSPADM

## 2022-05-20 RX ORDER — SODIUM CHLORIDE 0.9 % (FLUSH) 0.9 %
10 SYRINGE (ML) INJECTION AS NEEDED
Status: CANCELLED | OUTPATIENT
Start: 2022-05-20

## 2022-05-20 RX ORDER — SODIUM CHLORIDE 9 MG/ML
250 INJECTION, SOLUTION INTRAVENOUS AS NEEDED
Status: DISCONTINUED | OUTPATIENT
Start: 2022-05-20 | End: 2022-05-21 | Stop reason: HOSPADM

## 2022-05-20 RX ORDER — HEPARIN SODIUM (PORCINE) LOCK FLUSH IV SOLN 100 UNIT/ML 100 UNIT/ML
500 SOLUTION INTRAVENOUS AS NEEDED
Status: CANCELLED | OUTPATIENT
Start: 2022-05-20

## 2022-05-20 RX ADMIN — HEPARIN SODIUM (PORCINE) LOCK FLUSH IV SOLN 100 UNIT/ML 500 UNITS: 100 SOLUTION at 15:53

## 2022-05-20 RX ADMIN — PEGFILGRASTIM 6 MG: 6 INJECTION SUBCUTANEOUS at 15:56

## 2022-05-20 RX ADMIN — SODIUM CHLORIDE 250 ML: 9 INJECTION, SOLUTION INTRAVENOUS at 13:57

## 2022-05-21 LAB
BH BB BLOOD EXPIRATION DATE: NORMAL
BH BB BLOOD TYPE BARCODE: 6200
BH BB DISPENSE STATUS: NORMAL
BH BB PRODUCT CODE: NORMAL
BH BB UNIT NUMBER: NORMAL
CROSSMATCH INTERPRETATION: NORMAL
UNIT  ABO: NORMAL
UNIT  RH: NORMAL

## 2022-05-26 ENCOUNTER — OFFICE VISIT (OUTPATIENT)
Dept: INTERNAL MEDICINE | Facility: CLINIC | Age: 77
End: 2022-05-26

## 2022-05-26 ENCOUNTER — HOSPITAL ENCOUNTER (INPATIENT)
Facility: HOSPITAL | Age: 77
LOS: 4 days | Discharge: HOME | End: 2022-05-30
Attending: EMERGENCY MEDICINE | Admitting: HOSPITALIST

## 2022-05-26 ENCOUNTER — APPOINTMENT (OUTPATIENT)
Dept: GENERAL RADIOLOGY | Facility: HOSPITAL | Age: 77
End: 2022-05-26

## 2022-05-26 VITALS
OXYGEN SATURATION: 91 % | TEMPERATURE: 97.1 F | SYSTOLIC BLOOD PRESSURE: 110 MMHG | DIASTOLIC BLOOD PRESSURE: 58 MMHG | HEART RATE: 94 BPM

## 2022-05-26 DIAGNOSIS — R50.81 NEUTROPENIC FEVER: ICD-10-CM

## 2022-05-26 DIAGNOSIS — T45.1X5A CHEMOTHERAPY-INDUCED NEUTROPENIA: ICD-10-CM

## 2022-05-26 DIAGNOSIS — J18.9 PNEUMONIA DUE TO INFECTIOUS ORGANISM, UNSPECIFIED LATERALITY, UNSPECIFIED PART OF LUNG: ICD-10-CM

## 2022-05-26 DIAGNOSIS — J44.9 CHRONIC OBSTRUCTIVE PULMONARY DISEASE, UNSPECIFIED COPD TYPE: ICD-10-CM

## 2022-05-26 DIAGNOSIS — R05.9 COUGH: Primary | ICD-10-CM

## 2022-05-26 DIAGNOSIS — D70.9 NEUTROPENIC FEVER: ICD-10-CM

## 2022-05-26 DIAGNOSIS — D69.6 THROMBOCYTOPENIA: ICD-10-CM

## 2022-05-26 DIAGNOSIS — I50.32 CHRONIC DIASTOLIC (CONGESTIVE) HEART FAILURE: ICD-10-CM

## 2022-05-26 DIAGNOSIS — D70.1 CHEMOTHERAPY-INDUCED NEUTROPENIA: ICD-10-CM

## 2022-05-26 PROBLEM — A41.9 SEPSIS: Status: ACTIVE | Noted: 2022-05-26

## 2022-05-26 PROBLEM — E83.42 HYPOMAGNESEMIA: Status: ACTIVE | Noted: 2022-05-26

## 2022-05-26 LAB
ABO GROUP BLD: NORMAL
ALBUMIN SERPL-MCNC: 3.3 G/DL (ref 3.5–5.2)
ALBUMIN/GLOB SERPL: 1.5 G/DL
ALP SERPL-CCNC: 85 U/L (ref 39–117)
ALT SERPL W P-5'-P-CCNC: 24 U/L (ref 1–41)
ANION GAP SERPL CALCULATED.3IONS-SCNC: 14 MMOL/L (ref 5–15)
AST SERPL-CCNC: 21 U/L (ref 1–40)
B PARAPERT DNA SPEC QL NAA+PROBE: NOT DETECTED
B PERT DNA SPEC QL NAA+PROBE: NOT DETECTED
BASOPHILS # BLD MANUAL: 0 10*3/MM3 (ref 0–0.2)
BASOPHILS NFR BLD MANUAL: 0 % (ref 0–1.5)
BILIRUB SERPL-MCNC: 0.6 MG/DL (ref 0–1.2)
BLD GP AB SCN SERPL QL: NEGATIVE
BUN SERPL-MCNC: 32 MG/DL (ref 8–23)
BUN/CREAT SERPL: 31.7 (ref 7–25)
C PNEUM DNA NPH QL NAA+NON-PROBE: NOT DETECTED
CALCIUM SPEC-SCNC: 9 MG/DL (ref 8.6–10.5)
CHLORIDE SERPL-SCNC: 95 MMOL/L (ref 98–107)
CO2 SERPL-SCNC: 25 MMOL/L (ref 22–29)
CREAT SERPL-MCNC: 1.01 MG/DL (ref 0.76–1.27)
D-LACTATE SERPL-SCNC: 1.5 MMOL/L (ref 0.5–2)
D-LACTATE SERPL-SCNC: 3 MMOL/L (ref 0.5–2)
DEPRECATED RDW RBC AUTO: 55.1 FL (ref 37–54)
EGFRCR SERPLBLD CKD-EPI 2021: 76.6 ML/MIN/1.73
EOSINOPHIL # BLD MANUAL: 0 10*3/MM3 (ref 0–0.4)
EOSINOPHIL NFR BLD MANUAL: 1 % (ref 0.3–6.2)
ERYTHROCYTE [DISTWIDTH] IN BLOOD BY AUTOMATED COUNT: 15.4 % (ref 12.3–15.4)
EXPIRATION DATE: NORMAL
FLUAV AG UPPER RESP QL IA.RAPID: NOT DETECTED
FLUAV SUBTYP SPEC NAA+PROBE: NOT DETECTED
FLUBV AG UPPER RESP QL IA.RAPID: NOT DETECTED
FLUBV RNA ISLT QL NAA+PROBE: NOT DETECTED
GLOBULIN UR ELPH-MCNC: 2.2 GM/DL
GLUCOSE BLDC GLUCOMTR-MCNC: 125 MG/DL (ref 70–130)
GLUCOSE BLDC GLUCOMTR-MCNC: 203 MG/DL (ref 70–130)
GLUCOSE SERPL-MCNC: 191 MG/DL (ref 65–99)
HADV DNA SPEC NAA+PROBE: NOT DETECTED
HCOV 229E RNA SPEC QL NAA+PROBE: NOT DETECTED
HCOV HKU1 RNA SPEC QL NAA+PROBE: NOT DETECTED
HCOV NL63 RNA SPEC QL NAA+PROBE: NOT DETECTED
HCOV OC43 RNA SPEC QL NAA+PROBE: NOT DETECTED
HCT VFR BLD AUTO: 23.2 % (ref 37.5–51)
HGB BLD-MCNC: 7.8 G/DL (ref 13–17.7)
HMPV RNA NPH QL NAA+NON-PROBE: NOT DETECTED
HOLD SPECIMEN: NORMAL
HPIV1 RNA ISLT QL NAA+PROBE: NOT DETECTED
HPIV2 RNA SPEC QL NAA+PROBE: NOT DETECTED
HPIV3 RNA NPH QL NAA+PROBE: DETECTED
HPIV4 P GENE NPH QL NAA+PROBE: NOT DETECTED
INTERNAL CONTROL: NORMAL
LYMPHOCYTES # BLD MANUAL: 0.15 10*3/MM3 (ref 0.7–3.1)
LYMPHOCYTES NFR BLD MANUAL: 2 % (ref 5–12)
Lab: NORMAL
M PNEUMO IGG SER IA-ACNC: NOT DETECTED
MAGNESIUM SERPL-MCNC: 1 MG/DL (ref 1.6–2.4)
MCH RBC QN AUTO: 32.9 PG (ref 26.6–33)
MCHC RBC AUTO-ENTMCNC: 33.6 G/DL (ref 31.5–35.7)
MCV RBC AUTO: 97.9 FL (ref 79–97)
MONOCYTES # BLD: 0 10*3/MM3 (ref 0.1–0.9)
NEUTROPHILS # BLD AUTO: 0 10*3/MM3 (ref 1.7–7)
NEUTROPHILS NFR BLD MANUAL: 3 % (ref 42.7–76)
PLAT MORPH BLD: NORMAL
PLATELET # BLD AUTO: 10 10*3/MM3 (ref 140–450)
POTASSIUM SERPL-SCNC: 4.6 MMOL/L (ref 3.5–5.2)
PROT SERPL-MCNC: 5.5 G/DL (ref 6–8.5)
RBC # BLD AUTO: 2.37 10*6/MM3 (ref 4.14–5.8)
RH BLD: POSITIVE
RHINOVIRUS RNA SPEC NAA+PROBE: NOT DETECTED
ROULEAUX BLD QL SMEAR: ABNORMAL
RSV RNA NPH QL NAA+NON-PROBE: NOT DETECTED
SARS-COV-2 AG UPPER RESP QL IA.RAPID: NOT DETECTED
SARS-COV-2 RNA NPH QL NAA+NON-PROBE: NOT DETECTED
SODIUM SERPL-SCNC: 134 MMOL/L (ref 136–145)
T&S EXPIRATION DATE: NORMAL
TROPONIN T SERPL-MCNC: 0.05 NG/ML (ref 0–0.03)
TROPONIN T SERPL-MCNC: 0.05 NG/ML (ref 0–0.03)
TROPONIN T SERPL-MCNC: 0.06 NG/ML (ref 0–0.03)
VARIANT LYMPHS NFR BLD MANUAL: 4 % (ref 0–5)
VARIANT LYMPHS NFR BLD MANUAL: 90 % (ref 19.6–45.3)
WBC MORPH BLD: NORMAL
WBC NRBC COR # BLD: 0.16 10*3/MM3 (ref 3.4–10.8)
WHOLE BLOOD HOLD COAG: NORMAL
WHOLE BLOOD HOLD SPECIMEN: NORMAL

## 2022-05-26 PROCEDURE — 25010000002 MAGNESIUM SULFATE 2 GM/50ML SOLUTION: Performed by: FAMILY MEDICINE

## 2022-05-26 PROCEDURE — 71045 X-RAY EXAM CHEST 1 VIEW: CPT

## 2022-05-26 PROCEDURE — 80053 COMPREHEN METABOLIC PANEL: CPT | Performed by: EMERGENCY MEDICINE

## 2022-05-26 PROCEDURE — 0202U NFCT DS 22 TRGT SARS-COV-2: CPT | Performed by: EMERGENCY MEDICINE

## 2022-05-26 PROCEDURE — 82962 GLUCOSE BLOOD TEST: CPT

## 2022-05-26 PROCEDURE — 25010000002 PIPERACILLIN SOD-TAZOBACTAM PER 1 G: Performed by: EMERGENCY MEDICINE

## 2022-05-26 PROCEDURE — 99223 1ST HOSP IP/OBS HIGH 75: CPT | Performed by: FAMILY MEDICINE

## 2022-05-26 PROCEDURE — 86850 RBC ANTIBODY SCREEN: CPT | Performed by: FAMILY MEDICINE

## 2022-05-26 PROCEDURE — 25010000002 MAGNESIUM SULFATE IN D5W 1G/100ML (PREMIX) 1-5 GM/100ML-% SOLUTION: Performed by: EMERGENCY MEDICINE

## 2022-05-26 PROCEDURE — 83735 ASSAY OF MAGNESIUM: CPT | Performed by: EMERGENCY MEDICINE

## 2022-05-26 PROCEDURE — 86901 BLOOD TYPING SEROLOGIC RH(D): CPT | Performed by: FAMILY MEDICINE

## 2022-05-26 PROCEDURE — 86923 COMPATIBILITY TEST ELECTRIC: CPT

## 2022-05-26 PROCEDURE — 84484 ASSAY OF TROPONIN QUANT: CPT | Performed by: FAMILY MEDICINE

## 2022-05-26 PROCEDURE — 87040 BLOOD CULTURE FOR BACTERIA: CPT | Performed by: EMERGENCY MEDICINE

## 2022-05-26 PROCEDURE — 86900 BLOOD TYPING SEROLOGIC ABO: CPT | Performed by: FAMILY MEDICINE

## 2022-05-26 PROCEDURE — 94640 AIRWAY INHALATION TREATMENT: CPT

## 2022-05-26 PROCEDURE — 94799 UNLISTED PULMONARY SVC/PX: CPT

## 2022-05-26 PROCEDURE — 25010000002 VANCOMYCIN 10 G RECONSTITUTED SOLUTION: Performed by: EMERGENCY MEDICINE

## 2022-05-26 PROCEDURE — 25010000002 PIPERACILLIN SOD-TAZOBACTAM PER 1 G: Performed by: FAMILY MEDICINE

## 2022-05-26 PROCEDURE — 81001 URINALYSIS AUTO W/SCOPE: CPT | Performed by: EMERGENCY MEDICINE

## 2022-05-26 PROCEDURE — 93005 ELECTROCARDIOGRAM TRACING: CPT | Performed by: EMERGENCY MEDICINE

## 2022-05-26 PROCEDURE — 99214 OFFICE O/P EST MOD 30 MIN: CPT | Performed by: STUDENT IN AN ORGANIZED HEALTH CARE EDUCATION/TRAINING PROGRAM

## 2022-05-26 PROCEDURE — 93005 ELECTROCARDIOGRAM TRACING: CPT

## 2022-05-26 PROCEDURE — 94760 N-INVAS EAR/PLS OXIMETRY 1: CPT

## 2022-05-26 PROCEDURE — 84484 ASSAY OF TROPONIN QUANT: CPT | Performed by: EMERGENCY MEDICINE

## 2022-05-26 PROCEDURE — 85025 COMPLETE CBC W/AUTO DIFF WBC: CPT | Performed by: EMERGENCY MEDICINE

## 2022-05-26 PROCEDURE — 87428 SARSCOV & INF VIR A&B AG IA: CPT | Performed by: STUDENT IN AN ORGANIZED HEALTH CARE EDUCATION/TRAINING PROGRAM

## 2022-05-26 PROCEDURE — 36415 COLL VENOUS BLD VENIPUNCTURE: CPT

## 2022-05-26 PROCEDURE — 85007 BL SMEAR W/DIFF WBC COUNT: CPT | Performed by: EMERGENCY MEDICINE

## 2022-05-26 PROCEDURE — 83605 ASSAY OF LACTIC ACID: CPT | Performed by: EMERGENCY MEDICINE

## 2022-05-26 PROCEDURE — 99284 EMERGENCY DEPT VISIT MOD MDM: CPT

## 2022-05-26 PROCEDURE — 25010000002 METHYLPREDNISOLONE PER 125 MG: Performed by: FAMILY MEDICINE

## 2022-05-26 RX ORDER — ATORVASTATIN CALCIUM 10 MG/1
10 TABLET, FILM COATED ORAL NIGHTLY
Status: DISCONTINUED | OUTPATIENT
Start: 2022-05-26 | End: 2022-05-30 | Stop reason: HOSPADM

## 2022-05-26 RX ORDER — ALBUTEROL SULFATE 90 UG/1
2 AEROSOL, METERED RESPIRATORY (INHALATION) EVERY 4 HOURS PRN
Status: DISCONTINUED | OUTPATIENT
Start: 2022-05-26 | End: 2022-05-30 | Stop reason: HOSPADM

## 2022-05-26 RX ORDER — MAGNESIUM SULFATE HEPTAHYDRATE 40 MG/ML
2 INJECTION, SOLUTION INTRAVENOUS AS NEEDED
Status: DISCONTINUED | OUTPATIENT
Start: 2022-05-26 | End: 2022-05-30 | Stop reason: HOSPADM

## 2022-05-26 RX ORDER — MULTIPLE VITAMINS W/ MINERALS TAB 9MG-400MCG
1 TAB ORAL DAILY
Status: DISCONTINUED | OUTPATIENT
Start: 2022-05-27 | End: 2022-05-30 | Stop reason: HOSPADM

## 2022-05-26 RX ORDER — POTASSIUM CHLORIDE 1.5 G/1.77G
40 POWDER, FOR SOLUTION ORAL AS NEEDED
Status: DISCONTINUED | OUTPATIENT
Start: 2022-05-26 | End: 2022-05-30 | Stop reason: HOSPADM

## 2022-05-26 RX ORDER — INSULIN LISPRO 100 [IU]/ML
0-9 INJECTION, SOLUTION INTRAVENOUS; SUBCUTANEOUS
Status: DISCONTINUED | OUTPATIENT
Start: 2022-05-26 | End: 2022-05-27

## 2022-05-26 RX ORDER — DEXTROSE MONOHYDRATE 25 G/50ML
25 INJECTION, SOLUTION INTRAVENOUS
Status: DISCONTINUED | OUTPATIENT
Start: 2022-05-26 | End: 2022-05-30 | Stop reason: HOSPADM

## 2022-05-26 RX ORDER — SODIUM CHLORIDE 0.9 % (FLUSH) 0.9 %
20 SYRINGE (ML) INJECTION AS NEEDED
Status: DISCONTINUED | OUTPATIENT
Start: 2022-05-26 | End: 2022-05-30 | Stop reason: HOSPADM

## 2022-05-26 RX ORDER — SODIUM CHLORIDE 0.9 % (FLUSH) 0.9 %
10 SYRINGE (ML) INJECTION AS NEEDED
Status: DISCONTINUED | OUTPATIENT
Start: 2022-05-26 | End: 2022-05-30 | Stop reason: HOSPADM

## 2022-05-26 RX ORDER — PRIMIDONE 50 MG/1
50 TABLET ORAL 2 TIMES DAILY
Status: DISCONTINUED | OUTPATIENT
Start: 2022-05-26 | End: 2022-05-30 | Stop reason: HOSPADM

## 2022-05-26 RX ORDER — MAGNESIUM SULFATE HEPTAHYDRATE 40 MG/ML
4 INJECTION, SOLUTION INTRAVENOUS AS NEEDED
Status: DISCONTINUED | OUTPATIENT
Start: 2022-05-26 | End: 2022-05-30 | Stop reason: HOSPADM

## 2022-05-26 RX ORDER — NICOTINE POLACRILEX 4 MG
15 LOZENGE BUCCAL
Status: DISCONTINUED | OUTPATIENT
Start: 2022-05-26 | End: 2022-05-30 | Stop reason: HOSPADM

## 2022-05-26 RX ORDER — SODIUM CHLORIDE 0.9 % (FLUSH) 0.9 %
10 SYRINGE (ML) INJECTION EVERY 12 HOURS SCHEDULED
Status: DISCONTINUED | OUTPATIENT
Start: 2022-05-26 | End: 2022-05-30 | Stop reason: HOSPADM

## 2022-05-26 RX ORDER — ACETAMINOPHEN 325 MG/1
650 TABLET ORAL EVERY 4 HOURS PRN
Status: DISCONTINUED | OUTPATIENT
Start: 2022-05-26 | End: 2022-05-30 | Stop reason: HOSPADM

## 2022-05-26 RX ORDER — IPRATROPIUM BROMIDE AND ALBUTEROL SULFATE 2.5; .5 MG/3ML; MG/3ML
3 SOLUTION RESPIRATORY (INHALATION)
Status: DISCONTINUED | OUTPATIENT
Start: 2022-05-26 | End: 2022-05-30 | Stop reason: HOSPADM

## 2022-05-26 RX ORDER — METHYLPREDNISOLONE SODIUM SUCCINATE 125 MG/2ML
60 INJECTION, POWDER, LYOPHILIZED, FOR SOLUTION INTRAMUSCULAR; INTRAVENOUS EVERY 8 HOURS
Status: DISCONTINUED | OUTPATIENT
Start: 2022-05-26 | End: 2022-05-30 | Stop reason: HOSPADM

## 2022-05-26 RX ORDER — ONDANSETRON 2 MG/ML
4 INJECTION INTRAMUSCULAR; INTRAVENOUS EVERY 6 HOURS PRN
Status: DISCONTINUED | OUTPATIENT
Start: 2022-05-26 | End: 2022-05-30 | Stop reason: HOSPADM

## 2022-05-26 RX ORDER — MAGNESIUM SULFATE 1 G/100ML
1 INJECTION INTRAVENOUS ONCE
Status: COMPLETED | OUTPATIENT
Start: 2022-05-26 | End: 2022-05-26

## 2022-05-26 RX ORDER — PANTOPRAZOLE SODIUM 40 MG/1
40 TABLET, DELAYED RELEASE ORAL EVERY MORNING
Status: DISCONTINUED | OUTPATIENT
Start: 2022-05-27 | End: 2022-05-30 | Stop reason: HOSPADM

## 2022-05-26 RX ORDER — POTASSIUM CHLORIDE 750 MG/1
40 CAPSULE, EXTENDED RELEASE ORAL AS NEEDED
Status: DISCONTINUED | OUTPATIENT
Start: 2022-05-26 | End: 2022-05-30 | Stop reason: HOSPADM

## 2022-05-26 RX ORDER — ONDANSETRON 4 MG/1
4 TABLET, FILM COATED ORAL EVERY 6 HOURS PRN
Status: DISCONTINUED | OUTPATIENT
Start: 2022-05-26 | End: 2022-05-30 | Stop reason: HOSPADM

## 2022-05-26 RX ORDER — LANOLIN ALCOHOL/MO/W.PET/CERES
10000 CREAM (GRAM) TOPICAL 2 TIMES DAILY
COMMUNITY

## 2022-05-26 RX ORDER — HEPARIN SODIUM (PORCINE) LOCK FLUSH IV SOLN 100 UNIT/ML 100 UNIT/ML
5 SOLUTION INTRAVENOUS AS NEEDED
Status: DISCONTINUED | OUTPATIENT
Start: 2022-05-26 | End: 2022-05-30 | Stop reason: HOSPADM

## 2022-05-26 RX ORDER — POTASSIUM CHLORIDE 7.45 MG/ML
10 INJECTION INTRAVENOUS
Status: DISCONTINUED | OUTPATIENT
Start: 2022-05-26 | End: 2022-05-30 | Stop reason: HOSPADM

## 2022-05-26 RX ORDER — LANOLIN ALCOHOL/MO/W.PET/CERES
1000 CREAM (GRAM) TOPICAL DAILY
Status: DISCONTINUED | OUTPATIENT
Start: 2022-05-27 | End: 2022-05-30 | Stop reason: HOSPADM

## 2022-05-26 RX ORDER — GABAPENTIN 300 MG/1
300 CAPSULE ORAL NIGHTLY
Status: DISCONTINUED | OUTPATIENT
Start: 2022-05-26 | End: 2022-05-30 | Stop reason: HOSPADM

## 2022-05-26 RX ADMIN — GABAPENTIN 300 MG: 300 CAPSULE ORAL at 23:17

## 2022-05-26 RX ADMIN — METHYLPREDNISOLONE SODIUM SUCCINATE 60 MG: 125 INJECTION, POWDER, FOR SOLUTION INTRAMUSCULAR; INTRAVENOUS at 23:18

## 2022-05-26 RX ADMIN — SODIUM CHLORIDE 1000 ML: 9 INJECTION, SOLUTION INTRAVENOUS at 18:04

## 2022-05-26 RX ADMIN — ATORVASTATIN CALCIUM 10 MG: 10 TABLET, FILM COATED ORAL at 23:17

## 2022-05-26 RX ADMIN — MAGNESIUM SULFATE HEPTAHYDRATE 2 G: 2 INJECTION, SOLUTION INTRAVENOUS at 18:04

## 2022-05-26 RX ADMIN — TAZOBACTAM SODIUM AND PIPERACILLIN SODIUM 3.38 G: 375; 3 INJECTION, SOLUTION INTRAVENOUS at 23:24

## 2022-05-26 RX ADMIN — Medication 10 ML: at 23:18

## 2022-05-26 RX ADMIN — MAGNESIUM SULFATE HEPTAHYDRATE 1 G: 1 INJECTION, SOLUTION INTRAVENOUS at 16:19

## 2022-05-26 RX ADMIN — ACETAMINOPHEN 650 MG: 325 TABLET ORAL at 23:19

## 2022-05-26 RX ADMIN — IPRATROPIUM BROMIDE AND ALBUTEROL SULFATE 3 ML: .5; 3 SOLUTION RESPIRATORY (INHALATION) at 21:25

## 2022-05-26 RX ADMIN — TAZOBACTAM SODIUM AND PIPERACILLIN SODIUM 3.38 G: 375; 3 INJECTION, SOLUTION INTRAVENOUS at 13:42

## 2022-05-26 RX ADMIN — PRIMIDONE 50 MG: 50 TABLET ORAL at 23:21

## 2022-05-26 RX ADMIN — VANCOMYCIN HYDROCHLORIDE 1750 MG: 500 INJECTION, POWDER, LYOPHILIZED, FOR SOLUTION INTRAVENOUS at 14:09

## 2022-05-26 NOTE — PROGRESS NOTES
Internal Medicine Acute Visit     Patient Name: Gabriela Mar  : 1945   MRN: [id number]     Chief Complaint:    Chief Complaint   Patient presents with   • Cough   • Shortness of Breath   • Fatigue       History of Present Illness: Gabriela Mar is a 76 y.o. male who presents for fatigue, cough, and shortness of breath. Symptoms began on  and have worsened. He is short of breath at rest. He feels feverish. He has no known sick contacts. His cough is productive of green sputum.     Subjective     I have reviewed and the following portions of the patient's history were updated as appropriate: past family history, past medical history, past social history, past surgical history and problem list.    Allergies:   Allergies   Allergen Reactions   • Bactrim [Sulfamethoxazole-Trimethoprim] Other (See Comments)     Joint pain   • Sulfa Antibiotics GI Intolerance     Makes bones hurt     • Tegaderm Chg Dressing [Chlorhexidine] Itching and Other (See Comments)     redness       Objective     Physical Exam:  Vital Signs:   Vitals:    22 1022   BP: 110/58   Pulse: 94   Temp: 97.1 °F (36.2 °C)   SpO2: 91%   PainSc:   2     There is no height or weight on file to calculate BMI.    Physical Exam  Vitals and nursing note reviewed.   Constitutional:       General: He is not in acute distress.     Appearance: He is ill-appearing. He is not toxic-appearing.   HENT:      Head: Normocephalic and atraumatic.   Cardiovascular:      Rate and Rhythm: Normal rate and regular rhythm.   Pulmonary:      Breath sounds: Rhonchi and rales present. No wheezing.      Comments: Increased work of breathing present.   Musculoskeletal:      Right lower leg: No edema.      Left lower leg: No edema.   Skin:     General: Skin is warm and dry.   Neurological:      Mental Status: He is alert.       Assessment / Plan      Assessment/Plan:   Diagnoses and all orders for this visit:    1. Pneumonia  (Primary)  Patient  usually has normal oxygen level and he is currently satting at 91% on room air. With symptoms of cough, shortness of breath, and fatigue, there is concern for pneumonia (bacterial or COVID). Rapid COVID test in the office is negative but may benefit from PCR as well. As he has increase in work of breathing and a sat of 91% on room air in the setting of immunosuppresion (on treatment for lymphoma), recommended ER evaluation.    Follow Up:   Return if symptoms worsen or fail to improve.    Time:   I spent approximately 20 minutes providing clinical care for this patient; including review of patient's chart and provider documentation, face to face time spent with patient in examination room (obtaining history, performing physical exam, discussing diagnosis and management options), placing orders, and completing patient documentation.     Klarissa Malin MD  [facility] [place] [place] [facility] [place]

## 2022-05-27 LAB
ANION GAP SERPL CALCULATED.3IONS-SCNC: 16 MMOL/L (ref 5–15)
BACTERIA UR QL AUTO: NORMAL /HPF
BASOPHILS # BLD AUTO: 0 10*3/MM3 (ref 0–0.2)
BASOPHILS NFR BLD AUTO: 0 % (ref 0–1.5)
BILIRUB UR QL STRIP: NEGATIVE
BUN SERPL-MCNC: 26 MG/DL (ref 8–23)
BUN/CREAT SERPL: 30.2 (ref 7–25)
CALCIUM SPEC-SCNC: 8.5 MG/DL (ref 8.6–10.5)
CHLORIDE SERPL-SCNC: 96 MMOL/L (ref 98–107)
CLARITY UR: ABNORMAL
CO2 SERPL-SCNC: 20 MMOL/L (ref 22–29)
COLOR UR: YELLOW
CREAT SERPL-MCNC: 0.86 MG/DL (ref 0.76–1.27)
DEPRECATED RDW RBC AUTO: 50.3 FL (ref 37–54)
EGFRCR SERPLBLD CKD-EPI 2021: 89.2 ML/MIN/1.73
EOSINOPHIL # BLD AUTO: 0 10*3/MM3 (ref 0–0.4)
EOSINOPHIL NFR BLD AUTO: 0 % (ref 0.3–6.2)
ERYTHROCYTE [DISTWIDTH] IN BLOOD BY AUTOMATED COUNT: 14.6 % (ref 12.3–15.4)
GLUCOSE BLDC GLUCOMTR-MCNC: 331 MG/DL (ref 70–130)
GLUCOSE BLDC GLUCOMTR-MCNC: 349 MG/DL (ref 70–130)
GLUCOSE BLDC GLUCOMTR-MCNC: 378 MG/DL (ref 70–130)
GLUCOSE BLDC GLUCOMTR-MCNC: 394 MG/DL (ref 70–130)
GLUCOSE BLDC GLUCOMTR-MCNC: 433 MG/DL (ref 70–130)
GLUCOSE BLDC GLUCOMTR-MCNC: 448 MG/DL (ref 70–130)
GLUCOSE SERPL-MCNC: 291 MG/DL (ref 65–99)
GLUCOSE UR STRIP-MCNC: NEGATIVE MG/DL
HCT VFR BLD AUTO: 23.7 % (ref 37.5–51)
HGB BLD-MCNC: 8.3 G/DL (ref 13–17.7)
HGB UR QL STRIP.AUTO: NEGATIVE
HYALINE CASTS UR QL AUTO: NORMAL /LPF
IMM GRANULOCYTES # BLD AUTO: 0 10*3/MM3 (ref 0–0.05)
IMM GRANULOCYTES NFR BLD AUTO: 0 % (ref 0–0.5)
KETONES UR QL STRIP: NEGATIVE
LEUKOCYTE ESTERASE UR QL STRIP.AUTO: NEGATIVE
LYMPHOCYTES # BLD AUTO: 0.13 10*3/MM3 (ref 0.7–3.1)
LYMPHOCYTES NFR BLD AUTO: 76.5 % (ref 19.6–45.3)
MAGNESIUM SERPL-MCNC: 1.5 MG/DL (ref 1.6–2.4)
MCH RBC QN AUTO: 32.7 PG (ref 26.6–33)
MCHC RBC AUTO-ENTMCNC: 35 G/DL (ref 31.5–35.7)
MCV RBC AUTO: 93.3 FL (ref 79–97)
MONOCYTES # BLD AUTO: 0.03 10*3/MM3 (ref 0.1–0.9)
MONOCYTES NFR BLD AUTO: 17.6 % (ref 5–12)
NEUTROPHILS NFR BLD AUTO: 0.01 10*3/MM3 (ref 1.7–7)
NEUTROPHILS NFR BLD AUTO: 5.9 % (ref 42.7–76)
NITRITE UR QL STRIP: NEGATIVE
NRBC BLD AUTO-RTO: 0 /100 WBC (ref 0–0.2)
PH UR STRIP.AUTO: <=5 [PH] (ref 5–8)
PLAT MORPH BLD: NORMAL
PLATELET # BLD AUTO: 17 10*3/MM3 (ref 140–450)
PMV BLD AUTO: 12.2 FL (ref 6–12)
POTASSIUM SERPL-SCNC: 5.1 MMOL/L (ref 3.5–5.2)
PROCALCITONIN SERPL-MCNC: 0.25 NG/ML (ref 0–0.25)
PROT UR QL STRIP: ABNORMAL
RBC # BLD AUTO: 2.54 10*6/MM3 (ref 4.14–5.8)
RBC # UR STRIP: NORMAL /HPF
RBC MORPH BLD: NORMAL
REF LAB TEST METHOD: NORMAL
SODIUM SERPL-SCNC: 132 MMOL/L (ref 136–145)
SP GR UR STRIP: 1.02 (ref 1–1.03)
SQUAMOUS #/AREA URNS HPF: NORMAL /HPF
UROBILINOGEN UR QL STRIP: ABNORMAL
WBC # UR STRIP: NORMAL /HPF
WBC MORPH BLD: NORMAL
WBC NRBC COR # BLD: 0.17 10*3/MM3 (ref 3.4–10.8)

## 2022-05-27 PROCEDURE — P9100 PATHOGEN TEST FOR PLATELETS: HCPCS

## 2022-05-27 PROCEDURE — 63710000001 INSULIN LISPRO (HUMAN) PER 5 UNITS: Performed by: FAMILY MEDICINE

## 2022-05-27 PROCEDURE — 82962 GLUCOSE BLOOD TEST: CPT

## 2022-05-27 PROCEDURE — 25010000002 VANCOMYCIN PER 500 MG: Performed by: FAMILY MEDICINE

## 2022-05-27 PROCEDURE — 25010000002 CEFTRIAXONE PER 250 MG: Performed by: HOSPITALIST

## 2022-05-27 PROCEDURE — 84145 PROCALCITONIN (PCT): CPT | Performed by: FAMILY MEDICINE

## 2022-05-27 PROCEDURE — 25010000002 METHYLPREDNISOLONE PER 125 MG: Performed by: FAMILY MEDICINE

## 2022-05-27 PROCEDURE — 94799 UNLISTED PULMONARY SVC/PX: CPT

## 2022-05-27 PROCEDURE — 85025 COMPLETE CBC W/AUTO DIFF WBC: CPT | Performed by: FAMILY MEDICINE

## 2022-05-27 PROCEDURE — 63710000001 INSULIN LISPRO (HUMAN) PER 5 UNITS: Performed by: NURSE PRACTITIONER

## 2022-05-27 PROCEDURE — 83735 ASSAY OF MAGNESIUM: CPT | Performed by: FAMILY MEDICINE

## 2022-05-27 PROCEDURE — P9035 PLATELET PHERES LEUKOREDUCED: HCPCS

## 2022-05-27 PROCEDURE — 85007 BL SMEAR W/DIFF WBC COUNT: CPT | Performed by: FAMILY MEDICINE

## 2022-05-27 PROCEDURE — 99233 SBSQ HOSP IP/OBS HIGH 50: CPT | Performed by: HOSPITALIST

## 2022-05-27 PROCEDURE — 36430 TRANSFUSION BLD/BLD COMPNT: CPT

## 2022-05-27 PROCEDURE — 25010000002 MAGNESIUM SULFATE 2 GM/50ML SOLUTION: Performed by: FAMILY MEDICINE

## 2022-05-27 PROCEDURE — 25010000002 PIPERACILLIN SOD-TAZOBACTAM PER 1 G: Performed by: FAMILY MEDICINE

## 2022-05-27 PROCEDURE — 80048 BASIC METABOLIC PNL TOTAL CA: CPT | Performed by: FAMILY MEDICINE

## 2022-05-27 RX ORDER — INSULIN LISPRO 100 [IU]/ML
9 INJECTION, SOLUTION INTRAVENOUS; SUBCUTANEOUS ONCE
Status: COMPLETED | OUTPATIENT
Start: 2022-05-27 | End: 2022-05-27

## 2022-05-27 RX ORDER — INSULIN LISPRO 100 [IU]/ML
0-9 INJECTION, SOLUTION INTRAVENOUS; SUBCUTANEOUS
Status: DISCONTINUED | OUTPATIENT
Start: 2022-05-27 | End: 2022-05-28

## 2022-05-27 RX ADMIN — CYANOCOBALAMIN TAB 1000 MCG 1000 MCG: 1000 TAB at 09:45

## 2022-05-27 RX ADMIN — IPRATROPIUM BROMIDE AND ALBUTEROL SULFATE 3 ML: .5; 3 SOLUTION RESPIRATORY (INHALATION) at 18:50

## 2022-05-27 RX ADMIN — INSULIN LISPRO 8 UNITS: 100 INJECTION, SOLUTION INTRAVENOUS; SUBCUTANEOUS at 12:13

## 2022-05-27 RX ADMIN — MAGNESIUM SULFATE HEPTAHYDRATE 2 G: 2 INJECTION, SOLUTION INTRAVENOUS at 16:45

## 2022-05-27 RX ADMIN — PRIMIDONE 50 MG: 50 TABLET ORAL at 20:47

## 2022-05-27 RX ADMIN — Medication 10 ML: at 09:46

## 2022-05-27 RX ADMIN — Medication 10 ML: at 20:47

## 2022-05-27 RX ADMIN — ATORVASTATIN CALCIUM 10 MG: 10 TABLET, FILM COATED ORAL at 20:50

## 2022-05-27 RX ADMIN — MULTIPLE VITAMINS W/ MINERALS TAB 1 TABLET: TAB at 09:45

## 2022-05-27 RX ADMIN — PANTOPRAZOLE SODIUM 40 MG: 40 TABLET, DELAYED RELEASE ORAL at 09:45

## 2022-05-27 RX ADMIN — INSULIN LISPRO 8 UNITS: 100 INJECTION, SOLUTION INTRAVENOUS; SUBCUTANEOUS at 18:28

## 2022-05-27 RX ADMIN — SODIUM CHLORIDE 1 G: 900 INJECTION INTRAVENOUS at 15:32

## 2022-05-27 RX ADMIN — GABAPENTIN 300 MG: 300 CAPSULE ORAL at 20:47

## 2022-05-27 RX ADMIN — IPRATROPIUM BROMIDE AND ALBUTEROL SULFATE 3 ML: .5; 3 SOLUTION RESPIRATORY (INHALATION) at 12:22

## 2022-05-27 RX ADMIN — DOXYCYCLINE 100 MG: 100 INJECTION, POWDER, LYOPHILIZED, FOR SOLUTION INTRAVENOUS at 13:16

## 2022-05-27 RX ADMIN — MAGNESIUM SULFATE HEPTAHYDRATE 2 G: 2 INJECTION, SOLUTION INTRAVENOUS at 09:45

## 2022-05-27 RX ADMIN — DOXYCYCLINE 100 MG: 100 INJECTION, POWDER, LYOPHILIZED, FOR SOLUTION INTRAVENOUS at 20:51

## 2022-05-27 RX ADMIN — VANCOMYCIN HYDROCHLORIDE 750 MG: 750 INJECTION, SOLUTION INTRAVENOUS at 02:48

## 2022-05-27 RX ADMIN — INSULIN LISPRO 9 UNITS: 100 INJECTION, SOLUTION INTRAVENOUS; SUBCUTANEOUS at 21:53

## 2022-05-27 RX ADMIN — PRIMIDONE 50 MG: 50 TABLET ORAL at 09:50

## 2022-05-27 RX ADMIN — INSULIN LISPRO 7 UNITS: 100 INJECTION, SOLUTION INTRAVENOUS; SUBCUTANEOUS at 09:44

## 2022-05-27 RX ADMIN — METHYLPREDNISOLONE SODIUM SUCCINATE 60 MG: 125 INJECTION, POWDER, FOR SOLUTION INTRAMUSCULAR; INTRAVENOUS at 20:47

## 2022-05-27 RX ADMIN — METHYLPREDNISOLONE SODIUM SUCCINATE 60 MG: 125 INJECTION, POWDER, FOR SOLUTION INTRAMUSCULAR; INTRAVENOUS at 03:58

## 2022-05-27 RX ADMIN — METHYLPREDNISOLONE SODIUM SUCCINATE 60 MG: 125 INJECTION, POWDER, FOR SOLUTION INTRAMUSCULAR; INTRAVENOUS at 12:13

## 2022-05-27 RX ADMIN — TAZOBACTAM SODIUM AND PIPERACILLIN SODIUM 3.38 G: 375; 3 INJECTION, SOLUTION INTRAVENOUS at 05:40

## 2022-05-28 LAB
ALBUMIN SERPL-MCNC: 3 G/DL (ref 3.5–5.2)
ALBUMIN/GLOB SERPL: 1.3 G/DL
ALP SERPL-CCNC: 65 U/L (ref 39–117)
ALT SERPL W P-5'-P-CCNC: 18 U/L (ref 1–41)
ANION GAP SERPL CALCULATED.3IONS-SCNC: 11 MMOL/L (ref 5–15)
AST SERPL-CCNC: 14 U/L (ref 1–40)
BH BB BLOOD EXPIRATION DATE: NORMAL
BH BB BLOOD TYPE BARCODE: 7300
BH BB DISPENSE STATUS: NORMAL
BH BB PRODUCT CODE: NORMAL
BH BB UNIT NUMBER: NORMAL
BILIRUB SERPL-MCNC: 0.3 MG/DL (ref 0–1.2)
BUN SERPL-MCNC: 23 MG/DL (ref 8–23)
BUN/CREAT SERPL: 28.4 (ref 7–25)
CALCIUM SPEC-SCNC: 8.4 MG/DL (ref 8.6–10.5)
CHLORIDE SERPL-SCNC: 98 MMOL/L (ref 98–107)
CO2 SERPL-SCNC: 26 MMOL/L (ref 22–29)
CREAT SERPL-MCNC: 0.81 MG/DL (ref 0.76–1.27)
DEPRECATED RDW RBC AUTO: 49.2 FL (ref 37–54)
EGFRCR SERPLBLD CKD-EPI 2021: 90.8 ML/MIN/1.73
ERYTHROCYTE [DISTWIDTH] IN BLOOD BY AUTOMATED COUNT: 14.5 % (ref 12.3–15.4)
GLOBULIN UR ELPH-MCNC: 2.3 GM/DL
GLUCOSE BLDC GLUCOMTR-MCNC: 280 MG/DL (ref 70–130)
GLUCOSE BLDC GLUCOMTR-MCNC: 286 MG/DL (ref 70–130)
GLUCOSE BLDC GLUCOMTR-MCNC: 389 MG/DL (ref 70–130)
GLUCOSE BLDC GLUCOMTR-MCNC: 402 MG/DL (ref 70–130)
GLUCOSE BLDC GLUCOMTR-MCNC: 413 MG/DL (ref 70–130)
GLUCOSE BLDC GLUCOMTR-MCNC: 415 MG/DL (ref 70–130)
GLUCOSE BLDC GLUCOMTR-MCNC: 458 MG/DL (ref 70–130)
GLUCOSE SERPL-MCNC: 283 MG/DL (ref 65–99)
HCT VFR BLD AUTO: 19.9 % (ref 37.5–51)
HGB BLD-MCNC: 7 G/DL (ref 13–17.7)
MAGNESIUM SERPL-MCNC: 1.9 MG/DL (ref 1.6–2.4)
MCH RBC QN AUTO: 32.9 PG (ref 26.6–33)
MCHC RBC AUTO-ENTMCNC: 35.2 G/DL (ref 31.5–35.7)
MCV RBC AUTO: 93.4 FL (ref 79–97)
PLATELET # BLD AUTO: 9 10*3/MM3 (ref 140–450)
POTASSIUM SERPL-SCNC: 4.8 MMOL/L (ref 3.5–5.2)
PROT SERPL-MCNC: 5.3 G/DL (ref 6–8.5)
QT INTERVAL: 340 MS
QTC INTERVAL: 494 MS
RBC # BLD AUTO: 2.13 10*6/MM3 (ref 4.14–5.8)
SODIUM SERPL-SCNC: 135 MMOL/L (ref 136–145)
UNIT  ABO: NORMAL
UNIT  RH: NORMAL
WBC NRBC COR # BLD: 0.2 10*3/MM3 (ref 3.4–10.8)

## 2022-05-28 PROCEDURE — 94799 UNLISTED PULMONARY SVC/PX: CPT

## 2022-05-28 PROCEDURE — 82962 GLUCOSE BLOOD TEST: CPT

## 2022-05-28 PROCEDURE — 63710000001 INSULIN LISPRO (HUMAN) PER 5 UNITS: Performed by: HOSPITALIST

## 2022-05-28 PROCEDURE — 63710000001 INSULIN LISPRO (HUMAN) PER 5 UNITS: Performed by: NURSE PRACTITIONER

## 2022-05-28 PROCEDURE — 63710000001 INSULIN DETEMIR PER 5 UNITS: Performed by: HOSPITALIST

## 2022-05-28 PROCEDURE — P9016 RBC LEUKOCYTES REDUCED: HCPCS

## 2022-05-28 PROCEDURE — 80053 COMPREHEN METABOLIC PANEL: CPT | Performed by: HOSPITALIST

## 2022-05-28 PROCEDURE — 86900 BLOOD TYPING SEROLOGIC ABO: CPT

## 2022-05-28 PROCEDURE — P9035 PLATELET PHERES LEUKOREDUCED: HCPCS

## 2022-05-28 PROCEDURE — 99232 SBSQ HOSP IP/OBS MODERATE 35: CPT | Performed by: HOSPITALIST

## 2022-05-28 PROCEDURE — 83735 ASSAY OF MAGNESIUM: CPT | Performed by: HOSPITALIST

## 2022-05-28 PROCEDURE — 25010000002 CEFTRIAXONE PER 250 MG: Performed by: HOSPITALIST

## 2022-05-28 PROCEDURE — 25010000002 METHYLPREDNISOLONE PER 125 MG: Performed by: FAMILY MEDICINE

## 2022-05-28 PROCEDURE — P9100 PATHOGEN TEST FOR PLATELETS: HCPCS

## 2022-05-28 PROCEDURE — 85027 COMPLETE CBC AUTOMATED: CPT | Performed by: HOSPITALIST

## 2022-05-28 PROCEDURE — 94761 N-INVAS EAR/PLS OXIMETRY MLT: CPT

## 2022-05-28 PROCEDURE — 36430 TRANSFUSION BLD/BLD COMPNT: CPT

## 2022-05-28 PROCEDURE — 94664 DEMO&/EVAL PT USE INHALER: CPT

## 2022-05-28 RX ORDER — INSULIN LISPRO 100 [IU]/ML
0-24 INJECTION, SOLUTION INTRAVENOUS; SUBCUTANEOUS
Status: DISCONTINUED | OUTPATIENT
Start: 2022-05-28 | End: 2022-05-30 | Stop reason: HOSPADM

## 2022-05-28 RX ADMIN — IPRATROPIUM BROMIDE AND ALBUTEROL SULFATE 3 ML: .5; 3 SOLUTION RESPIRATORY (INHALATION) at 16:08

## 2022-05-28 RX ADMIN — DOXYCYCLINE 100 MG: 100 INJECTION, POWDER, LYOPHILIZED, FOR SOLUTION INTRAVENOUS at 09:10

## 2022-05-28 RX ADMIN — CYANOCOBALAMIN TAB 1000 MCG 1000 MCG: 1000 TAB at 09:07

## 2022-05-28 RX ADMIN — INSULIN LISPRO 6 UNITS: 100 INJECTION, SOLUTION INTRAVENOUS; SUBCUTANEOUS at 09:06

## 2022-05-28 RX ADMIN — INSULIN LISPRO 20 UNITS: 100 INJECTION, SOLUTION INTRAVENOUS; SUBCUTANEOUS at 18:08

## 2022-05-28 RX ADMIN — INSULIN LISPRO 9 UNITS: 100 INJECTION, SOLUTION INTRAVENOUS; SUBCUTANEOUS at 12:35

## 2022-05-28 RX ADMIN — PANTOPRAZOLE SODIUM 40 MG: 40 TABLET, DELAYED RELEASE ORAL at 09:07

## 2022-05-28 RX ADMIN — METHYLPREDNISOLONE SODIUM SUCCINATE 60 MG: 125 INJECTION, POWDER, FOR SOLUTION INTRAMUSCULAR; INTRAVENOUS at 05:21

## 2022-05-28 RX ADMIN — IPRATROPIUM BROMIDE AND ALBUTEROL SULFATE 3 ML: .5; 3 SOLUTION RESPIRATORY (INHALATION) at 13:26

## 2022-05-28 RX ADMIN — IPRATROPIUM BROMIDE AND ALBUTEROL SULFATE 3 ML: .5; 3 SOLUTION RESPIRATORY (INHALATION) at 22:44

## 2022-05-28 RX ADMIN — DOXYCYCLINE 100 MG: 100 INJECTION, POWDER, LYOPHILIZED, FOR SOLUTION INTRAVENOUS at 21:18

## 2022-05-28 RX ADMIN — PRIMIDONE 50 MG: 50 TABLET ORAL at 21:18

## 2022-05-28 RX ADMIN — ATORVASTATIN CALCIUM 10 MG: 10 TABLET, FILM COATED ORAL at 21:18

## 2022-05-28 RX ADMIN — GABAPENTIN 300 MG: 300 CAPSULE ORAL at 21:18

## 2022-05-28 RX ADMIN — INSULIN DETEMIR 10 UNITS: 100 INJECTION, SOLUTION SUBCUTANEOUS at 21:19

## 2022-05-28 RX ADMIN — METHYLPREDNISOLONE SODIUM SUCCINATE 60 MG: 125 INJECTION, POWDER, FOR SOLUTION INTRAMUSCULAR; INTRAVENOUS at 12:36

## 2022-05-28 RX ADMIN — IPRATROPIUM BROMIDE AND ALBUTEROL SULFATE 3 ML: .5; 3 SOLUTION RESPIRATORY (INHALATION) at 09:00

## 2022-05-28 RX ADMIN — PRIMIDONE 50 MG: 50 TABLET ORAL at 09:07

## 2022-05-28 RX ADMIN — MULTIPLE VITAMINS W/ MINERALS TAB 1 TABLET: TAB at 09:06

## 2022-05-28 RX ADMIN — METHYLPREDNISOLONE SODIUM SUCCINATE 60 MG: 125 INJECTION, POWDER, FOR SOLUTION INTRAMUSCULAR; INTRAVENOUS at 21:18

## 2022-05-28 RX ADMIN — SODIUM CHLORIDE 1 G: 900 INJECTION INTRAVENOUS at 12:35

## 2022-05-28 RX ADMIN — Medication 10 ML: at 21:19

## 2022-05-29 LAB
ALBUMIN SERPL-MCNC: 3 G/DL (ref 3.5–5.2)
ALBUMIN/GLOB SERPL: 1.3 G/DL
ALP SERPL-CCNC: 70 U/L (ref 39–117)
ALT SERPL W P-5'-P-CCNC: 23 U/L (ref 1–41)
ANION GAP SERPL CALCULATED.3IONS-SCNC: 10 MMOL/L (ref 5–15)
AST SERPL-CCNC: 18 U/L (ref 1–40)
BASOPHILS # BLD AUTO: 0.02 10*3/MM3 (ref 0–0.2)
BASOPHILS NFR BLD AUTO: 1.5 % (ref 0–1.5)
BH BB BLOOD EXPIRATION DATE: NORMAL
BH BB BLOOD EXPIRATION DATE: NORMAL
BH BB BLOOD TYPE BARCODE: 5100
BH BB BLOOD TYPE BARCODE: 5100
BH BB DISPENSE STATUS: NORMAL
BH BB DISPENSE STATUS: NORMAL
BH BB PRODUCT CODE: NORMAL
BH BB PRODUCT CODE: NORMAL
BH BB UNIT NUMBER: NORMAL
BH BB UNIT NUMBER: NORMAL
BILIRUB SERPL-MCNC: 0.4 MG/DL (ref 0–1.2)
BUN SERPL-MCNC: 28 MG/DL (ref 8–23)
BUN/CREAT SERPL: 29.2 (ref 7–25)
CALCIUM SPEC-SCNC: 8.8 MG/DL (ref 8.6–10.5)
CHLORIDE SERPL-SCNC: 96 MMOL/L (ref 98–107)
CO2 SERPL-SCNC: 25 MMOL/L (ref 22–29)
CREAT SERPL-MCNC: 0.96 MG/DL (ref 0.76–1.27)
DEPRECATED RDW RBC AUTO: 54.1 FL (ref 37–54)
EGFRCR SERPLBLD CKD-EPI 2021: 81.4 ML/MIN/1.73
EOSINOPHIL # BLD AUTO: 0 10*3/MM3 (ref 0–0.4)
EOSINOPHIL NFR BLD AUTO: 0 % (ref 0.3–6.2)
ERYTHROCYTE [DISTWIDTH] IN BLOOD BY AUTOMATED COUNT: 16.3 % (ref 12.3–15.4)
GLOBULIN UR ELPH-MCNC: 2.4 GM/DL
GLUCOSE BLDC GLUCOMTR-MCNC: 260 MG/DL (ref 70–130)
GLUCOSE BLDC GLUCOMTR-MCNC: 286 MG/DL (ref 70–130)
GLUCOSE BLDC GLUCOMTR-MCNC: 302 MG/DL (ref 70–130)
GLUCOSE BLDC GLUCOMTR-MCNC: 365 MG/DL (ref 70–130)
GLUCOSE SERPL-MCNC: 307 MG/DL (ref 65–99)
HCT VFR BLD AUTO: 25 % (ref 37.5–51)
HGB BLD-MCNC: 8.9 G/DL (ref 13–17.7)
IMM GRANULOCYTES # BLD AUTO: 0.11 10*3/MM3 (ref 0–0.05)
IMM GRANULOCYTES NFR BLD AUTO: 8 % (ref 0–0.5)
LYMPHOCYTES # BLD AUTO: 0.08 10*3/MM3 (ref 0.7–3.1)
LYMPHOCYTES NFR BLD AUTO: 5.8 % (ref 19.6–45.3)
MCH RBC QN AUTO: 32.4 PG (ref 26.6–33)
MCHC RBC AUTO-ENTMCNC: 35.6 G/DL (ref 31.5–35.7)
MCV RBC AUTO: 90.9 FL (ref 79–97)
MONOCYTES # BLD AUTO: 0.22 10*3/MM3 (ref 0.1–0.9)
MONOCYTES NFR BLD AUTO: 16.1 % (ref 5–12)
NEUTROPHILS NFR BLD AUTO: 0.94 10*3/MM3 (ref 1.7–7)
NEUTROPHILS NFR BLD AUTO: 68.6 % (ref 42.7–76)
NRBC BLD AUTO-RTO: 0 /100 WBC (ref 0–0.2)
OVALOCYTES BLD QL SMEAR: NORMAL
PLAT MORPH BLD: NORMAL
PLATELET # BLD AUTO: 28 10*3/MM3 (ref 140–450)
PMV BLD AUTO: 11.8 FL (ref 6–12)
POTASSIUM SERPL-SCNC: 4.5 MMOL/L (ref 3.5–5.2)
PROT SERPL-MCNC: 5.4 G/DL (ref 6–8.5)
RBC # BLD AUTO: 2.75 10*6/MM3 (ref 4.14–5.8)
SODIUM SERPL-SCNC: 131 MMOL/L (ref 136–145)
UNIT  ABO: NORMAL
UNIT  ABO: NORMAL
UNIT  RH: NORMAL
UNIT  RH: NORMAL
WBC MORPH BLD: NORMAL
WBC NRBC COR # BLD: 1.37 10*3/MM3 (ref 3.4–10.8)

## 2022-05-29 PROCEDURE — P9016 RBC LEUKOCYTES REDUCED: HCPCS

## 2022-05-29 PROCEDURE — 94664 DEMO&/EVAL PT USE INHALER: CPT

## 2022-05-29 PROCEDURE — 63710000001 INSULIN DETEMIR PER 5 UNITS: Performed by: HOSPITALIST

## 2022-05-29 PROCEDURE — 80053 COMPREHEN METABOLIC PANEL: CPT | Performed by: HOSPITALIST

## 2022-05-29 PROCEDURE — 94799 UNLISTED PULMONARY SVC/PX: CPT

## 2022-05-29 PROCEDURE — 99232 SBSQ HOSP IP/OBS MODERATE 35: CPT | Performed by: HOSPITALIST

## 2022-05-29 PROCEDURE — 85025 COMPLETE CBC W/AUTO DIFF WBC: CPT | Performed by: HOSPITALIST

## 2022-05-29 PROCEDURE — 94761 N-INVAS EAR/PLS OXIMETRY MLT: CPT

## 2022-05-29 PROCEDURE — 25010000002 CEFTRIAXONE PER 250 MG: Performed by: HOSPITALIST

## 2022-05-29 PROCEDURE — 63710000001 INSULIN LISPRO (HUMAN) PER 5 UNITS: Performed by: HOSPITALIST

## 2022-05-29 PROCEDURE — 36430 TRANSFUSION BLD/BLD COMPNT: CPT

## 2022-05-29 PROCEDURE — 86900 BLOOD TYPING SEROLOGIC ABO: CPT

## 2022-05-29 PROCEDURE — 85007 BL SMEAR W/DIFF WBC COUNT: CPT | Performed by: HOSPITALIST

## 2022-05-29 PROCEDURE — 82962 GLUCOSE BLOOD TEST: CPT

## 2022-05-29 PROCEDURE — 25010000002 METHYLPREDNISOLONE PER 125 MG: Performed by: FAMILY MEDICINE

## 2022-05-29 RX ADMIN — MULTIPLE VITAMINS W/ MINERALS TAB 1 TABLET: TAB at 09:54

## 2022-05-29 RX ADMIN — Medication 10 ML: at 20:25

## 2022-05-29 RX ADMIN — IPRATROPIUM BROMIDE AND ALBUTEROL SULFATE 3 ML: .5; 3 SOLUTION RESPIRATORY (INHALATION) at 10:29

## 2022-05-29 RX ADMIN — INSULIN LISPRO 12 UNITS: 100 INJECTION, SOLUTION INTRAVENOUS; SUBCUTANEOUS at 10:00

## 2022-05-29 RX ADMIN — INSULIN LISPRO 12 UNITS: 100 INJECTION, SOLUTION INTRAVENOUS; SUBCUTANEOUS at 18:52

## 2022-05-29 RX ADMIN — CYANOCOBALAMIN TAB 1000 MCG 1000 MCG: 1000 TAB at 09:54

## 2022-05-29 RX ADMIN — IPRATROPIUM BROMIDE AND ALBUTEROL SULFATE 3 ML: .5; 3 SOLUTION RESPIRATORY (INHALATION) at 14:20

## 2022-05-29 RX ADMIN — INSULIN LISPRO 16 UNITS: 100 INJECTION, SOLUTION INTRAVENOUS; SUBCUTANEOUS at 12:49

## 2022-05-29 RX ADMIN — IPRATROPIUM BROMIDE AND ALBUTEROL SULFATE 3 ML: .5; 3 SOLUTION RESPIRATORY (INHALATION) at 06:57

## 2022-05-29 RX ADMIN — PRIMIDONE 50 MG: 50 TABLET ORAL at 09:53

## 2022-05-29 RX ADMIN — METHYLPREDNISOLONE SODIUM SUCCINATE 60 MG: 125 INJECTION, POWDER, FOR SOLUTION INTRAMUSCULAR; INTRAVENOUS at 20:25

## 2022-05-29 RX ADMIN — DOXYCYCLINE 100 MG: 100 INJECTION, POWDER, LYOPHILIZED, FOR SOLUTION INTRAVENOUS at 22:05

## 2022-05-29 RX ADMIN — METHYLPREDNISOLONE SODIUM SUCCINATE 60 MG: 125 INJECTION, POWDER, FOR SOLUTION INTRAMUSCULAR; INTRAVENOUS at 03:18

## 2022-05-29 RX ADMIN — GABAPENTIN 300 MG: 300 CAPSULE ORAL at 20:25

## 2022-05-29 RX ADMIN — Medication 10 ML: at 10:00

## 2022-05-29 RX ADMIN — PRIMIDONE 50 MG: 50 TABLET ORAL at 20:25

## 2022-05-29 RX ADMIN — METHYLPREDNISOLONE SODIUM SUCCINATE 60 MG: 125 INJECTION, POWDER, FOR SOLUTION INTRAMUSCULAR; INTRAVENOUS at 12:48

## 2022-05-29 RX ADMIN — ATORVASTATIN CALCIUM 10 MG: 10 TABLET, FILM COATED ORAL at 20:25

## 2022-05-29 RX ADMIN — IPRATROPIUM BROMIDE AND ALBUTEROL SULFATE 3 ML: .5; 3 SOLUTION RESPIRATORY (INHALATION) at 20:15

## 2022-05-29 RX ADMIN — SODIUM CHLORIDE 1 G: 900 INJECTION INTRAVENOUS at 12:48

## 2022-05-29 RX ADMIN — DOXYCYCLINE 100 MG: 100 INJECTION, POWDER, LYOPHILIZED, FOR SOLUTION INTRAVENOUS at 09:55

## 2022-05-29 RX ADMIN — PANTOPRAZOLE SODIUM 40 MG: 40 TABLET, DELAYED RELEASE ORAL at 09:54

## 2022-05-29 RX ADMIN — INSULIN DETEMIR 10 UNITS: 100 INJECTION, SOLUTION SUBCUTANEOUS at 20:25

## 2022-05-30 ENCOUNTER — READMISSION MANAGEMENT (OUTPATIENT)
Dept: CALL CENTER | Facility: HOSPITAL | Age: 77
End: 2022-05-30

## 2022-05-30 VITALS
RESPIRATION RATE: 18 BRPM | WEIGHT: 186.8 LBS | BODY MASS INDEX: 26.74 KG/M2 | DIASTOLIC BLOOD PRESSURE: 83 MMHG | HEART RATE: 97 BPM | TEMPERATURE: 97 F | SYSTOLIC BLOOD PRESSURE: 155 MMHG | OXYGEN SATURATION: 94 % | HEIGHT: 70 IN

## 2022-05-30 PROBLEM — A41.9 SEPSIS: Status: RESOLVED | Noted: 2022-05-26 | Resolved: 2022-05-30

## 2022-05-30 PROBLEM — E83.42 HYPOMAGNESEMIA: Status: RESOLVED | Noted: 2022-05-26 | Resolved: 2022-05-30

## 2022-05-30 PROBLEM — D70.9 NEUTROPENIC FEVER (HCC): Status: RESOLVED | Noted: 2022-05-26 | Resolved: 2022-05-30

## 2022-05-30 PROBLEM — R50.81 NEUTROPENIC FEVER: Status: RESOLVED | Noted: 2022-05-26 | Resolved: 2022-05-30

## 2022-05-30 LAB
ALBUMIN SERPL-MCNC: 3.2 G/DL (ref 3.5–5.2)
ALBUMIN/GLOB SERPL: 1.2 G/DL
ALP SERPL-CCNC: 71 U/L (ref 39–117)
ALT SERPL W P-5'-P-CCNC: 27 U/L (ref 1–41)
ANION GAP SERPL CALCULATED.3IONS-SCNC: 11 MMOL/L (ref 5–15)
AST SERPL-CCNC: 20 U/L (ref 1–40)
BASOPHILS # BLD AUTO: 0.03 10*3/MM3 (ref 0–0.2)
BASOPHILS NFR BLD AUTO: 1.4 % (ref 0–1.5)
BH BB BLOOD EXPIRATION DATE: NORMAL
BH BB BLOOD EXPIRATION DATE: NORMAL
BH BB BLOOD TYPE BARCODE: 6200
BH BB BLOOD TYPE BARCODE: 6200
BH BB DISPENSE STATUS: NORMAL
BH BB DISPENSE STATUS: NORMAL
BH BB PRODUCT CODE: NORMAL
BH BB PRODUCT CODE: NORMAL
BH BB UNIT NUMBER: NORMAL
BH BB UNIT NUMBER: NORMAL
BILIRUB SERPL-MCNC: 0.5 MG/DL (ref 0–1.2)
BUN SERPL-MCNC: 22 MG/DL (ref 8–23)
BUN/CREAT SERPL: 26.5 (ref 7–25)
CALCIUM SPEC-SCNC: 8.9 MG/DL (ref 8.6–10.5)
CHLORIDE SERPL-SCNC: 96 MMOL/L (ref 98–107)
CO2 SERPL-SCNC: 24 MMOL/L (ref 22–29)
CREAT SERPL-MCNC: 0.83 MG/DL (ref 0.76–1.27)
CROSSMATCH INTERPRETATION: NORMAL
CROSSMATCH INTERPRETATION: NORMAL
DEPRECATED RDW RBC AUTO: 53.1 FL (ref 37–54)
EGFRCR SERPLBLD CKD-EPI 2021: 90.1 ML/MIN/1.73
EOSINOPHIL # BLD AUTO: 0 10*3/MM3 (ref 0–0.4)
EOSINOPHIL NFR BLD AUTO: 0 % (ref 0.3–6.2)
ERYTHROCYTE [DISTWIDTH] IN BLOOD BY AUTOMATED COUNT: 16.2 % (ref 12.3–15.4)
GLOBULIN UR ELPH-MCNC: 2.6 GM/DL
GLUCOSE BLDC GLUCOMTR-MCNC: 289 MG/DL (ref 70–130)
GLUCOSE BLDC GLUCOMTR-MCNC: 313 MG/DL (ref 70–130)
GLUCOSE SERPL-MCNC: 283 MG/DL (ref 65–99)
HCT VFR BLD AUTO: 29 % (ref 37.5–51)
HGB BLD-MCNC: 10.4 G/DL (ref 13–17.7)
IMM GRANULOCYTES # BLD AUTO: 0.18 10*3/MM3 (ref 0–0.05)
IMM GRANULOCYTES NFR BLD AUTO: 8.6 % (ref 0–0.5)
LYMPHOCYTES # BLD AUTO: 0.13 10*3/MM3 (ref 0.7–3.1)
LYMPHOCYTES NFR BLD AUTO: 6.2 % (ref 19.6–45.3)
MCH RBC QN AUTO: 32 PG (ref 26.6–33)
MCHC RBC AUTO-ENTMCNC: 35.9 G/DL (ref 31.5–35.7)
MCV RBC AUTO: 89.2 FL (ref 79–97)
MONOCYTES # BLD AUTO: 0.26 10*3/MM3 (ref 0.1–0.9)
MONOCYTES NFR BLD AUTO: 12.4 % (ref 5–12)
NEUTROPHILS NFR BLD AUTO: 1.49 10*3/MM3 (ref 1.7–7)
NEUTROPHILS NFR BLD AUTO: 71.4 % (ref 42.7–76)
NRBC BLD AUTO-RTO: 0 /100 WBC (ref 0–0.2)
PLATELET # BLD AUTO: 22 10*3/MM3 (ref 140–450)
POTASSIUM SERPL-SCNC: 5.4 MMOL/L (ref 3.5–5.2)
PROT SERPL-MCNC: 5.8 G/DL (ref 6–8.5)
RBC # BLD AUTO: 3.25 10*6/MM3 (ref 4.14–5.8)
RBC MORPH BLD: NORMAL
SMALL PLATELETS BLD QL SMEAR: NORMAL
SODIUM SERPL-SCNC: 131 MMOL/L (ref 136–145)
UNIT  ABO: NORMAL
UNIT  ABO: NORMAL
UNIT  RH: NORMAL
UNIT  RH: NORMAL
WBC MORPH BLD: NORMAL
WBC NRBC COR # BLD: 2.09 10*3/MM3 (ref 3.4–10.8)

## 2022-05-30 PROCEDURE — 85007 BL SMEAR W/DIFF WBC COUNT: CPT | Performed by: HOSPITALIST

## 2022-05-30 PROCEDURE — 80053 COMPREHEN METABOLIC PANEL: CPT | Performed by: HOSPITALIST

## 2022-05-30 PROCEDURE — 82962 GLUCOSE BLOOD TEST: CPT

## 2022-05-30 PROCEDURE — 25010000002 METHYLPREDNISOLONE PER 125 MG: Performed by: FAMILY MEDICINE

## 2022-05-30 PROCEDURE — 94664 DEMO&/EVAL PT USE INHALER: CPT

## 2022-05-30 PROCEDURE — 63710000001 INSULIN LISPRO (HUMAN) PER 5 UNITS: Performed by: HOSPITALIST

## 2022-05-30 PROCEDURE — 99239 HOSP IP/OBS DSCHRG MGMT >30: CPT | Performed by: HOSPITALIST

## 2022-05-30 PROCEDURE — 94799 UNLISTED PULMONARY SVC/PX: CPT

## 2022-05-30 PROCEDURE — 85025 COMPLETE CBC W/AUTO DIFF WBC: CPT | Performed by: HOSPITALIST

## 2022-05-30 PROCEDURE — 25010000002 CEFTRIAXONE PER 250 MG: Performed by: HOSPITALIST

## 2022-05-30 RX ORDER — METHYLPREDNISOLONE 4 MG/1
TABLET ORAL
Qty: 21 TABLET | Refills: 0 | Status: SHIPPED | OUTPATIENT
Start: 2022-05-30 | End: 2022-06-06

## 2022-05-30 RX ORDER — DOXYCYCLINE HYCLATE 100 MG/1
100 CAPSULE ORAL 2 TIMES DAILY
Qty: 6 CAPSULE | Refills: 0 | Status: SHIPPED | OUTPATIENT
Start: 2022-05-30 | End: 2022-06-02

## 2022-05-30 RX ADMIN — DOXYCYCLINE 100 MG: 100 INJECTION, POWDER, LYOPHILIZED, FOR SOLUTION INTRAVENOUS at 08:37

## 2022-05-30 RX ADMIN — METHYLPREDNISOLONE SODIUM SUCCINATE 60 MG: 125 INJECTION, POWDER, FOR SOLUTION INTRAMUSCULAR; INTRAVENOUS at 04:09

## 2022-05-30 RX ADMIN — METHYLPREDNISOLONE SODIUM SUCCINATE 60 MG: 125 INJECTION, POWDER, FOR SOLUTION INTRAMUSCULAR; INTRAVENOUS at 10:21

## 2022-05-30 RX ADMIN — Medication 10 ML: at 08:38

## 2022-05-30 RX ADMIN — INSULIN LISPRO 12 UNITS: 100 INJECTION, SOLUTION INTRAVENOUS; SUBCUTANEOUS at 08:37

## 2022-05-30 RX ADMIN — SODIUM CHLORIDE 1 G: 900 INJECTION INTRAVENOUS at 10:21

## 2022-05-30 RX ADMIN — IPRATROPIUM BROMIDE AND ALBUTEROL SULFATE 3 ML: .5; 3 SOLUTION RESPIRATORY (INHALATION) at 07:35

## 2022-05-30 RX ADMIN — PANTOPRAZOLE SODIUM 40 MG: 40 TABLET, DELAYED RELEASE ORAL at 05:54

## 2022-05-30 RX ADMIN — PRIMIDONE 50 MG: 50 TABLET ORAL at 08:37

## 2022-05-30 RX ADMIN — INSULIN LISPRO 16 UNITS: 100 INJECTION, SOLUTION INTRAVENOUS; SUBCUTANEOUS at 12:25

## 2022-05-30 RX ADMIN — CYANOCOBALAMIN TAB 1000 MCG 1000 MCG: 1000 TAB at 08:37

## 2022-05-30 RX ADMIN — MULTIPLE VITAMINS W/ MINERALS TAB 1 TABLET: TAB at 08:37

## 2022-05-30 NOTE — OUTREACH NOTE
Prep Survey    Flowsheet Row Responses   [facility] facility patient discharged from? [place]   Is LACE score < 7 ? No   Emergency Room discharge w/ pulse ox? No   Eligibility [facility]   Date of Admission 05/26/22   Date of Discharge 05/30/22   Discharge Disposition Home or Self Care   Discharge diagnosis Sepsis   Does the patient have one of the following disease processes/diagnoses(primary or secondary)? Sepsis   Does the patient have Home health ordered? No   Is there a DME ordered? No   Prep survey completed? Yes          NAPOLEON A - Registered Nurse

## 2022-05-31 ENCOUNTER — TRANSITIONAL CARE MANAGEMENT TELEPHONE ENCOUNTER (OUTPATIENT)
Dept: CALL CENTER | Facility: HOSPITAL | Age: 77
End: 2022-05-31

## 2022-05-31 LAB
BACTERIA SPEC AEROBE CULT: NORMAL
BACTERIA SPEC AEROBE CULT: NORMAL

## 2022-05-31 NOTE — OUTREACH NOTE
Call Center TCM Note    Flowsheet Row Responses   [facility] patient discharged from? [place]   Does the patient have one of the following disease processes/diagnoses(primary or secondary)? Sepsis   TCM attempt successful? No  [no verbal release]   Unsuccessful attempts Attempt 1          Shira Osorio RN    5/31/2022, 08:22 EDT

## 2022-05-31 NOTE — OUTREACH NOTE
Call Center TCM Note    Flowsheet Row Responses   [facility] patient discharged from? [place]   Does the patient have one of the following disease processes/diagnoses(primary or secondary)? Sepsis   TCM attempt successful? Yes   Call start time 1423   Call end time 1427   Discharge diagnosis Sepsis   Meds reviewed with patient/caregiver? Yes   Is the patient having any side effects they believe may be caused by any medication additions or changes? No   Does the patient have all medications related to this admission filled (includes all antibiotics, inhalers, nebulizers,steroids,etc.) Yes   Is the patient taking all medications as directed (includes completed medication regime)? Yes   Does the patient have a primary care provider?  Yes   Comments regarding PCP No available HOSP DC FU appts that meet TCM guidelines.    Does the patient have an appointment with their PCP within 7 days of discharge? No   What is preventing the patient from scheduling follow up appointments within 7 days of discharge? Earlier appointment not available   Nursing Interventions --  [Route to office. ]   Has the patient kept scheduled appointments due by today? N/A   Has home health visited the patient within 72 hours of discharge? N/A   Psychosocial issues? No   Did the patient receive a copy of their discharge instructions? Yes   Nursing interventions Reviewed instructions with patient   What is the patient's perception of their health status since discharge? Improving   Nursing interventions Nurse provided patient education   Is the patient/caregiver able to teach back Sepsis? S - Shivering,fever or very cold, E - Extreme pain or generalized discomfort (worst ever,especially abdomen), P - Pale or discolored skin, S - Sleepy, difficult to arouse,confused, S - Short of breath   Nursing interventions Nurse provided patient education   Is patient/caregiver able to teach back steps to recovery at home? Eat a balanced diet, Rest and  regain strength, Set small, achievable goals for return to baseline health   Is the patient/caregiver able to teach back signs and symptoms of worsening condition: Rapid heart rate (>90), Hyperthermia, Fever, Shortness of breath/rapid respiratory rate   Is the patient/caregiver able to teach back the hierarchy of who to call/visit for symptoms/problems? PCP, Specialist, Home health nurse, Urgent Care, ED, 911 Yes   TCM call completed? Yes   Wrap up additional comments Pt reports that he is feeling better.           Shira Osorio RN    5/31/2022, 14:27 EDT

## 2022-06-06 ENCOUNTER — HOSPITAL ENCOUNTER (OUTPATIENT)
Dept: GENERAL RADIOLOGY | Facility: HOSPITAL | Age: 77
Discharge: HOME | End: 2022-06-06

## 2022-06-06 ENCOUNTER — LAB (OUTPATIENT)
Dept: LAB | Facility: HOSPITAL | Age: 77
End: 2022-06-06

## 2022-06-06 ENCOUNTER — OFFICE VISIT (OUTPATIENT)
Dept: INTERNAL MEDICINE | Facility: CLINIC | Age: 77
End: 2022-06-06

## 2022-06-06 VITALS
WEIGHT: 188 LBS | OXYGEN SATURATION: 93 % | SYSTOLIC BLOOD PRESSURE: 132 MMHG | HEART RATE: 115 BPM | TEMPERATURE: 97.5 F | DIASTOLIC BLOOD PRESSURE: 68 MMHG | BODY MASS INDEX: 26.98 KG/M2

## 2022-06-06 DIAGNOSIS — J44.1 COPD WITH EXACERBATION: ICD-10-CM

## 2022-06-06 DIAGNOSIS — J18.9 PNEUMONIA DUE TO INFECTIOUS ORGANISM, UNSPECIFIED LATERALITY, UNSPECIFIED PART OF LUNG: Primary | ICD-10-CM

## 2022-06-06 DIAGNOSIS — J18.9 PNEUMONIA DUE TO INFECTIOUS ORGANISM, UNSPECIFIED LATERALITY, UNSPECIFIED PART OF LUNG: ICD-10-CM

## 2022-06-06 LAB
BASOPHILS # BLD AUTO: 0.02 10*3/MM3 (ref 0–0.2)
BASOPHILS NFR BLD AUTO: 0.3 % (ref 0–1.5)
DEPRECATED RDW RBC AUTO: 55.1 FL (ref 37–54)
EOSINOPHIL # BLD AUTO: 0 10*3/MM3 (ref 0–0.4)
EOSINOPHIL NFR BLD AUTO: 0 % (ref 0.3–6.2)
ERYTHROCYTE [DISTWIDTH] IN BLOOD BY AUTOMATED COUNT: 16.8 % (ref 12.3–15.4)
HCT VFR BLD AUTO: 31.9 % (ref 37.5–51)
HGB BLD-MCNC: 10.9 G/DL (ref 13–17.7)
LYMPHOCYTES # BLD AUTO: 0.48 10*3/MM3 (ref 0.7–3.1)
LYMPHOCYTES NFR BLD AUTO: 6.6 % (ref 19.6–45.3)
MCH RBC QN AUTO: 32.2 PG (ref 26.6–33)
MCHC RBC AUTO-ENTMCNC: 34.2 G/DL (ref 31.5–35.7)
MCV RBC AUTO: 94.1 FL (ref 79–97)
MONOCYTES # BLD AUTO: 0.86 10*3/MM3 (ref 0.1–0.9)
MONOCYTES NFR BLD AUTO: 11.8 % (ref 5–12)
NEUTROPHILS NFR BLD AUTO: 5.88 10*3/MM3 (ref 1.7–7)
NEUTROPHILS NFR BLD AUTO: 80.5 % (ref 42.7–76)
PLATELET # BLD AUTO: 34 10*3/MM3 (ref 140–450)
PMV BLD AUTO: 14.3 FL (ref 6–12)
RBC # BLD AUTO: 3.39 10*6/MM3 (ref 4.14–5.8)
WBC NRBC COR # BLD: 7.3 10*3/MM3 (ref 3.4–10.8)

## 2022-06-06 PROCEDURE — 99495 TRANSJ CARE MGMT MOD F2F 14D: CPT | Performed by: INTERNAL MEDICINE

## 2022-06-06 PROCEDURE — 96372 THER/PROPH/DIAG INJ SC/IM: CPT | Performed by: INTERNAL MEDICINE

## 2022-06-06 PROCEDURE — 71046 X-RAY EXAM CHEST 2 VIEWS: CPT

## 2022-06-06 PROCEDURE — 1111F DSCHRG MED/CURRENT MED MERGE: CPT | Performed by: INTERNAL MEDICINE

## 2022-06-06 PROCEDURE — 85025 COMPLETE CBC W/AUTO DIFF WBC: CPT

## 2022-06-06 PROCEDURE — 80053 COMPREHEN METABOLIC PANEL: CPT

## 2022-06-06 RX ORDER — DOXYCYCLINE HYCLATE 100 MG/1
100 CAPSULE ORAL 2 TIMES DAILY
Qty: 20 CAPSULE | Refills: 0 | Status: SHIPPED | OUTPATIENT
Start: 2022-06-06 | End: 2022-06-22

## 2022-06-06 RX ORDER — PREDNISONE 20 MG/1
20 TABLET ORAL DAILY
Qty: 5 TABLET | Refills: 0 | Status: SHIPPED | OUTPATIENT
Start: 2022-06-06 | End: 2022-06-11

## 2022-06-06 RX ORDER — ALBUTEROL SULFATE 2.5 MG/3ML
2.5 SOLUTION RESPIRATORY (INHALATION) EVERY 4 HOURS PRN
Qty: 120 EACH | Refills: 5 | Status: SHIPPED | OUTPATIENT
Start: 2022-06-06

## 2022-06-06 RX ORDER — CEFTRIAXONE 1 G/1
1 INJECTION, POWDER, FOR SOLUTION INTRAMUSCULAR; INTRAVENOUS EVERY 24 HOURS
Status: COMPLETED | OUTPATIENT
Start: 2022-06-06 | End: 2022-06-06

## 2022-06-06 RX ADMIN — CEFTRIAXONE 1 G: 1 INJECTION, POWDER, FOR SOLUTION INTRAMUSCULAR; INTRAVENOUS at 13:54

## 2022-06-06 NOTE — PROGRESS NOTES
Transitional Care Follow Up Visit  Subjective     Gabriela Mar is a 76 y.o. male who presents for a transitional care management visit.    Within 48 business hours after discharge our office contacted him via telephone to coordinate his care and needs.      I reviewed and discussed the details of that call along with the discharge summary, hospital problems, inpatient lab results, inpatient diagnostic studies, and consultation reports with Gabriela.     Current outpatient and discharge medications have been reconciled for the patient.  Reviewed by: Krystina Gee MD      Date of TCM Phone Call 5/30/2022   [facility]   Date of Admission 5/26/2022   Date of Discharge 5/30/2022   Discharge Disposition Home or Self Care     Risk for Readmission (LACE) Score: 14 (5/30/2022  6:01 AM)      History of Present Illness   Continues to cough , and has chest congestion and soa. Finished his doxycyline and pred taper, and his sxs continue to be persisiting.  Cough and Pnd are the most aggravating.  Drinks 2 liquid ivs daily, unsure why he is dehydrated.      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]   Yes   • Type 2 diabetes mellitus with hyperglycemia, with long-term current use of insulin (HCC) [E11.65, Z79.4]   Not Applicable   • Chronic obstructive pulmonary disease with acute lower respiratory infection (HCC) [J44.0]   Yes   • Chronic diastolic congestive heart failure (HCC) [I50.32]   Yes   • Esophageal reflux [K21.9]   Yes   • Hyperlipidemia LDL goal <70 [E78.5]   Yes   • Essential hypertension [I10]   Yes       Resolved Hospital Problems     Diagnosis Date Resolved POA   • Sepsis (HCC) [A41.9] 05/30/2022 Yes   • Neutropenic fever (HCC) [D70.9,  R50.81] 05/30/2022 Unknown   • Hypomagnesemia [E83.42] 05/30/2022 Unknown      Hospital Course:  Gabriela Mar is a 76 y.o. 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     This patient's problems and plans were partially entered by my partner and updated as appropriate by me 05/29/22.     Sepsis   Lactic Acidosis   Neutropenic Fever  Parainfluenza Virus  -Blood Cultures x 2 pending (drawn from Port-A-Cath)   -Respiratory PCR + Parainfluenza Virus 3   -initially given Vancomycin/Zosyn in Ed  - continued on doxycycline/Rocephin to cover lungs in setting of neutropenia while inpatient  - d/c on 3 more days of PO Doxycycline 100mg BID.  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  - f/u with PCP in 3-5 days for repeat labs and f/u with Dr. Herr as scheduled     Elevated Troponin  -In setting of Sepsis, neutropenic fever  -EKG with sinus tach  -Trend troponin x 2 more occurrences      Hypomagnesemia  -Replace per protocol  -Repeat level improved     DM2  -restart Amaryl and Metformin  -Monitor with steroids      AE COPD  -Pt wheezing on exam, Respiratory panel + Parainfluenza Virus 3  -Solu-Medrol 60mg IV q 8 while admitted  - d/c on Medrol dose pack  -CXR  negative      GERD  -Continue PPI      HLD  -Continue statin  Discharge Follow Up Recommendations for outpatient labs/diagnostics:  - Take Medrol dose pack and Doxycyline PO to completion as prescribed  - f/u with PCP for repeat labs to check platelets and hemoglobin in 3-5 days  - f/u with Dr. Herr as scheduled       The following portions of the patient's history were reviewed and updated as appropriate: allergies, current medications, past family history, past medical history, past social history, past surgical history and problem list.    Review of Systems   Constitutional: Positive for fatigue. Negative for chills and fever.   HENT: Negative for ear discharge, ear pain, sinus pressure and sore throat.    Respiratory: Positive for cough, chest tightness and shortness of breath.    Cardiovascular: Negative for chest pain, palpitations and leg swelling.   Gastrointestinal: Negative for diarrhea, nausea and vomiting.   Genitourinary: Negative for difficulty urinating and frequency.   Musculoskeletal: Negative for arthralgias, back pain and myalgias.   Neurological: Positive for weakness. Negative for dizziness, syncope and headaches.   Psychiatric/Behavioral: Negative for confusion and sleep disturbance.       Objective   Physical Exam  Constitutional:       General: He is not in acute distress.     Appearance: Normal appearance. He is ill-appearing.   HENT:      Head: Normocephalic and atraumatic.      Right Ear: Tympanic membrane and external ear normal.      Left Ear: Tympanic membrane and external ear normal. There is impacted cerumen.      Nose: Nose normal.      Mouth/Throat:      Mouth: Mucous membranes are moist.   Eyes:      General: No scleral icterus.  Neck:      Vascular: No carotid bruit.   Cardiovascular:      Rate and Rhythm: Normal rate and regular rhythm.      Pulses: Normal pulses.      Heart sounds: Normal heart sounds. No murmur heard.    No friction rub. No gallop.   Pulmonary:      Effort:  Pulmonary effort is normal.      Breath sounds: Rhonchi and rales present.   Abdominal:      General: Bowel sounds are normal. There is no distension.      Palpations: Abdomen is soft.      Tenderness: There is no right CVA tenderness, left CVA tenderness, guarding or rebound.      Hernia: No hernia is present.   Musculoskeletal:         General: No tenderness. Normal range of motion.      Cervical back: Normal range of motion.      Right lower leg: No edema.      Left lower leg: No edema.   Lymphadenopathy:      Cervical: No cervical adenopathy.   Skin:     General: Skin is warm.      Coloration: Skin is pale.      Findings: No rash.   Neurological:      General: No focal deficit present.      Mental Status: He is alert and oriented to person, place, and time. Mental status is at baseline.      Cranial Nerves: No cranial nerve deficit.      Sensory: No sensory deficit.      Coordination: Coordination normal.      Gait: Gait normal.      Deep Tendon Reflexes: Reflexes normal.   Psychiatric:         Mood and Affect: Mood normal.         Behavior: Behavior normal.             Current Outpatient Medications:   •  albuterol sulfate  (90 Base) MCG/ACT inhaler, Inhale 2 puffs Every 4 (Four) Hours As Needed for Wheezing., Disp: 8 g, Rfl: 3  •  Cholecalciferol (VITAMIN D) 1000 UNITS tablet, Take 2,000 Units by mouth Daily., Disp: , Rfl:   •  gabapentin (NEURONTIN) 300 MG capsule, Take 1 capsule by mouth Every Night., Disp: 30 capsule, Rfl: 2  •  glimepiride (AMARYL) 4 MG tablet, Take 1 tablet by mouth Daily With Dinner., Disp: 90 tablet, Rfl: 1  •  metFORMIN (GLUCOPHAGE) 1000 MG tablet, TAKE ONE TABLET BY MOUTH IN THE MORNING AND TAKE ONE AND ONE-HALF TABLETS BY MOUTH AT BEDTIME, Disp: 75 tablet, Rfl: 5  •  Multiple Vitamins-Minerals (MULTI VITAMIN/MINERALS PO), Take 1 tablet by mouth Daily., Disp: , Rfl:   •  Multiple Vitamins-Minerals (PreserVision AREDS 2) chewable tablet, Chew 1 tablet 2 (Two) Times a Day., Disp:  , Rfl:   •  omeprazole (priLOSEC) 20 MG capsule, Take 1 capsule by mouth Every Night., Disp: , Rfl:   •  simvastatin (ZOCOR) 10 MG tablet, Take 1 tablet by mouth every night at bedtime., Disp: 90 tablet, Rfl: 1  •  vitamin B-12 (CYANOCOBALAMIN) 1000 MCG tablet, Take 3,000 mcg by mouth Daily., Disp: , Rfl:   •  albuterol (PROVENTIL) (2.5 MG/3ML) 0.083% nebulizer solution, Take 2.5 mg by nebulization Every 4 (Four) Hours As Needed for Wheezing., Disp: 120 each, Rfl: 5  •  cefdinir (OMNICEF) 300 MG capsule, Take 1 capsule by mouth 2 (Two) Times a Day., Disp: 28 capsule, Rfl: 0  •  doxycycline (VIBRAMYCIN) 100 MG capsule, Take 1 capsule by mouth 2 (Two) Times a Day., Disp: 20 capsule, Rfl: 0  •  guaiFENesin-dextromethorphan (ROBITUSSIN DM) 100-10 MG/5ML syrup, Take 5 mL by mouth 4 (Four) Times a Day As Needed for Cough., Disp: 200 mL, Rfl: 1  •  predniSONE (DELTASONE) 20 MG tablet, Take 1 tablet by mouth Daily for 5 days., Disp: 5 tablet, Rfl: 0  •  saccharomyces boulardii (Florastor) 250 MG capsule, Take 1 capsule by mouth Every Night., Disp: 30 capsule, Rfl: 1  No current facility-administered medications for this visit.      /68   Pulse 115   Temp 97.5 °F (36.4 °C)   Wt 85.3 kg (188 lb)   SpO2 93%   BMI 26.98 kg/m²       Results for orders placed or performed during the hospital encounter of 05/26/22   Respiratory Panel PCR w/COVID-19(SARS-CoV-2) [facility] In-House, NP Swab in [facility], 3-4 HR TAT - Swab, Nasopharynx    Specimen: Nasopharynx; Swab   Result Value Ref Range    ADENOVIRUS, PCR Not Detected Not Detected    Coronavirus 229E Not Detected Not Detected    Coronavirus HKU1 Not Detected Not Detected    Coronavirus NL63 Not Detected Not Detected    Coronavirus OC43 Not Detected Not Detected    COVID19 Not Detected Not Detected - Ref. Range    Human Metapneumovirus Not Detected Not Detected    Human Rhinovirus/Enterovirus Not Detected Not Detected    Influenza A PCR Not Detected Not  Detected    Influenza B PCR Not Detected Not Detected    Parainfluenza Virus 1 Not Detected Not Detected    Parainfluenza Virus 2 Not Detected Not Detected    Parainfluenza Virus 3 Detected (A) Not Detected    Parainfluenza Virus 4 Not Detected Not Detected    RSV, PCR Not Detected Not Detected    Bordetella pertussis pcr Not Detected Not Detected    Bordetella parapertussis PCR Not Detected Not Detected    Chlamydophila pneumoniae PCR Not Detected Not Detected    Mycoplasma pneumo by PCR Not Detected Not Detected   Blood Culture - Blood, Blood, Central Line    Specimen: Blood, Central Line   Result Value Ref Range    Blood Culture No growth at 5 days    Blood Culture - Blood, Blood, Central Line    Specimen: Blood, Central Line   Result Value Ref Range    Blood Culture No growth at 5 days    Comprehensive Metabolic Panel    Specimen: Blood   Result Value Ref Range    Glucose 191 (H) 65 - 99 mg/dL    BUN 32 (H) 8 - 23 mg/dL    Creatinine 1.01 0.76 - 1.27 mg/dL    Sodium 134 (L) 136 - 145 mmol/L    Potassium 4.6 3.5 - 5.2 mmol/L    Chloride 95 (L) 98 - 107 mmol/L    CO2 25.0 22.0 - 29.0 mmol/L    Calcium 9.0 8.6 - 10.5 mg/dL    Total Protein 5.5 (L) 6.0 - 8.5 g/dL    Albumin 3.30 (L) 3.50 - 5.20 g/dL    ALT (SGPT) 24 1 - 41 U/L    AST (SGOT) 21 1 - 40 U/L    Alkaline Phosphatase 85 39 - 117 U/L    Total Bilirubin 0.6 0.0 - 1.2 mg/dL    Globulin 2.2 gm/dL    A/G Ratio 1.5 g/dL    BUN/Creatinine Ratio 31.7 (H) 7.0 - 25.0    Anion Gap 14.0 5.0 - 15.0 mmol/L    eGFR 76.6 >60.0 mL/min/1.73   Troponin    Specimen: Blood   Result Value Ref Range    Troponin T 0.063 (C) 0.000 - 0.030 ng/mL   Magnesium    Specimen: Blood   Result Value Ref Range    Magnesium 1.0 (L) 1.6 - 2.4 mg/dL   Urinalysis With Microscopic If Indicated (No Culture) - Urine, Clean Catch    Specimen: Urine, Clean Catch   Result Value Ref Range    Color, UA Yellow Yellow, Straw    Appearance, UA Cloudy (A) Clear    pH, UA <=5.0 5.0 - 8.0    Specific  Gravity, UA 1.023 1.001 - 1.030    Glucose, UA Negative Negative    Ketones, UA Negative Negative    Bilirubin, UA Negative Negative    Blood, UA Negative Negative    Protein, UA 30 mg/dL (1+) (A) Negative    Leuk Esterase, UA Negative Negative    Nitrite, UA Negative Negative    Urobilinogen, UA 0.2 E.U./dL 0.2 - 1.0 E.U./dL   CBC Auto Differential    Specimen: Blood   Result Value Ref Range    WBC 0.16 (C) 3.40 - 10.80 10*3/mm3    RBC 2.37 (L) 4.14 - 5.80 10*6/mm3    Hemoglobin 7.8 (L) 13.0 - 17.7 g/dL    Hematocrit 23.2 (L) 37.5 - 51.0 %    MCV 97.9 (H) 79.0 - 97.0 fL    MCH 32.9 26.6 - 33.0 pg    MCHC 33.6 31.5 - 35.7 g/dL    RDW 15.4 12.3 - 15.4 %    RDW-SD 55.1 (H) 37.0 - 54.0 fl    Platelets 10 (C) 140 - 450 10*3/mm3   Lactic Acid, Plasma    Specimen: Blood   Result Value Ref Range    Lactate 3.0 (C) 0.5 - 2.0 mmol/L   STAT Lactic Acid, Reflex    Specimen: Blood   Result Value Ref Range    Lactate 1.5 0.5 - 2.0 mmol/L   Manual Differential    Specimen: Blood   Result Value Ref Range    Neutrophil % 3.0 (L) 42.7 - 76.0 %    Lymphocyte % 90.0 (H) 19.6 - 45.3 %    Monocyte % 2.0 (L) 5.0 - 12.0 %    Eosinophil % 1.0 0.3 - 6.2 %    Basophil % 0.0 0.0 - 1.5 %    Atypical Lymphocyte % 4.0 0.0 - 5.0 %    Neutrophils Absolute 0.00 (L) 1.70 - 7.00 10*3/mm3    Lymphocytes Absolute 0.15 (L) 0.70 - 3.10 10*3/mm3    Monocytes Absolute 0.00 (L) 0.10 - 0.90 10*3/mm3    Eosinophils Absolute 0.00 0.00 - 0.40 10*3/mm3    Basophils Absolute 0.00 0.00 - 0.20 10*3/mm3    Rouleaux Slight/1+ None Seen    WBC Morphology Normal Normal    Platelet Morphology Normal Normal   Troponin    Specimen: Blood   Result Value Ref Range    Troponin T 0.054 (C) 0.000 - 0.030 ng/mL   Troponin    Specimen: Blood   Result Value Ref Range    Troponin T 0.052 (C) 0.000 - 0.030 ng/mL   CBC Auto Differential    Specimen: Blood   Result Value Ref Range    WBC 0.17 (C) 3.40 - 10.80 10*3/mm3    RBC 2.54 (L) 4.14 - 5.80 10*6/mm3    Hemoglobin 8.3 (L) 13.0 -  17.7 g/dL    Hematocrit 23.7 (L) 37.5 - 51.0 %    MCV 93.3 79.0 - 97.0 fL    MCH 32.7 26.6 - 33.0 pg    MCHC 35.0 31.5 - 35.7 g/dL    RDW 14.6 12.3 - 15.4 %    RDW-SD 50.3 37.0 - 54.0 fl    MPV 12.2 (H) 6.0 - 12.0 fL    Platelets 17 (C) 140 - 450 10*3/mm3    Neutrophil % 5.9 (L) 42.7 - 76.0 %    Lymphocyte % 76.5 (H) 19.6 - 45.3 %    Monocyte % 17.6 (H) 5.0 - 12.0 %    Eosinophil % 0.0 (L) 0.3 - 6.2 %    Basophil % 0.0 0.0 - 1.5 %    Immature Grans % 0.0 0.0 - 0.5 %    Neutrophils, Absolute 0.01 (L) 1.70 - 7.00 10*3/mm3    Lymphocytes, Absolute 0.13 (L) 0.70 - 3.10 10*3/mm3    Monocytes, Absolute 0.03 (L) 0.10 - 0.90 10*3/mm3    Eosinophils, Absolute 0.00 0.00 - 0.40 10*3/mm3    Basophils, Absolute 0.00 0.00 - 0.20 10*3/mm3    Immature Grans, Absolute 0.00 0.00 - 0.05 10*3/mm3    nRBC 0.0 0.0 - 0.2 /100 WBC   Basic Metabolic Panel    Specimen: Blood   Result Value Ref Range    Glucose 291 (H) 65 - 99 mg/dL    BUN 26 (H) 8 - 23 mg/dL    Creatinine 0.86 0.76 - 1.27 mg/dL    Sodium 132 (L) 136 - 145 mmol/L    Potassium 5.1 3.5 - 5.2 mmol/L    Chloride 96 (L) 98 - 107 mmol/L    CO2 20.0 (L) 22.0 - 29.0 mmol/L    Calcium 8.5 (L) 8.6 - 10.5 mg/dL    BUN/Creatinine Ratio 30.2 (H) 7.0 - 25.0    Anion Gap 16.0 (H) 5.0 - 15.0 mmol/L    eGFR 89.2 >60.0 mL/min/1.73   Magnesium    Specimen: Blood   Result Value Ref Range    Magnesium 1.5 (L) 1.6 - 2.4 mg/dL   Procalcitonin    Specimen: Blood   Result Value Ref Range    Procalcitonin 0.25 0.00 - 0.25 ng/mL   Urinalysis, Microscopic Only - Urine, Clean Catch    Specimen: Urine, Clean Catch   Result Value Ref Range    RBC, UA 0-2 None Seen, 0-2 /HPF    WBC, UA 0-2 None Seen, 0-2 /HPF    Bacteria, UA None Seen None Seen, Trace /HPF    Squamous Epithelial Cells, UA 0-2 None Seen, 0-2 /HPF    Hyaline Casts, UA 0-6 0 - 6 /LPF    Methodology Automated Microscopy    Scan Slide    Specimen: Blood   Result Value Ref Range    RBC Morphology Normal Normal    WBC Morphology Normal Normal     Platelet Morphology Normal Normal   CBC (No Diff)    Specimen: Blood   Result Value Ref Range    WBC 0.20 (C) 3.40 - 10.80 10*3/mm3    RBC 2.13 (L) 4.14 - 5.80 10*6/mm3    Hemoglobin 7.0 (L) 13.0 - 17.7 g/dL    Hematocrit 19.9 (C) 37.5 - 51.0 %    MCV 93.4 79.0 - 97.0 fL    MCH 32.9 26.6 - 33.0 pg    MCHC 35.2 31.5 - 35.7 g/dL    RDW 14.5 12.3 - 15.4 %    RDW-SD 49.2 37.0 - 54.0 fl    Platelets 9 (C) 140 - 450 10*3/mm3   Comprehensive Metabolic Panel    Specimen: Blood   Result Value Ref Range    Glucose 283 (H) 65 - 99 mg/dL    BUN 23 8 - 23 mg/dL    Creatinine 0.81 0.76 - 1.27 mg/dL    Sodium 135 (L) 136 - 145 mmol/L    Potassium 4.8 3.5 - 5.2 mmol/L    Chloride 98 98 - 107 mmol/L    CO2 26.0 22.0 - 29.0 mmol/L    Calcium 8.4 (L) 8.6 - 10.5 mg/dL    Total Protein 5.3 (L) 6.0 - 8.5 g/dL    Albumin 3.00 (L) 3.50 - 5.20 g/dL    ALT (SGPT) 18 1 - 41 U/L    AST (SGOT) 14 1 - 40 U/L    Alkaline Phosphatase 65 39 - 117 U/L    Total Bilirubin 0.3 0.0 - 1.2 mg/dL    Globulin 2.3 gm/dL    A/G Ratio 1.3 g/dL    BUN/Creatinine Ratio 28.4 (H) 7.0 - 25.0    Anion Gap 11.0 5.0 - 15.0 mmol/L    eGFR 90.8 >60.0 mL/min/1.73   Magnesium    Specimen: Blood   Result Value Ref Range    Magnesium 1.9 1.6 - 2.4 mg/dL   Comprehensive Metabolic Panel    Specimen: Blood   Result Value Ref Range    Glucose 307 (H) 65 - 99 mg/dL    BUN 28 (H) 8 - 23 mg/dL    Creatinine 0.96 0.76 - 1.27 mg/dL    Sodium 131 (L) 136 - 145 mmol/L    Potassium 4.5 3.5 - 5.2 mmol/L    Chloride 96 (L) 98 - 107 mmol/L    CO2 25.0 22.0 - 29.0 mmol/L    Calcium 8.8 8.6 - 10.5 mg/dL    Total Protein 5.4 (L) 6.0 - 8.5 g/dL    Albumin 3.00 (L) 3.50 - 5.20 g/dL    ALT (SGPT) 23 1 - 41 U/L    AST (SGOT) 18 1 - 40 U/L    Alkaline Phosphatase 70 39 - 117 U/L    Total Bilirubin 0.4 0.0 - 1.2 mg/dL    Globulin 2.4 gm/dL    A/G Ratio 1.3 g/dL    BUN/Creatinine Ratio 29.2 (H) 7.0 - 25.0    Anion Gap 10.0 5.0 - 15.0 mmol/L    eGFR 81.4 >60.0 mL/min/1.73   CBC Auto Differential     Specimen: Blood   Result Value Ref Range    WBC 1.37 (C) 3.40 - 10.80 10*3/mm3    RBC 2.75 (L) 4.14 - 5.80 10*6/mm3    Hemoglobin 8.9 (L) 13.0 - 17.7 g/dL    Hematocrit 25.0 (L) 37.5 - 51.0 %    MCV 90.9 79.0 - 97.0 fL    MCH 32.4 26.6 - 33.0 pg    MCHC 35.6 31.5 - 35.7 g/dL    RDW 16.3 (H) 12.3 - 15.4 %    RDW-SD 54.1 (H) 37.0 - 54.0 fl    MPV 11.8 6.0 - 12.0 fL    Platelets 28 (C) 140 - 450 10*3/mm3    Neutrophil % 68.6 42.7 - 76.0 %    Lymphocyte % 5.8 (L) 19.6 - 45.3 %    Monocyte % 16.1 (H) 5.0 - 12.0 %    Eosinophil % 0.0 (L) 0.3 - 6.2 %    Basophil % 1.5 0.0 - 1.5 %    Immature Grans % 8.0 (H) 0.0 - 0.5 %    Neutrophils, Absolute 0.94 (L) 1.70 - 7.00 10*3/mm3    Lymphocytes, Absolute 0.08 (L) 0.70 - 3.10 10*3/mm3    Monocytes, Absolute 0.22 0.10 - 0.90 10*3/mm3    Eosinophils, Absolute 0.00 0.00 - 0.40 10*3/mm3    Basophils, Absolute 0.02 0.00 - 0.20 10*3/mm3    Immature Grans, Absolute 0.11 (H) 0.00 - 0.05 10*3/mm3    nRBC 0.0 0.0 - 0.2 /100 WBC   Scan Slide    Specimen: Blood   Result Value Ref Range    Ovalocytes Slight/1+ None Seen    WBC Morphology Normal Normal    Platelet Morphology Normal Normal   Comprehensive Metabolic Panel    Specimen: Blood   Result Value Ref Range    Glucose 283 (H) 65 - 99 mg/dL    BUN 22 8 - 23 mg/dL    Creatinine 0.83 0.76 - 1.27 mg/dL    Sodium 131 (L) 136 - 145 mmol/L    Potassium 5.4 (H) 3.5 - 5.2 mmol/L    Chloride 96 (L) 98 - 107 mmol/L    CO2 24.0 22.0 - 29.0 mmol/L    Calcium 8.9 8.6 - 10.5 mg/dL    Total Protein 5.8 (L) 6.0 - 8.5 g/dL    Albumin 3.20 (L) 3.50 - 5.20 g/dL    ALT (SGPT) 27 1 - 41 U/L    AST (SGOT) 20 1 - 40 U/L    Alkaline Phosphatase 71 39 - 117 U/L    Total Bilirubin 0.5 0.0 - 1.2 mg/dL    Globulin 2.6 gm/dL    A/G Ratio 1.2 g/dL    BUN/Creatinine Ratio 26.5 (H) 7.0 - 25.0    Anion Gap 11.0 5.0 - 15.0 mmol/L    eGFR 90.1 >60.0 mL/min/1.73   CBC Auto Differential    Specimen: Blood   Result Value Ref Range    WBC 2.09 (L) 3.40 - 10.80 10*3/mm3    RBC  3.25 (L) 4.14 - 5.80 10*6/mm3    Hemoglobin 10.4 (L) 13.0 - 17.7 g/dL    Hematocrit 29.0 (L) 37.5 - 51.0 %    MCV 89.2 79.0 - 97.0 fL    MCH 32.0 26.6 - 33.0 pg    MCHC 35.9 (H) 31.5 - 35.7 g/dL    RDW 16.2 (H) 12.3 - 15.4 %    RDW-SD 53.1 37.0 - 54.0 fl    Platelets 22 (C) 140 - 450 10*3/mm3    Neutrophil % 71.4 42.7 - 76.0 %    Lymphocyte % 6.2 (L) 19.6 - 45.3 %    Monocyte % 12.4 (H) 5.0 - 12.0 %    Eosinophil % 0.0 (L) 0.3 - 6.2 %    Basophil % 1.4 0.0 - 1.5 %    Immature Grans % 8.6 (H) 0.0 - 0.5 %    Neutrophils, Absolute 1.49 (L) 1.70 - 7.00 10*3/mm3    Lymphocytes, Absolute 0.13 (L) 0.70 - 3.10 10*3/mm3    Monocytes, Absolute 0.26 0.10 - 0.90 10*3/mm3    Eosinophils, Absolute 0.00 0.00 - 0.40 10*3/mm3    Basophils, Absolute 0.03 0.00 - 0.20 10*3/mm3    Immature Grans, Absolute 0.18 (H) 0.00 - 0.05 10*3/mm3    nRBC 0.0 0.0 - 0.2 /100 WBC   Scan Slide    Specimen: Blood   Result Value Ref Range    RBC Morphology Normal Normal    WBC Morphology Normal Normal    Platelet Estimate Decreased Normal   POC Glucose Once    Specimen: Blood   Result Value Ref Range    Glucose 125 70 - 130 mg/dL   POC Glucose Once    Specimen: Blood   Result Value Ref Range    Glucose 203 (H) 70 - 130 mg/dL   POC Glucose Once    Specimen: Blood   Result Value Ref Range    Glucose 349 (H) 70 - 130 mg/dL   POC Glucose Once    Specimen: Blood   Result Value Ref Range    Glucose 394 (H) 70 - 130 mg/dL   POC Glucose Once    Specimen: Blood   Result Value Ref Range    Glucose 378 (H) 70 - 130 mg/dL   POC Glucose Once    Specimen: Blood   Result Value Ref Range    Glucose 433 (C) 70 - 130 mg/dL   POC Glucose Once    Specimen: Blood   Result Value Ref Range    Glucose 448 (C) 70 - 130 mg/dL   POC Glucose Once    Specimen: Blood   Result Value Ref Range    Glucose 331 (H) 70 - 130 mg/dL   POC Glucose Once    Specimen: Blood   Result Value Ref Range    Glucose 280 (H) 70 - 130 mg/dL   POC Glucose Once    Specimen: Blood   Result Value Ref Range     Glucose 413 (C) 70 - 130 mg/dL   POC Glucose Once    Specimen: Blood   Result Value Ref Range    Glucose 458 (C) 70 - 130 mg/dL   POC Glucose Once    Specimen: Blood   Result Value Ref Range    Glucose 415 (C) 70 - 130 mg/dL   POC Glucose Once    Specimen: Blood   Result Value Ref Range    Glucose 402 (C) 70 - 130 mg/dL   POC Glucose Once    Specimen: Blood   Result Value Ref Range    Glucose 389 (H) 70 - 130 mg/dL   POC Glucose Once    Specimen: Blood   Result Value Ref Range    Glucose 286 (H) 70 - 130 mg/dL   POC Glucose Once    Specimen: Blood   Result Value Ref Range    Glucose 260 (H) 70 - 130 mg/dL   POC Glucose Once    Specimen: Blood   Result Value Ref Range    Glucose 302 (H) 70 - 130 mg/dL   POC Glucose Once    Specimen: Blood   Result Value Ref Range    Glucose 286 (H) 70 - 130 mg/dL   POC Glucose Once    Specimen: Blood   Result Value Ref Range    Glucose 365 (H) 70 - 130 mg/dL   POC Glucose Once    Specimen: Blood   Result Value Ref Range    Glucose 289 (H) 70 - 130 mg/dL   POC Glucose Once    Specimen: Blood   Result Value Ref Range    Glucose 313 (H) 70 - 130 mg/dL   ECG 12 Lead   Result Value Ref Range    QT Interval 340 ms    QTC Interval 494 ms   Type & Screen    Specimen: Blood   Result Value Ref Range    ABO Type A     RH type Positive     Antibody Screen Negative     T&S Expiration Date 5/29/2022 11:59:59 PM    Prepare Platelet Pheresis, 1 Units   Result Value Ref Range    Product Code V8413E75     Unit Number [id number]     UNIT  ABO B     UNIT  RH POS     Dispense Status PT     Blood Expiration Date 202205272359     Blood Type Barcode 7300    Prepare Platelet Pheresis, 2 Units   Result Value Ref Range    Product Code D6024U78     Unit Number [id number]     UNIT  ABO O     UNIT  RH POS     Dispense Status PT     Blood Expiration Date 202205302359     Blood Type Barcode 5100     Product Code V8826C58     Unit Number [id number]     UNIT  ABO O     UNIT  RH POS     Dispense  Status PT     Blood Expiration Date 202205302359     Blood Type Barcode 5100    Prepare RBC, 2 Units   Result Value Ref Range    Product Code Q9699A94     Unit Number [id number]     UNIT  ABO A     UNIT  RH POS     Crossmatch Interpretation Compatible     Dispense Status PT     Blood Expiration Date 202206142359     Blood Type Barcode 6200     Product Code K6827O26     Unit Number [id number]     UNIT  ABO A     UNIT  RH POS     Crossmatch Interpretation Compatible     Dispense Status PT     Blood Expiration Date 202206142359     Blood Type Barcode 6200    Green Top (Gel)   Result Value Ref Range    Extra Tube Hold for add-ons.    Lavender Top   Result Value Ref Range    Extra Tube hold for add-on    Gold Top - SST   Result Value Ref Range    Extra Tube Hold for add-ons.    Gray Top   Result Value Ref Range    Extra Tube Hold for add-ons.    Light Blue Top   Result Value Ref Range    Extra Tube Hold for add-ons.              Assessment & Plan   Diagnoses and all orders for this visit:    Pneumonia due to infectious organism, unspecified laterality, unspecified part of lung  -     CBC & Differential; Future  -     Comprehensive Metabolic Panel; Future  -     Home Nebulizer  -     albuterol (PROVENTIL) (2.5 MG/3ML) 0.083% nebulizer solution; Take 2.5 mg by nebulization Every 4 (Four) Hours As Needed for Wheezing.  -     cefTRIAXone (ROCEPHIN) injection 1 g  -     doxycycline (VIBRAMYCIN) 100 MG capsule; Take 1 capsule by mouth 2 (Two) Times a Day.  -     predniSONE (DELTASONE) 20 MG tablet; Take 1 tablet by mouth Daily for 5 days.  -     XR Chest PA & Lateral    COPD with exacerbation (HCC)  -     CBC & Differential; Future  -     Comprehensive Metabolic Panel; Future  -     Home Nebulizer  -     albuterol (PROVENTIL) (2.5 MG/3ML) 0.083% nebulizer solution; Take 2.5 mg by nebulization Every 4 (Four) Hours As Needed for Wheezing.  -     cefTRIAXone (ROCEPHIN) injection 1 g  -     doxycycline (VIBRAMYCIN) 100  MG capsule; Take 1 capsule by mouth 2 (Two) Times a Day.  -     predniSONE (DELTASONE) 20 MG tablet; Take 1 tablet by mouth Daily for 5 days.  -     XR Chest PA & Lateral    O2 sats between 91 and 93 %. Tachycardic. Patient wants to hold off on hospitalization, as he reports feels good except for the cough.he does have paroxysms of coughing and gets pale during episodes.  Reiterated to increase fluids to 100- 120 oz daily.    If soa worse, go back to the ER.  Add nebs.Continue inhalers. Short interval follow up in 3 days, repeat rocephin shot then.    Accompanied by daughter- discussed plan with patient and family.      Current outpatient and discharge medications have been reconciled for the patient.  Reviewed by: Krystina Gee MD      Return in about 3 days (around 6/9/2022) for Recheck on thursday at 11 am, please have Kasey schedule this patient only for that day.    Electronically signed by:    Krystina Gee MD

## 2022-06-07 ENCOUNTER — TELEPHONE (OUTPATIENT)
Dept: INTERNAL MEDICINE | Facility: CLINIC | Age: 77
End: 2022-06-07

## 2022-06-07 LAB
ALBUMIN SERPL-MCNC: 3.8 G/DL (ref 3.5–5.2)
ALBUMIN/GLOB SERPL: 1.7 G/DL
ALP SERPL-CCNC: 87 U/L (ref 39–117)
ALT SERPL W P-5'-P-CCNC: 28 U/L (ref 1–41)
ANION GAP SERPL CALCULATED.3IONS-SCNC: 13.3 MMOL/L (ref 5–15)
AST SERPL-CCNC: 34 U/L (ref 1–40)
BILIRUB SERPL-MCNC: 0.4 MG/DL (ref 0–1.2)
BUN SERPL-MCNC: 16 MG/DL (ref 8–23)
BUN/CREAT SERPL: 19.5 (ref 7–25)
CALCIUM SPEC-SCNC: 9 MG/DL (ref 8.6–10.5)
CHLORIDE SERPL-SCNC: 96 MMOL/L (ref 98–107)
CO2 SERPL-SCNC: 26.7 MMOL/L (ref 22–29)
CREAT SERPL-MCNC: 0.82 MG/DL (ref 0.76–1.27)
EGFRCR SERPLBLD CKD-EPI 2021: 90.5 ML/MIN/1.73
GLOBULIN UR ELPH-MCNC: 2.2 GM/DL
GLUCOSE SERPL-MCNC: 46 MG/DL (ref 65–99)
POTASSIUM SERPL-SCNC: 4.3 MMOL/L (ref 3.5–5.2)
PROT SERPL-MCNC: 6 G/DL (ref 6–8.5)
SODIUM SERPL-SCNC: 136 MMOL/L (ref 136–145)

## 2022-06-07 NOTE — TELEPHONE ENCOUNTER
Late Entry    Lab called overnight with critical platelets of 34. Reviewed chart and this is chronic so no action taken.

## 2022-06-08 ENCOUNTER — READMISSION MANAGEMENT (OUTPATIENT)
Dept: CALL CENTER | Facility: HOSPITAL | Age: 77
End: 2022-06-08

## 2022-06-08 NOTE — OUTREACH NOTE
"Sepsis Week 2 Survey    Flowsheet Row Responses   [facility] patient discharged from? [facility]   Does the patient have one of the following disease processes/diagnoses(primary or secondary)? Sepsis   Week 2 attempt successful? Yes   Call start time 1847   Call end time 1849   Discharge diagnosis Sepsis   Is the patient taking all medications as directed (includes completed medication regime)? Yes   Medication comments Was restarted on doxycycline and steroids by his PCP.   Does the patient have a primary care provider?  Yes   Has the patient kept scheduled appointments due by today? Yes   Comments Has followed up with PCP and has another appt tomorrow.   Has home health visited the patient within 72 hours of discharge? N/A   Psychosocial issues? No   What is the patient's perception of their health status since discharge? Improving   Is the patient/caregiver able to teach back the hierarchy of who to call/visit for symptoms/problems? PCP, Specialist, Home health nurse, Urgent Care, ED, 911 Yes   Additional teach back comments States he \"Feel like I'm doing great!\"  Had labs done and chest xray ordered by PCP and has appt tomorrow for results.     Week 2 call completed? Yes   Revoked No further contact(revokes)-requires comment   Graduated/Revoked comments Denies questions or needs at this time.          JERI LOAIZA - Licensed Nurse  "

## 2022-06-09 ENCOUNTER — OFFICE VISIT (OUTPATIENT)
Dept: INTERNAL MEDICINE | Facility: CLINIC | Age: 77
End: 2022-06-09

## 2022-06-09 VITALS
HEIGHT: 70 IN | HEART RATE: 115 BPM | DIASTOLIC BLOOD PRESSURE: 64 MMHG | OXYGEN SATURATION: 93 % | WEIGHT: 191 LBS | TEMPERATURE: 97.8 F | SYSTOLIC BLOOD PRESSURE: 136 MMHG | BODY MASS INDEX: 27.35 KG/M2

## 2022-06-09 DIAGNOSIS — J44.1 COPD WITH EXACERBATION: Primary | ICD-10-CM

## 2022-06-09 PROCEDURE — 99214 OFFICE O/P EST MOD 30 MIN: CPT | Performed by: INTERNAL MEDICINE

## 2022-06-09 PROCEDURE — 96372 THER/PROPH/DIAG INJ SC/IM: CPT | Performed by: INTERNAL MEDICINE

## 2022-06-09 RX ORDER — SACCHAROMYCES BOULARDII 250 MG
250 CAPSULE ORAL NIGHTLY
Qty: 30 CAPSULE | Refills: 1 | Status: SHIPPED | OUTPATIENT
Start: 2022-06-09 | End: 2022-07-12

## 2022-06-09 RX ORDER — CEFDINIR 300 MG/1
300 CAPSULE ORAL 2 TIMES DAILY
Qty: 28 CAPSULE | Refills: 0 | Status: SHIPPED | OUTPATIENT
Start: 2022-06-09 | End: 2022-07-12

## 2022-06-09 RX ORDER — CEFTRIAXONE 1 G/1
1 INJECTION, POWDER, FOR SOLUTION INTRAMUSCULAR; INTRAVENOUS ONCE
Status: COMPLETED | OUTPATIENT
Start: 2022-06-09 | End: 2022-06-09

## 2022-06-09 RX ORDER — GUAIFENESIN/DEXTROMETHORPHAN 100-10MG/5
5 SYRUP ORAL 4 TIMES DAILY PRN
Qty: 200 ML | Refills: 1 | Status: SHIPPED | OUTPATIENT
Start: 2022-06-09 | End: 2022-07-12

## 2022-06-09 RX ADMIN — CEFTRIAXONE 1 G: 1 INJECTION, POWDER, FOR SOLUTION INTRAMUSCULAR; INTRAVENOUS at 11:38

## 2022-06-09 NOTE — PROGRESS NOTES
Results (Review lab results and x-ray.)    Subjective   Gabriela Mar is a 76 y.o. male is here today for follow-up.    History of Present Illness   Starting to cough up some phlegm.  Using the neb bid.Stable otw.  Notes his chest feels looser.      Current Outpatient Medications:   •  albuterol (PROVENTIL) (2.5 MG/3ML) 0.083% nebulizer solution, Take 2.5 mg by nebulization Every 4 (Four) Hours As Needed for Wheezing., Disp: 120 each, Rfl: 5  •  albuterol sulfate  (90 Base) MCG/ACT inhaler, Inhale 2 puffs Every 4 (Four) Hours As Needed for Wheezing., Disp: 8 g, Rfl: 3  •  Cholecalciferol (VITAMIN D) 1000 UNITS tablet, Take 2,000 Units by mouth Daily., Disp: , Rfl:   •  doxycycline (VIBRAMYCIN) 100 MG capsule, Take 1 capsule by mouth 2 (Two) Times a Day., Disp: 20 capsule, Rfl: 0  •  gabapentin (NEURONTIN) 300 MG capsule, Take 1 capsule by mouth Every Night., Disp: 30 capsule, Rfl: 2  •  glimepiride (AMARYL) 4 MG tablet, Take 1 tablet by mouth Daily With Dinner., Disp: 90 tablet, Rfl: 1  •  metFORMIN (GLUCOPHAGE) 1000 MG tablet, TAKE ONE TABLET BY MOUTH IN THE MORNING AND TAKE ONE AND ONE-HALF TABLETS BY MOUTH AT BEDTIME, Disp: 75 tablet, Rfl: 5  •  Multiple Vitamins-Minerals (MULTI VITAMIN/MINERALS PO), Take 1 tablet by mouth Daily., Disp: , Rfl:   •  Multiple Vitamins-Minerals (PreserVision AREDS 2) chewable tablet, Chew 1 tablet 2 (Two) Times a Day., Disp: , Rfl:   •  omeprazole (priLOSEC) 20 MG capsule, Take 1 capsule by mouth Every Night., Disp: , Rfl:   •  simvastatin (ZOCOR) 10 MG tablet, Take 1 tablet by mouth every night at bedtime., Disp: 90 tablet, Rfl: 1  •  vitamin B-12 (CYANOCOBALAMIN) 1000 MCG tablet, Take 3,000 mcg by mouth Daily., Disp: , Rfl:   •  cefdinir (OMNICEF) 300 MG capsule, Take 1 capsule by mouth 2 (Two) Times a Day., Disp: 28 capsule, Rfl: 0  •  guaiFENesin-dextromethorphan (ROBITUSSIN DM) 100-10 MG/5ML syrup, Take 5 mL by mouth 4 (Four) Times a Day As Needed for Cough., Disp: 200  "mL, Rfl: 1  •  saccharomyces boulardii (Florastor) 250 MG capsule, Take 1 capsule by mouth Every Night., Disp: 30 capsule, Rfl: 1      The following portions of the patient's history were reviewed and updated as appropriate: allergies, current medications, past family history, past medical history, past social history, past surgical history and problem list.    Review of Systems   Constitutional: Negative for chills, fatigue and fever.   HENT: Negative for ear discharge, ear pain, sinus pressure and sore throat.    Respiratory: Positive for cough, chest tightness and shortness of breath.    Cardiovascular: Negative for chest pain, palpitations and leg swelling.   Gastrointestinal: Negative for diarrhea, nausea and vomiting.   Genitourinary: Negative for difficulty urinating and frequency.   Musculoskeletal: Negative for arthralgias, back pain and myalgias.   Neurological: Positive for weakness. Negative for dizziness, syncope and headaches.   Psychiatric/Behavioral: Negative for confusion and sleep disturbance.       Objective   /64   Pulse 115   Temp 97.8 °F (36.6 °C)   Ht 177.8 cm (70\")   Wt 86.6 kg (191 lb)   SpO2 93%   BMI 27.41 kg/m²   Physical Exam  Constitutional:       General: He is not in acute distress.     Appearance: Normal appearance. He is ill-appearing.   HENT:      Head: Normocephalic and atraumatic.      Nose: Nose normal.      Mouth/Throat:      Mouth: Mucous membranes are moist.   Eyes:      General: No scleral icterus.  Neck:      Vascular: No carotid bruit.   Cardiovascular:      Rate and Rhythm: Normal rate and regular rhythm.      Pulses: Normal pulses.      Heart sounds: Normal heart sounds. No murmur heard.    No friction rub. No gallop.   Pulmonary:      Effort: Pulmonary effort is normal.      Breath sounds: Rhonchi and rales present.   Abdominal:      General: Bowel sounds are normal.      Palpations: Abdomen is soft.   Musculoskeletal:         General: No tenderness. Normal " range of motion.      Cervical back: Normal range of motion.      Right lower leg: No edema.      Left lower leg: No edema.   Lymphadenopathy:      Cervical: No cervical adenopathy.   Skin:     General: Skin is warm.      Coloration: Skin is pale.      Findings: No rash.   Neurological:      General: No focal deficit present.      Mental Status: He is alert and oriented to person, place, and time. Mental status is at baseline.   Psychiatric:         Mood and Affect: Mood normal.         Behavior: Behavior normal.           Results for orders placed or performed in visit on 06/06/22   Comprehensive Metabolic Panel    Specimen: Blood   Result Value Ref Range    Glucose 46 (C) 65 - 99 mg/dL    BUN 16 8 - 23 mg/dL    Creatinine 0.82 0.76 - 1.27 mg/dL    Sodium 136 136 - 145 mmol/L    Potassium 4.3 3.5 - 5.2 mmol/L    Chloride 96 (L) 98 - 107 mmol/L    CO2 26.7 22.0 - 29.0 mmol/L    Calcium 9.0 8.6 - 10.5 mg/dL    Total Protein 6.0 6.0 - 8.5 g/dL    Albumin 3.80 3.50 - 5.20 g/dL    ALT (SGPT) 28 1 - 41 U/L    AST (SGOT) 34 1 - 40 U/L    Alkaline Phosphatase 87 39 - 117 U/L    Total Bilirubin 0.4 0.0 - 1.2 mg/dL    Globulin 2.2 gm/dL    A/G Ratio 1.7 g/dL    BUN/Creatinine Ratio 19.5 7.0 - 25.0    Anion Gap 13.3 5.0 - 15.0 mmol/L    eGFR 90.5 >60.0 mL/min/1.73   CBC Auto Differential    Specimen: Blood   Result Value Ref Range    WBC 7.30 3.40 - 10.80 10*3/mm3    RBC 3.39 (L) 4.14 - 5.80 10*6/mm3    Hemoglobin 10.9 (L) 13.0 - 17.7 g/dL    Hematocrit 31.9 (L) 37.5 - 51.0 %    MCV 94.1 79.0 - 97.0 fL    MCH 32.2 26.6 - 33.0 pg    MCHC 34.2 31.5 - 35.7 g/dL    RDW 16.8 (H) 12.3 - 15.4 %    RDW-SD 55.1 (H) 37.0 - 54.0 fl    MPV 14.3 (H) 6.0 - 12.0 fL    Platelets 34 (C) 140 - 450 10*3/mm3    Neutrophil % 80.5 (H) 42.7 - 76.0 %    Lymphocyte % 6.6 (L) 19.6 - 45.3 %    Monocyte % 11.8 5.0 - 12.0 %    Eosinophil % 0.0 (L) 0.3 - 6.2 %    Basophil % 0.3 0.0 - 1.5 %    Neutrophils, Absolute 5.88 1.70 - 7.00 10*3/mm3    Lymphocytes,  Absolute 0.48 (L) 0.70 - 3.10 10*3/mm3    Monocytes, Absolute 0.86 0.10 - 0.90 10*3/mm3    Eosinophils, Absolute 0.00 0.00 - 0.40 10*3/mm3    Basophils, Absolute 0.02 0.00 - 0.20 10*3/mm3         CT Head With & Without Contrast    Result Date: 6/11/2022  Continued interval decrease in size and conspicuity of previously noted areas of mild skin thickening along the right superficial facial soft tissues overlying the temporalis muscle. A nodule along the anterior margin of the right parotid is also decreased in size. There are otherwise no acute intracranial findings or evidence of progressive lymphomatous disease.  CT of the chest demonstrates faint areas of somewhat diffuse peribronchial groundglass opacity, with more discrete tiny tree-in-bud nodules present at the left lung base. Appearances somewhat nonspecific and possibly related to some component of bronchiolitis and atypical pneumonia. There is otherwise no evidence of lymphomatous disease progression in the chest.  No acute findings in the abdomen and pelvis. No evidence of lymphomatous disease.  This report was finalized on 6/11/2022 7:29 PM by Lazaro Carrero.      CT Chest With Contrast Diagnostic    Result Date: 6/11/2022  Continued interval decrease in size and conspicuity of previously noted areas of mild skin thickening along the right superficial facial soft tissues overlying the temporalis muscle. A nodule along the anterior margin of the right parotid is also decreased in size. There are otherwise no acute intracranial findings or evidence of progressive lymphomatous disease.  CT of the chest demonstrates faint areas of somewhat diffuse peribronchial groundglass opacity, with more discrete tiny tree-in-bud nodules present at the left lung base. Appearances somewhat nonspecific and possibly related to some component of bronchiolitis and atypical pneumonia. There is otherwise no evidence of lymphomatous disease progression in the chest.  No acute  findings in the abdomen and pelvis. No evidence of lymphomatous disease.  This report was finalized on 6/11/2022 7:29 PM by Lazaro Carrero.      CT Abdomen Pelvis With Contrast    Result Date: 6/11/2022  Continued interval decrease in size and conspicuity of previously noted areas of mild skin thickening along the right superficial facial soft tissues overlying the temporalis muscle. A nodule along the anterior margin of the right parotid is also decreased in size. There are otherwise no acute intracranial findings or evidence of progressive lymphomatous disease.  CT of the chest demonstrates faint areas of somewhat diffuse peribronchial groundglass opacity, with more discrete tiny tree-in-bud nodules present at the left lung base. Appearances somewhat nonspecific and possibly related to some component of bronchiolitis and atypical pneumonia. There is otherwise no evidence of lymphomatous disease progression in the chest.  No acute findings in the abdomen and pelvis. No evidence of lymphomatous disease.  This report was finalized on 6/11/2022 7:29 PM by Lazaro Carrero.      XR Chest PA & Lateral    Result Date: 6/6/2022  No evidence of acute disease in the chest.  This report was finalized on 6/6/2022 4:31 PM by Lazaro Carrero.        Assessment & Plan   Diagnoses and all orders for this visit:    COPD with exacerbation (HCC)  -     guaiFENesin-dextromethorphan (ROBITUSSIN DM) 100-10 MG/5ML syrup; Take 5 mL by mouth 4 (Four) Times a Day As Needed for Cough.  -     cefdinir (OMNICEF) 300 MG capsule; Take 1 capsule by mouth 2 (Two) Times a Day.  -     cefTRIAXone (ROCEPHIN) injection 1 g  -     saccharomyces boulardii (Florastor) 250 MG capsule; Take 1 capsule by mouth Every Night.    add omnicef to doxy.    CXR results reviewed. Negative for Pna.    Adv to increase nebs to QID, watch for tremors.     Plan communicated to pt and daughter.      No follow-ups on file.    Electronically signed by:    Krystina  Marlen COYLE

## 2022-06-11 ENCOUNTER — HOSPITAL ENCOUNTER (OUTPATIENT)
Dept: CT IMAGING | Facility: HOSPITAL | Age: 77
Discharge: HOME | End: 2022-06-11
Admitting: INTERNAL MEDICINE

## 2022-06-11 DIAGNOSIS — C84.71 ANAPLASTIC ALK-NEGATIVE LARGE CELL LYMPHOMA OF LYMPH NODE OF HEAD: ICD-10-CM

## 2022-06-11 PROCEDURE — 74177 CT ABD & PELVIS W/CONTRAST: CPT

## 2022-06-11 PROCEDURE — 71260 CT THORAX DX C+: CPT

## 2022-06-11 PROCEDURE — 25010000002 IOPAMIDOL 61 % SOLUTION: Performed by: INTERNAL MEDICINE

## 2022-06-11 PROCEDURE — 70470 CT HEAD/BRAIN W/O & W/DYE: CPT

## 2022-06-11 RX ADMIN — IOPAMIDOL 100 ML: 612 INJECTION, SOLUTION INTRAVENOUS at 15:23

## 2022-06-16 ENCOUNTER — OFFICE VISIT (OUTPATIENT)
Dept: ONCOLOGY | Facility: CLINIC | Age: 77
End: 2022-06-16

## 2022-06-16 ENCOUNTER — HOSPITAL ENCOUNTER (OUTPATIENT)
Dept: ONCOLOGY | Facility: HOSPITAL | Age: 77
Setting detail: INFUSION SERIES
End: 2022-06-16

## 2022-06-16 VITALS
WEIGHT: 190.4 LBS | DIASTOLIC BLOOD PRESSURE: 71 MMHG | TEMPERATURE: 98.1 F | BODY MASS INDEX: 27.26 KG/M2 | HEIGHT: 70 IN | OXYGEN SATURATION: 96 % | SYSTOLIC BLOOD PRESSURE: 121 MMHG | HEART RATE: 85 BPM

## 2022-06-16 DIAGNOSIS — C84.71 ANAPLASTIC ALK-NEGATIVE LARGE CELL LYMPHOMA OF LYMPH NODE OF HEAD: Primary | ICD-10-CM

## 2022-06-16 PROCEDURE — 99214 OFFICE O/P EST MOD 30 MIN: CPT | Performed by: INTERNAL MEDICINE

## 2022-06-16 NOTE — PROGRESS NOTES
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  male presents today for follow-up of his lymphoma.  He completed 6 of 6 cycles of CHP-Brentuximab.   He denies infections. He denies any issues with headaches.  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  But overall he is actually doing remarkably well.  He has become increasingly weak.  Though this should improve over the next several months.    Past medical history, social history and family history was reviewed 06/16/22 and unchanged from prior visit.    Review of Systems:    Review of Systems   Constitutional: Positive for appetite change and fatigue.   HENT:  Negative.    Eyes: Negative.    Respiratory: Negative.    Cardiovascular: Negative.    Gastrointestinal: Negative.    Endocrine: Negative.    Genitourinary: Negative.     Musculoskeletal: Negative.    Skin: Negative.    Neurological: Positive for numbness.   Hematological: Negative.    Psychiatric/Behavioral: Positive for sleep disturbance.            Medications:        Current Outpatient Medications:   •  albuterol (PROVENTIL) (2.5 MG/3ML) 0.083% nebulizer solution, Take 2.5 mg by nebulization Every 4 (Four) Hours As Needed for Wheezing., Disp: 120 each, Rfl: 5  •  albuterol sulfate  (90 Base) MCG/ACT inhaler, Inhale 2 puffs Every 4 (Four) Hours As Needed for Wheezing., Disp: 8 g, Rfl: 3  •  cefdinir (OMNICEF) 300 MG capsule, Take 1 capsule by mouth 2  "(Two) Times a Day., Disp: 28 capsule, Rfl: 0  •  Cholecalciferol (VITAMIN D) 1000 UNITS tablet, Take 2,000 Units by mouth Daily., Disp: , Rfl:   •  doxycycline (VIBRAMYCIN) 100 MG capsule, Take 1 capsule by mouth 2 (Two) Times a Day., Disp: 20 capsule, Rfl: 0  •  gabapentin (NEURONTIN) 300 MG capsule, Take 1 capsule by mouth Every Night., Disp: 30 capsule, Rfl: 2  •  glimepiride (AMARYL) 4 MG tablet, Take 1 tablet by mouth Daily With Dinner., Disp: 90 tablet, Rfl: 1  •  guaiFENesin-dextromethorphan (ROBITUSSIN DM) 100-10 MG/5ML syrup, Take 5 mL by mouth 4 (Four) Times a Day As Needed for Cough., Disp: 200 mL, Rfl: 1  •  metFORMIN (GLUCOPHAGE) 1000 MG tablet, TAKE ONE TABLET BY MOUTH IN THE MORNING AND TAKE ONE AND ONE-HALF TABLETS BY MOUTH AT BEDTIME, Disp: 75 tablet, Rfl: 5  •  Multiple Vitamins-Minerals (MULTI VITAMIN/MINERALS PO), Take 1 tablet by mouth Daily., Disp: , Rfl:   •  Multiple Vitamins-Minerals (PreserVision AREDS 2) chewable tablet, Chew 1 tablet 2 (Two) Times a Day., Disp: , Rfl:   •  omeprazole (priLOSEC) 20 MG capsule, Take 1 capsule by mouth Every Night., Disp: , Rfl:   •  saccharomyces boulardii (Florastor) 250 MG capsule, Take 1 capsule by mouth Every Night., Disp: 30 capsule, Rfl: 1  •  simvastatin (ZOCOR) 10 MG tablet, Take 1 tablet by mouth every night at bedtime., Disp: 90 tablet, Rfl: 1  •  vitamin B-12 (CYANOCOBALAMIN) 1000 MCG tablet, Take 3,000 mcg by mouth Daily., Disp: , Rfl:     Pain Medications             gabapentin (NEURONTIN) 300 MG capsule Take 1 capsule by mouth Every Night.             ALLERGIES:    Allergies   Allergen Reactions   • Bactrim [Sulfamethoxazole-Trimethoprim] Other (See Comments)     Joint pain   • Sulfa Antibiotics GI Intolerance     Makes bones hurt     • Tegaderm Chg Dressing [Chlorhexidine] Itching and Other (See Comments)     redness         Physical Exam    VITAL SIGNS:  /71   Pulse 85   Temp 98.1 °F (36.7 °C)   Ht 177.8 cm (70\")   Wt 86.4 kg (190 " lb 6.4 oz)   SpO2 96%   BMI 27.32 kg/m²                 Wt Readings from Last 3 Encounters:   06/16/22 86.4 kg (190 lb 6.4 oz)   06/09/22 86.6 kg (191 lb)   06/06/22 85.3 kg (188 lb)       Body mass index is 27.32 kg/m². Body surface area is 2.04 meters squared.    Pain Score    06/16/22 1138   PainSc: 0-No pain           Performance Status: 0    General: well appearing, in no acute distress  HEENT: Swelling on the right side of his face consistent with his lymphomatous process.  Lymphatics: no cervical, supraclavicular, or axillary adenopathy  Cardiovascular: regular rate and rhythm, no murmurs, rubs or gallops  Lungs: clear to auscultation bilaterally  Abdomen: soft, nontender, nondistended.  No palpable organomegaly  Extremities: no lower extremity edema  Skin: no rashes, lesions, bruising, or petechiae  Msk:  Shows no weakness of the large muscle groups  Psych: Mood is stable        RECENT LABS:    Lab Results   Component Value Date    HGB 10.9 (L) 06/06/2022    HCT 31.9 (L) 06/06/2022    MCV 94.1 06/06/2022    PLT 34 (C) 06/06/2022    WBC 7.30 06/06/2022    NEUTROABS 5.88 06/06/2022    LYMPHSABS 0.48 (L) 06/06/2022    MONOSABS 0.86 06/06/2022    EOSABS 0.00 06/06/2022    BASOSABS 0.02 06/06/2022       Lab Results   Component Value Date    GLUCOSE 46 (C) 06/06/2022    BUN 16 06/06/2022    CREATININE 0.82 06/06/2022     06/06/2022    K 4.3 06/06/2022    CL 96 (L) 06/06/2022    CO2 26.7 06/06/2022    CALCIUM 9.0 06/06/2022    PROTEINTOT 6.0 06/06/2022    ALBUMIN 3.80 06/06/2022    BILITOT 0.4 06/06/2022    ALKPHOS 87 06/06/2022    AST 34 06/06/2022    ALT 28 06/06/2022       CT Head With Contrast    Result Date: 4/4/2022  1.  There are some white matter changes noted which could reflect more chronic small vessel ischemic change. 2.  Soft tissue nodule in the subcutaneous soft tissues of the right facial area which seems smaller as compared to prior study.  This report was finalized on 4/4/2022 12:19 PM by  Howard Cervantes MD.      CT Head With & Without Contrast    Result Date: 6/11/2022  Continued interval decrease in size and conspicuity of previously noted areas of mild skin thickening along the right superficial facial soft tissues overlying the temporalis muscle. A nodule along the anterior margin of the right parotid is also decreased in size. There are otherwise no acute intracranial findings or evidence of progressive lymphomatous disease.  CT of the chest demonstrates faint areas of somewhat diffuse peribronchial groundglass opacity, with more discrete tiny tree-in-bud nodules present at the left lung base. Appearances somewhat nonspecific and possibly related to some component of bronchiolitis and atypical pneumonia. There is otherwise no evidence of lymphomatous disease progression in the chest.  No acute findings in the abdomen and pelvis. No evidence of lymphomatous disease.  This report was finalized on 6/11/2022 7:29 PM by Lazaro Carrero.      CT Chest With Contrast Diagnostic    Result Date: 6/11/2022  Continued interval decrease in size and conspicuity of previously noted areas of mild skin thickening along the right superficial facial soft tissues overlying the temporalis muscle. A nodule along the anterior margin of the right parotid is also decreased in size. There are otherwise no acute intracranial findings or evidence of progressive lymphomatous disease.  CT of the chest demonstrates faint areas of somewhat diffuse peribronchial groundglass opacity, with more discrete tiny tree-in-bud nodules present at the left lung base. Appearances somewhat nonspecific and possibly related to some component of bronchiolitis and atypical pneumonia. There is otherwise no evidence of lymphomatous disease progression in the chest.  No acute findings in the abdomen and pelvis. No evidence of lymphomatous disease.  This report was finalized on 6/11/2022 7:29 PM by Lazaro Carrero.      CT Abdomen Pelvis With  Contrast    Result Date: 6/11/2022  Continued interval decrease in size and conspicuity of previously noted areas of mild skin thickening along the right superficial facial soft tissues overlying the temporalis muscle. A nodule along the anterior margin of the right parotid is also decreased in size. There are otherwise no acute intracranial findings or evidence of progressive lymphomatous disease.  CT of the chest demonstrates faint areas of somewhat diffuse peribronchial groundglass opacity, with more discrete tiny tree-in-bud nodules present at the left lung base. Appearances somewhat nonspecific and possibly related to some component of bronchiolitis and atypical pneumonia. There is otherwise no evidence of lymphomatous disease progression in the chest.  No acute findings in the abdomen and pelvis. No evidence of lymphomatous disease.  This report was finalized on 6/11/2022 7:29 PM by Lazaro Carrero.            Assessment/Plan    1.  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  He has completed 6 cycles of treatment with A + CHP.  At this time his CAT scans look reasonable.  There is no concern for progressive disease.  I recommended consolidative radiotherapy in the situation since he is fairly high risk for recurrent disease.  I will see him back in my clinic in 3 months.  I will refer him to radiation oncology for now.    2.  Immunocompromise state.  He got 1 dose of his Covid vaccination.  He also received Evusheld.          Ai Henderson MD  [facility]ematology [facility] On[facility]    Return in (Approximately): 3 months    Orders Placed This Encounter   Procedures   • Comprehensive Metabolic Panel   • Lactate Dehydrogenase   • Ambulatory Referral to Radiation Oncology   • CBC & Differential       6/16/2022

## 2022-06-19 DIAGNOSIS — E78.5 HYPERLIPIDEMIA LDL GOAL <70: ICD-10-CM

## 2022-06-20 RX ORDER — SIMVASTATIN 10 MG
TABLET ORAL
Qty: 90 TABLET | Refills: 1 | Status: SHIPPED | OUTPATIENT
Start: 2022-06-20 | End: 2022-12-19

## 2022-06-21 ENCOUNTER — TELEPHONE (OUTPATIENT)
Dept: RADIATION ONCOLOGY | Facility: HOSPITAL | Age: 77
End: 2022-06-21

## 2022-06-22 ENCOUNTER — OFFICE VISIT (OUTPATIENT)
Dept: RADIATION ONCOLOGY | Facility: HOSPITAL | Age: 77
End: 2022-06-22

## 2022-06-22 ENCOUNTER — HOSPITAL ENCOUNTER (OUTPATIENT)
Dept: RADIATION ONCOLOGY | Facility: HOSPITAL | Age: 77
Setting detail: RADIATION/ONCOLOGY SERIES
Discharge: HOME | End: 2022-06-22

## 2022-06-22 VITALS
WEIGHT: 187.8 LBS | HEART RATE: 116 BPM | DIASTOLIC BLOOD PRESSURE: 70 MMHG | HEIGHT: 70 IN | OXYGEN SATURATION: 97 % | TEMPERATURE: 97.3 F | SYSTOLIC BLOOD PRESSURE: 131 MMHG | RESPIRATION RATE: 17 BRPM | BODY MASS INDEX: 26.88 KG/M2

## 2022-06-22 DIAGNOSIS — C84.70 ANAPLASTIC ALK-NEGATIVE LARGE CELL LYMPHOMA, UNSPECIFIED BODY REGION: Primary | ICD-10-CM

## 2022-06-22 DIAGNOSIS — C85.90 LYMPHOMA, UNSPECIFIED BODY REGION, UNSPECIFIED LYMPHOMA TYPE: ICD-10-CM

## 2022-06-22 RX ORDER — FUROSEMIDE 20 MG/1
20 TABLET ORAL 2 TIMES DAILY
COMMUNITY
End: 2022-10-25

## 2022-06-22 RX ORDER — POTASSIUM CHLORIDE 20 MEQ/1
20 TABLET, EXTENDED RELEASE ORAL 2 TIMES DAILY
COMMUNITY
End: 2022-10-25

## 2022-06-22 NOTE — PROGRESS NOTES
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  The patient initially presented with lesion in the right temporal area.  This was resected by Dr. De Los Santos on 2021.  This was consistent with anaplastic large cell lymphoma ALK negative.  The patient was then sent for a PET scan on 2022 that showed changes related to his resection.  The patient also had some areas in the parotid gland with SUV of 3.6 that were potentially concerning for an additional lesion but it is uncertain at that time.  The patient met with Dr. Herr and was started on chemotherapy quickly thereafter.  The patient pleated several cycles of chemotherapy and then had restaging scans.  Scans in 2021 showed a persistent nodule in the parotid.  Patient finished up his A + CHP chemotherapy and then had restaging scans.  The patient had scans 2022.  Is scan of the chest abdomen pelvis showed no evidence of disease.  The patient's CT scan of the head showed a persistent nodule in the right parotid.  Patient was sent to consider adjuvant radiation treatments.    Patient ports that he is tolerating chemotherapy pretty  well.  He is interested in pursuing additional treatments to improve his progression free survival.    BMI:  Body mass index is 26.95 kg/m².      Social History     Substance and Sexual Activity   Alcohol Use No    Comment: stopped drinking       Allergies   Allergen Reactions   • Bactrim [Sulfamethoxazole-Trimethoprim] Other (See Comments)     Joint pain   • Sulfa Antibiotics GI Intolerance     Makes bones hurt     • Tegaderm Chg Dressing [Chlorhexidine] Itching and Other (See Comments)     redness       Social History     Tobacco Use   • Smoking status: Former Smoker     Packs/day: 2.00     Years: 35.00     Pack years: 70.00     Types: Cigarettes   • Smokeless tobacco: Never Used   • Tobacco comment: QUIT 20 PLUS YEARS AGO    Vaping Use   • Vaping Use: Never used   Substance Use Topics   • Alcohol use: No     Comment: stopped drinking   • Drug use: No         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) 01/20/2022   • Arthritis    • CKD (chronic kidney disease), stage III (HCC)    • Diabetes mellitus (HCC)    • Esophagitis, reflux    • Hyperlipidemia    • Hypertension    • Lymphoma (HCC)    • Obesity    • Pharyngitis    • Wears eyeglasses        family history includes Colon cancer in his brother, brother, and father; Diabetes in his mother, sister, and sister; Kidney failure in his sister; Lung cancer in his brother; Squamous cell carcinoma in his brother.     Past Surgical History:   Procedure Laterality Date   • COLONOSCOPY      2015   • ENDOSCOPY      2011   • EYE SURGERY      cataract 8/22 and 10/18/18 - Dr. Hernández   • JOINT REPLACEMENT      LEFT HIP 2016-MARCH    • SUBMAXILLARY CYST  EXCISION      Right back   • TONSILLECTOMY     • TOTAL HIP ARTHROPLASTY Right 3/7/2017    Procedure: RIGHT TOTAL HIP ARTHROPLASTY;  Surgeon: Reynaldo Suarez MD;  Location: [facility];  Service:    • VASECTOMY     • WRIST GANGLION EXCISION          Review of Systems  "  Respiratory: Positive for shortness of breath.    Cardiovascular: Positive for leg swelling.   Neurological: Positive for extremity weakness.   Hematological: Bruises/bleeds easily.    A full 14 point review of systems was performed and was negative except as noted in the HPI.         Objective   VITAL SIGNS:   Vitals:    06/22/22 1008   BP: 131/70   Pulse: 116   Resp: 17   Temp: 97.3 °F (36.3 °C)   SpO2: 97%  Comment: RA   Weight: 85.2 kg (187 lb 12.8 oz)   Height: 177.8 cm (70\")   PainSc: 0-No pain        KPS       80%    Physical Exam  Vitals and nursing note reviewed.   Constitutional:       Appearance: He is well-developed.   HENT:      Head: Normocephalic and atraumatic.   Eyes:      Conjunctiva/sclera: Conjunctivae normal.      Pupils: Pupils are equal, round, and reactive to light.   Neck:      Comments: No obviously enlarged cervical or supraclavicular LAD.  Cardiovascular:      Comments: Patient well perfused. Non cyanotic. No prominent JVD. No pedal edema  Pulmonary:      Effort: Pulmonary effort is normal.      Breath sounds: Normal breath sounds. No stridor. No wheezing.   Abdominal:      General: There is no distension.      Palpations: Abdomen is soft.   Musculoskeletal:         General: Normal range of motion.      Cervical back: Normal range of motion and neck supple.      Comments: Patient moves all extremities spontaneously.    Skin:     General: Skin is warm and dry.   Neurological:      Mental Status: He is alert and oriented to person, place, and time.      Comments: Coordination intact.   Psychiatric:         Behavior: Behavior normal.         Thought Content: Thought content normal.         Judgment: Judgment normal.              The following portions of the patient's history were reviewed and updated as appropriate: allergies, current medications, past family history, past medical history, past social history, past surgical history and problem list.   I have personally requested reviewed " and interpreted the patient's images and radiology reports and pathology reports listed below:  I reviewed the patient's PET scan from 1/11/2020.  I reviewed the patient's CT scan from 4/4/2022.  I reviewed the patient's CT scan of the head chest abdomen and pelvis from 6/11/2022.  I reviewed the patient's pathology report from 6/27/2021  I reviewed Dr. Herr's most recent note.  I reviewed Dr. De Los Santos's most recent note.  I discussed the case personally Dr. De Los Santos.  I discussed case personally with Dr. Herr.  Assessment      IMPRESSION:     Mr. Mar is a very pleasant 76-year-old gentleman with an ALK negative large cell lymphoma of the right temporal region.  At the time of his diagnosis is unclear if he had true parotid involvement as his disease was not terribly avid on PET scan.  The patient's nodule in the parotid gland could potentially represent a Warthin's tumor.  I called Dr. De Los Santos today for his impressions on his surgery and determine if he thought the parotid was truly involved.  He recommended proceeding with a PET scan and CT-guided biopsy of the parotid nodules are still persistent on the patient's most recent CT scan, although the parotid was not completely imaged.  If his PET the patient's parotid gland is not involved and was not involved that the patient would simply need electrons to the area of his primary lesions in the right temporal region.  If the patient still has a persistent disease in the parotid gland he will need a much higher dose and would obviously need to cover the entire parotid.  Due to the patient's pathology he would certainly lose function of that parotid gland to significant degree.  The dose for consolidation recommend by the NCCN is 30 Gray in 15 fractions.  For persistent disease 36 Gray in 18 fractions is recommended.  I discussed the case with both Dr. Herr and Dr. De Los Santos.      I discussed the plan with the patient and then proceeded to call Dr. Bangura who felt  that he could safely perform a CT-guided core biopsy of this.  We have ordered this at this time.    Greater than 1 hour was spent preparing for and coordinating this visit. >50% of the time was spent in direct face to face conversation with the patient teaching, answering question, and providing explanations regarding the patient's case.  The decision to treat the patient with radiation is a complex one and carries the risk of long-term side effects and complications.  The patient's malignancy represents a complicated life threatening condition that requires complex multidisciplinary management for treatment and followup.      RECOMMENDATIONS:      Anaplastic large cell lymphoma ALK negative as of right temporal area  -Uncertain if parotid gland was initially involved  -Patient had parotid nodule that time  -Discussed the case with Dr. De Los Santos and Dr. Herr  -Recommend PET scan and core biopsy determine the patient has persistent disease or Warthin's tumor  -Discussed with Dr. Bangura who will perform CT guided core needle biopsy  -If parotid is involved recommend a dose of 30 Gray to the whole parotid and 36 Gray to residual disease  -Parotids not involved we will consider treating skin only to dose of 30 Gray in 15 fractions           Mil Arriaga MD      Errors in dictation may reflect use of voice recognition software and not all errors in transcription may have been detected prior to signing.

## 2022-06-23 ENCOUNTER — PATIENT OUTREACH (OUTPATIENT)
Dept: CASE MANAGEMENT | Facility: OTHER | Age: 77
End: 2022-06-23

## 2022-06-23 NOTE — OUTREACH NOTE
AMBULATORY CASE MANAGEMENT NOTE    Name and Relationship of Patient/Support Person: Gabriela Mar W - Self    Patient Outreach    Role of ACM explained to patient, declined service.    SUKHI ASENCIO  Ambulatory Case Management    6/23/2022, 11:37 EDT

## 2022-06-24 DIAGNOSIS — D69.59 CHEMOTHERAPY-INDUCED THROMBOCYTOPENIA: ICD-10-CM

## 2022-06-24 DIAGNOSIS — C85.91 LYMPHOMA OF LYMPH NODES OF HEAD, UNSPECIFIED LYMPHOMA TYPE: ICD-10-CM

## 2022-06-24 DIAGNOSIS — C85.80 LARGE CELL LYMPHOMA: ICD-10-CM

## 2022-06-24 DIAGNOSIS — C84.70 ANAPLASTIC ALK-NEGATIVE LARGE CELL LYMPHOMA, UNSPECIFIED BODY REGION: Primary | ICD-10-CM

## 2022-06-24 DIAGNOSIS — T45.1X5A CHEMOTHERAPY-INDUCED THROMBOCYTOPENIA: ICD-10-CM

## 2022-06-24 DIAGNOSIS — C85.90 LYMPHOMA, UNSPECIFIED BODY REGION, UNSPECIFIED LYMPHOMA TYPE: ICD-10-CM

## 2022-07-06 RX ORDER — SODIUM CHLORIDE 0.9 % (FLUSH) 0.9 %
3 SYRINGE (ML) INJECTION EVERY 12 HOURS SCHEDULED
Status: CANCELLED | OUTPATIENT
Start: 2022-07-06

## 2022-07-06 RX ORDER — SODIUM CHLORIDE 0.9 % (FLUSH) 0.9 %
10 SYRINGE (ML) INJECTION AS NEEDED
Status: CANCELLED | OUTPATIENT
Start: 2022-07-06

## 2022-07-07 ENCOUNTER — HOSPITAL ENCOUNTER (OUTPATIENT)
Dept: ULTRASOUND IMAGING | Facility: HOSPITAL | Age: 77
Discharge: HOME | End: 2022-07-07
Admitting: RADIOLOGY

## 2022-07-07 DIAGNOSIS — C84.70 ANAPLASTIC ALK-NEGATIVE LARGE CELL LYMPHOMA, UNSPECIFIED BODY REGION: ICD-10-CM

## 2022-07-07 PROCEDURE — 88342 IMHCHEM/IMCYTCHM 1ST ANTB: CPT | Performed by: RADIOLOGY

## 2022-07-07 PROCEDURE — 88307 TISSUE EXAM BY PATHOLOGIST: CPT | Performed by: RADIOLOGY

## 2022-07-07 PROCEDURE — 88184 FLOWCYTOMETRY/ TC 1 MARKER: CPT

## 2022-07-07 PROCEDURE — 88185 FLOWCYTOMETRY/TC ADD-ON: CPT

## 2022-07-07 PROCEDURE — 76942 ECHO GUIDE FOR BIOPSY: CPT

## 2022-07-07 RX ORDER — LIDOCAINE HYDROCHLORIDE 10 MG/ML
5 INJECTION, SOLUTION EPIDURAL; INFILTRATION; INTRACAUDAL; PERINEURAL ONCE
Status: COMPLETED | OUTPATIENT
Start: 2022-07-07 | End: 2022-07-07

## 2022-07-07 RX ADMIN — LIDOCAINE HYDROCHLORIDE 5 ML: 10 INJECTION, SOLUTION EPIDURAL; INFILTRATION; INTRACAUDAL; PERINEURAL at 12:55

## 2022-07-08 LAB — REF LAB TEST METHOD: NORMAL

## 2022-07-12 ENCOUNTER — OFFICE VISIT (OUTPATIENT)
Dept: INTERNAL MEDICINE | Facility: CLINIC | Age: 77
End: 2022-07-12

## 2022-07-12 VITALS
WEIGHT: 182 LBS | HEART RATE: 91 BPM | OXYGEN SATURATION: 94 % | SYSTOLIC BLOOD PRESSURE: 120 MMHG | RESPIRATION RATE: 18 BRPM | TEMPERATURE: 98.3 F | BODY MASS INDEX: 26.05 KG/M2 | DIASTOLIC BLOOD PRESSURE: 60 MMHG | HEIGHT: 70 IN

## 2022-07-12 DIAGNOSIS — J44.0 CHRONIC OBSTRUCTIVE PULMONARY DISEASE WITH ACUTE LOWER RESPIRATORY INFECTION: ICD-10-CM

## 2022-07-12 DIAGNOSIS — Z79.4 TYPE 2 DIABETES MELLITUS WITH HYPERGLYCEMIA, WITH LONG-TERM CURRENT USE OF INSULIN: ICD-10-CM

## 2022-07-12 DIAGNOSIS — Z23 NEED FOR COVID-19 VACCINE: Primary | ICD-10-CM

## 2022-07-12 DIAGNOSIS — E11.65 TYPE 2 DIABETES MELLITUS WITH HYPERGLYCEMIA, WITH LONG-TERM CURRENT USE OF INSULIN: ICD-10-CM

## 2022-07-12 LAB
CYTO UR: NORMAL
EXPIRATION DATE: ABNORMAL
HBA1C MFR BLD: 6.4 %
LAB AP CASE REPORT: NORMAL
LAB AP CLINICAL INFORMATION: NORMAL
LAB AP FLOW CYTOMETRY SUMMARY: NORMAL
LAB AP SPECIAL STAINS: NORMAL
Lab: ABNORMAL
PATH REPORT.FINAL DX SPEC: NORMAL
PATH REPORT.GROSS SPEC: NORMAL

## 2022-07-12 PROCEDURE — 83036 HEMOGLOBIN GLYCOSYLATED A1C: CPT | Performed by: INTERNAL MEDICINE

## 2022-07-12 PROCEDURE — 0052A COVID-19 (PFIZER) 12+ YRS: CPT | Performed by: INTERNAL MEDICINE

## 2022-07-12 PROCEDURE — 99213 OFFICE O/P EST LOW 20 MIN: CPT | Performed by: INTERNAL MEDICINE

## 2022-07-12 PROCEDURE — 91305 COVID-19 (PFIZER) 12+ YRS: CPT | Performed by: INTERNAL MEDICINE

## 2022-07-12 NOTE — PROGRESS NOTES
Pneumonia (Follow up)    Subjective   Gabriela Mar is a 76 y.o. male is here today for follow-up.    History of Present Illness   Gabriela is here for a follow up on his recent Pna, s/p antibiotics,   Oxygen is still borderline low.  He is due for a PET CT next week, and is s/p Parotid gland biopsy last week.  His appetite is back and eating better. Still lost 10 lbs.      Current Outpatient Medications:   •  albuterol (PROVENTIL) (2.5 MG/3ML) 0.083% nebulizer solution, Take 2.5 mg by nebulization Every 4 (Four) Hours As Needed for Wheezing., Disp: 120 each, Rfl: 5  •  albuterol sulfate  (90 Base) MCG/ACT inhaler, Inhale 2 puffs Every 4 (Four) Hours As Needed for Wheezing., Disp: 8 g, Rfl: 3  •  Cholecalciferol (VITAMIN D) 1000 UNITS tablet, Take 2,000 Units by mouth Daily., Disp: , Rfl:   •  furosemide (LASIX) 20 MG tablet, Take 20 mg by mouth 2 (Two) Times a Day., Disp: , Rfl:   •  gabapentin (NEURONTIN) 300 MG capsule, Take 1 capsule by mouth Every Night., Disp: 30 capsule, Rfl: 2  •  glimepiride (AMARYL) 4 MG tablet, Take 1 tablet by mouth Daily With Dinner., Disp: 90 tablet, Rfl: 1  •  metFORMIN (GLUCOPHAGE) 1000 MG tablet, TAKE ONE TABLET BY MOUTH IN THE MORNING AND TAKE ONE AND ONE-HALF TABLETS BY MOUTH AT BEDTIME, Disp: 75 tablet, Rfl: 5  •  Multiple Vitamins-Minerals (MULTI VITAMIN/MINERALS PO), Take 1 tablet by mouth Daily., Disp: , Rfl:   •  Multiple Vitamins-Minerals (PreserVision AREDS 2) chewable tablet, Chew 1 tablet 2 (Two) Times a Day., Disp: , Rfl:   •  omeprazole (priLOSEC) 20 MG capsule, Take 1 capsule by mouth Every Night., Disp: , Rfl:   •  potassium chloride (K-DUR,KLOR-CON) 20 MEQ CR tablet, Take 20 mEq by mouth 2 (Two) Times a Day., Disp: , Rfl:   •  simvastatin (ZOCOR) 10 MG tablet, TAKE ONE TABLET BY MOUTH EVERY NIGHT AT BEDTIME, Disp: 90 tablet, Rfl: 1  •  vitamin B-12 (CYANOCOBALAMIN) 1000 MCG tablet, Take 3,000 mcg by mouth Daily., Disp: , Rfl:       The following portions of  "the patient's history were reviewed and updated as appropriate: allergies, current medications, past family history, past medical history, past social history, past surgical history and problem list.    Review of Systems   Constitutional: Positive for fatigue. Negative for chills and fever.   HENT: Negative for ear discharge, ear pain, sinus pressure and sore throat.    Respiratory: Negative for cough, chest tightness, shortness of breath and wheezing (better).    Cardiovascular: Negative for chest pain, palpitations and leg swelling.   Gastrointestinal: Negative for diarrhea, nausea and vomiting.   Musculoskeletal: Negative for arthralgias, back pain and myalgias.   Neurological: Positive for weakness. Negative for dizziness, syncope and headaches.   Psychiatric/Behavioral: Negative for confusion and sleep disturbance.       Objective   /60   Pulse 91   Temp 98.3 °F (36.8 °C)   Resp 18   Ht 177.8 cm (70\")   Wt 82.6 kg (182 lb)   SpO2 94%   BMI 26.11 kg/m²   Physical Exam  Vitals and nursing note reviewed.   Constitutional:       Appearance: He is well-developed. He is not ill-appearing (looking better).   HENT:      Head: Normocephalic and atraumatic.      Right Ear: External ear normal.      Left Ear: External ear normal.      Mouth/Throat:      Pharynx: No oropharyngeal exudate.   Eyes:      Conjunctiva/sclera: Conjunctivae normal.      Pupils: Pupils are equal, round, and reactive to light.   Neck:      Thyroid: No thyromegaly.   Cardiovascular:      Rate and Rhythm: Normal rate and regular rhythm.      Pulses: Normal pulses.      Heart sounds: Normal heart sounds. No murmur heard.    No friction rub. No gallop.   Pulmonary:      Effort: Pulmonary effort is normal.      Breath sounds: Normal breath sounds.   Abdominal:      General: Bowel sounds are normal. There is no distension.      Palpations: Abdomen is soft.      Tenderness: There is no abdominal tenderness.   Musculoskeletal:      Cervical " back: Neck supple.   Skin:     General: Skin is warm and dry.   Neurological:      Mental Status: He is alert and oriented to person, place, and time.      Cranial Nerves: No cranial nerve deficit.   Psychiatric:         Judgment: Judgment normal.           Results for orders placed or performed in visit on 07/12/22   POC Glycosylated Hemoglobin (Hb A1C)    Specimen: Blood   Result Value Ref Range    Hemoglobin A1C 6.4 %    Lot Number [id number]     Expiration Date 03/01/24              Assessment & Plan   Diagnoses and all orders for this visit:    Need for COVID-19 vaccine  -     COVID-19 Vaccine (Pfizer) Ocasio Cap    Type 2 diabetes mellitus with hyperglycemia, with long-term current use of insulin (HCC)  -     POC Glycosylated Hemoglobin (Hb A1C)    Chronic obstructive pulmonary disease with acute lower respiratory infection (HCC)  Comments:  feeling better continue nebs and inhalers.    continue current meds.         Pfizer covid vaccine 2nd dose given today    Return in about 3 months (around 10/12/2022) for Next scheduled follow up.    Electronically signed by:    Krystina Gee MD

## 2022-07-14 ENCOUNTER — APPOINTMENT (OUTPATIENT)
Dept: ONCOLOGY | Facility: HOSPITAL | Age: 77
End: 2022-07-14

## 2022-07-18 DIAGNOSIS — G62.0 CHEMOTHERAPY-INDUCED NEUROPATHY: ICD-10-CM

## 2022-07-18 DIAGNOSIS — T45.1X5A CHEMOTHERAPY-INDUCED NEUROPATHY: ICD-10-CM

## 2022-07-18 RX ORDER — GABAPENTIN 300 MG/1
CAPSULE ORAL
Qty: 30 CAPSULE | Refills: 3 | Status: SHIPPED | OUTPATIENT
Start: 2022-07-18 | End: 2022-11-21

## 2022-07-18 NOTE — TELEPHONE ENCOUNTER
Last Office Visit: 7/12/22  Next Office Visit: 10/25/22    Labs completed in past 6 months? no  Labs completed in past year? no    Last Refill Date: 4/18/22  Quantity:30  Refills:2    Pharmacy:     Please review pended refill request for any changes needed on refills or quantities. Thank you!

## 2022-07-20 ENCOUNTER — HOSPITAL ENCOUNTER (OUTPATIENT)
Dept: PET IMAGING | Facility: HOSPITAL | Age: 77
Discharge: HOME | End: 2022-07-20

## 2022-07-20 DIAGNOSIS — C85.91 LYMPHOMA OF LYMPH NODES OF HEAD, UNSPECIFIED LYMPHOMA TYPE: ICD-10-CM

## 2022-07-20 LAB — GLUCOSE BLDC GLUCOMTR-MCNC: 124 MG/DL (ref 70–130)

## 2022-07-20 PROCEDURE — 78815 PET IMAGE W/CT SKULL-THIGH: CPT

## 2022-07-20 PROCEDURE — 82962 GLUCOSE BLOOD TEST: CPT

## 2022-07-20 PROCEDURE — 0 FLUDEOXYGLUCOSE F18 SOLUTION: Performed by: RADIOLOGY

## 2022-07-20 PROCEDURE — A9552 F18 FDG: HCPCS | Performed by: RADIOLOGY

## 2022-07-20 RX ADMIN — FLUDEOXYGLUCOSE F18 1 DOSE: 300 INJECTION INTRAVENOUS at 14:34

## 2022-07-21 ENCOUNTER — TELEPHONE (OUTPATIENT)
Dept: RADIATION ONCOLOGY | Facility: HOSPITAL | Age: 77
End: 2022-07-21

## 2022-07-21 ENCOUNTER — HOSPITAL ENCOUNTER (OUTPATIENT)
Dept: RADIATION ONCOLOGY | Facility: HOSPITAL | Age: 77
Setting detail: RADIATION/ONCOLOGY SERIES
Discharge: HOME | End: 2022-07-21

## 2022-07-27 ENCOUNTER — OFFICE VISIT (OUTPATIENT)
Dept: RADIATION ONCOLOGY | Facility: HOSPITAL | Age: 77
End: 2022-07-27

## 2022-07-27 ENCOUNTER — HOSPITAL ENCOUNTER (OUTPATIENT)
Dept: RADIATION ONCOLOGY | Facility: HOSPITAL | Age: 77
Discharge: HOME | End: 2022-07-27

## 2022-07-27 VITALS
RESPIRATION RATE: 16 BRPM | HEIGHT: 69 IN | WEIGHT: 187.7 LBS | OXYGEN SATURATION: 94 % | SYSTOLIC BLOOD PRESSURE: 126 MMHG | HEART RATE: 96 BPM | DIASTOLIC BLOOD PRESSURE: 69 MMHG | TEMPERATURE: 98 F | BODY MASS INDEX: 27.8 KG/M2

## 2022-07-27 DIAGNOSIS — C84.79 ANAPLASTIC ALK-NEGATIVE LARGE CELL LYMPHOMA OF SOLID ORGAN EXCLUDING SPLEEN: Primary | ICD-10-CM

## 2022-07-27 PROCEDURE — G0463 HOSPITAL OUTPT CLINIC VISIT: HCPCS

## 2022-07-27 PROCEDURE — 77333 RADIATION TREATMENT AID(S): CPT | Performed by: RADIOLOGY

## 2022-07-27 PROCEDURE — 77334 RADIATION TREATMENT AID(S): CPT | Performed by: RADIOLOGY

## 2022-07-27 PROCEDURE — 77290 THER RAD SIMULAJ FIELD CPLX: CPT | Performed by: RADIOLOGY

## 2022-07-27 NOTE — PROGRESS NOTES
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  The patient initially presented with lesion in the right temporal area.  This was resected by Dr. De Los Santos on 2021.  This was consistent with anaplastic large cell lymphoma ALK negative.  The patient was then sent for a PET scan on 2022 that showed changes related to his resection.  The patient also had some areas in the parotid gland with SUV of 3.6 that were potentially concerning for an additional lesion but it is uncertain at that time.  The patient met with Dr. Herr and was started on chemotherapy quickly thereafter.  The patient pleated several cycles of chemotherapy and then had restaging scans.  Scans in 2021 showed a persistent nodule in the parotid.  Patient finished up his A + CHP chemotherapy and then had restaging scans that show some potential residual nodularity in the right parotid gland suspicious for residual disease involvement.  I discussed the case with Dr. De Los Santos to clarify some of his pathologic specimens and where they were obtained.  Dr. De Los Santos recommended proceeding with a PET scan and biopsies of the residual nodularity in the parotid.  The patient returns today after his PET scan and biopsies.    Patient reports that he is feeling well.  He denies recent fevers chills nausea vomiting.    Allergies   Allergen Reactions   • Bactrim [Sulfamethoxazole-Trimethoprim] Other (See Comments)     Joint pain   • Sulfa Antibiotics GI Intolerance     Makes bones hurt     • Tegaderm Chg Dressing [Chlorhexidine] Itching and Other  "(See Comments)     redness       Review of Systems - Oncology      A full 14 point review of systems was performed and was negative except as noted in the HPI.    Objective   VITAL SIGNS:   Vitals:    07/27/22 0933   BP: 126/69   Pulse: 96   Resp: 16   Temp: 98 °F (36.7 °C)   SpO2: 94%  Comment: RA   Weight: 85.1 kg (187 lb 11.2 oz)   Height: 175.3 cm (69\")   PainSc: 0-No pain        Karnofsky score: 80       Physical Exam  Vitals and nursing note reviewed.   Constitutional:       Appearance: He is well-developed.   HENT:      Head: Normocephalic and atraumatic.   Eyes:      Conjunctiva/sclera: Conjunctivae normal.      Pupils: Pupils are equal, round, and reactive to light.   Neck:      Comments: No enlarged cervical or supraclavicular LAD.    Cardiovascular:      Comments: Patient well perfused. Non cyanotic. No prominent JVD. No pedal edema  Pulmonary:      Effort: Pulmonary effort is normal.      Breath sounds: Normal breath sounds. No stridor. No wheezing.   Abdominal:      General: There is no distension.      Palpations: Abdomen is soft.   Musculoskeletal:         General: Normal range of motion.      Cervical back: Normal range of motion and neck supple.      Comments: Patient moves all extremities spontaneously.    Skin:     General: Skin is warm and dry.      Comments: Previous scars present on the right temporal region.   Neurological:      Mental Status: He is alert and oriented to person, place, and time.      Comments: Coordination intact.   Psychiatric:         Behavior: Behavior normal.         Thought Content: Thought content normal.         Judgment: Judgment normal.              The following portions of the patient's history were reviewed and updated as appropriate: allergies, current medications, past family history, past medical history, past social history, past surgical history and problem list.    CT Head With Contrast    Result Date: 4/4/2022  1.  There are some white matter changes noted " which could reflect more chronic small vessel ischemic change. 2.  Soft tissue nodule in the subcutaneous soft tissues of the right facial area which seems smaller as compared to prior study.  This report was finalized on 4/4/2022 12:19 PM by Howard Cervantes MD.      CT Head With & Without Contrast    Result Date: 6/11/2022  Continued interval decrease in size and conspicuity of previously noted areas of mild skin thickening along the right superficial facial soft tissues overlying the temporalis muscle. A nodule along the anterior margin of the right parotid is also decreased in size. There are otherwise no acute intracranial findings or evidence of progressive lymphomatous disease.  CT of the chest demonstrates faint areas of somewhat diffuse peribronchial groundglass opacity, with more discrete tiny tree-in-bud nodules present at the left lung base. Appearances somewhat nonspecific and possibly related to some component of bronchiolitis and atypical pneumonia. There is otherwise no evidence of lymphomatous disease progression in the chest.  No acute findings in the abdomen and pelvis. No evidence of lymphomatous disease.  This report was finalized on 6/11/2022 7:29 PM by Lazaro Carrero.      CT Chest With Contrast Diagnostic    Result Date: 6/11/2022  Continued interval decrease in size and conspicuity of previously noted areas of mild skin thickening along the right superficial facial soft tissues overlying the temporalis muscle. A nodule along the anterior margin of the right parotid is also decreased in size. There are otherwise no acute intracranial findings or evidence of progressive lymphomatous disease.  CT of the chest demonstrates faint areas of somewhat diffuse peribronchial groundglass opacity, with more discrete tiny tree-in-bud nodules present at the left lung base. Appearances somewhat nonspecific and possibly related to some component of bronchiolitis and atypical pneumonia. There is otherwise no  evidence of lymphomatous disease progression in the chest.  No acute findings in the abdomen and pelvis. No evidence of lymphomatous disease.  This report was finalized on 6/11/2022 7:29 PM by Lazaro Carrero.      CT Abdomen Pelvis With Contrast    Result Date: 6/11/2022  Continued interval decrease in size and conspicuity of previously noted areas of mild skin thickening along the right superficial facial soft tissues overlying the temporalis muscle. A nodule along the anterior margin of the right parotid is also decreased in size. There are otherwise no acute intracranial findings or evidence of progressive lymphomatous disease.  CT of the chest demonstrates faint areas of somewhat diffuse peribronchial groundglass opacity, with more discrete tiny tree-in-bud nodules present at the left lung base. Appearances somewhat nonspecific and possibly related to some component of bronchiolitis and atypical pneumonia. There is otherwise no evidence of lymphomatous disease progression in the chest.  No acute findings in the abdomen and pelvis. No evidence of lymphomatous disease.  This report was finalized on 6/11/2022 7:29 PM by Lazaro Carrero.          US Fine Needle Aspiration BX 1st Lesion    Result Date: 7/7/2022   1. Successful ultrasound-guided right parotid lesion biopsy.  This report was finalized on 7/7/2022 1:42 PM by Favio Ornelas MD.      NM PET/CT Skull Base to Mid Thigh    Result Date: 7/22/2022  Negative PET scan. Previously noted small foci of activity in the right face and parotid gland have resolved. No new PET scan abnormality.  This report was finalized on 7/22/2022 8:20 AM by Dr. Bruce Monsalve MD.      I reviewed the patient's pathology from 12/27/2021.    I reviewed the patient's pathology report from 7/7/2022.   -This showed no evidence of residual lymphoma.    There are no diagnoses linked to this encounter.  IMPRESSION:   Mr. Mar is a very pleasant 76-year-old gentleman with an ALK negative large  cell lymphoma of the right temporal region.  He does appear that the patient initially had parotid akbar involvement at the time of his initial treatments.  At this time the patient's nodularity in the parotid glands has nearly completely resolved.  There is a biopsy performed that shows no evidence of residual tumor in the parotid gland.  The patient would benefit from consolidative radiotherapy to dose of 30 Gray in 15 fractions.  We discussed the ramifications of this including decreased parotid salivary function and potential accelerated tooth decay.  We also suggest patient likely have a bit of a sunburn at the end of treatments especially as we would need to bolus the patient's skin to treat the sites of additional excision.    I recommend that the patient proceed with radiation planning this time to expedite his care as his PET scan took much longer to obtain then was and initially anticipated.  The patient follows closely with dentist, and he has plans for a cleaning next month.  The patient already uses prescription toothpaste.    Recommend IMRT and IGR T per the NCCN guidelines.    RECOMMENDATIONS:   Anaplastic large cell lymphoma ALK negative as of right temporal area  -Uncertain if parotid gland was initially involved  -Patient had parotid nodule that time  -Discussed the case with Dr. De Los Santos and Dr. Herr  -PET scan shows near complete resolution of nodularity in the parotid gland  -Biopsies of the parotid negative  -It appears very clear at this time point of the parotid/parotid lymph nodes were initially involved in the coverage should include the entire parotid gland  -I recommend a dose of 30 Gray to the whole parotid   -Recommend IGR T and IMRT  -Recommend bolus to the right temporal skin  -recommend CT sim today  -Will discuss at  on Friday         Mil Arriaga MD

## 2022-07-29 PROCEDURE — 77399 UNLISTED PX MED RADJ PHYSICS: CPT | Performed by: RADIOLOGY

## 2022-08-01 ENCOUNTER — HOSPITAL ENCOUNTER (OUTPATIENT)
Dept: RADIATION ONCOLOGY | Facility: HOSPITAL | Age: 77
Setting detail: RADIATION/ONCOLOGY SERIES
Discharge: HOME | End: 2022-08-01

## 2022-08-04 PROCEDURE — 77300 RADIATION THERAPY DOSE PLAN: CPT | Performed by: RADIOLOGY

## 2022-08-04 PROCEDURE — 77338 DESIGN MLC DEVICE FOR IMRT: CPT | Performed by: RADIOLOGY

## 2022-08-04 PROCEDURE — 77301 RADIOTHERAPY DOSE PLAN IMRT: CPT | Performed by: RADIOLOGY

## 2022-08-08 ENCOUNTER — HOSPITAL ENCOUNTER (OUTPATIENT)
Dept: RADIATION ONCOLOGY | Facility: HOSPITAL | Age: 77
Discharge: HOME | End: 2022-08-08

## 2022-08-08 PROCEDURE — 77386: CPT | Performed by: RADIOLOGY

## 2022-08-09 ENCOUNTER — HOSPITAL ENCOUNTER (OUTPATIENT)
Dept: RADIATION ONCOLOGY | Facility: HOSPITAL | Age: 77
Discharge: HOME | End: 2022-08-09

## 2022-08-09 ENCOUNTER — DOCUMENTATION (OUTPATIENT)
Dept: NUTRITION | Facility: HOSPITAL | Age: 77
End: 2022-08-09

## 2022-08-09 VITALS — WEIGHT: 189.3 LBS | BODY MASS INDEX: 27.95 KG/M2

## 2022-08-09 PROCEDURE — 77386: CPT | Performed by: RADIOLOGY

## 2022-08-09 NOTE — PROGRESS NOTES
ONC Nutrition    Diagnosis: Stage I ALK negative anaplastic T-cell large cell lymphoma of the face     Chemotherapy: OP LYMPHOMA A + CHP (Doxorubicin / Cyclophosphamide / Brentuximab vedotin  / Prednisone) - every 21 days x 6 cycles (cycle 6)    Radiation: At this time the patient's nodularity in the parotid glands has nearly completely resolved / biopsy performed that shows no evidence of residual tumor in the parotid gland.  The patient would benefit from consolidative radiotherapy to dose of 30 Gray in 15 fractions-patient has completed 2/15 fractions     Weight - 189.3 lbs / stable weight since May 2022  Weight Change - ~50# (20%) weight loss since FEB 2022     Initial consultation with patient during RAD ONC status checks.  Patient with weight stablization over the past 3 months; good appetite and eating well presently.      Discussed the possible nutritional impact symptoms that patient may experience with the progression of radiation to the H&N region including taste changes, dry mouth and difficulty swallowing.  Encouraged patient to use the baking soda, salt and water mouth rinses numerous times throughout the day.  Will continue to follow for management of symptoms as they may develop.

## 2022-08-10 ENCOUNTER — HOSPITAL ENCOUNTER (OUTPATIENT)
Dept: RADIATION ONCOLOGY | Facility: HOSPITAL | Age: 77
Discharge: HOME | End: 2022-08-10

## 2022-08-10 PROCEDURE — 77386: CPT | Performed by: RADIOLOGY

## 2022-08-11 ENCOUNTER — HOSPITAL ENCOUNTER (OUTPATIENT)
Dept: RADIATION ONCOLOGY | Facility: HOSPITAL | Age: 77
Discharge: HOME | End: 2022-08-11

## 2022-08-11 PROCEDURE — 77386: CPT | Performed by: RADIOLOGY

## 2022-08-12 ENCOUNTER — HOSPITAL ENCOUNTER (OUTPATIENT)
Dept: RADIATION ONCOLOGY | Facility: HOSPITAL | Age: 77
Discharge: HOME | End: 2022-08-12

## 2022-08-12 PROCEDURE — 77386: CPT | Performed by: RADIOLOGY

## 2022-08-12 PROCEDURE — 77336 RADIATION PHYSICS CONSULT: CPT | Performed by: RADIOLOGY

## 2022-08-15 ENCOUNTER — HOSPITAL ENCOUNTER (OUTPATIENT)
Dept: RADIATION ONCOLOGY | Facility: HOSPITAL | Age: 77
Discharge: HOME | End: 2022-08-15

## 2022-08-15 PROCEDURE — 77386: CPT | Performed by: RADIOLOGY

## 2022-08-16 ENCOUNTER — HOSPITAL ENCOUNTER (OUTPATIENT)
Dept: RADIATION ONCOLOGY | Facility: HOSPITAL | Age: 77
Discharge: HOME | End: 2022-08-16

## 2022-08-16 ENCOUNTER — DOCUMENTATION (OUTPATIENT)
Dept: NUTRITION | Facility: HOSPITAL | Age: 77
End: 2022-08-16

## 2022-08-16 VITALS — WEIGHT: 188.5 LBS | BODY MASS INDEX: 27.84 KG/M2

## 2022-08-16 PROCEDURE — 77386: CPT | Performed by: RADIOLOGY

## 2022-08-16 NOTE — PROGRESS NOTES
ONC Nutrition     Diagnosis: Stage I ALK negative anaplastic T-cell large cell lymphoma of the face     Chemotherapy: OP LYMPHOMA A + CHP (Doxorubicin / Cyclophosphamide / Brentuximab vedotin  / Prednisone) - every 21 days x 6 cycles / 6 cycles completed     Radiation: At this time the patient's nodularity in the parotid glands has nearly completely resolved / biopsy performed that shows no evidence of residual tumor in the parotid gland.  The patient would benefit from consolidative radiotherapy to dose of 30 Gray in 15 fractions-patient has completed 7/15 fractions     Weight - 188.5 lbs / stable weight since May 2022  Weight Change - ~50# (20%) weight loss since FEB 2022    Patient with dry mouth issues over the past week; he is using the baking soda, salt and water mouth rinses 2-3x per day; encouraged patient to use 5-6x per day; also gave patient suggestions of using xylimelts for dry mouth management and to ensure that foods have a soft, moist consistency for ease of chewing and swallowing.      Will continue to follow.

## 2022-08-17 ENCOUNTER — HOSPITAL ENCOUNTER (OUTPATIENT)
Dept: RADIATION ONCOLOGY | Facility: HOSPITAL | Age: 77
Discharge: HOME | End: 2022-08-17

## 2022-08-17 PROCEDURE — 77386: CPT | Performed by: RADIOLOGY

## 2022-08-18 ENCOUNTER — HOSPITAL ENCOUNTER (OUTPATIENT)
Dept: RADIATION ONCOLOGY | Facility: HOSPITAL | Age: 77
Discharge: HOME | End: 2022-08-18

## 2022-08-18 PROCEDURE — 77386: CPT | Performed by: RADIOLOGY

## 2022-08-19 ENCOUNTER — HOSPITAL ENCOUNTER (OUTPATIENT)
Dept: RADIATION ONCOLOGY | Facility: HOSPITAL | Age: 77
Discharge: HOME | End: 2022-08-19

## 2022-08-19 PROCEDURE — 77336 RADIATION PHYSICS CONSULT: CPT | Performed by: RADIOLOGY

## 2022-08-19 PROCEDURE — 77386: CPT | Performed by: RADIOLOGY

## 2022-08-22 ENCOUNTER — HOSPITAL ENCOUNTER (OUTPATIENT)
Dept: ONCOLOGY | Facility: HOSPITAL | Age: 77
Setting detail: INFUSION SERIES
Discharge: HOME | End: 2022-08-22

## 2022-08-22 ENCOUNTER — HOSPITAL ENCOUNTER (OUTPATIENT)
Dept: RADIATION ONCOLOGY | Facility: HOSPITAL | Age: 77
Discharge: HOME | End: 2022-08-22

## 2022-08-22 VITALS
HEART RATE: 101 BPM | HEIGHT: 69 IN | TEMPERATURE: 97.4 F | SYSTOLIC BLOOD PRESSURE: 106 MMHG | WEIGHT: 185 LBS | RESPIRATION RATE: 20 BRPM | BODY MASS INDEX: 27.4 KG/M2 | DIASTOLIC BLOOD PRESSURE: 53 MMHG

## 2022-08-22 DIAGNOSIS — Z95.828 PORT-A-CATH IN PLACE: Primary | ICD-10-CM

## 2022-08-22 PROCEDURE — 77386: CPT | Performed by: RADIOLOGY

## 2022-08-22 PROCEDURE — 25010000002 HEPARIN LOCK FLUSH PER 10 UNITS: Performed by: INTERNAL MEDICINE

## 2022-08-22 PROCEDURE — 36591 DRAW BLOOD OFF VENOUS DEVICE: CPT

## 2022-08-22 PROCEDURE — 96523 IRRIG DRUG DELIVERY DEVICE: CPT

## 2022-08-22 RX ORDER — SODIUM CHLORIDE 0.9 % (FLUSH) 0.9 %
10 SYRINGE (ML) INJECTION AS NEEDED
Status: CANCELLED | OUTPATIENT
Start: 2022-08-22

## 2022-08-22 RX ORDER — HEPARIN SODIUM (PORCINE) LOCK FLUSH IV SOLN 100 UNIT/ML 100 UNIT/ML
500 SOLUTION INTRAVENOUS AS NEEDED
Status: DISCONTINUED | OUTPATIENT
Start: 2022-08-22 | End: 2022-08-23 | Stop reason: HOSPADM

## 2022-08-22 RX ORDER — HEPARIN SODIUM (PORCINE) LOCK FLUSH IV SOLN 100 UNIT/ML 100 UNIT/ML
500 SOLUTION INTRAVENOUS AS NEEDED
Status: CANCELLED | OUTPATIENT
Start: 2022-08-22

## 2022-08-22 RX ADMIN — HEPARIN 500 UNITS: 100 SYRINGE at 11:08

## 2022-08-23 ENCOUNTER — HOSPITAL ENCOUNTER (OUTPATIENT)
Dept: RADIATION ONCOLOGY | Facility: HOSPITAL | Age: 77
Discharge: HOME | End: 2022-08-23

## 2022-08-23 VITALS — BODY MASS INDEX: 27.66 KG/M2 | WEIGHT: 187.3 LBS

## 2022-08-23 PROCEDURE — 77386: CPT | Performed by: RADIOLOGY

## 2022-08-24 ENCOUNTER — HOSPITAL ENCOUNTER (OUTPATIENT)
Dept: RADIATION ONCOLOGY | Facility: HOSPITAL | Age: 77
Discharge: HOME | End: 2022-08-24

## 2022-08-24 PROCEDURE — 77386: CPT | Performed by: RADIOLOGY

## 2022-08-25 ENCOUNTER — HOSPITAL ENCOUNTER (OUTPATIENT)
Dept: RADIATION ONCOLOGY | Facility: HOSPITAL | Age: 77
Discharge: HOME | End: 2022-08-25

## 2022-08-25 PROCEDURE — 77386: CPT | Performed by: RADIOLOGY

## 2022-08-26 ENCOUNTER — HOSPITAL ENCOUNTER (OUTPATIENT)
Dept: RADIATION ONCOLOGY | Facility: HOSPITAL | Age: 77
Discharge: HOME | End: 2022-08-26

## 2022-08-26 PROCEDURE — 77336 RADIATION PHYSICS CONSULT: CPT | Performed by: RADIOLOGY

## 2022-08-26 PROCEDURE — 77386: CPT | Performed by: RADIOLOGY

## 2022-09-13 ENCOUNTER — TELEPHONE (OUTPATIENT)
Dept: ONCOLOGY | Facility: CLINIC | Age: 77
End: 2022-09-13

## 2022-09-14 ENCOUNTER — HOSPITAL ENCOUNTER (OUTPATIENT)
Dept: ONCOLOGY | Facility: HOSPITAL | Age: 77
Setting detail: INFUSION SERIES
Discharge: HOME | End: 2022-09-14

## 2022-09-14 ENCOUNTER — OFFICE VISIT (OUTPATIENT)
Dept: ONCOLOGY | Facility: CLINIC | Age: 77
End: 2022-09-14

## 2022-09-14 VITALS
DIASTOLIC BLOOD PRESSURE: 74 MMHG | OXYGEN SATURATION: 99 % | RESPIRATION RATE: 18 BRPM | TEMPERATURE: 97.7 F | BODY MASS INDEX: 25.48 KG/M2 | SYSTOLIC BLOOD PRESSURE: 138 MMHG | HEART RATE: 104 BPM | HEIGHT: 69 IN | WEIGHT: 172 LBS

## 2022-09-14 VITALS — WEIGHT: 172 LBS | BODY MASS INDEX: 25.4 KG/M2

## 2022-09-14 DIAGNOSIS — Z95.828 PORT-A-CATH IN PLACE: Primary | ICD-10-CM

## 2022-09-14 DIAGNOSIS — T45.1X5A CHEMOTHERAPY-INDUCED NEUROPATHY: Primary | ICD-10-CM

## 2022-09-14 DIAGNOSIS — G62.0 CHEMOTHERAPY-INDUCED NEUROPATHY: Primary | ICD-10-CM

## 2022-09-14 DIAGNOSIS — C84.70 ANAPLASTIC ALK-NEGATIVE LARGE CELL LYMPHOMA, UNSPECIFIED BODY REGION: ICD-10-CM

## 2022-09-14 LAB
ALBUMIN SERPL-MCNC: 3 G/DL (ref 3.5–5.2)
ALBUMIN/GLOB SERPL: 0.9 G/DL
ALP SERPL-CCNC: 82 U/L (ref 39–117)
ALT SERPL W P-5'-P-CCNC: 14 U/L (ref 1–41)
ANION GAP SERPL CALCULATED.3IONS-SCNC: 9 MMOL/L (ref 5–15)
AST SERPL-CCNC: 15 U/L (ref 1–40)
BASOPHILS # BLD AUTO: 0.03 10*3/MM3 (ref 0–0.2)
BASOPHILS NFR BLD AUTO: 0.2 % (ref 0–1.5)
BILIRUB SERPL-MCNC: 0.2 MG/DL (ref 0–1.2)
BUN SERPL-MCNC: 17 MG/DL (ref 8–23)
BUN/CREAT SERPL: 17.5 (ref 7–25)
CALCIUM SPEC-SCNC: 8.6 MG/DL (ref 8.6–10.5)
CHLORIDE SERPL-SCNC: 103 MMOL/L (ref 98–107)
CO2 SERPL-SCNC: 22 MMOL/L (ref 22–29)
CREAT SERPL-MCNC: 0.97 MG/DL (ref 0.76–1.27)
DEPRECATED RDW RBC AUTO: 48.8 FL (ref 37–54)
EGFRCR SERPLBLD CKD-EPI 2021: 80.4 ML/MIN/1.73
EOSINOPHIL # BLD AUTO: 0.06 10*3/MM3 (ref 0–0.4)
EOSINOPHIL NFR BLD AUTO: 0.5 % (ref 0.3–6.2)
ERYTHROCYTE [DISTWIDTH] IN BLOOD BY AUTOMATED COUNT: 13.2 % (ref 12.3–15.4)
GLOBULIN UR ELPH-MCNC: 3.4 GM/DL
GLUCOSE SERPL-MCNC: 213 MG/DL (ref 65–99)
HCT VFR BLD AUTO: 34.8 % (ref 37.5–51)
HGB BLD-MCNC: 11.6 G/DL (ref 13–17.7)
IMM GRANULOCYTES # BLD AUTO: 0.01 10*3/MM3 (ref 0–0.05)
IMM GRANULOCYTES NFR BLD AUTO: 0.1 % (ref 0–0.5)
LDH SERPL-CCNC: 141 U/L (ref 135–225)
LYMPHOCYTES # BLD AUTO: 1.76 10*3/MM3 (ref 0.7–3.1)
LYMPHOCYTES NFR BLD AUTO: 13.5 % (ref 19.6–45.3)
MCH RBC QN AUTO: 33 PG (ref 26.6–33)
MCHC RBC AUTO-ENTMCNC: 33.3 G/DL (ref 31.5–35.7)
MCV RBC AUTO: 98.9 FL (ref 79–97)
MONOCYTES # BLD AUTO: 0.61 10*3/MM3 (ref 0.1–0.9)
MONOCYTES NFR BLD AUTO: 4.7 % (ref 5–12)
NEUTROPHILS NFR BLD AUTO: 10.54 10*3/MM3 (ref 1.7–7)
NEUTROPHILS NFR BLD AUTO: 81 % (ref 42.7–76)
PLATELET # BLD AUTO: 134 10*3/MM3 (ref 140–450)
PMV BLD AUTO: 10.5 FL (ref 6–12)
POTASSIUM SERPL-SCNC: 3.9 MMOL/L (ref 3.5–5.2)
PROT SERPL-MCNC: 6.4 G/DL (ref 6–8.5)
RBC # BLD AUTO: 3.52 10*6/MM3 (ref 4.14–5.8)
SODIUM SERPL-SCNC: 134 MMOL/L (ref 136–145)
WBC NRBC COR # BLD: 13.01 10*3/MM3 (ref 3.4–10.8)

## 2022-09-14 PROCEDURE — 99215 OFFICE O/P EST HI 40 MIN: CPT | Performed by: INTERNAL MEDICINE

## 2022-09-14 PROCEDURE — 36591 DRAW BLOOD OFF VENOUS DEVICE: CPT

## 2022-09-14 PROCEDURE — 25010000002 HEPARIN LOCK FLUSH PER 10 UNITS: Performed by: INTERNAL MEDICINE

## 2022-09-14 PROCEDURE — 83615 LACTATE (LD) (LDH) ENZYME: CPT | Performed by: INTERNAL MEDICINE

## 2022-09-14 PROCEDURE — 85025 COMPLETE CBC W/AUTO DIFF WBC: CPT | Performed by: INTERNAL MEDICINE

## 2022-09-14 PROCEDURE — 80053 COMPREHEN METABOLIC PANEL: CPT | Performed by: INTERNAL MEDICINE

## 2022-09-14 PROCEDURE — 96523 IRRIG DRUG DELIVERY DEVICE: CPT

## 2022-09-14 RX ORDER — SODIUM CHLORIDE 0.9 % (FLUSH) 0.9 %
10 SYRINGE (ML) INJECTION AS NEEDED
OUTPATIENT
Start: 2022-09-14

## 2022-09-14 RX ORDER — HEPARIN SODIUM (PORCINE) LOCK FLUSH IV SOLN 100 UNIT/ML 100 UNIT/ML
500 SOLUTION INTRAVENOUS AS NEEDED
OUTPATIENT
Start: 2022-09-14

## 2022-09-14 RX ORDER — HEPARIN SODIUM (PORCINE) LOCK FLUSH IV SOLN 100 UNIT/ML 100 UNIT/ML
500 SOLUTION INTRAVENOUS AS NEEDED
Status: DISCONTINUED | OUTPATIENT
Start: 2022-09-14 | End: 2022-09-15 | Stop reason: HOSPADM

## 2022-09-14 RX ADMIN — HEPARIN 500 UNITS: 100 SYRINGE at 12:49

## 2022-09-14 NOTE — PROGRESS NOTES
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         REASON FOR VISIT: Management of mild lymphoma    HISTORY OF PRESENT ILLNESS:   76 y.o.  male presents today for follow-up of his lymphoma.  He completed 6 of 6 cycles of CHP-Brentuximab.   He is having considerable neuropathy with severe numbness in his hands and feet.  He is having trouble ambulating because of that.  He is having issues driving because he cannot feel his feet.  Otherwise seems to be doing well.  He is completed consolidative radiotherapy to his face.      Past medical history, social history and family history was reviewed 09/14/22 and unchanged from prior visit.    Review of Systems:    Review of Systems   Constitutional: Negative for appetite change and fatigue.   HENT:  Negative.    Eyes: Negative.    Respiratory: Negative.    Cardiovascular: Negative.    Gastrointestinal: Negative.    Endocrine: Negative.    Genitourinary: Negative.     Skin: Negative.    Neurological: Positive for extremity weakness and numbness.   Hematological: Negative.    Psychiatric/Behavioral: Negative for sleep disturbance.            Medications:        Current Outpatient Medications:   •  albuterol (PROVENTIL) (2.5 MG/3ML) 0.083% nebulizer solution, Take 2.5 mg by nebulization Every 4 (Four) Hours As Needed for  "Wheezing., Disp: 120 each, Rfl: 5  •  albuterol sulfate  (90 Base) MCG/ACT inhaler, Inhale 2 puffs Every 4 (Four) Hours As Needed for Wheezing., Disp: 8 g, Rfl: 3  •  Cholecalciferol (VITAMIN D) 1000 UNITS tablet, Take 2,000 Units by mouth Daily., Disp: , Rfl:   •  furosemide (LASIX) 20 MG tablet, Take 20 mg by mouth 2 (Two) Times a Day., Disp: , Rfl:   •  gabapentin (NEURONTIN) 300 MG capsule, TAKE ONE CAPSULE BY MOUTH ONCE NIGHTLY, Disp: 30 capsule, Rfl: 3  •  glimepiride (AMARYL) 4 MG tablet, Take 1 tablet by mouth Daily With Dinner., Disp: 90 tablet, Rfl: 1  •  metFORMIN (GLUCOPHAGE) 1000 MG tablet, TAKE ONE TABLET BY MOUTH IN THE MORNING AND TAKE ONE AND ONE-HALF TABLETS BY MOUTH AT BEDTIME, Disp: 75 tablet, Rfl: 5  •  Multiple Vitamins-Minerals (MULTI VITAMIN/MINERALS PO), Take 1 tablet by mouth Daily., Disp: , Rfl:   •  Multiple Vitamins-Minerals (PreserVision AREDS 2) chewable tablet, Chew 1 tablet 2 (Two) Times a Day., Disp: , Rfl:   •  omeprazole (priLOSEC) 20 MG capsule, Take 1 capsule by mouth Every Night., Disp: , Rfl:   •  potassium chloride (K-DUR,KLOR-CON) 20 MEQ CR tablet, Take 20 mEq by mouth 2 (Two) Times a Day., Disp: , Rfl:   •  simvastatin (ZOCOR) 10 MG tablet, TAKE ONE TABLET BY MOUTH EVERY NIGHT AT BEDTIME, Disp: 90 tablet, Rfl: 1  •  vitamin B-12 (CYANOCOBALAMIN) 1000 MCG tablet, Take 3,000 mcg by mouth Daily., Disp: , Rfl:     Pain Medications             gabapentin (NEURONTIN) 300 MG capsule TAKE ONE CAPSULE BY MOUTH ONCE NIGHTLY             ALLERGIES:    Allergies   Allergen Reactions   • Bactrim [Sulfamethoxazole-Trimethoprim] Other (See Comments)     Joint pain   • Sulfa Antibiotics GI Intolerance     Makes bones hurt     • Tegaderm Chg Dressing [Chlorhexidine] Itching and Other (See Comments)     redness         Physical Exam    VITAL SIGNS:  /74 Comment: LUE  Pulse 104   Temp 97.7 °F (36.5 °C) (Infrared)   Resp 18   Ht 175.3 cm (69\")   Wt 78 kg (172 lb)   SpO2 99% " Comment: RA  BMI 25.40 kg/m²     ECOG score: 2           Wt Readings from Last 3 Encounters:   09/14/22 78 kg (172 lb)   08/23/22 85 kg (187 lb 4.8 oz)   08/22/22 83.9 kg (185 lb)       Body mass index is 25.4 kg/m². Body surface area is 1.94 meters squared.    Pain Score    09/14/22 1150   PainSc: 0-No pain           Performance Status: 0    General: well appearing, in no acute distress  HEENT: Swelling on the right side of his face consistent with his lymphomatous process.  Lymphatics: no cervical, supraclavicular, or axillary adenopathy  Cardiovascular: regular rate and rhythm, no murmurs, rubs or gallops  Lungs: clear to auscultation bilaterally  Abdomen: soft, nontender, nondistended.  No palpable organomegaly  Extremities: no lower extremity edema  Skin: no rashes, lesions, bruising, or petechiae  Msk:  Shows no weakness of the large muscle groups  Psych: Mood is stable        RECENT LABS:    Lab Results   Component Value Date    HGB 10.9 (L) 06/06/2022    HCT 31.9 (L) 06/06/2022    MCV 94.1 06/06/2022    PLT 34 (C) 06/06/2022    WBC 7.30 06/06/2022    NEUTROABS 5.88 06/06/2022    LYMPHSABS 0.48 (L) 06/06/2022    MONOSABS 0.86 06/06/2022    EOSABS 0.00 06/06/2022    BASOSABS 0.02 06/06/2022       Lab Results   Component Value Date    GLUCOSE 46 (C) 06/06/2022    BUN 16 06/06/2022    CREATININE 0.82 06/06/2022     06/06/2022    K 4.3 06/06/2022    CL 96 (L) 06/06/2022    CO2 26.7 06/06/2022    CALCIUM 9.0 06/06/2022    PROTEINTOT 6.0 06/06/2022    ALBUMIN 3.80 06/06/2022    BILITOT 0.4 06/06/2022    ALKPHOS 87 06/06/2022    AST 34 06/06/2022    ALT 28 06/06/2022       CT Head With Contrast    Result Date: 4/4/2022  1.  There are some white matter changes noted which could reflect more chronic small vessel ischemic change. 2.  Soft tissue nodule in the subcutaneous soft tissues of the right facial area which seems smaller as compared to prior study.  This report was finalized on 4/4/2022 12:19 PM by Howard  MD Helen.      CT Head With & Without Contrast    Result Date: 6/11/2022  Continued interval decrease in size and conspicuity of previously noted areas of mild skin thickening along the right superficial facial soft tissues overlying the temporalis muscle. A nodule along the anterior margin of the right parotid is also decreased in size. There are otherwise no acute intracranial findings or evidence of progressive lymphomatous disease.  CT of the chest demonstrates faint areas of somewhat diffuse peribronchial groundglass opacity, with more discrete tiny tree-in-bud nodules present at the left lung base. Appearances somewhat nonspecific and possibly related to some component of bronchiolitis and atypical pneumonia. There is otherwise no evidence of lymphomatous disease progression in the chest.  No acute findings in the abdomen and pelvis. No evidence of lymphomatous disease.  This report was finalized on 6/11/2022 7:29 PM by Lazaro Carrero.      CT Chest With Contrast Diagnostic    Result Date: 6/11/2022  Continued interval decrease in size and conspicuity of previously noted areas of mild skin thickening along the right superficial facial soft tissues overlying the temporalis muscle. A nodule along the anterior margin of the right parotid is also decreased in size. There are otherwise no acute intracranial findings or evidence of progressive lymphomatous disease.  CT of the chest demonstrates faint areas of somewhat diffuse peribronchial groundglass opacity, with more discrete tiny tree-in-bud nodules present at the left lung base. Appearances somewhat nonspecific and possibly related to some component of bronchiolitis and atypical pneumonia. There is otherwise no evidence of lymphomatous disease progression in the chest.  No acute findings in the abdomen and pelvis. No evidence of lymphomatous disease.  This report was finalized on 6/11/2022 7:29 PM by Lazaro Carrero.      CT Abdomen Pelvis With  Contrast    Result Date: 6/11/2022  Continued interval decrease in size and conspicuity of previously noted areas of mild skin thickening along the right superficial facial soft tissues overlying the temporalis muscle. A nodule along the anterior margin of the right parotid is also decreased in size. There are otherwise no acute intracranial findings or evidence of progressive lymphomatous disease.  CT of the chest demonstrates faint areas of somewhat diffuse peribronchial groundglass opacity, with more discrete tiny tree-in-bud nodules present at the left lung base. Appearances somewhat nonspecific and possibly related to some component of bronchiolitis and atypical pneumonia. There is otherwise no evidence of lymphomatous disease progression in the chest.  No acute findings in the abdomen and pelvis. No evidence of lymphomatous disease.  This report was finalized on 6/11/2022 7:29 PM by Lazaro Carrero.            Assessment/Plan    1.  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  He has completed 6 cycles of treatment with A + CHP.  Completed consolidative radiotherapy.  At this time he does not need any further treatment.  Plan to rescan  him in 6 months.    2.  Immunocompromise state.  He got 1 dose of his Covid vaccination.  He also received Evusheld.     3.  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  We will have him see neurology just to make sure there is no other cause present.          Ai Henderson MD  [facility]    Return in (Approximately): 6 months    Orders Placed This Encounter   Procedures   • CT Abdomen Pelvis With Contrast   • CT Chest With Contrast Diagnostic   • CT Head With & Without Contrast   • CT Soft Tissue Neck Without Contrast   • Comprehensive Metabolic Panel   • Lactate Dehydrogenase   • Ambulatory Referral to Neurology   • CBC & Differential       9/14/2022

## 2022-09-19 ENCOUNTER — APPOINTMENT (OUTPATIENT)
Dept: ONCOLOGY | Facility: HOSPITAL | Age: 77
End: 2022-09-19

## 2022-09-28 ENCOUNTER — HOSPITAL ENCOUNTER (OUTPATIENT)
Dept: RADIATION ONCOLOGY | Facility: HOSPITAL | Age: 77
Setting detail: RADIATION/ONCOLOGY SERIES
Discharge: HOME | End: 2022-09-28

## 2022-09-28 ENCOUNTER — OFFICE VISIT (OUTPATIENT)
Dept: RADIATION ONCOLOGY | Facility: HOSPITAL | Age: 77
End: 2022-09-28

## 2022-09-28 VITALS
OXYGEN SATURATION: 94 % | DIASTOLIC BLOOD PRESSURE: 63 MMHG | SYSTOLIC BLOOD PRESSURE: 143 MMHG | BODY MASS INDEX: 25.9 KG/M2 | TEMPERATURE: 97.8 F | HEIGHT: 69 IN | WEIGHT: 174.9 LBS | HEART RATE: 99 BPM | RESPIRATION RATE: 16 BRPM

## 2022-09-28 DIAGNOSIS — C84.70 ANAPLASTIC ALK-NEGATIVE LARGE CELL LYMPHOMA, UNSPECIFIED BODY REGION: ICD-10-CM

## 2022-09-28 PROCEDURE — G0463 HOSPITAL OUTPT CLINIC VISIT: HCPCS

## 2022-09-28 NOTE — PROGRESS NOTES
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 male with parotid T-cell lymphoma.  The patient initially presented with a lesion in the right temporal area of the face.  This was resected by Dr. De Los Santos on 2021.  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  The patient was sent for a PET scan on 2022 that showed changes related to his resection as well as a somewhat indeterminate soft tissue density focus mass with SUV 3.2 near the angle of the mandible, and right parotid gland with SUV 3.6 potentially concerning for an additional lesion.  There was no evidence of more distant metastatic disease.  The patient then completed 6 cycles of postoperative chemotherapy of A + CHP.  Repeat CT scans in 2021 showed potential residual nodularity in the right parotid gland suspicious for residual disease involvement.    The patient was sent for ultrasound-guided biopsy of the right parotid on 2022 which was negative for evidence of malignancy.  Restaging PET/CT scan 2022 showed resolution of small foci of activity involving the right face and parotid gland, consistent with complete response to treatment.  In accordance with NCCN guidelines, the patient then received adjuvant involved site radiation therapy.  The right parotid was treated to a dose of 30 Gy in 15 fraction using IGRT IMRT.   A skin bolus was utilized to treat the sites of additional excision.  He tolerated treatment well.    Mild post-treatment fatigue has subsided.  The patient did develop xerostomia and hypogeusia that persist.   He is using Prevident  toothpaste prescribed by his dentist.  He is using mouthwash with enamel protection and also doing baking soda + salt water rinses.  He denies oral pain, lesions, sores, or difficulty chewing.  No otalgia.  He denies increased swelling of the head or neck.  He denies erythema, rash, or desquamation of the skin of the face.  No fever, chills, night sweats, or weight loss.      MEDICATIONS: Medication reconciliation for the patient was reviewed and confirmed in the electronic medical record.    Review of Systems   Constitutional: Positive for fatigue.   HENT:          Dry mouth, decreased taste   All other systems reviewed and are negative.          KPS 90%      Physical Exam  Vitals and nursing note reviewed.   Constitutional:       General: He is not in acute distress.     Appearance: Normal appearance. He is well-developed.   HENT:      Head: Normocephalic and atraumatic.      Salivary Glands: Right salivary gland is not diffusely enlarged.      Comments: Mild lymphedema of the right side of face.  Surgical incision right temporal region appears well-healed.     Mouth/Throat:      Mouth: Mucous membranes are dry.      Comments: Mild radiation induced xerostomia without visible lesions or masses involving the oral cavity  Eyes:      Conjunctiva/sclera: Conjunctivae normal.      Pupils: Pupils are equal, round, and reactive to light.   Cardiovascular:      Rate and Rhythm: Normal rate and regular rhythm.   Pulmonary:      Effort: Pulmonary effort is normal.      Breath sounds: Normal breath sounds. No wheezing.   Musculoskeletal:         General: Normal range of motion.      Cervical back: Normal range of motion and neck supple.      Comments: Ambulates with walker   Lymphadenopathy:      Head:      Right side of head: No submental, submandibular, preauricular or posterior auricular adenopathy.      Left side of head: No submental, submandibular, preauricular or posterior auricular adenopathy.      Cervical: No  "cervical adenopathy.   Skin:     General: Skin is warm and dry.      Comments: Mild dry skin over the area of the right parotid consistent with mild radiation dermatitis.  Skin is nonerythematous and appears healthy.   Neurological:      Mental Status: He is alert and oriented to person, place, and time.   Psychiatric:         Behavior: Behavior normal.         Thought Content: Thought content normal.         Judgment: Judgment normal.         VITAL SIGNS:   Vitals:    09/28/22 1314   BP: 143/63   Pulse: 99   Resp: 16   Temp: 97.8 °F (36.6 °C)   SpO2: 94%  Comment: RA   Weight: 79.3 kg (174 lb 14.4 oz)   Height: 175.3 cm (69\")   PainSc: 0-No pain           The following portions of the patient's history were reviewed and updated as appropriate: allergies, current medications, past family history, past medical history, past social history, past surgical history and problem list.         Diagnoses and all orders for this visit:    1. 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, unspecified body region (HCC)         IMPRESSION:  Gabriela Mar is a 76 y.o. gentleman with recent diagnosis of a stage IE anaplastic ALK-negative large cell lymphoma of the right temporal region.  It does appear that the patient initially had parotid akbar involvement at the time of his initial treatments.  Following surgical resection and adjuvant chemotherapy, the patient appears to have had a complete response to treatment with repeat biopsy showing no evidence of residual tumor in the parotid gland and repeat PET/CT scan negative for any residual concerning nodularity or hypermetabolic activity.  The patient then underwent consolidative radiation therapy, which he completed 8/26/2022.  He tolerated treatments well.  He did develop the anticipated side effects of grade 1 fatigue, grade 1 xerostomia, and hypogeusia.  We discussed timeline for gradual improvement and resolution of these side effects.  We also discussed diet recommendations, " Biotene dry mouth, increased water intake, and the use of sugar free lozenges to help with salivation.  The patient was recommended to continue good oral hygiene including tooth pastes with fluoride and routine dental exams.  Clinically, the patient's exam today was without findings concerning for recurrence.  No B symptoms.  The patient is scheduled to undergo repeat CT scans of the head, neck, chest, abdomen and pelvis on 3/1/2023 with Dr. Herr for ongoing surveillance.  I would like to see the patient back in 6 months.  We discussed follow up intervals, surveillance imaging, serial labs, and expectations for response to treatment.         RECOMMENDATIONS:      Anaplastic large cell lymphoma ALK negative involving right temporal area  -Status post initial resection per Dr. De Los Santos  -High suspicion of right parotid/parotid lymph nodes involved initially  -Status post chemotherapy 6 of 6 cycles A + CHP  -PET scan shows complete resolution of nodularity in the parotid gland  -Biopsies of the parotid gland 7/7/2022 negative for malignancy  -Case discussed at tumor board  -Status post consolidative radiotherapy with coverage including the whole parotid 30 Gray in 15 fractions   -Bolus to the right temporal skin   -IGR T and IMRT   -Completed 8/26/2022  -Follows with Dr. Herr  -Repeat CT scans 6 months  -RTC at that time        Return in about 6 months (around 3/21/2023) for Office Visit.    CALIN Hanna    I spent a total of 30 minutes on today's visit, with more than 12 minutes in direct face to face communication, and the remainder of the time spent in reviewing the relevant history, records, available imaging, and for documentation.

## 2022-10-03 ENCOUNTER — TELEPHONE (OUTPATIENT)
Dept: ONCOLOGY | Facility: CLINIC | Age: 77
End: 2022-10-03

## 2022-10-03 NOTE — TELEPHONE ENCOUNTER
Caller: Gabriela Mar    Relationship: Self    Best call back number: [phone]        Who are you requesting to speak with (clinical staff, provider,  specific staff member): PETEY      What was the call regarding: PATIENT WANTED TO SPEAK TO DR PETIT'S NURSE PETEY    Do you require a callback: YES

## 2022-10-03 NOTE — TELEPHONE ENCOUNTER
I called the patient and he wanted to know about all his labs from Sept 14.  I called him and went over the results and he verbalized understanding.

## 2022-10-04 ENCOUNTER — TELEPHONE (OUTPATIENT)
Dept: ONCOLOGY | Facility: CLINIC | Age: 77
End: 2022-10-04

## 2022-10-04 DIAGNOSIS — Z95.828 PORT-A-CATH IN PLACE: Primary | ICD-10-CM

## 2022-10-04 NOTE — TELEPHONE ENCOUNTER
Called patient back he states that he noticed this yesterday, it is no longer bleeding but a little tender. He denies fever redness pain or warmth around port site. He does report itching around his port. Called and spoke with Sophia in IR, she discussed with Dr. Bangura and he would like patient to keep site covered and if Dr. Herr is agreeable he would like to get the port removed. Called and informed patient to keep covered and will discuss removal with Dr. Herr. Instructed patient to go to ER with any signs of infection. Patient verbalized understanding.

## 2022-10-04 NOTE — TELEPHONE ENCOUNTER
Provider: DR PETIT  Caller: HARIS    Reason for Call: HARIS STATES A SLIVER OF PLASTIC WAS STICKING OUT IF HIS SKIN WHERE HIS PORT IS. HE STATES HE FLICKED IT WITH HIS FINGER AND IT BLED JUST A TINY BIT SO HE PUT A NEOSPORIN BAND-AID     PLEASE ADVISE

## 2022-10-05 NOTE — TELEPHONE ENCOUNTER
Discussed with Dr. Herr we will remove port. Order entered, referral coordinator notified, patient notified.

## 2022-10-07 ENCOUNTER — HOSPITAL ENCOUNTER (OUTPATIENT)
Dept: INTERVENTIONAL RADIOLOGY/VASCULAR | Facility: HOSPITAL | Age: 77
Discharge: HOME | End: 2022-10-07
Admitting: INTERNAL MEDICINE

## 2022-10-07 VITALS
DIASTOLIC BLOOD PRESSURE: 72 MMHG | OXYGEN SATURATION: 97 % | TEMPERATURE: 97.1 F | HEART RATE: 76 BPM | SYSTOLIC BLOOD PRESSURE: 125 MMHG | WEIGHT: 174.2 LBS | BODY MASS INDEX: 24.94 KG/M2 | RESPIRATION RATE: 16 BRPM | HEIGHT: 70 IN

## 2022-10-07 DIAGNOSIS — Z95.828 PORT-A-CATH IN PLACE: ICD-10-CM

## 2022-10-07 LAB
GLUCOSE BLDC GLUCOMTR-MCNC: 100 MG/DL (ref 70–130)
INR PPP: 1.07 (ref 0.84–1.13)
PROTHROMBIN TIME: 13.8 SECONDS (ref 11.4–14.4)

## 2022-10-07 PROCEDURE — 82962 GLUCOSE BLOOD TEST: CPT

## 2022-10-07 PROCEDURE — 99152 MOD SED SAME PHYS/QHP 5/>YRS: CPT

## 2022-10-07 PROCEDURE — 25010000002 CEFAZOLIN IN DEXTROSE 2-4 GM/100ML-% SOLUTION: Performed by: RADIOLOGY

## 2022-10-07 PROCEDURE — 25010000002 FENTANYL CITRATE (PF) 50 MCG/ML SOLUTION: Performed by: RADIOLOGY

## 2022-10-07 PROCEDURE — 25010000002 MIDAZOLAM PER 1 MG: Performed by: RADIOLOGY

## 2022-10-07 PROCEDURE — 85610 PROTHROMBIN TIME: CPT | Performed by: RADIOLOGY

## 2022-10-07 RX ORDER — HEPARIN SODIUM 200 [USP'U]/100ML
INJECTION, SOLUTION INTRAVENOUS
Status: DISPENSED
Start: 2022-10-07 | End: 2022-10-07

## 2022-10-07 RX ORDER — MIDAZOLAM HYDROCHLORIDE 1 MG/ML
INJECTION INTRAMUSCULAR; INTRAVENOUS
Status: DISPENSED
Start: 2022-10-07 | End: 2022-10-07

## 2022-10-07 RX ORDER — FENTANYL CITRATE 50 UG/ML
INJECTION, SOLUTION INTRAMUSCULAR; INTRAVENOUS
Status: DISPENSED
Start: 2022-10-07 | End: 2022-10-07

## 2022-10-07 RX ORDER — FENTANYL CITRATE 50 UG/ML
INJECTION, SOLUTION INTRAMUSCULAR; INTRAVENOUS
Status: COMPLETED | OUTPATIENT
Start: 2022-10-07 | End: 2022-10-07

## 2022-10-07 RX ORDER — CEPHALEXIN 500 MG/1
500 CAPSULE ORAL 2 TIMES DAILY
Qty: 20 CAPSULE | Refills: 0 | Status: SHIPPED | OUTPATIENT
Start: 2022-10-07 | End: 2022-10-17

## 2022-10-07 RX ORDER — SODIUM CHLORIDE 0.9 % (FLUSH) 0.9 %
3 SYRINGE (ML) INJECTION EVERY 12 HOURS SCHEDULED
Status: DISCONTINUED | OUTPATIENT
Start: 2022-10-07 | End: 2022-10-08 | Stop reason: HOSPADM

## 2022-10-07 RX ORDER — MIDAZOLAM HYDROCHLORIDE 1 MG/ML
INJECTION INTRAMUSCULAR; INTRAVENOUS
Status: COMPLETED | OUTPATIENT
Start: 2022-10-07 | End: 2022-10-07

## 2022-10-07 RX ORDER — SODIUM CHLORIDE 0.9 % (FLUSH) 0.9 %
10 SYRINGE (ML) INJECTION AS NEEDED
Status: DISCONTINUED | OUTPATIENT
Start: 2022-10-07 | End: 2022-10-08 | Stop reason: HOSPADM

## 2022-10-07 RX ORDER — CEFAZOLIN SODIUM 2 G/100ML
2 INJECTION, SOLUTION INTRAVENOUS ONCE
Status: COMPLETED | OUTPATIENT
Start: 2022-10-07 | End: 2022-10-07

## 2022-10-07 RX ORDER — LIDOCAINE HYDROCHLORIDE AND EPINEPHRINE 10; 10 MG/ML; UG/ML
INJECTION, SOLUTION INFILTRATION; PERINEURAL
Status: COMPLETED
Start: 2022-10-07 | End: 2022-10-07

## 2022-10-07 RX ADMIN — CEFAZOLIN SODIUM 2 G: 2 INJECTION, SOLUTION INTRAVENOUS at 08:58

## 2022-10-07 RX ADMIN — FENTANYL CITRATE 50 MCG: 50 INJECTION, SOLUTION INTRAMUSCULAR; INTRAVENOUS at 08:58

## 2022-10-07 RX ADMIN — MIDAZOLAM HYDROCHLORIDE 0.5 MG: 1 INJECTION, SOLUTION INTRAMUSCULAR; INTRAVENOUS at 09:04

## 2022-10-07 RX ADMIN — MIDAZOLAM HYDROCHLORIDE 1 MG: 1 INJECTION, SOLUTION INTRAMUSCULAR; INTRAVENOUS at 08:58

## 2022-10-07 RX ADMIN — LIDOCAINE HYDROCHLORIDE,EPINEPHRINE BITARTRATE 5 ML: 10; .01 INJECTION, SOLUTION INFILTRATION; PERINEURAL at 09:05

## 2022-10-07 NOTE — NURSING NOTE
Pt to IR suite, assisted to table in supine position. Placed on all monitors, HR 80s, no ectopy. A&Ox4, no distress noted.

## 2022-10-07 NOTE — NURSING NOTE
Port removal removed by Dr. Bangura, Fentanyl 50 mcg & Versed 1.5 mg given, sedation time 10 minutes. [place] dsg to port site, no dermabond used so site can heal. A&Ox4, no distress noted, report called to KAREN nurse.

## 2022-10-07 NOTE — PRE-PROCEDURE NOTE
[facility]   Vascular Interventional Radiology  History & Physicial    Patient Name:Gabriela Mar    : 1945    MRN: [id number]    Primary Care Physician: Krystina Gee MD    Referring Physician: Ai Henderson MD     Date of admission: 10/7/2022    Subjective     Reason for Consult: Port OR    History of Present Illness     Gabriela Mar is a 76 y.o. male referred to IR as noted above.      Active Hospital Problems:  There are no active hospital problems to display for this patient.      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 Hernández   • JOINT REPLACEMENT      LEFT HIP -MARCH    • SUBMAXILLARY CYST  EXCISION      Right back   • TONSILLECTOMY     • TOTAL HIP ARTHROPLASTY Right 3/7/2017    Procedure: RIGHT TOTAL HIP ARTHROPLASTY;  Surgeon: Reynaldo Suarez MD;  Location: [facility];  Service:    • VASECTOMY     • WRIST GANGLION EXCISION         Family History: His family history includes Colon cancer in his brother, brother, and father; Diabetes in his mother, sister, and sister; Kidney failure in his sister; Lung cancer in his brother; Squamous cell carcinoma in his brother.     Social History: He  reports that he has quit smoking. His smoking use included cigarettes. He has a 70.00 pack-year smoking history. He has never used smokeless tobacco. He reports that he does not drink alcohol and does not use drugs.    Home Medications:  PreserVision AREDS 2,  "albuterol, albuterol sulfate HFA, cholecalciferol, furosemide, gabapentin, glimepiride, metFORMIN, multivitamin with minerals, omeprazole, potassium chloride, simvastatin, and vitamin B-12    Current Medications:  •  fentaNYL citrate (PF)  •  midazolam  •  ceFAZolin  •  heparin (porcine)  •  lidocaine 1% - EPINEPHrine [id number]  •  sodium chloride  •  sodium chloride     Allergies:  He is allergic to bactrim [sulfamethoxazole-trimethoprim], sulfa antibiotics, and tegaderm chg dressing [chlorhexidine].    Review of Systems    Objective     Visit Vitals  /73   Pulse 96   Temp 97.1 °F (36.2 °C)   Resp 18   Ht 177.8 cm (70\")   Wt 79 kg (174 lb 3.2 oz)   SpO2 97%   BMI 25.00 kg/m²        Physical Exam    A&Ox3.   Able to communicate  No Apparent Distress  Average physique   Port reservoir in RSCL area partly exposed/inflammed.    Result Review      I have personally reviewed the results from the time of this admission to 10/7/2022 08:34 EDT and agree with these findings.  [x]  Laboratory  []  Microbiology  [x]  Radiology  []  EKG/Telemetry   []  Cardiology/Vascular   []  Pathology  []  Old records  []  Other:    Most notable findings include: As noted:    Results from last 7 days   Lab Units 10/07/22  0709   INR  1.07       Estimated Creatinine Clearance: 71.3 mL/min (by C-G formula based on SCr of 0.97 mg/dL). No results found for: CREATININE    COVID19   Date Value Ref Range Status   05/26/2022 Not Detected Not Detected - Ref. Range Final        No results found for: PREGTESTUR, PREGSERUM, HCG, HCGQUANT     ASA SCALE ASSESSMENT (applicable if sedation planned):        MALLAMPATI CLASSIFICATION (applicable if sedation planned):       Assessment / Plan     Gabriela Mar is a 76 y.o. male referred to the IR service with above problem.    Plan:   As above.    Sixto Bangura MD   Vascular Interventional Radiology  10/07/22   8:34 AM EDT   "

## 2022-10-10 ENCOUNTER — TELEPHONE (OUTPATIENT)
Dept: ONCOLOGY | Facility: CLINIC | Age: 77
End: 2022-10-10

## 2022-10-10 ENCOUNTER — TELEPHONE (OUTPATIENT)
Dept: INFUSION THERAPY | Facility: HOSPITAL | Age: 77
End: 2022-10-10

## 2022-10-10 NOTE — TELEPHONE ENCOUNTER
Caller: Gabriela Mar    Relationship: Self    Best call back number: [phone]      Who are you requesting to speak with (clinical staff, provider,  specific staff member): SCHEDULING      What was the call regarding: NEED TO CANCEL PORT FLUSH 10/12. NO LONGER HAS PORT IN , NOT NEEDED    Do you require a callback: NO UNLESS QUESTIONS OR CONCERNS

## 2022-10-12 ENCOUNTER — APPOINTMENT (OUTPATIENT)
Dept: ONCOLOGY | Facility: HOSPITAL | Age: 77
End: 2022-10-12

## 2022-10-12 ENCOUNTER — TELEPHONE (OUTPATIENT)
Dept: ONCOLOGY | Facility: CLINIC | Age: 77
End: 2022-10-12

## 2022-10-12 NOTE — TELEPHONE ENCOUNTER
Returned call to patient. Asking patient if he has any drainage? Patient reporting no drainage present. Asking patient if his wound looked like it was healing. Patient stating he had a minimal opening in the center. Informing patient to continue to change dressing until the area is fully healed. Patient stating he understood.

## 2022-10-12 NOTE — TELEPHONE ENCOUNTER
Caller: Gabriela Mar    Relationship: Self    Best call back number:[phone]    What is the best time to reach you: ANYTIME    Who are you requesting to speak with (clinical staff, provider,  specific staff member): NURSE      What was the call regarding: PATIENT HAS BEEN CHANGING HIS BANDAGE EVERYDAY, WHERE IS PORT WAS REMOVED. PATIENT WOULD LIKE TO KNOW HOW LING HE NEEDS TO DO THIS       Do you require a callback: YES

## 2022-10-17 DIAGNOSIS — R25.1 TREMORS OF NERVOUS SYSTEM: ICD-10-CM

## 2022-10-17 RX ORDER — PRIMIDONE 50 MG/1
TABLET ORAL
Qty: 60 TABLET | Refills: 2 | OUTPATIENT
Start: 2022-10-17

## 2022-10-24 DIAGNOSIS — E11.65 TYPE 2 DIABETES MELLITUS WITH HYPERGLYCEMIA, WITH LONG-TERM CURRENT USE OF INSULIN: ICD-10-CM

## 2022-10-24 DIAGNOSIS — Z79.4 TYPE 2 DIABETES MELLITUS WITH HYPERGLYCEMIA, WITH LONG-TERM CURRENT USE OF INSULIN: ICD-10-CM

## 2022-10-24 RX ORDER — GLIMEPIRIDE 4 MG/1
TABLET ORAL
Qty: 90 TABLET | Refills: 1 | Status: SHIPPED | OUTPATIENT
Start: 2022-10-24 | End: 2023-02-01 | Stop reason: SDUPTHER

## 2022-10-25 ENCOUNTER — LAB (OUTPATIENT)
Dept: LAB | Facility: HOSPITAL | Age: 77
End: 2022-10-25

## 2022-10-25 ENCOUNTER — OFFICE VISIT (OUTPATIENT)
Dept: INTERNAL MEDICINE | Facility: CLINIC | Age: 77
End: 2022-10-25

## 2022-10-25 VITALS
HEART RATE: 93 BPM | DIASTOLIC BLOOD PRESSURE: 60 MMHG | OXYGEN SATURATION: 95 % | TEMPERATURE: 97.4 F | WEIGHT: 171.2 LBS | HEIGHT: 70 IN | SYSTOLIC BLOOD PRESSURE: 122 MMHG | BODY MASS INDEX: 24.51 KG/M2

## 2022-10-25 DIAGNOSIS — E11.65 TYPE 2 DIABETES MELLITUS WITH HYPERGLYCEMIA, WITH LONG-TERM CURRENT USE OF INSULIN: ICD-10-CM

## 2022-10-25 DIAGNOSIS — Z79.4 TYPE 2 DIABETES MELLITUS WITH HYPERGLYCEMIA, WITH LONG-TERM CURRENT USE OF INSULIN: ICD-10-CM

## 2022-10-25 DIAGNOSIS — G62.0 CHEMOTHERAPY-INDUCED NEUROPATHY: ICD-10-CM

## 2022-10-25 DIAGNOSIS — T45.1X5A CHEMOTHERAPY-INDUCED NEUROPATHY: ICD-10-CM

## 2022-10-25 DIAGNOSIS — E11.65 TYPE 2 DIABETES MELLITUS WITH HYPERGLYCEMIA, WITH LONG-TERM CURRENT USE OF INSULIN: Primary | ICD-10-CM

## 2022-10-25 DIAGNOSIS — T80.212D PORT OR RESERVOIR INFECTION, SUBSEQUENT ENCOUNTER: ICD-10-CM

## 2022-10-25 DIAGNOSIS — Z79.4 TYPE 2 DIABETES MELLITUS WITH HYPERGLYCEMIA, WITH LONG-TERM CURRENT USE OF INSULIN: Primary | ICD-10-CM

## 2022-10-25 DIAGNOSIS — R43.2 DYSGEUSIA: ICD-10-CM

## 2022-10-25 LAB
EXPIRATION DATE: ABNORMAL
HBA1C MFR BLD: 6.5 %
Lab: ABNORMAL

## 2022-10-25 PROCEDURE — 99214 OFFICE O/P EST MOD 30 MIN: CPT | Performed by: INTERNAL MEDICINE

## 2022-10-25 PROCEDURE — 80061 LIPID PANEL: CPT

## 2022-10-25 PROCEDURE — 80053 COMPREHEN METABOLIC PANEL: CPT

## 2022-10-25 NOTE — PROGRESS NOTES
Diabetes (Not talking metformin any longer) and Hyperlipidemia    Subjective   Gabriela Mar is a 76 y.o. male is here today for follow-up.    History of Present Illness     The patient presents today for a follow-up on his diabetes.     The patient states he is doing well and does not have any pain. He had labs done on 09/14/2022 and also his port was removed at the same time.  The doctor saw a small amount of infection and gave the patient antibiotics Then he was to change the bandage every day after that. He was unsure how long to continue changing the bandage daily. His port was removed 20 days ago. The adhesive from the large bandages have been irritating the patient's skin. He started using Band-Aids with Neosporin 2 days ago.     The patient reports he eats well and tries to stay close to the foods he can FPC taste. He denies any problems with swallowing. He only has issues with his taste buds and not able to taste a lot of different foods.     The patient is still taking glimepiride. He states his blood glucose is always good in the morning. The patient has lost approximately 68 pounds. He has started eating more snacks between meals.     The patient has a CT scan scheduled for 03/2023.    The patient is taking 10,000 mcg of B12 sublingual 2 times a day. It has helped with his neuropathy and walking. He denies shortness of breath.       Following surgical resection and adjuvant chemotherapy, the patient appears to have had a complete response to treatment with repeat biopsy showing no evidence of residual tumor in the parotid gland and repeat PET/CT scan negative for any residual concerning nodularity or hypermetabolic activity.  The patient then underwent consolidative radiation therapy, which he completed 8/26/2022.        Current Outpatient Medications:   •  albuterol (PROVENTIL) (2.5 MG/3ML) 0.083% nebulizer solution, Take 2.5 mg by nebulization Every 4 (Four) Hours As Needed for Wheezing., Disp:  "120 each, Rfl: 5  •  albuterol sulfate  (90 Base) MCG/ACT inhaler, Inhale 2 puffs Every 4 (Four) Hours As Needed for Wheezing., Disp: 8 g, Rfl: 3  •  Cholecalciferol (VITAMIN D) 1000 UNITS tablet, Take 2,000 Units by mouth Daily., Disp: , Rfl:   •  gabapentin (NEURONTIN) 300 MG capsule, TAKE ONE CAPSULE BY MOUTH ONCE NIGHTLY, Disp: 30 capsule, Rfl: 3  •  glimepiride (AMARYL) 4 MG tablet, TAKE ONE TABLET BY MOUTH DAILY WITH DINNER, Disp: 90 tablet, Rfl: 1  •  Multiple Vitamins-Minerals (MULTI VITAMIN/MINERALS PO), Take 1 tablet by mouth Daily., Disp: , Rfl:   •  Multiple Vitamins-Minerals (PreserVision AREDS 2) chewable tablet, Chew 1 tablet 2 (Two) Times a Day., Disp: , Rfl:   •  simvastatin (ZOCOR) 10 MG tablet, TAKE ONE TABLET BY MOUTH EVERY NIGHT AT BEDTIME, Disp: 90 tablet, Rfl: 1  •  vitamin B-12 (CYANOCOBALAMIN) 1000 MCG tablet, Take 10 tablets by mouth 2 (Two) Times a Day., Disp: , Rfl:       The following portions of the patient's history were reviewed and updated as appropriate: allergies, current medications, past family history, past medical history, past social history, past surgical history and problem list.    Review of Systems   Constitutional: Negative.  Negative for chills and fever.   HENT: Negative for ear discharge, ear pain, mouth sores (abnormal taste), sinus pressure and sore throat.    Respiratory: Negative for cough, chest tightness and shortness of breath.    Cardiovascular: Negative for chest pain, palpitations and leg swelling.   Gastrointestinal: Negative for diarrhea, nausea and vomiting.   Musculoskeletal: Negative for arthralgias, back pain and myalgias.   Skin: Positive for wound.   Neurological: Negative for dizziness, syncope and headaches.   Psychiatric/Behavioral: Negative for confusion and sleep disturbance.       Objective   /60   Pulse 93   Temp 97.4 °F (36.3 °C)   Ht 177.8 cm (70\")   Wt 77.7 kg (171 lb 3.2 oz)   SpO2 95% Comment: ra  BMI 24.56 kg/m² "   Physical Exam  Vitals and nursing note reviewed.   Constitutional:       Appearance: He is well-developed.   HENT:      Head: Normocephalic and atraumatic.      Right Ear: External ear normal.      Left Ear: External ear normal.      Mouth/Throat:      Pharynx: No oropharyngeal exudate.   Eyes:      Conjunctiva/sclera: Conjunctivae normal.      Pupils: Pupils are equal, round, and reactive to light.   Neck:      Thyroid: No thyromegaly.   Cardiovascular:      Rate and Rhythm: Normal rate and regular rhythm.      Pulses: Normal pulses.      Heart sounds: Normal heart sounds. No murmur heard.    No friction rub. No gallop.   Pulmonary:      Effort: Pulmonary effort is normal.      Breath sounds: Normal breath sounds.   Abdominal:      General: Bowel sounds are normal. There is no distension.      Palpations: Abdomen is soft.      Tenderness: There is no abdominal tenderness.   Musculoskeletal:      Cervical back: Neck supple.   Skin:     General: Skin is warm and dry.   Neurological:      Mental Status: He is alert and oriented to person, place, and time.      Cranial Nerves: No cranial nerve deficit.   Psychiatric:         Judgment: Judgment normal.           Results for orders placed or performed in visit on 10/25/22   POC Glycosylated Hemoglobin (Hb A1C)    Specimen: Blood   Result Value Ref Range    Hemoglobin A1C 6.5 %    Lot Number [id number]     Expiration Date 5/2/24              Assessment & Plan   Diagnoses and all orders for this visit:    Type 2 diabetes mellitus with hyperglycemia, with long-term current use of insulin (HCC)  -     POC Glycosylated Hemoglobin (Hb A1C)  -     Comprehensive Metabolic Panel; Future  -     Lipid Panel; Future    Port or reservoir infection, subsequent encounter  Comments:  resolved, adv can d/c dressings.    Dysgeusia  Comments:  from xerostomia    Chemotherapy-induced neuropathy (HCC)    continue high dose b12. Advised b12 shots if plateaus.      - Instructed patient to  only take the glimepiride right after he eats.   - Will do labs today.   - Will return for a follow-up in 3 months.             Return in about 3 months (around 1/25/2023) for Medicare Wellness.    Electronically signed by:    Krystina Gee MD     Transcribed from ambient dictation for Krystina Gee MD by Eden Avelar.  10/25/22   16:43 EDT    Patient or patient representative verbalized consent to the visit recording.  I have personally performed the services described in this document as transcribed by the above individual, and it is both accurate and complete.  Krystina Gee MD  10/25/2022  23:52 EDT

## 2022-10-26 LAB
ALBUMIN SERPL-MCNC: 2.8 G/DL (ref 3.5–5.2)
ALBUMIN/GLOB SERPL: 0.8 G/DL
ALP SERPL-CCNC: 76 U/L (ref 39–117)
ALT SERPL W P-5'-P-CCNC: 18 U/L (ref 1–41)
ANION GAP SERPL CALCULATED.3IONS-SCNC: 13.5 MMOL/L (ref 5–15)
AST SERPL-CCNC: 27 U/L (ref 1–40)
BILIRUB SERPL-MCNC: 0.3 MG/DL (ref 0–1.2)
BUN SERPL-MCNC: 11 MG/DL (ref 8–23)
BUN/CREAT SERPL: 10.1 (ref 7–25)
CALCIUM SPEC-SCNC: 8.8 MG/DL (ref 8.6–10.5)
CHLORIDE SERPL-SCNC: 106 MMOL/L (ref 98–107)
CHOLEST SERPL-MCNC: 120 MG/DL (ref 0–200)
CO2 SERPL-SCNC: 21.5 MMOL/L (ref 22–29)
CREAT SERPL-MCNC: 1.09 MG/DL (ref 0.76–1.27)
EGFRCR SERPLBLD CKD-EPI 2021: 69.9 ML/MIN/1.73
GLOBULIN UR ELPH-MCNC: 3.4 GM/DL
GLUCOSE SERPL-MCNC: 88 MG/DL (ref 65–99)
HDLC SERPL-MCNC: 52 MG/DL (ref 40–60)
LDLC SERPL CALC-MCNC: 48 MG/DL (ref 0–100)
LDLC/HDLC SERPL: 0.9 {RATIO}
POTASSIUM SERPL-SCNC: 4.1 MMOL/L (ref 3.5–5.2)
PROT SERPL-MCNC: 6.2 G/DL (ref 6–8.5)
SODIUM SERPL-SCNC: 141 MMOL/L (ref 136–145)
TRIGL SERPL-MCNC: 107 MG/DL (ref 0–150)
VLDLC SERPL-MCNC: 20 MG/DL (ref 5–40)

## 2022-11-01 ENCOUNTER — TELEPHONE (OUTPATIENT)
Dept: ONCOLOGY | Facility: CLINIC | Age: 77
End: 2022-11-01

## 2022-11-01 NOTE — TELEPHONE ENCOUNTER
Return call to Gabriela.  Gabriela reports starting last week he noted a fingernail size knot in his right axilla.  States has not changed size since he noticed.  Will speak with Dr Herr about how he would like to evaluate and contact Gabriela tomorrow.  Gabriela states understood

## 2022-11-01 NOTE — TELEPHONE ENCOUNTER
Caller: HARIS ANGEL    Relationship: SELF    Best call back number: [phone]    What is the best time to reach you: ANY    Who are you requesting to speak with (clinical staff, provider,  specific staff member): LISA    What was the call regarding: PT ASKED THAT LISA, DR PETIT'S NURSE CALL HIM WHEN AVAILABLE.     Do you require a callback: YES

## 2022-11-02 NOTE — TELEPHONE ENCOUNTER
Returned call to patient. At this time informing patient Dr. Herr wanted to see how it looked in a week and to call us with an update on how the place looks. Patient stating he understood plan.

## 2022-11-09 ENCOUNTER — TELEPHONE (OUTPATIENT)
Dept: ONCOLOGY | Facility: CLINIC | Age: 77
End: 2022-11-09

## 2022-11-09 DIAGNOSIS — C84.71 ANAPLASTIC ALK-NEGATIVE LARGE CELL LYMPHOMA OF LYMPH NODE OF HEAD: Primary | ICD-10-CM

## 2022-11-09 NOTE — TELEPHONE ENCOUNTER
Returned call patient informing him that plan is to do a ct of chest in next few days. Patient stating he understood plan.

## 2022-11-09 NOTE — TELEPHONE ENCOUNTER
Returned call to patient. At this time patient stating that his nodule under his arm has not gotten any smaller. Stating it's the same size and it's as big as the bed of the finger nail. Informing patient nurse will discuss with .

## 2022-11-09 NOTE — TELEPHONE ENCOUNTER
Caller: HARIS    Relationship to patient: SELF    Best call back number: [phone]    Patient is needing: TO REQUEST CALL FROM FABRIZIO MI.

## 2022-11-11 ENCOUNTER — APPOINTMENT (OUTPATIENT)
Dept: CT IMAGING | Facility: HOSPITAL | Age: 77
End: 2022-11-11

## 2022-11-21 DIAGNOSIS — T45.1X5A CHEMOTHERAPY-INDUCED NEUROPATHY: ICD-10-CM

## 2022-11-21 DIAGNOSIS — G62.0 CHEMOTHERAPY-INDUCED NEUROPATHY: ICD-10-CM

## 2022-11-21 RX ORDER — GABAPENTIN 300 MG/1
CAPSULE ORAL
Qty: 30 CAPSULE | Refills: 5 | Status: SHIPPED | OUTPATIENT
Start: 2022-11-21

## 2022-11-21 NOTE — TELEPHONE ENCOUNTER
Last Office Visit: 10/25/22  Next Office Visit:5/24/23    Labs completed in past 6 months? no  Labs completed in past year? no    Last Refill Date:7/18/22  Quantity:30  Refills:3    Pharmacy:     Please review pended refill request for any changes needed on refills or quantities. Thank you!

## 2022-11-22 ENCOUNTER — HOSPITAL ENCOUNTER (OUTPATIENT)
Dept: CT IMAGING | Facility: HOSPITAL | Age: 77
Discharge: HOME | End: 2022-11-22
Admitting: INTERNAL MEDICINE

## 2022-11-22 DIAGNOSIS — C84.71 ANAPLASTIC ALK-NEGATIVE LARGE CELL LYMPHOMA OF LYMPH NODE OF HEAD: ICD-10-CM

## 2022-11-22 LAB — CREAT BLDA-MCNC: 1.1 MG/DL (ref 0.6–1.3)

## 2022-11-22 PROCEDURE — 82565 ASSAY OF CREATININE: CPT

## 2022-11-22 PROCEDURE — 25010000002 IOPAMIDOL 61 % SOLUTION: Performed by: INTERNAL MEDICINE

## 2022-11-22 PROCEDURE — 71260 CT THORAX DX C+: CPT

## 2022-11-22 RX ADMIN — IOPAMIDOL 60 ML: 612 INJECTION, SOLUTION INTRAVENOUS at 13:15

## 2022-11-23 ENCOUNTER — TELEPHONE (OUTPATIENT)
Dept: ONCOLOGY | Facility: CLINIC | Age: 77
End: 2022-11-23

## 2022-11-23 NOTE — TELEPHONE ENCOUNTER
Call to Gabriela to notify him that there is no evidence of cancer on his recent scans.  We will continue to monitor nodules in the lungs with repeat scans in several months.  Dr Herr believes these to be related to inflammation.  Gabriela states understood

## 2022-12-07 ENCOUNTER — TELEPHONE (OUTPATIENT)
Dept: ONCOLOGY | Facility: CLINIC | Age: 77
End: 2022-12-07

## 2022-12-07 NOTE — TELEPHONE ENCOUNTER
Returned call to patient. After discussing with Dr. Herr. Informing patient that we will monitor over the next few months. Patient stating she understood.

## 2022-12-07 NOTE — TELEPHONE ENCOUNTER
Caller: Haris Mar    Relationship: Self    Best call back number: [phone]    What is the best time to reach you: ANYTIME    Who are you requesting to speak with (clinical staff, provider,  specific staff member): CLINICAL    What was the call regarding: HARIS IS CALLING STATES HE WOULD LIKE TO DISCUSS HIS CT SCAN THAT HE HAD.  HE HAS QUESTION REGARDING POSSIBLE INFLAMMATION, INFECTION AND NODULES.  WONDERING IF HE NEEDS TO TAKE MEDICATION FOR THIS     Do you require a callback: YES

## 2022-12-13 ENCOUNTER — OFFICE VISIT (OUTPATIENT)
Dept: NEUROLOGY | Facility: CLINIC | Age: 77
End: 2022-12-13

## 2022-12-13 VITALS
WEIGHT: 180 LBS | BODY MASS INDEX: 25.83 KG/M2 | OXYGEN SATURATION: 97 % | HEART RATE: 76 BPM | DIASTOLIC BLOOD PRESSURE: 80 MMHG | SYSTOLIC BLOOD PRESSURE: 136 MMHG

## 2022-12-13 DIAGNOSIS — T45.1X5A CHEMOTHERAPY-INDUCED NEUROPATHY: Primary | ICD-10-CM

## 2022-12-13 DIAGNOSIS — G62.0 CHEMOTHERAPY-INDUCED NEUROPATHY: Primary | ICD-10-CM

## 2022-12-13 PROCEDURE — 99204 OFFICE O/P NEW MOD 45 MIN: CPT | Performed by: PSYCHIATRY & NEUROLOGY

## 2022-12-13 RX ORDER — FERROUS SULFATE 325(65) MG
325 TABLET ORAL
COMMUNITY

## 2022-12-13 NOTE — PROGRESS NOTES
Subjective   Patient ID: Gabriela Mar is a 77 y.o. male     No chief complaint on file.       History of Present Illness    77 y.o. male referred for peripheral neuropathy.     Developed N/T in balls of feet and hands  one March 2022 one month after starting chemotherapy.      Denies pain.      Numbess from mid calf distally.      LE feel weak when walking longer distances.  Uses a walker.      Walker for balance.     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    T2DM,     Chemotherapy induced neuropathy.  S/p 6 cycles of A + CHP   Reports severe numbness in hands and feet.      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) 01/20/2022   • Arthritis    • CKD (chronic kidney disease), stage III (HCC)    • Diabetes mellitus (HCC)    • Esophagitis, reflux    • History of radiation therapy 08/26/2022    right parotid   • Hyperlipidemia    • Hypertension    • Lymphoma (HCC)    • Obesity    • Pharyngitis    • Wears eyeglasses      Family History   Problem Relation Age of Onset   • Diabetes Mother    • Colon cancer Father    • Kidney failure Sister    • Diabetes Sister    • Colon cancer Brother    • Colon cancer Brother    • Lung cancer Brother    • Squamous cell carcinoma Brother         side of face   • Diabetes Sister      Social History     Socioeconomic History   • Marital status: Single   Tobacco Use   • Smoking status: Former     Packs/day: 2.00     Years: 35.00     Pack years: 70.00     Types: Cigarettes   • Smokeless tobacco: Never   • Tobacco comments:     QUIT 20 PLUS YEARS AGO    Vaping Use   • Vaping Use: Never used   Substance and Sexual Activity   • Alcohol use: No     Comment: stopped drinking   • Drug use: No   • Sexual activity: Defer       Review of Systems   Constitutional: Positive for fatigue. Negative for activity change and unexpected weight change.   HENT: Negative for facial swelling, hearing loss,  tinnitus, trouble swallowing and voice change.    Eyes: Negative for photophobia, pain and visual disturbance.   Respiratory: Negative for apnea, cough and choking.    Cardiovascular: Negative for chest pain.   Gastrointestinal: Negative for constipation, nausea and vomiting.   Endocrine: Negative for cold intolerance.   Genitourinary: Negative for difficulty urinating, frequency and urgency.   Musculoskeletal: Positive for arthralgias and gait problem. Negative for back pain, myalgias, neck pain and neck stiffness.   Skin: Negative for rash.   Allergic/Immunologic: Negative for immunocompromised state.   Neurological: Positive for weakness and numbness. Negative for dizziness, tremors, seizures, syncope, facial asymmetry, speech difficulty, light-headedness and headaches.   Hematological: Negative for adenopathy.   Psychiatric/Behavioral: Negative for confusion, decreased concentration, hallucinations and sleep disturbance. The patient is not nervous/anxious.        Objective     Vitals:    12/13/22 0919 12/13/22 0921   BP: 136/80    Pulse: 76    SpO2: 97%    Weight:  81.6 kg (180 lb)       Neurologic Exam     Mental Status   Oriented to person, place, and time.   Speech: speech is normal   Level of consciousness: alert  Knowledge: good and consistent with education.   Normal comprehension.     Cranial Nerves   Cranial nerves II through XII intact.     CN II   Visual fields full to confrontation.   Visual acuity: normal  Right visual field deficit: none  Left visual field deficit: none     CN III, IV, VI   Pupils are equal, round, and reactive to light.  Extraocular motions are normal.   Nystagmus: none   Diplopia: none  Ophthalmoparesis: none  Upgaze: normal  Downgaze: normal  Conjugate gaze: present    CN V   Facial sensation intact.   Right corneal reflex: normal  Left corneal reflex: normal    CN VII   Right facial weakness: none  Left facial weakness: none    CN VIII   Hearing: intact    CN IX, X   Palate:  symmetric  Right gag reflex: normal  Left gag reflex: normal    CN XI   Right sternocleidomastoid strength: normal  Left sternocleidomastoid strength: normal    CN XII   Tongue: not atrophic  Fasciculations: absent  Tongue deviation: none    Motor Exam   Muscle bulk: normal  Overall muscle tone: normal    Strength   Strength 5/5 throughout.     Sensory Exam   Light touch normal.     Gait, Coordination, and Reflexes     Gait  Gait: wide-based    Tremor   Resting tremor: absent  Intention tremor: absent  Action tremor: absent    Reflexes   Right brachioradialis: 0  Left brachioradialis: 0  Right biceps: 0  Left biceps: 0  Right triceps: 0  Left triceps: 0  Right patellar: 0  Left patellar: 0  Right achilles: 0  Left achilles: 0      Physical Exam  Eyes:      Extraocular Movements: EOM normal.      Pupils: Pupils are equal, round, and reactive to light.   Neurological:      Mental Status: He is oriented to person, place, and time.      Cranial Nerves: Cranial nerves 2-12 are intact.      Motor: Motor strength is normal.      Deep Tendon Reflexes:      Reflex Scores:       Tricep reflexes are 0 on the right side and 0 on the left side.       Bicep reflexes are 0 on the right side and 0 on the left side.       Brachioradialis reflexes are 0 on the right side and 0 on the left side.       Patellar reflexes are 0 on the right side and 0 on the left side.       Achilles reflexes are 0 on the right side and 0 on the left side.  Psychiatric:         Speech: Speech normal.         Hospital Outpatient Visit on 11/22/2022   Component Date Value Ref Range Status   • Creatinine 11/22/2022 1.10  0.60 - 1.30 mg/dL Final    Serial Number: [id number]:  [id number]         Assessment & Plan     Problem List Items Addressed This Visit        Neuro    Chemotherapy-induced neuropathy (HCC) - Primary (Chronic)    Current Assessment & Plan     Continue GBP          Relevant Medications    gabapentin (NEURONTIN) 300 MG capsule          No  follow-ups on file.

## 2022-12-19 DIAGNOSIS — E78.5 HYPERLIPIDEMIA LDL GOAL <70: ICD-10-CM

## 2022-12-19 RX ORDER — SIMVASTATIN 10 MG
TABLET ORAL
Qty: 90 TABLET | Refills: 1 | Status: SHIPPED | OUTPATIENT
Start: 2022-12-19

## 2023-02-01 ENCOUNTER — OFFICE VISIT (OUTPATIENT)
Dept: INTERNAL MEDICINE | Facility: CLINIC | Age: 78
End: 2023-02-01
Payer: MEDICARE

## 2023-02-01 VITALS
SYSTOLIC BLOOD PRESSURE: 122 MMHG | HEART RATE: 78 BPM | OXYGEN SATURATION: 96 % | TEMPERATURE: 97.7 F | HEIGHT: 70 IN | DIASTOLIC BLOOD PRESSURE: 70 MMHG | WEIGHT: 196 LBS | BODY MASS INDEX: 28.06 KG/M2

## 2023-02-01 DIAGNOSIS — E11.65 TYPE 2 DIABETES MELLITUS WITH HYPERGLYCEMIA, WITH LONG-TERM CURRENT USE OF INSULIN: Primary | ICD-10-CM

## 2023-02-01 DIAGNOSIS — E78.5 HYPERLIPIDEMIA LDL GOAL <70: ICD-10-CM

## 2023-02-01 DIAGNOSIS — Z79.4 TYPE 2 DIABETES MELLITUS WITH HYPERGLYCEMIA, WITH LONG-TERM CURRENT USE OF INSULIN: Primary | ICD-10-CM

## 2023-02-01 DIAGNOSIS — M54.9 UPPER BACK PAIN: ICD-10-CM

## 2023-02-01 LAB
EXPIRATION DATE: ABNORMAL
HBA1C MFR BLD: 7.3 %
Lab: ABNORMAL

## 2023-02-01 PROCEDURE — 3051F HG A1C>EQUAL 7.0%<8.0%: CPT | Performed by: INTERNAL MEDICINE

## 2023-02-01 PROCEDURE — 83036 HEMOGLOBIN GLYCOSYLATED A1C: CPT | Performed by: INTERNAL MEDICINE

## 2023-02-01 PROCEDURE — 99214 OFFICE O/P EST MOD 30 MIN: CPT | Performed by: INTERNAL MEDICINE

## 2023-02-01 RX ORDER — METFORMIN HYDROCHLORIDE 500 MG/1
500 TABLET, EXTENDED RELEASE ORAL
Qty: 90 TABLET | Refills: 1 | Status: SHIPPED | OUTPATIENT
Start: 2023-02-01

## 2023-02-01 RX ORDER — GLIMEPIRIDE 4 MG/1
4 TABLET ORAL
Qty: 90 TABLET | Refills: 1 | Status: SHIPPED | OUTPATIENT
Start: 2023-02-01

## 2023-02-01 RX ORDER — TIZANIDINE 2 MG/1
2 TABLET ORAL NIGHTLY PRN
Qty: 30 TABLET | Refills: 1 | Status: SHIPPED | OUTPATIENT
Start: 2023-02-01 | End: 2023-03-27

## 2023-02-01 NOTE — PROGRESS NOTES
Diabetes    Subjective   Gabriela Mar is a 77 y.o. male is here today for follow-up.    History of Present Illness   The patient is accompanied by his niece, reports he is feeling well. He is eating well. He has gained some weight. He denies any shortness of breath.    His blood pressure is normal today.    His current A1C is 7.3 percent; previously it was 5.9 percent and 6.4 percent o 10/25/2023. He is currently taking glimepiride. He was previously taking insulin, metformin, and Actos. Actos was discontinued due to shortness of breath. He has not been monitoring his blood glucose at home.    He completed chemotherapy in 05/2022. He completed radiation at the end of 07/2022.     He still has neuropathy. He denies any pain. He notes that his feet and legs feel like they are wrapped in bandages. He is currently taking 10,000 units of vitamin B12 twice daily. He denies any bilateral lower extremity edema.    He complains of back pain for the last couple of weeks. He denies any known injury. He notes that the pain is worse in the morning. The pain improves with movement. He denies any difficulty sleeping; he does wake up 1 to 3 times per night.    He is scheduled to have a scan on 03/10/2023. He is scheduled to see Dr. Ai Henderson MD on 03/15/2023 and CALIN Alas on 03/28/2023.    The following portions of the patient's history were reviewed and updated as appropriate: allergies, current medications, past family history, past medical history, past social history, past surgical history and problem list.        Current Outpatient Medications:   •  albuterol (PROVENTIL) (2.5 MG/3ML) 0.083% nebulizer solution, Take 2.5 mg by nebulization Every 4 (Four) Hours As Needed for Wheezing., Disp: 120 each, Rfl: 5  •  albuterol sulfate  (90 Base) MCG/ACT inhaler, Inhale 2 puffs Every 4 (Four) Hours As Needed for Wheezing., Disp: 8 g, Rfl: 3  •  Cholecalciferol (VITAMIN D) 1000 UNITS tablet, Take 2,000  "Units by mouth Daily., Disp: , Rfl:   •  ferrous sulfate 325 (65 FE) MG tablet, Take 325 mg by mouth Daily With Breakfast., Disp: , Rfl:   •  gabapentin (NEURONTIN) 300 MG capsule, TAKE ONE CAPSULE BY MOUTH ONCE NIGHTLY, Disp: 30 capsule, Rfl: 5  •  glimepiride (AMARYL) 4 MG tablet, Take 1 tablet by mouth Daily With Dinner., Disp: 90 tablet, Rfl: 1  •  Multiple Vitamins-Minerals (MULTI VITAMIN/MINERALS PO), Take 1 tablet by mouth Daily., Disp: , Rfl:   •  Multiple Vitamins-Minerals (PreserVision AREDS 2) chewable tablet, Chew 1 tablet 2 (Two) Times a Day., Disp: , Rfl:   •  simvastatin (ZOCOR) 10 MG tablet, TAKE ONE TABLET BY MOUTH EVERY NIGHT AT BEDTIME, Disp: 90 tablet, Rfl: 1  •  vitamin B-12 (CYANOCOBALAMIN) 1000 MCG tablet, Take 10 tablets by mouth 2 (Two) Times a Day., Disp: , Rfl:   •  metFORMIN ER (Glucophage XR) 500 MG 24 hr tablet, Take 1 tablet by mouth Daily With Breakfast., Disp: 90 tablet, Rfl: 1  •  tiZANidine (ZANAFLEX) 2 MG tablet, Take 1 tablet by mouth At Night As Needed for Muscle Spasms., Disp: 30 tablet, Rfl: 1      Review of Systems   Constitutional: Positive for fatigue. Negative for chills and fever.   HENT: Negative for ear discharge, ear pain, sinus pressure and sore throat.    Respiratory: Negative for cough, chest tightness and shortness of breath.    Cardiovascular: Negative for chest pain, palpitations and leg swelling.   Gastrointestinal: Negative for diarrhea, nausea and vomiting.   Musculoskeletal: Negative for arthralgias, back pain and myalgias.   Neurological: Negative for dizziness, syncope and headaches.   Psychiatric/Behavioral: Negative for confusion and sleep disturbance.       Objective   /70   Pulse 78   Temp 97.7 °F (36.5 °C)   Ht 177.8 cm (70\")   Wt 88.9 kg (196 lb)   SpO2 96% Comment: ra  BMI 28.12 kg/m²   Physical Exam  Vitals and nursing note reviewed.   Constitutional:       Appearance: He is well-developed.   HENT:      Head: Normocephalic and " atraumatic.      Right Ear: External ear normal.      Left Ear: External ear normal.      Mouth/Throat:      Pharynx: No oropharyngeal exudate.   Eyes:      Conjunctiva/sclera: Conjunctivae normal.      Pupils: Pupils are equal, round, and reactive to light.   Neck:      Thyroid: No thyromegaly.   Cardiovascular:      Rate and Rhythm: Normal rate and regular rhythm.      Pulses: Normal pulses.      Heart sounds: Normal heart sounds. No murmur heard.    No friction rub. No gallop.   Pulmonary:      Effort: Pulmonary effort is normal.      Breath sounds: Normal breath sounds.   Abdominal:      General: Bowel sounds are normal. There is no distension.      Palpations: Abdomen is soft.      Tenderness: There is no abdominal tenderness.   Musculoskeletal:      Cervical back: Neck supple.   Skin:     General: Skin is warm and dry.   Neurological:      Mental Status: He is alert and oriented to person, place, and time.      Cranial Nerves: No cranial nerve deficit.   Psychiatric:         Judgment: Judgment normal.           Results for orders placed or performed in visit on 02/01/23   POC Glycosylated Hemoglobin (Hb A1C)    Specimen: Blood   Result Value Ref Range    Hemoglobin A1C 7.3 %    Lot Number [id number]     Expiration Date 11/3/24              Assessment & Plan   Diagnoses and all orders for this visit:    Type 2 diabetes mellitus with hyperglycemia, with long-term current use of insulin ([facility])  -     POC Glycosylated Hemoglobin (Hb A1C)  -     glimepiride (AMARYL) 4 MG tablet; Take 1 tablet by mouth Daily With Dinner.  -     metFORMIN ER (Glucophage XR) 500 MG 24 hr tablet; Take 1 tablet by mouth Daily With Breakfast.    Hyperlipidemia LDL goal <70    Upper back pain  Comments:  Start tizanidine as directed. Apply a heating pad and perform stretches. If his symptoms worsen, will order an MRI and an x-ray.  Orders:  -     tiZANidine (ZANAFLEX) 2 MG tablet; Take 1 tablet by mouth At Night As Needed for Muscle  Spasms.    Type 2 diabetes  His hemoglobin A1C has increased from 6.5 percent to 7.3 percent. Continue glimepiride as directed. Start taking metformin 500 mg once daily. Start monitoring blood glucose levels at home.             Return for Next scheduled follow up.    Transcribed from ambient dictation for Krystina Gee MD by Nithya Simon.  02/01/23   17:09 EST    Patient or patient representative verbalized consent to the visit recording.  I have personally performed the services described in this document as transcribed by the above individual, and it is both accurate and complete.  Krystina Gee MD  2/4/2023  23:44 EST    Electronically signed by:    Krystina Gee MD

## 2023-02-01 NOTE — ASSESSMENT & PLAN NOTE
His hemoglobin A1C has increased from 6.5 percent to 7.3 percent. Continue glimepiride as directed. Start taking metformin 500 mg once daily. Start monitoring blood glucose levels at home.

## 2023-03-13 ENCOUNTER — TELEPHONE (OUTPATIENT)
Dept: ONCOLOGY | Facility: CLINIC | Age: 78
End: 2023-03-13
Payer: MEDICARE

## 2023-03-13 NOTE — TELEPHONE ENCOUNTER
Caller: Gabriela Mar    Relationship to patient: Self    Best call back number: [phone]    Chief complaint: R/S    Type of visit: FOLLOW UP    Requested date: AFTER CT THAT IS SCHEDULED 3-30    If rescheduling, when is the original appointment: 3-15     Additional notes:PLEASE ADVISE

## 2023-03-15 ENCOUNTER — APPOINTMENT (OUTPATIENT)
Dept: CT IMAGING | Facility: HOSPITAL | Age: 78
End: 2023-03-15

## 2023-03-27 DIAGNOSIS — M54.9 UPPER BACK PAIN: ICD-10-CM

## 2023-03-27 RX ORDER — TIZANIDINE 2 MG/1
TABLET ORAL
Qty: 30 TABLET | Refills: 1 | Status: SHIPPED | OUTPATIENT
Start: 2023-03-27

## 2023-03-30 ENCOUNTER — HOSPITAL ENCOUNTER (OUTPATIENT)
Dept: CT IMAGING | Facility: HOSPITAL | Age: 78
Discharge: HOME | End: 2023-03-30
Admitting: INTERNAL MEDICINE
Payer: MEDICARE

## 2023-03-30 DIAGNOSIS — C84.70 ANAPLASTIC ALK-NEGATIVE LARGE CELL LYMPHOMA, UNSPECIFIED BODY REGION: ICD-10-CM

## 2023-03-30 LAB — CREAT BLDA-MCNC: 1.1 MG/DL (ref 0.6–1.3)

## 2023-03-30 PROCEDURE — 74177 CT ABD & PELVIS W/CONTRAST: CPT

## 2023-03-30 PROCEDURE — 25510000001 IOPAMIDOL 61 % SOLUTION: Performed by: INTERNAL MEDICINE

## 2023-03-30 PROCEDURE — 70470 CT HEAD/BRAIN W/O & W/DYE: CPT

## 2023-03-30 PROCEDURE — 82565 ASSAY OF CREATININE: CPT

## 2023-03-30 PROCEDURE — 71260 CT THORAX DX C+: CPT

## 2023-03-30 PROCEDURE — 70491 CT SOFT TISSUE NECK W/DYE: CPT

## 2023-03-30 RX ADMIN — IOPAMIDOL 95 ML: 612 INJECTION, SOLUTION INTRAVENOUS at 11:50

## 2023-03-31 ENCOUNTER — OFFICE VISIT (OUTPATIENT)
Dept: RADIATION ONCOLOGY | Facility: HOSPITAL | Age: 78
End: 2023-03-31
Payer: MEDICARE

## 2023-03-31 ENCOUNTER — HOSPITAL ENCOUNTER (OUTPATIENT)
Dept: RADIATION ONCOLOGY | Facility: HOSPITAL | Age: 78
Setting detail: RADIATION/ONCOLOGY SERIES
Discharge: HOME | End: 2023-03-31
Payer: MEDICARE

## 2023-03-31 VITALS
WEIGHT: 189.3 LBS | OXYGEN SATURATION: 93 % | SYSTOLIC BLOOD PRESSURE: 166 MMHG | HEART RATE: 97 BPM | DIASTOLIC BLOOD PRESSURE: 72 MMHG | TEMPERATURE: 97.2 F | BODY MASS INDEX: 27.16 KG/M2 | RESPIRATION RATE: 16 BRPM

## 2023-03-31 DIAGNOSIS — C84.70 ANAPLASTIC ALK-NEGATIVE LARGE CELL LYMPHOMA, UNSPECIFIED BODY REGION: ICD-10-CM

## 2023-03-31 DIAGNOSIS — J40 BRONCHITIS: Primary | ICD-10-CM

## 2023-03-31 PROCEDURE — G0463 HOSPITAL OUTPT CLINIC VISIT: HCPCS

## 2023-03-31 RX ORDER — AZITHROMYCIN 250 MG/1
TABLET, FILM COATED ORAL
Qty: 6 TABLET | Refills: 0 | Status: SHIPPED | OUTPATIENT
Start: 2023-03-31

## 2023-03-31 NOTE — PROGRESS NOTES
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 male with history of parotid T-cell lymphoma.  The patient initially presented with a lesion in the right temporal area of the face.  This was resected by Dr. De Los Santos on 2021.  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  The patient was sent for a PET scan on 2022 that showed changes related to his resection as well as a somewhat indeterminate soft tissue density focus mass with SUV 3.2 near the angle of the mandible, and right parotid gland with SUV 3.6 potentially concerning for an additional lesion.  There was no evidence of more distant metastatic disease.  The patient then completed 6 cycles of postoperative chemotherapy of A + CHP.  Repeat CT scans 2022 showed potential residual nodularity in the right parotid gland suspicious for residual disease involvement.    The patient was sent for ultrasound-guided biopsy of the right parotid on 2022 which was negative for evidence of malignancy.  Restaging PET/CT scan 2022 showed resolution of small foci of activity involving the right face and parotid gland, consistent with complete response to treatment.  In accordance with NCCN guidelines, the patient then received adjuvant involved site radiation therapy.  The right parotid was treated to a dose of 30 Gy in 15 fraction using IGRT IMRT.   A skin bolus was utilized to treat the sites of additional excision.    He completed treatments 2022.  He tolerated treatment well.    He returns today for scheduled follow-up visit.  From a symptom  standpoint, he still reports xerostomia.  He is no longer using mouth rinses.  He is using Prevident toothpaste prescribed by his dentist, and notes adequate water intake throughout the day.  He reports taste is improving.  He denies oral pains or lesions.  He denies dysphagia.  He denies swelling or palpable masses of the head or neck.  He does report a 10-day duration of sore throat and increased cough with thick yellow sputum.  He denies fever, chills, night sweats, or weight loss.  No increased dyspnea.  He does endorse a palpable nodule in the right armpit, first noticed in the fall 2022 following COVID booster.  This prompted a CT scan of the chest performed 11/22/2022 which did not demonstrate any axillary adenopathy.  A few mediastinal lymph nodes appeared stable.  Multiple new subcentimeter bilateral pulmonary nodules were present, felt to relate to possible infectious or inflammatory process over metastatic disease.          MEDICATIONS: Medication reconciliation for the patient was reviewed and confirmed in the electronic medical record.    Review of Systems   Respiratory: Positive for cough (x10 days) and wheezing.    All other systems reviewed and are negative.    KPS 90%        Physical Exam  Vitals and nursing note reviewed.   Constitutional:       General: He is not in acute distress.     Appearance: Normal appearance. He is well-developed.   HENT:      Head: Normocephalic and atraumatic.      Comments: Surgical incision right temporal region appears well-healed.  No increased skin thickening.  No palpable nodularity or underlying mass.  Eyes:      Conjunctiva/sclera: Conjunctivae normal.      Pupils: Pupils are equal, round, and reactive to light.   Cardiovascular:      Rate and Rhythm: Normal rate and regular rhythm.      Heart sounds: No murmur heard.    No friction rub.      Comments: Well perfused.  Noncyanotic.  No prominent JVD.  No pedal edema.  Pulmonary:      Effort: Pulmonary effort is  normal. No respiratory distress.      Comments: Diminished breath sounds throughout.  Chest:      Chest wall: No tenderness.   Musculoskeletal:         General: Normal range of motion.      Cervical back: Normal range of motion and neck supple.      Comments: Moves all extremities spontaneously.   Lymphadenopathy:      Head:      Right side of head: No preauricular, posterior auricular or occipital adenopathy.      Left side of head: No preauricular, posterior auricular or occipital adenopathy.      Cervical: No cervical adenopathy.      Comments: 1.5 x 1.5 cm non-tender, semi-mobile nodule of the right axilla.  Nonerythematous, non-inflamed.   Skin:     General: Skin is warm and dry.   Neurological:      Mental Status: He is alert and oriented to person, place, and time.      Comments: Coordination intact.   Psychiatric:         Behavior: Behavior normal.         Thought Content: Thought content normal.         Judgment: Judgment normal.         VITAL SIGNS:   Vitals:    03/31/23 1336   BP: 166/72   Pulse: 97   Resp: 16   Temp: 97.2 °F (36.2 °C)   TempSrc: Temporal   SpO2: 93%   Weight: 85.9 kg (189 lb 4.8 oz)   PainSc: 0-No pain             IMAGING:  I have personally reviewed CT scans of the head, soft tissue neck, chest, abdomen and pelvis performed 3/30/2023 alongside Dr. Arriaga.  Official radiology read is currently pending.      The following portions of the patient's history were reviewed and updated as appropriate: allergies, current medications, past family history, past medical history, past social history, past surgical history and problem list.         Diagnoses and all orders for this visit:    1. Bronchitis (Primary)  -     azithromycin (Zithromax Z-Bert) 250 MG tablet; Take 2 tablets by mouth on day 1, then 1 tablet daily on days 2-5  Dispense: 6 tablet; Refill: 0    2. 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, unspecified body region (HCC)         IMPRESSION:  Gabriela Mar is a 77 y.o. gentleman  with history of a stage IE anaplastic ALK-negative large cell lymphoma of the right temporal region.  It does appear that the patient initially had parotid akbar involvement at the time of his initial treatments.  Following surgical resection and adjuvant chemotherapy, the patient appears to have had a complete response to treatment with repeat biopsy showing no evidence of residual tumor in the parotid gland and repeat PET/CT scan negative for any residual concerning nodularity or hypermetabolic activity.  The patient then underwent consolidative radiation therapy, which he completed 8/26/2022.  He tolerated treatments well.  He continues to have some lingering xerostomia, and no late radiation related toxicities otherwise noted.  We discussed timeline for gradual improvement and resolution of this side effect.  The patient was recommended to continue good oral hygiene including tooth pastes with fluoride and routine dental exams.  We discussed that official radiology read from CT scans ordered by medical oncology was still pending.  I did review CT scans performed 3/30/2023 alongside Dr. Arriaga, which do not appear to show evidence of local recurrence within the region of the right parotid or head and neck lymph nodes.  Personal interpretation of the chest CT appears to show some stable subcentimeter pulmonary nodules and scattered groundglass opacities, with no evidence of new or enlarging pulmonary parenchymal nodules or pathologic hilar/mediastinal lymphadenopathy.  A nodule within the right axilla is apparent, and potentially appears more consistent with superficial subcutaneous nodule over right axillary adenopathy.  Personal interpretation is without definite evidence of recurrent lymphoma in the imaged head, neck, chest, abdomen or pelvis.  Final radiology read has not been published.  The right axillary nodule would be easily amenable to biopsy if indicated.  The patient's clinical exam is otherwise  without findings concerning for recurrence.  No B symptoms.  The patient will continue routine oncologic surveillance and serial imaging under the direction of Dr. Herr.  He is scheduled for follow-up in the medical oncology clinic next week.  I will schedule the patient to return to our clinic in approximately 6 months, or certainly sooner should symptomology/clinical findings warrant.        RECOMMENDATIONS:    Anaplastic large cell lymphoma ALK negative involving right temporal area  -Status post initial resection per Dr. De Los Santos  -High suspicion of right parotid/parotid lymph nodes involved initially  -Status post chemotherapy 6 of 6 cycles A + CHP  -PET scan shows complete resolution of nodularity in the parotid gland  -Biopsies of the parotid gland 7/7/2022 negative for malignancy  -Case discussed at tumor board  -Status post consolidative radiotherapy with coverage including the whole parotid 30 Gray in 15 fractions              -Bolus to the right temporal skin              -IGR T and IMRT              -Completed 8/26/2022  -Repeat CT chest 11/22/2022 showing tiny bilateral lung nodules potentially inflammatory or infectious without definite evidence of intrathoracic recurrence  -Repeat CT head, neck, chest, abdomen pelvis 3/30/2023 pending official radiology read.  Imaging reviewed alongside Dr. Arriaga, with personal interpretation of scans without definite evidence of recurrent metastatic disease  -Follows with Dr. Herr for surveillance, repeat imaging  -RTC next months    Bilateral pulmonary nodules  -Subcentimeter  -History of COVID-19 infection ~early 2022  -Appear stable on most recent imaging (official read pending) compared to 11/22/2022 CT chest  -Potentially infectious or inflammatory versus less likely appearance of recurrent lymphomatous disease   -Potentially consistent with atypical pneumonia in setting of recent URI symptoms  -Prescription for azithromycin for possible URI versus  pneumonia  -Recommend continued surveillance    Axillary nodule  -Nontender, noninflamed, semimobile  -1.5 x 1.5 cm  -Personal interpretation CT chest 3/30/2023 showing superficial nodule without appearance of pathologic right axillary adenopathy (official read pending)   -Readily accessible to biopsy if warranted  -Continue to monitor      Return in about 6 months (around 9/30/2023) for Office Visit.    Sharmin York, CALIN    I spent a total of 30 minutes on today's visit, with more than 15 minutes in direct face to face communication, and the remainder of the time spent in reviewing the relevant history, records, available imaging, and for documentation.

## 2023-04-03 ENCOUNTER — LAB (OUTPATIENT)
Dept: LAB | Facility: HOSPITAL | Age: 78
End: 2023-04-03
Payer: MEDICARE

## 2023-04-03 ENCOUNTER — OFFICE VISIT (OUTPATIENT)
Dept: ONCOLOGY | Facility: CLINIC | Age: 78
End: 2023-04-03
Payer: MEDICARE

## 2023-04-03 VITALS
OXYGEN SATURATION: 95 % | DIASTOLIC BLOOD PRESSURE: 73 MMHG | SYSTOLIC BLOOD PRESSURE: 144 MMHG | HEIGHT: 70 IN | TEMPERATURE: 97.5 F | BODY MASS INDEX: 26.92 KG/M2 | WEIGHT: 188 LBS | RESPIRATION RATE: 18 BRPM | HEART RATE: 98 BPM

## 2023-04-03 DIAGNOSIS — C84.71 ANAPLASTIC ALK-NEGATIVE LARGE CELL LYMPHOMA OF LYMPH NODE OF HEAD: ICD-10-CM

## 2023-04-03 DIAGNOSIS — C84.71 ANAPLASTIC ALK-NEGATIVE LARGE CELL LYMPHOMA OF LYMPH NODE OF HEAD: Primary | ICD-10-CM

## 2023-04-03 LAB
ALBUMIN SERPL-MCNC: 3.9 G/DL (ref 3.5–5.2)
ALBUMIN/GLOB SERPL: 1.1 G/DL
ALP SERPL-CCNC: 86 U/L (ref 39–117)
ALT SERPL W P-5'-P-CCNC: 21 U/L (ref 1–41)
ANION GAP SERPL CALCULATED.3IONS-SCNC: 12 MMOL/L (ref 5–15)
AST SERPL-CCNC: 28 U/L (ref 1–40)
BASOPHILS # BLD AUTO: 0.03 10*3/MM3 (ref 0–0.2)
BASOPHILS NFR BLD AUTO: 0.4 % (ref 0–1.5)
BILIRUB SERPL-MCNC: 0.3 MG/DL (ref 0–1.2)
BUN SERPL-MCNC: 23 MG/DL (ref 8–23)
BUN/CREAT SERPL: 20.9 (ref 7–25)
CALCIUM SPEC-SCNC: 9.3 MG/DL (ref 8.6–10.5)
CHLORIDE SERPL-SCNC: 100 MMOL/L (ref 98–107)
CO2 SERPL-SCNC: 25 MMOL/L (ref 22–29)
CREAT SERPL-MCNC: 1.1 MG/DL (ref 0.76–1.27)
DEPRECATED RDW RBC AUTO: 44.3 FL (ref 37–54)
EGFRCR SERPLBLD CKD-EPI 2021: 68.7 ML/MIN/1.73
EOSINOPHIL # BLD AUTO: 0.1 10*3/MM3 (ref 0–0.4)
EOSINOPHIL NFR BLD AUTO: 1.2 % (ref 0.3–6.2)
ERYTHROCYTE [DISTWIDTH] IN BLOOD BY AUTOMATED COUNT: 12.5 % (ref 12.3–15.4)
GLOBULIN UR ELPH-MCNC: 3.5 GM/DL
GLUCOSE SERPL-MCNC: 176 MG/DL (ref 65–99)
HCT VFR BLD AUTO: 39.5 % (ref 37.5–51)
HGB BLD-MCNC: 13.3 G/DL (ref 13–17.7)
IMM GRANULOCYTES # BLD AUTO: 0.02 10*3/MM3 (ref 0–0.05)
IMM GRANULOCYTES NFR BLD AUTO: 0.2 % (ref 0–0.5)
LDH SERPL-CCNC: 210 U/L (ref 135–225)
LYMPHOCYTES # BLD AUTO: 3.54 10*3/MM3 (ref 0.7–3.1)
LYMPHOCYTES NFR BLD AUTO: 43.2 % (ref 19.6–45.3)
MCH RBC QN AUTO: 32.5 PG (ref 26.6–33)
MCHC RBC AUTO-ENTMCNC: 33.7 G/DL (ref 31.5–35.7)
MCV RBC AUTO: 96.6 FL (ref 79–97)
MONOCYTES # BLD AUTO: 0.64 10*3/MM3 (ref 0.1–0.9)
MONOCYTES NFR BLD AUTO: 7.8 % (ref 5–12)
NEUTROPHILS NFR BLD AUTO: 3.87 10*3/MM3 (ref 1.7–7)
NEUTROPHILS NFR BLD AUTO: 47.2 % (ref 42.7–76)
PLATELET # BLD AUTO: 150 10*3/MM3 (ref 140–450)
PMV BLD AUTO: 10.7 FL (ref 6–12)
POTASSIUM SERPL-SCNC: 4.5 MMOL/L (ref 3.5–5.2)
PROT SERPL-MCNC: 7.4 G/DL (ref 6–8.5)
RBC # BLD AUTO: 4.09 10*6/MM3 (ref 4.14–5.8)
SODIUM SERPL-SCNC: 137 MMOL/L (ref 136–145)
WBC NRBC COR # BLD: 8.2 10*3/MM3 (ref 3.4–10.8)

## 2023-04-03 PROCEDURE — 3077F SYST BP >= 140 MM HG: CPT | Performed by: INTERNAL MEDICINE

## 2023-04-03 PROCEDURE — 83615 LACTATE (LD) (LDH) ENZYME: CPT

## 2023-04-03 PROCEDURE — 80053 COMPREHEN METABOLIC PANEL: CPT

## 2023-04-03 PROCEDURE — 99214 OFFICE O/P EST MOD 30 MIN: CPT | Performed by: INTERNAL MEDICINE

## 2023-04-03 PROCEDURE — 36415 COLL VENOUS BLD VENIPUNCTURE: CPT

## 2023-04-03 PROCEDURE — 85025 COMPLETE CBC W/AUTO DIFF WBC: CPT

## 2023-04-03 PROCEDURE — 3078F DIAST BP <80 MM HG: CPT | Performed by: INTERNAL MEDICINE

## 2023-04-03 PROCEDURE — 1126F AMNT PAIN NOTED NONE PRSNT: CPT | Performed by: INTERNAL MEDICINE

## 2023-04-03 NOTE — PROGRESS NOTES
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         REASON FOR VISIT: Management of mild lymphoma    HISTORY OF PRESENT ILLNESS:   77 y.o.  male presents today for follow-up of his lymphoma.  He completed 6 of 6 cycles of CHP-Brentuximab and completed radiotherapy for consolidation in August 2022.  He presents today after having scans done.  Clinically doing reasonably well.    Past medical history, social history and family history was reviewed 04/03/23 and unchanged from prior visit.    Review of Systems:    Review of Systems   Constitutional: Negative for appetite change and fatigue.   HENT:  Negative.    Eyes: Negative.    Respiratory: Negative.    Cardiovascular: Negative.    Gastrointestinal: Negative.    Endocrine: Negative.    Genitourinary: Negative.     Skin: Negative.    Neurological: Negative.    Hematological: Negative.    Psychiatric/Behavioral: Negative for sleep disturbance.            Medications:        Current Outpatient Medications:   •  albuterol (PROVENTIL) (2.5 MG/3ML) 0.083% nebulizer solution, Take 2.5 mg by nebulization Every 4 (Four) Hours As Needed for Wheezing., Disp: 120 each, Rfl: 5  •  albuterol sulfate  (90 Base) MCG/ACT inhaler, Inhale 2 puffs Every 4 (Four) Hours As Needed for Wheezing., Disp: 8 g, Rfl: 3  •  azithromycin (Zithromax  "Z-Bert) 250 MG tablet, Take 2 tablets by mouth on day 1, then 1 tablet daily on days 2-5, Disp: 6 tablet, Rfl: 0  •  Cholecalciferol (VITAMIN D) 1000 UNITS tablet, Take 2,000 Units by mouth Daily., Disp: , Rfl:   •  ferrous sulfate 325 (65 FE) MG tablet, Take 325 mg by mouth Daily With Breakfast., Disp: , Rfl:   •  gabapentin (NEURONTIN) 300 MG capsule, TAKE ONE CAPSULE BY MOUTH ONCE NIGHTLY, Disp: 30 capsule, Rfl: 5  •  glimepiride (AMARYL) 4 MG tablet, Take 1 tablet by mouth Daily With Dinner., Disp: 90 tablet, Rfl: 1  •  metFORMIN ER (Glucophage XR) 500 MG 24 hr tablet, Take 1 tablet by mouth Daily With Breakfast., Disp: 90 tablet, Rfl: 1  •  Multiple Vitamins-Minerals (MULTI VITAMIN/MINERALS PO), Take 1 tablet by mouth Daily., Disp: , Rfl:   •  Multiple Vitamins-Minerals (PreserVision AREDS 2) chewable tablet, Chew 1 tablet 2 (Two) Times a Day., Disp: , Rfl:   •  simvastatin (ZOCOR) 10 MG tablet, TAKE ONE TABLET BY MOUTH EVERY NIGHT AT BEDTIME, Disp: 90 tablet, Rfl: 1  •  tiZANidine (ZANAFLEX) 2 MG tablet, TAKE ONE TABLET BY MOUTH ONCE NIGHTLY AS NEEDED FOR MUSCLE SPASMS, Disp: 30 tablet, Rfl: 1  •  vitamin B-12 (CYANOCOBALAMIN) 1000 MCG tablet, Take 10 tablets by mouth 2 (Two) Times a Day., Disp: , Rfl:     Pain Medications             gabapentin (NEURONTIN) 300 MG capsule TAKE ONE CAPSULE BY MOUTH ONCE NIGHTLY    tiZANidine (ZANAFLEX) 2 MG tablet TAKE ONE TABLET BY MOUTH ONCE NIGHTLY AS NEEDED FOR MUSCLE SPASMS             ALLERGIES:    Allergies   Allergen Reactions   • Bactrim [Sulfamethoxazole-Trimethoprim] Other (See Comments)     Joint pain   • Sulfa Antibiotics GI Intolerance     Makes bones hurt     • Tegaderm Chg Dressing [Chlorhexidine] Itching and Other (See Comments)     redness         Physical Exam    VITAL SIGNS:  /73   Pulse 98   Temp 97.5 °F (36.4 °C) (Infrared)   Resp 18   Ht 177.8 cm (70\")   Wt 85.3 kg (188 lb)   SpO2 95%   BMI 26.98 kg/m²     ECOG score: 0           Wt Readings " from Last 3 Encounters:   04/03/23 85.3 kg (188 lb)   03/31/23 85.9 kg (189 lb 4.8 oz)   02/01/23 88.9 kg (196 lb)       Body mass index is 26.98 kg/m². Body surface area is 2.03 meters squared.    Pain Score    04/03/23 1410   PainSc: 0-No pain           Performance Status: 0    General: well appearing, in no acute distress  HEENT: Swelling on the right side of his face consistent with his lymphomatous process.  Lymphatics: no cervical, supraclavicular, or axillary adenopathy  Cardiovascular: regular rate and rhythm, no murmurs, rubs or gallops  Lungs: clear to auscultation bilaterally  Abdomen: soft, nontender, nondistended.  No palpable organomegaly  Extremities: no lower extremity edema  Skin: no rashes, lesions, bruising, or petechiae  Msk:  Shows no weakness of the large muscle groups  Psych: Mood is stable        RECENT LABS:    Lab Results   Component Value Date    HGB 13.3 04/03/2023    HCT 39.5 04/03/2023    MCV 96.6 04/03/2023     04/03/2023    WBC 8.20 04/03/2023    NEUTROABS 3.87 04/03/2023    LYMPHSABS 3.54 (H) 04/03/2023    MONOSABS 0.64 04/03/2023    EOSABS 0.10 04/03/2023    BASOSABS 0.03 04/03/2023       Lab Results   Component Value Date    GLUCOSE 88 10/25/2022    BUN 11 10/25/2022    CREATININE 1.10 03/30/2023     10/25/2022    K 4.1 10/25/2022     10/25/2022    CO2 21.5 (L) 10/25/2022    CALCIUM 8.8 10/25/2022    PROTEINTOT 6.2 10/25/2022    ALBUMIN 2.80 (L) 10/25/2022    BILITOT 0.3 10/25/2022    ALKPHOS 76 10/25/2022    AST 27 10/25/2022    ALT 18 10/25/2022       CT Head With & Without Contrast    Result Date: 3/31/2023  Impression: New multifocal areas of tree-in-bud peribronchial nodularity and faint groundglass opacity are seen in both lung fields, most notable in the right upper lobe, with the appearance of likely atypical pneumonia. Consider short-term follow-up CT chest to document expected evolution of this likely infectious finding. Otherwise, there is no evidence  of recurrent lymphomatous disease in the head, neck, chest, abdomen and pelvis. No additional acute findings are present. Electronically Signed: Kenn Carrero  3/31/2023 1:01 PM EDT  Workstation ID: [id number]    CT Soft Tissue Neck With Contrast    Result Date: 3/31/2023  Impression: New multifocal areas of tree-in-bud peribronchial nodularity and faint groundglass opacity are seen in both lung fields, most notable in the right upper lobe, with the appearance of likely atypical pneumonia. Consider short-term follow-up CT chest to document expected evolution of this likely infectious finding. Otherwise, there is no evidence of recurrent lymphomatous disease in the head, neck, chest, abdomen and pelvis. No additional acute findings are present. Electronically Signed: Kenn Carrero  3/31/2023 1:01 PM EDT  Workstation ID: [id number]    CT Chest With Contrast Diagnostic    Result Date: 3/31/2023  Impression: New multifocal areas of tree-in-bud peribronchial nodularity and faint groundglass opacity are seen in both lung fields, most notable in the right upper lobe, with the appearance of likely atypical pneumonia. Consider short-term follow-up CT chest to document expected evolution of this likely infectious finding. Otherwise, there is no evidence of recurrent lymphomatous disease in the head, neck, chest, abdomen and pelvis. No additional acute findings are present. Electronically Signed: Kenn Carrero  3/31/2023 1:01 PM EDT  Workstation ID: [id number]    CT Abdomen Pelvis With Contrast    Result Date: 3/31/2023  Impression: New multifocal areas of tree-in-bud peribronchial nodularity and faint groundglass opacity are seen in both lung fields, most notable in the right upper lobe, with the appearance of likely atypical pneumonia. Consider short-term follow-up CT chest to document expected evolution of this likely infectious finding. Otherwise, there is no evidence of recurrent lymphomatous disease in the head, neck, chest,  abdomen and pelvis. No additional acute findings are present. Electronically Signed: Kenn Carrero  3/31/2023 1:01 PM EDT  Workstation ID: [id number]          Assessment/Plan    1.  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  He has completed 6 cycles of treatment with A + CHP.  Completed consolidative radiotherapy in August 2022.  I reviewed his scans today which shows no sign of recurrent disease.  Plan to rescan him in 6 months.  Fairly high risk for recurrence.    Total time of patient care on day of service including time prior to, face to face with patient, and following visit spent in reviewing records, lab results, imaging studies, discussion with patient, and documentation/charting was > 31 minutes.        Ai Henderson MD  [facility]         Orders Placed This Encounter   Procedures   • CT Abdomen Pelvis With Contrast   • CT Chest With Contrast Diagnostic   • CT Head With & Without Contrast   • CT Soft Tissue Neck With Contrast   • Lactate Dehydrogenase   • Comprehensive Metabolic Panel   • CBC & Differential       4/3/2023

## 2023-05-22 DIAGNOSIS — G62.0 CHEMOTHERAPY-INDUCED NEUROPATHY: ICD-10-CM

## 2023-05-22 DIAGNOSIS — T45.1X5A CHEMOTHERAPY-INDUCED NEUROPATHY: ICD-10-CM

## 2023-05-22 RX ORDER — GABAPENTIN 300 MG/1
CAPSULE ORAL
Qty: 30 CAPSULE | OUTPATIENT
Start: 2023-05-22

## 2023-05-22 RX ORDER — GABAPENTIN 300 MG/1
300 CAPSULE ORAL NIGHTLY
Qty: 90 CAPSULE | Refills: 1 | Status: SHIPPED | OUTPATIENT
Start: 2023-05-22

## 2023-05-24 ENCOUNTER — OFFICE VISIT (OUTPATIENT)
Dept: INTERNAL MEDICINE | Facility: CLINIC | Age: 78
End: 2023-05-24
Payer: MEDICARE

## 2023-05-24 ENCOUNTER — LAB (OUTPATIENT)
Dept: LAB | Facility: HOSPITAL | Age: 78
End: 2023-05-24
Payer: MEDICARE

## 2023-05-24 VITALS
WEIGHT: 195.2 LBS | HEART RATE: 76 BPM | DIASTOLIC BLOOD PRESSURE: 74 MMHG | BODY MASS INDEX: 27.94 KG/M2 | SYSTOLIC BLOOD PRESSURE: 128 MMHG | TEMPERATURE: 97.5 F | OXYGEN SATURATION: 97 % | HEIGHT: 70 IN

## 2023-05-24 DIAGNOSIS — E55.9 VITAMIN D DEFICIENCY: ICD-10-CM

## 2023-05-24 DIAGNOSIS — C84.70 ANAPLASTIC ALK-NEGATIVE LARGE CELL LYMPHOMA, UNSPECIFIED BODY REGION: ICD-10-CM

## 2023-05-24 DIAGNOSIS — E78.5 HYPERLIPIDEMIA LDL GOAL <70: ICD-10-CM

## 2023-05-24 DIAGNOSIS — T45.1X5A CHEMOTHERAPY-INDUCED NEUROPATHY: ICD-10-CM

## 2023-05-24 DIAGNOSIS — J18.9 PNEUMONIA DUE TO INFECTIOUS ORGANISM, UNSPECIFIED LATERALITY, UNSPECIFIED PART OF LUNG: ICD-10-CM

## 2023-05-24 DIAGNOSIS — M54.9 UPPER BACK PAIN: ICD-10-CM

## 2023-05-24 DIAGNOSIS — G62.0 CHEMOTHERAPY-INDUCED NEUROPATHY: ICD-10-CM

## 2023-05-24 DIAGNOSIS — R06.09 DYSPNEA ON EXERTION: ICD-10-CM

## 2023-05-24 DIAGNOSIS — R14.0 ABDOMINAL DISTENSION: ICD-10-CM

## 2023-05-24 DIAGNOSIS — J44.1 COPD WITH EXACERBATION: ICD-10-CM

## 2023-05-24 DIAGNOSIS — Z79.4 TYPE 2 DIABETES MELLITUS WITH HYPERGLYCEMIA, WITH LONG-TERM CURRENT USE OF INSULIN: Primary | ICD-10-CM

## 2023-05-24 DIAGNOSIS — E11.65 TYPE 2 DIABETES MELLITUS WITH HYPERGLYCEMIA, WITH LONG-TERM CURRENT USE OF INSULIN: Primary | ICD-10-CM

## 2023-05-24 LAB
EXPIRATION DATE: ABNORMAL
EXPIRATION DATE: ABNORMAL
HBA1C MFR BLD: 11.3 %
Lab: ABNORMAL
Lab: ABNORMAL
POC CREATININE URINE: 10
POC MICROALBUMIN URINE: 150

## 2023-05-24 PROCEDURE — 84443 ASSAY THYROID STIM HORMONE: CPT

## 2023-05-24 PROCEDURE — 82306 VITAMIN D 25 HYDROXY: CPT

## 2023-05-24 PROCEDURE — 82607 VITAMIN B-12: CPT

## 2023-05-24 PROCEDURE — 85027 COMPLETE CBC AUTOMATED: CPT

## 2023-05-24 PROCEDURE — 80061 LIPID PANEL: CPT

## 2023-05-24 PROCEDURE — 80053 COMPREHEN METABOLIC PANEL: CPT

## 2023-05-24 RX ORDER — ASPIRIN 81 MG/1
81 TABLET ORAL DAILY
Qty: 90 TABLET | Refills: 3 | Status: SHIPPED | OUTPATIENT
Start: 2023-05-24

## 2023-05-24 RX ORDER — ALBUTEROL SULFATE 90 UG/1
2 AEROSOL, METERED RESPIRATORY (INHALATION) EVERY 4 HOURS PRN
Qty: 8 G | Refills: 3 | Status: SHIPPED | OUTPATIENT
Start: 2023-05-24

## 2023-05-24 RX ORDER — SIMVASTATIN 10 MG
10 TABLET ORAL
Qty: 90 TABLET | Refills: 3 | Status: SHIPPED | OUTPATIENT
Start: 2023-05-24

## 2023-05-24 RX ORDER — TIZANIDINE 2 MG/1
2 TABLET ORAL NIGHTLY PRN
Qty: 30 TABLET | Refills: 1 | Status: SHIPPED | OUTPATIENT
Start: 2023-05-24

## 2023-05-24 RX ORDER — ALBUTEROL SULFATE 2.5 MG/3ML
2.5 SOLUTION RESPIRATORY (INHALATION) EVERY 4 HOURS PRN
Qty: 120 EACH | Refills: 5 | Status: SHIPPED | OUTPATIENT
Start: 2023-05-24

## 2023-05-24 NOTE — PROGRESS NOTES
The ABCs of the Annual Wellness Visit  Subsequent Medicare Wellness Visit    Subjective    Gabriela Mar is a 77 y.o. male who presents for a Subsequent Medicare Wellness Visit.    The following portions of the patient's history were reviewed and   updated as appropriate: allergies, current medications, past family history, past medical history, past social history, past surgical history and problem list.    Compared to one year ago, the patient feels his physical   health is the same.    Compared to one year ago, the patient feels his mental   health is the same.    Recent Hospitalizations:  This patient has had a [facility] admission record on file within the last 365 days.    Current Medical Providers:  Patient Care Team:  Krystina Gee MD as PCP - General (Internal Medicine)  Evangelist Hernández MD as Consulting Physician (Ophthalmology)  Ai Henderson MD as Referring Physician (Hematology)  Hay Singer IV, MD as Cardiologist (Interventional Cardiology)  Mil Arriaga MD as Consulting Physician (Radiation Oncology)    Outpatient Medications Prior to Visit   Medication Sig Dispense Refill   • Cholecalciferol (VITAMIN D) 1000 UNITS tablet Take 2 tablets by mouth Daily.     • ferrous sulfate 325 (65 FE) MG tablet Take 1 tablet by mouth Daily With Breakfast.     • glimepiride (AMARYL) 4 MG tablet Take 1 tablet by mouth Daily With Dinner. 90 tablet 1   • metFORMIN ER (Glucophage XR) 500 MG 24 hr tablet Take 1 tablet by mouth Daily With Breakfast. 90 tablet 1   • Multiple Vitamins-Minerals (MULTI VITAMIN/MINERALS PO) Take 1 tablet by mouth Daily.     • Multiple Vitamins-Minerals (PreserVision AREDS 2) chewable tablet Chew 1 tablet 2 (Two) Times a Day.     • vitamin B-12 (CYANOCOBALAMIN) 1000 MCG tablet Take 10 tablets by mouth 2 (Two) Times a Day.     • albuterol (PROVENTIL) (2.5 MG/3ML) 0.083% nebulizer solution Take 2.5 mg by nebulization Every 4 (Four) Hours As Needed for  Wheezing. 120 each 5   • albuterol sulfate  (90 Base) MCG/ACT inhaler Inhale 2 puffs Every 4 (Four) Hours As Needed for Wheezing. 8 g 3   • simvastatin (ZOCOR) 10 MG tablet TAKE ONE TABLET BY MOUTH EVERY NIGHT AT BEDTIME 90 tablet 1   • tiZANidine (ZANAFLEX) 2 MG tablet TAKE ONE TABLET BY MOUTH ONCE NIGHTLY AS NEEDED FOR MUSCLE SPASMS 30 tablet 1   • azithromycin (Zithromax Z-Bert) 250 MG tablet Take 2 tablets by mouth on day 1, then 1 tablet daily on days 2-5 6 tablet 0   • gabapentin (NEURONTIN) 300 MG capsule TAKE ONE CAPSULE BY MOUTH ONCE NIGHTLY 30 capsule 5     No facility-administered medications prior to visit.       No opioid medication identified on active medication list. I have reviewed chart for other potential  high risk medication/s and harmful drug interactions in the elderly.          Aspirin is not on active medication list.  Aspirin use is not indicated based on review of current medical condition/s. Risk of harm outweighs potential benefits.  .    Patient Active Problem List   Diagnosis   • Hyperlipidemia LDL goal <70   • Sciatica   • Essential hypertension   • Esophageal reflux   • Obesity   • Osteoarthritis   • Vitamin D deficiency   • Vitamin B deficiency   • Hip arthritis   • Chronic bilateral low back pain without sciatica   • Chronic diastolic congestive heart failure (HCC)   • Chronic obstructive pulmonary disease with acute lower respiratory infection (HCC)   • Type 2 diabetes mellitus with hyperglycemia, with long-term current use of insulin ([facility])   • Coronary artery disease involving native coronary artery of native heart with angina pectoris   • 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  ACP discussion was held with the patient during this visit. Patient has an advance directive (not in EMR), copy requested.     Objective    Vitals:    23 1457   BP: 128/74   Pulse: 76   Temp: 97.5 °F (36.4 °C)   SpO2: 97%  Comment: ra   Weight: 88.5 kg (195 lb 3.2 oz)   Height: 177.8 cm (70\")   PainSc: 0-No pain     Estimated body mass index is 28.01 kg/m² as calculated from the following:    Height as of this encounter: 177.8 cm (70\").    Weight as of this encounter: 88.5 kg (195 lb 3.2 oz).    BMI is >= 25 and <30. (Overweight) The following options were offered after discussion;: exercise counseling/recommendations and nutrition counseling/recommendations      Does the patient have evidence of cognitive impairment? No    Lab Results   Component Value Date    TRIG 239 (H) 2023    HDL 43 2023    LDL 75 2023    VLDL 39 2023    HGBA1C 11.3 2023        HEALTH RISK ASSESSMENT    Smoking Status:  Social History     Tobacco Use   Smoking Status Former   • Packs/day: 2.00   • Years: 35.00   • Pack years: 70.00   • Types: Cigarettes   Smokeless Tobacco Never   Tobacco Comments    QUIT 20 PLUS YEARS AGO      Alcohol Consumption:  Social History     Substance and Sexual Activity   Alcohol Use No    Comment: stopped drinking     Fall Risk Screen:    MOEADI Fall Risk Assessment was completed, and patient is at LOW risk for falls.Assessment completed on:2023    Depression Screenin/24/2023     3:07 PM   PHQ-2/PHQ-9 Depression Screening   Little Interest or Pleasure in Doing Things 0-->not at all   Feeling Down, Depressed or Hopeless 0-->not at all   PHQ-9: Brief Depression Severity Measure Score 0       Health Habits and Functional and Cognitive Screenin/24/2023     3:07 PM   Functional & Cognitive Status   Do you have difficulty preparing food and " eating? No   Do you have difficulty bathing yourself, getting dressed or grooming yourself? No   Do you have difficulty using the toilet? No   Do you have difficulty moving around from place to place? No   Do you have trouble with steps or getting out of a bed or a chair? Yes   Current Diet Unhealthy Diet   Dental Exam Up to date   Eye Exam Up to date   Current Exercises Include No Regular Exercise   Do you need help using the phone?  No   Are you deaf or do you have serious difficulty hearing?  No   Do you need help with transportation? No   Do you need help shopping? No   Do you need help preparing meals?  No   Do you need help with housework?  No   Do you need help with laundry? No   Do you need help taking your medications? No   Do you need help managing money? No   Do you ever drive or ride in a car without wearing a seat belt? No   Have you felt unusual stress, anger or loneliness in the last month? No   Who do you live with? Child   If you need help, do you have trouble finding someone available to you? No   Have you been bothered in the last four weeks by sexual problems? No   Do you have difficulty concentrating, remembering or making decisions? No       Age-appropriate Screening Schedule:  Refer to the list below for future screening recommendations based on patient's age, sex and/or medical conditions. Orders for these recommended tests are listed in the plan section. The patient has been provided with a written plan.    Health Maintenance   Topic Date Due   • COVID-19 Vaccine (3 - Pfizer risk series) 08/09/2022   • INFLUENZA VACCINE  08/01/2023   • HEMOGLOBIN A1C  11/24/2023   • DIABETIC EYE EXAM  12/06/2023   • ANNUAL WELLNESS VISIT  05/24/2024   • LIPID PANEL  05/24/2024   • URINE MICROALBUMIN  05/24/2024   • COLORECTAL CANCER SCREENING  07/01/2024   • HEPATITIS C SCREENING  Completed   • Pneumococcal Vaccine 65+  Completed   • TDAP/TD VACCINES  Discontinued   • ZOSTER VACCINE  Discontinued             "      CMS Preventative Services Quick Reference  Risk Factors Identified During Encounter  Immunizations Discussed/Encouraged: COVID19  The above risks/problems have been discussed with the patient.  Pertinent information has been shared with the patient in the After Visit Summary.  An After Visit Summary and PPPS were made available to the patient.    Follow Up:   Next Medicare Wellness visit to be scheduled in 1 year.       Additional E&M Note during same encounter follows:  Patient has multiple medical problems which are significant and separately identifiable that require additional work above and beyond the Medicare Wellness Visit.      Chief Complaint  Medicare Wellness-subsequent (Pt is fasting)    Subjective        HPI  Gabriela Mar is also being seen today for t2 dm, htn, hlp and s/p chemo.    The patient presents today for a follow-up.    The patient's A1c is elevated at 11.3 percent. He is not taking his insulin. He had lost so much weight and they stopped all insulin. His A1c was 7.3 percent on 02/01/2023. He is taking metformin and glimepiride. He is not taking the Novolin N injections because he did not feel like he needed them. He was on 50 units 2 times a day of the insulin before he stopped using it. He occasionally checks his blood glucose at home. He has not been eating many sweets.    He has completed chemotherapy. He had a CT scan in 03/2023 and it was clear. He still has dry mouth and neuropathy. His eating has also been good. He saw Dr. Balaji Santamaria in 12/2022. He is still taking gabapentin. He still feels weak on occasion and gets off balanced. He feels like the bottom of his feet are rounded instead of flat.    He stopped taking aspirin 81 mg a long time ago.    His stomach feels very distended and his belly button is everted.           Objective   Vital Signs:  /74   Pulse 76   Temp 97.5 °F (36.4 °C)   Ht 177.8 cm (70\")   Wt 88.5 kg (195 lb 3.2 oz)   SpO2 97% Comment: ra  " BMI 28.01 kg/m²     Physical Exam  Constitutional:       General: He is not in acute distress.     Appearance: Normal appearance.   HENT:      Head: Normocephalic and atraumatic.      Right Ear: Tympanic membrane and external ear normal.      Left Ear: Tympanic membrane and external ear normal.      Nose: Nose normal.      Mouth/Throat:      Mouth: Mucous membranes are moist.   Eyes:      General: No scleral icterus.  Neck:      Vascular: No carotid bruit.   Cardiovascular:      Rate and Rhythm: Normal rate and regular rhythm.      Pulses:           Dorsalis pedis pulses are 1+ on the right side and 1+ on the left side.      Heart sounds: Normal heart sounds. No murmur heard.    No friction rub. No gallop.   Pulmonary:      Effort: Pulmonary effort is normal.      Breath sounds: Normal breath sounds. No rhonchi or rales.   Abdominal:      General: Bowel sounds are normal. There is distension.      Palpations: Abdomen is soft. There is no mass.      Tenderness: There is no abdominal tenderness. There is no right CVA tenderness, left CVA tenderness, guarding or rebound.      Hernia: No hernia is present.      Comments: Tense distended abdomen with everted umbilicus   Musculoskeletal:         General: No tenderness. Normal range of motion.      Cervical back: Normal range of motion.      Right lower leg: No edema.      Left lower leg: No edema.      Right foot: No deformity.      Left foot: No deformity.   Feet:      Right foot:      Skin integrity: Skin integrity normal.      Left foot:      Skin integrity: Skin integrity normal.      Comments:      Lymphadenopathy:      Cervical: No cervical adenopathy.   Skin:     General: Skin is warm.      Findings: No rash.   Neurological:      General: No focal deficit present.      Mental Status: He is alert and oriented to person, place, and time. Mental status is at baseline.      Cranial Nerves: No cranial nerve deficit.      Sensory: Sensory deficit present.      Motor:  Weakness present.      Coordination: Coordination normal.      Gait: Gait normal.      Deep Tendon Reflexes: Reflexes normal.   Psychiatric:         Mood and Affect: Mood normal.         Behavior: Behavior normal.                         Assessment and Plan   Diagnoses and all orders for this visit:    1. Type 2 diabetes mellitus with hyperglycemia, with long-term current use of insulin (Primary)  -     POC Glycosylated Hemoglobin (Hb A1C)  -     POCT microalbumin  -     insulin NPH (humuLIN N,novoLIN N) 100 UNIT/ML injection; Inject 10 units BID x 2 weeks then 20 units BID  Dispense: 20 mL; Refill: 5  -     aspirin 81 MG EC tablet; Take 1 tablet by mouth Daily.  Dispense: 90 tablet; Refill: 3    2. Chemotherapy-induced neuropathy  -     gabapentin (NEURONTIN) 300 MG capsule; Take 1 capsule by mouth Every Night.  Dispense: 90 capsule; Refill: 1  -     CBC (No Diff); Future  -     Vitamin B12; Future    3. Pneumonia due to infectious organism, unspecified laterality, unspecified part of lung  -     albuterol (PROVENTIL) (2.5 MG/3ML) 0.083% nebulizer solution; Take 2.5 mg by nebulization Every 4 (Four) Hours As Needed for Wheezing.  Dispense: 120 each; Refill: 5    4. COPD with exacerbation  -     albuterol (PROVENTIL) (2.5 MG/3ML) 0.083% nebulizer solution; Take 2.5 mg by nebulization Every 4 (Four) Hours As Needed for Wheezing.  Dispense: 120 each; Refill: 5    5. Dyspnea on exertion  -     albuterol sulfate  (90 Base) MCG/ACT inhaler; Inhale 2 puffs Every 4 (Four) Hours As Needed for Wheezing.  Dispense: 8 g; Refill: 3    6. Hyperlipidemia LDL goal <70  -     simvastatin (ZOCOR) 10 MG tablet; Take 1 tablet by mouth every night at bedtime.  Dispense: 90 tablet; Refill: 3  -     Comprehensive Metabolic Panel; Future  -     Lipid Panel; Future  -     TSH Rfx On Abnormal To Free T4; Future    7. Upper back pain  Comments:  Start tizanidine as directed. Apply a heating pad and perform stretches. If his symptoms  worsen, will order an MRI and an x-ray.  Orders:  -     tiZANidine (ZANAFLEX) 2 MG tablet; Take 1 tablet by mouth At Night As Needed for Muscle Spasms.  Dispense: 30 tablet; Refill: 1    8. Vitamin D deficiency  -     Vitamin D,25-Hydroxy; Future    9. 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 Abdominal distension  -     CT Abdomen Pelvis Without Contrast; Future    1. Type 2 diabetes.  - Will start Novolin N at 10 units 2 times a day for 2 weeks.  - If doing well, then increase insulin to 20 units 2 times a day.  - Will return in 2 months for reevaluation.  - Will order routine lab work.  - Resume taking aspirin 81 mg.  - Continue taking metformin and glimepiride at current dose.  - Will do a urinalysis.    2. Abdominal distension.  - Will order an abdominal CT scan.         Follow Up   Return in about 8 weeks (around 7/19/2023) for Recheck- ok for 8.15 am or 11.45 am overbook if needed.  Patient was given instructions and counseling regarding his condition or for health maintenance advice. Please see specific information pulled into the AVS if appropriate.     Transcribed from ambient dictation for Krystina Gee MD by Eden Avelar.  05/24/23   16:41 EDT    Patient or patient representative verbalized consent to the visit recording.  I have personally performed the services described in this document as transcribed by the above individual, and it is both accurate and complete.  Krystina Gee MD  5/27/2023  00:22 EDT

## 2023-05-25 LAB
25(OH)D3 SERPL-MCNC: 60.2 NG/ML (ref 30–100)
ALBUMIN SERPL-MCNC: 4.7 G/DL (ref 3.5–5.2)
ALBUMIN/GLOB SERPL: 1.5 G/DL
ALP SERPL-CCNC: 91 U/L (ref 39–117)
ALT SERPL W P-5'-P-CCNC: 24 U/L (ref 1–41)
ANION GAP SERPL CALCULATED.3IONS-SCNC: 12 MMOL/L (ref 5–15)
AST SERPL-CCNC: 26 U/L (ref 1–40)
BILIRUB SERPL-MCNC: 0.4 MG/DL (ref 0–1.2)
BUN SERPL-MCNC: 23 MG/DL (ref 8–23)
BUN/CREAT SERPL: 18.1 (ref 7–25)
CALCIUM SPEC-SCNC: 10.1 MG/DL (ref 8.6–10.5)
CHLORIDE SERPL-SCNC: 94 MMOL/L (ref 98–107)
CHOLEST SERPL-MCNC: 157 MG/DL (ref 0–200)
CO2 SERPL-SCNC: 26 MMOL/L (ref 22–29)
CREAT SERPL-MCNC: 1.27 MG/DL (ref 0.76–1.27)
DEPRECATED RDW RBC AUTO: 48.1 FL (ref 37–54)
EGFRCR SERPLBLD CKD-EPI 2021: 57.8 ML/MIN/1.73
ERYTHROCYTE [DISTWIDTH] IN BLOOD BY AUTOMATED COUNT: 13.7 % (ref 12.3–15.4)
GLOBULIN UR ELPH-MCNC: 3.2 GM/DL
GLUCOSE SERPL-MCNC: 292 MG/DL (ref 65–99)
HCT VFR BLD AUTO: 42.8 % (ref 37.5–51)
HDLC SERPL-MCNC: 43 MG/DL (ref 40–60)
HGB BLD-MCNC: 14.1 G/DL (ref 13–17.7)
LDLC SERPL CALC-MCNC: 75 MG/DL (ref 0–100)
LDLC/HDLC SERPL: 1.54 {RATIO}
MCH RBC QN AUTO: 31.8 PG (ref 26.6–33)
MCHC RBC AUTO-ENTMCNC: 32.9 G/DL (ref 31.5–35.7)
MCV RBC AUTO: 96.6 FL (ref 79–97)
PLATELET # BLD AUTO: 128 10*3/MM3 (ref 140–450)
PMV BLD AUTO: 11.7 FL (ref 6–12)
POTASSIUM SERPL-SCNC: 4.3 MMOL/L (ref 3.5–5.2)
PROT SERPL-MCNC: 7.9 G/DL (ref 6–8.5)
RBC # BLD AUTO: 4.43 10*6/MM3 (ref 4.14–5.8)
SODIUM SERPL-SCNC: 132 MMOL/L (ref 136–145)
TRIGL SERPL-MCNC: 239 MG/DL (ref 0–150)
TSH SERPL DL<=0.05 MIU/L-ACNC: 1.69 UIU/ML (ref 0.27–4.2)
VIT B12 BLD-MCNC: >2000 PG/ML (ref 211–946)
VLDLC SERPL-MCNC: 39 MG/DL (ref 5–40)
WBC NRBC COR # BLD: 8.13 10*3/MM3 (ref 3.4–10.8)

## 2023-05-29 ENCOUNTER — HOSPITAL ENCOUNTER (OUTPATIENT)
Dept: CT IMAGING | Facility: HOSPITAL | Age: 78
Discharge: HOME | End: 2023-05-29
Admitting: INTERNAL MEDICINE

## 2023-05-29 DIAGNOSIS — C84.70 ANAPLASTIC ALK-NEGATIVE LARGE CELL LYMPHOMA, UNSPECIFIED BODY REGION: ICD-10-CM

## 2023-05-29 DIAGNOSIS — R14.0 ABDOMINAL DISTENSION: ICD-10-CM

## 2023-05-29 PROCEDURE — 74176 CT ABD & PELVIS W/O CONTRAST: CPT

## 2023-05-29 PROCEDURE — 0 DIATRIZOATE MEGLUMINE & SODIUM PER 1 ML: Performed by: INTERNAL MEDICINE

## 2023-06-01 ENCOUNTER — TELEPHONE (OUTPATIENT)
Dept: INTERNAL MEDICINE | Facility: CLINIC | Age: 78
End: 2023-06-01

## 2023-06-01 NOTE — TELEPHONE ENCOUNTER
. AST 75 and ALT 90  Normal bili   CT showed: A 1 cm low-density abnormality is noted in the anterior segment of the right lobe of the liver as seen on axial image 24 and series 2.    Ddx: liver Yuliya?  Plan:   - Consult Oncology, appreciate recs   - Consult [facility] for biopsy of pancreatic mass        I called and advised patient of Dr. Balderas information regarding what she thought she had felt with touch. Patient verbalized understanding.

## 2023-06-01 NOTE — TELEPHONE ENCOUNTER
----- Message from Krystina Gee MD sent at 5/31/2023 11:54 PM EDT -----  Please let him know that I felt that he had fluid in his abdomen, likely caused by liver problems.  But his liver looked completely normal and there was no fluid in his abdomen.  This is great news!

## 2023-08-01 ENCOUNTER — OFFICE VISIT (OUTPATIENT)
Dept: INTERNAL MEDICINE | Facility: CLINIC | Age: 78
End: 2023-08-01
Payer: MEDICARE

## 2023-08-01 VITALS
DIASTOLIC BLOOD PRESSURE: 70 MMHG | SYSTOLIC BLOOD PRESSURE: 132 MMHG | HEIGHT: 70 IN | HEART RATE: 72 BPM | BODY MASS INDEX: 31.12 KG/M2 | WEIGHT: 217.4 LBS | OXYGEN SATURATION: 96 %

## 2023-08-01 DIAGNOSIS — E11.65 TYPE 2 DIABETES MELLITUS WITH HYPERGLYCEMIA, WITH LONG-TERM CURRENT USE OF INSULIN: Primary | ICD-10-CM

## 2023-08-01 DIAGNOSIS — Z79.4 TYPE 2 DIABETES MELLITUS WITH HYPERGLYCEMIA, WITH LONG-TERM CURRENT USE OF INSULIN: Primary | ICD-10-CM

## 2023-08-01 DIAGNOSIS — K11.7 XEROSTOMIA: ICD-10-CM

## 2023-08-01 LAB
EXPIRATION DATE: ABNORMAL
HBA1C MFR BLD: 8.8 %
Lab: ABNORMAL

## 2023-08-01 RX ORDER — PILOCARPINE HYDROCHLORIDE 5 MG/1
5-10 TABLET, FILM COATED ORAL NIGHTLY PRN
Qty: 60 TABLET | Refills: 5 | Status: SHIPPED | OUTPATIENT
Start: 2023-08-01

## 2023-10-02 ENCOUNTER — HOSPITAL ENCOUNTER (OUTPATIENT)
Dept: CT IMAGING | Facility: HOSPITAL | Age: 78
Discharge: HOME | End: 2023-10-02
Payer: MEDICARE

## 2023-10-02 ENCOUNTER — LAB (OUTPATIENT)
Dept: LAB | Facility: HOSPITAL | Age: 78
End: 2023-10-02
Payer: MEDICARE

## 2023-10-02 DIAGNOSIS — C84.71 ANAPLASTIC ALK-NEGATIVE LARGE CELL LYMPHOMA OF LYMPH NODE OF HEAD: ICD-10-CM

## 2023-10-02 LAB
ALBUMIN SERPL-MCNC: 4.1 G/DL (ref 3.5–5.2)
ALBUMIN/GLOB SERPL: 1.2 G/DL
ALP SERPL-CCNC: 65 U/L (ref 39–117)
ALT SERPL W P-5'-P-CCNC: 28 U/L (ref 1–41)
ANION GAP SERPL CALCULATED.3IONS-SCNC: 9 MMOL/L (ref 5–15)
AST SERPL-CCNC: 30 U/L (ref 1–40)
BASOPHILS # BLD AUTO: 0.04 10*3/MM3 (ref 0–0.2)
BASOPHILS NFR BLD AUTO: 0.6 % (ref 0–1.5)
BILIRUB SERPL-MCNC: 0.4 MG/DL (ref 0–1.2)
BUN SERPL-MCNC: 27 MG/DL (ref 8–23)
BUN/CREAT SERPL: 19.7 (ref 7–25)
CALCIUM SPEC-SCNC: 9.5 MG/DL (ref 8.6–10.5)
CHLORIDE SERPL-SCNC: 101 MMOL/L (ref 98–107)
CO2 SERPL-SCNC: 29 MMOL/L (ref 22–29)
CREAT BLDA-MCNC: 1.5 MG/DL (ref 0.6–1.3)
CREAT SERPL-MCNC: 1.37 MG/DL (ref 0.76–1.27)
DEPRECATED RDW RBC AUTO: 47.9 FL (ref 37–54)
EGFRCR SERPLBLD CKD-EPI 2021: 52.8 ML/MIN/1.73
EOSINOPHIL # BLD AUTO: 0.14 10*3/MM3 (ref 0–0.4)
EOSINOPHIL NFR BLD AUTO: 2.1 % (ref 0.3–6.2)
ERYTHROCYTE [DISTWIDTH] IN BLOOD BY AUTOMATED COUNT: 13.3 % (ref 12.3–15.4)
GLOBULIN UR ELPH-MCNC: 3.3 GM/DL
GLUCOSE SERPL-MCNC: 178 MG/DL (ref 65–99)
HCT VFR BLD AUTO: 38.3 % (ref 37.5–51)
HGB BLD-MCNC: 12.9 G/DL (ref 13–17.7)
IMM GRANULOCYTES # BLD AUTO: 0.01 10*3/MM3 (ref 0–0.05)
IMM GRANULOCYTES NFR BLD AUTO: 0.2 % (ref 0–0.5)
LDH SERPL-CCNC: 275 U/L (ref 135–225)
LYMPHOCYTES # BLD AUTO: 3.13 10*3/MM3 (ref 0.7–3.1)
LYMPHOCYTES NFR BLD AUTO: 47.1 % (ref 19.6–45.3)
MCH RBC QN AUTO: 33.6 PG (ref 26.6–33)
MCHC RBC AUTO-ENTMCNC: 33.7 G/DL (ref 31.5–35.7)
MCV RBC AUTO: 99.7 FL (ref 79–97)
MONOCYTES # BLD AUTO: 0.7 10*3/MM3 (ref 0.1–0.9)
MONOCYTES NFR BLD AUTO: 10.5 % (ref 5–12)
NEUTROPHILS NFR BLD AUTO: 2.63 10*3/MM3 (ref 1.7–7)
NEUTROPHILS NFR BLD AUTO: 39.5 % (ref 42.7–76)
NRBC BLD AUTO-RTO: 0 /100 WBC (ref 0–0.2)
PLATELET # BLD AUTO: 127 10*3/MM3 (ref 140–450)
PMV BLD AUTO: 12.1 FL (ref 6–12)
POTASSIUM SERPL-SCNC: 4.6 MMOL/L (ref 3.5–5.2)
PROT SERPL-MCNC: 7.4 G/DL (ref 6–8.5)
RBC # BLD AUTO: 3.84 10*6/MM3 (ref 4.14–5.8)
SODIUM SERPL-SCNC: 139 MMOL/L (ref 136–145)
WBC NRBC COR # BLD: 6.65 10*3/MM3 (ref 3.4–10.8)

## 2023-10-02 PROCEDURE — 82565 ASSAY OF CREATININE: CPT

## 2023-10-02 PROCEDURE — 25510000001 IOPAMIDOL 61 % SOLUTION: Performed by: INTERNAL MEDICINE

## 2023-10-02 PROCEDURE — 83615 LACTATE (LD) (LDH) ENZYME: CPT

## 2023-10-02 PROCEDURE — 71260 CT THORAX DX C+: CPT

## 2023-10-02 PROCEDURE — 80053 COMPREHEN METABOLIC PANEL: CPT

## 2023-10-02 PROCEDURE — 74177 CT ABD & PELVIS W/CONTRAST: CPT

## 2023-10-02 PROCEDURE — 36415 COLL VENOUS BLD VENIPUNCTURE: CPT

## 2023-10-02 PROCEDURE — 70470 CT HEAD/BRAIN W/O & W/DYE: CPT

## 2023-10-02 PROCEDURE — 70491 CT SOFT TISSUE NECK W/DYE: CPT

## 2023-10-02 PROCEDURE — 85025 COMPLETE CBC W/AUTO DIFF WBC: CPT

## 2023-10-02 RX ADMIN — IOPAMIDOL 85 ML: 612 INJECTION, SOLUTION INTRAVENOUS at 11:35

## 2023-10-04 ENCOUNTER — HOSPITAL ENCOUNTER (OUTPATIENT)
Dept: RADIATION ONCOLOGY | Facility: HOSPITAL | Age: 78
Setting detail: RADIATION/ONCOLOGY SERIES
Discharge: HOME | End: 2023-10-04
Payer: MEDICARE

## 2023-10-04 ENCOUNTER — OFFICE VISIT (OUTPATIENT)
Dept: RADIATION ONCOLOGY | Facility: HOSPITAL | Age: 78
End: 2023-10-04
Payer: MEDICARE

## 2023-10-04 VITALS
TEMPERATURE: 97.5 F | BODY MASS INDEX: 32.64 KG/M2 | SYSTOLIC BLOOD PRESSURE: 163 MMHG | WEIGHT: 227.5 LBS | DIASTOLIC BLOOD PRESSURE: 79 MMHG | RESPIRATION RATE: 16 BRPM | OXYGEN SATURATION: 97 % | HEART RATE: 89 BPM

## 2023-10-04 DIAGNOSIS — C84.70 ANAPLASTIC ALK-NEGATIVE LARGE CELL LYMPHOMA, UNSPECIFIED BODY REGION: Primary | ICD-10-CM

## 2023-10-04 PROCEDURE — G0463 HOSPITAL OUTPT CLINIC VISIT: HCPCS

## 2023-10-04 RX ORDER — OMEPRAZOLE 20 MG/1
20 CAPSULE, DELAYED RELEASE ORAL DAILY
COMMUNITY

## 2023-10-04 NOTE — PROGRESS NOTES
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 male with history of parotid T-cell lymphoma.  The patient initially presented with a lesion in the right temporal area of the face.  This was resected by Dr. De Los Santos on 2021.  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  The patient was sent for a PET scan on 2022 that showed changes related to his resection as well as a somewhat indeterminate soft tissue density focus mass with SUV 3.2 near the angle of the mandible, and right parotid gland with SUV 3.6 potentially concerning for an additional lesion.  There was no evidence of more distant metastatic disease.  The patient then completed 6 cycles of postoperative chemotherapy of A + CHP.  Repeat CT scans 2022 showed potential residual nodularity in the right parotid gland suspicious for residual disease involvement.    The patient was sent for ultrasound-guided biopsy of the right parotid on 2022 which was negative for evidence of malignancy.  Restaging PET/CT scan 2022 showed resolution of small foci of activity involving the right face and parotid gland, consistent with complete response to treatment.  In accordance with NCCN guidelines, the patient then received adjuvant involved site radiation therapy.  The right parotid was treated to a dose of 30 Gy in 15 fraction using IGRT IMRT.   A skin bolus was utilized to treat the sites of additional excision.    He completed treatments 2022.  He tolerated treatment well.      The patient has no new complaints today. He still has some Xerostomia at  night.  He recently had scans.      MEDICATIONS: Medication reconciliation for the patient was reviewed and confirmed in the electronic medical record.    Review of Systems   Constitutional:  Positive for fatigue.   Respiratory:  Positive for cough (x10 days) and wheezing.    Musculoskeletal:         Leg pain   All other systems reviewed and are negative.  KPS 90%  Karnofsky score: 90     Physical Exam  Vitals and nursing note reviewed.   Constitutional:       General: He is not in acute distress.     Appearance: Normal appearance. He is well-developed.   HENT:      Head: Normocephalic and atraumatic.      Comments: Surgical incision right temporal region appears well-healed.  No evidence of recurrence. Mild volume loss at site of biopsies. Skin smooth.  Eyes:      Conjunctiva/sclera: Conjunctivae normal.      Pupils: Pupils are equal, round, and reactive to light.   Neck:      Trachea: No tracheal deviation.   Cardiovascular:      Rate and Rhythm: Normal rate and regular rhythm.      Heart sounds: No murmur heard.    No friction rub.      Comments: Well perfused.  Noncyanotic.  No prominent JVD.  No pedal edema.  Pulmonary:      Effort: Pulmonary effort is normal. No respiratory distress.      Breath sounds: No wheezing.      Comments: Diminished breath sounds throughout.  Chest:      Chest wall: No tenderness.   Abdominal:      General: There is no distension.      Palpations: Abdomen is soft.   Musculoskeletal:         General: Normal range of motion.      Cervical back: Normal range of motion and neck supple.      Comments: Moves all extremities spontaneously.   Lymphadenopathy:      Head:      Right side of head: No preauricular, posterior auricular or occipital adenopathy.      Left side of head: No preauricular, posterior auricular or occipital adenopathy.      Cervical: No cervical adenopathy.      Comments: 1.5 x 1.5 cm non-tender, semi-mobile nodule of the right axilla.  Nonerythematous, non-inflamed.    Skin:     General: Skin is warm and dry.   Neurological:      Mental Status: He is alert and oriented to person, place, and time.      Cranial Nerves: No cranial nerve deficit.      Coordination: Coordination normal.      Comments: Coordination intact.   Psychiatric:         Behavior: Behavior normal.         Thought Content: Thought content normal.         Judgment: Judgment normal.       VITAL SIGNS:   Vitals:    10/04/23 1110   BP: 163/79   Pulse: 89   Resp: 16   Temp: 97.5 °F (36.4 °C)   TempSrc: Temporal   SpO2: 97%   Weight: 103 kg (227 lb 8 oz)   PainSc: 0-No pain             IMAGING:      The following portions of the patient's history were reviewed and updated as appropriate: allergies, current medications, past family history, past medical history, past social history, past surgical history and problem list.    CT Head With & Without Contrast    Result Date: 10/2/2023  Impression: No evidence of new worrisome lymphadenopathy or other osseous or soft tissue finding in the head, neck, chest, abdomen and pelvis concerning for lymphomatous disease recurrence. Somewhat improved appearance of previously noted multifocal tree-in-bud nodularity, suggesting some component of likely postinfectious scarring or chronic atypical pneumonia. No acute findings are present otherwise. Electronically Signed: Kenn Carrero MD  10/2/2023 12:35 PM EDT  Workstation ID: [id number]    CT Soft Tissue Neck With Contrast    Result Date: 10/2/2023  Impression: No evidence of new worrisome lymphadenopathy or other osseous or soft tissue finding in the head, neck, chest, abdomen and pelvis concerning for lymphomatous disease recurrence. Somewhat improved appearance of previously noted multifocal tree-in-bud nodularity, suggesting some component of likely postinfectious scarring or chronic atypical pneumonia. No acute findings are present otherwise. Electronically Signed: Kenn Carrero MD  10/2/2023 12:35 PM EDT  Workstation ID:  [id number]    CT Chest With Contrast Diagnostic    Result Date: 10/2/2023  Impression: No evidence of new worrisome lymphadenopathy or other osseous or soft tissue finding in the head, neck, chest, abdomen and pelvis concerning for lymphomatous disease recurrence. Somewhat improved appearance of previously noted multifocal tree-in-bud nodularity, suggesting some component of likely postinfectious scarring or chronic atypical pneumonia. No acute findings are present otherwise. Electronically Signed: Kenn Carrero MD  10/2/2023 12:35 PM EDT  Workstation ID: [id number]    CT Abdomen Pelvis With Contrast    Result Date: 10/2/2023  Impression: No evidence of new worrisome lymphadenopathy or other osseous or soft tissue finding in the head, neck, chest, abdomen and pelvis concerning for lymphomatous disease recurrence. Somewhat improved appearance of previously noted multifocal tree-in-bud nodularity, suggesting some component of likely postinfectious scarring or chronic atypical pneumonia. No acute findings are present otherwise. Electronically Signed: Kenn Carrero MD  10/2/2023 12:35 PM EDT  Workstation ID: [id number]          There are no diagnoses linked to this encounter.       IMPRESSION:  Gabriela Mar is a 77 y.o. gentleman with history of a stage IE anaplastic ALK-negative large cell lymphoma of the right temporal region.  It does appear that the patient initially had parotid akbar involvement at the time of his initial treatments.  Following surgical resection and adjuvant chemotherapy, the patient appears to have had a complete response to treatment with repeat biopsy showing no evidence of residual tumor in the parotid gland and repeat PET/CT scan negative for any residual concerning nodularity or hypermetabolic activity.  The patient then underwent consolidative radiation therapy, which he completed 8/26/2022.     The patient has some residual xerostomia. Will continue to follow with DR Herr. His scans look  good.    I spent 30 min on the patients case today.    RECOMMENDATIONS:    Anaplastic large cell lymphoma ALK negative involving right temporal area  -Status post initial resection per Dr. De Los Santos  -High suspicion of right parotid/parotid lymph nodes involved initially  -Status post chemotherapy 6 of 6 cycles A + CHP  -PET scan shows complete resolution of nodularity in the parotid gland  -Biopsies of the parotid gland 7/7/2022 negative for malignancy  -Case discussed at tumor board  -Status post consolidative radiotherapy with coverage including the whole parotid 30 Gray in 15 fractions              -Bolus to the right temporal skin              -IGR T and IMRT              -Completed 8/26/2022  -scans look good  -Follows with Dr. Herr for surveillance, repeat imaging  -RTC as needed    Bilateral pulmonary nodules  -Subcentimeter  -History of COVID-19 infection ~early 2022  -Appear stable on most recent imaging (official read pending) compared to 11/22/2022 CT chest  -Potentially infectious or inflammatory versus less likely appearance of recurrent lymphomatous disease   -Potentially consistent with atypical pneumonia in setting of recent URI symptoms  -Prescription for azithromycin for possible URI versus pneumonia  -Recommend continued surveillance        No follow-ups on file.    Mil Arriaga MD

## 2023-10-09 ENCOUNTER — OFFICE VISIT (OUTPATIENT)
Dept: ONCOLOGY | Facility: CLINIC | Age: 78
End: 2023-10-09
Payer: MEDICARE

## 2023-10-09 VITALS
RESPIRATION RATE: 16 BRPM | DIASTOLIC BLOOD PRESSURE: 81 MMHG | HEART RATE: 86 BPM | WEIGHT: 231 LBS | HEIGHT: 70 IN | BODY MASS INDEX: 33.07 KG/M2 | TEMPERATURE: 97.1 F | OXYGEN SATURATION: 98 % | SYSTOLIC BLOOD PRESSURE: 174 MMHG

## 2023-10-09 DIAGNOSIS — C84.71 ANAPLASTIC ALK-NEGATIVE LARGE CELL LYMPHOMA OF LYMPH NODE OF HEAD: Primary | ICD-10-CM

## 2023-10-09 PROCEDURE — 3077F SYST BP >= 140 MM HG: CPT | Performed by: INTERNAL MEDICINE

## 2023-10-09 PROCEDURE — 99214 OFFICE O/P EST MOD 30 MIN: CPT | Performed by: INTERNAL MEDICINE

## 2023-10-09 PROCEDURE — 1126F AMNT PAIN NOTED NONE PRSNT: CPT | Performed by: INTERNAL MEDICINE

## 2023-10-09 PROCEDURE — 3079F DIAST BP 80-89 MM HG: CPT | Performed by: INTERNAL MEDICINE

## 2023-10-09 NOTE — PROGRESS NOTES
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         REASON FOR VISIT: Management of mild lymphoma    HISTORY OF PRESENT ILLNESS:   77 y.o.  male presents today for follow-up of his lymphoma.  He completed 6 of 6 cycles of CHP-Brentuximab and completed radiotherapy for consolidation in August 2022.  He presents today after having scans done.  Clinically doing reasonably well.  No major changes since I last saw him.  Denies any issues with nausea or vomiting.  No issues with pain.  No night sweats.    Past medical history, social history and family history was reviewed 10/09/23 and unchanged from prior visit.    Review of Systems:    Review of Systems   Constitutional:  Negative for appetite change and fatigue.   HENT:  Negative.     Eyes: Negative.    Respiratory: Negative.     Cardiovascular: Negative.    Gastrointestinal: Negative.    Endocrine: Negative.    Genitourinary: Negative.     Skin: Negative.    Neurological: Negative.    Hematological: Negative.    Psychiatric/Behavioral:  Negative for sleep disturbance.             Medications:        Current Outpatient Medications:     albuterol (PROVENTIL) (2.5 MG/3ML) 0.083% nebulizer solution, Take 2.5 mg by nebulization Every 4 (Four) Hours As Needed for Wheezing., Disp: 120 each, Rfl: 5    albuterol sulfate  (90  "Base) MCG/ACT inhaler, Inhale 2 puffs Every 4 (Four) Hours As Needed for Wheezing., Disp: 8 g, Rfl: 3    Cholecalciferol (VITAMIN D) 1000 UNITS tablet, Take 2 tablets by mouth Daily., Disp: , Rfl:     ferrous sulfate 325 (65 FE) MG tablet, Take 1 tablet by mouth Daily With Breakfast., Disp: , Rfl:     gabapentin (NEURONTIN) 300 MG capsule, Take 1 capsule by mouth Every Night., Disp: 90 capsule, Rfl: 1    glimepiride (AMARYL) 4 MG tablet, Take 1 tablet by mouth Daily With Dinner., Disp: 90 tablet, Rfl: 1    insulin NPH (humuLIN N,novoLIN N) 100 UNIT/ML injection, Inject 10 units BID x 2 weeks then 20 units BID, Disp: 20 mL, Rfl: 5    metFORMIN ER (Glucophage XR) 500 MG 24 hr tablet, Take 1 tablet by mouth Daily With Breakfast., Disp: 90 tablet, Rfl: 1    Multiple Vitamins-Minerals (MULTI VITAMIN/MINERALS PO), Take 1 tablet by mouth Daily., Disp: , Rfl:     Multiple Vitamins-Minerals (PreserVision AREDS 2) chewable tablet, Chew 1 tablet 2 (Two) Times a Day., Disp: , Rfl:     omeprazole (priLOSEC) 20 MG capsule, Take 1 capsule by mouth Daily., Disp: , Rfl:     simvastatin (ZOCOR) 10 MG tablet, Take 1 tablet by mouth every night at bedtime., Disp: 90 tablet, Rfl: 3    Pain Medications               gabapentin (NEURONTIN) 300 MG capsule Take 1 capsule by mouth Every Night.               ALLERGIES:    Allergies   Allergen Reactions    Bactrim [Sulfamethoxazole-Trimethoprim] Other (See Comments)     Joint pain    Sulfa Antibiotics GI Intolerance     Makes bones hurt      Tegaderm Chg Dressing [Chlorhexidine] Itching and Other (See Comments)     redness         Physical Exam    VITAL SIGNS:  /81 Comment: LUE  Pulse 86   Temp 97.1 øF (36.2 øC) (Infrared)   Resp 16   Ht 177.8 cm (70\")   Wt 105 kg (231 lb)   SpO2 98% Comment: RA  BMI 33.15 kg/mý     ECOG score: 0           Wt Readings from Last 3 Encounters:   10/09/23 105 kg (231 lb)   10/04/23 103 kg (227 lb 8 oz)   08/01/23 98.6 kg (217 lb 6.4 oz)       Body " mass index is 33.15 kg/mý. Body surface area is 2.22 meters squared.    Pain Score    10/09/23 1052   PainSc: 0-No pain           Performance Status: 0    General: well appearing, in no acute distress  HEENT: Swelling on the right side of his face consistent with his lymphomatous process.  Lymphatics: no cervical, supraclavicular, or axillary adenopathy  Cardiovascular: regular rate and rhythm, no murmurs, rubs or gallops  Lungs: clear to auscultation bilaterally  Abdomen: soft, nontender, nondistended.  No palpable organomegaly  Extremities: no lower extremity edema  Skin: no rashes, lesions, bruising, or petechiae  Msk:  Shows no weakness of the large muscle groups  Psych: Mood is stable        RECENT LABS:    Lab Results   Component Value Date    HGB 12.9 (L) 10/02/2023    HCT 38.3 10/02/2023    MCV 99.7 (H) 10/02/2023     (L) 10/02/2023    WBC 6.65 10/02/2023    NEUTROABS 2.63 10/02/2023    LYMPHSABS 3.13 (H) 10/02/2023    MONOSABS 0.70 10/02/2023    EOSABS 0.14 10/02/2023    BASOSABS 0.04 10/02/2023       Lab Results   Component Value Date    GLUCOSE 178 (H) 10/02/2023    BUN 27 (H) 10/02/2023    CREATININE 1.37 (H) 10/02/2023     10/02/2023    K 4.6 10/02/2023     10/02/2023    CO2 29.0 10/02/2023    CALCIUM 9.5 10/02/2023    PROTEINTOT 7.4 10/02/2023    ALBUMIN 4.1 10/02/2023    BILITOT 0.4 10/02/2023    ALKPHOS 65 10/02/2023    AST 30 10/02/2023    ALT 28 10/02/2023     CT Abdomen Pelvis With Contrast    Result Date: 10/2/2023  Impression: No evidence of new worrisome lymphadenopathy or other osseous or soft tissue finding in the head, neck, chest, abdomen and pelvis concerning for lymphomatous disease recurrence. Somewhat improved appearance of previously noted multifocal tree-in-bud nodularity, suggesting some component of likely postinfectious scarring or chronic atypical pneumonia. No acute findings are present otherwise. Electronically Signed: Kenn Carrero MD  10/2/2023 12:35 PM EDT   Workstation ID: [id number]    CT Chest With Contrast Diagnostic    Result Date: 10/2/2023  Impression: No evidence of new worrisome lymphadenopathy or other osseous or soft tissue finding in the head, neck, chest, abdomen and pelvis concerning for lymphomatous disease recurrence. Somewhat improved appearance of previously noted multifocal tree-in-bud nodularity, suggesting some component of likely postinfectious scarring or chronic atypical pneumonia. No acute findings are present otherwise. Electronically Signed: Kenn Carrero MD  10/2/2023 12:35 PM EDT  Workstation ID: [id number]    CT Head With & Without Contrast    Result Date: 10/2/2023  Impression: No evidence of new worrisome lymphadenopathy or other osseous or soft tissue finding in the head, neck, chest, abdomen and pelvis concerning for lymphomatous disease recurrence. Somewhat improved appearance of previously noted multifocal tree-in-bud nodularity, suggesting some component of likely postinfectious scarring or chronic atypical pneumonia. No acute findings are present otherwise. Electronically Signed: Kenn Carrero MD  10/2/2023 12:35 PM EDT  Workstation ID: [id number]    CT Soft Tissue Neck With Contrast    Result Date: 10/2/2023  Impression: No evidence of new worrisome lymphadenopathy or other osseous or soft tissue finding in the head, neck, chest, abdomen and pelvis concerning for lymphomatous disease recurrence. Somewhat improved appearance of previously noted multifocal tree-in-bud nodularity, suggesting some component of likely postinfectious scarring or chronic atypical pneumonia. No acute findings are present otherwise. Electronically Signed: Kenn Carrero MD  10/2/2023 12:35 PM EDT  Workstation ID: [id number]          Assessment/Plan    1.  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  He has completed 6 cycles of treatment with A + CHP.  Completed consolidative radiotherapy in August 2022.  I reviewed his scans today  which shows no sign of recurrent disease.  Plan to rescan him in 6 months.  Fairly high risk for recurrence. His LDH is slightly more elevated than I would anticipate.  For now no testing.  We will plan to repeat scans in 6 months.    Total time of patient care on day of service including time prior to, face to face with patient, and following visit spent in reviewing records, lab results, imaging studies, discussion with patient, and documentation/charting was > 32 minutes.        Ai Henderson MD  [facility]    Return in (Approximately): 6 months    Orders Placed This Encounter   Procedures    CT Soft Tissue Neck With Contrast    CT Chest With Contrast Diagnostic    CT Abdomen Pelvis With Contrast    Lactate Dehydrogenase    Comprehensive Metabolic Panel    CBC & Differential       10/9/2023

## 2023-10-20 DIAGNOSIS — Z79.4 TYPE 2 DIABETES MELLITUS WITH HYPERGLYCEMIA, WITH LONG-TERM CURRENT USE OF INSULIN: ICD-10-CM

## 2023-10-20 DIAGNOSIS — E11.65 TYPE 2 DIABETES MELLITUS WITH HYPERGLYCEMIA, WITH LONG-TERM CURRENT USE OF INSULIN: ICD-10-CM

## 2023-10-20 RX ORDER — GLIMEPIRIDE 4 MG/1
4 TABLET ORAL
Qty: 90 TABLET | Refills: 0 | Status: SHIPPED | OUTPATIENT
Start: 2023-10-20

## 2023-11-10 ENCOUNTER — OFFICE VISIT (OUTPATIENT)
Dept: INTERNAL MEDICINE | Facility: CLINIC | Age: 78
End: 2023-11-10
Payer: MEDICARE

## 2023-11-10 VITALS
SYSTOLIC BLOOD PRESSURE: 126 MMHG | TEMPERATURE: 97.3 F | DIASTOLIC BLOOD PRESSURE: 80 MMHG | BODY MASS INDEX: 33.58 KG/M2 | WEIGHT: 234.6 LBS | OXYGEN SATURATION: 96 % | HEART RATE: 78 BPM | HEIGHT: 70 IN

## 2023-11-10 DIAGNOSIS — T45.1X5A CHEMOTHERAPY-INDUCED NEUROPATHY: ICD-10-CM

## 2023-11-10 DIAGNOSIS — Z79.4 TYPE 2 DIABETES MELLITUS WITH HYPERGLYCEMIA, WITH LONG-TERM CURRENT USE OF INSULIN: Primary | ICD-10-CM

## 2023-11-10 DIAGNOSIS — E11.65 TYPE 2 DIABETES MELLITUS WITH HYPERGLYCEMIA, WITH LONG-TERM CURRENT USE OF INSULIN: Primary | ICD-10-CM

## 2023-11-10 DIAGNOSIS — G62.0 CHEMOTHERAPY-INDUCED NEUROPATHY: ICD-10-CM

## 2023-11-10 LAB
EXPIRATION DATE: ABNORMAL
HBA1C MFR BLD: 7.2 % (ref 4.5–5.7)
Lab: ABNORMAL

## 2023-11-10 RX ORDER — GABAPENTIN 300 MG/1
CAPSULE ORAL
Qty: 90 CAPSULE | Refills: 2 | Status: SHIPPED | OUTPATIENT
Start: 2023-11-10

## 2023-11-10 RX ORDER — METFORMIN HYDROCHLORIDE 500 MG/1
500 TABLET, EXTENDED RELEASE ORAL
Qty: 90 TABLET | Refills: 1 | Status: SHIPPED | OUTPATIENT
Start: 2023-11-10

## 2023-11-10 RX ORDER — CYANOCOBALAMIN 1000 UG/ML
1000 INJECTION, SOLUTION INTRAMUSCULAR; SUBCUTANEOUS
Status: SHIPPED | OUTPATIENT
Start: 2023-11-10

## 2023-11-10 RX ORDER — GLIMEPIRIDE 4 MG/1
4 TABLET ORAL
Qty: 90 TABLET | Refills: 1 | Status: SHIPPED | OUTPATIENT
Start: 2023-11-10

## 2023-11-10 RX ADMIN — CYANOCOBALAMIN 1000 MCG: 1000 INJECTION, SOLUTION INTRAMUSCULAR; SUBCUTANEOUS at 12:30

## 2023-11-10 NOTE — PROGRESS NOTES
Diabetes and Hyperlipidemia    Subjective   Gabriela Mar is a 78 y.o. male is here today for follow-up.    History of Present Illness  Gabriela is here for a follow up on his Dm2, htn, chf, and his lymphoma ,  CHP-Brentuximab and completed radiotherapy for consolidation in August 2022 .  Plan for repeat scans in April.  Sugars are stable, running between   Would like a handicap parking.    Current Outpatient Medications:     albuterol (PROVENTIL) (2.5 MG/3ML) 0.083% nebulizer solution, Take 2.5 mg by nebulization Every 4 (Four) Hours As Needed for Wheezing., Disp: 120 each, Rfl: 5    albuterol sulfate  (90 Base) MCG/ACT inhaler, Inhale 2 puffs Every 4 (Four) Hours As Needed for Wheezing., Disp: 8 g, Rfl: 3    Cholecalciferol (VITAMIN D) 1000 UNITS tablet, Take 2 tablets by mouth Daily., Disp: , Rfl:     ferrous sulfate 325 (65 FE) MG tablet, Take 1 tablet by mouth Daily With Breakfast., Disp: , Rfl:     gabapentin (NEURONTIN) 300 MG capsule, 1 PO BID x 1 week then 1 PO TID, Disp: 90 capsule, Rfl: 2    glimepiride (AMARYL) 4 MG tablet, Take 1 tablet by mouth Daily With Dinner., Disp: 90 tablet, Rfl: 1    insulin NPH (humuLIN N,novoLIN N) 100 UNIT/ML injection, Inject 10 units BID x 2 weeks then 20 units BID, Disp: 20 mL, Rfl: 5    metFORMIN ER (Glucophage XR) 500 MG 24 hr tablet, Take 1 tablet by mouth Daily With Breakfast., Disp: 90 tablet, Rfl: 1    Multiple Vitamins-Minerals (MULTI VITAMIN/MINERALS PO), Take 1 tablet by mouth Daily., Disp: , Rfl:     Multiple Vitamins-Minerals (PreserVision AREDS 2) chewable tablet, Chew 1 tablet 2 (Two) Times a Day., Disp: , Rfl:     omeprazole (priLOSEC) 20 MG capsule, Take 1 capsule by mouth Daily., Disp: , Rfl:     simvastatin (ZOCOR) 10 MG tablet, Take 1 tablet by mouth every night at bedtime., Disp: 90 tablet, Rfl: 3    Current Facility-Administered Medications:     cyanocobalamin injection 1,000 mcg, 1,000 mcg, Intramuscular, Q28 Days, Marlen, Krystina, MD,  "1,000 mcg at 11/10/23 1230      The following portions of the patient's history were reviewed and updated as appropriate: allergies, current medications, past family history, past medical history, past social history, past surgical history and problem list.    Review of Systems   Constitutional:  Positive for unexpected weight change. Negative for chills and fever.   HENT:  Negative for ear discharge, ear pain, sinus pressure and sore throat.    Respiratory:  Negative for cough, chest tightness and shortness of breath.    Cardiovascular:  Negative for chest pain, palpitations and leg swelling.   Gastrointestinal:  Negative for diarrhea, nausea and vomiting.   Musculoskeletal:  Negative for arthralgias, back pain and myalgias.   Neurological:  Positive for numbness. Negative for dizziness, syncope and headaches.        Rt hand   Psychiatric/Behavioral:  Negative for confusion and sleep disturbance.        Objective   /80   Pulse 78   Temp 97.3 °F (36.3 °C)   Ht 177.8 cm (70\")   Wt 106 kg (234 lb 9.6 oz)   SpO2 96% Comment: ra  BMI 33.66 kg/m²   Physical Exam  Vitals and nursing note reviewed.   Constitutional:       Appearance: He is well-developed.   HENT:      Head: Normocephalic and atraumatic.      Right Ear: External ear normal.      Left Ear: External ear normal.      Mouth/Throat:      Pharynx: No oropharyngeal exudate.   Eyes:      Conjunctiva/sclera: Conjunctivae normal.      Pupils: Pupils are equal, round, and reactive to light.   Neck:      Thyroid: No thyromegaly.   Cardiovascular:      Rate and Rhythm: Normal rate and regular rhythm.      Pulses: Normal pulses.      Heart sounds: Normal heart sounds. No murmur heard.     No friction rub. No gallop.   Pulmonary:      Effort: Pulmonary effort is normal.      Breath sounds: Normal breath sounds.   Abdominal:      General: Bowel sounds are normal. There is no distension.      Palpations: Abdomen is soft.      Tenderness: There is no abdominal " tenderness.   Musculoskeletal:      Cervical back: Neck supple.   Skin:     General: Skin is warm and dry.   Neurological:      Mental Status: He is alert and oriented to person, place, and time.      Cranial Nerves: No cranial nerve deficit.   Psychiatric:         Judgment: Judgment normal.                 Results for orders placed or performed in visit on 11/10/23   POC Glycosylated Hemoglobin (Hb A1C)    Specimen: Blood   Result Value Ref Range    Hemoglobin A1C 7.2 (A) 4.5 - 5.7 %    Lot Number [id number]     Expiration Date 6/6/25              Assessment & Plan   Diagnoses and all orders for this visit:    Type 2 diabetes mellitus with hyperglycemia, with long-term current use of insulin  -     POC Glycosylated Hemoglobin (Hb A1C)  -     glimepiride (AMARYL) 4 MG tablet; Take 1 tablet by mouth Daily With Dinner.  -     insulin NPH (humuLIN N,novoLIN N) 100 UNIT/ML injection; Inject 10 units BID x 2 weeks then 20 units BID  -     metFORMIN ER (Glucophage XR) 500 MG 24 hr tablet; Take 1 tablet by mouth Daily With Breakfast.    Chemotherapy-induced neuropathy  -     gabapentin (NEURONTIN) 300 MG capsule; 1 PO BID x 1 week then 1 PO TID  -     cyanocobalamin injection 1,000 mcg      Start b12 shots, increase gabapentin.  Paperwork for handicap parking given.         The patient has read and signed the [facility] Controlled Substance Contract.  I will continue to see patient for regular follow up appointments.  They are well controlled on their medication.  CASTRO has been reviewed by me and is updated every 3 months. The patient is aware of the potential for addiction and dependence.     Return in about 3 months (around 2/10/2024) for Recheck and wellness in July.    Electronically signed by:    Krystina Gee MD

## 2023-11-20 ENCOUNTER — TELEPHONE (OUTPATIENT)
Dept: INTERNAL MEDICINE | Facility: CLINIC | Age: 78
End: 2023-11-20
Payer: MEDICARE

## 2023-11-20 NOTE — TELEPHONE ENCOUNTER
Caller: Gabriela Mar    Relationship: Self    Best call back number: [phone]    Which medication are you concerned about: GABAPENTIN AND METFORMIN    Who prescribed you this medication: DOCTOR MUKUND        What are your concerns: THE PATIENT STATES THAT HIS GABAPENTIN WAS SUPPOSED TO BE INCREASED TO THE NEXT DOSAGE THE PATIENT STATES THAT [facility] DOES NOT HAVE A NEW PRESCRIPTION FOR THE INCREASE THE PATIENT ALSO STATES THAT THE METFORMIN WAS LOWERED TO 500MG HE WAS TAKING 1000MG THE PATIENT STATES THAT HE HAS BEEN CUTTING THE 1000MG IN HALF BUT HE WOULD LIKE TO GET A PRESCRIPTION SENT IN FOR 500MG SO THAT HE DOES NOT HAVE TO CUT THEM

## 2023-11-20 NOTE — TELEPHONE ENCOUNTER
Pt advised that scripts were sent in on 11/10/23, pt states he will call pharmacy to check on them.

## 2024-02-12 ENCOUNTER — LAB (OUTPATIENT)
Dept: LAB | Facility: HOSPITAL | Age: 79
End: 2024-02-12
Payer: MEDICARE

## 2024-02-12 ENCOUNTER — OFFICE VISIT (OUTPATIENT)
Dept: INTERNAL MEDICINE | Facility: CLINIC | Age: 79
End: 2024-02-12
Payer: MEDICARE

## 2024-02-12 VITALS
SYSTOLIC BLOOD PRESSURE: 150 MMHG | WEIGHT: 240 LBS | HEART RATE: 81 BPM | BODY MASS INDEX: 34.36 KG/M2 | HEIGHT: 70 IN | DIASTOLIC BLOOD PRESSURE: 86 MMHG | TEMPERATURE: 97.9 F | OXYGEN SATURATION: 91 %

## 2024-02-12 DIAGNOSIS — G62.0 CHEMOTHERAPY-INDUCED NEUROPATHY: ICD-10-CM

## 2024-02-12 DIAGNOSIS — G89.29 CHRONIC MIDLINE LOW BACK PAIN WITHOUT SCIATICA: ICD-10-CM

## 2024-02-12 DIAGNOSIS — E78.5 HYPERLIPIDEMIA LDL GOAL <70: ICD-10-CM

## 2024-02-12 DIAGNOSIS — J44.0 CHRONIC OBSTRUCTIVE PULMONARY DISEASE WITH ACUTE LOWER RESPIRATORY INFECTION: ICD-10-CM

## 2024-02-12 DIAGNOSIS — Z79.4 TYPE 2 DIABETES MELLITUS WITH HYPERGLYCEMIA, WITH LONG-TERM CURRENT USE OF INSULIN: Primary | ICD-10-CM

## 2024-02-12 DIAGNOSIS — T45.1X5A CHEMOTHERAPY-INDUCED NEUROPATHY: ICD-10-CM

## 2024-02-12 DIAGNOSIS — M54.50 CHRONIC MIDLINE LOW BACK PAIN WITHOUT SCIATICA: ICD-10-CM

## 2024-02-12 DIAGNOSIS — E11.65 TYPE 2 DIABETES MELLITUS WITH HYPERGLYCEMIA, WITH LONG-TERM CURRENT USE OF INSULIN: Primary | ICD-10-CM

## 2024-02-12 DIAGNOSIS — J34.89 SINUS DRAINAGE: ICD-10-CM

## 2024-02-12 LAB
B PARAPERT DNA SPEC QL NAA+PROBE: NOT DETECTED
B PERT DNA SPEC QL NAA+PROBE: NOT DETECTED
C PNEUM DNA NPH QL NAA+NON-PROBE: NOT DETECTED
EXPIRATION DATE: ABNORMAL
EXPIRATION DATE: NORMAL
FLUAV AG UPPER RESP QL IA.RAPID: NOT DETECTED
FLUAV SUBTYP SPEC NAA+PROBE: NOT DETECTED
FLUBV AG UPPER RESP QL IA.RAPID: NOT DETECTED
FLUBV RNA ISLT QL NAA+PROBE: NOT DETECTED
HADV DNA SPEC NAA+PROBE: NOT DETECTED
HBA1C MFR BLD: 7.9 % (ref 4.5–5.7)
HCOV 229E RNA SPEC QL NAA+PROBE: DETECTED
HCOV HKU1 RNA SPEC QL NAA+PROBE: NOT DETECTED
HCOV NL63 RNA SPEC QL NAA+PROBE: NOT DETECTED
HCOV OC43 RNA SPEC QL NAA+PROBE: NOT DETECTED
HMPV RNA NPH QL NAA+NON-PROBE: NOT DETECTED
HPIV1 RNA ISLT QL NAA+PROBE: NOT DETECTED
HPIV2 RNA SPEC QL NAA+PROBE: NOT DETECTED
HPIV3 RNA NPH QL NAA+PROBE: NOT DETECTED
HPIV4 P GENE NPH QL NAA+PROBE: NOT DETECTED
INTERNAL CONTROL: NORMAL
Lab: ABNORMAL
Lab: NORMAL
M PNEUMO IGG SER IA-ACNC: NOT DETECTED
RHINOVIRUS RNA SPEC NAA+PROBE: NOT DETECTED
RSV RNA NPH QL NAA+NON-PROBE: NOT DETECTED
SARS-COV-2 AG UPPER RESP QL IA.RAPID: NOT DETECTED
SARS-COV-2 RNA NPH QL NAA+NON-PROBE: NOT DETECTED

## 2024-02-12 PROCEDURE — 87428 SARSCOV & INF VIR A&B AG IA: CPT | Performed by: INTERNAL MEDICINE

## 2024-02-12 PROCEDURE — 80061 LIPID PANEL: CPT

## 2024-02-12 PROCEDURE — 83036 HEMOGLOBIN GLYCOSYLATED A1C: CPT | Performed by: INTERNAL MEDICINE

## 2024-02-12 PROCEDURE — G2211 COMPLEX E/M VISIT ADD ON: HCPCS | Performed by: INTERNAL MEDICINE

## 2024-02-12 PROCEDURE — 1160F RVW MEDS BY RX/DR IN RCRD: CPT | Performed by: INTERNAL MEDICINE

## 2024-02-12 PROCEDURE — 0202U NFCT DS 22 TRGT SARS-COV-2: CPT | Performed by: INTERNAL MEDICINE

## 2024-02-12 PROCEDURE — 1159F MED LIST DOCD IN RCRD: CPT | Performed by: INTERNAL MEDICINE

## 2024-02-12 PROCEDURE — 3079F DIAST BP 80-89 MM HG: CPT | Performed by: INTERNAL MEDICINE

## 2024-02-12 PROCEDURE — 3051F HG A1C>EQUAL 7.0%<8.0%: CPT | Performed by: INTERNAL MEDICINE

## 2024-02-12 PROCEDURE — 99214 OFFICE O/P EST MOD 30 MIN: CPT | Performed by: INTERNAL MEDICINE

## 2024-02-12 PROCEDURE — 80053 COMPREHEN METABOLIC PANEL: CPT

## 2024-02-12 PROCEDURE — 3077F SYST BP >= 140 MM HG: CPT | Performed by: INTERNAL MEDICINE

## 2024-02-12 RX ORDER — TIZANIDINE 2 MG/1
2 TABLET ORAL NIGHTLY PRN
Qty: 30 TABLET | Refills: 3 | Status: SHIPPED | OUTPATIENT
Start: 2024-02-12

## 2024-02-12 RX ORDER — AZITHROMYCIN 250 MG/1
TABLET, FILM COATED ORAL
Qty: 6 TABLET | Refills: 0 | Status: SHIPPED | OUTPATIENT
Start: 2024-02-12

## 2024-02-13 ENCOUNTER — TELEPHONE (OUTPATIENT)
Dept: INTERNAL MEDICINE | Facility: CLINIC | Age: 79
End: 2024-02-13
Payer: MEDICARE

## 2024-02-13 LAB
ALBUMIN SERPL-MCNC: 4.1 G/DL (ref 3.5–5.2)
ALBUMIN/GLOB SERPL: 1.3 G/DL
ALP SERPL-CCNC: 82 U/L (ref 39–117)
ALT SERPL W P-5'-P-CCNC: 25 U/L (ref 1–41)
ANION GAP SERPL CALCULATED.3IONS-SCNC: 13.8 MMOL/L (ref 5–15)
AST SERPL-CCNC: 32 U/L (ref 1–40)
BILIRUB SERPL-MCNC: 0.4 MG/DL (ref 0–1.2)
BUN SERPL-MCNC: 21 MG/DL (ref 8–23)
BUN/CREAT SERPL: 14.7 (ref 7–25)
CALCIUM SPEC-SCNC: 9.1 MG/DL (ref 8.6–10.5)
CHLORIDE SERPL-SCNC: 100 MMOL/L (ref 98–107)
CHOLEST SERPL-MCNC: 117 MG/DL (ref 0–200)
CO2 SERPL-SCNC: 26.2 MMOL/L (ref 22–29)
CREAT SERPL-MCNC: 1.43 MG/DL (ref 0.76–1.27)
EGFRCR SERPLBLD CKD-EPI 2021: 49.8 ML/MIN/1.73
GLOBULIN UR ELPH-MCNC: 3.1 GM/DL
GLUCOSE SERPL-MCNC: 82 MG/DL (ref 65–99)
HDLC SERPL-MCNC: 38 MG/DL (ref 40–60)
LDLC SERPL CALC-MCNC: 61 MG/DL (ref 0–100)
LDLC/HDLC SERPL: 1.57 {RATIO}
POTASSIUM SERPL-SCNC: 4.6 MMOL/L (ref 3.5–5.2)
PROT SERPL-MCNC: 7.2 G/DL (ref 6–8.5)
SODIUM SERPL-SCNC: 140 MMOL/L (ref 136–145)
TRIGL SERPL-MCNC: 97 MG/DL (ref 0–150)
VLDLC SERPL-MCNC: 18 MG/DL (ref 5–40)

## 2024-02-15 ENCOUNTER — HOSPITAL ENCOUNTER (OUTPATIENT)
Dept: GENERAL RADIOLOGY | Facility: HOSPITAL | Age: 79
Discharge: HOME | End: 2024-02-15
Admitting: INTERNAL MEDICINE
Payer: MEDICARE

## 2024-02-15 DIAGNOSIS — M54.50 CHRONIC MIDLINE LOW BACK PAIN WITHOUT SCIATICA: ICD-10-CM

## 2024-02-15 DIAGNOSIS — G89.29 CHRONIC MIDLINE LOW BACK PAIN WITHOUT SCIATICA: ICD-10-CM

## 2024-02-15 PROCEDURE — 72100 X-RAY EXAM L-S SPINE 2/3 VWS: CPT

## 2024-02-23 ENCOUNTER — TELEPHONE (OUTPATIENT)
Dept: INTERNAL MEDICINE | Facility: CLINIC | Age: 79
End: 2024-02-23
Payer: MEDICARE

## 2024-02-23 NOTE — TELEPHONE ENCOUNTER
Patient advised of results, he states Dr Gee put him on a muscle relaxer and seems to be helping and he will call back if he decides to do physical therapy but at this time he does not want to

## 2024-02-23 NOTE — TELEPHONE ENCOUNTER
----- Message from Krystina Gee MD sent at 2/22/2024 10:42 PM EST -----  Please make sure patient has seen the following [facility] message:       Your back x-rays are consistent with moderate arthritis and degenerative disc disease.  No compression or bony fracture seen.  If your pain is persisting, you would benefit from physical therapy.  Please let me know and I can place the referral.

## 2024-04-08 ENCOUNTER — HOSPITAL ENCOUNTER (OUTPATIENT)
Dept: CT IMAGING | Facility: HOSPITAL | Age: 79
Discharge: HOME | End: 2024-04-08
Admitting: INTERNAL MEDICINE
Payer: MEDICARE

## 2024-04-08 DIAGNOSIS — C84.71 ANAPLASTIC ALK-NEGATIVE LARGE CELL LYMPHOMA OF LYMPH NODE OF HEAD: ICD-10-CM

## 2024-04-08 LAB — CREAT BLDA-MCNC: 1.6 MG/DL (ref 0.6–1.3)

## 2024-04-08 PROCEDURE — 82565 ASSAY OF CREATININE: CPT

## 2024-04-08 PROCEDURE — 25510000001 IOPAMIDOL 61 % SOLUTION: Performed by: INTERNAL MEDICINE

## 2024-04-08 PROCEDURE — 74177 CT ABD & PELVIS W/CONTRAST: CPT

## 2024-04-08 PROCEDURE — 70491 CT SOFT TISSUE NECK W/DYE: CPT

## 2024-04-08 PROCEDURE — 71260 CT THORAX DX C+: CPT

## 2024-04-08 RX ADMIN — IOPAMIDOL 85 ML: 612 INJECTION, SOLUTION INTRAVENOUS at 11:15

## 2024-04-10 ENCOUNTER — LAB (OUTPATIENT)
Dept: LAB | Facility: HOSPITAL | Age: 79
End: 2024-04-10
Payer: MEDICARE

## 2024-04-10 ENCOUNTER — OFFICE VISIT (OUTPATIENT)
Dept: ONCOLOGY | Facility: CLINIC | Age: 79
End: 2024-04-10
Payer: MEDICARE

## 2024-04-10 VITALS
BODY MASS INDEX: 34.36 KG/M2 | HEIGHT: 70 IN | SYSTOLIC BLOOD PRESSURE: 174 MMHG | DIASTOLIC BLOOD PRESSURE: 76 MMHG | OXYGEN SATURATION: 94 % | TEMPERATURE: 96.4 F | RESPIRATION RATE: 20 BRPM | HEART RATE: 89 BPM | WEIGHT: 240 LBS

## 2024-04-10 DIAGNOSIS — C84.71 ANAPLASTIC ALK-NEGATIVE LARGE CELL LYMPHOMA OF LYMPH NODE OF HEAD: ICD-10-CM

## 2024-04-10 DIAGNOSIS — C84.71 ANAPLASTIC ALK-NEGATIVE LARGE CELL LYMPHOMA OF LYMPH NODE OF NECK: Primary | ICD-10-CM

## 2024-04-10 LAB
ALBUMIN SERPL-MCNC: 4.3 G/DL (ref 3.5–5.2)
ALBUMIN/GLOB SERPL: 1.7 G/DL
ALP SERPL-CCNC: 82 U/L (ref 39–117)
ALT SERPL W P-5'-P-CCNC: 26 U/L (ref 1–41)
ANION GAP SERPL CALCULATED.3IONS-SCNC: 12 MMOL/L (ref 5–15)
AST SERPL-CCNC: 33 U/L (ref 1–40)
BASOPHILS # BLD AUTO: 0.05 10*3/MM3 (ref 0–0.2)
BASOPHILS NFR BLD AUTO: 0.6 % (ref 0–1.5)
BILIRUB SERPL-MCNC: 0.4 MG/DL (ref 0–1.2)
BUN SERPL-MCNC: 19 MG/DL (ref 8–23)
BUN/CREAT SERPL: 13.3 (ref 7–25)
CALCIUM SPEC-SCNC: 9.4 MG/DL (ref 8.6–10.5)
CHLORIDE SERPL-SCNC: 100 MMOL/L (ref 98–107)
CO2 SERPL-SCNC: 28 MMOL/L (ref 22–29)
CREAT SERPL-MCNC: 1.43 MG/DL (ref 0.76–1.27)
DEPRECATED RDW RBC AUTO: 49 FL (ref 37–54)
EGFRCR SERPLBLD CKD-EPI 2021: 49.8 ML/MIN/1.73
EOSINOPHIL # BLD AUTO: 0.14 10*3/MM3 (ref 0–0.4)
EOSINOPHIL NFR BLD AUTO: 1.8 % (ref 0.3–6.2)
ERYTHROCYTE [DISTWIDTH] IN BLOOD BY AUTOMATED COUNT: 13.5 % (ref 12.3–15.4)
GLOBULIN UR ELPH-MCNC: 2.6 GM/DL
GLUCOSE SERPL-MCNC: 183 MG/DL (ref 65–99)
HCT VFR BLD AUTO: 42.3 % (ref 37.5–51)
HGB BLD-MCNC: 14.3 G/DL (ref 13–17.7)
IMM GRANULOCYTES # BLD AUTO: 0.01 10*3/MM3 (ref 0–0.05)
IMM GRANULOCYTES NFR BLD AUTO: 0.1 % (ref 0–0.5)
LDH SERPL-CCNC: 253 U/L (ref 135–225)
LYMPHOCYTES # BLD AUTO: 3.85 10*3/MM3 (ref 0.7–3.1)
LYMPHOCYTES NFR BLD AUTO: 49.2 % (ref 19.6–45.3)
MCH RBC QN AUTO: 32.9 PG (ref 26.6–33)
MCHC RBC AUTO-ENTMCNC: 33.8 G/DL (ref 31.5–35.7)
MCV RBC AUTO: 97.5 FL (ref 79–97)
MONOCYTES # BLD AUTO: 0.77 10*3/MM3 (ref 0.1–0.9)
MONOCYTES NFR BLD AUTO: 9.8 % (ref 5–12)
NEUTROPHILS NFR BLD AUTO: 3 10*3/MM3 (ref 1.7–7)
NEUTROPHILS NFR BLD AUTO: 38.5 % (ref 42.7–76)
PLATELET # BLD AUTO: 134 10*3/MM3 (ref 140–450)
PMV BLD AUTO: 10.6 FL (ref 6–12)
POTASSIUM SERPL-SCNC: 4.9 MMOL/L (ref 3.5–5.2)
PROT SERPL-MCNC: 6.9 G/DL (ref 6–8.5)
RBC # BLD AUTO: 4.34 10*6/MM3 (ref 4.14–5.8)
SODIUM SERPL-SCNC: 140 MMOL/L (ref 136–145)
WBC NRBC COR # BLD AUTO: 7.82 10*3/MM3 (ref 3.4–10.8)

## 2024-04-10 PROCEDURE — 1126F AMNT PAIN NOTED NONE PRSNT: CPT | Performed by: INTERNAL MEDICINE

## 2024-04-10 PROCEDURE — 3077F SYST BP >= 140 MM HG: CPT | Performed by: INTERNAL MEDICINE

## 2024-04-10 PROCEDURE — 3078F DIAST BP <80 MM HG: CPT | Performed by: INTERNAL MEDICINE

## 2024-04-10 PROCEDURE — 85025 COMPLETE CBC W/AUTO DIFF WBC: CPT

## 2024-04-10 PROCEDURE — 36415 COLL VENOUS BLD VENIPUNCTURE: CPT

## 2024-04-10 PROCEDURE — 99214 OFFICE O/P EST MOD 30 MIN: CPT | Performed by: INTERNAL MEDICINE

## 2024-04-10 PROCEDURE — 83615 LACTATE (LD) (LDH) ENZYME: CPT

## 2024-04-10 PROCEDURE — 80053 COMPREHEN METABOLIC PANEL: CPT

## 2024-04-10 NOTE — PROGRESS NOTES
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         REASON FOR VISIT: Management of mild lymphoma    HISTORY OF PRESENT ILLNESS:   78 y.o.  male presents today for follow-up of his lymphoma.  He completed 6 of 6 cycles of CHP-Brentuximab and completed radiotherapy for consolidation in August 2022.  He presents today after having scans done.  Clinically doing reasonably well.  No major changes since I last saw him.  Denies any issues with nausea or vomiting.  No issues with pain.  No night sweats.  Still continues to have neuropathy.    Past medical history, social history and family history was reviewed 04/10/24 and unchanged from prior visit.    Review of Systems:    Review of Systems   Constitutional:  Negative for appetite change and fatigue.   HENT:  Negative.     Eyes: Negative.    Respiratory: Negative.     Cardiovascular: Negative.    Gastrointestinal: Negative.    Endocrine: Negative.    Genitourinary: Negative.     Skin: Negative.    Neurological: Negative.    Hematological: Negative.    Psychiatric/Behavioral:  Negative for sleep disturbance.             Medications:        Current Outpatient Medications:     albuterol (PROVENTIL) (2.5 MG/3ML) 0.083% nebulizer solution, Take 2.5 mg by nebulization Every 4 (Four) Hours As Needed for Wheezing., Disp: 120 each, Rfl:  "5    albuterol sulfate  (90 Base) MCG/ACT inhaler, Inhale 2 puffs Every 4 (Four) Hours As Needed for Wheezing., Disp: 8 g, Rfl: 3    Cholecalciferol (VITAMIN D) 1000 UNITS tablet, Take 2 tablets by mouth Daily., Disp: , Rfl:     ferrous sulfate 325 (65 FE) MG tablet, Take 1 tablet by mouth Daily With Breakfast., Disp: , Rfl:     glimepiride (AMARYL) 4 MG tablet, Take 1 tablet by mouth Daily With Dinner., Disp: 90 tablet, Rfl: 1    insulin NPH (humuLIN N,novoLIN N) 100 UNIT/ML injection, Inject 80 units BID x 2 weeks, Disp: 20 mL, Rfl: 5    metFORMIN ER (Glucophage XR) 500 MG 24 hr tablet, Take 1 tablet by mouth Daily With Breakfast., Disp: 90 tablet, Rfl: 1    Multiple Vitamins-Minerals (MULTI VITAMIN/MINERALS PO), Take 1 tablet by mouth Daily., Disp: , Rfl:     Multiple Vitamins-Minerals (PreserVision AREDS 2) chewable tablet, Chew 1 tablet 2 (Two) Times a Day., Disp: , Rfl:     omeprazole (priLOSEC) 20 MG capsule, Take 1 capsule by mouth Daily., Disp: , Rfl:     simvastatin (ZOCOR) 10 MG tablet, Take 1 tablet by mouth every night at bedtime., Disp: 90 tablet, Rfl: 3    tiZANidine (ZANAFLEX) 2 MG tablet, Take 1 tablet by mouth At Night As Needed for Muscle Spasms., Disp: 30 tablet, Rfl: 3    Current Facility-Administered Medications:     cyanocobalamin injection 1,000 mcg, 1,000 mcg, Intramuscular, Q28 Days, Krystina Gee MD, 1,000 mcg at 11/10/23 1230    Pain Medications               tiZANidine (ZANAFLEX) 2 MG tablet Take 1 tablet by mouth At Night As Needed for Muscle Spasms.               ALLERGIES:    Allergies   Allergen Reactions    Bactrim [Sulfamethoxazole-Trimethoprim] Other (See Comments)     Joint pain    Sulfa Antibiotics GI Intolerance     Makes bones hurt      Tegaderm Chg Dressing [Chlorhexidine] Itching and Other (See Comments)     redness         Physical Exam    VITAL SIGNS:  /76 Comment: LUE  Pulse 89   Temp 96.4 °F (35.8 °C) (Temporal)   Resp 20   Ht 177.8 cm (70\")   Wt " 109 kg (240 lb)   SpO2 94% Comment: RA  BMI 34.44 kg/m²     ECOG score: 0           Wt Readings from Last 3 Encounters:   04/10/24 109 kg (240 lb)   02/12/24 109 kg (240 lb)   11/10/23 106 kg (234 lb 9.6 oz)       Body mass index is 34.44 kg/m². Body surface area is 2.26 meters squared.    Pain Score    04/10/24 1033   PainSc: 0-No pain           Performance Status: 0    General: well appearing, in no acute distress  HEENT: Swelling on the right side of his face consistent with his lymphomatous process.  Lymphatics: no cervical, supraclavicular, or axillary adenopathy  Cardiovascular: regular rate and rhythm, no murmurs, rubs or gallops  Lungs: clear to auscultation bilaterally  Abdomen: soft, nontender, nondistended.  No palpable organomegaly  Extremities: no lower extremity edema  Skin: no rashes, lesions, bruising, or petechiae  Msk:  Shows no weakness of the large muscle groups  Psych: Mood is stable        RECENT LABS:    Lab Results   Component Value Date    HGB 14.3 04/10/2024    HCT 42.3 04/10/2024    MCV 97.5 (H) 04/10/2024     (L) 04/10/2024    WBC 7.82 04/10/2024    NEUTROABS 3.00 04/10/2024    LYMPHSABS 3.85 (H) 04/10/2024    MONOSABS 0.77 04/10/2024    EOSABS 0.14 04/10/2024    BASOSABS 0.05 04/10/2024       Lab Results   Component Value Date    GLUCOSE 82 02/12/2024    BUN 21 02/12/2024    CREATININE 1.60 (H) 04/08/2024     02/12/2024    K 4.6 02/12/2024     02/12/2024    CO2 26.2 02/12/2024    CALCIUM 9.1 02/12/2024    PROTEINTOT 7.2 02/12/2024    ALBUMIN 4.1 02/12/2024    BILITOT 0.4 02/12/2024    ALKPHOS 82 02/12/2024    AST 32 02/12/2024    ALT 25 02/12/2024     CT Soft Tissue Neck With Contrast    Result Date: 4/8/2024  Impression: Stable CT appearance of the neck, chest, abdomen and pelvis as above. There is no evidence of new worrisome adenopathy to specifically suggest lymphomatous disease recurrence or progression. No unexpected acute findings are present. Electronically  Signed: Kenn Carrero MD  4/8/2024 4:21 PM EDT  Workstation ID: [id number]    CT Chest With Contrast Diagnostic    Result Date: 4/8/2024  Impression: Stable CT appearance of the neck, chest, abdomen and pelvis as above. There is no evidence of new worrisome adenopathy to specifically suggest lymphomatous disease recurrence or progression. No unexpected acute findings are present. Electronically Signed: Kenn Carrero MD  4/8/2024 4:21 PM EDT  Workstation ID: [id number]    CT Abdomen Pelvis With Contrast    Result Date: 4/8/2024  Impression: Stable CT appearance of the neck, chest, abdomen and pelvis as above. There is no evidence of new worrisome adenopathy to specifically suggest lymphomatous disease recurrence or progression. No unexpected acute findings are present. Electronically Signed: Kenn Carrero MD  4/8/2024 4:21 PM EDT  Workstation ID: [id number]        Assessment/Plan    1.  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  He has completed 6 cycles of treatment with A + CHP.  Completed consolidative radiotherapy in August 2022.  I reviewed his scans today which shows no sign of recurrent disease.  Plan to rescan him in 6 months.  Fairly high risk for recurrence.      2.  Neuropathy of the lower extremities.  Still present.  No significant change there.    Total time of patient care on day of service including time prior to, face to face with patient, and following visit spent in reviewing records, lab results, imaging studies, discussion with patient, and documentation/charting was > 31 minutes.        Ai Henderson MD  [facility] Hematology and Oncology    Return on: 10/23/24  Return in (Approximately): 6 months, Schedule with next infusion    Orders Placed This Encounter   Procedures    CT Abdomen Pelvis With Contrast    CT Chest With Contrast Diagnostic    CT Soft Tissue Neck With Contrast    Lactate Dehydrogenase    Comprehensive Metabolic Panel    CBC & Differential        4/10/2024

## 2024-05-16 ENCOUNTER — TELEPHONE (OUTPATIENT)
Dept: INTERNAL MEDICINE | Facility: CLINIC | Age: 79
End: 2024-05-16

## 2024-05-16 ENCOUNTER — OFFICE VISIT (OUTPATIENT)
Dept: INTERNAL MEDICINE | Facility: CLINIC | Age: 79
End: 2024-05-16
Payer: MEDICARE

## 2024-05-16 VITALS
WEIGHT: 241.4 LBS | DIASTOLIC BLOOD PRESSURE: 82 MMHG | HEART RATE: 79 BPM | OXYGEN SATURATION: 97 % | HEIGHT: 70 IN | BODY MASS INDEX: 34.56 KG/M2 | SYSTOLIC BLOOD PRESSURE: 162 MMHG | TEMPERATURE: 97.6 F

## 2024-05-16 DIAGNOSIS — Z12.11 SCREENING FOR COLON CANCER: ICD-10-CM

## 2024-05-16 DIAGNOSIS — E11.65 TYPE 2 DIABETES MELLITUS WITH HYPERGLYCEMIA, WITH LONG-TERM CURRENT USE OF INSULIN: Primary | ICD-10-CM

## 2024-05-16 DIAGNOSIS — E78.5 HYPERLIPIDEMIA LDL GOAL <70: ICD-10-CM

## 2024-05-16 DIAGNOSIS — Z79.4 TYPE 2 DIABETES MELLITUS WITH HYPERGLYCEMIA, WITH LONG-TERM CURRENT USE OF INSULIN: Primary | ICD-10-CM

## 2024-05-16 LAB
EXPIRATION DATE: ABNORMAL
HBA1C MFR BLD: 8.1 % (ref 4.5–5.7)
Lab: ABNORMAL

## 2024-05-16 PROCEDURE — 3079F DIAST BP 80-89 MM HG: CPT | Performed by: INTERNAL MEDICINE

## 2024-05-16 PROCEDURE — 3052F HG A1C>EQUAL 8.0%<EQUAL 9.0%: CPT | Performed by: INTERNAL MEDICINE

## 2024-05-16 PROCEDURE — 1159F MED LIST DOCD IN RCRD: CPT | Performed by: INTERNAL MEDICINE

## 2024-05-16 PROCEDURE — 99214 OFFICE O/P EST MOD 30 MIN: CPT | Performed by: INTERNAL MEDICINE

## 2024-05-16 PROCEDURE — 1160F RVW MEDS BY RX/DR IN RCRD: CPT | Performed by: INTERNAL MEDICINE

## 2024-05-16 PROCEDURE — 3077F SYST BP >= 140 MM HG: CPT | Performed by: INTERNAL MEDICINE

## 2024-05-16 PROCEDURE — 83036 HEMOGLOBIN GLYCOSYLATED A1C: CPT | Performed by: INTERNAL MEDICINE

## 2024-05-16 PROCEDURE — 1126F AMNT PAIN NOTED NONE PRSNT: CPT | Performed by: INTERNAL MEDICINE

## 2024-05-16 RX ORDER — GLIMEPIRIDE 4 MG/1
4 TABLET ORAL
Qty: 90 TABLET | Refills: 1 | Status: SHIPPED | OUTPATIENT
Start: 2024-05-16

## 2024-05-16 RX ORDER — METFORMIN HYDROCHLORIDE 500 MG/1
500 TABLET, EXTENDED RELEASE ORAL
Qty: 90 TABLET | Refills: 1 | Status: SHIPPED | OUTPATIENT
Start: 2024-05-16

## 2024-05-16 RX ORDER — SIMVASTATIN 10 MG
10 TABLET ORAL
Qty: 90 TABLET | Refills: 3 | Status: SHIPPED | OUTPATIENT
Start: 2024-05-16

## 2024-05-16 NOTE — PROGRESS NOTES
Diabetes    Subjective   Gabriela Mar is a 78 y.o. male is here today for follow-up.    Diabetes  Pertinent negatives for hypoglycemia include no confusion, dizziness or headaches. Pertinent negatives for diabetes include no chest pain.     Here for a f/u on his htn, dm2. Notes sugars are higher. Also doesn't exercise.also gained some weight back.  BP ok at home.    Current Outpatient Medications:     albuterol (PROVENTIL) (2.5 MG/3ML) 0.083% nebulizer solution, Take 2.5 mg by nebulization Every 4 (Four) Hours As Needed for Wheezing., Disp: 120 each, Rfl: 5    albuterol sulfate  (90 Base) MCG/ACT inhaler, Inhale 2 puffs Every 4 (Four) Hours As Needed for Wheezing., Disp: 8 g, Rfl: 3    Cholecalciferol (VITAMIN D) 1000 UNITS tablet, Take 2 tablets by mouth Daily., Disp: , Rfl:     ferrous sulfate 325 (65 FE) MG tablet, Take 1 tablet by mouth Daily With Breakfast., Disp: , Rfl:     glimepiride (AMARYL) 4 MG tablet, Take 1 tablet by mouth Daily With Dinner., Disp: 90 tablet, Rfl: 1    insulin NPH (humuLIN N,novoLIN N) 100 UNIT/ML injection, Inject 100 Units under the skin into the appropriate area as directed 2 (Two) Times a Day Before Meals., Disp: 60 mL, Rfl: 5    metFORMIN ER (Glucophage XR) 500 MG 24 hr tablet, Take 1 tablet by mouth Daily With Breakfast., Disp: 90 tablet, Rfl: 1    Multiple Vitamins-Minerals (MULTI VITAMIN/MINERALS PO), Take 1 tablet by mouth Daily., Disp: , Rfl:     Multiple Vitamins-Minerals (PreserVision AREDS 2) chewable tablet, Chew 1 tablet 2 (Two) Times a Day., Disp: , Rfl:     omeprazole (priLOSEC) 20 MG capsule, Take 1 capsule by mouth Daily., Disp: , Rfl:     simvastatin (ZOCOR) 10 MG tablet, Take 1 tablet by mouth every night at bedtime., Disp: 90 tablet, Rfl: 3    tiZANidine (ZANAFLEX) 2 MG tablet, Take 1 tablet by mouth At Night As Needed for Muscle Spasms., Disp: 30 tablet, Rfl: 3  No current facility-administered medications for this visit.      The following portions of  "the patient's history were reviewed and updated as appropriate: allergies, current medications, past family history, past medical history, past social history, past surgical history and problem list.    Review of Systems   Constitutional: Negative.  Negative for chills and fever.   HENT:  Negative for ear discharge, ear pain, sinus pressure and sore throat.    Respiratory:  Negative for cough, chest tightness and shortness of breath.    Cardiovascular:  Negative for chest pain, palpitations and leg swelling.   Gastrointestinal:  Negative for diarrhea, nausea and vomiting.   Musculoskeletal:  Negative for arthralgias, back pain and myalgias.   Neurological:  Negative for dizziness, syncope and headaches.   Psychiatric/Behavioral:  Negative for confusion and sleep disturbance.        Objective   /82   Pulse 79   Temp 97.6 °F (36.4 °C)   Ht 177.8 cm (70\")   Wt 109 kg (241 lb 6.4 oz)   SpO2 97% Comment: ra  BMI 34.64 kg/m²   Physical Exam  Vitals and nursing note reviewed.   Constitutional:       Appearance: He is well-developed.   HENT:      Head: Normocephalic and atraumatic.      Right Ear: External ear normal.      Left Ear: External ear normal.      Mouth/Throat:      Pharynx: No oropharyngeal exudate.   Eyes:      Conjunctiva/sclera: Conjunctivae normal.      Pupils: Pupils are equal, round, and reactive to light.   Neck:      Thyroid: No thyromegaly.   Cardiovascular:      Rate and Rhythm: Normal rate and regular rhythm.      Pulses: Normal pulses.      Heart sounds: Normal heart sounds. No murmur heard.     No friction rub. No gallop.   Pulmonary:      Effort: Pulmonary effort is normal.      Breath sounds: Normal breath sounds.   Abdominal:      General: Bowel sounds are normal. There is no distension.      Palpations: Abdomen is soft.      Tenderness: There is no abdominal tenderness.   Musculoskeletal:      Cervical back: Neck supple.   Skin:     General: Skin is warm and dry.   Neurological:      " Mental Status: He is alert and oriented to person, place, and time.      Cranial Nerves: No cranial nerve deficit.   Psychiatric:         Judgment: Judgment normal.                 Results for orders placed or performed in visit on 05/16/24   POC Glycosylated Hemoglobin (Hb A1C)    Specimen: Blood   Result Value Ref Range    Hemoglobin A1C 8.1 (A) 4.5 - 5.7 %    Lot Number [id number]     Expiration Date 12/17/25              Assessment & Plan   Diagnoses and all orders for this visit:    Type 2 diabetes mellitus with hyperglycemia, with long-term current use of insulin  -     POC Glycosylated Hemoglobin (Hb A1C)  -     glimepiride (AMARYL) 4 MG tablet; Take 1 tablet by mouth Daily With Dinner.  -     metFORMIN ER (Glucophage XR) 500 MG 24 hr tablet; Take 1 tablet by mouth Daily With Breakfast.  -     insulin NPH (humuLIN N,novoLIN N) 100 UNIT/ML injection; Inject 100 Units under the skin into the appropriate area as directed 2 (Two) Times a Day Before Meals.    Hyperlipidemia LDL goal <70  -     simvastatin (ZOCOR) 10 MG tablet; Take 1 tablet by mouth every night at bedtime.    Screening for colon cancer  -     Ambulatory Referral For Screening Colonoscopy      Increase nph to 90 bid and if stays high, increase to 100 unts. Also reiterated diet and increase exercise.     Monitor BP at home.      Return for Next scheduled follow up, Medicare Wellness.    Electronically signed by:    Krystina Gee MD

## 2024-06-05 NOTE — TELEPHONE ENCOUNTER
Left VM that his labs have been ordered, he needs to be fasting for those. Ok for [facility] to relay if he calls back.

## 2024-06-06 NOTE — TELEPHONE ENCOUNTER
Patient advised labs have been ordered and can have done a week prior to his appointment and will need to be fasting

## 2024-07-08 ENCOUNTER — TELEPHONE (OUTPATIENT)
Dept: GASTROENTEROLOGY | Facility: CLINIC | Age: 79
End: 2024-07-08
Payer: MEDICARE

## 2024-07-08 NOTE — TELEPHONE ENCOUNTER
Patient contacted the office and stated that the prep was too expensive at the pharmacy. Patient stated he does not have pharmacy coverage.     Advised patient that we have a sample for him at the office at no charge.  Patient was extremely grateful.

## 2024-07-15 ENCOUNTER — TELEPHONE (OUTPATIENT)
Dept: GASTROENTEROLOGY | Facility: CLINIC | Age: 79
End: 2024-07-15

## 2024-07-15 NOTE — TELEPHONE ENCOUNTER
[facility] staff attempted to follow warm transfer process and was unsuccessful     Caller: Gabriela Mar    Relationship to patient: Self    Best call back number: [phone]    Patient is needing: PATIENT HAS COLONOSCOPY SCHEDULED FOR TOMORROW. HE ATE SOME NOODLES THIS MORNING, HAS BEEN TAKING HIS IRON PILLS, AND ALSO HAS BEEN TAKING HIS INSULIN. HE IS WANTING TO KNOW IF THIS IS GOING TO AFFECT HIM HAVING THE COLONOSCOPY DONE TOMMOROW. PLEASE CALL BACK TODAY TO ADVISE.

## 2024-09-12 ENCOUNTER — LAB (OUTPATIENT)
Dept: LAB | Facility: HOSPITAL | Age: 79
End: 2024-09-12
Payer: MEDICARE

## 2024-09-12 DIAGNOSIS — E78.5 HYPERLIPIDEMIA LDL GOAL <70: ICD-10-CM

## 2024-09-12 DIAGNOSIS — Z79.4 TYPE 2 DIABETES MELLITUS WITH HYPERGLYCEMIA, WITH LONG-TERM CURRENT USE OF INSULIN: ICD-10-CM

## 2024-09-12 DIAGNOSIS — E11.65 TYPE 2 DIABETES MELLITUS WITH HYPERGLYCEMIA, WITH LONG-TERM CURRENT USE OF INSULIN: ICD-10-CM

## 2024-09-12 DIAGNOSIS — I10 ESSENTIAL HYPERTENSION: ICD-10-CM

## 2024-09-12 DIAGNOSIS — E55.9 VITAMIN D DEFICIENCY: ICD-10-CM

## 2024-09-12 DIAGNOSIS — N40.0 BENIGN PROSTATIC HYPERPLASIA, UNSPECIFIED WHETHER LOWER URINARY TRACT SYMPTOMS PRESENT: ICD-10-CM

## 2024-09-12 LAB
ALBUMIN SERPL-MCNC: 4 G/DL (ref 3.5–5.2)
ALBUMIN/GLOB SERPL: 1.4 G/DL
ALP SERPL-CCNC: 76 U/L (ref 39–117)
ALT SERPL W P-5'-P-CCNC: 27 U/L (ref 1–41)
ANION GAP SERPL CALCULATED.3IONS-SCNC: 10.2 MMOL/L (ref 5–15)
AST SERPL-CCNC: 24 U/L (ref 1–40)
BILIRUB SERPL-MCNC: 0.4 MG/DL (ref 0–1.2)
BUN SERPL-MCNC: 20 MG/DL (ref 8–23)
BUN/CREAT SERPL: 13.9 (ref 7–25)
CALCIUM SPEC-SCNC: 9.1 MG/DL (ref 8.6–10.5)
CHLORIDE SERPL-SCNC: 100 MMOL/L (ref 98–107)
CHOLEST SERPL-MCNC: 134 MG/DL (ref 0–200)
CO2 SERPL-SCNC: 28.8 MMOL/L (ref 22–29)
CREAT SERPL-MCNC: 1.44 MG/DL (ref 0.76–1.27)
EGFRCR SERPLBLD CKD-EPI 2021: 49.4 ML/MIN/1.73
GLOBULIN UR ELPH-MCNC: 2.8 GM/DL
GLUCOSE SERPL-MCNC: 244 MG/DL (ref 65–99)
HDLC SERPL-MCNC: 39 MG/DL (ref 40–60)
LDLC SERPL CALC-MCNC: 66 MG/DL (ref 0–100)
LDLC/HDLC SERPL: 1.57 {RATIO}
POTASSIUM SERPL-SCNC: 4.4 MMOL/L (ref 3.5–5.2)
PROT SERPL-MCNC: 6.8 G/DL (ref 6–8.5)
PSA SERPL-MCNC: 0.46 NG/ML (ref 0–4)
SODIUM SERPL-SCNC: 139 MMOL/L (ref 136–145)
TRIGL SERPL-MCNC: 168 MG/DL (ref 0–150)
TSH SERPL DL<=0.05 MIU/L-ACNC: 1.5 UIU/ML (ref 0.27–4.2)
VLDLC SERPL-MCNC: 29 MG/DL (ref 5–40)

## 2024-09-12 PROCEDURE — 83036 HEMOGLOBIN GLYCOSYLATED A1C: CPT

## 2024-09-12 PROCEDURE — 82607 VITAMIN B-12: CPT

## 2024-09-12 PROCEDURE — 80053 COMPREHEN METABOLIC PANEL: CPT

## 2024-09-12 PROCEDURE — 84153 ASSAY OF PSA TOTAL: CPT

## 2024-09-12 PROCEDURE — 84443 ASSAY THYROID STIM HORMONE: CPT

## 2024-09-12 PROCEDURE — 80061 LIPID PANEL: CPT

## 2024-09-12 PROCEDURE — 82306 VITAMIN D 25 HYDROXY: CPT

## 2024-09-13 LAB
25(OH)D3 SERPL-MCNC: 74.4 NG/ML (ref 30–100)
HBA1C MFR BLD: 8.5 % (ref 4.8–5.6)
VIT B12 BLD-MCNC: 747 PG/ML (ref 211–946)

## 2024-09-18 ENCOUNTER — OFFICE VISIT (OUTPATIENT)
Dept: INTERNAL MEDICINE | Facility: CLINIC | Age: 79
End: 2024-09-18
Payer: MEDICARE

## 2024-09-18 VITALS
OXYGEN SATURATION: 96 % | HEIGHT: 70 IN | HEART RATE: 78 BPM | BODY MASS INDEX: 33.7 KG/M2 | SYSTOLIC BLOOD PRESSURE: 136 MMHG | DIASTOLIC BLOOD PRESSURE: 66 MMHG | TEMPERATURE: 98.5 F | WEIGHT: 235.4 LBS

## 2024-09-18 DIAGNOSIS — I10 ESSENTIAL HYPERTENSION: ICD-10-CM

## 2024-09-18 DIAGNOSIS — E55.9 VITAMIN D DEFICIENCY: ICD-10-CM

## 2024-09-18 DIAGNOSIS — Z00.00 MEDICARE ANNUAL WELLNESS VISIT, SUBSEQUENT: Primary | ICD-10-CM

## 2024-09-18 DIAGNOSIS — Z79.4 TYPE 2 DIABETES MELLITUS WITH HYPERGLYCEMIA, WITH LONG-TERM CURRENT USE OF INSULIN: ICD-10-CM

## 2024-09-18 DIAGNOSIS — N40.0 BENIGN PROSTATIC HYPERPLASIA, UNSPECIFIED WHETHER LOWER URINARY TRACT SYMPTOMS PRESENT: ICD-10-CM

## 2024-09-18 DIAGNOSIS — E78.5 HYPERLIPIDEMIA LDL GOAL <70: ICD-10-CM

## 2024-09-18 DIAGNOSIS — R18.8 OTHER ASCITES: ICD-10-CM

## 2024-09-18 DIAGNOSIS — E11.65 TYPE 2 DIABETES MELLITUS WITH HYPERGLYCEMIA, WITH LONG-TERM CURRENT USE OF INSULIN: ICD-10-CM

## 2024-09-18 DIAGNOSIS — C84.71 ANAPLASTIC ALK-NEGATIVE LARGE CELL LYMPHOMA OF LYMPH NODE OF NECK: ICD-10-CM

## 2024-09-18 LAB
EXPIRATION DATE: ABNORMAL
Lab: ABNORMAL
POC CREATININE URINE: 100
POC MICROALBUMIN URINE: 80

## 2024-09-18 RX ORDER — LOSARTAN POTASSIUM 25 MG/1
25 TABLET ORAL DAILY
Qty: 90 TABLET | Refills: 1 | Status: SHIPPED | OUTPATIENT
Start: 2024-09-18

## 2024-09-18 RX ORDER — ASPIRIN 81 MG/1
81 TABLET ORAL DAILY
Qty: 90 TABLET | Refills: 3 | Status: SHIPPED | OUTPATIENT
Start: 2024-09-18

## 2024-09-24 ENCOUNTER — OUTSIDE FACILITY SERVICE (OUTPATIENT)
Dept: GASTROENTEROLOGY | Facility: CLINIC | Age: 79
End: 2024-09-24
Payer: MEDICARE

## 2024-09-24 PROCEDURE — 45385 COLONOSCOPY W/LESION REMOVAL: CPT | Performed by: INTERNAL MEDICINE

## 2024-09-24 PROCEDURE — 45388 COLONOSCOPY W/ABLATION: CPT | Performed by: INTERNAL MEDICINE

## 2024-09-24 PROCEDURE — 88305 TISSUE EXAM BY PATHOLOGIST: CPT | Performed by: INTERNAL MEDICINE

## 2024-09-24 PROCEDURE — G0500 MOD SEDAT ENDO SERVICE >5YRS: HCPCS | Performed by: INTERNAL MEDICINE

## 2024-09-25 ENCOUNTER — LAB REQUISITION (OUTPATIENT)
Dept: LAB | Facility: HOSPITAL | Age: 79
End: 2024-09-25
Payer: MEDICARE

## 2024-09-25 DIAGNOSIS — Z12.11 ENCOUNTER FOR SCREENING FOR MALIGNANT NEOPLASM OF COLON: ICD-10-CM

## 2024-10-07 ENCOUNTER — HOSPITAL ENCOUNTER (OUTPATIENT)
Dept: CT IMAGING | Facility: HOSPITAL | Age: 79
Discharge: HOME | End: 2024-10-07
Admitting: INTERNAL MEDICINE
Payer: MEDICARE

## 2024-10-07 DIAGNOSIS — C84.71 ANAPLASTIC ALK-NEGATIVE LARGE CELL LYMPHOMA OF LYMPH NODE OF NECK: ICD-10-CM

## 2024-10-07 PROCEDURE — 71260 CT THORAX DX C+: CPT

## 2024-10-07 PROCEDURE — 70491 CT SOFT TISSUE NECK W/DYE: CPT

## 2024-10-07 PROCEDURE — 74177 CT ABD & PELVIS W/CONTRAST: CPT

## 2024-10-07 PROCEDURE — 25510000001 IOPAMIDOL 61 % SOLUTION: Performed by: INTERNAL MEDICINE

## 2024-10-07 RX ORDER — IOPAMIDOL 612 MG/ML
100 INJECTION, SOLUTION INTRAVASCULAR
Status: COMPLETED | OUTPATIENT
Start: 2024-10-07 | End: 2024-10-07

## 2024-10-07 RX ADMIN — IOPAMIDOL 98 ML: 612 INJECTION, SOLUTION INTRAVENOUS at 11:25

## 2024-10-09 ENCOUNTER — OFFICE VISIT (OUTPATIENT)
Dept: ONCOLOGY | Facility: CLINIC | Age: 79
End: 2024-10-09
Payer: MEDICARE

## 2024-10-09 ENCOUNTER — LAB (OUTPATIENT)
Dept: LAB | Facility: HOSPITAL | Age: 79
End: 2024-10-09
Payer: MEDICARE

## 2024-10-09 VITALS
BODY MASS INDEX: 33.21 KG/M2 | HEIGHT: 70 IN | RESPIRATION RATE: 20 BRPM | SYSTOLIC BLOOD PRESSURE: 146 MMHG | TEMPERATURE: 97.5 F | WEIGHT: 232 LBS | HEART RATE: 76 BPM | DIASTOLIC BLOOD PRESSURE: 67 MMHG | OXYGEN SATURATION: 96 %

## 2024-10-09 DIAGNOSIS — C84.71 ANAPLASTIC ALK-NEGATIVE LARGE CELL LYMPHOMA OF LYMPH NODE OF NECK: ICD-10-CM

## 2024-10-09 DIAGNOSIS — C84.71 ANAPLASTIC ALK-NEGATIVE LARGE CELL LYMPHOMA OF LYMPH NODE OF NECK: Primary | ICD-10-CM

## 2024-10-09 LAB
ALBUMIN SERPL-MCNC: 3.9 G/DL (ref 3.5–5.2)
ALBUMIN/GLOB SERPL: 1.3 G/DL
ALP SERPL-CCNC: 91 U/L (ref 39–117)
ALT SERPL W P-5'-P-CCNC: 25 U/L (ref 1–41)
ANION GAP SERPL CALCULATED.3IONS-SCNC: 14 MMOL/L (ref 5–15)
AST SERPL-CCNC: 37 U/L (ref 1–40)
BASOPHILS # BLD AUTO: 0.03 10*3/MM3 (ref 0–0.2)
BASOPHILS NFR BLD AUTO: 0.4 % (ref 0–1.5)
BILIRUB SERPL-MCNC: 0.4 MG/DL (ref 0–1.2)
BUN SERPL-MCNC: 17 MG/DL (ref 8–23)
BUN/CREAT SERPL: 13.4 (ref 7–25)
CALCIUM SPEC-SCNC: 9 MG/DL (ref 8.6–10.5)
CHLORIDE SERPL-SCNC: 101 MMOL/L (ref 98–107)
CO2 SERPL-SCNC: 22 MMOL/L (ref 22–29)
CREAT SERPL-MCNC: 1.27 MG/DL (ref 0.76–1.27)
DEPRECATED RDW RBC AUTO: 48 FL (ref 37–54)
EGFRCR SERPLBLD CKD-EPI 2021: 57.5 ML/MIN/1.73
EOSINOPHIL # BLD AUTO: 0.19 10*3/MM3 (ref 0–0.4)
EOSINOPHIL NFR BLD AUTO: 2.6 % (ref 0.3–6.2)
ERYTHROCYTE [DISTWIDTH] IN BLOOD BY AUTOMATED COUNT: 13.4 % (ref 12.3–15.4)
GLOBULIN UR ELPH-MCNC: 3 GM/DL
GLUCOSE SERPL-MCNC: 249 MG/DL (ref 65–99)
HCT VFR BLD AUTO: 40.4 % (ref 37.5–51)
HGB BLD-MCNC: 13.7 G/DL (ref 13–17.7)
IMM GRANULOCYTES # BLD AUTO: 0.01 10*3/MM3 (ref 0–0.05)
IMM GRANULOCYTES NFR BLD AUTO: 0.1 % (ref 0–0.5)
LDH SERPL-CCNC: 281 U/L (ref 135–225)
LYMPHOCYTES # BLD AUTO: 3.62 10*3/MM3 (ref 0.7–3.1)
LYMPHOCYTES NFR BLD AUTO: 48.7 % (ref 19.6–45.3)
MCH RBC QN AUTO: 32.9 PG (ref 26.6–33)
MCHC RBC AUTO-ENTMCNC: 33.9 G/DL (ref 31.5–35.7)
MCV RBC AUTO: 96.9 FL (ref 79–97)
MONOCYTES # BLD AUTO: 0.62 10*3/MM3 (ref 0.1–0.9)
MONOCYTES NFR BLD AUTO: 8.3 % (ref 5–12)
NEUTROPHILS NFR BLD AUTO: 2.96 10*3/MM3 (ref 1.7–7)
NEUTROPHILS NFR BLD AUTO: 39.9 % (ref 42.7–76)
PLATELET # BLD AUTO: 118 10*3/MM3 (ref 140–450)
PMV BLD AUTO: 10.9 FL (ref 6–12)
POTASSIUM SERPL-SCNC: 4.7 MMOL/L (ref 3.5–5.2)
PROT SERPL-MCNC: 6.9 G/DL (ref 6–8.5)
RBC # BLD AUTO: 4.17 10*6/MM3 (ref 4.14–5.8)
SODIUM SERPL-SCNC: 137 MMOL/L (ref 136–145)
WBC NRBC COR # BLD AUTO: 7.43 10*3/MM3 (ref 3.4–10.8)

## 2024-10-09 PROCEDURE — 99214 OFFICE O/P EST MOD 30 MIN: CPT | Performed by: INTERNAL MEDICINE

## 2024-10-09 PROCEDURE — 80053 COMPREHEN METABOLIC PANEL: CPT

## 2024-10-09 PROCEDURE — 1126F AMNT PAIN NOTED NONE PRSNT: CPT | Performed by: INTERNAL MEDICINE

## 2024-10-09 PROCEDURE — 3078F DIAST BP <80 MM HG: CPT | Performed by: INTERNAL MEDICINE

## 2024-10-09 PROCEDURE — 36415 COLL VENOUS BLD VENIPUNCTURE: CPT

## 2024-10-09 PROCEDURE — 3077F SYST BP >= 140 MM HG: CPT | Performed by: INTERNAL MEDICINE

## 2024-10-09 PROCEDURE — 85025 COMPLETE CBC W/AUTO DIFF WBC: CPT

## 2024-10-09 PROCEDURE — 83615 LACTATE (LD) (LDH) ENZYME: CPT

## 2024-10-09 NOTE — PROGRESS NOTES
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         REASON FOR VISIT: Management of mild lymphoma    HISTORY OF PRESENT ILLNESS:   78 y.o.  male presents today for follow-up of his lymphoma.  He completed 6 of 6 cycles of CHP-Brentuximab and completed radiotherapy for consolidation in August 2022.  He presents today after having scans done.  Clinically doing reasonably well.  No major changes since I last saw him.  Denies any issues with nausea or vomiting.  No issues with pain.  No night sweats.  Still continues to have neuropathy.  Its fairly mild.  For now it has been 2 years since completed treatment.    Past medical history, social history and family history was reviewed 10/09/24 and unchanged from prior visit.    Review of Systems:    Review of Systems   Constitutional:  Negative for appetite change and fatigue.   HENT:  Negative.     Eyes: Negative.    Respiratory: Negative.     Cardiovascular: Negative.    Gastrointestinal: Negative.    Endocrine: Negative.    Genitourinary: Negative.     Skin: Negative.    Neurological: Negative.    Hematological: Negative.    Psychiatric/Behavioral:  Negative for sleep disturbance.             Medications:        Current Outpatient Medications:     albuterol (PROVENTIL) (2.5 MG/3ML) 0.083% nebulizer solution, Take 2.5 mg by  nebulization Every 4 (Four) Hours As Needed for Wheezing., Disp: 120 each, Rfl: 5    albuterol sulfate  (90 Base) MCG/ACT inhaler, Inhale 2 puffs Every 4 (Four) Hours As Needed for Wheezing., Disp: 8 g, Rfl: 3    aspirin 81 MG EC tablet, Take 1 tablet by mouth Daily., Disp: 90 tablet, Rfl: 3    Cholecalciferol (VITAMIN D) 1000 UNITS tablet, Take 2 tablets by mouth Daily., Disp: , Rfl:     glimepiride (AMARYL) 4 MG tablet, Take 1 tablet by mouth Daily With Dinner., Disp: 90 tablet, Rfl: 1    insulin NPH (humuLIN N,novoLIN N) 100 UNIT/ML injection, Inject 100 Units under the skin into the appropriate area as directed 2 (Two) Times a Day Before Meals., Disp: 60 mL, Rfl: 5    losartan (Cozaar) 25 MG tablet, Take 1 tablet by mouth Daily., Disp: 90 tablet, Rfl: 1    metFORMIN ER (Glucophage XR) 500 MG 24 hr tablet, Take 1 tablet by mouth Daily With Breakfast., Disp: 90 tablet, Rfl: 1    Multiple Vitamins-Minerals (MULTI VITAMIN/MINERALS PO), Take 1 tablet by mouth Daily., Disp: , Rfl:     Multiple Vitamins-Minerals (PreserVision AREDS 2) chewable tablet, Chew 1 tablet 2 (Two) Times a Day., Disp: , Rfl:     omeprazole (priLOSEC) 20 MG capsule, Take 1 capsule by mouth Daily., Disp: , Rfl:     simvastatin (ZOCOR) 10 MG tablet, Take 1 tablet by mouth every night at bedtime., Disp: 90 tablet, Rfl: 3    tiZANidine (ZANAFLEX) 2 MG tablet, Take 1 tablet by mouth At Night As Needed for Muscle Spasms., Disp: 30 tablet, Rfl: 3    Sod Picosulfate-Mag Ox-Cit Acd 10-3.5-12 MG-GM -GM/160ML solution, Take 350 mL by mouth Take As Directed for 1 dose. (Patient not taking: Reported on 10/9/2024), Disp: 350 mL, Rfl: 0    Pain Medications               aspirin 81 MG EC tablet Take 1 tablet by mouth Daily.    tiZANidine (ZANAFLEX) 2 MG tablet Take 1 tablet by mouth At Night As Needed for Muscle Spasms.               ALLERGIES:    Allergies   Allergen Reactions    Bactrim [Sulfamethoxazole-Trimethoprim] Other (See Comments)     Joint  "pain    Sulfa Antibiotics GI Intolerance     Makes bones hurt      Tegaderm Chg Dressing [Chlorhexidine] Itching and Other (See Comments)     redness         Physical Exam    VITAL SIGNS:  /67 Comment: LUE  Pulse 76   Temp 97.5 °F (36.4 °C) (Infrared)   Resp 20   Ht 177.8 cm (70\")   Wt 105 kg (232 lb)   SpO2 96% Comment: RA  BMI 33.29 kg/m²     ECOG score: 0           Wt Readings from Last 3 Encounters:   10/09/24 105 kg (232 lb)   09/18/24 107 kg (235 lb 6.4 oz)   08/30/24 109 kg (240 lb)       Body mass index is 33.29 kg/m². Body surface area is 2.22 meters squared.    Pain Score    10/09/24 1123   PainSc: 0-No pain           Performance Status: 0    General: well appearing, in no acute distress  HEENT: Swelling on the right side of his face consistent with his lymphomatous process.  Lymphatics: no cervical, supraclavicular, or axillary adenopathy  Cardiovascular: regular rate and rhythm, no murmurs, rubs or gallops  Lungs: clear to auscultation bilaterally  Abdomen: soft, nontender, nondistended.  No palpable organomegaly  Extremities: no lower extremity edema  Skin: no rashes, lesions, bruising, or petechiae  Msk:  Shows no weakness of the large muscle groups  Psych: Mood is stable        RECENT LABS:    Lab Results   Component Value Date    HGB 13.7 10/09/2024    HCT 40.4 10/09/2024    MCV 96.9 10/09/2024     (L) 10/09/2024    WBC 7.43 10/09/2024    NEUTROABS 2.96 10/09/2024    LYMPHSABS 3.62 (H) 10/09/2024    MONOSABS 0.62 10/09/2024    EOSABS 0.19 10/09/2024    BASOSABS 0.03 10/09/2024       Lab Results   Component Value Date    GLUCOSE 244 (H) 09/12/2024    BUN 20 09/12/2024    CREATININE 1.44 (H) 09/12/2024     09/12/2024    K 4.4 09/12/2024     09/12/2024    CO2 28.8 09/12/2024    CALCIUM 9.1 09/12/2024    PROTEINTOT 6.8 09/12/2024    ALBUMIN 4.0 09/12/2024    BILITOT 0.4 09/12/2024    ALKPHOS 76 09/12/2024    AST 24 09/12/2024    ALT 27 09/12/2024     CT Abdomen Pelvis With " Contrast    Result Date: 10/7/2024  Impression: Stable CT appearance of the neck, chest, abdomen and pelvis as above. There is no evidence of new worrisome adenopathy to specifically suggest lymphomatous disease recurrence or progression. No unexpected or acute findings are present. Electronically Signed: Kenn Carrero MD  10/7/2024 12:23 PM EDT  Workstation ID: [id number]    CT Chest With Contrast Diagnostic    Result Date: 10/7/2024  Impression: Stable CT appearance of the neck, chest, abdomen and pelvis as above. There is no evidence of new worrisome adenopathy to specifically suggest lymphomatous disease recurrence or progression. No unexpected or acute findings are present. Electronically Signed: Kenn Carrero MD  10/7/2024 12:23 PM EDT  Workstation ID: [id number]    CT Soft Tissue Neck With Contrast    Result Date: 10/7/2024  Impression: Stable CT appearance of the neck, chest, abdomen and pelvis as above. There is no evidence of new worrisome adenopathy to specifically suggest lymphomatous disease recurrence or progression. No unexpected or acute findings are present. Electronically Signed: Kenn Carrero MD  10/7/2024 12:23 PM EDT  Workstation ID: [id number]    XR Chest 2 View    Result Date: 8/30/2024  Impression: No active disease Electronically Signed: Samir Ramirez MD  8/30/2024 1:11 PM EDT  Workstation ID: [id number]        Assessment/Plan    1.  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  He has completed 6 cycles of treatment with A + CHP.  Completed consolidative radiotherapy in August 2022.  I reviewed his scans today which shows no sign of recurrent disease.  At this point he is 2 years out he does not need any further scans unless he is symptomatic.  I will see him in summer 2025 to make sure he is doing well.      2.  Neuropathy of the lower extremities.  Still present.  No significant change there.    Total time of patient care on day of service including time prior to,  face to face with patient, and following visit spent in reviewing records, lab results, imaging studies, discussion with patient, and documentation/charting was > 32 minutes.        Ai Henderson MD  [facility] Hematology and Oncology         No orders of the defined types were placed in this encounter.      10/9/2024

## 2024-11-16 DIAGNOSIS — E11.65 TYPE 2 DIABETES MELLITUS WITH HYPERGLYCEMIA, WITH LONG-TERM CURRENT USE OF INSULIN: ICD-10-CM

## 2024-11-16 DIAGNOSIS — Z79.4 TYPE 2 DIABETES MELLITUS WITH HYPERGLYCEMIA, WITH LONG-TERM CURRENT USE OF INSULIN: ICD-10-CM

## 2024-11-17 RX ORDER — METFORMIN HYDROCHLORIDE 500 MG/1
500 TABLET, EXTENDED RELEASE ORAL
Qty: 90 TABLET | Refills: 1 | Status: SHIPPED | OUTPATIENT
Start: 2024-11-17

## 2024-12-11 ENCOUNTER — APPOINTMENT (OUTPATIENT)
Dept: CT IMAGING | Facility: HOSPITAL | Age: 79
End: 2024-12-11
Payer: MEDICARE

## 2024-12-11 ENCOUNTER — OFFICE VISIT (OUTPATIENT)
Dept: INTERNAL MEDICINE | Facility: CLINIC | Age: 79
End: 2024-12-11
Payer: MEDICARE

## 2024-12-11 ENCOUNTER — HOSPITAL ENCOUNTER (INPATIENT)
Facility: HOSPITAL | Age: 79
LOS: 8 days | Discharge: NURSING, REHAB OR LONG-TERM CARE | End: 2024-12-19
Attending: EMERGENCY MEDICINE | Admitting: INTERNAL MEDICINE
Payer: MEDICARE

## 2024-12-11 ENCOUNTER — APPOINTMENT (OUTPATIENT)
Dept: GENERAL RADIOLOGY | Facility: HOSPITAL | Age: 79
End: 2024-12-11
Payer: MEDICARE

## 2024-12-11 VITALS
SYSTOLIC BLOOD PRESSURE: 158 MMHG | BODY MASS INDEX: 32.93 KG/M2 | DIASTOLIC BLOOD PRESSURE: 40 MMHG | TEMPERATURE: 98 F | HEART RATE: 115 BPM | HEIGHT: 70 IN | WEIGHT: 230 LBS | OXYGEN SATURATION: 77 %

## 2024-12-11 DIAGNOSIS — A41.9 SEPSIS WITH ACUTE HYPOXIC RESPIRATORY FAILURE WITHOUT SEPTIC SHOCK, DUE TO UNSPECIFIED ORGANISM: ICD-10-CM

## 2024-12-11 DIAGNOSIS — R53.1 GENERALIZED WEAKNESS: ICD-10-CM

## 2024-12-11 DIAGNOSIS — R00.0 TACHYCARDIA: ICD-10-CM

## 2024-12-11 DIAGNOSIS — R09.02 HYPOXIA: ICD-10-CM

## 2024-12-11 DIAGNOSIS — E11.65 TYPE 2 DIABETES MELLITUS WITH HYPERGLYCEMIA, WITH LONG-TERM CURRENT USE OF INSULIN: ICD-10-CM

## 2024-12-11 DIAGNOSIS — R52 BODY ACHES: ICD-10-CM

## 2024-12-11 DIAGNOSIS — R06.02 SHORTNESS OF BREATH: Primary | ICD-10-CM

## 2024-12-11 DIAGNOSIS — R09.81 NASAL CONGESTION: ICD-10-CM

## 2024-12-11 DIAGNOSIS — R65.20 SEPSIS WITH ACUTE HYPOXIC RESPIRATORY FAILURE WITHOUT SEPTIC SHOCK, DUE TO UNSPECIFIED ORGANISM: ICD-10-CM

## 2024-12-11 DIAGNOSIS — R73.9 ACUTE HYPERGLYCEMIA: ICD-10-CM

## 2024-12-11 DIAGNOSIS — Z79.4 TYPE 2 DIABETES MELLITUS WITH HYPERGLYCEMIA, WITH LONG-TERM CURRENT USE OF INSULIN: ICD-10-CM

## 2024-12-11 DIAGNOSIS — R53.83 FATIGUE, UNSPECIFIED TYPE: ICD-10-CM

## 2024-12-11 DIAGNOSIS — R05.8 PRODUCTIVE COUGH: ICD-10-CM

## 2024-12-11 DIAGNOSIS — J96.01 SEPSIS WITH ACUTE HYPOXIC RESPIRATORY FAILURE WITHOUT SEPTIC SHOCK, DUE TO UNSPECIFIED ORGANISM: ICD-10-CM

## 2024-12-11 DIAGNOSIS — I48.91 ATRIAL FIBRILLATION WITH RVR: ICD-10-CM

## 2024-12-11 DIAGNOSIS — R79.81 LOW OXYGEN SATURATION: ICD-10-CM

## 2024-12-11 DIAGNOSIS — R06.09 DOE (DYSPNEA ON EXERTION): ICD-10-CM

## 2024-12-11 DIAGNOSIS — R09.89 SCATTERED RESPIRATORY CRACKLES: ICD-10-CM

## 2024-12-11 DIAGNOSIS — I10 ESSENTIAL HYPERTENSION: ICD-10-CM

## 2024-12-11 DIAGNOSIS — J18.9 MULTIFOCAL PNEUMONIA: Primary | ICD-10-CM

## 2024-12-11 LAB
ALBUMIN SERPL-MCNC: 3.5 G/DL (ref 3.5–5.2)
ALBUMIN/GLOB SERPL: 0.9 G/DL
ALP SERPL-CCNC: 126 U/L (ref 39–117)
ALT SERPL W P-5'-P-CCNC: 23 U/L (ref 1–41)
ANION GAP SERPL CALCULATED.3IONS-SCNC: 15 MMOL/L (ref 5–15)
APTT PPP: 40.1 SECONDS (ref 60–90)
ARTERIAL PATENCY WRIST A: ABNORMAL
AST SERPL-CCNC: 27 U/L (ref 1–40)
ATMOSPHERIC PRESS: ABNORMAL MM[HG]
B PARAPERT DNA SPEC QL NAA+PROBE: NOT DETECTED
B PERT DNA SPEC QL NAA+PROBE: NOT DETECTED
BASE EXCESS BLDA CALC-SCNC: -0.6 MMOL/L (ref 0–2)
BASOPHILS # BLD AUTO: 0.08 10*3/MM3 (ref 0–0.2)
BASOPHILS NFR BLD AUTO: 0.5 % (ref 0–1.5)
BDY SITE: ABNORMAL
BILIRUB SERPL-MCNC: 0.8 MG/DL (ref 0–1.2)
BODY TEMPERATURE: 37
BUN SERPL-MCNC: 53 MG/DL (ref 8–23)
BUN/CREAT SERPL: 26.6 (ref 7–25)
C PNEUM DNA NPH QL NAA+NON-PROBE: NOT DETECTED
CALCIUM SPEC-SCNC: 9.6 MG/DL (ref 8.6–10.5)
CHLORIDE SERPL-SCNC: 88 MMOL/L (ref 98–107)
CO2 BLDA-SCNC: 27.5 MMOL/L (ref 22–33)
CO2 SERPL-SCNC: 25 MMOL/L (ref 22–29)
COHGB MFR BLD: 1.2 % (ref 0–2)
CREAT SERPL-MCNC: 1.99 MG/DL (ref 0.76–1.27)
D DIMER PPP FEU-MCNC: 3.21 MCGFEU/ML (ref 0–0.8)
D-LACTATE SERPL-SCNC: 1.7 MMOL/L (ref 0.5–2)
D-LACTATE SERPL-SCNC: 3.7 MMOL/L (ref 0.5–2)
DEPRECATED RDW RBC AUTO: 48.7 FL (ref 37–54)
EGFRCR SERPLBLD CKD-EPI 2021: 33.3 ML/MIN/1.73
EOSINOPHIL # BLD AUTO: 0 10*3/MM3 (ref 0–0.4)
EOSINOPHIL NFR BLD AUTO: 0 % (ref 0.3–6.2)
EPAP: 0
ERYTHROCYTE [DISTWIDTH] IN BLOOD BY AUTOMATED COUNT: 13.4 % (ref 12.3–15.4)
EXPIRATION DATE: NORMAL
FLUAV AG UPPER RESP QL IA.RAPID: NOT DETECTED
FLUAV SUBTYP SPEC NAA+PROBE: NOT DETECTED
FLUBV AG UPPER RESP QL IA.RAPID: NOT DETECTED
FLUBV RNA ISLT QL NAA+PROBE: NOT DETECTED
GEN 5 1HR TROPONIN T REFLEX: 70 NG/L
GLOBULIN UR ELPH-MCNC: 3.8 GM/DL
GLUCOSE BLDC GLUCOMTR-MCNC: 464 MG/DL (ref 70–130)
GLUCOSE BLDC GLUCOMTR-MCNC: 472 MG/DL (ref 70–130)
GLUCOSE SERPL-MCNC: 509 MG/DL (ref 65–99)
HADV DNA SPEC NAA+PROBE: NOT DETECTED
HCO3 BLDA-SCNC: 26 MMOL/L (ref 20–26)
HCOV 229E RNA SPEC QL NAA+PROBE: NOT DETECTED
HCOV HKU1 RNA SPEC QL NAA+PROBE: NOT DETECTED
HCOV NL63 RNA SPEC QL NAA+PROBE: NOT DETECTED
HCOV OC43 RNA SPEC QL NAA+PROBE: NOT DETECTED
HCT VFR BLD AUTO: 38.5 % (ref 37.5–51)
HCT VFR BLD CALC: 38.5 % (ref 38–51)
HGB BLD-MCNC: 12.6 G/DL (ref 13–17.7)
HGB BLDA-MCNC: 12.6 G/DL (ref 13.5–17.5)
HMPV RNA NPH QL NAA+NON-PROBE: NOT DETECTED
HOLD SPECIMEN: NORMAL
HPIV1 RNA ISLT QL NAA+PROBE: NOT DETECTED
HPIV2 RNA SPEC QL NAA+PROBE: NOT DETECTED
HPIV3 RNA NPH QL NAA+PROBE: NOT DETECTED
HPIV4 P GENE NPH QL NAA+PROBE: NOT DETECTED
IMM GRANULOCYTES # BLD AUTO: 0.1 10*3/MM3 (ref 0–0.05)
IMM GRANULOCYTES NFR BLD AUTO: 0.6 % (ref 0–0.5)
INHALED O2 CONCENTRATION: 36 %
INR PPP: 1.38 (ref 0.89–1.12)
INTERNAL CONTROL: NORMAL
IPAP: 0
LIPASE SERPL-CCNC: 21 U/L (ref 13–60)
LYMPHOCYTES # BLD AUTO: 2.04 10*3/MM3 (ref 0.7–3.1)
LYMPHOCYTES NFR BLD AUTO: 12.7 % (ref 19.6–45.3)
Lab: NORMAL
M PNEUMO IGG SER IA-ACNC: NOT DETECTED
MAGNESIUM SERPL-MCNC: 1.9 MG/DL (ref 1.6–2.4)
MCH RBC QN AUTO: 32.5 PG (ref 26.6–33)
MCHC RBC AUTO-ENTMCNC: 32.7 G/DL (ref 31.5–35.7)
MCV RBC AUTO: 99.2 FL (ref 79–97)
METHGB BLD QL: 0.9 % (ref 0–1.5)
MODALITY: ABNORMAL
MONOCYTES # BLD AUTO: 1.45 10*3/MM3 (ref 0.1–0.9)
MONOCYTES NFR BLD AUTO: 9 % (ref 5–12)
NEUTROPHILS NFR BLD AUTO: 12.43 10*3/MM3 (ref 1.7–7)
NEUTROPHILS NFR BLD AUTO: 77.2 % (ref 42.7–76)
NRBC BLD AUTO-RTO: 0 /100 WBC (ref 0–0.2)
NT-PROBNP SERPL-MCNC: 4921 PG/ML (ref 0–1800)
OXYHGB MFR BLDV: 94.3 % (ref 94–99)
PAW @ PEAK INSP FLOW SETTING VENT: 0 CMH2O
PCO2 BLDA: 49.9 MM HG (ref 35–45)
PCO2 TEMP ADJ BLD: 49.9 MM HG (ref 35–48)
PH BLDA: 7.33 PH UNITS (ref 7.35–7.45)
PH, TEMP CORRECTED: 7.33 PH UNITS
PLATELET # BLD AUTO: 149 10*3/MM3 (ref 140–450)
PMV BLD AUTO: 11.5 FL (ref 6–12)
PO2 BLDA: 92.5 MM HG (ref 83–108)
PO2 TEMP ADJ BLD: 92.5 MM HG (ref 83–108)
POTASSIUM SERPL-SCNC: 5.2 MMOL/L (ref 3.5–5.2)
PROT SERPL-MCNC: 7.3 G/DL (ref 6–8.5)
PROTHROMBIN TIME: 17.1 SECONDS (ref 12.2–14.5)
RBC # BLD AUTO: 3.88 10*6/MM3 (ref 4.14–5.8)
RHINOVIRUS RNA SPEC NAA+PROBE: DETECTED
RSV RNA NPH QL NAA+NON-PROBE: NOT DETECTED
SARS-COV-2 AG UPPER RESP QL IA.RAPID: NOT DETECTED
SARS-COV-2 RNA NPH QL NAA+NON-PROBE: NOT DETECTED
SODIUM SERPL-SCNC: 128 MMOL/L (ref 136–145)
TOTAL RATE: 0 BREATHS/MINUTE
TROPONIN T % DELTA: -8 %
TROPONIN T NUMERIC DELTA: -6 NG/L
TROPONIN T SERPL HS-MCNC: 76 NG/L
UFH PPP CHRO-ACNC: 0.1 IU/ML (ref 0.3–0.7)
WBC NRBC COR # BLD AUTO: 16.1 10*3/MM3 (ref 3.4–10.8)
WHOLE BLOOD HOLD COAG: NORMAL
WHOLE BLOOD HOLD SPECIMEN: NORMAL

## 2024-12-11 PROCEDURE — 94640 AIRWAY INHALATION TREATMENT: CPT

## 2024-12-11 PROCEDURE — 36415 COLL VENOUS BLD VENIPUNCTURE: CPT

## 2024-12-11 PROCEDURE — 99291 CRITICAL CARE FIRST HOUR: CPT | Performed by: STUDENT IN AN ORGANIZED HEALTH CARE EDUCATION/TRAINING PROGRAM

## 2024-12-11 PROCEDURE — 85520 HEPARIN ASSAY: CPT | Performed by: EMERGENCY MEDICINE

## 2024-12-11 PROCEDURE — 99291 CRITICAL CARE FIRST HOUR: CPT

## 2024-12-11 PROCEDURE — 85610 PROTHROMBIN TIME: CPT | Performed by: EMERGENCY MEDICINE

## 2024-12-11 PROCEDURE — 71045 X-RAY EXAM CHEST 1 VIEW: CPT

## 2024-12-11 PROCEDURE — 93005 ELECTROCARDIOGRAM TRACING: CPT | Performed by: EMERGENCY MEDICINE

## 2024-12-11 PROCEDURE — 85025 COMPLETE CBC W/AUTO DIFF WBC: CPT | Performed by: EMERGENCY MEDICINE

## 2024-12-11 PROCEDURE — 83605 ASSAY OF LACTIC ACID: CPT | Performed by: EMERGENCY MEDICINE

## 2024-12-11 PROCEDURE — 83735 ASSAY OF MAGNESIUM: CPT | Performed by: EMERGENCY MEDICINE

## 2024-12-11 PROCEDURE — 25010000002 HEPARIN (PORCINE) PER 1000 UNITS: Performed by: EMERGENCY MEDICINE

## 2024-12-11 PROCEDURE — 63710000001 INSULIN GLARGINE PER 5 UNITS: Performed by: STUDENT IN AN ORGANIZED HEALTH CARE EDUCATION/TRAINING PROGRAM

## 2024-12-11 PROCEDURE — 82375 ASSAY CARBOXYHB QUANT: CPT

## 2024-12-11 PROCEDURE — 84484 ASSAY OF TROPONIN QUANT: CPT | Performed by: EMERGENCY MEDICINE

## 2024-12-11 PROCEDURE — 82805 BLOOD GASES W/O2 SATURATION: CPT

## 2024-12-11 PROCEDURE — 63710000001 INSULIN LISPRO (HUMAN) PER 5 UNITS: Performed by: STUDENT IN AN ORGANIZED HEALTH CARE EDUCATION/TRAINING PROGRAM

## 2024-12-11 PROCEDURE — 25010000002 AMPICILLIN-SULBACTAM PER 1.5 G: Performed by: EMERGENCY MEDICINE

## 2024-12-11 PROCEDURE — 25810000003 SODIUM CHLORIDE 0.9 % SOLUTION: Performed by: EMERGENCY MEDICINE

## 2024-12-11 PROCEDURE — 36600 WITHDRAWAL OF ARTERIAL BLOOD: CPT

## 2024-12-11 PROCEDURE — 85379 FIBRIN DEGRADATION QUANT: CPT | Performed by: EMERGENCY MEDICINE

## 2024-12-11 PROCEDURE — 85730 THROMBOPLASTIN TIME PARTIAL: CPT | Performed by: EMERGENCY MEDICINE

## 2024-12-11 PROCEDURE — 82948 REAGENT STRIP/BLOOD GLUCOSE: CPT

## 2024-12-11 PROCEDURE — 80053 COMPREHEN METABOLIC PANEL: CPT | Performed by: EMERGENCY MEDICINE

## 2024-12-11 PROCEDURE — 87040 BLOOD CULTURE FOR BACTERIA: CPT | Performed by: EMERGENCY MEDICINE

## 2024-12-11 PROCEDURE — 25010000002 HEPARIN (PORCINE) 25000-0.45 UT/250ML-% SOLUTION: Performed by: EMERGENCY MEDICINE

## 2024-12-11 PROCEDURE — 25510000001 IOPAMIDOL PER 1 ML: Performed by: EMERGENCY MEDICINE

## 2024-12-11 PROCEDURE — 0202U NFCT DS 22 TRGT SARS-COV-2: CPT | Performed by: EMERGENCY MEDICINE

## 2024-12-11 PROCEDURE — 83880 ASSAY OF NATRIURETIC PEPTIDE: CPT | Performed by: EMERGENCY MEDICINE

## 2024-12-11 PROCEDURE — 71275 CT ANGIOGRAPHY CHEST: CPT

## 2024-12-11 PROCEDURE — 25010000002 CEFTRIAXONE PER 250 MG: Performed by: STUDENT IN AN ORGANIZED HEALTH CARE EDUCATION/TRAINING PROGRAM

## 2024-12-11 PROCEDURE — 83050 HGB METHEMOGLOBIN QUAN: CPT

## 2024-12-11 PROCEDURE — 25010000002 METHYLPREDNISOLONE PER 125 MG: Performed by: EMERGENCY MEDICINE

## 2024-12-11 PROCEDURE — 83690 ASSAY OF LIPASE: CPT | Performed by: EMERGENCY MEDICINE

## 2024-12-11 PROCEDURE — 94799 UNLISTED PULMONARY SVC/PX: CPT

## 2024-12-11 RX ORDER — SODIUM CHLORIDE 0.9 % (FLUSH) 0.9 %
10 SYRINGE (ML) INJECTION AS NEEDED
Status: DISCONTINUED | OUTPATIENT
Start: 2024-12-11 | End: 2024-12-19 | Stop reason: HOSPADM

## 2024-12-11 RX ORDER — METHYLPREDNISOLONE SODIUM SUCCINATE 125 MG/2ML
125 INJECTION, POWDER, LYOPHILIZED, FOR SOLUTION INTRAMUSCULAR; INTRAVENOUS ONCE
Status: COMPLETED | OUTPATIENT
Start: 2024-12-11 | End: 2024-12-11

## 2024-12-11 RX ORDER — BISACODYL 10 MG
10 SUPPOSITORY, RECTAL RECTAL DAILY PRN
Status: DISCONTINUED | OUTPATIENT
Start: 2024-12-11 | End: 2024-12-12

## 2024-12-11 RX ORDER — INSULIN LISPRO 100 [IU]/ML
2-9 INJECTION, SOLUTION INTRAVENOUS; SUBCUTANEOUS
Status: DISCONTINUED | OUTPATIENT
Start: 2024-12-11 | End: 2024-12-12

## 2024-12-11 RX ORDER — IPRATROPIUM BROMIDE AND ALBUTEROL SULFATE 2.5; .5 MG/3ML; MG/3ML
3 SOLUTION RESPIRATORY (INHALATION) ONCE
Status: COMPLETED | OUTPATIENT
Start: 2024-12-11 | End: 2024-12-11

## 2024-12-11 RX ORDER — NICOTINE POLACRILEX 4 MG
15 LOZENGE BUCCAL
Status: DISCONTINUED | OUTPATIENT
Start: 2024-12-11 | End: 2024-12-12 | Stop reason: SDUPTHER

## 2024-12-11 RX ORDER — HEPARIN SODIUM 1000 [USP'U]/ML
4000 INJECTION, SOLUTION INTRAVENOUS; SUBCUTANEOUS AS NEEDED
Status: DISCONTINUED | OUTPATIENT
Start: 2024-12-11 | End: 2024-12-11

## 2024-12-11 RX ORDER — DILTIAZEM HYDROCHLORIDE 5 MG/ML
10 INJECTION INTRAVENOUS ONCE
Status: COMPLETED | OUTPATIENT
Start: 2024-12-11 | End: 2024-12-11

## 2024-12-11 RX ORDER — DOXYCYCLINE 100 MG/1
100 CAPSULE ORAL EVERY 12 HOURS SCHEDULED
Status: COMPLETED | OUTPATIENT
Start: 2024-12-12 | End: 2024-12-18

## 2024-12-11 RX ORDER — DEXTROSE MONOHYDRATE 25 G/50ML
25 INJECTION, SOLUTION INTRAVENOUS
Status: DISCONTINUED | OUTPATIENT
Start: 2024-12-11 | End: 2024-12-12 | Stop reason: SDUPTHER

## 2024-12-11 RX ORDER — SODIUM CHLORIDE 9 MG/ML
40 INJECTION, SOLUTION INTRAVENOUS AS NEEDED
Status: DISCONTINUED | OUTPATIENT
Start: 2024-12-11 | End: 2024-12-12

## 2024-12-11 RX ORDER — IPRATROPIUM BROMIDE AND ALBUTEROL SULFATE 2.5; .5 MG/3ML; MG/3ML
3 SOLUTION RESPIRATORY (INHALATION) EVERY 6 HOURS PRN
Status: DISCONTINUED | OUTPATIENT
Start: 2024-12-11 | End: 2024-12-19 | Stop reason: HOSPADM

## 2024-12-11 RX ORDER — IOPAMIDOL 755 MG/ML
100 INJECTION, SOLUTION INTRAVASCULAR
Status: COMPLETED | OUTPATIENT
Start: 2024-12-11 | End: 2024-12-11

## 2024-12-11 RX ORDER — DILTIAZEM HCL/D5W 125 MG/125
5-15 PLASTIC BAG, INJECTION (ML) INTRAVENOUS
Status: DISCONTINUED | OUTPATIENT
Start: 2024-12-11 | End: 2024-12-13

## 2024-12-11 RX ORDER — BISACODYL 5 MG/1
5 TABLET, DELAYED RELEASE ORAL DAILY PRN
Status: DISCONTINUED | OUTPATIENT
Start: 2024-12-11 | End: 2024-12-12

## 2024-12-11 RX ORDER — HEPARIN SODIUM 10000 [USP'U]/100ML
23 INJECTION, SOLUTION INTRAVENOUS
Status: DISCONTINUED | OUTPATIENT
Start: 2024-12-11 | End: 2024-12-16

## 2024-12-11 RX ORDER — AMOXICILLIN 250 MG
2 CAPSULE ORAL 2 TIMES DAILY
Status: DISCONTINUED | OUTPATIENT
Start: 2024-12-11 | End: 2024-12-17 | Stop reason: SDUPTHER

## 2024-12-11 RX ORDER — IBUPROFEN 600 MG/1
1 TABLET ORAL
Status: DISCONTINUED | OUTPATIENT
Start: 2024-12-11 | End: 2024-12-12

## 2024-12-11 RX ORDER — NITROGLYCERIN 0.4 MG/1
0.4 TABLET SUBLINGUAL
Status: DISCONTINUED | OUTPATIENT
Start: 2024-12-11 | End: 2024-12-19 | Stop reason: HOSPADM

## 2024-12-11 RX ORDER — HEPARIN SODIUM 1000 [USP'U]/ML
2000 INJECTION, SOLUTION INTRAVENOUS; SUBCUTANEOUS AS NEEDED
Status: DISCONTINUED | OUTPATIENT
Start: 2024-12-11 | End: 2024-12-11

## 2024-12-11 RX ORDER — SODIUM CHLORIDE 0.9 % (FLUSH) 0.9 %
10 SYRINGE (ML) INJECTION AS NEEDED
Status: DISCONTINUED | OUTPATIENT
Start: 2024-12-11 | End: 2024-12-13

## 2024-12-11 RX ORDER — HEPARIN SODIUM 1000 [USP'U]/ML
4000 INJECTION, SOLUTION INTRAVENOUS; SUBCUTANEOUS ONCE
Status: COMPLETED | OUTPATIENT
Start: 2024-12-11 | End: 2024-12-11

## 2024-12-11 RX ORDER — SODIUM CHLORIDE 0.9 % (FLUSH) 0.9 %
10 SYRINGE (ML) INJECTION EVERY 12 HOURS SCHEDULED
Status: DISCONTINUED | OUTPATIENT
Start: 2024-12-11 | End: 2024-12-14

## 2024-12-11 RX ORDER — PANTOPRAZOLE SODIUM 40 MG/1
40 TABLET, DELAYED RELEASE ORAL
Status: DISCONTINUED | OUTPATIENT
Start: 2024-12-12 | End: 2024-12-19 | Stop reason: HOSPADM

## 2024-12-11 RX ORDER — SODIUM CHLORIDE 0.9 % (FLUSH) 0.9 %
10 SYRINGE (ML) INJECTION AS NEEDED
Status: DISCONTINUED | OUTPATIENT
Start: 2024-12-11 | End: 2024-12-12

## 2024-12-11 RX ORDER — POLYETHYLENE GLYCOL 3350 17 G/17G
17 POWDER, FOR SOLUTION ORAL DAILY PRN
Status: DISCONTINUED | OUTPATIENT
Start: 2024-12-11 | End: 2024-12-12

## 2024-12-11 RX ADMIN — IPRATROPIUM BROMIDE AND ALBUTEROL SULFATE 3 ML: 2.5; .5 SOLUTION RESPIRATORY (INHALATION) at 14:13

## 2024-12-11 RX ADMIN — MUPIROCIN 1 APPLICATION: 20 OINTMENT TOPICAL at 22:05

## 2024-12-11 RX ADMIN — DILTIAZEM HYDROCHLORIDE 10 MG: 5 INJECTION, SOLUTION INTRAVENOUS at 20:18

## 2024-12-11 RX ADMIN — INSULIN GLARGINE 25 UNITS: 100 INJECTION, SOLUTION SUBCUTANEOUS at 22:05

## 2024-12-11 RX ADMIN — Medication 5 MG/HR: at 20:18

## 2024-12-11 RX ADMIN — SODIUM CHLORIDE 1000 ML: 9 INJECTION, SOLUTION INTRAVENOUS at 18:42

## 2024-12-11 RX ADMIN — SENNOSIDES AND DOCUSATE SODIUM 2 TABLET: 50; 8.6 TABLET ORAL at 22:05

## 2024-12-11 RX ADMIN — AMPICILLIN SODIUM AND SULBACTAM SODIUM 3 G: 2; 1 INJECTION, POWDER, FOR SOLUTION INTRAMUSCULAR; INTRAVENOUS at 18:42

## 2024-12-11 RX ADMIN — HEPARIN SODIUM 4000 UNITS: 1000 INJECTION INTRAVENOUS; SUBCUTANEOUS at 20:57

## 2024-12-11 RX ADMIN — DOXYCYCLINE 100 MG: 100 INJECTION, POWDER, LYOPHILIZED, FOR SOLUTION INTRAVENOUS at 19:59

## 2024-12-11 RX ADMIN — Medication 10 ML: at 20:33

## 2024-12-11 RX ADMIN — METHYLPREDNISOLONE SODIUM SUCCINATE 125 MG: 125 INJECTION INTRAMUSCULAR; INTRAVENOUS at 14:21

## 2024-12-11 RX ADMIN — SODIUM CHLORIDE 2000 MG: 900 INJECTION INTRAVENOUS at 21:53

## 2024-12-11 RX ADMIN — IOPAMIDOL 80 ML: 755 INJECTION, SOLUTION INTRAVENOUS at 19:13

## 2024-12-11 RX ADMIN — INSULIN LISPRO 9 UNITS: 100 INJECTION, SOLUTION INTRAVENOUS; SUBCUTANEOUS at 22:05

## 2024-12-11 RX ADMIN — HEPARIN SODIUM 10 UNITS/KG/HR: 10000 INJECTION, SOLUTION INTRAVENOUS at 20:58

## 2024-12-11 NOTE — PROGRESS NOTES
Office Note     Name: Gabriela Mar    : 1945     MRN: [id number]     Chief Complaint  Generalized Body Aches (Patient reports today for illness that started 7 days ago and his symptoms include nasal congestion, cough, shortness of breath, fatigue/ weakness and phlegm. Patient states that he has taken OTC medication with not a lot of relief. Patient states that he has not been exposed to any known illness to his knowledge. ), Nasal Congestion, Shortness of Breath, and Fatigue    Subjective     History of Present Illness:  Gabriela Mar is a 79 y.o. male who presents today for evaluation of acute respiratory complaints.  Patient follows with Dr. Gee for chronic conditions.    Patient reports onset of symptoms was last Tuesday.  He has been experiencing a cough, head and nasal congestion, fatigue, generalized weakness, shortness of breath, and generally feeling unwell.    He describes the cough is productive.  He is coughing up a fair amount of mucus.  He reports it changes colors.  Sometimes it is white, sometimes light gray, and sometimes dark gray.  He does describe it as a thick consistency.  He also feels congestion in his chest when coughing.  He is also very seeing a clear nasal drainage.  No known fever.    He reports when sitting his shortness of breath is improved.  He does experience dyspnea with minimal exertion.  His niece was with him today and reports that he had to stop and take a break when walking from the car to the front door of the office.  She suggested he go to the ED for further evaluation but the patient was hesitant and presented here for evaluation.    He initially thought he could get over it on his own.  He has been using over-the-counter Zicam, Mucinex, and Coricidin with minimal relief in symptoms.  He has an albuterol inhaler as well as nebulizer treatments on his medication list but has not been using these.  He denies any history of chronic lung disease but does  have a diagnosis of COPD on his chart.  Patient does not use supplemental oxygen.    Patient had an appointment earlier today with Dr. Gee but was unable to drive himself to the office.    Patient has not taken any of his diabetic or blood pressure medications in 1 week.    No further complaints or concerns at this time.    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 Hernández    JOINT REPLACEMENT      LEFT HIP -MARCH     SUBMAXILLARY CYST  EXCISION      Right back    TONSILLECTOMY      TOTAL HIP ARTHROPLASTY Right 2017    Procedure: RIGHT TOTAL HIP ARTHROPLASTY;  Surgeon: Ryenaldo Suarez MD;  Location: [facility];  Service:     VASECTOMY      WRIST GANGLION EXCISION         Social History     Socioeconomic History    Marital status: Single   Tobacco Use    Smoking status: Former     Current packs/day: 0.00     Average packs/day: 2.0 packs/day for 35.0 years (70.0 ttl pk-yrs)     Types: Cigarettes     Start date:      Quit date:      Years since quittin.9    Smokeless tobacco: Never    Tobacco comments:     QUIT 20 PLUS YEARS AGO    Vaping Use    Vaping status: Never Used   Substance and Sexual Activity    Alcohol use: No     Comment: stopped drinking    Drug use: No    Sexual activity: Defer       No current facility-administered medications for this visit.    Current Outpatient Medications:     albuterol (PROVENTIL) (2.5 MG/3ML) 0.083% nebulizer solution, Take 2.5 mg by nebulization Every 4  "(Four) Hours As Needed for Wheezing., Disp: 120 each, Rfl: 5    albuterol sulfate  (90 Base) MCG/ACT inhaler, Inhale 2 puffs Every 4 (Four) Hours As Needed for Wheezing., Disp: 8 g, Rfl: 3    aspirin 81 MG EC tablet, Take 1 tablet by mouth Daily., Disp: 90 tablet, Rfl: 3    Cholecalciferol (VITAMIN D) 1000 UNITS tablet, Take 2 tablets by mouth Daily., Disp: , Rfl:     glimepiride (AMARYL) 4 MG tablet, Take 1 tablet by mouth Daily With Dinner., Disp: 90 tablet, Rfl: 1    insulin NPH (humuLIN N,novoLIN N) 100 UNIT/ML injection, Inject 100 Units under the skin into the appropriate area as directed 2 (Two) Times a Day Before Meals., Disp: 60 mL, Rfl: 5    losartan (Cozaar) 25 MG tablet, Take 1 tablet by mouth Daily., Disp: 90 tablet, Rfl: 1    metFORMIN ER (GLUCOPHAGE-XR) 500 MG 24 hr tablet, TAKE 1 TABLET BY MOUTH DAILY WITH BREAKFAST, Disp: 90 tablet, Rfl: 1    Multiple Vitamins-Minerals (MULTI VITAMIN/MINERALS PO), Take 1 tablet by mouth Daily., Disp: , Rfl:     Multiple Vitamins-Minerals (PreserVision AREDS 2) chewable tablet, Chew 1 tablet 2 (Two) Times a Day., Disp: , Rfl:     omeprazole (priLOSEC) 20 MG capsule, Take 1 capsule by mouth Daily., Disp: , Rfl:     simvastatin (ZOCOR) 10 MG tablet, Take 1 tablet by mouth every night at bedtime., Disp: 90 tablet, Rfl: 3    tiZANidine (ZANAFLEX) 2 MG tablet, Take 1 tablet by mouth At Night As Needed for Muscle Spasms., Disp: 30 tablet, Rfl: 3    Facility-Administered Medications Ordered in Other Visits:     sodium chloride 0.9 % flush 10 mL, 10 mL, Intravenous, PRN, Emergency, Triage Protocol, MD    Objective     Vital Signs  /40 Comment: no bp meds for 1 week  Pulse 115   Temp 98 °F (36.7 °C)   Ht 177.8 cm (70\")   Wt 104 kg (230 lb)   SpO2 (!) 77% Comment: kept pulse ox sitting and his o2 wouldnt go u any higher, patient seems to be in respitory distress  BMI 33.00 kg/m²   Estimated body mass index is 33 kg/m² as calculated from the following:    " "Height as of this encounter: 177.8 cm (70\").    Weight as of this encounter: 104 kg (230 lb).           Physical Exam  Constitutional:       General: He is in acute distress.      Appearance: Normal appearance. He is ill-appearing.   HENT:      Head: Normocephalic and atraumatic.      Nose: Nose normal.      Mouth/Throat:      Mouth: Mucous membranes are dry.   Eyes:      Extraocular Movements: Extraocular movements intact.      Conjunctiva/sclera: Conjunctivae normal.      Pupils: Pupils are equal, round, and reactive to light.   Cardiovascular:      Rate and Rhythm: Normal rate. Tachycardia present.   Pulmonary:      Effort: Pulmonary effort is normal. No respiratory distress.      Comments: Breath sounds diminished in all lobes, scattered crackles noted on auscultation, congested cough noted during exam, upper airway wheezing noted on exam, tachypnea with minimal exertion, O2 sat 77 during exam on room air.  Musculoskeletal:         General: Normal range of motion.      Cervical back: Normal range of motion and neck supple.      Right lower leg: No edema.      Left lower leg: No edema.   Skin:     General: Skin is warm and dry.   Neurological:      General: No focal deficit present.      Mental Status: He is alert and oriented to person, place, and time. Mental status is at baseline.   Psychiatric:         Mood and Affect: Mood normal.         Behavior: Behavior normal.         Thought Content: Thought content normal.         Judgment: Judgment normal.          Assessment and Plan     Diagnoses and all orders for this visit:    1. Shortness of breath (Primary)  -     POCT SARS-CoV-2 Antigen MAIKOL + Flu    2. Productive cough    3. WU (dyspnea on exertion)    4. Low oxygen saturation    5. Scattered respiratory crackles    6. Nasal congestion  -     POCT SARS-CoV-2 Antigen MAIKOL + Flu    7. Fatigue, unspecified type  -     POCT SARS-CoV-2 Antigen MAIKOL + Flu    8. Body aches  -     POCT SARS-CoV-2 Antigen MAIKOL + " Flu    9. Generalized weakness    10. Essential hypertension    11. Tachycardia    12. Type 2 diabetes mellitus with hyperglycemia, with long-term current use of insulin    Plan:  COVID and flu swab in the office today negative.  Patient is hypertensive and tachycardic.  O2 sat in the 70s during exam on room air.  Patient does exhibit respiratory distress and dyspnea with minimal exertion.  Patient unable to ambulate back to car after visit.  Patient was taken out via wheelchair by MA.  I personally called [facility] ED and spoke with the ED physician on.  Report provided to ED physician and notification that patient was on their way for further evaluation and admission.  Patient refused EMS transport.  Appropriate documentation completed.    Patient's niece is present with him and will transport him via private vehicle.      Follow Up  Return if symptoms worsen or fail to improve, for Follow up with Dr. Gee.    CALIN Terry    Part of this note may be an electronic transcription/translation of spoken language to printed text using the Dragon Dictation System.

## 2024-12-11 NOTE — ED PROVIDER NOTES
Subjective   History of Present Illness  79-year-old male who presents for evaluation of upper respiratory congestion with associated shortness of breath.  Symptoms began last Tuesday, 8 days ago.  They have continued to persist and worsen.  He states that he initially had some upper respiratory congestion and rhinorrhea.  He then developed a cough, that has been mildly productive.  He has slowly developed increased shortness of breath.  He held off until he saw his primary care physician today and was identified to be hypoxic and he was sent by his primary care physician here to the ER for further workup.  He had hypoxia down to 79% on room air upon arrival.  He does not typically wear oxygen.  He denies a known underlying history of lung disease.  He does report previously smoking but stopped over 35 years ago.  He denies obvious sick contacts.  No recent fever.  No chest pain.  No abdominal pain.  No change in bowel or urinary function.  No history of DVT or PE.  No other acute complaints.    The wife also reports the patient has had decreased oral intake and has not been eating or drinking well not take his medication for the last 6 days.      Review of Systems   Constitutional:  Positive for activity change, appetite change and fatigue. Negative for chills and fever.   HENT:  Positive for congestion and rhinorrhea. Negative for ear pain, postnasal drip, sinus pressure and sore throat.    Eyes:  Negative for photophobia, pain, redness and visual disturbance.   Respiratory:  Positive for cough and shortness of breath. Negative for chest tightness.    Cardiovascular:  Negative for chest pain, palpitations and leg swelling.   Gastrointestinal:  Negative for abdominal pain, anal bleeding, blood in stool, diarrhea, nausea and vomiting.   Endocrine: Negative for polydipsia and polyuria.   Genitourinary:  Negative for difficulty urinating, dysuria, frequency and urgency.   Musculoskeletal:  Negative for arthralgias,  back pain and neck pain.   Skin:  Negative for pallor and rash.   Allergic/Immunologic: Negative for environmental allergies and immunocompromised state.   Neurological:  Negative for dizziness, weakness and headaches.   Hematological:  Negative for adenopathy.   Psychiatric/Behavioral:  Negative for confusion, self-injury and suicidal ideas. The patient is not nervous/anxious.    All other systems reviewed and are negative.      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    Arthritis     CKD (chronic kidney disease), stage III     COPD (chronic obstructive pulmonary disease)     Diabetes mellitus     Esophagitis, reflux     GERD (gastroesophageal reflux disease)     History of radiation therapy 08/26/2022    right parotid    Hyperlipidemia     Hypertension     Lymphoma     Obesity     Pharyngitis     Wears eyeglasses        Allergies   Allergen Reactions    Bactrim [Sulfamethoxazole-Trimethoprim] Other (See Comments)     Joint pain    Sulfa Antibiotics GI Intolerance     Makes bones hurt      Tegaderm Chg Dressing [Chlorhexidine] Itching and Other (See Comments)     redness       Past Surgical History:   Procedure Laterality Date    COLONOSCOPY      2015    COLONOSCOPY  09/24/2024    ENDOSCOPY      2011    EYE SURGERY      cataract 8/22 and 10/18/18 - Dr. Hernández    JOINT REPLACEMENT      LEFT HIP 2016-MARCH     SUBMAXILLARY CYST  EXCISION      Right back    TONSILLECTOMY      TOTAL HIP ARTHROPLASTY Right 03/07/2017    Procedure: RIGHT TOTAL HIP ARTHROPLASTY;  Surgeon: Reynaldo Suarez MD;  Location: [facility];  Service:     VASECTOMY      WRIST GANGLION EXCISION         Family History   Problem Relation Age of Onset    Diabetes Mother     Colon cancer Father     Kidney failure Sister     Diabetes Sister     Colon cancer Brother     Colon cancer Brother     Lung cancer Brother     Squamous cell carcinoma Brother         side of face    Diabetes Sister         Social History     Socioeconomic History    Marital status: Single   Tobacco Use    Smoking status: Former     Current packs/day: 0.00     Average packs/day: 2.0 packs/day for 35.0 years (70.0 ttl pk-yrs)     Types: Cigarettes     Start date:      Quit date:      Years since quittin.9     Passive exposure: Past    Smokeless tobacco: Never    Tobacco comments:     QUIT 20 PLUS YEARS AGO    Vaping Use    Vaping status: Never Used   Substance and Sexual Activity    Alcohol use: No     Comment: stopped drinking    Drug use: No    Sexual activity: Defer           Objective   Physical Exam  Vitals and nursing note reviewed.   Constitutional:       General: He is not in acute distress.     Appearance: Normal appearance. He is well-developed. He is not toxic-appearing or diaphoretic.   HENT:      Head: Normocephalic and atraumatic.      Right Ear: External ear normal.      Left Ear: External ear normal.      Nose: Nose normal.   Eyes:      General: Lids are normal.      Pupils: Pupils are equal, round, and reactive to light.   Neck:      Trachea: No tracheal deviation.   Cardiovascular:      Rate and Rhythm: Normal rate and regular rhythm.      Pulses: No decreased pulses.      Heart sounds: Normal heart sounds. No murmur heard.     No friction rub. No gallop.   Pulmonary:      Effort: Pulmonary effort is normal. Prolonged expiration present. No respiratory distress.      Breath sounds: Normal breath sounds. Examination of the right-lower field reveals rales. Examination of the left-lower field reveals rales. No decreased breath sounds, wheezing, rhonchi or rales.   Abdominal:      General: Bowel sounds are normal.      Palpations: Abdomen is soft.      Tenderness: There is no abdominal tenderness. There is no guarding or rebound.   Musculoskeletal:         General: No deformity. Normal range of motion.      Cervical back: Normal range of motion and neck supple.      Right lower le+ Pitting Edema  present.      Left lower le+ Pitting Edema present.   Lymphadenopathy:      Cervical: No cervical adenopathy.   Skin:     General: Skin is warm and dry.      Findings: No rash.   Neurological:      Mental Status: He is alert and oriented to person, place, and time.      Cranial Nerves: No cranial nerve deficit.      Sensory: No sensory deficit.   Psychiatric:         Speech: Speech normal.         Behavior: Behavior normal.         Thought Content: Thought content normal.         Judgment: Judgment normal.         Critical Care    Performed by: Katiana Irizarry MD  Authorized by: Katiana Irizarry MD    Critical care provider statement:     Critical care time (minutes):  60    Critical care time was exclusive of:  Separately billable procedures and treating other patients    Critical care was necessary to treat or prevent imminent or life-threatening deterioration of the following conditions:  Respiratory failure and sepsis    Critical care was time spent personally by me on the following activities:  Development of treatment plan with patient or surrogate, discussions with consultants, evaluation of patient's response to treatment, blood draw for specimens, examination of patient, obtaining history from patient or surrogate, ordering and performing treatments and interventions, ordering and review of laboratory studies, ordering and review of radiographic studies, pulse oximetry, re-evaluation of patient's condition and review of old charts    I assumed direction of critical care for this patient from another provider in my specialty: no      Care discussed with: admitting provider               ED Course                                                       Medical Decision Making  Differential diagnosis includes sepsis, pneumonia, viral illness, respiratory failure, renal insufficiency, hyperglycemia, DKA, other unspecified etiology.    The patient presents tachycardic and hypoxic.  No preceding  history of oxygen dependence.  He    Chest x-ray shows multifocal pneumonia.  Labs show hyperglycemia, acute renal insufficiency, concern for dehydration with elevated lactic acid level.  No signs of DKA.  BNP was also elevated and the D-dimer was elevated.  CT angiogram supports multifocal pneumonia but does not show pulmonary embolism.  The patient was given IV fluids here in the ER but I was careful not to fluid overload the patient given significantly elevated BNP.    Viral panel positive for rhinovirus.  Troponin was elevated but remained relatively stable.  Felt to be elevated secondary to acute renal sufficiency and compounding pneumonia.    The patient was noted to be in A-fib accounting for the tachycardia.  Diltiazem and heparin was initiated.    I discussed the patient with the intensivist, Dr. Talley, who will consult for admission.    Problems Addressed:  Acute hyperglycemia: chronic illness or injury with exacerbation, progression, or side effects of treatment  Atrial fibrillation with RVR: complicated acute illness or injury with systemic symptoms that poses a threat to life or bodily functions  Hypoxia: complicated acute illness or injury with systemic symptoms that poses a threat to life or bodily functions  Multifocal pneumonia: complicated acute illness or injury with systemic symptoms that poses a threat to life or bodily functions  Sepsis with acute hypoxic respiratory failure without septic shock, due to unspecified organism: complicated acute illness or injury with systemic symptoms that poses a threat to life or bodily functions    Amount and/or Complexity of Data Reviewed  External Data Reviewed: labs, radiology and ECG.  Labs: ordered. Decision-making details documented in ED Course.  Radiology: ordered and independent interpretation performed. Decision-making details documented in ED Course.  ECG/medicine tests: ordered and independent interpretation performed. Decision-making details  documented in ED Course.    Risk  OTC drugs.  Prescription drug management.  Decision regarding hospitalization.        Final diagnoses:   Multifocal pneumonia   Hypoxia   Sepsis with acute hypoxic respiratory failure without septic shock, due to unspecified organism   Acute hyperglycemia   Atrial fibrillation with RVR       ED Disposition  ED Disposition       ED Disposition   Decision to Admit    Condition   --    Comment   Level of Care: Critical Care [6]   Diagnosis: Multifocal pneumonia [[id number]]   Certification: I Certify That Inpatient Hospital Services Are Medically Necessary For Greater Than 2 Midnights                 No follow-up provider specified.       Medication List      No changes were made to your prescriptions during this visit.            Katiana Irizarry MD  12/12/24 0900

## 2024-12-12 ENCOUNTER — APPOINTMENT (OUTPATIENT)
Dept: CARDIOLOGY | Facility: HOSPITAL | Age: 79
End: 2024-12-12
Payer: MEDICARE

## 2024-12-12 PROBLEM — E11.65 TYPE 2 DIABETES MELLITUS WITH HYPERGLYCEMIA, WITH LONG-TERM CURRENT USE OF INSULIN: Status: RESOLVED | Noted: 2019-12-06 | Resolved: 2024-12-12

## 2024-12-12 PROBLEM — Z79.4 TYPE 2 DIABETES MELLITUS WITH HYPERGLYCEMIA, WITH LONG-TERM CURRENT USE OF INSULIN: Status: ACTIVE | Noted: 2022-03-25

## 2024-12-12 PROBLEM — T45.1X5A CHEMOTHERAPY-INDUCED NEUROPATHY: Chronic | Status: RESOLVED | Noted: 2022-09-14 | Resolved: 2024-12-12

## 2024-12-12 PROBLEM — I95.1 ORTHOSTATIC HYPOTENSION: Status: RESOLVED | Noted: 2022-03-25 | Resolved: 2024-12-12

## 2024-12-12 PROBLEM — I48.0 PAROXYSMAL ATRIAL FIBRILLATION: Status: ACTIVE | Noted: 2024-12-12

## 2024-12-12 PROBLEM — T45.1X5A CHEMOTHERAPY-INDUCED THROMBOCYTOPENIA: Status: RESOLVED | Noted: 2022-03-25 | Resolved: 2024-12-12

## 2024-12-12 PROBLEM — D69.59 CHEMOTHERAPY-INDUCED THROMBOCYTOPENIA: Status: RESOLVED | Noted: 2022-03-25 | Resolved: 2024-12-12

## 2024-12-12 PROBLEM — R50.81 NEUTROPENIC FEVER: Status: RESOLVED | Noted: 2022-03-25 | Resolved: 2024-12-12

## 2024-12-12 PROBLEM — Z79.4 TYPE 2 DIABETES MELLITUS WITH HYPERGLYCEMIA, WITH LONG-TERM CURRENT USE OF INSULIN: Status: RESOLVED | Noted: 2019-12-06 | Resolved: 2024-12-12

## 2024-12-12 PROBLEM — E86.0 DEHYDRATION: Status: RESOLVED | Noted: 2022-03-25 | Resolved: 2024-12-12

## 2024-12-12 PROBLEM — E43 SEVERE MALNUTRITION: Status: RESOLVED | Noted: 2022-03-28 | Resolved: 2024-12-12

## 2024-12-12 PROBLEM — N17.9 ACUTE KIDNEY INJURY: Status: RESOLVED | Noted: 2022-03-25 | Resolved: 2024-12-12

## 2024-12-12 PROBLEM — D70.9 NEUTROPENIC FEVER: Status: RESOLVED | Noted: 2022-03-25 | Resolved: 2024-12-12

## 2024-12-12 PROBLEM — E87.5 HYPERKALEMIA: Status: RESOLVED | Noted: 2022-03-25 | Resolved: 2024-12-12

## 2024-12-12 PROBLEM — G62.0 CHEMOTHERAPY-INDUCED NEUROPATHY: Chronic | Status: RESOLVED | Noted: 2022-09-14 | Resolved: 2024-12-12

## 2024-12-12 LAB
ALBUMIN SERPL-MCNC: 2.9 G/DL (ref 3.5–5.2)
ALBUMIN/GLOB SERPL: 0.7 G/DL
ALP SERPL-CCNC: 109 U/L (ref 39–117)
ALT SERPL W P-5'-P-CCNC: 22 U/L (ref 1–41)
ANION GAP SERPL CALCULATED.3IONS-SCNC: 17 MMOL/L (ref 5–15)
ARTERIAL PATENCY WRIST A: POSITIVE
AST SERPL-CCNC: 23 U/L (ref 1–40)
ATMOSPHERIC PRESS: ABNORMAL MM[HG]
AV MEAN PRESS GRAD SYS DOP V1V2: 2 MMHG
AV VMAX SYS DOP: 90.5 CM/SEC
B-OH-BUTYR SERPL-SCNC: 0.47 MMOL/L (ref 0.02–0.27)
BASE EXCESS BLDA CALC-SCNC: -2.8 MMOL/L (ref 0–2)
BASOPHILS # BLD AUTO: 0.03 10*3/MM3 (ref 0–0.2)
BASOPHILS NFR BLD AUTO: 0.3 % (ref 0–1.5)
BDY SITE: ABNORMAL
BH CV ECHO MEAS - AO MAX PG: 3.3 MMHG
BH CV ECHO MEAS - AO ROOT DIAM: 3.1 CM
BH CV ECHO MEAS - AO V2 VTI: 12.3 CM
BH CV ECHO MEAS - AVA(I,D): 3.7 CM2
BH CV ECHO MEAS - EDV(CUBED): 64 ML
BH CV ECHO MEAS - EDV(MOD-SP2): 33.8 ML
BH CV ECHO MEAS - EDV(MOD-SP4): 72.1 ML
BH CV ECHO MEAS - EF(MOD-SP2): 48.8 %
BH CV ECHO MEAS - EF(MOD-SP4): 54.8 %
BH CV ECHO MEAS - ESV(CUBED): 29.8 ML
BH CV ECHO MEAS - ESV(MOD-SP2): 17.3 ML
BH CV ECHO MEAS - ESV(MOD-SP4): 32.6 ML
BH CV ECHO MEAS - FS: 22.5 %
BH CV ECHO MEAS - IVS/LVPW: 0.82 CM
BH CV ECHO MEAS - IVSD: 0.9 CM
BH CV ECHO MEAS - LA DIMENSION: 2.5 CM
BH CV ECHO MEAS - LAT PEAK E' VEL: 10.6 CM/SEC
BH CV ECHO MEAS - LV MASS(C)D: 127.1 GRAMS
BH CV ECHO MEAS - LV MAX PG: 2.25 MMHG
BH CV ECHO MEAS - LV MEAN PG: 1 MMHG
BH CV ECHO MEAS - LV V1 MAX: 75 CM/SEC
BH CV ECHO MEAS - LV V1 VTI: 14.5 CM
BH CV ECHO MEAS - LVIDD: 4 CM
BH CV ECHO MEAS - LVIDS: 3.1 CM
BH CV ECHO MEAS - LVOT AREA: 3.1 CM2
BH CV ECHO MEAS - LVOT DIAM: 2 CM
BH CV ECHO MEAS - LVPWD: 1.1 CM
BH CV ECHO MEAS - MV DEC SLOPE: 557.5 CM/SEC2
BH CV ECHO MEAS - MV DEC TIME: 0.12 SEC
BH CV ECHO MEAS - MV E MAX VEL: 70.7 CM/SEC
BH CV ECHO MEAS - MV MAX PG: 3.3 MMHG
BH CV ECHO MEAS - MV MEAN PG: 1.25 MMHG
BH CV ECHO MEAS - MV P1/2T: 52.3 MSEC
BH CV ECHO MEAS - MV V2 VTI: 17.7 CM
BH CV ECHO MEAS - MVA(P1/2T): 4.2 CM2
BH CV ECHO MEAS - MVA(VTI): 2.6 CM2
BH CV ECHO MEAS - PA ACC TIME: 0.11 SEC
BH CV ECHO MEAS - RAP SYSTOLE: 8 MMHG
BH CV ECHO MEAS - RVSP: 33 MMHG
BH CV ECHO MEAS - SV(LVOT): 45.6 ML
BH CV ECHO MEAS - SV(MOD-SP2): 16.5 ML
BH CV ECHO MEAS - SV(MOD-SP4): 39.5 ML
BH CV ECHO MEAS - TAPSE (>1.6): 1.27 CM
BH CV ECHO MEAS - TR MAX PG: 25 MMHG
BH CV ECHO MEAS - TR MAX VEL: 249.7 CM/SEC
BH CV XLRA - RV BASE: 4.4 CM
BH CV XLRA - RV LENGTH: 6.2 CM
BH CV XLRA - RV MID: 3.7 CM
BH CV XLRA - TDI S': 4.7 CM/SEC
BILIRUB SERPL-MCNC: 0.4 MG/DL (ref 0–1.2)
BODY TEMPERATURE: 37
BUN SERPL-MCNC: 55 MG/DL (ref 8–23)
BUN/CREAT SERPL: 28.9 (ref 7–25)
CALCIUM SPEC-SCNC: 9.2 MG/DL (ref 8.6–10.5)
CHLORIDE SERPL-SCNC: 92 MMOL/L (ref 98–107)
CO2 BLDA-SCNC: 27.3 MMOL/L (ref 22–33)
CO2 SERPL-SCNC: 22 MMOL/L (ref 22–29)
COHGB MFR BLD: 0.7 % (ref 0–2)
CREAT SERPL-MCNC: 1.9 MG/DL (ref 0.76–1.27)
DEPRECATED RDW RBC AUTO: 50.3 FL (ref 37–54)
EGFRCR SERPLBLD CKD-EPI 2021: 35.2 ML/MIN/1.73
EOSINOPHIL # BLD AUTO: 0 10*3/MM3 (ref 0–0.4)
EOSINOPHIL NFR BLD AUTO: 0 % (ref 0.3–6.2)
EPAP: 0
ERYTHROCYTE [DISTWIDTH] IN BLOOD BY AUTOMATED COUNT: 13.4 % (ref 12.3–15.4)
GLOBULIN UR ELPH-MCNC: 4.2 GM/DL
GLUCOSE BLDC GLUCOMTR-MCNC: 124 MG/DL (ref 70–130)
GLUCOSE BLDC GLUCOMTR-MCNC: 126 MG/DL (ref 70–130)
GLUCOSE BLDC GLUCOMTR-MCNC: 136 MG/DL (ref 70–130)
GLUCOSE BLDC GLUCOMTR-MCNC: 148 MG/DL (ref 70–130)
GLUCOSE BLDC GLUCOMTR-MCNC: 149 MG/DL (ref 70–130)
GLUCOSE BLDC GLUCOMTR-MCNC: 150 MG/DL (ref 70–130)
GLUCOSE BLDC GLUCOMTR-MCNC: 157 MG/DL (ref 70–130)
GLUCOSE BLDC GLUCOMTR-MCNC: 157 MG/DL (ref 70–130)
GLUCOSE BLDC GLUCOMTR-MCNC: 164 MG/DL (ref 70–130)
GLUCOSE BLDC GLUCOMTR-MCNC: 166 MG/DL (ref 70–130)
GLUCOSE BLDC GLUCOMTR-MCNC: 184 MG/DL (ref 70–130)
GLUCOSE BLDC GLUCOMTR-MCNC: 204 MG/DL (ref 70–130)
GLUCOSE BLDC GLUCOMTR-MCNC: 224 MG/DL (ref 70–130)
GLUCOSE BLDC GLUCOMTR-MCNC: 264 MG/DL (ref 70–130)
GLUCOSE BLDC GLUCOMTR-MCNC: 266 MG/DL (ref 70–130)
GLUCOSE BLDC GLUCOMTR-MCNC: 267 MG/DL (ref 70–130)
GLUCOSE BLDC GLUCOMTR-MCNC: 280 MG/DL (ref 70–130)
GLUCOSE BLDC GLUCOMTR-MCNC: 319 MG/DL (ref 70–130)
GLUCOSE BLDC GLUCOMTR-MCNC: 369 MG/DL (ref 70–130)
GLUCOSE BLDC GLUCOMTR-MCNC: 444 MG/DL (ref 70–130)
GLUCOSE BLDC GLUCOMTR-MCNC: 496 MG/DL (ref 70–130)
GLUCOSE BLDC GLUCOMTR-MCNC: 505 MG/DL (ref 70–130)
GLUCOSE BLDC GLUCOMTR-MCNC: 515 MG/DL (ref 70–130)
GLUCOSE BLDC GLUCOMTR-MCNC: 525 MG/DL (ref 70–130)
GLUCOSE BLDC GLUCOMTR-MCNC: 554 MG/DL (ref 70–130)
GLUCOSE SERPL-MCNC: 431 MG/DL (ref 65–99)
HBA1C MFR BLD: 8.5 % (ref 4.8–5.6)
HCO3 BLDA-SCNC: 25.5 MMOL/L (ref 20–26)
HCT VFR BLD AUTO: 40.7 % (ref 37.5–51)
HCT VFR BLD CALC: 40.3 % (ref 38–51)
HGB BLD-MCNC: 13 G/DL (ref 13–17.7)
HGB BLDA-MCNC: 13.1 G/DL (ref 13.5–17.5)
IMM GRANULOCYTES # BLD AUTO: 0.05 10*3/MM3 (ref 0–0.05)
IMM GRANULOCYTES NFR BLD AUTO: 0.5 % (ref 0–0.5)
INHALED O2 CONCENTRATION: 100 %
IPAP: 0
L PNEUMO1 AG UR QL IA: NEGATIVE
LV EF 2D ECHO EST: 65 %
LV EF BIPLANE MOD: 53.7 %
LYMPHOCYTES # BLD AUTO: 1.48 10*3/MM3 (ref 0.7–3.1)
LYMPHOCYTES NFR BLD AUTO: 13.5 % (ref 19.6–45.3)
MCH RBC QN AUTO: 32.2 PG (ref 26.6–33)
MCHC RBC AUTO-ENTMCNC: 31.9 G/DL (ref 31.5–35.7)
MCV RBC AUTO: 100.7 FL (ref 79–97)
METHGB BLD QL: 0.9 % (ref 0–1.5)
MODALITY: ABNORMAL
MONOCYTES # BLD AUTO: 0.2 10*3/MM3 (ref 0.1–0.9)
MONOCYTES NFR BLD AUTO: 1.8 % (ref 5–12)
NEUTROPHILS NFR BLD AUTO: 83.9 % (ref 42.7–76)
NEUTROPHILS NFR BLD AUTO: 9.17 10*3/MM3 (ref 1.7–7)
NRBC BLD AUTO-RTO: 0 /100 WBC (ref 0–0.2)
OXYHGB MFR BLDV: 96.3 % (ref 94–99)
PAW @ PEAK INSP FLOW SETTING VENT: 0 CMH2O
PCO2 BLDA: 58.7 MM HG (ref 35–45)
PCO2 TEMP ADJ BLD: 58.7 MM HG (ref 35–48)
PH BLDA: 7.25 PH UNITS (ref 7.35–7.45)
PH, TEMP CORRECTED: 7.25 PH UNITS
PLATELET # BLD AUTO: 171 10*3/MM3 (ref 140–450)
PMV BLD AUTO: 11.7 FL (ref 6–12)
PO2 BLDA: 131 MM HG (ref 83–108)
PO2 TEMP ADJ BLD: 131 MM HG (ref 83–108)
POTASSIUM SERPL-SCNC: 4.7 MMOL/L (ref 3.5–5.2)
PROCALCITONIN SERPL-MCNC: 0.9 NG/ML (ref 0–0.25)
PROT SERPL-MCNC: 7.1 G/DL (ref 6–8.5)
RBC # BLD AUTO: 4.04 10*6/MM3 (ref 4.14–5.8)
S PNEUM AG SPEC QL LA: NEGATIVE
SODIUM SERPL-SCNC: 131 MMOL/L (ref 136–145)
TOTAL RATE: 0 BREATHS/MINUTE
TSH SERPL DL<=0.05 MIU/L-ACNC: 0.75 UIU/ML (ref 0.27–4.2)
UFH PPP CHRO-ACNC: 0.1 IU/ML (ref 0.3–0.7)
UFH PPP CHRO-ACNC: 0.17 IU/ML (ref 0.3–0.7)
UFH PPP CHRO-ACNC: 0.29 IU/ML (ref 0.3–0.7)
WBC NRBC COR # BLD AUTO: 10.93 10*3/MM3 (ref 3.4–10.8)

## 2024-12-12 PROCEDURE — 82948 REAGENT STRIP/BLOOD GLUCOSE: CPT

## 2024-12-12 PROCEDURE — 93306 TTE W/DOPPLER COMPLETE: CPT

## 2024-12-12 PROCEDURE — 63710000001 INSULIN GLARGINE PER 5 UNITS: Performed by: INTERNAL MEDICINE

## 2024-12-12 PROCEDURE — 25010000002 CEFTRIAXONE PER 250 MG: Performed by: STUDENT IN AN ORGANIZED HEALTH CARE EDUCATION/TRAINING PROGRAM

## 2024-12-12 PROCEDURE — 84145 PROCALCITONIN (PCT): CPT

## 2024-12-12 PROCEDURE — 85520 HEPARIN ASSAY: CPT

## 2024-12-12 PROCEDURE — 82010 KETONE BODYS QUAN: CPT

## 2024-12-12 PROCEDURE — 82805 BLOOD GASES W/O2 SATURATION: CPT

## 2024-12-12 PROCEDURE — 63710000001 INSULIN GLARGINE PER 5 UNITS

## 2024-12-12 PROCEDURE — 25010000002 HEPARIN (PORCINE) 25000-0.45 UT/250ML-% SOLUTION

## 2024-12-12 PROCEDURE — 25010000002 AMIODARONE IN DEXTROSE 5% 360-4.14 MG/200ML-% SOLUTION: Performed by: INTERNAL MEDICINE

## 2024-12-12 PROCEDURE — 63710000001 INSULIN LISPRO (HUMAN) PER 5 UNITS

## 2024-12-12 PROCEDURE — 94799 UNLISTED PULMONARY SVC/PX: CPT

## 2024-12-12 PROCEDURE — 84443 ASSAY THYROID STIM HORMONE: CPT

## 2024-12-12 PROCEDURE — 87899 AGENT NOS ASSAY W/OPTIC: CPT | Performed by: STUDENT IN AN ORGANIZED HEALTH CARE EDUCATION/TRAINING PROGRAM

## 2024-12-12 PROCEDURE — 82375 ASSAY CARBOXYHB QUANT: CPT

## 2024-12-12 PROCEDURE — 99233 SBSQ HOSP IP/OBS HIGH 50: CPT | Performed by: INTERNAL MEDICINE

## 2024-12-12 PROCEDURE — 83036 HEMOGLOBIN GLYCOSYLATED A1C: CPT

## 2024-12-12 PROCEDURE — 94660 CPAP INITIATION&MGMT: CPT

## 2024-12-12 PROCEDURE — 93306 TTE W/DOPPLER COMPLETE: CPT | Performed by: INTERNAL MEDICINE

## 2024-12-12 PROCEDURE — 25010000002 HEPARIN (PORCINE) PER 1000 UNITS

## 2024-12-12 PROCEDURE — 80053 COMPREHEN METABOLIC PANEL: CPT

## 2024-12-12 PROCEDURE — 83050 HGB METHEMOGLOBIN QUAN: CPT

## 2024-12-12 PROCEDURE — 87449 NOS EACH ORGANISM AG IA: CPT | Performed by: STUDENT IN AN ORGANIZED HEALTH CARE EDUCATION/TRAINING PROGRAM

## 2024-12-12 PROCEDURE — 85025 COMPLETE CBC W/AUTO DIFF WBC: CPT

## 2024-12-12 PROCEDURE — 99222 1ST HOSP IP/OBS MODERATE 55: CPT | Performed by: INTERNAL MEDICINE

## 2024-12-12 PROCEDURE — 36600 WITHDRAWAL OF ARTERIAL BLOOD: CPT

## 2024-12-12 RX ORDER — IBUPROFEN 600 MG/1
1 TABLET ORAL
Status: DISCONTINUED | OUTPATIENT
Start: 2024-12-12 | End: 2024-12-12

## 2024-12-12 RX ORDER — DEXTROSE MONOHYDRATE 25 G/50ML
10-50 INJECTION, SOLUTION INTRAVENOUS
Status: DISCONTINUED | OUTPATIENT
Start: 2024-12-12 | End: 2024-12-12

## 2024-12-12 RX ORDER — DEXTROSE MONOHYDRATE 25 G/50ML
25 INJECTION, SOLUTION INTRAVENOUS
Status: DISCONTINUED | OUTPATIENT
Start: 2024-12-12 | End: 2024-12-12

## 2024-12-12 RX ORDER — NICOTINE POLACRILEX 4 MG
15 LOZENGE BUCCAL
Status: DISCONTINUED | OUTPATIENT
Start: 2024-12-12 | End: 2024-12-12

## 2024-12-12 RX ORDER — INSULIN LISPRO 100 [IU]/ML
1-200 INJECTION, SOLUTION INTRAVENOUS; SUBCUTANEOUS
Status: DISCONTINUED | OUTPATIENT
Start: 2024-12-12 | End: 2024-12-18

## 2024-12-12 RX ORDER — HEPARIN SODIUM 1000 [USP'U]/ML
2000 INJECTION, SOLUTION INTRAVENOUS; SUBCUTANEOUS ONCE
Status: COMPLETED | OUTPATIENT
Start: 2024-12-12 | End: 2024-12-12

## 2024-12-12 RX ORDER — HEPARIN SODIUM 1000 [USP'U]/ML
4000 INJECTION, SOLUTION INTRAVENOUS; SUBCUTANEOUS ONCE
Status: COMPLETED | OUTPATIENT
Start: 2024-12-12 | End: 2024-12-12

## 2024-12-12 RX ORDER — INSULIN LISPRO 100 [IU]/ML
1-200 INJECTION, SOLUTION INTRAVENOUS; SUBCUTANEOUS AS NEEDED
Status: DISCONTINUED | OUTPATIENT
Start: 2024-12-12 | End: 2024-12-18

## 2024-12-12 RX ORDER — DEXTROSE MONOHYDRATE 25 G/50ML
10-50 INJECTION, SOLUTION INTRAVENOUS
Status: DISCONTINUED | OUTPATIENT
Start: 2024-12-12 | End: 2024-12-18

## 2024-12-12 RX ORDER — ASPIRIN 81 MG/1
81 TABLET ORAL DAILY
Status: DISCONTINUED | OUTPATIENT
Start: 2024-12-12 | End: 2024-12-19 | Stop reason: HOSPADM

## 2024-12-12 RX ORDER — INSULIN LISPRO 100 [IU]/ML
2-9 INJECTION, SOLUTION INTRAVENOUS; SUBCUTANEOUS
Status: DISCONTINUED | OUTPATIENT
Start: 2024-12-12 | End: 2024-12-12

## 2024-12-12 RX ORDER — NICOTINE POLACRILEX 4 MG
15 LOZENGE BUCCAL
Status: DISCONTINUED | OUTPATIENT
Start: 2024-12-12 | End: 2024-12-18

## 2024-12-12 RX ORDER — IBUPROFEN 600 MG/1
1 TABLET ORAL
Status: DISCONTINUED | OUTPATIENT
Start: 2024-12-12 | End: 2024-12-18

## 2024-12-12 RX ADMIN — INSULIN HUMAN 3.3 UNITS/HR: 1 INJECTION, SOLUTION INTRAVENOUS at 22:57

## 2024-12-12 RX ADMIN — INSULIN GLARGINE 25 UNITS: 100 INJECTION, SOLUTION SUBCUTANEOUS at 20:44

## 2024-12-12 RX ADMIN — DOXYCYCLINE 100 MG: 100 CAPSULE ORAL at 08:04

## 2024-12-12 RX ADMIN — HEPARIN SODIUM 4000 UNITS: 1000 INJECTION, SOLUTION INTRAVENOUS; SUBCUTANEOUS at 04:33

## 2024-12-12 RX ADMIN — HEPARIN SODIUM 2000 UNITS: 1000 INJECTION, SOLUTION INTRAVENOUS; SUBCUTANEOUS at 10:50

## 2024-12-12 RX ADMIN — INSULIN LISPRO 2 UNITS: 100 INJECTION, SOLUTION INTRAVENOUS; SUBCUTANEOUS at 20:44

## 2024-12-12 RX ADMIN — INSULIN HUMAN 9.9 UNITS/HR: 1 INJECTION, SOLUTION INTRAVENOUS at 00:46

## 2024-12-12 RX ADMIN — MUPIROCIN 1 APPLICATION: 20 OINTMENT TOPICAL at 20:26

## 2024-12-12 RX ADMIN — ASPIRIN 81 MG: 81 TABLET, COATED ORAL at 13:03

## 2024-12-12 RX ADMIN — INSULIN HUMAN 5.4 UNITS/HR: 1 INJECTION, SOLUTION INTRAVENOUS at 10:10

## 2024-12-12 RX ADMIN — INSULIN GLARGINE 10 UNITS: 100 INJECTION, SOLUTION SUBCUTANEOUS at 22:56

## 2024-12-12 RX ADMIN — Medication 10 ML: at 08:11

## 2024-12-12 RX ADMIN — SODIUM CHLORIDE 2000 MG: 900 INJECTION INTRAVENOUS at 22:59

## 2024-12-12 RX ADMIN — HEPARIN SODIUM 15 UNITS/KG/HR: 10000 INJECTION, SOLUTION INTRAVENOUS at 16:05

## 2024-12-12 RX ADMIN — Medication 10 ML: at 20:44

## 2024-12-12 RX ADMIN — MUPIROCIN 1 APPLICATION: 20 OINTMENT TOPICAL at 08:04

## 2024-12-12 RX ADMIN — AMIODARONE HYDROCHLORIDE 0.5 MG/MIN: 1.8 INJECTION, SOLUTION INTRAVENOUS at 13:03

## 2024-12-12 RX ADMIN — SENNOSIDES AND DOCUSATE SODIUM 2 TABLET: 50; 8.6 TABLET ORAL at 20:26

## 2024-12-12 RX ADMIN — DOXYCYCLINE 100 MG: 100 CAPSULE ORAL at 20:26

## 2024-12-12 RX ADMIN — Medication 10 MG/HR: at 06:49

## 2024-12-12 NOTE — PLAN OF CARE
Goal Outcome Evaluation:      VSS. Bipap and tolerating 6L NC. BM x1.  mL plus x1 unmeasured. A&Ox4. SBP 90's-100's. Cardizem turned off per cardiology with pt HR being under 100. Amio started @0.5 per Cards. Insulin gtt infusing. Heparin gtt infusing. Sputum thick, green. Pt has strong cough. ECHO completed. Family updated.

## 2024-12-12 NOTE — CASE MANAGEMENT/SOCIAL WORK
Discharge Planning Assessment  [facility]     Patient Name: Gabriela Mar  MRN: [id number]  Today's Date: 12/11/2024    Admit Date: 12/11/2024    Plan: Home   Discharge Needs Assessment       Row Name 12/11/24 2030       Living Environment    People in Home child(tulio), adult    Current Living Arrangements home    Primary Care Provided by self    Provides Primary Care For no one    Family Caregiver if Needed child(tulio), adult    Family Caregiver Names Patient states that he lives with his son; Andreina Mar, daughter; Coretta Mar, friend    Able to Return to Prior Arrangements yes       Transition Planning    Patient/Family Anticipates Transition to home with family    Patient/Family Anticipated Services at Transition        Discharge Needs Assessment    Equipment Currently Used at Home rollator  'walking stick'    Concerns to be Addressed denies needs/concerns at this time    Equipment Needed After Discharge oxygen  Has no O2 at home, currently on 4L O2 per [place]    Discharge Coordination/Progress Lives in a house in [place] with his son, independent with ADLs.  Has a walker stick and rollator.  No history of home health and no advanced directives.                   Discharge Plan       Row Name 12/11/24 2032       Plan    Plan Home    Patient/Family in Agreement with Plan yes    Plan Comments I spoke with Mr Mar at bedside.  Mr Mar states that he lives in a house in [place] with his son and is independent with ADLs.  His DME includes a 'walking stick' and a rollator.  He has not had home health in the past and does not have an advanced directive.  His insurance is Medicare with [employer] as secondary, and he denies any issues or lapses in coverage.  He states that these policies do not help with prescription coverage.  His PCP is Krystina Gee, and he gets his prescriptions filled at [facility] [address]  At this time there are no discharge needs.    Final Discharge  Disposition Code 01 - home or self-care                  Continued Care and Services - Admitted Since 12/11/2024    No active coordination exists for this encounter.          Demographic Summary    No documentation.                  Functional Status       Row Name 12/11/24 2030       Functional Status    Usual Activity Tolerance good    Current Activity Tolerance good       Functional Status, IADL    Medications independent    Meal Preparation independent    Housekeeping independent    Laundry independent    Shopping independent       Mental Status    General Appearance WDL WDL                   Psychosocial    No documentation.                  Abuse/Neglect    No documentation.                  Legal    No documentation.                  Substance Abuse    No documentation.                  Patient Forms    No documentation.                     Belen Marsh RN

## 2024-12-12 NOTE — PROGRESS NOTES
Pharmacy to Dose Heparin Infusion Note    Gabriela Mar is a 79 y.o. male receiving heparin infusion.     Therapy for (VTE/Cardiac): Atrial Fibrillation (Cardiac Protocol)  Patient Weight: 104 kg  Initial Bolus (Y/N): Yes  Any Bolus (Y/N): Yes     Signs or Symptoms of Bleeding: No current S/Sx bleeding per RN & chart review     Cardiac (Not VTE)   Initial Bolus: 60 units/kg (Max 4,000 units)  Initial rate: 12 units/kg/hr (Max 1,000 units/hr)   Anti-Xa Bolus   Dose Infusion Hold   Time Infusion Rate Change (units/kg/hr) Repeat  Anti-Xa    < 0.11 50 units/kg  (4000 units Max) None Increase by  3 units/kg/hr 6 hours   0.11- 0.19 25 units/kg  (2000 units Max) None Increase by  2 units/kg/hr 6 hours   0.2 - 0.29 0 None Increase by  1 units/kg/hr 6 hours   0.3 - 0.5 0 None No Change 6 hours (after 2 consecutive levels in range check qAM)   0.51 - 0.6 0 None Decrease by  1 units/kg/hr 6 hours   0.61 - 0.8 0 30 minutes Decrease by  2 units/kg/hr 6 hours   0.81 - 1 0 60 minutes Decrease by  3 units/kg/hr 6 hours   >1 0 Hold  After Anti-Xa less than 0.5 decrease previous rate by  4 units/kg/hr  Every 2 hours until Anti-Xa  less than 0.5 then when infusion restarts in 6 hours     Results from last 7 days   Lab Units 12/11/24  1355   HEMOGLOBIN g/dL 12.6*   HEMATOCRIT % 38.5   PLATELETS 10*3/mm3 149       Date   Time   Anti-Xa Current Rate (units/kg/hr) Bolus   (units) Rate Change   (units/kg/hr) New Rate (units/kg/hr) Repeat  Anti-Xa Comments /  Pump Check    12/11 20.16 0.10 ---new--- +4000 +10 10 03:00 Set up IV pump w/ RN Gurinder. TBW, bolus, & rate confirmed. Pt counseled on UFH.                                                                                                                                                                                                                                 Romeo Fernandez, PharmD, BCPS, BCCP  20:30 EST   12/11/24

## 2024-12-12 NOTE — PLAN OF CARE
Goal Outcome Evaluation:   Patient presented to 2A on 6L NC for rhinovirus, COPD, new onset of a fib RVR. Patient also had a lactic of 3.7, glucose greater than 500, and a wbc of 16. Patient A&Ox4, had been SOB x1 week. Patient was reportedly in 70's at PCP office. Patient eventually started on insulin drip and IV glucommander for worsening glucose. Respiratory status continued to worsen, leading to high flow and then eventually Bipap. Patient has tolerated well, resting most of the night. HR has mainly been 90's-100's. On Cardizem drip at 10, heparin at 13, and insulin currently at 8. Lung sounds rhonchi, producing yellow/green sputum from cough. Patient updated throughout care.

## 2024-12-12 NOTE — CONSULTS
[facility]  Cardiovascular Consultation Note    Reason for consultation: #1 A-fib RVR #2 hypotension #3 elevated high-sensitivity troponin    History of Present Illness:  79-year-old male with diabetes, hypertension, hyperlipidemia, CKD, previous lymphoma presents with shortness of breath.  He was found to have significant bilateral pneumonia.  In his PCPs office the patient was hypoxemic so he was admitted.  He is was placed on IV antibiotics.  He has had some hypotension.  We are asked see the patient because he went into A-fib RVR.  The patient denies feeling palpitations.  States he is not very active at home and does occasionally get short of breath.  He also has occasional tightness in his chest.  He has a BiPAP Bonzo is difficult to understand everything.  When he saw Dr. Ramirez in 2020 despite an abnormal nuclear scan he was having no angina.  The angina has now been there for few months.  He denies edema.  He denies wheezing.  No blood in the stool.  No stroke or TIA    Cardiac risk factors: #1 male sex #2 age greater than 55 #3 diabetes #4 hypertension #5 hyperlipidemia    Past medical and surgical history, social and family history reviewed in EMR.    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 Hernández    JOINT REPLACEMENT      LEFT HIP 2016-MARCH     SUBMAXILLARY  CYST  EXCISION      Right back    TONSILLECTOMY      TOTAL HIP ARTHROPLASTY Right 2017    Procedure: RIGHT TOTAL HIP ARTHROPLASTY;  Surgeon: Reynaldo Suarez MD;  Location: [facility];  Service:     VASECTOMY      WRIST GANGLION EXCISION       Social History     Socioeconomic History    Marital status: Single   Tobacco Use    Smoking status: Former     Current packs/day: 0.00     Average packs/day: 2.0 packs/day for 35.0 years (70.0 ttl pk-yrs)     Types: Cigarettes     Start date:      Quit date:      Years since quittin.9     Passive exposure: Past    Smokeless tobacco: Never    Tobacco comments:     QUIT 20 PLUS YEARS AGO    Vaping Use    Vaping status: Never Used   Substance and Sexual Activity    Alcohol use: No     Comment: stopped drinking    Drug use: No    Sexual activity: Defer     Family History   Problem Relation Age of Onset    Diabetes Mother     Colon cancer Father     Kidney failure Sister     Diabetes Sister     Colon cancer Brother     Colon cancer Brother     Lung cancer Brother     Squamous cell carcinoma Brother         side of face    Diabetes Sister        H&P ROS reviewed and pertinent CV ROS as noted in HPI.    Cardiac: Patient had an abnormal stress test in  but he was asymptomatic and was treated with medical therapy.  Has a history of heart failure with preserved EF which responded to diuretics.  Respiratory: Complains of significant shortness of breath was found to be hypoxic in his PCPs office admitted with bilateral pneumonia and sepsis  Lower Extremities: No recent edema.  No claudication no lesions  GI: No nausea vomiting diarrhea bright red blood per rectum or melena  Neuro: No history of stroke TIA or neurologic event  Hematology: Has a history of lymphoma was treated.  At one time had pancytopenia which is resolved  Renal: Has CKD  Musculoskeletal: No specific complaints of joint pain  Endocrine: Has diabetes  Constitutional: No recent weight loss or  anorexia  Psych: No lesia depression or suicidal ideation      Physical Exam: General Pleasant overweight male in bed with BiPAP heart rate low 100s       HEENT: No JVP.  BiPAP is present.  No icterus       Respiratory: Decreased breath sounds bilaterally with no obvious wheezing or rhonchi       Cardiovascular: Irregular rate and rhythm without any obvious murmur and no edema to palpation       GI: Obese and soft with positive bowel sounds       Lower Extremities: No lesions       Neuro: Facial expressions are symmetrical moves all 4 extremities       Skin: Warm and dry       Psych: Pleasant affect    Results Review: The first EKG shows sinus tachycardia PACs right bundle branch block and LAFB.  EKG today shows A-fib RVR with right bundle branch block and no ischemia.  Chest x-ray shows bilateral pneumonia.  CTA was negative for PE.  Creatinine on admission was 1.99 down to 1 down to 1.9.  GFR 35.  Glucose is between 405 100.  Hemoglobin is 13 platelets are 17,000 RSV is positive BNP was elevated.  Patient is having hypotension           Vital Sign Min/Max for last 24 hours  Temp  Min: 96.6 °F (35.9 °C)  Max: 98.2 °F (36.8 °C)   BP  Min: 65/50  Max: 158/40   Pulse  Min: 80  Max: 176   Resp  Min: 16  Max: 22   SpO2  Min: 77 %  Max: 99 %   No data recorded      Intake/Output Summary (Last 24 hours) at 12/12/2024 1125  Last data filed at 12/12/2024 0600  Gross per 24 hour   Intake 1826 ml   Output 300 ml   Net 1526 ml             Current Facility-Administered Medications:     sennosides-docusate (PERICOLACE) 8.6-50 MG per tablet 2 tablet, 2 tablet, Oral, BID, 2 tablet at 12/11/24 2205 **AND** polyethylene glycol (MIRALAX) packet 17 g, 17 g, Oral, Daily PRN **AND** bisacodyl (DULCOLAX) EC tablet 5 mg, 5 mg, Oral, Daily PRN **AND** bisacodyl (DULCOLAX) suppository 10 mg, 10 mg, Rectal, Daily PRN, Thomas Talley MD    cefTRIAXone (ROCEPHIN) 2,000 mg in sodium chloride 0.9 % 100 mL MBP, 2,000 mg, Intravenous, Q24H, Mayank  MD Thomas, Last Rate: 200 mL/hr at 12/11/24 2153, 2,000 mg at 12/11/24 2153    dextrose (D50W) (25 g/50 mL) IV injection 10-50 mL, 10-50 mL, Intravenous, Q15 Min PRN, Shasha Bruno APRN    dextrose (GLUTOSE) oral gel 15 g, 15 g, Oral, Q15 Min PRN, Shasha Bruno APRN    dilTIAZem (CARDIZEM) 125 mg in 125 mL D5W infusion, 5-15 mg/hr, Intravenous, Titrated, Katiana Irizarry MD, Last Rate: 5 mL/hr at 12/12/24 1037, 5 mg/hr at 12/12/24 1037    doxycycline (MONODOX) capsule 100 mg, 100 mg, Oral, Q12H, Thomas Talley MD, 100 mg at 12/12/24 0804    glucagon (GLUCAGEN) injection 1 mg, 1 mg, Intramuscular, Q15 Min PRN, Shasha Bruno APRN    heparin 50947 units/250 mL (100 units/mL) in 0.45 % NaCl infusion, 15 Units/kg/hr, Intravenous, Titrated, Arlet Watters, PharmD, Last Rate: 15.6 mL/hr at 12/12/24 1051, 15 Units/kg/hr at 12/12/24 1051    insulin regular 1 unit/mL in 0.9% sodium chloride (Glucommander), 0-100 Units/hr, Intravenous, Titrated, Shasha Bruno APRN, Last Rate: 3.2 mL/hr at 12/12/24 1056, 3.2 Units/hr at 12/12/24 1056    ipratropium-albuterol (DUO-NEB) nebulizer solution 3 mL, 3 mL, Nebulization, Q6H PRN, Thomas Talley MD    mupirocin (BACTROBAN) 2 % nasal ointment 1 Application, 1 Application, Each Nare, BID, Thomas Talley MD, 1 Application at 12/12/24 0804    nitroglycerin (NITROSTAT) SL tablet 0.4 mg, 0.4 mg, Sublingual, Q5 Min PRN, Thomas Talley MD    pantoprazole (PROTONIX) EC tablet 40 mg, 40 mg, Oral, Q AM, Thomas Talley MD    Pharmacy to Dose Heparin, , Not Applicable, Continuous PRN, Katiana Irizarry MD    Potassium Replacement - Follow Nurse / BPA Driven Protocol, , Not Applicable, PRN, Shasha Bruno, APRN    sodium chloride 0.9 % bolus 2,562 mL, 30 mL/kg (Adjusted), Intravenous, Once, Katiana Irizarry MD    [COMPLETED] Insert Peripheral IV, , , Once **AND** sodium chloride 0.9 % flush 10 mL, 10 mL, Intravenous, PRN, Katiana Irizarry MD    sodium chloride 0.9 % flush  10 mL, 10 mL, Intravenous, Q12H, Thomas Talley MD, 10 mL at 12/12/24 0811    sodium chloride 0.9 % flush 10 mL, 10 mL, Intravenous, PRN, Thomas Talley MD    sodium chloride 0.9 % infusion 40 mL, 40 mL, Intravenous, PRN, Thomas Talley MD    Lab Review:   Results from last 7 days   Lab Units 12/12/24  0217 12/11/24  1355   WBC 10*3/mm3 10.93* 16.10*   HEMOGLOBIN g/dL 13.0 12.6*   PLATELETS 10*3/mm3 171 149     Results from last 7 days   Lab Units 12/12/24  0217 12/11/24  1355   SODIUM mmol/L 131* 128*   POTASSIUM mmol/L 4.7 5.2   CO2 mmol/L 22.0 25.0   BUN mg/dL 55* 53*   CREATININE mg/dL 1.90* 1.99*   MAGNESIUM mg/dL  --  1.9   GLUCOSE mg/dL 431* 509*     Estimated Creatinine Clearance: 37.1 mL/min (A) (by C-G formula based on SCr of 1.9 mg/dL (H)).    Results from last 7 days   Lab Units 12/12/24  0217   HEMOGLOBIN A1C % 8.50*     .lipd  Lab Results   Component Value Date    CHLPL 125 06/06/2016    TRIG 168 (H) 09/12/2024    HDL 39 (L) 09/12/2024    AST 23 12/12/2024    ALT 22 12/12/2024       Radiology Reports:  Imaging Results (Last 72 Hours)       Procedure Component Value Units Date/Time    CT Angiogram Chest [[id number]] Collected: 12/11/24 1915     Updated: 12/11/24 1923    Narrative:      CT ANGIOGRAM CHEST    Date of Exam: 12/11/2024 7:05 PM EST    Indication: dyspnea/hypoxia.    Comparison: None available.    Technique: CTA of the chest was performed after the uneventful intravenous administration of 80 cc Isovue-370. Reconstructed coronal and sagittal images were also obtained. In addition, a 3-D volume rendered image was created for interpretation.   Automated exposure control and iterative reconstruction methods were used.      Findings:  PULMONARY VASCULATURE: Pulmonary arteries are widely patent without evidence of embolus.  Main pulmonary artery is normal in size. No evidence of right heart strain.    MEDIASTINUM:Unremarkable. Aortic and heart size are normal. No aortic dissection identified. No mass nor  pericardial effusion.  CORONARY ARTERIES: There is calcified atherosclerotic disease.  LUNGS: There is irregular airspace disease within the left lower lobe and posterior right upper lobe. There are scattered tree-in-bud opacities throughout the lungs otherwise. Findings are consistent with multifocal pneumonia.  PLEURAL SPACE: Trace right effusion.  LYMPH NODES: There are no pathologically enlarged lymph nodes.    UPPER ABDOMEN: Unremarkable    OSSEOUS STRUCTURES: Appropriate for age with no acute process identified.    There is benign gynecomastia.      Impression:      Impression:  1.No evidence of pulmonary embolism.  2.Multifocal pneumonia        Electronically Signed: Hilario Gale MD    12/11/2024 7:18 PM EST    Workstation ID: [id number]    XR Chest 1 View [[id number]] Collected: 12/11/24 1441     Updated: 12/11/24 1446    Narrative:      XR CHEST 1 VW    Date of Exam: 12/11/2024 1:46 PM EST    Indication: SOA triage protocol    Comparison: 8/30/2024    Findings:  There is new, moderate airspace disease in the left lower lobe, consistent with pneumonia. There is increased patchy peribronchial thickening and multifocal interstitial disease of the lungs elsewhere which may reflect a more widespread bronchopneumonia   pattern. No effusion or pneumothorax is seen. Heart and vasculature appear within normal months.      Impression:      Impression:  Moderately extensive left lower lobe pneumonia. Also, suspected more diffuse multifocal pneumonia elsewhere in both lungs.        Electronically Signed: Bruce Monsalve MD    12/11/2024 2:43 PM EST    Workstation ID: [id number]            Assessment/Plan: Patient was admitted with hypoxic respiratory failure and sepsis with hypotension went into A-fib RVR this morning.  On admission he had 2 EKG s which showed sinus rhythm frequent PACs.  This morning he flipped into A-fib RVR.  Probably secondary to his sepsis and pneumonia.  Nonetheless he has had some low blood pressure  readings.  Because of this I will start the patient on amiodarone 0.5 mg/h drip.  When his heart rate improves hopefully we can discontinue diltiazem.  He will need some anticoagulation  2 elevated high-sensitivity troponin.  The patient has multiple CAD risk factors he has been experiencing some exertional chest pain at home.  Denies chest pain here.  I see no evidence of an ACS and no reason to proceed with coronary imaging.  Echocardiogram is pending.  I do recommend an aspirin      Guru Coe MD  12/12/24  11:25 EST

## 2024-12-12 NOTE — PROGRESS NOTES
Pharmacy to Dose Heparin Infusion Note    Gabriela Mar is a 79 y.o. male receiving heparin infusion.     Therapy for (VTE/Cardiac): Atrial Fibrillation (Cardiac Protocol)  Patient Weight: 104 kg  Initial Bolus (Y/N): Yes  Any Bolus (Y/N): Yes     Signs or Symptoms of Bleeding: No current S/Sx bleeding per RN & chart review     Cardiac (Not VTE)   Initial Bolus: 60 units/kg (Max 4,000 units)  Initial rate: 12 units/kg/hr (Max 1,000 units/hr)   Anti-Xa Bolus   Dose Infusion Hold   Time Infusion Rate Change (units/kg/hr) Repeat  Anti-Xa    < 0.11 50 units/kg  (4000 units Max) None Increase by  3 units/kg/hr 6 hours   0.11- 0.19 25 units/kg  (2000 units Max) None Increase by  2 units/kg/hr 6 hours   0.2 - 0.29 0 None Increase by  1 units/kg/hr 6 hours   0.3 - 0.5 0 None No Change 6 hours (after 2 consecutive levels in range check qAM)   0.51 - 0.6 0 None Decrease by  1 units/kg/hr 6 hours   0.61 - 0.8 0 30 minutes Decrease by  2 units/kg/hr 6 hours   0.81 - 1 0 60 minutes Decrease by  3 units/kg/hr 6 hours   >1 0 Hold  After Anti-Xa less than 0.5 decrease previous rate by  4 units/kg/hr  Every 2 hours until Anti-Xa  less than 0.5 then when infusion restarts in 6 hours     Results from last 7 days   Lab Units 12/12/24  0217 12/11/24 2016 12/11/24  1355   INR   --  1.38*  --    HEMOGLOBIN g/dL 13.0  --  12.6*   HEMATOCRIT % 40.7  --  38.5   PLATELETS 10*3/mm3 171  --  149       Date   Time   Anti-Xa Current Rate (units/kg/hr) Bolus   (units) Rate Change   (units/kg/hr) New Rate (units/kg/hr) Repeat  Anti-Xa Comments /  Pump Check    12/11 20.16 0.10 ---new--- +4000 +10 10 03:00 Set up IV pump w/ RN Gurinder. TBW, bolus, & rate confirmed. Pt counseled on UFH.   12/12 0340 0.10 10 4000 +3 13 1000 D/w RN   12/12 0952 0.17 13 2000 +2 15 1600 Sw Adrián, pump check

## 2024-12-12 NOTE — H&P
[facility]   HISTORY AND PHYSICAL    Patient Name: Gabriela Mar  : 1945  MRN: [id number]  Primary Care Physician:  Krystina Gee MD  Date of admission: 2024    Subjective   Subjective     Chief Complaint: Dyspnea    HPI:  Gabriela Mar is a 79 y.o. male with a history of DM, HTN, GERD and COPD (quit smoking 35 years ago), was sent to the hospital from his PCP office for shortness of breath, weakness and cough with sputum production. Symptoms started around 7-days ago. He lives with his son and initially his son  was sick. When his son started to get better, patient started noticing similar symptoms. In the PCP office he was saturating 70s on room air.     Upon arrival in ED, labs showed lactate 3.7, WBC 16, glucose 500 and Cr of 1.99. CTA chest showed multifocal pneumonia and viral panel was positive for Rhinovirus. He developed afib with RVR in the ED for which cardizem was ordered and ICU was consulted. Saturating 97% on 6 lit nasal cannula.     Patient states he previously had pneumonia 7 years ago. Denies active smoking and alcohol use. Full code.    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 Hernández    JOINT REPLACEMENT      LEFT HIP -MARCH     SUBMAXILLARY CYST  EXCISION      Right back    TONSILLECTOMY      TOTAL HIP ARTHROPLASTY Right 2017    Procedure: RIGHT TOTAL HIP ARTHROPLASTY;  Surgeon: Reynaldo Suarez MD;  Location: [facility];  Service:      VASECTOMY      WRIST GANGLION EXCISION         Family History: family history includes Colon cancer in his brother, brother, and father; Diabetes in his mother, sister, and sister; Kidney failure in his sister; Lung cancer in his brother; Squamous cell carcinoma in his brother. Otherwise pertinent FHx was reviewed and not pertinent to current issue.    Social History:  reports that he quit smoking about 29 years ago. His smoking use included cigarettes. He started smoking about 61 years ago. He has a 70 pack-year smoking history. He has never used smokeless tobacco. He reports that he does not drink alcohol and does not use drugs.    Home Medications:  PreserVision AREDS 2, albuterol, albuterol sulfate HFA, aspirin, cholecalciferol, glimepiride, insulin NPH, losartan, metFORMIN ER, multivitamin with minerals, omeprazole, simvastatin, and tiZANidine      Allergies:  Allergies   Allergen Reactions    Bactrim [Sulfamethoxazole-Trimethoprim] Other (See Comments)     Joint pain    Sulfa Antibiotics GI Intolerance     Makes bones hurt      Tegaderm Chg Dressing [Chlorhexidine] Itching and Other (See Comments)     redness       Objective   Objective     Vitals:   Temp:  [98 °F (36.7 °C)-98.2 °F (36.8 °C)] 98.2 °F (36.8 °C)  Heart Rate:  [] 151  Resp:  [16-18] 18  BP: (105-158)/(40-99) 111/65  Flow (L/min) (Oxygen Therapy):  [4] 4    Physical Exam   Constitutional: Awake, alert  Eyes: PERRLA, sclerae anicteric, no conjunctival injection  HENT: NCAT, mucous membranes moist  Neck: Supple, no thyromegaly, no lymphadenopathy, trachea midline  Respiratory: Rales bilaterally, nonlabored respirations on nasal cannula.   Cardiovascular: Irregular, no murmurs, rubs, or gallops, palpable pedal pulses bilaterally  Gastrointestinal: Positive bowel sounds, soft, nontender, nondistended  Musculoskeletal: No bilateral ankle edema, no clubbing or cyanosis to extremities  Psychiatric: Appropriate affect, cooperative  Neurologic:  Oriented x 3, strength symmetric in all extremities, Cranial Nerves grossly intact to confrontation, speech clear  Skin: No rashes     Assessment / Plan     Brief Patient Summary:  Gabriela Mar is a 79 y.o. male who is admitted in ICU for multifocal pneumonia and a.fib with RVR.       Active Critical Care Problems:  - Acute hypoxic respiratory failure  - Multifocal pneumonia  - Rhinovirus infection  - New onset A.fib with RVR  - Uncontrolled diabetes  - Pseudohyponatremia   - ZOHRA    Plan:   Neuro: Awake and alert.   CVS: Cardizem infusion for A.fib. CHADVASC score of 4. Check TTE. Trend serum lactate.   Pulm: Currently on 6-lit nasal cannula. Keep Saturations >88. Started ceftriaxone and doxycycline. Check urine antigens.   GI: Diet as tolerated  Renal: Likely pre-renal ZOHRA. Gentle hydration. Corrected Na of 135.   Endo: Check A1c. Might need insulin infusion if does not get better. No anion gap noted.   ID: Trend procalcitonin.   DVT ppx: Heparin drip  GI ppx: PPI  Social: Wants his daughter to be the medical POA      CODE STATUS:    Full code    I have spent 55 minutes of critical care time, independent of other billable procedures.       Thomas Talley MD  12/11/24   19:54 EST

## 2024-12-13 ENCOUNTER — APPOINTMENT (OUTPATIENT)
Dept: GENERAL RADIOLOGY | Facility: HOSPITAL | Age: 79
End: 2024-12-13
Payer: MEDICARE

## 2024-12-13 LAB
ALBUMIN SERPL-MCNC: 3.1 G/DL (ref 3.5–5.2)
ALBUMIN/GLOB SERPL: 0.9 G/DL
ALP SERPL-CCNC: 99 U/L (ref 39–117)
ALT SERPL W P-5'-P-CCNC: 24 U/L (ref 1–41)
ANION GAP SERPL CALCULATED.3IONS-SCNC: 19 MMOL/L (ref 5–15)
AST SERPL-CCNC: 42 U/L (ref 1–40)
BASOPHILS # BLD AUTO: 0.05 10*3/MM3 (ref 0–0.2)
BASOPHILS NFR BLD AUTO: 0.3 % (ref 0–1.5)
BILIRUB SERPL-MCNC: 0.3 MG/DL (ref 0–1.2)
BUN SERPL-MCNC: 82 MG/DL (ref 8–23)
BUN/CREAT SERPL: 33.5 (ref 7–25)
CALCIUM SPEC-SCNC: 8.9 MG/DL (ref 8.6–10.5)
CHLORIDE SERPL-SCNC: 93 MMOL/L (ref 98–107)
CO2 SERPL-SCNC: 22 MMOL/L (ref 22–29)
CREAT SERPL-MCNC: 2.45 MG/DL (ref 0.76–1.27)
DEPRECATED RDW RBC AUTO: 49.7 FL (ref 37–54)
EGFRCR SERPLBLD CKD-EPI 2021: 26 ML/MIN/1.73
EOSINOPHIL # BLD AUTO: 0 10*3/MM3 (ref 0–0.4)
EOSINOPHIL NFR BLD AUTO: 0 % (ref 0.3–6.2)
ERYTHROCYTE [DISTWIDTH] IN BLOOD BY AUTOMATED COUNT: 13.4 % (ref 12.3–15.4)
GLOBULIN UR ELPH-MCNC: 3.6 GM/DL
GLUCOSE BLDC GLUCOMTR-MCNC: 248 MG/DL (ref 70–130)
GLUCOSE BLDC GLUCOMTR-MCNC: 253 MG/DL (ref 70–130)
GLUCOSE BLDC GLUCOMTR-MCNC: 276 MG/DL (ref 70–130)
GLUCOSE BLDC GLUCOMTR-MCNC: 277 MG/DL (ref 70–130)
GLUCOSE BLDC GLUCOMTR-MCNC: 280 MG/DL (ref 70–130)
GLUCOSE BLDC GLUCOMTR-MCNC: 286 MG/DL (ref 70–130)
GLUCOSE BLDC GLUCOMTR-MCNC: 286 MG/DL (ref 70–130)
GLUCOSE BLDC GLUCOMTR-MCNC: 311 MG/DL (ref 70–130)
GLUCOSE SERPL-MCNC: 269 MG/DL (ref 65–99)
HCT VFR BLD AUTO: 39.1 % (ref 37.5–51)
HGB BLD-MCNC: 12.6 G/DL (ref 13–17.7)
IMM GRANULOCYTES # BLD AUTO: 0.19 10*3/MM3 (ref 0–0.05)
IMM GRANULOCYTES NFR BLD AUTO: 1.1 % (ref 0–0.5)
LYMPHOCYTES # BLD AUTO: 1.69 10*3/MM3 (ref 0.7–3.1)
LYMPHOCYTES NFR BLD AUTO: 10 % (ref 19.6–45.3)
MAGNESIUM SERPL-MCNC: 2.3 MG/DL (ref 1.6–2.4)
MCH RBC QN AUTO: 32.4 PG (ref 26.6–33)
MCHC RBC AUTO-ENTMCNC: 32.2 G/DL (ref 31.5–35.7)
MCV RBC AUTO: 100.5 FL (ref 79–97)
MONOCYTES # BLD AUTO: 0.82 10*3/MM3 (ref 0.1–0.9)
MONOCYTES NFR BLD AUTO: 4.8 % (ref 5–12)
NEUTROPHILS NFR BLD AUTO: 14.2 10*3/MM3 (ref 1.7–7)
NEUTROPHILS NFR BLD AUTO: 83.8 % (ref 42.7–76)
NRBC BLD AUTO-RTO: 0 /100 WBC (ref 0–0.2)
NT-PROBNP SERPL-MCNC: 8519 PG/ML (ref 0–1800)
PHOSPHATE SERPL-MCNC: 5.8 MG/DL (ref 2.5–4.5)
PLATELET # BLD AUTO: 169 10*3/MM3 (ref 140–450)
PMV BLD AUTO: 12 FL (ref 6–12)
POTASSIUM SERPL-SCNC: 5.1 MMOL/L (ref 3.5–5.2)
PROCALCITONIN SERPL-MCNC: 0.78 NG/ML (ref 0–0.25)
PROT SERPL-MCNC: 6.7 G/DL (ref 6–8.5)
RBC # BLD AUTO: 3.89 10*6/MM3 (ref 4.14–5.8)
SODIUM SERPL-SCNC: 134 MMOL/L (ref 136–145)
UFH PPP CHRO-ACNC: 0.36 IU/ML (ref 0.3–0.7)
UFH PPP CHRO-ACNC: 0.4 IU/ML (ref 0.3–0.7)
WBC NRBC COR # BLD AUTO: 16.95 10*3/MM3 (ref 3.4–10.8)

## 2024-12-13 PROCEDURE — 85025 COMPLETE CBC W/AUTO DIFF WBC: CPT | Performed by: INTERNAL MEDICINE

## 2024-12-13 PROCEDURE — 25010000002 HEPARIN (PORCINE) 25000-0.45 UT/250ML-% SOLUTION

## 2024-12-13 PROCEDURE — 82948 REAGENT STRIP/BLOOD GLUCOSE: CPT

## 2024-12-13 PROCEDURE — 84100 ASSAY OF PHOSPHORUS: CPT | Performed by: INTERNAL MEDICINE

## 2024-12-13 PROCEDURE — 94799 UNLISTED PULMONARY SVC/PX: CPT

## 2024-12-13 PROCEDURE — 84145 PROCALCITONIN (PCT): CPT | Performed by: INTERNAL MEDICINE

## 2024-12-13 PROCEDURE — 83880 ASSAY OF NATRIURETIC PEPTIDE: CPT | Performed by: INTERNAL MEDICINE

## 2024-12-13 PROCEDURE — 85520 HEPARIN ASSAY: CPT

## 2024-12-13 PROCEDURE — 25010000002 CEFTRIAXONE PER 250 MG: Performed by: STUDENT IN AN ORGANIZED HEALTH CARE EDUCATION/TRAINING PROGRAM

## 2024-12-13 PROCEDURE — 63710000001 INSULIN LISPRO (HUMAN) PER 5 UNITS: Performed by: INTERNAL MEDICINE

## 2024-12-13 PROCEDURE — 99233 SBSQ HOSP IP/OBS HIGH 50: CPT | Performed by: NURSE PRACTITIONER

## 2024-12-13 PROCEDURE — 94660 CPAP INITIATION&MGMT: CPT

## 2024-12-13 PROCEDURE — 25010000002 AMIODARONE IN DEXTROSE 5% 150-4.21 MG/100ML-% SOLUTION: Performed by: NURSE PRACTITIONER

## 2024-12-13 PROCEDURE — 63710000001 INSULIN GLARGINE PER 5 UNITS: Performed by: INTERNAL MEDICINE

## 2024-12-13 PROCEDURE — 71045 X-RAY EXAM CHEST 1 VIEW: CPT

## 2024-12-13 PROCEDURE — 80053 COMPREHEN METABOLIC PANEL: CPT | Performed by: INTERNAL MEDICINE

## 2024-12-13 PROCEDURE — 83735 ASSAY OF MAGNESIUM: CPT | Performed by: INTERNAL MEDICINE

## 2024-12-13 PROCEDURE — 25010000002 AMIODARONE IN DEXTROSE 5% 360-4.14 MG/200ML-% SOLUTION: Performed by: INTERNAL MEDICINE

## 2024-12-13 PROCEDURE — 99232 SBSQ HOSP IP/OBS MODERATE 35: CPT | Performed by: INTERNAL MEDICINE

## 2024-12-13 PROCEDURE — 93010 ELECTROCARDIOGRAM REPORT: CPT | Performed by: INTERNAL MEDICINE

## 2024-12-13 PROCEDURE — 93005 ELECTROCARDIOGRAM TRACING: CPT | Performed by: INTERNAL MEDICINE

## 2024-12-13 RX ADMIN — ASPIRIN 81 MG: 81 TABLET, COATED ORAL at 08:50

## 2024-12-13 RX ADMIN — SODIUM CHLORIDE 2000 MG: 900 INJECTION INTRAVENOUS at 22:22

## 2024-12-13 RX ADMIN — PANTOPRAZOLE SODIUM 40 MG: 40 TABLET, DELAYED RELEASE ORAL at 05:20

## 2024-12-13 RX ADMIN — INSULIN LISPRO 10 UNITS: 100 INJECTION, SOLUTION INTRAVENOUS; SUBCUTANEOUS at 09:29

## 2024-12-13 RX ADMIN — AMIODARONE HYDROCHLORIDE 0.5 MG/MIN: 1.8 INJECTION, SOLUTION INTRAVENOUS at 12:35

## 2024-12-13 RX ADMIN — SENNOSIDES AND DOCUSATE SODIUM 2 TABLET: 50; 8.6 TABLET ORAL at 08:50

## 2024-12-13 RX ADMIN — AMIODARONE HYDROCHLORIDE 0.5 MG/MIN: 1.8 INJECTION, SOLUTION INTRAVENOUS at 00:06

## 2024-12-13 RX ADMIN — AMIODARONE HYDROCHLORIDE 150 MG: 1.5 INJECTION, SOLUTION INTRAVENOUS at 09:16

## 2024-12-13 RX ADMIN — MUPIROCIN 1 APPLICATION: 20 OINTMENT TOPICAL at 20:03

## 2024-12-13 RX ADMIN — HEPARIN SODIUM 16 UNITS/KG/HR: 10000 INJECTION, SOLUTION INTRAVENOUS at 07:05

## 2024-12-13 RX ADMIN — INSULIN LISPRO 7 UNITS: 100 INJECTION, SOLUTION INTRAVENOUS; SUBCUTANEOUS at 18:10

## 2024-12-13 RX ADMIN — INSULIN LISPRO 5 UNITS: 100 INJECTION, SOLUTION INTRAVENOUS; SUBCUTANEOUS at 05:20

## 2024-12-13 RX ADMIN — INSULIN GLARGINE 49 UNITS: 100 INJECTION, SOLUTION SUBCUTANEOUS at 20:03

## 2024-12-13 RX ADMIN — MUPIROCIN 1 APPLICATION: 20 OINTMENT TOPICAL at 08:50

## 2024-12-13 RX ADMIN — DOXYCYCLINE 100 MG: 100 CAPSULE ORAL at 08:50

## 2024-12-13 RX ADMIN — HEPARIN SODIUM 16 UNITS/KG/HR: 10000 INJECTION, SOLUTION INTRAVENOUS at 22:22

## 2024-12-13 RX ADMIN — Medication 10 ML: at 08:51

## 2024-12-13 RX ADMIN — INSULIN LISPRO 7 UNITS: 100 INJECTION, SOLUTION INTRAVENOUS; SUBCUTANEOUS at 12:32

## 2024-12-13 RX ADMIN — AMIODARONE HYDROCHLORIDE 0.5 MG/MIN: 1.8 INJECTION, SOLUTION INTRAVENOUS at 22:23

## 2024-12-13 RX ADMIN — Medication 10 ML: at 20:03

## 2024-12-13 RX ADMIN — DOXYCYCLINE 100 MG: 100 CAPSULE ORAL at 20:03

## 2024-12-13 NOTE — CASE MANAGEMENT/SOCIAL WORK
Continued Stay Note  [facility]     Patient Name: Gabriela Mar  MRN: [id number]  Today's Date: 12/13/2024    Admit Date: 12/11/2024    Plan: D/C goal is home   Discharge Plan       Row Name 12/13/24 1447       Plan    Plan D/C goal is home    Patient/Family in Agreement with Plan yes    Plan Comments Discussed in MDR, patient alert, cardiology following on amiodarone IV , + rhinovirus, weaned to 6L. PT/OT ordered. I met w/Mr. Mar, he was sitting in chair, had eaten lunch. D/C goal is home, explained therapy will see, await recs. D/C TBD @ this time. CM will continue to follow.    Final Discharge Disposition Code 01 - home or self-care                   Discharge Codes    No documentation.                 Expected Discharge Date and Time       Expected Discharge Date Expected Discharge Time    Dec 17, 2024               Ranjit Banks RN

## 2024-12-13 NOTE — PROGRESS NOTES
Cardiology Progress Note      Reason for visit:    Atrial fibrillation with RVR  Coronary artery disease    IDENTIFICATION: 79-year-old gentleman who resides in [place], [place]    Active Hospital Problems    Diagnosis  POA    **Multifocal pneumonia [J18.9]  Yes     Priority: High    Paroxysmal atrial fibrillation [I48.0]  No     Priority: High     A-fib with RVR in the setting of multifocal pneumonia, 12/12/2024  [id number] 5 (age >75, CAD, HTN, DM)      Type 2 diabetes mellitus with hyperglycemia, with long-term current use of insulin [E11.65, Z79.4]  Not Applicable     Priority: High    Coronary artery disease involving native coronary artery of native heart with angina pectoris [I25.119]  Yes     Priority: Medium     Pharmacologic nuclear stress (10/21/2020): apical ischemia.  Normal LVEF.  Coronary calcification on CT attenuation correction images.      Chronic diastolic congestive heart failure [I50.32]  Yes     Priority: Medium     Echocardiogram (01/04/2017): LVEF 50%. Trace MR and TR.   Echo (1/27/2021):LVEF = 70%.  Grade 2 diastolic dysfunction.  Cardiac valves anatomically and functionally normal.      Essential hypertension [I10]  Yes     Priority: Medium     Target blood pressure <130/80 mmHg      Hyperlipidemia LDL goal <70 [E78.5]  Yes     Priority: Low     High intensity statin therapy indicated given the presence of CAD              Patient remains in atrial fibrillation on telemetry.  IV heparin drip infusing.  IV amiodarone at 0.5 mg/minute infusing.  He is unaware he is in atrial fibrillation.  This is a new diagnosis for him.  He reports prior to getting sick with a respiratory infection approximately 4 to 5 days ago he had not been having any exertional angina or shortness of breath however he admits he is not very active.  His creatinine has worsened today to over 2.           Vital Sign Min/Max for last 24 hours  Temp  Min: 96.6 °F (35.9 °C)  Max: 97.9 °F (36.6 °C)   BP  Min: 65/50  Max:  113/61   Pulse  Min: 80  Max: 112   Resp  Min: 16  Max: 21   SpO2  Min: 88 %  Max: 98 %   Flow (L/min) (Oxygen Therapy)  Min: 4  Max: 6      Intake/Output Summary (Last 24 hours) at 2024 0706  Last data filed at 2024 0400  Gross per 24 hour   Intake 382.5 ml   Output 600 ml   Net -217.5 ml           Physical Exam  Constitutional:       General: He is awake.   Cardiovascular:      Rate and Rhythm: Tachycardia present. Rhythm irregularly irregular.      Heart sounds: No murmur heard.  Pulmonary:      Effort: Pulmonary effort is normal.      Breath sounds: Decreased breath sounds, wheezing and rhonchi present.   Musculoskeletal:      Right lower le+ Pitting Edema present.      Left lower le+ Pitting Edema present.   Skin:     General: Skin is warm and dry.   Neurological:      Mental Status: He is alert and oriented to person, place, and time.   Psychiatric:         Behavior: Behavior is cooperative.         Tele: Atrial fibrillation    Results Review (reviewed the patient's recent labs in the electronic medical record):     EKG (2024): Atrial fibrillation with RVR, right bundle branch block, 110 bpm    CXR (2024): Results pending    CT angiogram of the chest (2024): No evidence of PE.  Multifocal pneumonia    ECHO (2024): LVEF 65%.  No valvular heart disease    Results from last 7 days   Lab Units 24  0539 24  1355   SODIUM mmol/L 134* 131* 128*   POTASSIUM mmol/L 5.1 4.7 5.2   CHLORIDE mmol/L 93* 92* 88*   BUN mg/dL 82* 55* 53*   CREATININE mg/dL 2.45* 1.90* 1.99*   MAGNESIUM mg/dL 2.3  --  1.9     Results from last 7 days   Lab Units 24  1510 24  1355   HSTROP T ng/L 70* 76*     Results from last 7 days   Lab Units 24  0539 24  0217 24  1355   WBC 10*3/mm3 16.95* 10.93* 16.10*   HEMOGLOBIN g/dL 12.6* 13.0 12.6*   HEMATOCRIT % 39.1 40.7 38.5   PLATELETS 10*3/mm3 169 171 149       Lab Results   Component Value Date     HGBA1C 8.50 (H) 12/12/2024       Lab Results   Component Value Date    CHOL 134 09/12/2024    CHLPL 125 06/06/2016    TRIG 168 (H) 09/12/2024    HDL 39 (L) 09/12/2024    LDL 66 09/12/2024              Atrial fibrillation with RVR/paroxysmal atrial fibrillaton  New onset this admission.  Occurred in the setting of multifocal pneumonia.  Remains in atrial fibrillation  IV heparin drip infusing  IV amiodarone infusing 0.5 mg/minute    Acute on chronic diastolic heart failure  Echo this admission normal LVEF and no valvular abnormality  proBNP elevated on admission 4921 now has worsened to 8519      Coronary artery disease/elevated troponins/NSTEMI  Has not seen cardiology since 2020.  Had stress test at that time with apical ischemia and coronary calcification on CT imaging  No cardiac catheterization performed due to lack of symptoms and medical management with antianginals recommended at that time  Troponins are elevated but flat.  Type I versus type II NSTEMI but likely type II from pneumonia/rhinovirus/sepsis  Echo this admission normal LVEF and no valvular abnormality  Will need ischemic eval by cardiac catheterization after recovers from pneumonia and rhinovirus but this will be planned as outpatient    Hypertension/hypotension  Current BP 91/55    Hyperlipidemia  Last lipid panel 9/2024 total cholesterol 134, triglycerides 168, HDL 39, LDL 66    Type 2 diabetes mellitus  Not well-controlled hemoglobin A1c 8.5      Multifocal pneumonia/positive for rhinovirus/sepsis  Management per primary team      Acute kidney injury  Creatinine worsening and currently 2.45       Give 150 mg bolus of IV amiodarone x 1.  Transition IV amiodarone to p.o. 400 mg twice daily x 7 days followed by 200 mg daily thereafter  Continue IV heparin over the weekend and we will plan on transitioning to Eliquis 5 mg twice daily on Monday  Will focus on rate control and loading with IV amiodarone.  After the holidays he will follow-up in our  office and if remains in A-fib can schedule external cardioversion at that time.  Will also likely need outpatient ischemic evaluation with cardiac catheterization which can be performed at time of cardioversion  If BP improves and rates not well-controlled we will try to start metoprolol  Would recommend nephrology consult for worsening creatinine and assistance with diuretics.  proBNP has increased since admission    Electronically signed by CALIN Santos, 12/13/24, 7:05 AM EST.

## 2024-12-13 NOTE — PROGRESS NOTES
INTENSIVIST   PROGRESS NOTE        SUBJECTIVE     Gabriela 79 y.o. male is followed for: Shortness of Breath and Rapid Heart Rate       Multifocal pneumonia    Essential hypertension    Type 2 diabetes mellitus with hyperglycemia, with long-term current use of insulin    Paroxysmal atrial fibrillation    As an Intensivist, we provide an integrated approach to the ICU patient and family, medical management of comorbid conditions, including but not limited to electrolytes, glycemic control, organ dysfunction, lead interdisciplinary rounds and coordinate the care with all other services, including those from other specialists.     Interval History:    Tolerating BiPAP    Diltiazem continuous intravenous infusion to control his HR. (A Fib)    Temp  Min: 96.6 °F (35.9 °C)  Max: 97.7 °F (36.5 °C)       History     Last Reviewed by Avery Mckeon MD on 2024 at  9:48 PM    Sections Reviewed    Medical, Surgical, Family, Tobacco, Alcohol, Drug Use, Sexual Activity,   Social Documentation    Problem list reviewed by Avery Mckeon MD on 2024 at  9:48 PM  Medicines reviewed by Avery Mckeon MD on 2024 at  9:48 PM  Allergies reviewed by Avery Mckeon MD on 2024 at  9:48 PM       The patient's relevant past medical, surgical and social history were reviewed and updated in Epic as appropriate.        OBJECTIVE     Vitals:  Temp: 97.6 °F (36.4 °C) (24 1600) Temp  Min: 96.6 °F (35.9 °C)  Max: 97.7 °F (36.5 °C)   Temp core:      BP: 93/57 (24 1732) BP  Min: 65/50  Max: 126/92   MAP (non-invasive) Noninvasive MAP (mmHg): 66 (24 1732) Noninvasive MAP (mmHg)  Av.7  Min: 57  Max: 111   Pulse: 83 (24) Pulse  Min: 82  Max: 121   Resp: 16 (24) Resp  Min: 16  Max: 22   SpO2: 97 % (24) SpO2  Min: 83 %  Max: 99 %   Device: nasal cannula, humidified (24)    Flow Rate: (S) 4 (24) Flow (L/min) (Oxygen Therapy)  Min: 4  Max: 6          12/12/24  0600 12/12/24 0927   Weight: 102 kg (225 lb 8.5 oz) 102 kg (224 lb 13.9 oz)        Intake/Ouptut 24 hrs (7:00AM - 6:59 AM)  Intake & Output (last 2 days)         12/11 0701 12/12 0700 12/12 0701 12/13 0700    I.V. (mL/kg) 626 (6.1) 382.5 (3.8)    IV Piggyback 1200     Total Intake(mL/kg) 1826 (17.9) 382.5 (3.8)    Urine (mL/kg/hr) 300 300 (0.2)    Total Output 300 300    Net +1526 +82.5          Urine Unmeasured Occurrence  1 x            Medications (drips):  amiodarone, Last Rate: 0.5 mg/min (12/12/24 1304)  dilTIAZem, Last Rate: Stopped (12/12/24 1603)  heparin, Last Rate: 16 Units/kg/hr (12/12/24 1712)  Pharmacy to Dose Heparin          NIV:   Settings: Observed:   Mode of Delivery: BiPAP (12/12/24 0805)          IPAP (cm H2O): 14 (12/12/24 0805)    EPAP (cm H2O): 6 (12/12/24 0805)    Pressure Support (cm H20): 8 cm H2O (12/12/24 0036)         Set Rate (Breaths/Min): 16 breaths/min (12/12/24 0805) Respiratory Rate Total (Breaths/Min): 20 breaths/min (12/12/24 0805)         Tidal Volume (mL): 387 mL (12/12/24 0805)   Oxygen Concentration (%): 45 (12/12/24 1200)  Minute Ventilation (L/Min): 8.9 (12/12/24 0451)      Physical Examination  Telemetry:  Rhythm: atrial rhythm (12/12/24 1800)      Constitutional:  No acute distress.   Cardiovascular: IRR.    Respiratory: Normal breath sounds  (+) Rhonchi   Abdominal:  Soft with no tenderness.   Extremities: No Edema   Neurological:   Alert, Oriented, Cooperative.    Best Eye Response: 4-->(E4) spontaneous (12/12/24 1800)  Best Motor Response: 6-->(M6) obeys commands (12/12/24 1800)  Best Verbal Response: 5-->(V5) oriented (12/12/24 1800)  Wolf Coma Scale Score: 15 (12/12/24 1800)               Results Reviewed:  Laboratory  Microbiology  Radiology  Pathology    Hematology:  Results from last 7 days   Lab Units 12/12/24  0217 12/11/24  1355   WBC 10*3/mm3 10.93* 16.10*   HEMOGLOBIN g/dL 13.0 12.6*   MCV fL 100.7* 99.2*   PLATELETS 10*3/mm3 171 149      Results from last 7 days   Lab Units 12/12/24  0217 12/11/24  1355   NEUTROS ABS 10*3/mm3 9.17* 12.43*   LYMPHS ABS 10*3/mm3 1.48 2.04   EOS ABS 10*3/mm3 0.00 0.00     Chemistry:  Estimated Creatinine Clearance: 37.1 mL/min (A) (by C-G formula based on SCr of 1.9 mg/dL (H)).    Results from last 7 days   Lab Units 12/12/24 0217 12/11/24  1355   SODIUM mmol/L 131* 128*   POTASSIUM mmol/L 4.7 5.2   CHLORIDE mmol/L 92* 88*   CO2 mmol/L 22.0 25.0   BUN mg/dL 55* 53*   CREATININE mg/dL 1.90* 1.99*   GLUCOSE mg/dL 431* 509*     Results from last 7 days   Lab Units 12/12/24 0217 12/11/24  1355   CALCIUM mg/dL 9.2 9.6   MAGNESIUM mg/dL  --  1.9     Hepatic Panel:  Results from last 7 days   Lab Units 12/12/24 0217 12/11/24  1355   ALBUMIN g/dL 2.9* 3.5   TOTAL PROTEIN g/dL 7.1 7.3   BILIRUBIN mg/dL 0.4 0.8   AST (SGOT) U/L 23 27   ALT (SGPT) U/L 22 23   ALK PHOS U/L 109 126*     Coagulation Labs:  Results from last 7 days   Lab Units 12/11/24  2016   PROTIME Seconds 17.1*   INR  1.38*   APTT seconds 40.1*      Cardiac Labs:  Results from last 7 days   Lab Units 12/11/24  1510 12/11/24  1355   PROBNP pg/mL  --  4,921.0*   HSTROP T ng/L 70* 76*     Biomarkers:  Results from last 7 days   Lab Units 12/12/24  0217 12/11/24  1724 12/11/24  1355   LACTATE mmol/L  --  1.7 3.7*   PROCALCITONIN ng/mL 0.90*  --   --    D DIMER QUANT MCGFEU/mL  --   --  3.21*       Lab Results   Component Value Date    TSH 0.747 12/12/2024     COVID-19  Lab Results   Component Value Date    COVID19 Not Detected 12/11/2024    COVID19 Not Detected 02/12/2024    COVID19 Not Detected 05/26/2022    COVID19 Not Detected 03/25/2022       Arterial Blood Gases:  Results from last 7 days   Lab Units 12/12/24  0023 12/11/24  1423   PH, ARTERIAL pH units 7.246* 7.325*   PCO2, ARTERIAL mm Hg 58.7* 49.9*   PO2 ART mm Hg 131.0* 92.5   FIO2 % 100 36         Images:  CT Angiogram Chest    Result Date: 12/11/2024  Impression: 1.No evidence of pulmonary embolism.  2.There is irregular airspace disease within the left lower lobe and posterior right upper lobe. There are scattered tree-in-bud opacities throughout the lungs otherwise. Findings are consistent with multifocal pneumonia. Multifocal pneumonia Electronically Signed: Hilario Gale MD  12/11/2024 7:18 PM EST  Workstation ID: [id number]    XR Chest 1 View    Result Date: 12/11/2024  Impression: Moderately extensive left lower lobe pneumonia. Also, suspected more diffuse multifocal pneumonia elsewhere in both lungs. Electronically Signed: Bruce Monsalve MD  12/11/2024 2:43 PM EST  Workstation ID: [id number]     Echo:  Results for orders placed during the hospital encounter of 12/11/24    Adult Transthoracic Echo Complete w/ Color, Spectral and Contrast if Necessary Per Protocol    Interpretation Summary    Left ventricular systolic function is normal. Estimated left ventricular EF = 65%    The right ventricular cavity is mildly dilated.    No hemodynamically significant valvular heart disease      Results: Reviewed.  I reviewed the patient's new laboratory and imaging results.  I independently reviewed the patient's new images.    Medications: Reviewed.    Assessment   A/P     Hospital:  LOS: 1 day   ICU: 1d     Active Hospital Problems    Diagnosis  POA    **Multifocal pneumonia [J18.9]  Yes    Paroxysmal atrial fibrillation [I48.0]  No     A-fib with RVR in the setting of multifocal pneumonia, 12/12/2024  [id number] 5 (age >75, CAD, HTN, DM)      Type 2 diabetes mellitus with hyperglycemia, with long-term current use of insulin [E11.65, Z79.4]  Not Applicable    Essential hypertension [I10]  Yes     Target blood pressure <130/80 mmHg       Gabriela is a 79 y.o. male admitted on 12/11/2024 with Multifocal pneumonia [J18.9]    He was sent to the hospital from his PCP office for dyspnea, weakness and cough with sputum production. Symptoms started around 7-days ago. He lives with his son and initially his son  was sick. When his  son started to get better, patient started noticing similar symptoms. In the PCP office his SpO2 was in the 70s on ambient air.      Upon arrival in ED, labs showed lactate 3.7, WBC 16, glucose 500 and Cr of 1.99.     CTA chest showed multifocal pneumonia and viral panel was positive for Rhinovirus.     He developed afib with RVR in the ED for which cardizem was ordered and ICU was consulted. SpO2 97% on 6 lit nasal cannula.      Patient states he previously had pneumonia 7 years ago. Denies active smoking and alcohol use. Full code.    Comorbidities: DM, HTN, GERD and COPD (quit smoking 35 years ago),    Assessment/Management/Treatment Plan:    Respiratory failure  Rhinovirus infection  CT Chest 12/11/24: There is irregular airspace disease within the left lower lobe and posterior right upper lobe. There are scattered tree-in-bud opacities throughout the lungs otherwise . R/O Secondary bacterial infection.  Cardiovascular  P A Fib  HTN  Dyslipidemia   Renal  R/O ZOHRA/CKD    Lab Results   Component Value Date    CREATININE 1.90 (H) 12/12/2024    CREATININE 1.99 (H) 12/11/2024    CREATININE 1.27 10/09/2024    CREATININE 1.44 (H) 09/12/2024    CREATININE 1.60 (H) 04/08/2024    CREATININE 1.43 (H) 02/12/2024    CREATININE 1.37 (H) 10/02/2023       Endocrine   Body mass index is 32.27 kg/m². Obese Class I: 30-34.9kg/m2  Type 2 diabetes.    Lab Results   Lab Value Date/Time    HGBA1C 8.50 (H) 12/12/2024 0217    HGBA1C 8.50 (H) 09/12/2024 1307     Results from last 7 days   Lab Units 12/12/24  2028 12/12/24  1858 12/12/24  1708 12/12/24  1602 12/12/24  1500 12/12/24  1418 12/12/24  1300 12/12/24  1203   GLUCOSE mg/dL 157* 166* 157* 164* 150* 149* 124 136*       Diet: Diet: Regular/House, Cardiac, Diabetic; Healthy Heart (2-3 Na+); Consistent Carbohydrate; Fluid Consistency: Thin (IDDSI 0)   Advance Directives: Code Status and Medical Interventions: CPR (Attempt to Resuscitate); Full Support   Ordered at: 12/11/24 2051      Code Status (Patient has no pulse and is not breathing):    CPR (Attempt to Resuscitate)     Medical Interventions (Patient has pulse or is breathing):    Full Support        VTE Prophylaxis:  Pharmacologic & mechanical VTE prophylaxis orders are present.         In brief:  Appreciate Cardiology's input. Start Amiodarone.  ECHO EF 65^%  Continue Heparin continuous intravenous infusion   Continue Insulin continuous intravenous infusion   Disposition: Keep in ICU.    Plan of care and goals reviewed during interdisciplinary rounds.  I discussed the patient's findings and my recommendations with patient and nursing staff    MDM:    Problem(s) High due to: Acute or Chronic illness or injury that may poses a threat to life or bodily function  Data: High due to: Review of prior external records from each unique source, Review of the result(s) of each unique test, Ordering of each unique test, and Discuss management or test interpretation with external physician or NPP (not separately reported)    High    [x] Primary Attending Intensive Care Medicine - Nutrition Support   [] Consultant    Copied text in this note has been reviewed and is accurate as of 12/12/24

## 2024-12-13 NOTE — CONSULTS
Diabetes Education    Patient Name:  Gabriela Mar  YOB: 1945  MRN: [id number]  Admit Date:  12/11/2024      Total time spent reviewing chart, preparing education/materials, providing education at bedside, and coordinating care approx 30 minutes.    Consult for diabetes education received for A1c >7. Chart reviewed. Pt was seen at bedside today. Permission given for visit.     Discussed and taught Mr. Mar about type 2 diabetes self-management, risk factors, and importance of blood glucose control to reduce complications. Target blood glucose readings and A1c goals per ADA were reviewed. Reviewed with patient current A1c 8.5 and discussed its significance. Signs, symptoms, and treatment of hyperglycemia and hypoglycemia were discussed. Pt denies frequent episodes of hypoglycemia. Confirms his current regimen is amaryl, metformin, and 85u humulin n BID. Denies difficulty obtaining supplies or insulin.     Reports 17-18 year history of type 2 diabetes. SMBG 1-2 times per day using a meter. States he is UTD on his dilated eye exam, last one was September 2024. States he gets all his supplies at [facility] typically. Eats 3 meals per day. Discussed consistent carbohydrate eating pattern and benefits to maintaining stable blood glucose.     Lifestyle changes such as physical activity with MD approval and healthy eating were encouraged.  Encouraged pt to monitor blood sugar at home 1-2 times per day and to call PCP if blood sugar is trending high. Encouraged to keep record of blood glucose readings to take to follow up appointment with PCP.     Thank you for this consult.     Electronically signed by:  Nena Goodman RN  12/13/24 13:03 EST

## 2024-12-14 ENCOUNTER — APPOINTMENT (OUTPATIENT)
Dept: ULTRASOUND IMAGING | Facility: HOSPITAL | Age: 79
End: 2024-12-14
Payer: MEDICARE

## 2024-12-14 ENCOUNTER — APPOINTMENT (OUTPATIENT)
Dept: GENERAL RADIOLOGY | Facility: HOSPITAL | Age: 79
End: 2024-12-14
Payer: MEDICARE

## 2024-12-14 LAB
ALBUMIN SERPL-MCNC: 2.7 G/DL (ref 3.5–5.2)
ANION GAP SERPL CALCULATED.3IONS-SCNC: 11 MMOL/L (ref 5–15)
BASOPHILS # BLD AUTO: 0.05 10*3/MM3 (ref 0–0.2)
BASOPHILS NFR BLD AUTO: 0.3 % (ref 0–1.5)
BUN SERPL-MCNC: 75 MG/DL (ref 8–23)
BUN/CREAT SERPL: 37.1 (ref 7–25)
CALCIUM SPEC-SCNC: 8.5 MG/DL (ref 8.6–10.5)
CHLORIDE SERPL-SCNC: 95 MMOL/L (ref 98–107)
CO2 SERPL-SCNC: 25 MMOL/L (ref 22–29)
CREAT SERPL-MCNC: 2.02 MG/DL (ref 0.76–1.27)
D-LACTATE SERPL-SCNC: 1.2 MMOL/L (ref 0.5–2)
DEPRECATED RDW RBC AUTO: 49.6 FL (ref 37–54)
EGFRCR SERPLBLD CKD-EPI 2021: 32.7 ML/MIN/1.73
EOSINOPHIL # BLD AUTO: 0 10*3/MM3 (ref 0–0.4)
EOSINOPHIL NFR BLD AUTO: 0 % (ref 0.3–6.2)
ERYTHROCYTE [DISTWIDTH] IN BLOOD BY AUTOMATED COUNT: 13.6 % (ref 12.3–15.4)
GLUCOSE BLDC GLUCOMTR-MCNC: 119 MG/DL (ref 70–130)
GLUCOSE BLDC GLUCOMTR-MCNC: 139 MG/DL (ref 70–130)
GLUCOSE BLDC GLUCOMTR-MCNC: 152 MG/DL (ref 70–130)
GLUCOSE BLDC GLUCOMTR-MCNC: 169 MG/DL (ref 70–130)
GLUCOSE BLDC GLUCOMTR-MCNC: 197 MG/DL (ref 70–130)
GLUCOSE BLDC GLUCOMTR-MCNC: 286 MG/DL (ref 70–130)
GLUCOSE SERPL-MCNC: 174 MG/DL (ref 65–99)
HCT VFR BLD AUTO: 36.2 % (ref 37.5–51)
HGB BLD-MCNC: 11.8 G/DL (ref 13–17.7)
IMM GRANULOCYTES # BLD AUTO: 0.17 10*3/MM3 (ref 0–0.05)
IMM GRANULOCYTES NFR BLD AUTO: 1.1 % (ref 0–0.5)
LYMPHOCYTES # BLD AUTO: 2.11 10*3/MM3 (ref 0.7–3.1)
LYMPHOCYTES NFR BLD AUTO: 13.3 % (ref 19.6–45.3)
MCH RBC QN AUTO: 32.3 PG (ref 26.6–33)
MCHC RBC AUTO-ENTMCNC: 32.6 G/DL (ref 31.5–35.7)
MCV RBC AUTO: 99.2 FL (ref 79–97)
MONOCYTES # BLD AUTO: 0.85 10*3/MM3 (ref 0.1–0.9)
MONOCYTES NFR BLD AUTO: 5.3 % (ref 5–12)
NEUTROPHILS NFR BLD AUTO: 12.72 10*3/MM3 (ref 1.7–7)
NEUTROPHILS NFR BLD AUTO: 80 % (ref 42.7–76)
NRBC BLD AUTO-RTO: 0 /100 WBC (ref 0–0.2)
NT-PROBNP SERPL-MCNC: 7920 PG/ML (ref 0–1800)
PHOSPHATE SERPL-MCNC: 3.3 MG/DL (ref 2.5–4.5)
PLATELET # BLD AUTO: 131 10*3/MM3 (ref 140–450)
PMV BLD AUTO: 11.2 FL (ref 6–12)
POTASSIUM SERPL-SCNC: 4.4 MMOL/L (ref 3.5–5.2)
PROCALCITONIN SERPL-MCNC: 0.77 NG/ML (ref 0–0.25)
RBC # BLD AUTO: 3.65 10*6/MM3 (ref 4.14–5.8)
SODIUM SERPL-SCNC: 131 MMOL/L (ref 136–145)
UFH PPP CHRO-ACNC: 0.14 IU/ML (ref 0.3–0.7)
UFH PPP CHRO-ACNC: 0.35 IU/ML (ref 0.3–0.7)
UFH PPP CHRO-ACNC: >1.1 IU/ML (ref 0.3–0.7)
WBC NRBC COR # BLD AUTO: 15.9 10*3/MM3 (ref 3.4–10.8)

## 2024-12-14 PROCEDURE — 25010000002 CEFTRIAXONE PER 250 MG: Performed by: STUDENT IN AN ORGANIZED HEALTH CARE EDUCATION/TRAINING PROGRAM

## 2024-12-14 PROCEDURE — 85025 COMPLETE CBC W/AUTO DIFF WBC: CPT | Performed by: INTERNAL MEDICINE

## 2024-12-14 PROCEDURE — 97162 PT EVAL MOD COMPLEX 30 MIN: CPT

## 2024-12-14 PROCEDURE — 76775 US EXAM ABDO BACK WALL LIM: CPT

## 2024-12-14 PROCEDURE — 25010000002 HEPARIN (PORCINE) PER 1000 UNITS

## 2024-12-14 PROCEDURE — 84145 PROCALCITONIN (PCT): CPT | Performed by: INTERNAL MEDICINE

## 2024-12-14 PROCEDURE — 85520 HEPARIN ASSAY: CPT

## 2024-12-14 PROCEDURE — 97166 OT EVAL MOD COMPLEX 45 MIN: CPT

## 2024-12-14 PROCEDURE — 80069 RENAL FUNCTION PANEL: CPT | Performed by: INTERNAL MEDICINE

## 2024-12-14 PROCEDURE — 99232 SBSQ HOSP IP/OBS MODERATE 35: CPT | Performed by: INTERNAL MEDICINE

## 2024-12-14 PROCEDURE — 63710000001 INSULIN LISPRO (HUMAN) PER 5 UNITS: Performed by: INTERNAL MEDICINE

## 2024-12-14 PROCEDURE — 71045 X-RAY EXAM CHEST 1 VIEW: CPT

## 2024-12-14 PROCEDURE — 63710000001 INSULIN GLARGINE PER 5 UNITS: Performed by: INTERNAL MEDICINE

## 2024-12-14 PROCEDURE — 25010000002 AMIODARONE IN DEXTROSE 5% 360-4.14 MG/200ML-% SOLUTION: Performed by: INTERNAL MEDICINE

## 2024-12-14 PROCEDURE — 94799 UNLISTED PULMONARY SVC/PX: CPT

## 2024-12-14 PROCEDURE — 83605 ASSAY OF LACTIC ACID: CPT | Performed by: INTERNAL MEDICINE

## 2024-12-14 PROCEDURE — 82948 REAGENT STRIP/BLOOD GLUCOSE: CPT

## 2024-12-14 PROCEDURE — 83880 ASSAY OF NATRIURETIC PEPTIDE: CPT | Performed by: INTERNAL MEDICINE

## 2024-12-14 PROCEDURE — 94660 CPAP INITIATION&MGMT: CPT

## 2024-12-14 RX ORDER — HEPARIN SODIUM 1000 [USP'U]/ML
2000 INJECTION, SOLUTION INTRAVENOUS; SUBCUTANEOUS ONCE
Status: COMPLETED | OUTPATIENT
Start: 2024-12-14 | End: 2024-12-14

## 2024-12-14 RX ADMIN — INSULIN LISPRO 7 UNITS: 100 INJECTION, SOLUTION INTRAVENOUS; SUBCUTANEOUS at 20:00

## 2024-12-14 RX ADMIN — AMIODARONE HYDROCHLORIDE 0.5 MG/MIN: 1.8 INJECTION, SOLUTION INTRAVENOUS at 22:04

## 2024-12-14 RX ADMIN — MUPIROCIN 1 APPLICATION: 20 OINTMENT TOPICAL at 20:00

## 2024-12-14 RX ADMIN — SODIUM CHLORIDE 2000 MG: 900 INJECTION INTRAVENOUS at 21:51

## 2024-12-14 RX ADMIN — AMIODARONE HYDROCHLORIDE 0.5 MG/MIN: 1.8 INJECTION, SOLUTION INTRAVENOUS at 09:22

## 2024-12-14 RX ADMIN — MUPIROCIN 1 APPLICATION: 20 OINTMENT TOPICAL at 08:32

## 2024-12-14 RX ADMIN — SENNOSIDES AND DOCUSATE SODIUM 2 TABLET: 50; 8.6 TABLET ORAL at 20:00

## 2024-12-14 RX ADMIN — DOXYCYCLINE 100 MG: 100 CAPSULE ORAL at 08:32

## 2024-12-14 RX ADMIN — ASPIRIN 81 MG: 81 TABLET, COATED ORAL at 08:32

## 2024-12-14 RX ADMIN — PANTOPRAZOLE SODIUM 40 MG: 40 TABLET, DELAYED RELEASE ORAL at 06:31

## 2024-12-14 RX ADMIN — INSULIN LISPRO 10 UNITS: 100 INJECTION, SOLUTION INTRAVENOUS; SUBCUTANEOUS at 08:32

## 2024-12-14 RX ADMIN — HEPARIN SODIUM 2000 UNITS: 1000 INJECTION INTRAVENOUS; SUBCUTANEOUS at 14:48

## 2024-12-14 RX ADMIN — INSULIN GLARGINE 49 UNITS: 100 INJECTION, SOLUTION SUBCUTANEOUS at 20:00

## 2024-12-14 RX ADMIN — DOXYCYCLINE 100 MG: 100 CAPSULE ORAL at 20:00

## 2024-12-14 NOTE — PLAN OF CARE
Goal Outcome Evaluation:            Remains on NC weaned to 4 L. Frequent productive cough. Hr remains in a fibb rate 100-120's. 150mg amio bolus x 1. Dropped bp to 70/50. Pt asymptomatic. Bp recovered on own. No changes to HR. Up to chair from 10am on. Bg remains 250's. PT updated on POC.

## 2024-12-14 NOTE — PROGRESS NOTES
INTENSIVIST   PROGRESS NOTE        SUBJECTIVE     Gabriela 79 y.o. male is followed for: Shortness of Breath and Rapid Heart Rate       Multifocal pneumonia    Hyperlipidemia LDL goal <70    Essential hypertension    Chronic diastolic congestive heart failure    ZOHRA (acute kidney injury)    Coronary artery disease involving native coronary artery of native heart with angina pectoris    Type 2 diabetes mellitus with hyperglycemia, with long-term current use of insulin    Paroxysmal atrial fibrillation    As an Intensivist, we provide an integrated approach to the ICU patient and family, medical management of comorbid conditions, including but not limited to electrolytes, glycemic control, organ dysfunction, lead interdisciplinary rounds and coordinate the care with all other services, including those from other specialists.     Interval History:    Much better.    Off Bipap.    Tremors, for the past 5 months.    Temp  Min: 96.9 °F (36.1 °C)  Max: 98.6 °F (37 °C)       History     Last Reviewed by Mirtha Wolf APRN on 2024 at  7:06 AM    Sections Reviewed    Medical, Surgical, Family, Tobacco, Alcohol, Drug Use, Sexual Activity,   Social Documentation    Problem list reviewed by Avery Mckeon MD on 2024 at  9:48 PM  Medicines reviewed by Avery Mckeon MD on 2024 at  9:48 PM  Allergies reviewed by Avery Mckeon MD on 2024 at  9:48 PM       The patient's relevant past medical, surgical and social history were reviewed and updated in Epic as appropriate.        OBJECTIVE     Vitals:  Temp: 98.5 °F (36.9 °C) (24) Temp  Min: 96.9 °F (36.1 °C)  Max: 98.6 °F (37 °C)   Temp core:      BP: 102/61 (24) BP  Min: 71/51  Max: 132/43   MAP (non-invasive) Noninvasive MAP (mmHg): 73 (24) Noninvasive MAP (mmHg)  Av.1  Min: 53  Max: 111   Pulse: (!) 121 (24) Pulse  Min: 80  Max: 121   Resp: 18 (24) Resp  Min: 16  Max: 22   SpO2: 95 %  (12/13/24 2100) SpO2  Min: 85 %  Max: 99 %   Device: nasal cannula, humidified (12/13/24 2000)    Flow Rate: 4 (12/13/24 2000) Flow (L/min) (Oxygen Therapy)  Min: 3  Max: 6         12/12/24  0600 12/12/24 0927   Weight: 102 kg (225 lb 8.5 oz) 102 kg (224 lb 13.9 oz)        Intake/Ouptut 24 hrs (7:00AM - 6:59 AM)  Intake & Output (last 2 days)         12/12 0701 12/13 0700 12/13 0701 12/14 0700    P.O.  1080    I.V. (mL/kg) 382.5 (3.8) 979 (9.6)    IV Piggyback  100    Total Intake(mL/kg) 382.5 (3.8) 2159 (21.2)    Urine (mL/kg/hr) 600 (0.2) 800 (0.5)    Total Output 600 800    Net -217.5 +1359          Urine Unmeasured Occurrence 1 x             Medications (drips):  amiodarone, Last Rate: 0.5 mg/min (12/13/24 1235)  heparin, Last Rate: 16 Units/kg/hr (12/13/24 0705)  Pharmacy to Dose Heparin          NIV:   Settings: Observed:   Mode of Delivery: BiPAP, ST (12/13/24 0359)          IPAP (cm H2O): 14 (12/13/24 0359)    EPAP (cm H2O): 6 (12/13/24 0359)    Pressure Support (cm H20): 8 cm H2O (12/13/24 0359)         Set Rate (Breaths/Min): 16 breaths/min (12/13/24 0359) Respiratory Rate Total (Breaths/Min): 19 breaths/min (12/13/24 0359)         Tidal Volume (mL): 413 mL (12/13/24 0359)   Oxygen Concentration (%): 60 (12/13/24 0600)  Minute Ventilation (L/Min): 6.9 (12/13/24 0359)      Physical Examination  Telemetry:  Rhythm: atrial rhythm (12/13/24 2000)      Constitutional:  No acute distress.   Cardiovascular: IRR.    Respiratory: Normal breath sounds  (+) Rhonchi   Abdominal:  Soft with no tenderness.   Extremities: No Edema   Neurological:   Alert, Oriented, Cooperative.    Best Eye Response: 4-->(E4) spontaneous (12/13/24 2000)  Best Motor Response: 6-->(M6) obeys commands (12/13/24 2000)  Best Verbal Response: 5-->(V5) oriented (12/13/24 2000)  [place] Coma Scale Score: 15 (12/13/24 2000)               Results Reviewed:  Laboratory  Microbiology  Radiology  Pathology    Hematology:  Results from last 7 days    Lab Units 12/13/24  0539 12/12/24  0217 12/11/24  1355   WBC 10*3/mm3 16.95* 10.93* 16.10*   HEMOGLOBIN g/dL 12.6* 13.0 12.6*   MCV fL 100.5* 100.7* 99.2*   PLATELETS 10*3/mm3 169 171 149     Results from last 7 days   Lab Units 12/13/24  0539 12/12/24 0217 12/11/24  1355   NEUTROS ABS 10*3/mm3 14.20* 9.17* 12.43*   LYMPHS ABS 10*3/mm3 1.69 1.48 2.04   EOS ABS 10*3/mm3 0.00 0.00 0.00     Chemistry:  Estimated Creatinine Clearance: 28.8 mL/min (A) (by C-G formula based on SCr of 2.45 mg/dL (H)).    Results from last 7 days   Lab Units 12/13/24  0539 12/12/24  0217   SODIUM mmol/L 134* 131*   POTASSIUM mmol/L 5.1 4.7   CHLORIDE mmol/L 93* 92*   CO2 mmol/L 22.0 22.0   BUN mg/dL 82* 55*   CREATININE mg/dL 2.45* 1.90*   GLUCOSE mg/dL 269* 431*     Results from last 7 days   Lab Units 12/13/24  0539 12/12/24 0217 12/11/24  1355   CALCIUM mg/dL 8.9 9.2 9.6   MAGNESIUM mg/dL 2.3  --  1.9   PHOSPHORUS mg/dL 5.8*  --   --      Hepatic Panel:  Results from last 7 days   Lab Units 12/13/24  0539 12/12/24  0217 12/11/24  1355   ALBUMIN g/dL 3.1* 2.9* 3.5   TOTAL PROTEIN g/dL 6.7 7.1 7.3   BILIRUBIN mg/dL 0.3 0.4 0.8   AST (SGOT) U/L 42* 23 27   ALT (SGPT) U/L 24 22 23   ALK PHOS U/L 99 109 126*     Cardiac Labs:  Results from last 7 days   Lab Units 12/13/24  0539 12/11/24  1510 12/11/24  1355   PROBNP pg/mL 8,519.0*  --  4,921.0*   HSTROP T ng/L  --  70* 76*     Biomarkers:  Results from last 7 days   Lab Units 12/13/24  0539 12/12/24  0217 12/11/24  1724 12/11/24  1355   LACTATE mmol/L  --   --  1.7 3.7*   PROCALCITONIN ng/mL 0.78* 0.90*  --   --    D DIMER QUANT MCGFEU/mL  --   --   --  3.21*       Lab Results   Component Value Date    TSH 0.747 12/12/2024     COVID-19  Lab Results   Component Value Date    COVID19 Not Detected 12/11/2024    COVID19 Not Detected 02/12/2024    COVID19 Not Detected 05/26/2022    COVID19 Not Detected 03/25/2022       Arterial Blood Gases:  Results from last 7 days   Lab Units 12/12/24  0023  12/11/24  1423   PH, ARTERIAL pH units 7.246* 7.325*   PCO2, ARTERIAL mm Hg 58.7* 49.9*   PO2 ART mm Hg 131.0* 92.5   FIO2 % 100 36         Images:  CT Angiogram Chest    Result Date: 12/11/2024  Impression: 1.No evidence of pulmonary embolism. 2.There is irregular airspace disease within the left lower lobe and posterior right upper lobe. There are scattered tree-in-bud opacities throughout the lungs otherwise. Findings are consistent with multifocal pneumonia. Multifocal pneumonia Electronically Signed: Hilario Gale MD  12/11/2024 7:18 PM EST  Workstation ID: [id number]    XR Chest 1 View    Result Date: 12/11/2024  Impression: Moderately extensive left lower lobe pneumonia. Also, suspected more diffuse multifocal pneumonia elsewhere in both lungs. Electronically Signed: Bruce Monsalve MD  12/11/2024 2:43 PM EST  Workstation ID: [id number]     Echo:  Results for orders placed during the hospital encounter of 12/11/24    Adult Transthoracic Echo Complete w/ Color, Spectral and Contrast if Necessary Per Protocol    Interpretation Summary    Left ventricular systolic function is normal. Estimated left ventricular EF = 65%    The right ventricular cavity is mildly dilated.    No hemodynamically significant valvular heart disease      Results: Reviewed.  I reviewed the patient's new laboratory and imaging results.  I independently reviewed the patient's new images.    Medications: Reviewed.    Assessment   A/P     Hospital:  LOS: 2 days   ICU: 1d 23h     Active Hospital Problems    Diagnosis  POA    **Multifocal pneumonia [J18.9]  Yes    Paroxysmal atrial fibrillation [I48.0]  No     A-fib with RVR in the setting of multifocal pneumonia, 12/12/2024  [id number] 5 (age >75, CAD, HTN, DM)      Type 2 diabetes mellitus with hyperglycemia, with long-term current use of insulin [E11.65, Z79.4]  Not Applicable    Coronary artery disease involving native coronary artery of native heart with angina pectoris [I25.119]  Yes      Pharmacologic nuclear stress (10/21/2020): apical ischemia.  Normal LVEF.  Coronary calcification on CT attenuation correction images.      ZOHRA (acute kidney injury) [N17.9]  Yes    Chronic diastolic congestive heart failure [I50.32]  Yes     Echocardiogram (01/04/2017): LVEF 50%. Trace MR and TR.   Echo (1/27/2021):LVEF = 70%.  Grade 2 diastolic dysfunction.  Cardiac valves anatomically and functionally normal.      Essential hypertension [I10]  Yes     Target blood pressure <130/80 mmHg      Hyperlipidemia LDL goal <70 [E78.5]  Yes     High intensity statin therapy indicated given the presence of CAD       Gabriela is a 79 y.o. male admitted on 12/11/2024 with Multifocal pneumonia [J18.9]    He was sent to the hospital from his PCP office for dyspnea, weakness and cough with sputum production. Symptoms started around 7-days ago. He lives with his son and initially his son  was sick. When his son started to get better, patient started noticing similar symptoms. In the PCP office his SpO2 was in the 70s on ambient air.      Upon arrival in ED, labs showed lactate 3.7, WBC 16, glucose 500 and Cr of 1.99.     CTA chest showed multifocal pneumonia and viral panel was positive for Rhinovirus.     He developed afib with RVR in the ED for which cardizem was ordered and ICU was consulted. SpO2 97% on 6 lit nasal cannula.      Patient states he previously had pneumonia 7 years ago. Denies active smoking and alcohol use. Full code.    Comorbidities: DM, HTN, GERD and COPD (quit smoking 35 years ago),    Assessment/Management/Treatment Plan:    Respiratory failure  Rhinovirus infection  CT Chest 12/11/24: There is irregular airspace disease within the left lower lobe and posterior right upper lobe. There are scattered tree-in-bud opacities throughout the lungs otherwise . R/O Secondary bacterial infection.  Cardiovascular  P A Fib  HTN  Dyslipidemia   Renal  R/O ZOHRA/CKD  Endocrine   Body mass index is 32.27 kg/m². Obese Class  I: 30-34.9kg/m2  Type 2 diabetes.    Lab Results   Lab Value Date/Time    HGBA1C 8.50 (H) 12/12/2024 0217    HGBA1C 8.50 (H) 09/12/2024 1307     Results from last 7 days   Lab Units 12/13/24  1947 12/13/24  1705 12/13/24  1136 12/13/24  0709 12/13/24  0506 12/13/24  0256 12/13/24  0126 12/13/24  0025   GLUCOSE mg/dL 286* 253* 276* 286* 280* 248* 311* 277*       Diet: Diet: Regular/House, Cardiac, Diabetic; Healthy Heart (2-3 Na+); Consistent Carbohydrate; Fluid Consistency: Thin (IDDSI 0)   Advance Directives: Code Status and Medical Interventions: CPR (Attempt to Resuscitate); Full Support   Ordered at: 12/11/24 2051     Code Status (Patient has no pulse and is not breathing):    CPR (Attempt to Resuscitate)     Medical Interventions (Patient has pulse or is breathing):    Full Support        VTE Prophylaxis:  Pharmacologic & mechanical VTE prophylaxis orders are present.         In brief:  A Fib } Cardiology  Continue Heparin continuous intravenous infusion   Continue antibiotics: Ceftriaxone and Doxy.  PT/OT  Monitor renal function. SCr up. Non-oliguric.  Renal US in AM    Lab Results   Component Value Date    CREATININE 2.45 (H) 12/13/2024    CREATININE 1.90 (H) 12/12/2024    CREATININE 1.99 (H) 12/11/2024    CREATININE 1.27 10/09/2024     Goal: Glucose < 180 mg/dL.   Transition IV to SQ insulin.  Disposition: Keep in ICU.    Plan of care and goals reviewed during interdisciplinary rounds.  I discussed the patient's findings and my recommendations with patient and nursing staff    MDM:    Problem(s) High due to: Acute or Chronic illness or injury that may poses a threat to life or bodily function  Data: Moderate due to: Review or results of each unique test and Ordering of each unique test    Moderate    [x] Primary Attending Intensive Care Medicine - Nutrition Support   [] Consultant    Copied text in this note has been reviewed and is accurate as of 12/13/24

## 2024-12-14 NOTE — PLAN OF CARE
Goal Outcome Evaluation:   VSS, no acute events overnight. Patient only wore bipap for about an hour before taking off. Patient on 4-6L overnight. HR remained less than 120 unless changing positions. Heparin remains at 16, amio at 0.5. A&Ox4, adequate UOP. Patient updated throughout care.

## 2024-12-14 NOTE — THERAPY EVALUATION
Patient Name: Gabriela Mar  : 1945    MRN: [id number]                              Today's Date: 2024       Admit Date: 2024    Visit Dx:     ICD-10-CM ICD-9-CM   1. Multifocal pneumonia  J18.9 486   2. Hypoxia  R09.02 799.02   3. Sepsis with acute hypoxic respiratory failure without septic shock, due to unspecified organism  A41.9 038.9    R65.20 995.91    J96.01 518.81   4. Acute hyperglycemia  R73.9 790.29   5. 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 pectoris    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    Port-A-Cath in place    Peripheral neuropathy    Type 2 diabetes mellitus with hyperglycemia, with long-term current use of insulin    Multifocal pneumonia    Paroxysmal atrial fibrillation     Past Medical History:   Diagnosis Date    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 Hernández    JOINT REPLACEMENT      LEFT HIP 2016-MARCH     SUBMAXILLARY CYST  EXCISION      Right back    TONSILLECTOMY      TOTAL HIP ARTHROPLASTY Right 03/07/2017    Procedure: RIGHT TOTAL HIP ARTHROPLASTY;  Surgeon: Reynaldo Suarez MD;  Location: [facility];  Service:     VASECTOMY      WRIST GANGLION EXCISION        General Information       Row Name 12/14/24 1416          OT Time and Intention    Document Type evaluation  -LR     Mode of Treatment occupational therapy  -LR       Row Name 12/14/24 1416          General Information    Patient Profile Reviewed yes  -LR     Prior Level of Function independent:;bed mobility;ADL's;transfer;gait;all household mobility;home management  -LR     Existing Precautions/Restrictions fall;oxygen therapy device and L/min;other (see comments)  lopez cath, 4-6L O2  -LR     Barriers to Rehab medically complex;previous functional deficit  -LR       Row Name 12/14/24 1416          Living Environment    People in Home child(tulio), adult  -LR       Row Name 12/14/24 1416          Home Main Entrance    Number of Stairs, Main Entrance one;other (see comments)  into garage  -LR     Stair Railings, Main Entrance none;other (see comments)  pt states that he holds onto freezer when entering garage door  -LR       Row Name 12/14/24 1416          Stairs Within Home, Primary    Number of Stairs, Within Home, Primary none  -LR       Row Name 12/14/24 1416          Cognition    Orientation Status (Cognition) oriented x 3  -LR       Row Name 12/14/24 1416          Safety Issues/Impairments Affecting Functional Mobility    Safety Issues Affecting Function (Mobility) safety precaution awareness;safety precautions follow-through/compliance;awareness of need for assistance;insight into deficits/self-awareness  -LR     Impairments Affecting Function (Mobility) balance;endurance/activity tolerance;shortness of breath;strength;sensation/sensory  awareness;coordination  -               User Key  (r) = Recorded By, (t) = Taken By, (c) = Cosigned By      Initials Name Provider Type    LR Nevaeh Milian, OT Occupational Therapist                     Mobility/ADL's       Row Name 12/14/24 1417          Bed Mobility    Bed Mobility sit-supine  -LR     Sit-Supine [place] (Bed Mobility) minimum assist (75% patient effort);verbal cues  -LR     Assistive Device (Bed Mobility) bed rails;head of bed elevated  -LR     Comment, (Bed Mobility) VC for hand placement. VC for PLB throughout session  -LR       Row Name 12/14/24 1417          Transfers    Transfers sit-stand transfer;stand-sit transfer;bed-chair transfer  -LR       Row Name 12/14/24 1417          Bed-Chair Transfer    Bed-Chair [place] (Transfers) minimum assist (75% patient effort);verbal cues  -LR     Assistive Device (Bed-Chair Transfers) walker, front-wheeled  -LR       Row Name 12/14/24 1417          Sit-Stand Transfer    Sit-Stand [place] (Transfers) minimum assist (75% patient effort);verbal cues  -LR     Assistive Device (Sit-Stand Transfers) walker, front-wheeled  -LR       Row Name 12/14/24 1417          Stand-Sit Transfer    Stand-Sit [place] (Transfers) minimum assist (75% patient effort);verbal cues  -LR     Assistive Device (Stand-Sit Transfers) walker, front-wheeled  -LR       Row Name 12/14/24 1417          Functional Mobility    Functional Mobility- Ind. Level verbal cues required;other (see comments);moderate assist (50% patient effort);2 person assist required;minimum assist (75% patient effort);1 person + 1 person to manage equipment;1 person  Progressed to min 1+1 during ambulation  -LR     Functional Mobility- Device walker, front-wheeled  -LR     Functional Mobility-Distance (Feet) --  < distance  -LR     Patient was able to Ambulate yes  -       Row Name 12/14/24 1417          Activities of Daily Living    BADL Assessment/Intervention upper body dressing   -LR       Row Name 12/14/24 1417          Upper Body Dressing Assessment/Training    [place] Level (Upper Body Dressing) don;front opening garment;minimum assist (75% patient effort)  -LR     Position (Upper Body Dressing) edge of bed sitting  -LR               User Key  (r) = Recorded By, (t) = Taken By, (c) = Cosigned By      Initials Name Provider Type    LR Nevaeh Milian, OT Occupational Therapist                   Obj/Interventions       Row Name 12/14/24 1420          Sensory Assessment (Somatosensory)    Sensory Assessment (Somatosensory) UE sensation intact  -LR       Row Name 12/14/24 1420          Vision Assessment/Intervention    Visual Impairment/Limitations WFL  -LR     Vision Assessment Comment Pt states he has a PMH of cataract surgery. R eye has periods of blurry vision  -LR       Row Name 12/14/24 1420          Range of Motion Comprehensive    General Range of Motion bilateral upper extremity ROM WFL  -LR       Row Name 12/14/24 1420          Strength Comprehensive (MMT)    General Manual Muscle Testing (MMT) Assessment upper extremity strength deficits identified  -LR     Comment, General Manual Muscle Testing (MMT) Assessment BUE grossly 4-/5 MMT  -LR       Row Name 12/14/24 1420          Balance    Balance Assessment sitting static balance;sitting dynamic balance;sit to stand dynamic balance;standing static balance;standing dynamic balance  -LR     Static Sitting Balance standby assist  -LR     Dynamic Sitting Balance contact guard  -LR     Position, Sitting Balance unsupported;sitting edge of bed  -LR     Static Standing Balance minimal assist  -LR     Dynamic Standing Balance moderate assist;1-person assist;1 person to manage equipment  -LR     Position/Device Used, Standing Balance supported  -LR     Balance Interventions sitting;sit to stand;standing;supported;dynamic;occupation based/functional task;static  -LR               User Key  (r) = Recorded By, (t) = Taken By, (c) =  Cosigned By      Initials Name Provider Type    LR Nevaeh Milian OT Occupational Therapist                   Goals/Plan       Row Name 12/14/24 1425          Transfer Goal 1 (OT)    Activity/Assistive Device (Transfer Goal 1, OT) sit-to-stand/stand-to-sit;bed-to-chair/chair-to-bed;toilet  -LR     [place] Level/Cues Needed (Transfer Goal 1, OT) contact guard required  -LR     Time Frame (Transfer Goal 1, OT) long term goal (LTG);10 days  -LR     Progress/Outcome (Transfer Goal 1, OT) new goal;goal ongoing  -LR       Row Name 12/14/24 1425          Grooming Goal 1 (OT)    Activity/Device (Grooming Goal 1, OT) hair care;oral care;wash face, hands  -LR     [place] (Grooming Goal 1, OT) standby assist  -LR     Time Frame (Grooming Goal 1, OT) short term goal (STG);1 week  -LR     Strategies/Barriers (Grooming Goal 1, OT) Sink side  -LR     Progress/Outcome (Grooming Goal 1, OT) goal ongoing;new goal  -LR       Row Name 12/14/24 1423          Therapy Assessment/Plan (OT)    Planned Therapy Interventions (OT) activity tolerance training;ROM/therapeutic exercise;adaptive equipment training;BADL retraining;strengthening exercise;occupation/activity based interventions;transfer/mobility retraining;functional balance retraining;IADL retraining;passive ROM/stretching;patient/caregiver education/training  -LR               User Key  (r) = Recorded By, (t) = Taken By, (c) = Cosigned By      Initials Name Provider Type    LR Nevaeh Milian OT Occupational Therapist                   Clinical Impression       Row Name 12/14/24 1421          Pain Assessment    Pretreatment Pain Rating 0/10 - no pain  -LR     Posttreatment Pain Rating 0/10 - no pain  -LR       Row Name 12/14/24 1421          Plan of Care Review    Plan of Care Reviewed With patient  -LR     Progress no change  -LR     Outcome Evaluation OT eval complete. Pt presents below baseline with decreased activity tolerance, SOA, and global weakness.  Recommend IPOT POC and IRF at D/C.  -LR       Row Name 12/14/24 1421          Therapy Assessment/Plan (OT)    Patient/Family Therapy Goal Statement (OT) Return to PLOF  -LR     Rehab Potential (OT) good  -LR     Criteria for Skilled Therapeutic Interventions Met (OT) yes;skilled treatment is necessary  -LR     Therapy Frequency (OT) daily  -LR     Predicted Duration of Therapy Intervention (OT) 7 days  -LR       Row Name 12/14/24 1421          Therapy Plan Review/Discharge Plan (OT)    Anticipated Discharge Disposition (OT) inpatient rehabilitation facility  -LR       Row Name 12/14/24 1421          Vital Signs    Pre Systolic BP Rehab 122  -LR     Pre Treatment Diastolic BP 66  -LR     Post Systolic BP Rehab 123  -LR     Post Treatment Diastolic BP 73  -LR     Pretreatment Heart Rate (beats/min) 118  -LR     Intratreatment Heart Rate (beats/min) 144   -LR     Posttreatment Heart Rate (beats/min) 119  -LR     Pre SpO2 (%) 92  -LR     O2 Delivery Pre Treatment nasal cannula  -LR     Intra SpO2 (%) 89   -LR     O2 Delivery Intra Treatment nasal cannula  -LR     Post SpO2 (%) 92  -LR     O2 Delivery Post Treatment nasal cannula  -LR     Pre Patient Position Supine  -LR     Intra Patient Position Standing  -LR     Post Patient Position Sitting  -LR       Row Name 12/14/24 1421          Positioning and Restraints    Pre-Treatment Position in bed  -LR     Post Treatment Position chair  -LR     In Chair notified nsg;reclined;call light within reach;encouraged to call for assist;exit alarm on;waffle cushion;legs elevated  -LR               User Key  (r) = Recorded By, (t) = Taken By, (c) = Cosigned By      Initials Name Provider Type    LR Nevaeh Milian, OT Occupational Therapist                   Outcome Measures       Row Name 12/14/24 1426          How much help from another is currently needed...    Putting on and taking off regular lower body clothing? 2  -LR     Bathing (including washing, rinsing, and drying) 2   -LR     Toileting (which includes using toilet bed pan or urinal) 2  -LR     Putting on and taking off regular upper body clothing 3  -LR     Taking care of personal grooming (such as brushing teeth) 3  -LR     Eating meals 3  -LR     AM-PAC 6 Clicks Score (OT) 15  -LR       Row Name 12/14/24 1426          Functional Assessment    Outcome Measure Options AM-PAC 6 Clicks Daily Activity (OT)  -LR               User Key  (r) = Recorded By, (t) = Taken By, (c) = Cosigned By      Initials Name Provider Type    LR Nevaeh Milian OT Occupational Therapist                    Occupational Therapy Education       Title: PT OT SLP Therapies (In Progress)       Topic: Occupational Therapy (In Progress)       Point: ADL training (In Progress)       Description:   Instruct learner(s) on proper safety adaptation and remediation techniques during self care or transfers.   Instruct in proper use of assistive devices.                  Learning Progress Summary            Patient Acceptance, E, NR by LR at 12/14/2024 0917                      Point: Precautions (In Progress)       Description:   Instruct learner(s) on prescribed precautions during self-care and functional transfers.                  Learning Progress Summary            Patient Acceptance, E, NR by LR at 12/14/2024 0917                      Point: Body mechanics (In Progress)       Description:   Instruct learner(s) on proper positioning and spine alignment during self-care, functional mobility activities and/or exercises.                  Learning Progress Summary            Patient Acceptance, E, NR by LR at 12/14/2024 0917                                      User Key       Initials Effective Dates Name Provider Type [facility] 10/09/24 -  Nevaeh Milian OT Occupational Therapist OT                  OT Recommendation and Plan  Planned Therapy Interventions (OT): activity tolerance training, ROM/therapeutic exercise, adaptive equipment training, BADL  retraining, strengthening exercise, occupation/activity based interventions, transfer/mobility retraining, functional balance retraining, IADL retraining, passive ROM/stretching, patient/caregiver education/training  Therapy Frequency (OT): daily  Plan of Care Review  Plan of Care Reviewed With: patient  Progress: no change  Outcome Evaluation: OT eval complete. Pt presents below baseline with decreased activity tolerance, SOA, and global weakness. Recommend IPOT POC and IRF at D/C.     Time Calculation:   Evaluation Complexity (OT)  Review Occupational Profile/Medical/Therapy History Complexity: expanded/moderate complexity  Assessment, Occupational Performance/Identification of Deficit Complexity: 3-5 performance deficits  Clinical Decision Making Complexity (OT): detailed assessment/moderate complexity  Overall Complexity of Evaluation (OT): moderate complexity     Time Calculation- OT       Row Name 12/14/24 1427             Time Calculation- OT    OT Start Time 0917  -LR      OT Received On 12/14/24  -LR      OT Goal Re-Cert Due Date 12/24/24  -LR         Untimed Charges    OT Eval/Re-eval Minutes 48  -LR         Total Minutes    Untimed Charges Total Minutes 48  -LR       Total Minutes 48  -LR                User Key  (r) = Recorded By, (t) = Taken By, (c) = Cosigned By      Initials Name Provider Type    LR Nevaeh Milian OT Occupational Therapist                  Therapy Charges for Today       Code Description Service Date Service Provider Modifiers Qty    [id number] HC OT EVAL MOD COMPLEXITY 4 12/14/2024 Nevaeh Milian OT GO 1                 Nevaeh Milian OT  12/14/2024

## 2024-12-14 NOTE — PROGRESS NOTES
INTENSIVIST   PROGRESS NOTE        SUBJECTIVE     Gabriela 79 y.o. male is followed for: Shortness of Breath and Rapid Heart Rate       Multifocal pneumonia    Hyperlipidemia LDL goal <70    Essential hypertension    Chronic diastolic congestive heart failure    ZOHRA (acute kidney injury)    Coronary artery disease involving native coronary artery of native heart with angina pectoris    Type 2 diabetes mellitus with hyperglycemia, with long-term current use of insulin    Paroxysmal atrial fibrillation    As an Intensivist, we provide an integrated approach to the ICU patient and family, medical management of comorbid conditions, including but not limited to electrolytes, glycemic control, organ dysfunction, lead interdisciplinary rounds and coordinate the care with all other services, including those from other specialists.     Interval History:    Much better.    Coughing, but having difficulty getting the sputum out.    Thick secretions.    Temp  Min: 98.4 °F (36.9 °C)  Max: 98.7 °F (37.1 °C)       History     Last Reviewed by Mirtha Wolf APRN on 2024 at  7:06 AM    Sections Reviewed    Medical, Surgical, Family, Tobacco, Alcohol, Drug Use, Sexual Activity,   Social Documentation    Problem list reviewed by Avery Mckeon MD on 2024 at  9:48 PM  Medicines reviewed by Avery Mckeon MD on 2024 at  9:48 PM  Allergies reviewed by Avery Mckeon MD on 2024 at  9:48 PM       The patient's relevant past medical, surgical and social history were reviewed and updated in Epic as appropriate.        OBJECTIVE     Vitals:  Temp: 98.4 °F (36.9 °C) (24 0400) Temp  Min: 98.4 °F (36.9 °C)  Max: 98.7 °F (37.1 °C)   Temp core:      BP: 103/63 (24 0400) BP  Min: 99/56  Max: 112/62   MAP (non-invasive) Noninvasive MAP (mmHg): 75 (24 0400) Noninvasive MAP (mmHg)  Av  Min: 53  Max: 111   Pulse: 112 (24 0430) Pulse  Min: 101  Max: 123   Resp: 20 (24 0400) Resp  Min: 18   Max: 23   SpO2: 95 % (12/14/24 0430) SpO2  Min: 85 %  Max: 98 %   Device: humidified, nasal cannula (12/14/24 0600)    Flow Rate: 4 (12/14/24 0600) Flow (L/min) (Oxygen Therapy)  Min: 3  Max: 4         12/12/24  0600 12/12/24 0927   Weight: 102 kg (225 lb 8.5 oz) 102 kg (224 lb 13.9 oz)        Intake/Ouptut 24 hrs (7:00AM - 6:59 AM)  Intake & Output (last 2 days)         12/12 0701  12/13 0700 12/13 0701  12/14 0700 12/14 0701  12/15 0700    P.O.  1080     I.V. (mL/kg) 382.5 (3.8) 979 (9.6)     IV Piggyback  100     Total Intake(mL/kg) 382.5 (3.8) 2159 (21.2)     Urine (mL/kg/hr) 600 (0.2) 2000 (0.8)     Total Output 600 2000     Net -217.5 +159            Urine Unmeasured Occurrence 1 x              Medications (drips):  amiodarone, Last Rate: 0.5 mg/min (12/14/24 0922)  heparin, Last Rate: 18 Units/kg/hr (12/14/24 1449)  Pharmacy to Dose Heparin          NIV:   Settings: Observed:   Mode of Delivery: BiPAP, ST (12/14/24 0142)          IPAP (cm H2O): 14 (12/14/24 0142)    EPAP (cm H2O): 6 (12/14/24 0142)    Pressure Support (cm H20): 8 cm H2O (12/14/24 0142)         Set Rate (Breaths/Min): 16 breaths/min (12/14/24 0142) Respiratory Rate Total (Breaths/Min): 19 breaths/min (12/14/24 0142)         Tidal Volume (mL): 425 mL (12/14/24 0142)   Oxygen Concentration (%): 50 (12/14/24 0142)  Minute Ventilation (L/Min): 7.9 (12/14/24 0142)      Physical Examination  Telemetry:  Rhythm: atrial rhythm (12/14/24 0600)      Constitutional:  No acute distress.   Cardiovascular: IRR.    Respiratory: Normal breath sounds  (+) Rhonchi   Abdominal:  Soft with no tenderness.   Extremities: No Edema   Neurological:   Alert, Oriented, Cooperative.    Best Eye Response: 4-->(E4) spontaneous (12/14/24 0600)  Best Motor Response: 6-->(M6) obeys commands (12/14/24 0600)  Best Verbal Response: 5-->(V5) oriented (12/14/24 0600)  Wolf Coma Scale Score: 15 (12/14/24 0600)               Results  Reviewed:  Laboratory  Microbiology  Radiology  Pathology    Hematology:  Results from last 7 days   Lab Units 12/14/24  0713 12/13/24  0539 12/12/24 0217   WBC 10*3/mm3 15.90* 16.95* 10.93*   HEMOGLOBIN g/dL 11.8* 12.6* 13.0   MCV fL 99.2* 100.5* 100.7*   PLATELETS 10*3/mm3 131* 169 171     Results from last 7 days   Lab Units 12/14/24  0713 12/13/24  0539 12/12/24 0217   NEUTROS ABS 10*3/mm3 12.72* 14.20* 9.17*   LYMPHS ABS 10*3/mm3 2.11 1.69 1.48   EOS ABS 10*3/mm3 0.00 0.00 0.00     Chemistry:  Estimated Creatinine Clearance: 34.9 mL/min (A) (by C-G formula based on SCr of 2.02 mg/dL (H)).    Results from last 7 days   Lab Units 12/14/24 0713 12/13/24 0539   SODIUM mmol/L 131* 134*   POTASSIUM mmol/L 4.4 5.1   CHLORIDE mmol/L 95* 93*   CO2 mmol/L 25.0 22.0   BUN mg/dL 75* 82*   CREATININE mg/dL 2.02* 2.45*   GLUCOSE mg/dL 174* 269*     Results from last 7 days   Lab Units 12/14/24  0713 12/13/24  0539 12/12/24 0217 12/11/24  1355   CALCIUM mg/dL 8.5* 8.9   < > 9.6   MAGNESIUM mg/dL  --  2.3  --  1.9   PHOSPHORUS mg/dL 3.3 5.8*  --   --     < > = values in this interval not displayed.     Hepatic Panel:  Results from last 7 days   Lab Units 12/14/24  0713 12/13/24  0539 12/12/24 0217 12/11/24  1355   ALBUMIN g/dL 2.7* 3.1* 2.9* 3.5   TOTAL PROTEIN g/dL  --  6.7 7.1 7.3   BILIRUBIN mg/dL  --  0.3 0.4 0.8   AST (SGOT) U/L  --  42* 23 27   ALT (SGPT) U/L  --  24 22 23   ALK PHOS U/L  --  99 109 126*     Cardiac Labs:  Results from last 7 days   Lab Units 12/14/24  0713 12/13/24  0539 12/11/24  1510 12/11/24  1355   PROBNP pg/mL 7,920.0* 8,519.0*  --  4,921.0*   HSTROP T ng/L  --   --  70* 76*     Biomarkers:  Results from last 7 days   Lab Units 12/14/24  0713 12/13/24  0539 12/12/24  0217 12/11/24  1724 12/11/24  1355   LACTATE mmol/L 1.2  --   --  1.7 3.7*   PROCALCITONIN ng/mL 0.77* 0.78* 0.90*  --   --    D DIMER QUANT MCGFEU/mL  --   --   --   --  3.21*       Lab Results   Component Value Date    TSH  0.747 12/12/2024     COVID-19  Lab Results   Component Value Date    COVID19 Not Detected 12/11/2024    COVID19 Not Detected 02/12/2024    COVID19 Not Detected 05/26/2022    COVID19 Not Detected 03/25/2022       Arterial Blood Gases:  Results from last 7 days   Lab Units 12/12/24  0023 12/11/24  1423   PH, ARTERIAL pH units 7.246* 7.325*   PCO2, ARTERIAL mm Hg 58.7* 49.9*   PO2 ART mm Hg 131.0* 92.5   FIO2 % 100 36         Images:    US Renal Limited    Result Date: 12/14/2024  Impression: Symmetric senescent renal cortical thinning. No hydronephrosis. Incidental hepatic steatosis. Electronically Signed: Sherman Momin MD  12/14/2024 10:21 AM EST  Workstation ID: [id number]    XR Chest 1 View    Result Date: 12/14/2024  Impression: 1.Increasing patchy opacity within the right midlung concerning for infection. 2.Mildly decreased patchy opacity in the left lower lung may reflect infection or atelectasis. Electronically Signed: Devin Cody MD  12/14/2024 8:18 AM EST  Workstation ID: [id number]    XR Chest 1 View    Result Date: 12/13/2024  Impression: Similar radiographic appearance of patient's multifocal pneumonia. Electronically Signed: Josh Avila MD  12/13/2024 8:06 AM EST  Workstation ID: [id number]     CT Angiogram Chest    Result Date: 12/11/2024  Impression: 1.No evidence of pulmonary embolism. 2.There is irregular airspace disease within the left lower lobe and posterior right upper lobe. There are scattered tree-in-bud opacities throughout the lungs otherwise. Findings are consistent with multifocal pneumonia. Multifocal pneumonia Electronically Signed: Hilario Gale MD  12/11/2024 7:18 PM EST  Workstation ID: [id number]    XR Chest 1 View    Result Date: 12/11/2024  Impression: Moderately extensive left lower lobe pneumonia. Also, suspected more diffuse multifocal pneumonia elsewhere in both lungs. Electronically Signed: Bruce Monsalve MD  12/11/2024 2:43 PM EST  Workstation ID: [id number]     Echo:  Results for  orders placed during the hospital encounter of 12/11/24    Adult Transthoracic Echo Complete w/ Color, Spectral and Contrast if Necessary Per Protocol    Interpretation Summary    Left ventricular systolic function is normal. Estimated left ventricular EF = 65%    The right ventricular cavity is mildly dilated.    No hemodynamically significant valvular heart disease      Results: Reviewed.  I reviewed the patient's new laboratory and imaging results.  I independently reviewed the patient's new images.    Medications: Reviewed.    Assessment   A/P     Hospital:  LOS: 3 days   ICU: 2d 19h     Active Hospital Problems    Diagnosis  POA    **Multifocal pneumonia [J18.9]  Yes    Paroxysmal atrial fibrillation [I48.0]  No     A-fib with RVR in the setting of multifocal pneumonia, 12/12/2024  [id number] 5 (age >75, CAD, HTN, DM)      Type 2 diabetes mellitus with hyperglycemia, with long-term current use of insulin [E11.65, Z79.4]  Not Applicable    Coronary artery disease involving native coronary artery of native heart with angina pectoris [I25.119]  Yes     Pharmacologic nuclear stress (10/21/2020): apical ischemia.  Normal LVEF.  Coronary calcification on CT attenuation correction images.      ZOHRA (acute kidney injury) [N17.9]  Yes    Chronic diastolic congestive heart failure [I50.32]  Yes     Echocardiogram (01/04/2017): LVEF 50%. Trace MR and TR.   Echo (1/27/2021):LVEF = 70%.  Grade 2 diastolic dysfunction.  Cardiac valves anatomically and functionally normal.      Essential hypertension [I10]  Yes     Target blood pressure <130/80 mmHg      Hyperlipidemia LDL goal <70 [E78.5]  Yes     High intensity statin therapy indicated given the presence of CAD       Gabriela is a 79 y.o. male admitted on 12/11/2024 with Multifocal pneumonia [J18.9]    He was sent to the hospital from his PCP office for dyspnea, weakness and cough with sputum production. Symptoms started around 7-days ago. He lives with his son and  initially his son  was sick. When his son started to get better, patient started noticing similar symptoms. In the PCP office his SpO2 was in the 70s on ambient air.      Upon arrival in ED, labs showed lactate 3.7, WBC 16, glucose 500 and Cr of 1.99.     CTA chest showed multifocal pneumonia and viral panel was positive for Rhinovirus.     He developed afib with RVR in the ED for which cardizem was ordered and ICU was consulted. SpO2 97% on 6 lit nasal cannula.      Patient states he previously had pneumonia 7 years ago. Denies active smoking and alcohol use. Full code.    Comorbidities: DM, HTN, GERD and COPD (quit smoking 35 years ago),    Assessment/Management/Treatment Plan:    Respiratory failure  Rhinovirus infection  CT Chest 12/11/24: There is irregular airspace disease within the left lower lobe and posterior right upper lobe. There are scattered tree-in-bud opacities throughout the lungs otherwise . R/O Secondary bacterial infection.  Cardiovascular  P A Fib  HTN  Dyslipidemia   Renal  R/O ZOHRA/CKD  Endocrine   Body mass index is 32.27 kg/m². Obese Class I: 30-34.9kg/m2  Type 2 diabetes.    Lab Results   Lab Value Date/Time    HGBA1C 8.50 (H) 12/12/2024 0217    HGBA1C 8.50 (H) 09/12/2024 1307     Results from last 7 days   Lab Units 12/14/24  1633 12/14/24  1301 12/14/24  1139 12/14/24  0730 12/14/24  0627 12/13/24  1947 12/13/24  1705 12/13/24  1136   GLUCOSE mg/dL 152* 139* 119 169* 197* 286* 253* 276*       Diet: Diet: Regular/House, Cardiac, Diabetic; Healthy Heart (2-3 Na+); Consistent Carbohydrate; Fluid Consistency: Thin (IDDSI 0)   Advance Directives: Code Status and Medical Interventions: CPR (Attempt to Resuscitate); Full Support   Ordered at: 12/11/24 2051     Code Status (Patient has no pulse and is not breathing):    CPR (Attempt to Resuscitate)     Medical Interventions (Patient has pulse or is breathing):    Full Support        VTE Prophylaxis:  Pharmacologic & mechanical VTE prophylaxis  orders are present.         In brief:  A Fib } Cardiology  Continue Amiodarone  Continue Heparin continuous intravenous infusion   May need cardioversion  Continue antibiotics: Ceftriaxone and Doxy.  PT/OT  Monitor renal function. SCr trending down. Non-oliguric. Renal US: No hydronephrosis.    Lab Results   Component Value Date    CREATININE 2.02 (H) 12/14/2024    CREATININE 2.45 (H) 12/13/2024    CREATININE 1.90 (H) 12/12/2024    CREATININE 1.99 (H) 12/11/2024     Goal: Glucose < 180 mg/dL.   SQ insulin. Glucommander™ SubQ (eGMS)   Disposition: Keep in ICU.  CM: Per PT/OT: May need Inpatient Rehab    Plan of care and goals reviewed during interdisciplinary rounds.  I discussed the patient's findings and my recommendations with patient and nursing staff    MDM:    Problem(s) High due to: Acute or Chronic illness or injury that may poses a threat to life or bodily function  Data: Moderate due to: Review or results of each unique test and Ordering of each unique test    Moderate    [x] Primary Attending Intensive Care Medicine - Nutrition Support   [] Consultant    Copied text in this note has been reviewed and is accurate as of 12/14/24

## 2024-12-14 NOTE — THERAPY EVALUATION
Patient Name: Gabriela Mar  : 1945    MRN: [id number]                              Today's Date: 2024       Admit Date: 2024    Visit Dx:     ICD-10-CM ICD-9-CM   1. Multifocal pneumonia  J18.9 486   2. Hypoxia  R09.02 799.02   3. Sepsis with acute hypoxic respiratory failure without septic shock, due to unspecified organism  A41.9 038.9    R65.20 995.91    J96.01 518.81   4. Acute hyperglycemia  R73.9 790.29   5. 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 pectoris    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    Port-A-Cath in place    Peripheral neuropathy    Type 2 diabetes mellitus with hyperglycemia, with long-term current use of insulin    Multifocal pneumonia    Paroxysmal atrial fibrillation     Past Medical History:   Diagnosis Date    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 Hernández    JOINT REPLACEMENT      LEFT HIP 2016-MARCH     SUBMAXILLARY CYST  EXCISION      Right back    TONSILLECTOMY      TOTAL HIP ARTHROPLASTY Right 03/07/2017    Procedure: RIGHT TOTAL HIP ARTHROPLASTY;  Surgeon: Reynaldo Suarez MD;  Location: [facility];  Service:     VASECTOMY      WRIST GANGLION EXCISION        General Information       Row Name 12/14/24 1352          Physical Therapy Time and Intention    Document Type evaluation  -AE     Mode of Treatment physical therapy  -AE       Row Name 12/14/24 1352          General Information    Patient Profile Reviewed yes  -AE     Prior Level of Function independent:;all household mobility;community mobility;transfer;gait;bed mobility;ADL's;dressing;bathing  -AE     Existing Precautions/Restrictions fall;oxygen therapy device and L/min;other (see comments)  lopez cath  -AE     Barriers to Rehab medically complex;previous functional deficit  -AE       Row Name 12/14/24 1352          Living Environment    People in Home child(tulio), adult  -AE       Row Name 12/14/24 1352          Home Main Entrance    Number of Stairs, Main Entrance one  one step into garage  -AE       Row Name 12/14/24 1352          Stairs Within Home, Primary    Number of Stairs, Within Home, Primary none  -AE     Stair Railings, Within Home, Primary none  -AE       Row Name 12/14/24 1352          Cognition    Orientation Status (Cognition) oriented x 3  -AE       Row Name 12/14/24 1352          Safety Issues/Impairments Affecting Functional Mobility    Safety Issues Affecting Function (Mobility) awareness of need for assistance;insight into deficits/self-awareness;safety precaution awareness;safety precautions follow-through/compliance;sequencing abilities  -AE     Impairments Affecting Function (Mobility) balance;endurance/activity tolerance;shortness of breath;strength;sensation/sensory awareness;coordination  -AE               User  Key  (r) = Recorded By, (t) = Taken By, (c) = Cosigned By      Initials Name Provider Type    AE Yahir Cervantes, ARJUN Physical Therapist                   Mobility       Row Name 12/14/24 1354          Bed Mobility    Bed Mobility sit-supine  -AE     Sit-Supine [place] (Bed Mobility) minimum assist (75% patient effort);verbal cues  -AE     Assistive Device (Bed Mobility) bed rails;head of bed elevated  -AE     Comment, (Bed Mobility) VCs for hand placement and sequencing. Pt required increased cues to improve rolling and using bed rail to assist with getting to EOB.  -AE       Row Name 12/14/24 1356          Transfers    Comment, (Transfers) VCs for hand placement and sequencing. Pt required increased cues and time to improve upright posture.  -AE       Row Name 12/14/24 1359          Sit-Stand Transfer    Sit-Stand [place] (Transfers) minimum assist (75% patient effort);verbal cues  -AE     Assistive Device (Sit-Stand Transfers) walker, front-wheeled  -AE       Row Name 12/14/24 1355          Gait/Stairs (Locomotion)    [place] Level (Gait) moderate assist (50% patient effort);minimum assist (75% patient effort);verbal cues;1 person assist;1 person to manage equipment  initially mod A but progressed to min with improved ALONZO and bringing AD closer to COM.  -AE     Assistive Device (Gait) other (see comments)  BUE support on tele monitor  -AE     Distance in Feet (Gait) 25  -AE     Deviations/Abnormal Patterns (Gait) bilateral deviations;amber decreased;gait speed decreased;stride length decreased;base of support, wide  -AE     Bilateral Gait Deviations forward flexed posture;heel strike decreased  -AE     Comment, (Gait/Stairs) Pt demo step through gait pattern with slowed amber and decreased step length with decreased gait speed. Pt initially demo wide ALONZO with uncoordinated placement of BLE while stepping forward. Pt was able to improve with increased cues. Further distance limited by increased  HR.  -AE               User Key  (r) = Recorded By, (t) = Taken By, (c) = Cosigned By      Initials Name Provider Type    AE Yahir Cervantes PT Physical Therapist                   Obj/Interventions       Row Name 12/14/24 1406          Range of Motion Comprehensive    General Range of Motion bilateral lower extremity ROM WFL  -AE       Row Name 12/14/24 1406          Strength Comprehensive (MMT)    General Manual Muscle Testing (MMT) Assessment lower extremity strength deficits identified  -AE     Comment, General Manual Muscle Testing (MMT) Assessment BLE grossly 4-/5  -AE       Row Name 12/14/24 1406          Balance    Balance Assessment sitting static balance;standing static balance;sitting dynamic balance;standing dynamic balance  -AE     Static Sitting Balance standby assist  -AE     Dynamic Sitting Balance contact guard  -AE     Position, Sitting Balance unsupported;sitting edge of bed  -AE     Static Standing Balance minimal assist  -AE     Dynamic Standing Balance moderate assist;1-person assist;1 person to manage equipment;verbal cues  -AE     Position/Device Used, Standing Balance supported  -AE       Row Name 12/14/24 1406          Sensory Assessment (Somatosensory)    Sensory Assessment (Somatosensory) other (see comments)  BLE neuropathy distal to knees  -AE               User Key  (r) = Recorded By, (t) = Taken By, (c) = Cosigned By      Initials Name Provider Type    AE Yahir Cervantse PT Physical Therapist                   Goals/Plan       Row Name 12/14/24 1411          Bed Mobility Goal 1 (PT)    Activity/Assistive Device (Bed Mobility Goal 1, PT) sit to supine/supine to sit  -AE     [place] Level/Cues Needed (Bed Mobility Goal 1, PT) standby assist  -AE     Time Frame (Bed Mobility Goal 1, PT) short term goal (STG);5 days  -AE     Progress/Outcomes (Bed Mobility Goal 1, PT) new goal  -AE       Row Name 12/14/24 1411          Transfer Goal 1 (PT)    Activity/Assistive Device (Transfer  Goal 1, PT) sit-to-stand/stand-to-sit;bed-to-chair/chair-to-bed  -AE     [place] Level/Cues Needed (Transfer Goal 1, PT) standby assist  -AE     Time Frame (Transfer Goal 1, PT) long term goal (LTG);10 days  -AE     Progress/Outcome (Transfer Goal 1, PT) new goal  -AE       Row Name 12/14/24 1411          Gait Training Goal 1 (PT)    Activity/Assistive Device (Gait Training Goal 1, PT) gait (walking locomotion);assistive device use  -AE     [place] Level (Gait Training Goal 1, PT) contact guard required  -AE     Distance (Gait Training Goal 1, PT) 150ft  -AE     Time Frame (Gait Training Goal 1, PT) long term goal (LTG);10 days  -AE     Progress/Outcome (Gait Training Goal 1, PT) new goal  -AE       Row Name 12/14/24 1411          Therapy Assessment/Plan (PT)    Planned Therapy Interventions (PT) balance training;bed mobility training;gait training;home exercise program;patient/family education;postural re-education;ROM (range of motion);strengthening;transfer training  -AE               User Key  (r) = Recorded By, (t) = Taken By, (c) = Cosigned By      Initials Name Provider Type    AE Yahir Cervantes, PT Physical Therapist                   Clinical Impression       Row Name 12/14/24 1408          Pain    Pretreatment Pain Rating 0/10 - no pain  -AE     Posttreatment Pain Rating 0/10 - no pain  -AE       Row Name 12/14/24 1405          Plan of Care Review    Plan of Care Reviewed With patient  -AE     Progress no change  -AE     Outcome Evaluation Pt presents with decreased functional independence with decreased activity tolerance. Pt ambulated 25ft with initial mod A but able to improve to min A x1 +1 for line management. Recommend continued skilled IP PT interventions. Recommend D/C to IRF.  -AE       Row Name 12/14/24 7307          Therapy Assessment/Plan (PT)    Patient/Family Therapy Goals Statement (PT) Get better  -AE     Rehab Potential (PT) good  -AE     Criteria for Skilled Interventions Met  (PT) yes  -AE     Therapy Frequency (PT) daily  -AE     Predicted Duration of Therapy Intervention (PT) 2 weeks  -AE       Row Name 12/14/24 1408          Vital Signs    Pre Systolic BP Rehab 122  -AE     Pre Treatment Diastolic BP 66  -AE     Post Systolic BP Rehab 123  -AE     Post Treatment Diastolic BP 73  -AE     Pretreatment Heart Rate (beats/min) 120  -AE     Intratreatment Heart Rate (beats/min) 144  -AE     Posttreatment Heart Rate (beats/min) 117  -AE     Pre SpO2 (%) 93  -AE     O2 Delivery Pre Treatment nasal cannula  -AE     Intra SpO2 (%) 89  -AE     O2 Delivery Intra Treatment nasal cannula  -AE     Post SpO2 (%) 91  -AE     O2 Delivery Post Treatment nasal cannula  -AE     Pre Patient Position Supine  -AE     Intra Patient Position Standing  -AE     Post Patient Position Sitting  -AE       Row Name 12/14/24 1408          Positioning and Restraints    Pre-Treatment Position in bed  -AE     Post Treatment Position chair  -AE     In Chair notified nsg;reclined;call light within reach;encouraged to call for assist;exit alarm on;waffle cushion;legs elevated  -AE               User Key  (r) = Recorded By, (t) = Taken By, (c) = Cosigned By      Initials Name Provider Type    AE Yahir Cervantes, PT Physical Therapist                   Outcome Measures       Row Name 12/14/24 0160          How much help from another person do you currently need...    Turning from your back to your side while in flat bed without using bedrails? 3  -AE     Moving from lying on back to sitting on the side of a flat bed without bedrails? 3  -AE     Moving to and from a bed to a chair (including a wheelchair)? 3  -AE     Standing up from a chair using your arms (e.g., wheelchair, bedside chair)? 2  -AE     Climbing 3-5 steps with a railing? 2  -AE     To walk in hospital room? 2  -AE     AM-PAC 6 Clicks Score (PT) 15  -AE     Highest Level of Mobility Goal 4 --> Transfer to chair/commode  -AE       Row Name 12/14/24 0240 12/14/24  1426       Functional Assessment    Outcome Measure Options AM-PAC 6 Clicks Basic Mobility (PT)  -AE AM-PAC 6 Clicks Daily Activity (OT)  -LR              User Key  (r) = Recorded By, (t) = Taken By, (c) = Cosigned By      Initials Name Provider Type    AE Yahir Cervantes, PT Physical Therapist    LR Nevaeh Milian, OT Occupational Therapist                                 Physical Therapy Education       Title: PT OT SLP Therapies (In Progress)       Topic: Physical Therapy (In Progress)       Point: Mobility training (In Progress)       Learning Progress Summary            Patient Acceptance, E, NR by AE at 12/14/2024 0920                      Point: Home exercise program (Not Started)       Learner Progress:  Not documented in this visit.              Point: Body mechanics (In Progress)       Learning Progress Summary            Patient Acceptance, E, NR by AE at 12/14/2024 0920                      Point: Precautions (In Progress)       Learning Progress Summary            Patient Acceptance, E, NR by AE at 12/14/2024 0920                                      User Key       Initials Effective Dates Name Provider Type Discipline    AE 09/21/21 -  Yahir Cervantes, PT Physical Therapist PT                  PT Recommendation and Plan  Planned Therapy Interventions (PT): balance training, bed mobility training, gait training, home exercise program, patient/family education, postural re-education, ROM (range of motion), strengthening, transfer training  Progress: no change  Outcome Evaluation: Pt presents with decreased functional independence with decreased activity tolerance. Pt ambulated 25ft with initial mod A but able to improve to min A x1 +1 for line management. Recommend continued skilled IP PT interventions. Recommend D/C to IRF.     Time Calculation:   PT Evaluation Complexity  History, PT Evaluation Complexity: 3 or more personal factors and/or comorbidities  Examination of Body Systems (PT Eval  Complexity): total of 3 or more elements  Clinical Presentation (PT Evaluation Complexity): evolving  Clinical Decision Making (PT Evaluation Complexity): moderate complexity  Overall Complexity (PT Evaluation Complexity): moderate complexity     PT Charges       Row Name 12/14/24 1541             Time Calculation    Start Time 0920  -AE      PT Received On 12/14/24  -AE      PT Goal Re-Cert Due Date 12/24/24  -AE         Untimed Charges    PT Eval/Re-eval Minutes 49  -AE         Total Minutes    Untimed Charges Total Minutes 49  -AE       Total Minutes 49  -AE                User Key  (r) = Recorded By, (t) = Taken By, (c) = Cosigned By      Initials Name Provider Type    AE Yahir Cervantes, PT Physical Therapist                  Therapy Charges for Today       Code Description Service Date Service Provider Modifiers Qty    [id number] HC PT EVAL MOD COMPLEXITY 4 12/14/2024 Yahir Cervantes, PT GP 1            PT G-Codes  Outcome Measure Options: AM-PAC 6 Clicks Basic Mobility (PT)  AM-PAC 6 Clicks Score (PT): 15  AM-PAC 6 Clicks Score (OT): 15  PT Discharge Summary  Anticipated Discharge Disposition (PT): inpatient rehabilitation facility    Yahir Cervantes PT  12/14/2024

## 2024-12-14 NOTE — PROGRESS NOTES
Pharmacy to Dose Heparin Infusion Note    Gabriela Mar is a 79 y.o. male receiving heparin infusion.     Therapy for (VTE/Cardiac): Atrial Fibrillation (Cardiac Protocol)  Patient Weight: 104 kg  Initial Bolus (Y/N): Yes  Any Bolus (Y/N): Yes     Signs or Symptoms of Bleeding: No current S/Sx bleeding per RN & chart review     Cardiac (Not VTE)   Initial Bolus: 60 units/kg (Max 4,000 units)  Initial rate: 12 units/kg/hr (Max 1,000 units/hr)   Anti-Xa Bolus   Dose Infusion Hold   Time Infusion Rate Change (units/kg/hr) Repeat  Anti-Xa    < 0.11 50 units/kg  (4000 units Max) None Increase by  3 units/kg/hr 6 hours   0.11- 0.19 25 units/kg  (2000 units Max) None Increase by  2 units/kg/hr 6 hours   0.2 - 0.29 0 None Increase by  1 units/kg/hr 6 hours   0.3 - 0.5 0 None No Change 6 hours (after 2 consecutive levels in range check qAM)   0.51 - 0.6 0 None Decrease by  1 units/kg/hr 6 hours   0.61 - 0.8 0 30 minutes Decrease by  2 units/kg/hr 6 hours   0.81 - 1 0 60 minutes Decrease by  3 units/kg/hr 6 hours   >1 0 Hold  After Anti-Xa less than 0.5 decrease previous rate by  4 units/kg/hr  Every 2 hours until Anti-Xa  less than 0.5 then when infusion restarts in 6 hours     Results from last 7 days   Lab Units 12/14/24  0713 12/13/24  0539 12/12/24  0217 12/11/24 2016   INR   --   --   --  1.38*   HEMOGLOBIN g/dL 11.8* 12.6* 13.0  --    HEMATOCRIT % 36.2* 39.1 40.7  --    PLATELETS 10*3/mm3 131* 169 171  --        Date   Time   Anti-Xa Current Rate (units/kg/hr) Bolus   (units) Rate Change   (units/kg/hr) New Rate (units/kg/hr) Repeat  Anti-Xa Comments /  Pump Check    12/11 20.16 0.10 ---new--- +4000 +10 10 03:00 Set up IV pump w/ RN Gurinder. TBW, bolus, & rate confirmed. Pt counseled on UFH.   12/12 0340 0.10 10 4000 +3 13 1000 D/w RN   12/12 0952 0.17 13 2000 +2 15 1600 [address], pump check   12/12 1638 0.29 15 -- +1 16 0000 DW RN   12/13 0035 0.40 16 -- -- 16 0600 Shar 2470 -acb   12/13 0539 0.36 16 -- -- 16 AM  labs [address]     12/14   0713   >1.10   16   --   --   16   STAT recheck Lab collect. Likely drawn from near heparin infusion. STAT recheck. D/w RN.   12/14 1336 0.14 16 2000 +2 18 2100 D/w FABRIZIO Rosado, [facility]  12/14/2024  09:14 EST

## 2024-12-14 NOTE — PLAN OF CARE
Goal Outcome Evaluation:                                              Pt on 5L nasal cannula. A-fib, amio & heparin gtts continued. Frequent cough. Up to chair for several hours. Poor PO intake with meals.

## 2024-12-14 NOTE — PLAN OF CARE
Goal Outcome Evaluation:  Plan of Care Reviewed With: patient        Progress: no change  Outcome Evaluation: OT eval complete. Pt presents below baseline with decreased activity tolerance, SOA, and global weakness. Recommend IPOT POC and IRF at D/C.    Anticipated Discharge Disposition (OT): inpatient rehabilitation facility

## 2024-12-14 NOTE — PLAN OF CARE
Goal Outcome Evaluation:  Plan of Care Reviewed With: patient        Progress: no change  Outcome Evaluation: Pt presents with decreased functional independence with decreased activity tolerance. Pt ambulated 25ft with initial mod A but able to improve to min A x1 +1 for line management. Recommend continued skilled IP PT interventions. Recommend D/C to IRF.    Anticipated Discharge Disposition (PT): inpatient rehabilitation facility

## 2024-12-14 NOTE — PROGRESS NOTES
"[facility] Cardiology Daily Note       LOS: 3 days   Patient Care Team:  Krystina Gee MD as PCP - General (Internal Medicine)  Evangelist Hernández MD as Consulting Physician (Ophthalmology)  Ai Henderson MD as Referring Physician (Hematology)  Hay Singer IV, MD as Cardiologist (Interventional Cardiology)  Mil Arriaga MD as Consulting Physician (Radiation Oncology)    Chief Complaint: A-fib with RVR    Subjective     Subjective: Breathing is better.  Remains in A-fib with rapid rates.  Current rate is 112.  95% on 4 L nasal cannula.  Blood pressures have been good overnight.    Review of Systems:   As above.    Medications:  aspirin, 81 mg, Oral, Daily  cefTRIAXone, 2,000 mg, Intravenous, Q24H  doxycycline, 100 mg, Oral, Q12H  insulin glargine, 1-200 Units, Subcutaneous, Nightly - Glucommander  insulin lispro, 1-200 Units, Subcutaneous, 4x Daily With Meals & Nightly  mupirocin, 1 Application, Each Nare, BID  pantoprazole, 40 mg, Oral, Q AM  senna-docusate sodium, 2 tablet, Oral, BID  sodium chloride, 30 mL/kg (Adjusted), Intravenous, Once  sodium chloride, 10 mL, Intravenous, Q12H        Objective     Vital Sign Min/Max for last 24 hours  Temp  Min: 97.9 °F (36.6 °C)  Max: 98.7 °F (37.1 °C)   BP  Min: 71/51  Max: 116/52   Pulse  Min: 93  Max: 123   Resp  Min: 18  Max: 23   SpO2  Min: 85 %  Max: 99 %   Flow (L/min) (Oxygen Therapy)  Min: 3  Max: 4   No data recorded      Intake/Output Summary (Last 24 hours) at 12/14/2024 1019  Last data filed at 12/14/2024 0700  Gross per 24 hour   Intake 1054 ml   Output 2000 ml   Net -946 ml        Flowsheet Rows      Flowsheet Row First Filed Value   Admission Height 177.8 cm (70\") Documented at 12/11/2024 1338   Admission Weight 104 kg (230 lb) Documented at 12/11/2024 1338            Physical Exam:    General: Alert and oriented.   Cardiovascular: Heart has a nondisplaced focal PMI. Irregular rate and rhythm without murmur, " "gallop or rub.  Lungs: Coarse breath sounds bilaterally.  Equal expansion is noted.   Abdomen: Soft, nontender.  Extremities: Show no edema.   Skin: warm and dry.     Results Review:    I reviewed the patient's new clinical results.  EKG:  Tele: A-fib with RVR    Labs:    Results from last 7 days   Lab Units 12/14/24  0713 12/13/24  0539 12/12/24  0217 12/11/24  1355   SODIUM mmol/L 131* 134* 131* 128*   POTASSIUM mmol/L 4.4 5.1 4.7 5.2   CHLORIDE mmol/L 95* 93* 92* 88*   CO2 mmol/L 25.0 22.0 22.0 25.0   BUN mg/dL 75* 82* 55* 53*   CREATININE mg/dL 2.02* 2.45* 1.90* 1.99*   CALCIUM mg/dL 8.5* 8.9 9.2 9.6   BILIRUBIN mg/dL  --  0.3 0.4 0.8   ALK PHOS U/L  --  99 109 126*   ALT (SGPT) U/L  --  24 22 23   AST (SGOT) U/L  --  42* 23 27   GLUCOSE mg/dL 174* 269* 431* 509*     Results from last 7 days   Lab Units 12/14/24  0713 12/13/24  0539 12/12/24 0217   WBC 10*3/mm3 15.90* 16.95* 10.93*   HEMOGLOBIN g/dL 11.8* 12.6* 13.0   HEMATOCRIT % 36.2* 39.1 40.7   PLATELETS 10*3/mm3 131* 169 171     Lab Results   Component Value Date    TROPONINT 70 (C) 12/11/2024    TROPONINT 76 (C) 12/11/2024    TROPONINT 0.052 (C) 05/26/2022     Lab Results   Component Value Date    CHOL 134 09/12/2024    CHOL 117 02/12/2024    CHOL 157 05/24/2023     Lab Results   Component Value Date    TRIG 168 (H) 09/12/2024    TRIG 97 02/12/2024    TRIG 239 (H) 05/24/2023     Lab Results   Component Value Date    HDL 39 (L) 09/12/2024    HDL 38 (L) 02/12/2024    HDL 43 05/24/2023     No components found for: \"LDLCALC\"  Lab Results   Component Value Date    INR 1.38 (H) 12/11/2024    INR 1.07 10/07/2022    INR 1.02 01/31/2022    PROTIME 17.1 (H) 12/11/2024    PROTIME 13.8 10/07/2022    PROTIME 13.1 01/31/2022         Ejection Fraction: EF 65%.    Assessment   Assessment:    PAF with RVR  Sinus rhythm documented on 12/11/2024.  Multifocal pneumonia/  Chronic diastolic congestive heart failure   Coronary artery disease  Hypertension  Hyperlipidemia  Renal " insufficiency    Plan:    Renal function slightly better today.  Continue amiodarone for PAF with RVR  Continue heparin over the weekend.  Consider transition to Eliquis 5 mg twice daily on Monday.  Continue aspirin for known coronary artery disease  Antibiotics per intensivist  GDMT as blood pressure tolerates.  Considering external cardioversion in the future if he does not convert on his own.      Eden Adames MD  12/14/24  10:19 EST

## 2024-12-15 ENCOUNTER — APPOINTMENT (OUTPATIENT)
Dept: GENERAL RADIOLOGY | Facility: HOSPITAL | Age: 79
End: 2024-12-15
Payer: MEDICARE

## 2024-12-15 LAB
ANION GAP SERPL CALCULATED.3IONS-SCNC: 13 MMOL/L (ref 5–15)
BASOPHILS # BLD AUTO: 0.04 10*3/MM3 (ref 0–0.2)
BASOPHILS NFR BLD AUTO: 0.2 % (ref 0–1.5)
BUN SERPL-MCNC: 52 MG/DL (ref 8–23)
BUN/CREAT SERPL: 32.1 (ref 7–25)
CALCIUM SPEC-SCNC: 9 MG/DL (ref 8.6–10.5)
CHLORIDE SERPL-SCNC: 95 MMOL/L (ref 98–107)
CO2 SERPL-SCNC: 24 MMOL/L (ref 22–29)
CREAT SERPL-MCNC: 1.62 MG/DL (ref 0.76–1.27)
DEPRECATED RDW RBC AUTO: 49.7 FL (ref 37–54)
EGFRCR SERPLBLD CKD-EPI 2021: 42.6 ML/MIN/1.73
EOSINOPHIL # BLD AUTO: 0.01 10*3/MM3 (ref 0–0.4)
EOSINOPHIL NFR BLD AUTO: 0.1 % (ref 0.3–6.2)
ERYTHROCYTE [DISTWIDTH] IN BLOOD BY AUTOMATED COUNT: 13.7 % (ref 12.3–15.4)
GLUCOSE BLDC GLUCOMTR-MCNC: 178 MG/DL (ref 70–130)
GLUCOSE BLDC GLUCOMTR-MCNC: 199 MG/DL (ref 70–130)
GLUCOSE BLDC GLUCOMTR-MCNC: 240 MG/DL (ref 70–130)
GLUCOSE BLDC GLUCOMTR-MCNC: 258 MG/DL (ref 70–130)
GLUCOSE BLDC GLUCOMTR-MCNC: 284 MG/DL (ref 70–130)
GLUCOSE SERPL-MCNC: 219 MG/DL (ref 65–99)
HCT VFR BLD AUTO: 36.4 % (ref 37.5–51)
HGB BLD-MCNC: 11.9 G/DL (ref 13–17.7)
IMM GRANULOCYTES # BLD AUTO: 0.15 10*3/MM3 (ref 0–0.05)
IMM GRANULOCYTES NFR BLD AUTO: 0.9 % (ref 0–0.5)
LYMPHOCYTES # BLD AUTO: 1.71 10*3/MM3 (ref 0.7–3.1)
LYMPHOCYTES NFR BLD AUTO: 10.1 % (ref 19.6–45.3)
MCH RBC QN AUTO: 32.2 PG (ref 26.6–33)
MCHC RBC AUTO-ENTMCNC: 32.7 G/DL (ref 31.5–35.7)
MCV RBC AUTO: 98.6 FL (ref 79–97)
MONOCYTES # BLD AUTO: 0.72 10*3/MM3 (ref 0.1–0.9)
MONOCYTES NFR BLD AUTO: 4.3 % (ref 5–12)
NEUTROPHILS NFR BLD AUTO: 14.22 10*3/MM3 (ref 1.7–7)
NEUTROPHILS NFR BLD AUTO: 84.4 % (ref 42.7–76)
NRBC BLD AUTO-RTO: 0 /100 WBC (ref 0–0.2)
NT-PROBNP SERPL-MCNC: 8911 PG/ML (ref 0–1800)
PLATELET # BLD AUTO: 114 10*3/MM3 (ref 140–450)
PMV BLD AUTO: 11.8 FL (ref 6–12)
POTASSIUM SERPL-SCNC: 4.2 MMOL/L (ref 3.5–5.2)
QT INTERVAL: 346 MS
QTC INTERVAL: 468 MS
RBC # BLD AUTO: 3.69 10*6/MM3 (ref 4.14–5.8)
SODIUM SERPL-SCNC: 132 MMOL/L (ref 136–145)
UFH PPP CHRO-ACNC: 0.16 IU/ML (ref 0.3–0.7)
UFH PPP CHRO-ACNC: 0.22 IU/ML (ref 0.3–0.7)
UFH PPP CHRO-ACNC: 0.28 IU/ML (ref 0.3–0.7)
WBC NRBC COR # BLD AUTO: 16.85 10*3/MM3 (ref 3.4–10.8)

## 2024-12-15 PROCEDURE — 25010000002 CEFTRIAXONE PER 250 MG: Performed by: STUDENT IN AN ORGANIZED HEALTH CARE EDUCATION/TRAINING PROGRAM

## 2024-12-15 PROCEDURE — 71045 X-RAY EXAM CHEST 1 VIEW: CPT

## 2024-12-15 PROCEDURE — 99232 SBSQ HOSP IP/OBS MODERATE 35: CPT | Performed by: NURSE PRACTITIONER

## 2024-12-15 PROCEDURE — 85520 HEPARIN ASSAY: CPT

## 2024-12-15 PROCEDURE — 25010000002 HEPARIN (PORCINE) PER 1000 UNITS

## 2024-12-15 PROCEDURE — 80048 BASIC METABOLIC PNL TOTAL CA: CPT | Performed by: INTERNAL MEDICINE

## 2024-12-15 PROCEDURE — 25010000002 HEPARIN (PORCINE) 25000-0.45 UT/250ML-% SOLUTION

## 2024-12-15 PROCEDURE — 83880 ASSAY OF NATRIURETIC PEPTIDE: CPT | Performed by: INTERNAL MEDICINE

## 2024-12-15 PROCEDURE — 85025 COMPLETE CBC W/AUTO DIFF WBC: CPT | Performed by: INTERNAL MEDICINE

## 2024-12-15 PROCEDURE — 82948 REAGENT STRIP/BLOOD GLUCOSE: CPT

## 2024-12-15 PROCEDURE — 63710000001 INSULIN LISPRO (HUMAN) PER 5 UNITS: Performed by: INTERNAL MEDICINE

## 2024-12-15 PROCEDURE — 63710000001 INSULIN GLARGINE PER 5 UNITS: Performed by: INTERNAL MEDICINE

## 2024-12-15 PROCEDURE — 99232 SBSQ HOSP IP/OBS MODERATE 35: CPT | Performed by: INTERNAL MEDICINE

## 2024-12-15 RX ORDER — AMIODARONE HYDROCHLORIDE 200 MG/1
200 TABLET ORAL
Status: DISCONTINUED | OUTPATIENT
Start: 2024-12-22 | End: 2024-12-19 | Stop reason: HOSPADM

## 2024-12-15 RX ORDER — AMIODARONE HYDROCHLORIDE 200 MG/1
400 TABLET ORAL EVERY 12 HOURS SCHEDULED
Status: DISCONTINUED | OUTPATIENT
Start: 2024-12-15 | End: 2024-12-19 | Stop reason: HOSPADM

## 2024-12-15 RX ORDER — METOPROLOL SUCCINATE 25 MG/1
25 TABLET, EXTENDED RELEASE ORAL
Status: DISCONTINUED | OUTPATIENT
Start: 2024-12-15 | End: 2024-12-15

## 2024-12-15 RX ORDER — ROSUVASTATIN CALCIUM 10 MG/1
10 TABLET, COATED ORAL NIGHTLY
Status: DISCONTINUED | OUTPATIENT
Start: 2024-12-15 | End: 2024-12-19 | Stop reason: HOSPADM

## 2024-12-15 RX ORDER — METOPROLOL TARTRATE 25 MG/1
25 TABLET, FILM COATED ORAL EVERY 12 HOURS SCHEDULED
Status: DISCONTINUED | OUTPATIENT
Start: 2024-12-15 | End: 2024-12-19 | Stop reason: HOSPADM

## 2024-12-15 RX ORDER — HEPARIN SODIUM 1000 [USP'U]/ML
2000 INJECTION, SOLUTION INTRAVENOUS; SUBCUTANEOUS ONCE
Status: COMPLETED | OUTPATIENT
Start: 2024-12-15 | End: 2024-12-15

## 2024-12-15 RX ORDER — AMIODARONE HYDROCHLORIDE 200 MG/1
400 TABLET ORAL EVERY 12 HOURS SCHEDULED
Status: DISCONTINUED | OUTPATIENT
Start: 2024-12-15 | End: 2024-12-15

## 2024-12-15 RX ADMIN — INSULIN LISPRO 10 UNITS: 100 INJECTION, SOLUTION INTRAVENOUS; SUBCUTANEOUS at 12:27

## 2024-12-15 RX ADMIN — ASPIRIN 81 MG: 81 TABLET, COATED ORAL at 08:56

## 2024-12-15 RX ADMIN — INSULIN LISPRO 5 UNITS: 100 INJECTION, SOLUTION INTRAVENOUS; SUBCUTANEOUS at 21:03

## 2024-12-15 RX ADMIN — INSULIN GLARGINE 49 UNITS: 100 INJECTION, SOLUTION SUBCUTANEOUS at 21:04

## 2024-12-15 RX ADMIN — INSULIN LISPRO 2 UNITS: 100 INJECTION, SOLUTION INTRAVENOUS; SUBCUTANEOUS at 04:29

## 2024-12-15 RX ADMIN — HEPARIN SODIUM 18 UNITS/KG/HR: 10000 INJECTION, SOLUTION INTRAVENOUS at 04:20

## 2024-12-15 RX ADMIN — MUPIROCIN 1 APPLICATION: 20 OINTMENT TOPICAL at 08:57

## 2024-12-15 RX ADMIN — PANTOPRAZOLE SODIUM 40 MG: 40 TABLET, DELAYED RELEASE ORAL at 05:52

## 2024-12-15 RX ADMIN — METOPROLOL TARTRATE 25 MG: 25 TABLET, FILM COATED ORAL at 08:56

## 2024-12-15 RX ADMIN — METOPROLOL TARTRATE 25 MG: 25 TABLET, FILM COATED ORAL at 21:02

## 2024-12-15 RX ADMIN — HEPARIN SODIUM 2000 UNITS: 1000 INJECTION INTRAVENOUS; SUBCUTANEOUS at 05:51

## 2024-12-15 RX ADMIN — AMIODARONE HYDROCHLORIDE 400 MG: 200 TABLET ORAL at 08:56

## 2024-12-15 RX ADMIN — DOXYCYCLINE 100 MG: 100 CAPSULE ORAL at 21:02

## 2024-12-15 RX ADMIN — DOXYCYCLINE 100 MG: 100 CAPSULE ORAL at 08:57

## 2024-12-15 RX ADMIN — HEPARIN SODIUM 21 UNITS/KG/HR: 10000 INJECTION, SOLUTION INTRAVENOUS at 18:16

## 2024-12-15 RX ADMIN — SODIUM CHLORIDE 2000 MG: 900 INJECTION INTRAVENOUS at 21:01

## 2024-12-15 RX ADMIN — INSULIN LISPRO 1 UNITS: 100 INJECTION, SOLUTION INTRAVENOUS; SUBCUTANEOUS at 08:56

## 2024-12-15 RX ADMIN — SENNOSIDES AND DOCUSATE SODIUM 2 TABLET: 50; 8.6 TABLET ORAL at 21:02

## 2024-12-15 RX ADMIN — AMIODARONE HYDROCHLORIDE 400 MG: 200 TABLET ORAL at 21:01

## 2024-12-15 RX ADMIN — Medication 10 ML: at 21:01

## 2024-12-15 RX ADMIN — MUPIROCIN 1 APPLICATION: 20 OINTMENT TOPICAL at 21:02

## 2024-12-15 RX ADMIN — ROSUVASTATIN 10 MG: 10 TABLET, FILM COATED ORAL at 21:02

## 2024-12-15 NOTE — PROGRESS NOTES
Cardiology Progress Note      Reason for visit:    Atrial fibrillation with RVR    IDENTIFICATION: 79-year-old gentleman who resides in [place], [place]    Active Hospital Problems    Diagnosis  POA    **Multifocal pneumonia [J18.9]  Yes     Priority: High    Paroxysmal atrial fibrillation [I48.0]  No     Priority: High     A-fib with RVR in the setting of multifocal pneumonia, 12/12/2024  [id number] 5 (age >75, CAD, HTN, DM)      Type 2 diabetes mellitus with hyperglycemia, with long-term current use of insulin [E11.65, Z79.4]  Not Applicable     Priority: High    ZOHRA (acute kidney injury) [N17.9]  Yes     Priority: High    Coronary artery disease involving native coronary artery of native heart with angina pectoris [I25.119]  Yes     Priority: Medium     Pharmacologic nuclear stress (10/21/2020): apical ischemia.  Normal LVEF.  Coronary calcification on CT attenuation correction images.      Chronic diastolic congestive heart failure [I50.32]  Yes     Priority: Medium     Echocardiogram (01/04/2017): LVEF 50%. Trace MR and TR.   Echo (1/27/2021):LVEF = 70%.  Grade 2 diastolic dysfunction.  Cardiac valves anatomically and functionally normal.      Essential hypertension [I10]  Yes     Priority: Medium     Target blood pressure <130/80 mmHg      Hyperlipidemia LDL goal <70 [E78.5]  Yes     Priority: Low     High intensity statin therapy indicated given the presence of CAD              Patient is sitting up in the chair.  He is awake and alert.  He is on O2 at 6 L by nasal cannula.  O2 saturations 91 to 93%.  He remains in atrial fibrillation with rates in the low 100s.  He remains on IV amiodarone at 0.5 mg/minute.  IV heparin drip is infusing           Vital Sign Min/Max for last 24 hours  Temp  Min: 97.4 °F (36.3 °C)  Max: 98.9 °F (37.2 °C)   BP  Min: 95/67  Max: 146/70   Pulse  Min: 101  Max: 124   Resp  Min: 18  Max: 20   SpO2  Min: 90 %  Max: 96 %   Flow (L/min) (Oxygen Therapy)  Min: 5  Max: 6       Intake/Output Summary (Last 24 hours) at 12/15/2024 0713  Last data filed at 12/15/2024 0600  Gross per 24 hour   Intake 1588.04 ml   Output 1625 ml   Net -36.96 ml           Physical Exam  Constitutional:       General: He is awake.   Cardiovascular:      Rate and Rhythm: Tachycardia present. Rhythm irregularly irregular.      Heart sounds: No murmur heard.     Comments: Trace lower extremity edema  Pulmonary:      Effort: Pulmonary effort is normal.      Breath sounds: Decreased air movement present. Wheezing and rhonchi present.   Skin:     General: Skin is warm and dry.   Neurological:      Mental Status: He is alert.   Psychiatric:         Behavior: Behavior is cooperative.         Tele: Atrial fibrillation     Results Review (reviewed the patient's recent labs in the electronic medical record):      EKG (12/13/2024): Atrial fibrillation with RVR, right bundle branch block, 110 bpm     CXR (12/13/2024): Results pending     CT angiogram of the chest (12/11/2024): No evidence of PE.  Multifocal pneumonia     ECHO (12/12/2024): LVEF 65%.  No valvular heart disease    Results from last 7 days   Lab Units 12/15/24  0427 12/14/24  0713 12/13/24  0539 12/12/24  0217 12/11/24  1355   SODIUM mmol/L 132* 131* 134* 131* 128*   POTASSIUM mmol/L 4.2 4.4 5.1 4.7 5.2   CHLORIDE mmol/L 95* 95* 93* 92* 88*   BUN mg/dL 52* 75* 82* 55* 53*   CREATININE mg/dL 1.62* 2.02* 2.45* 1.90* 1.99*   MAGNESIUM mg/dL  --   --  2.3  --  1.9     Results from last 7 days   Lab Units 12/11/24  1510 12/11/24  1355   HSTROP T ng/L 70* 76*     Results from last 7 days   Lab Units 12/15/24  0238 12/14/24  0713 12/13/24  0539   WBC 10*3/mm3 16.85* 15.90* 16.95*   HEMOGLOBIN g/dL 11.9* 11.8* 12.6*   HEMATOCRIT % 36.4* 36.2* 39.1   PLATELETS 10*3/mm3 114* 131* 169       Lab Results   Component Value Date    HGBA1C 8.50 (H) 12/12/2024       Lab Results   Component Value Date    CHOL 134 09/12/2024    CHLPL 125 06/06/2016    TRIG 168 (H) 09/12/2024     HDL 39 (L) 09/12/2024    LDL 66 09/12/2024              Atrial fibrillation with RVR/paroxysmal atrial fibrillaton  New onset this admission.  Occurred in the setting of multifocal pneumonia.  Remains in atrial fibrillation  IV heparin drip infusing  IV amiodarone infusing 0.5 mg/minute     Acute on chronic diastolic heart failure  Echo this admission normal LVEF and no valvular abnormality  proBNP elevated on admission 4921 now has worsened to currently 8911        Coronary artery disease/elevated troponins/NSTEMI  Has not seen cardiology since 2020.  Had stress test at that time with apical ischemia and coronary calcification on CT imaging  No cardiac catheterization performed due to lack of symptoms and medical management with antianginals recommended at that time  Troponins are elevated but flat.  Type I versus type II NSTEMI but likely type II from pneumonia/rhinovirus/sepsis  Echo this admission normal LVEF and no valvular abnormality  Will need ischemic eval by cardiac catheterization after recovers from pneumonia and rhinovirus but this will be planned as outpatient     Hypertension/hypotension  Current /70     Hyperlipidemia  Last lipid panel 9/2024 total cholesterol 134, triglycerides 168, HDL 39, LDL 66     Type 2 diabetes mellitus  Not well-controlled hemoglobin A1c 8.5        Multifocal pneumonia/positive for rhinovirus/sepsis  Management per primary team        Acute kidney injury  Creatinine worsened to 2.45 but now improving and 1.62             Discontinue IV amiodarone and start amiodarone 400 mg twice daily x 7 days followed by 200 mg daily thereafter  Start metoprolol tartrate 25 mg twice daily  Continue IV heparin for today and will transition to Eliquis tomorrow  Will focus on rate control and loading with amiodarone at present time.  Would prefer patient recover from his pneumonia and schedule outpatient ECV.  Will need eventual cardiac catheterization but will let renal function  recover  Start Crestor 10 mg daily.    Electronically signed by CALIN Santos, 12/15/24, 7:13 AM EST.

## 2024-12-15 NOTE — PLAN OF CARE
Goal Outcome Evaluation:   Vss, no acute events overnight. Patient remains in a fib but still on amio drip at 0.5 and heparin now at 20. Patient stayed in the chair throughout night, refused a bath and also refused bipap. On 4-6L NC. A&Ox4, adequate UOP. Patient updated throughout care.

## 2024-12-15 NOTE — PROGRESS NOTES
Pharmacy to Dose Heparin Infusion Note    Gabriela Mar is a 79 y.o. male receiving heparin infusion.     Therapy for (VTE/Cardiac): Atrial Fibrillation (Cardiac Protocol)  Patient Weight: 104 kg  Initial Bolus (Y/N): Yes  Any Bolus (Y/N): Yes     Signs or Symptoms of Bleeding: No current S/Sx bleeding per RN & chart review     Cardiac (Not VTE)   Initial Bolus: 60 units/kg (Max 4,000 units)  Initial rate: 12 units/kg/hr (Max 1,000 units/hr)   Anti-Xa Bolus   Dose Infusion Hold   Time Infusion Rate Change (units/kg/hr) Repeat  Anti-Xa    < 0.11 50 units/kg  (4000 units Max) None Increase by  3 units/kg/hr 6 hours   0.11- 0.19 25 units/kg  (2000 units Max) None Increase by  2 units/kg/hr 6 hours   0.2 - 0.29 0 None Increase by  1 units/kg/hr 6 hours   0.3 - 0.5 0 None No Change 6 hours (after 2 consecutive levels in range check qAM)   0.51 - 0.6 0 None Decrease by  1 units/kg/hr 6 hours   0.61 - 0.8 0 30 minutes Decrease by  2 units/kg/hr 6 hours   0.81 - 1 0 60 minutes Decrease by  3 units/kg/hr 6 hours   >1 0 Hold  After Anti-Xa less than 0.5 decrease previous rate by  4 units/kg/hr  Every 2 hours until Anti-Xa  less than 0.5 then when infusion restarts in 6 hours     Results from last 7 days   Lab Units 12/15/24  0238 12/14/24  0713 12/13/24  0539 12/12/24  0217 12/11/24 2016   INR   --   --   --   --  1.38*   HEMOGLOBIN g/dL 11.9* 11.8* 12.6*   < >  --    HEMATOCRIT % 36.4* 36.2* 39.1   < >  --    PLATELETS 10*3/mm3 114* 131* 169   < >  --     < > = values in this interval not displayed.       Date   Time   Anti-Xa Current Rate (units/kg/hr) Bolus   (units) Rate Change   (units/kg/hr) New Rate (units/kg/hr) Repeat  Anti-Xa Comments /  Pump Check    12/11 20.16 0.10 ---new--- +4000 +10 10 03:00 Set up IV pump w/ RN Gurinder. TBW, bolus, & rate confirmed. Pt counseled on UFH.   12/12 0340 0.10 10 4000 +3 13 1000 D/w RN   12/12 0952 0.17 13 2000 +2 15 1600 Sw Adrián, pump check   12/12 1638 0.29 15 -- +1 16 0000  ALFA DINH   12/13 0035 0.40 16 -- -- 16 0600 [facility]   12/13 0539 0.36 16 -- -- 16 AM labs Shar [facility]     12/14   0713   >1.10   16   --   --   16   STAT recheck Lab collect. Likely drawn from near heparin infusion. STAT recheck. D/w RN.   12/14 1336 0.14 16 2000 +2 18 2100 D/w FABRIZIO Rosado   12/14 2110 0.35 18 -- -- 18 0500 Shar 99 [facility]   12/15 0238 0.16 18 2000 +2 20 1100 [place] [id number] -[facility]   12/15 1329 0.28 20 -- +1 21 2000 D/w FABRIZIO Naik, [facility]  12/15/2024  14:01 EST

## 2024-12-15 NOTE — PLAN OF CARE
Goal Outcome Evaluation:   Amio transitioned to PO. AFIB w/ rates 70s-120s.  Heparin gtt @ 21 u/kg/hr  Minimal appetite. Encouraging intake.  4-6LNC. Encouraging IS use.   Family updated at bedside.   Up to chair.   700 mL UOP.  Ordered to telemetry.

## 2024-12-15 NOTE — PROGRESS NOTES
INTENSIVIST   PROGRESS NOTE        SUBJECTIVE     Gabriela 79 y.o. male is followed for: Shortness of Breath and Rapid Heart Rate       Multifocal pneumonia    Hyperlipidemia LDL goal <70    Essential hypertension    Chronic diastolic congestive heart failure    ZOHRA (acute kidney injury)    Coronary artery disease involving native coronary artery of native heart with angina pectoris    Type 2 diabetes mellitus with hyperglycemia, with long-term current use of insulin    Paroxysmal atrial fibrillation    As an Intensivist, we provide an integrated approach to the ICU patient and family, medical management of comorbid conditions, including but not limited to electrolytes, glycemic control, organ dysfunction, lead interdisciplinary rounds and coordinate the care with all other services, including those from other specialists.     Interval History:    Doing well.    Slept well but not as much as he would like.    Temp  Min: 97.6 °F (36.4 °C)  Max: 98.7 °F (37.1 °C)       History     Last Reviewed by Mirtha Wolf APRN on 2024 at  7:06 AM    Sections Reviewed    Medical, Surgical, Family, Tobacco, Alcohol, Drug Use, Sexual Activity,   Social Documentation    Problem list reviewed by Avery Mckeon MD on 2024 at  9:48 PM  Medicines reviewed by Avery Mkceon MD on 2024 at  9:48 PM  Allergies reviewed by Avery Mckeon MD on 2024 at  9:48 PM       The patient's relevant past medical, surgical and social history were reviewed and updated in Epic as appropriate.        OBJECTIVE     Vitals:  Temp: 98.7 °F (37.1 °C) (12/15/24 1200) Temp  Min: 97.6 °F (36.4 °C)  Max: 98.7 °F (37.1 °C)   Temp core:      BP: 123/60 (12/15/24 1500) BP  Min: 91/68  Max: 146/70   MAP (non-invasive) Noninvasive MAP (mmHg): 80 (12/15/24 1500) Noninvasive MAP (mmHg)  Av.7  Min: 53  Max: 113   Pulse: 99 (12/15/24 1500) Pulse  Min: 93  Max: 126   Resp: 20 (12/15/24 1400) Resp  Min: 18  Max: 20   SpO2: 91 % (12/15/24  1500) SpO2  Min: 88 %  Max: 96 %   Device: nasal cannula, humidified (12/15/24 1450)    Flow Rate: 4 (12/15/24 1450) Flow (L/min) (Oxygen Therapy)  Min: 3  Max: 6         12/12/24  0600 12/12/24  0927   Weight: 102 kg (225 lb 8.5 oz) 102 kg (224 lb 13.9 oz)        Intake/Ouptut 24 hrs (7:00AM - 6:59 AM)  Intake & Output (last 2 days)         12/13 0701 12/14 0700 12/14 0701  12/15 0700 12/15 0701 12/16 0700    P.O. 1080      I.V. (mL/kg) 979 (9.6) 1588 (15.6)     IV Piggyback 100      Total Intake(mL/kg) 2159 (21.2) 1588 (15.6)     Urine (mL/kg/hr) 2000 (0.8) 1625 (0.7) 500 (0.6)    Total Output 2000 1625 500    Net +159 -37 -500                   Medications (drips):  heparin, Last Rate: 21 Units/kg/hr (12/15/24 1432)  Pharmacy to Dose Heparin          NIV:   Settings: Observed:   Mode of Delivery: standby, other (see comments) (pt refused tonight) (12/14/24 1040)          IPAP (cm H2O): 14 (12/14/24 0142)    EPAP (cm H2O): 6 (12/14/24 0142)    Pressure Support (cm H20): 8 cm H2O (12/14/24 0142)         Set Rate (Breaths/Min): 16 breaths/min (12/14/24 0142) Respiratory Rate Total (Breaths/Min): 19 breaths/min (12/14/24 0142)         Tidal Volume (mL): 425 mL (12/14/24 0142)   Oxygen Concentration (%): 50 (12/14/24 0142)  Minute Ventilation (L/Min): 7.9 (12/14/24 0142)      Physical Examination  Telemetry:  Rhythm: atrial rhythm (12/15/24 1400)      Constitutional:  No acute distress.   Cardiovascular: IRR.    Respiratory: Normal breath sounds  No adventitious sounds   Abdominal:  Soft with no tenderness.   Extremities: No Edema   Neurological:   Alert, Oriented, Cooperative.    Best Eye Response: 4-->(E4) spontaneous (12/15/24 1400)  Best Motor Response: 6-->(M6) obeys commands (12/15/24 1400)  Best Verbal Response: 5-->(V5) oriented (12/15/24 1400)  Wolf Coma Scale Score: 15 (12/15/24 1400)               Results Reviewed:  Laboratory  Microbiology  Radiology  Pathology    Hematology:  Results from last 7 days    Lab Units 12/15/24  0238 12/14/24  0713 12/13/24  0539   WBC 10*3/mm3 16.85* 15.90* 16.95*   HEMOGLOBIN g/dL 11.9* 11.8* 12.6*   MCV fL 98.6* 99.2* 100.5*   PLATELETS 10*3/mm3 114* 131* 169     Results from last 7 days   Lab Units 12/15/24  0238 12/14/24  0713 12/13/24  0539   NEUTROS ABS 10*3/mm3 14.22* 12.72* 14.20*   LYMPHS ABS 10*3/mm3 1.71 2.11 1.69   EOS ABS 10*3/mm3 0.01 0.00 0.00     Chemistry:  Estimated Creatinine Clearance: 43.5 mL/min (A) (by C-G formula based on SCr of 1.62 mg/dL (H)).    Results from last 7 days   Lab Units 12/15/24  0427 12/14/24  0713   SODIUM mmol/L 132* 131*   POTASSIUM mmol/L 4.2 4.4   CHLORIDE mmol/L 95* 95*   CO2 mmol/L 24.0 25.0   BUN mg/dL 52* 75*   CREATININE mg/dL 1.62* 2.02*   GLUCOSE mg/dL 219* 174*     Results from last 7 days   Lab Units 12/15/24  0427 12/14/24  0713 12/13/24  0539 12/12/24  0217 12/11/24  1355   CALCIUM mg/dL 9.0 8.5* 8.9   < > 9.6   MAGNESIUM mg/dL  --   --  2.3  --  1.9   PHOSPHORUS mg/dL  --  3.3 5.8*  --   --     < > = values in this interval not displayed.     Hepatic Panel:  Results from last 7 days   Lab Units 12/14/24 0713 12/13/24 0539 12/12/24 0217 12/11/24  1355   ALBUMIN g/dL 2.7* 3.1* 2.9* 3.5   TOTAL PROTEIN g/dL  --  6.7 7.1 7.3   BILIRUBIN mg/dL  --  0.3 0.4 0.8   AST (SGOT) U/L  --  42* 23 27   ALT (SGPT) U/L  --  24 22 23   ALK PHOS U/L  --  99 109 126*     Cardiac Labs:  Results from last 7 days   Lab Units 12/15/24  0427 12/14/24  0713 12/13/24  0539 12/11/24  1510 12/11/24  1355   PROBNP pg/mL 8,911.0* 7,920.0* 8,519.0*  --  4,921.0*   HSTROP T ng/L  --   --   --  70* 76*     Biomarkers:  Results from last 7 days   Lab Units 12/14/24  0713 12/13/24  0539 12/12/24  0217 12/11/24  1724 12/11/24  1355   LACTATE mmol/L 1.2  --   --  1.7 3.7*   PROCALCITONIN ng/mL 0.77* 0.78* 0.90*  --   --    D DIMER QUANT MCGFEU/mL  --   --   --   --  3.21*       Lab Results   Component Value Date    TSH 0.747 12/12/2024     COVID-19  Lab Results    Component Value Date    COVID19 Not Detected 12/11/2024    COVID19 Not Detected 02/12/2024    COVID19 Not Detected 05/26/2022    COVID19 Not Detected 03/25/2022       Arterial Blood Gases:  Results from last 7 days   Lab Units 12/12/24  0023 12/11/24  1423   PH, ARTERIAL pH units 7.246* 7.325*   PCO2, ARTERIAL mm Hg 58.7* 49.9*   PO2 ART mm Hg 131.0* 92.5   FIO2 % 100 36         Images:    XR Chest 1 View    Result Date: 12/15/2024  Impression: No significant change in multifocal pneumonia. Electronically Signed: Bunny Moeller MD  12/15/2024 8:50 AM EST  Workstation ID: [id number]    US Renal Limited    Result Date: 12/14/2024  Impression: Symmetric senescent renal cortical thinning. No hydronephrosis. Incidental hepatic steatosis. Electronically Signed: Sherman Momin MD  12/14/2024 10:21 AM EST  Workstation ID: [id number]    XR Chest 1 View    Result Date: 12/14/2024  Impression: 1.Increasing patchy opacity within the right midlung concerning for infection. 2.Mildly decreased patchy opacity in the left lower lung may reflect infection or atelectasis. Electronically Signed: Devin Cody MD  12/14/2024 8:18 AM EST  Workstation ID: [id number]     CT Angiogram Chest    Result Date: 12/11/2024  Impression: 1.No evidence of pulmonary embolism. 2.There is irregular airspace disease within the left lower lobe and posterior right upper lobe. There are scattered tree-in-bud opacities throughout the lungs otherwise. Findings are consistent with multifocal pneumonia. Multifocal pneumonia Electronically Signed: Hilario Gale MD  12/11/2024 7:18 PM EST  Workstation ID: [id number]    XR Chest 1 View    Result Date: 12/11/2024  Impression: Moderately extensive left lower lobe pneumonia. Also, suspected more diffuse multifocal pneumonia elsewhere in both lungs. Electronically Signed: Bruce Monsalve MD  12/11/2024 2:43 PM EST  Workstation ID: [id number]     Echo:  Results for orders placed during the hospital encounter of  12/11/24    Adult Transthoracic Echo Complete w/ Color, Spectral and Contrast if Necessary Per Protocol    Interpretation Summary    Left ventricular systolic function is normal. Estimated left ventricular EF = 65%    The right ventricular cavity is mildly dilated.    No hemodynamically significant valvular heart disease      Results: Reviewed.  I reviewed the patient's new laboratory and imaging results.  I independently reviewed the patient's new images.    Medications: Reviewed.    Assessment   A/P     Hospital:  LOS: 4 days   ICU: 3d 17h     Active Hospital Problems    Diagnosis  POA    **Multifocal pneumonia [J18.9]  Yes    Paroxysmal atrial fibrillation [I48.0]  No     A-fib with RVR in the setting of multifocal pneumonia, 12/12/2024  [id number] 5 (age >75, CAD, HTN, DM)      Type 2 diabetes mellitus with hyperglycemia, with long-term current use of insulin [E11.65, Z79.4]  Not Applicable    Coronary artery disease involving native coronary artery of native heart with angina pectoris [I25.119]  Yes     Pharmacologic nuclear stress (10/21/2020): apical ischemia.  Normal LVEF.  Coronary calcification on CT attenuation correction images.      ZOHRA (acute kidney injury) [N17.9]  Yes    Chronic diastolic congestive heart failure [I50.32]  Yes     Echocardiogram (01/04/2017): LVEF 50%. Trace MR and TR.   Echo (1/27/2021):LVEF = 70%.  Grade 2 diastolic dysfunction.  Cardiac valves anatomically and functionally normal.      Essential hypertension [I10]  Yes     Target blood pressure <130/80 mmHg      Hyperlipidemia LDL goal <70 [E78.5]  Yes     High intensity statin therapy indicated given the presence of CAD       Gabriela is a 79 y.o. male admitted on 12/11/2024 with Multifocal pneumonia [J18.9]    He was sent to the hospital from his PCP office for dyspnea, weakness and cough with sputum production. Symptoms started around 7-days ago. He lives with his son and initially his son  was sick. When his son started to  get better, patient started noticing similar symptoms. In the PCP office his SpO2 was in the 70s on ambient air.      Upon arrival in ED, labs showed lactate 3.7, WBC 16, glucose 500 and Cr of 1.99.     CTA chest showed multifocal pneumonia and viral panel was positive for Rhinovirus.     He developed afib with RVR in the ED for which cardizem was ordered and ICU was consulted. SpO2 97% on 6 lit nasal cannula.      Patient states he previously had pneumonia 7 years ago. Denies active smoking and alcohol use. Full code.    Comorbidities: DM, HTN, GERD and COPD (quit smoking 35 years ago),    Assessment/Management/Treatment Plan:    Respiratory failure  Rhinovirus infection  CT Chest 12/11/24: There is irregular airspace disease within the left lower lobe and posterior right upper lobe. There are scattered tree-in-bud opacities throughout the lungs otherwise . R/O Secondary bacterial infection.  Cardiovascular  P A Fib  HTN  Dyslipidemia   Renal  R/O ZOHRA/CKD  Endocrine   Body mass index is 32.27 kg/m². Obese Class I: 30-34.9kg/m2  Type 2 diabetes.    Lab Results   Lab Value Date/Time    HGBA1C 8.50 (H) 12/12/2024 0217    HGBA1C 8.50 (H) 09/12/2024 1307     Results from last 7 days   Lab Units 12/15/24  1155 12/15/24  0806 12/15/24  0424 12/14/24  1944/24  1633 12/14/24  1301 12/14/24  1139 12/14/24  0730   GLUCOSE mg/dL 258* 178* 199* 286* 152* 139* 119 169*       Diet: Diet: Regular/House, Cardiac, Diabetic; Healthy Heart (2-3 Na+); Consistent Carbohydrate; Fluid Consistency: Thin (IDDSI 0)   Advance Directives: Code Status and Medical Interventions: CPR (Attempt to Resuscitate); Full Support   Ordered at: 12/11/24 2051     Code Status (Patient has no pulse and is not breathing):    CPR (Attempt to Resuscitate)     Medical Interventions (Patient has pulse or is breathing):    Full Support        VTE Prophylaxis:  Pharmacologic & mechanical VTE prophylaxis orders are present.         In brief:  A Fib }  Cardiology  Continue Amiodarone. Change IV to PO  Start ß-blocker.  Start statin  Continue Heparin continuous intravenous infusion   May need cardioversion.  Continue antibiotics: Ceftriaxone and Doxy for suspected pneumonia.  Aerobika.  PT/OT  Monitor renal function. SCr trending down. Non-oliguric. Renal US: No hydronephrosis.    Lab Results   Component Value Date    CREATININE 1.62 (H) 12/15/2024    CREATININE 2.02 (H) 12/14/2024    CREATININE 2.45 (H) 12/13/2024    CREATININE 1.90 (H) 12/12/2024     Goal: Glucose < 180 mg/dL.   SQ insulin. Glucommander™ SubQ (eGMS)   Disposition: Transfer to Telemetry Unit  CM: Per PT/OT: May need Inpatient Rehab    Plan of care and goals reviewed during interdisciplinary rounds.  I discussed the patient's findings and my recommendations with patient and nursing staff    MDM:    Problem(s) High due to: Acute or Chronic illness or injury that may poses a threat to life or bodily function  Data: Moderate due to: Review or results of each unique test and Ordering of each unique test    Moderate    [x] Primary Attending Intensive Care Medicine - Nutrition Support   [] Consultant    Copied text in this note has been reviewed and is accurate as of 12/15/24

## 2024-12-15 NOTE — PLAN OF CARE
Goal Outcome Evaluation:              Outcome Evaluation: transfered to the floor from . VSS on 5-6L. A fib on monitor. controlled HR. heparin gtt at 21 units/kg/hr. skin interventions in place. no complains of pain.

## 2024-12-16 LAB
ANION GAP SERPL CALCULATED.3IONS-SCNC: 10 MMOL/L (ref 5–15)
BACTERIA SPEC AEROBE CULT: NORMAL
BACTERIA SPEC AEROBE CULT: NORMAL
BASOPHILS # BLD AUTO: 0.05 10*3/MM3 (ref 0–0.2)
BASOPHILS NFR BLD AUTO: 0.3 % (ref 0–1.5)
BUN SERPL-MCNC: 44 MG/DL (ref 8–23)
BUN/CREAT SERPL: 27.3 (ref 7–25)
CALCIUM SPEC-SCNC: 9.3 MG/DL (ref 8.6–10.5)
CHLORIDE SERPL-SCNC: 95 MMOL/L (ref 98–107)
CO2 SERPL-SCNC: 29 MMOL/L (ref 22–29)
CREAT SERPL-MCNC: 1.61 MG/DL (ref 0.76–1.27)
CRP SERPL-MCNC: 28.52 MG/DL (ref 0–0.5)
DEPRECATED RDW RBC AUTO: 47.7 FL (ref 37–54)
EGFRCR SERPLBLD CKD-EPI 2021: 43 ML/MIN/1.73
EOSINOPHIL # BLD AUTO: 0.04 10*3/MM3 (ref 0–0.4)
EOSINOPHIL NFR BLD AUTO: 0.2 % (ref 0.3–6.2)
ERYTHROCYTE [DISTWIDTH] IN BLOOD BY AUTOMATED COUNT: 13.3 % (ref 12.3–15.4)
GLUCOSE BLDC GLUCOMTR-MCNC: 138 MG/DL (ref 70–130)
GLUCOSE BLDC GLUCOMTR-MCNC: 157 MG/DL (ref 70–130)
GLUCOSE BLDC GLUCOMTR-MCNC: 158 MG/DL (ref 70–130)
GLUCOSE BLDC GLUCOMTR-MCNC: 170 MG/DL (ref 70–130)
GLUCOSE SERPL-MCNC: 156 MG/DL (ref 65–99)
HCT VFR BLD AUTO: 36.9 % (ref 37.5–51)
HGB BLD-MCNC: 12.1 G/DL (ref 13–17.7)
IMM GRANULOCYTES # BLD AUTO: 0.23 10*3/MM3 (ref 0–0.05)
IMM GRANULOCYTES NFR BLD AUTO: 1.3 % (ref 0–0.5)
LYMPHOCYTES # BLD AUTO: 2.13 10*3/MM3 (ref 0.7–3.1)
LYMPHOCYTES NFR BLD AUTO: 12.1 % (ref 19.6–45.3)
MAGNESIUM SERPL-MCNC: 1.8 MG/DL (ref 1.6–2.4)
MCH RBC QN AUTO: 32.1 PG (ref 26.6–33)
MCHC RBC AUTO-ENTMCNC: 32.8 G/DL (ref 31.5–35.7)
MCV RBC AUTO: 97.9 FL (ref 79–97)
MONOCYTES # BLD AUTO: 0.91 10*3/MM3 (ref 0.1–0.9)
MONOCYTES NFR BLD AUTO: 5.2 % (ref 5–12)
MRSA DNA SPEC QL NAA+PROBE: NEGATIVE
NEUTROPHILS NFR BLD AUTO: 14.3 10*3/MM3 (ref 1.7–7)
NEUTROPHILS NFR BLD AUTO: 80.9 % (ref 42.7–76)
NRBC BLD AUTO-RTO: 0 /100 WBC (ref 0–0.2)
NT-PROBNP SERPL-MCNC: 9458 PG/ML (ref 0–1800)
PHOSPHATE SERPL-MCNC: 3.2 MG/DL (ref 2.5–4.5)
PLATELET # BLD AUTO: 123 10*3/MM3 (ref 140–450)
PMV BLD AUTO: 12.2 FL (ref 6–12)
POTASSIUM SERPL-SCNC: 4.5 MMOL/L (ref 3.5–5.2)
RBC # BLD AUTO: 3.77 10*6/MM3 (ref 4.14–5.8)
SODIUM SERPL-SCNC: 134 MMOL/L (ref 136–145)
UFH PPP CHRO-ACNC: 0.29 IU/ML (ref 0.3–0.7)
UFH PPP CHRO-ACNC: 0.31 IU/ML (ref 0.3–0.7)
WBC NRBC COR # BLD AUTO: 17.66 10*3/MM3 (ref 3.4–10.8)

## 2024-12-16 PROCEDURE — 82948 REAGENT STRIP/BLOOD GLUCOSE: CPT

## 2024-12-16 PROCEDURE — 83735 ASSAY OF MAGNESIUM: CPT | Performed by: INTERNAL MEDICINE

## 2024-12-16 PROCEDURE — 63710000001 INSULIN LISPRO (HUMAN) PER 5 UNITS: Performed by: INTERNAL MEDICINE

## 2024-12-16 PROCEDURE — 84100 ASSAY OF PHOSPHORUS: CPT | Performed by: INTERNAL MEDICINE

## 2024-12-16 PROCEDURE — 25010000002 METHYLPREDNISOLONE PER 40 MG: Performed by: INTERNAL MEDICINE

## 2024-12-16 PROCEDURE — 97116 GAIT TRAINING THERAPY: CPT

## 2024-12-16 PROCEDURE — 83880 ASSAY OF NATRIURETIC PEPTIDE: CPT | Performed by: INTERNAL MEDICINE

## 2024-12-16 PROCEDURE — 86140 C-REACTIVE PROTEIN: CPT | Performed by: INTERNAL MEDICINE

## 2024-12-16 PROCEDURE — 85520 HEPARIN ASSAY: CPT

## 2024-12-16 PROCEDURE — 85025 COMPLETE CBC W/AUTO DIFF WBC: CPT | Performed by: INTERNAL MEDICINE

## 2024-12-16 PROCEDURE — 94761 N-INVAS EAR/PLS OXIMETRY MLT: CPT

## 2024-12-16 PROCEDURE — 25010000002 CEFTRIAXONE PER 250 MG: Performed by: INTERNAL MEDICINE

## 2024-12-16 PROCEDURE — 94799 UNLISTED PULMONARY SVC/PX: CPT

## 2024-12-16 PROCEDURE — 94664 DEMO&/EVAL PT USE INHALER: CPT

## 2024-12-16 PROCEDURE — 99232 SBSQ HOSP IP/OBS MODERATE 35: CPT | Performed by: INTERNAL MEDICINE

## 2024-12-16 PROCEDURE — 25010000002 HEPARIN (PORCINE) 25000-0.45 UT/250ML-% SOLUTION

## 2024-12-16 PROCEDURE — 63710000001 INSULIN GLARGINE PER 5 UNITS: Performed by: INTERNAL MEDICINE

## 2024-12-16 PROCEDURE — 97530 THERAPEUTIC ACTIVITIES: CPT

## 2024-12-16 PROCEDURE — 87641 MR-STAPH DNA AMP PROBE: CPT | Performed by: INTERNAL MEDICINE

## 2024-12-16 PROCEDURE — 80048 BASIC METABOLIC PNL TOTAL CA: CPT | Performed by: INTERNAL MEDICINE

## 2024-12-16 PROCEDURE — 99232 SBSQ HOSP IP/OBS MODERATE 35: CPT | Performed by: NURSE PRACTITIONER

## 2024-12-16 RX ORDER — TORSEMIDE 20 MG/1
40 TABLET ORAL ONCE
Status: COMPLETED | OUTPATIENT
Start: 2024-12-16 | End: 2024-12-16

## 2024-12-16 RX ORDER — GUAIFENESIN 600 MG/1
1200 TABLET, EXTENDED RELEASE ORAL EVERY 12 HOURS SCHEDULED
Status: DISCONTINUED | OUTPATIENT
Start: 2024-12-16 | End: 2024-12-19 | Stop reason: HOSPADM

## 2024-12-16 RX ORDER — IPRATROPIUM BROMIDE AND ALBUTEROL SULFATE 2.5; .5 MG/3ML; MG/3ML
3 SOLUTION RESPIRATORY (INHALATION)
Status: DISCONTINUED | OUTPATIENT
Start: 2024-12-16 | End: 2024-12-19 | Stop reason: HOSPADM

## 2024-12-16 RX ORDER — LOSARTAN POTASSIUM 25 MG/1
25 TABLET ORAL DAILY
Status: DISCONTINUED | OUTPATIENT
Start: 2024-12-16 | End: 2024-12-19 | Stop reason: HOSPADM

## 2024-12-16 RX ORDER — METHYLPREDNISOLONE SODIUM SUCCINATE 40 MG/ML
40 INJECTION, POWDER, LYOPHILIZED, FOR SOLUTION INTRAMUSCULAR; INTRAVENOUS EVERY 12 HOURS
Status: DISCONTINUED | OUTPATIENT
Start: 2024-12-16 | End: 2024-12-18

## 2024-12-16 RX ADMIN — Medication 10 ML: at 20:41

## 2024-12-16 RX ADMIN — APIXABAN 2.5 MG: 2.5 TABLET, FILM COATED ORAL at 12:05

## 2024-12-16 RX ADMIN — ROSUVASTATIN 10 MG: 10 TABLET, FILM COATED ORAL at 20:42

## 2024-12-16 RX ADMIN — INSULIN GLARGINE 49 UNITS: 100 INJECTION, SOLUTION SUBCUTANEOUS at 20:40

## 2024-12-16 RX ADMIN — SENNOSIDES AND DOCUSATE SODIUM 2 TABLET: 50; 8.6 TABLET ORAL at 20:42

## 2024-12-16 RX ADMIN — LOSARTAN POTASSIUM 25 MG: 25 TABLET, FILM COATED ORAL at 17:02

## 2024-12-16 RX ADMIN — ASPIRIN 81 MG: 81 TABLET, COATED ORAL at 08:19

## 2024-12-16 RX ADMIN — HEPARIN SODIUM 23 UNITS/KG/HR: 10000 INJECTION, SOLUTION INTRAVENOUS at 06:09

## 2024-12-16 RX ADMIN — METOPROLOL TARTRATE 25 MG: 25 TABLET, FILM COATED ORAL at 08:20

## 2024-12-16 RX ADMIN — DOXYCYCLINE 100 MG: 100 CAPSULE ORAL at 20:42

## 2024-12-16 RX ADMIN — DOXYCYCLINE 100 MG: 100 CAPSULE ORAL at 08:20

## 2024-12-16 RX ADMIN — SODIUM CHLORIDE 2000 MG: 900 INJECTION INTRAVENOUS at 21:51

## 2024-12-16 RX ADMIN — IPRATROPIUM BROMIDE AND ALBUTEROL SULFATE 3 ML: 2.5; .5 SOLUTION RESPIRATORY (INHALATION) at 16:29

## 2024-12-16 RX ADMIN — INSULIN LISPRO 6 UNITS: 100 INJECTION, SOLUTION INTRAVENOUS; SUBCUTANEOUS at 12:05

## 2024-12-16 RX ADMIN — TORSEMIDE 40 MG: 20 TABLET ORAL at 17:02

## 2024-12-16 RX ADMIN — METHYLPREDNISOLONE SODIUM SUCCINATE 40 MG: 40 INJECTION, POWDER, FOR SOLUTION INTRAMUSCULAR; INTRAVENOUS at 17:01

## 2024-12-16 RX ADMIN — PANTOPRAZOLE SODIUM 40 MG: 40 TABLET, DELAYED RELEASE ORAL at 06:09

## 2024-12-16 RX ADMIN — SENNOSIDES AND DOCUSATE SODIUM 2 TABLET: 50; 8.6 TABLET ORAL at 08:19

## 2024-12-16 RX ADMIN — IPRATROPIUM BROMIDE AND ALBUTEROL SULFATE 3 ML: 2.5; .5 SOLUTION RESPIRATORY (INHALATION) at 20:12

## 2024-12-16 RX ADMIN — INSULIN LISPRO 6 UNITS: 100 INJECTION, SOLUTION INTRAVENOUS; SUBCUTANEOUS at 08:18

## 2024-12-16 RX ADMIN — AMIODARONE HYDROCHLORIDE 400 MG: 200 TABLET ORAL at 08:19

## 2024-12-16 RX ADMIN — GUAIFENESIN 1200 MG: 600 TABLET ORAL at 20:42

## 2024-12-16 RX ADMIN — MUPIROCIN 1 APPLICATION: 20 OINTMENT TOPICAL at 08:19

## 2024-12-16 RX ADMIN — APIXABAN 2.5 MG: 2.5 TABLET, FILM COATED ORAL at 20:42

## 2024-12-16 RX ADMIN — AMIODARONE HYDROCHLORIDE 400 MG: 200 TABLET ORAL at 20:42

## 2024-12-16 RX ADMIN — INSULIN LISPRO 6 UNITS: 100 INJECTION, SOLUTION INTRAVENOUS; SUBCUTANEOUS at 17:01

## 2024-12-16 NOTE — PROGRESS NOTES
Cardiology Progress Note      Reason for visit:    Atrial fibrillation with RVR    IDENTIFICATION: 79-year-old gentleman who resides in [place], [place]    Active Hospital Problems    Diagnosis  POA    **Multifocal pneumonia [J18.9]  Yes     Priority: High    Paroxysmal atrial fibrillation [I48.0]  No     Priority: High     A-fib with RVR in the setting of multifocal pneumonia, 12/12/2024  [id number] 5 (age >75, CAD, HTN, DM)      Type 2 diabetes mellitus with hyperglycemia, with long-term current use of insulin [E11.65, Z79.4]  Not Applicable     Priority: High    ZOHRA (acute kidney injury) [N17.9]  Yes     Priority: High    Coronary artery disease involving native coronary artery of native heart with angina pectoris [I25.119]  Yes     Priority: Medium     Pharmacologic nuclear stress (10/21/2020): apical ischemia.  Normal LVEF.  Coronary calcification on CT attenuation correction images.      Chronic diastolic congestive heart failure [I50.32]  Yes     Priority: Medium     Echocardiogram (01/04/2017): LVEF 50%. Trace MR and TR.   Echo (1/27/2021):LVEF = 70%.  Grade 2 diastolic dysfunction.  Cardiac valves anatomically and functionally normal.      Essential hypertension [I10]  Yes     Priority: Medium     Target blood pressure <130/80 mmHg      Hyperlipidemia LDL goal <70 [E78.5]  Yes     Priority: Low     High intensity statin therapy indicated given the presence of CAD              Patient is sitting up in the chair.  He is on O2 at 6 L via nasal cannula with O2 saturations around 90 to 92%.  He denies any chest pain.  He remains in atrial fibrillation on telemetry with rates in the 90s.           Vital Sign Min/Max for last 24 hours  Temp  Min: 97.4 °F (36.3 °C)  Max: 98.7 °F (37.1 °C)   BP  Min: 91/68  Max: 133/74   Pulse  Min: 90  Max: 116   Resp  Min: 18  Max: 20   SpO2  Min: 87 %  Max: 95 %   Flow (L/min) (Oxygen Therapy)  Min: 3  Max: 6      Intake/Output Summary (Last 24 hours) at 12/16/2024  0840  Last data filed at 12/16/2024 0611  Gross per 24 hour   Intake 994 ml   Output 1425 ml   Net -431 ml           Physical Exam  Constitutional:       Appearance: He is well-developed.   HENT:      Head: Normocephalic.   Neck:      Vascular: No carotid bruit or JVD.   Cardiovascular:      Rate and Rhythm: Normal rate and regular rhythm. Rhythm irregularly irregular. No extrasystoles are present.     Heart sounds: Normal heart sounds. No murmur heard.     No friction rub. No gallop.   Pulmonary:      Effort: Pulmonary effort is normal.      Breath sounds: Decreased breath sounds, wheezing and rhonchi present.   Abdominal:      General: Bowel sounds are normal. There is no distension.      Palpations: Abdomen is soft.   Skin:     General: Skin is warm and dry.   Neurological:      Mental Status: He is alert and oriented to person, place, and time.         Tele: Atrial fibrillation    Results Review (reviewed the patient's recent labs in the electronic medical record):     EKG (12/13/2024): Atrial fibrillation with  bpm    CXR (12/15/2024): No change in multifocal pneumonia    ECHO (12/12/2024): Normal LVEF 65%.  No valvular abnormality    Results from last 7 days   Lab Units 12/16/24  0242 12/15/24  0427 12/14/24  0713 12/13/24  0539 12/12/24  0217 12/11/24  1355   SODIUM mmol/L 134* 132* 131* 134* 131* 128*   POTASSIUM mmol/L 4.5 4.2 4.4 5.1 4.7 5.2   CHLORIDE mmol/L 95* 95* 95* 93* 92* 88*   BUN mg/dL 44* 52* 75* 82* 55* 53*   CREATININE mg/dL 1.61* 1.62* 2.02* 2.45* 1.90* 1.99*   MAGNESIUM mg/dL 1.8  --   --  2.3  --  1.9     Results from last 7 days   Lab Units 12/11/24  1510 12/11/24  1355   HSTROP T ng/L 70* 76*     Results from last 7 days   Lab Units 12/16/24  0242 12/15/24  0238 12/14/24  0713   WBC 10*3/mm3 17.66* 16.85* 15.90*   HEMOGLOBIN g/dL 12.1* 11.9* 11.8*   HEMATOCRIT % 36.9* 36.4* 36.2*   PLATELETS 10*3/mm3 123* 114* 131*       Lab Results   Component Value Date    HGBA1C 8.50 (H)  12/12/2024       Lab Results   Component Value Date    CHOL 134 09/12/2024    CHLPL 125 06/06/2016    TRIG 168 (H) 09/12/2024    HDL 39 (L) 09/12/2024    LDL 66 09/12/2024              Atrial fibrillation with RVR/paroxysmal atrial fibrillaton  New onset this admission.  Occurred in the setting of multifocal pneumonia.  Remains in atrial fibrillation  IV heparin drip infusing  Amiodarone 400 mg twice daily x 7 days followed by 200 mg daily thereafter  Metoprolol tartrate 25 mg twice daily     Acute on chronic diastolic heart failure  Echo this admission normal LVEF and no valvular abnormality  proBNP elevated on admission 4921 now has worsened to currently 8911        Coronary artery disease/elevated troponins/NSTEMI  Has not seen cardiology since 2020.  Had stress test at that time with apical ischemia and coronary calcification on CT imaging  No cardiac catheterization performed due to lack of symptoms and medical management with antianginals recommended at that time  Troponins are elevated but flat.  Type I versus type II NSTEMI but likely type II from pneumonia/rhinovirus/sepsis  Echo this admission normal LVEF and no valvular abnormality  Will need ischemic eval by cardiac catheterization after recovers from pneumonia and rhinovirus but this will be planned as outpatient  Aspirin and statin therapy     Hypertension/hypotension  Current /63     Hyperlipidemia  Last lipid panel 9/2024 total cholesterol 134, triglycerides 168, HDL 39, LDL 66  Was on Zocor at home now on Crestor 10 mg daily     Type 2 diabetes mellitus  Not well-controlled hemoglobin A1c 8.5        Multifocal pneumonia/positive for rhinovirus/sepsis  Management per primary team        Acute kidney injury  Creatinine worsened to 2.45 but now improving and 1.61                   Discontinue heparin and start Eliquis 5 mg twice daily for stroke prophylaxis  Continue to load with p.o. amiodarone.  Will reassess in the office in 4 weeks and if  "remains out of rhythm we will plan external cardioversion at that time  Continue metoprolol to tartrate but will transition to succinate at discharge  Defer ACE/ARB/Entresto/spironolactone due to elevated creatinine and \"soft\" blood pressure  Continue aspirin and statin therapy for CAD.  Will benefit from ischemic eval as outpatient.  Based on symptoms after recovering from pneumonia we will plan for outpatient stress test versus cardiac cath    Electronically signed by CALIN Santos, 12/16/24, 8:40 AM EST.  "

## 2024-12-16 NOTE — PLAN OF CARE
Goal Outcome Evaluation:  Up to chair w therapy today.   VSS on o2 4 liters,    AF on tele w good rate control.  Hep drip dc'd today and eliquis initiated.    Referral sent to [facility] for DC planning.

## 2024-12-16 NOTE — THERAPY TREATMENT NOTE
Patient Name: Gabriela Mar  : 1945    MRN: [id number]                              Today's Date: 2024       Admit Date: 2024    Visit Dx:     ICD-10-CM ICD-9-CM   1. Multifocal pneumonia  J18.9 486   2. Hypoxia  R09.02 799.02   3. Sepsis with acute hypoxic respiratory failure without septic shock, due to unspecified organism  A41.9 038.9    R65.20 995.91    J96.01 518.81   4. Acute hyperglycemia  R73.9 790.29   5. 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 pectoris    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    Port-A-Cath in place    Peripheral neuropathy    Type 2 diabetes mellitus with hyperglycemia, with long-term current use of insulin    Multifocal pneumonia    Paroxysmal atrial fibrillation     Past Medical History:   Diagnosis Date    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 Hernández    JOINT REPLACEMENT      LEFT HIP 2016-MARCH     SUBMAXILLARY CYST  EXCISION      Right back    TONSILLECTOMY      TOTAL HIP ARTHROPLASTY Right 03/07/2017    Procedure: RIGHT TOTAL HIP ARTHROPLASTY;  Surgeon: Reynaldo Suarez MD;  [facility]: [facility];  Service:     VASECTOMY      WRIST GANGLION EXCISION        General Information       Row Name 12/16/24 1124          Physical Therapy Time and Intention    Document Type therapy note (daily note)  -KE     Mode of Treatment physical therapy  -KE       Row Name 12/16/24 1124          General Information    Patient Profile Reviewed yes  -KE     Existing Precautions/Restrictions fall;oxygen therapy device and L/min  -KE     Barriers to Rehab medically complex;previous functional deficit  -KE       Row Name 12/16/24 1124          Cognition    Orientation Status (Cognition) oriented x 3  -KE       Row Name 12/16/24 1124          Safety Issues/Impairments Affecting Functional Mobility    Safety Issues Affecting Function (Mobility) awareness of need for assistance;insight into deficits/self-awareness;safety precaution awareness  -KE     Impairments Affecting Function (Mobility) balance;endurance/activity tolerance;shortness of breath;strength;sensation/sensory awareness;coordination  -KE               User Key  (r) = Recorded By, (t) = Taken By, (c) = Cosigned By      Initials Name Provider Type    Luiza Ferrari, ARJUN Physical Therapist                   Mobility       Row Name 12/16/24 1125          Bed Mobility    Comment, (Bed Mobility) UIC pre/post tx  -       Row Name 12/16/24 1125          Sit-Stand Transfer    Sit-Stand [place] (Transfers) minimum assist (75% patient effort);verbal cues  -LUCIA     Assistive Device (Sit-Stand Transfers) walker, front-wheeled  -LUCIA     Comment, (Sit-Stand Transfer) x1+x5 from chair; Fabien progressing to CGA w/ final 3; VCs for improving HP and eccentric control  of stand-sit  -       Row Name 12/16/24 1125          Gait/Stairs (Locomotion)    [place] Level (Gait) minimum assist (75% patient effort);1 person assist  -     Assistive Device (Gait) walker, front-wheeled  -KE     Patient was able to Ambulate yes  -KE     Distance in Feet (Gait) 28  -KE     Deviations/Abnormal Patterns (Gait) bilateral deviations;amber decreased;gait speed decreased;stride length decreased;base of support, wide  -KE     Bilateral Gait Deviations forward flexed posture;heel strike decreased  -KE     Comment, (Gait/Stairs) Pt demo step through gait pattern with slow gait speed, decr heel strike, fwd flexed posture, and increased toe out bilat. Pt demo poor coordintation of steps with decreased stability noted w/ turns. VCs for upright posturing, fwd gaze, and management of AD. Further distances limited by weakness.  -               User Key  (r) = Recorded By, (t) = Taken By, (c) = Cosigned By      Initials Name Provider Type    Luiza Ferrari PT Physical Therapist                   Obj/Interventions       Row Name 12/16/24 1128          Motor Skills    Therapeutic Exercise hip;knee;ankle  -       Row Name 12/16/24 1128          Hip (Therapeutic Exercise)    Hip (Therapeutic Exercise) strengthening exercise  -     Hip Strengthening (Therapeutic Exercise) 5 repetitions  STS  -       Row Name 12/16/24 1128          Knee (Therapeutic Exercise)    Knee (Therapeutic Exercise) strengthening exercise  -     Knee Strengthening (Therapeutic Exercise) LAQ (long arc quad);10 repetitions;bilateral  -       Row Name 12/16/24 1128          Ankle (Therapeutic Exercise)    Ankle (Therapeutic Exercise) AROM (active range of motion)  -     Ankle AROM (Therapeutic Exercise) bilateral;dorsiflexion;plantarflexion;10 repetitions  -       Row Name 12/16/24 1128          Balance    Balance Assessment sitting static balance;sitting dynamic balance;standing static balance;standing dynamic  balance  -KE     Static Sitting Balance standby assist  -KE     Dynamic Sitting Balance contact guard  -KE     Position, Sitting Balance sitting in chair  -KE     Static Standing Balance contact guard  -KE     Dynamic Standing Balance minimal assist  -KE     Position/Device Used, Standing Balance supported;walker, front-wheeled  -KE     Balance Interventions standing;sitting;sit to stand;supported;static;dynamic  -KE     Comment, Balance unsteadiness noted throughout gait req MinAx1  -KE               User Key  (r) = Recorded By, (t) = Taken By, (c) = Cosigned By      Initials Name Provider Type    Luiza Ferrari, PT Physical Therapist                   Goals/Plan    No documentation.                  Clinical Impression       Row Name 12/16/24 1131          Pain    Pretreatment Pain Rating 0/10 - no pain  -KE     Posttreatment Pain Rating 0/10 - no pain  -KE       Row Name 12/16/24 1131          Plan of Care Review    Plan of Care Reviewed With patient  -KE     Progress no change  -KE     Outcome Evaluation Pt ambulating in room w/ FWW, MinAx1. Pt will continue to benefit from IPPT to address impairments in gait, balance, strength and functional endurance. PT continue to rec IRF at d/c.  -KE       Row Name 12/16/24 1131          Vital Signs    Pre Systolic BP Rehab 104  -KE     Pre Treatment Diastolic BP 63  -KE     Post Systolic BP Rehab 100  -KE     Post Treatment Diastolic BP 68  -KE     Pretreatment Heart Rate (beats/min) 94  -KE     Posttreatment Heart Rate (beats/min) 87  -KE     Pre SpO2 (%) 95  -KE     O2 Delivery Pre Treatment supplemental O2  -KE     Intra SpO2 (%) 90  6L  -KE     O2 Delivery Intra Treatment supplemental O2  -KE     Post SpO2 (%) 94  -KE     O2 Delivery Post Treatment supplemental O2  -KE     Pre Patient Position Sitting  -KE     Intra Patient Position Standing  -KE     Post Patient Position Sitting  -KE       Row Name 12/16/24 1131          Positioning and Restraints     Pre-Treatment Position sitting in chair/recliner  -KE     Post Treatment Position chair  -KE     In Chair notified nsg;reclined;call light within reach;encouraged to call for assist;exit alarm on;legs elevated;waffle cushion  -KE               User Key  (r) = Recorded By, (t) = Taken By, (c) = Cosigned By      Initials Name Provider Type    Luiza Ferrari, ARJUN Physical Therapist                   Outcome Measures       Row Name 12/16/24 1136          How much help from another person do you currently need...    Turning from your back to your side while in flat bed without using bedrails? 3  -KE     Moving from lying on back to sitting on the side of a flat bed without bedrails? 3  -KE     Moving to and from a bed to a chair (including a wheelchair)? 3  -KE     Standing up from a chair using your arms (e.g., wheelchair, bedside chair)? 3  -KE     Climbing 3-5 steps with a railing? 2  -KE     To walk in hospital room? 3  -KE     AM-PAC 6 Clicks Score (PT) 17  -KE     Highest Level of Mobility Goal 5 --> Static standing  -KE       Row Name 12/16/24 1136          Functional Assessment    Outcome Measure Options AM-PAC 6 Clicks Basic Mobility (PT)  -KE               User Key  (r) = Recorded By, (t) = Taken By, (c) = Cosigned By      Initials Name Provider Type    Luiza Ferrari, ARJUN Physical Therapist                                 Physical Therapy Education       Title: PT OT SLP Therapies (In Progress)       Topic: Physical Therapy (In Progress)       Point: Mobility training (In Progress)       Learning Progress Summary            Patient Acceptance, E, NR by LUCIA at 12/16/2024 1136    Acceptance, E, NR by AE at 12/14/2024 0920                      Point: Home exercise program (Not Started)       Learner Progress:  Not documented in this visit.              Point: Body mechanics (In Progress)       Learning Progress Summary            Patient Acceptance, E, NR by LUCIA at 12/16/2024 1136    Acceptance, E, NR by AE  at 12/14/2024 0920                      Point: Precautions (In Progress)       Learning Progress Summary            Patient Acceptance, E, NR by KE at 12/16/2024 1136    Acceptance, E, NR by AE at 12/14/2024 0920                                      User Key       Initials Effective Dates Name Provider Type Discipline    AE 09/21/21 -  Yahir Cervantes, PT Physical Therapist PT    LUCIA 11/16/23 -  Luiza Catalan PT Physical Therapist PT                  PT Recommendation and Plan     Progress: no change  Outcome Evaluation: Pt ambulating in room w/ FWW, MinAx1. Pt will continue to benefit from IPPT to address impairments in gait, balance, strength and functional endurance. PT continue to rec IRF at d/c.     Time Calculation:         PT Charges       Row Name 12/16/24 1137             Time Calculation    Start Time 1032  -KE      PT Received On 12/16/24  -KE         Timed Charges    86738 - Gait Training Minutes  14  -KE      98187 - PT Therapeutic Activity Minutes 12  -KE         Total Minutes    Timed Charges Total Minutes 26  -KE       Total Minutes 26  -KE                User Key  (r) = Recorded By, (t) = Taken By, (c) = Cosigned By      Initials Name Provider Type    Luiza Ferrari, ARJUN Physical Therapist                  Therapy Charges for Today       Code Description Service Date Service Provider Modifiers Qty    76402097437 HC GAIT TRAINING EA 15 MIN 12/16/2024 Luiza Catalan, PT GP 1    99700441804 HC PT THERAPEUTIC ACT EA 15 MIN 12/16/2024 Luiza Catalan PT GP 1            PT G-Codes  Outcome Measure Options: AM-PAC 6 Clicks Basic Mobility (PT)  AM-PAC 6 Clicks Score (PT): 17  AM-PAC 6 Clicks Score (OT): 15  PT Discharge Summary  Anticipated Discharge Disposition (PT): inpatient rehabilitation facility    Luiza Catalan PT  12/16/2024

## 2024-12-16 NOTE — CASE MANAGEMENT/SOCIAL WORK
Continued Stay Note  [facility]     Patient Name: Gabriela Mar  MRN: [id number]  Today's Date: 12/16/2024    Admit Date: 12/11/2024    Plan: [facility]   Discharge Plan       Row Name 12/16/24 1427       Plan    Plan [facility]    Plan Comments Per discussion in MDR, Pt was transitioned from Heparin drip to Eliquis today. He is on 4L NC (does not wear home O2). Therapy has recommended inpatient rehab, and he is agreeable. At his request, a referral was called to April with [facility]. CM will continue to follow for medical readiness.    Final Discharge Disposition Code 62 - inpatient rehab facility                   Discharge Codes    No documentation.                 Expected Discharge Date and Time       Expected Discharge Date Expected Discharge Time    Dec 17, 2024               Bernice Logan RN

## 2024-12-16 NOTE — PLAN OF CARE
Goal Outcome Evaluation:  Plan of Care Reviewed With: patient        Progress: no change  Outcome Evaluation: Pt ambulating in room w/ FWW, MinAx1. Pt will continue to benefit from IPPT to address impairments in gait, balance, strength and functional endurance. PT continue to rec IRF at d/c.    Anticipated Discharge Disposition (PT): inpatient rehabilitation facility

## 2024-12-16 NOTE — PROGRESS NOTES
Follow-up for    [facility] Medicine Services  PROGRESS NOTE    Patient Name: Gabriela Mar  : 1945  MRN: [id number]    Date of Admission: 2024  Primary Care Physician: Krystina Gee MD    Subjective   Subjective     CC:  Follow-up for pneumonia    HPI:  Patient seen and examined this morning.  Feeling much better today.  Still requiring 6 L oxygen supplementation.  Still with lots of productive cough.      Objective   Objective     Vital Signs:   Temp:  [97.4 °F (36.3 °C)-98.6 °F (37 °C)] 98.1 °F (36.7 °C)  Heart Rate:  [] 96  Resp:  [18-20] 18  BP: (104-132)/(60-86) 112/60  Flow (L/min) (Oxygen Therapy):  [4-6] 4     Physical Exam:  General: Comfortable, not in distress, conversant and cooperative  Head: Atraumatic and normocephalic  Eyes: No Icterus. No pallor  Ears:  Ears appear intact with no abnormalities noted  Throat: No oral lesions, no thrush  Neck: Supple, trachea midline  Lungs: Bilateral coarse crackles both lung fields with scattered wheezing  Heart:  Normal S1 and S2, no murmur, no gallop, No JVD, 2+ lower extremity swelling  Abdomen:  Soft, no tenderness, no organomegaly, normal bowel sounds, no organomegaly  Extremities: pulses equal bilaterally  Skin: No bleeding, bruising or rash, normal skin turgor and elasticity  Neurologic: Cranial nerves appear intact with no evidence of facial asymmetry, normal motor and sensory functions in all 4 extremities  Psych: Alert and oriented x 3, normal mood    Results Reviewed:  LAB RESULTS:      Lab 24  0926 24  0242 12/15/24  1947 12/15/24  1329 12/15/24  0238 24  1336 24  0713 24  0539 24  0952 24  0217 24  1724 24  1510 24  1355 24  1355   WBC  --  17.66*  --   --  16.85*  --  15.90* 16.95*  --  10.93*  --   --   --   --  16.10*   HEMOGLOBIN  --  12.1*  --   --  11.9*  --  11.8* 12.6*  --  13.0  --   --   --   --  12.6*   HEMATOCRIT   --  36.9*  --   --  36.4*  --  36.2* 39.1  --  40.7  --   --   --   --  38.5   PLATELETS  --  123*  --   --  114*  --  131* 169  --  171  --   --   --   --  149   NEUTROS ABS  --  14.30*  --   --  14.22*  --  12.72* 14.20*  --  9.17*  --   --   --   --  12.43*   IMMATURE GRANS (ABS)  --  0.23*  --   --  0.15*  --  0.17* 0.19*  --  0.05  --   --   --   --  0.10*   LYMPHS ABS  --  2.13  --   --  1.71  --  2.11 1.69  --  1.48  --   --   --   --  2.04   MONOS ABS  --  0.91*  --   --  0.72  --  0.85 0.82  --  0.20  --   --   --   --  1.45*   EOS ABS  --  0.04  --   --  0.01  --  0.00 0.00  --  0.00  --   --   --   --  0.00   MCV  --  97.9*  --   --  98.6*  --  99.2* 100.5*  --  100.7*  --   --   --   --  99.2*   PROCALCITONIN  --   --   --   --   --   --  0.77* 0.78*  --  0.90*  --   --   --   --   --    LACTATE  --   --   --   --   --   --  1.2  --   --   --   --  1.7  --   --  3.7*   PROTIME  --   --   --   --   --   --   --   --   --   --  17.1*  --   --   --   --    APTT  --   --   --   --   --   --   --   --   --   --  40.1*  --   --   --   --    HEPARIN ANTI-XA 0.31 0.29* 0.22* 0.28* 0.16*   < > >1.10* 0.36   < > 0.10* 0.10*  --   --    < >  --    D DIMER QUANT  --   --   --   --   --   --   --   --   --   --   --   --   --   --  3.21*   HSTROP T  --   --   --   --   --   --   --   --   --   --   --   --  70*  --  76*    < > = values in this interval not displayed.         Lab 12/16/24  0242 12/15/24  0427 12/14/24  0713 12/13/24  0539 12/12/24  0217 12/11/24  1355   SODIUM 134* 132* 131* 134* 131* 128*   POTASSIUM 4.5 4.2 4.4 5.1 4.7 5.2   CHLORIDE 95* 95* 95* 93* 92* 88*   CO2 29.0 24.0 25.0 22.0 22.0 25.0   ANION GAP 10.0 13.0 11.0 19.0* 17.0* 15.0   BUN 44* 52* 75* 82* 55* 53*   CREATININE 1.61* 1.62* 2.02* 2.45* 1.90* 1.99*   EGFR 43.0* 42.6* 32.7* 26.0* 35.2* 33.3*   GLUCOSE 156* 219* 174* 269* 431* 509*   CALCIUM 9.3 9.0 8.5* 8.9 9.2 9.6   MAGNESIUM 1.8  --   --  2.3  --  1.9   PHOSPHORUS 3.2  --  3.3 5.8*   --   --    HEMOGLOBIN A1C  --   --   --   --  8.50*  --    TSH  --   --   --   --  0.747  --          Lab 12/14/24  0713 12/13/24  0539 12/12/24 0217 12/11/24  1355   TOTAL PROTEIN  --  6.7 7.1 7.3   ALBUMIN 2.7* 3.1* 2.9* 3.5   GLOBULIN  --  3.6 4.2 3.8   ALT (SGPT)  --  24 22 23   AST (SGOT)  --  42* 23 27   BILIRUBIN  --  0.3 0.4 0.8   ALK PHOS  --  99 109 126*   LIPASE  --   --   --  21         Lab 12/16/24  0242 12/15/24  0427 12/14/24 0713 12/13/24  0539 12/11/24 2016 12/11/24  1510 12/11/24  1355   PROBNP 9,458.0* 8,911.0* 7,920.0* 8,519.0*  --   --  4,921.0*   HSTROP T  --   --   --   --   --  70* 76*   PROTIME  --   --   --   --  17.1*  --   --    INR  --   --   --   --  1.38*  --   --                  Lab 12/12/24  0023 12/11/24  1423   PH, ARTERIAL 7.246* 7.325*   PCO2, ARTERIAL 58.7* 49.9*   PO2 .0* 92.5   FIO2 100 36   HCO3 ART 25.5 26.0   BASE EXCESS ART -2.8* -0.6*   CARBOXYHEMOGLOBIN 0.7 1.2     Brief Urine Lab Results  (Last result in the past 365 days)        Color   Clarity   Blood   Leuk Est   Nitrite   Protein   CREAT   Urine HCG        09/18/24 1356             100                 Microbiology Results Abnormal       Procedure Component Value - Date/Time    COVID PRE-OP / PRE-PROCEDURE SCREENING ORDER (NO ISOLATION) - Swab, Nasopharynx [[id number]]  (Abnormal) Collected: 12/11/24 1418    Lab Status: Final result Specimen: Swab from Nasopharynx Updated: 12/11/24 1518    Narrative:      The following orders were created for panel order COVID PRE-OP / PRE-PROCEDURE SCREENING ORDER (NO ISOLATION) - Swab, Nasopharynx.  Procedure                               Abnormality         Status                     ---------                               -----------         ------                     Respiratory Panel PCR w/...[[id number]]  Abnormal            Final result                 Please view results for these tests on the individual orders.    Respiratory Panel PCR w/COVID-19(SARS-CoV-2)  [facility] In-House, NP Swab in UTM/VTM, 2 HR TAT - Swab, Nasopharynx [[id number]]  (Abnormal) Collected: 12/11/24 1418    Lab Status: Final result Specimen: Swab from Nasopharynx Updated: 12/11/24 1518     ADENOVIRUS, PCR Not Detected     Coronavirus 229E Not Detected     Coronavirus HKU1 Not Detected     Coronavirus NL63 Not Detected     Coronavirus OC43 Not Detected     COVID19 Not Detected     Human Metapneumovirus Not Detected     Human Rhinovirus/Enterovirus Detected     Influenza A PCR Not Detected     Influenza B PCR Not Detected     Parainfluenza Virus 1 Not Detected     Parainfluenza Virus 2 Not Detected     Parainfluenza Virus 3 Not Detected     Parainfluenza Virus 4 Not Detected     RSV, PCR Not Detected     Bordetella pertussis pcr Not Detected     Bordetella parapertussis PCR Not Detected     Chlamydophila pneumoniae PCR Not Detected     Mycoplasma pneumo by PCR Not Detected    Narrative:      In the setting of a positive respiratory panel with a viral infection PLUS a negative procalcitonin without other underlying concern for bacterial infection, consider observing off antibiotics or discontinuation of antibiotics and continue supportive care. If the respiratory panel is positive for atypical bacterial infection (Bordetella pertussis, Chlamydophila pneumoniae, or Mycoplasma pneumoniae), consider antibiotic de-escalation to target atypical bacterial infection.            XR Chest 1 View    Result Date: 12/15/2024  XR CHEST 1 VW Date of Exam: 12/15/2024 5:00 AM EST Indication: f/u infiltrates Comparison: 12/14/2024 Findings: Persistent patchy multifocal nodular airspace disease throughout the lungs consistent with multifocal pneumonia not significantly changed from the prior study. Negative for pneumothorax. Heart size within normal limits for technique. No significant effusion. Osseous structures appear intact.     Impression: Impression: No significant change in multifocal pneumonia.  Electronically Signed: Bunny Moeller MD  12/15/2024 8:50 AM EST  Workstation ID: [id number]     Results for orders placed during the hospital encounter of 12/11/24    Adult Transthoracic Echo Complete w/ Color, Spectral and Contrast if Necessary Per Protocol    Interpretation Summary    Left ventricular systolic function is normal. Estimated left ventricular EF = 65%    The right ventricular cavity is mildly dilated.    No hemodynamically significant valvular heart disease      Current medications:  Scheduled Meds:amiodarone, 400 mg, Oral, Q12H   Followed by  [START ON 12/22/2024] amiodarone, 200 mg, Oral, Q24H  apixaban, 2.5 mg, Oral, Q12H  aspirin, 81 mg, Oral, Daily  cefTRIAXone, 2,000 mg, Intravenous, Q24H  doxycycline, 100 mg, Oral, Q12H  guaiFENesin, 1,200 mg, Oral, Q12H  insulin glargine, 1-200 Units, Subcutaneous, Nightly - Glucommander  insulin lispro, 1-200 Units, Subcutaneous, 4x Daily With Meals & Nightly  ipratropium-albuterol, 3 mL, Nebulization, 4x Daily - RT  losartan, 25 mg, Oral, Daily  methylPREDNISolone sodium succinate, 40 mg, Intravenous, Q12H  metoprolol tartrate, 25 mg, Oral, Q12H  pantoprazole, 40 mg, Oral, Q AM  rosuvastatin, 10 mg, Oral, Nightly  senna-docusate sodium, 2 tablet, Oral, BID  torsemide, 40 mg, Oral, Once      Continuous Infusions:     PRN Meds:.  dextrose    dextrose    glucagon (human recombinant)    insulin lispro    ipratropium-albuterol    nitroglycerin    sodium chloride    Assessment & Plan   Assessment & Plan     Active Hospital Problems    Diagnosis  POA    **Multifocal pneumonia [J18.9]  Yes    Paroxysmal atrial fibrillation [I48.0]  No    Type 2 diabetes mellitus with hyperglycemia, with long-term current use of insulin [E11.65, Z79.4]  Not Applicable    Coronary artery disease involving native coronary artery of native heart with angina pectoris [I25.119]  Yes    ZOHRA (acute kidney injury) [N17.9]  Yes    Chronic diastolic congestive heart failure [I50.32]  Yes     Essential hypertension [I10]  Yes    Hyperlipidemia LDL goal <70 [E78.5]  Yes      Resolved Hospital Problems   No resolved problems to display.        Brief Hospital Course to date:  Gabriela Mar is a 79 y.o. male with past medical history of essential hypertension, type 2 diabetes, GERD, COPD, presented to the hospital with acute hypoxic respiratory failure, found to have bilateral pneumonia and A-fib with RVR.    Acute hypoxia  Severe COPD exacerbation  Bilateral community-acquired pneumonia  Rhinovirus infection  Patient presented to the hospital with severe dyspnea and weakness.  He was found to be hypoxic satting 70% on room air  CTA chest with evidence of bilateral pneumonia.  No PE  Started on Rocephin and doxycycline  Will add Solu-Medrol  Mucinex, DuoNebs and I-S  Continue oxygen supplementation at 6 L/min.  Wean off as tolerated without oxygen saturation above 88%    A-fib with RVR  Status post amiodarone drip  Continue metoprolol and p.o. amiodarone  Initially on heparin drip.  Currently on low-dose Eliquis 2.5 mg twice daily  Dr. Singer/cardiology following.  Plan for rate control strategy for now and after he recovers from pneumonia, plan for SULEMAN and cardioversion    Acute on chronic diastolic heart failure  Echo this admission normal LVEF and no valvular abnormality  As needed diuresis      Coronary artery disease/elevated troponins  NSTEMI, type I versus type II  Patient with no active chest pain or acute ischemic changes on EKG  Echo with normal ejection fraction  Seen by cardiology.  Plan for left heart cath as outpatient after he recovers from pneumonia  Currently on aspirin and Eliquis    Hypertension/hypotension  Restart losartan 25 mg daily     Dyslipidemia  Lipid panel 9/2024 total cholesterol 134, triglycerides 168, HDL 39, LDL 66  Continue rosuvastatin     Type 2 diabetes mellitus  A1c 8.5%  Continue insulin glargine and sliding scale per Glucomander     Acute kidney injury  Creatinine  peaked, improving to 2.45 but now improving and 1.62  Restart losartan continue to monitor kidney functions      Expected Discharge Location and Transportation: Home  Expected Discharge   Expected Discharge Date: 12/19/2024; Expected Discharge Time:      VTE Prophylaxis:  Pharmacologic & mechanical VTE prophylaxis orders are present.         AM-PAC 6 Clicks Score (PT): 17 (12/16/24 1136)    CODE STATUS:   Code Status and Medical Interventions: CPR (Attempt to Resuscitate); Full Support   Ordered at: 12/11/24 2051     Code Status (Patient has no pulse and is not breathing):    CPR (Attempt to Resuscitate)     Medical Interventions (Patient has pulse or is breathing):    Full Support       Jose J Townsend MD  12/16/24

## 2024-12-17 PROBLEM — I50.31 ACUTE HEART FAILURE WITH PRESERVED EJECTION FRACTION (HFPEF): Status: ACTIVE | Noted: 2024-12-17

## 2024-12-17 LAB
ALBUMIN SERPL-MCNC: 2.6 G/DL (ref 3.5–5.2)
ALBUMIN/GLOB SERPL: 0.8 G/DL
ALP SERPL-CCNC: 95 U/L (ref 39–117)
ALT SERPL W P-5'-P-CCNC: 21 U/L (ref 1–41)
ANION GAP SERPL CALCULATED.3IONS-SCNC: 21 MMOL/L (ref 5–15)
AST SERPL-CCNC: 29 U/L (ref 1–40)
BASOPHILS # BLD AUTO: 0.05 10*3/MM3 (ref 0–0.2)
BASOPHILS NFR BLD AUTO: 0.4 % (ref 0–1.5)
BILIRUB SERPL-MCNC: 0.4 MG/DL (ref 0–1.2)
BUN SERPL-MCNC: 68 MG/DL (ref 8–23)
BUN/CREAT SERPL: 31.8 (ref 7–25)
CALCIUM SPEC-SCNC: 8.9 MG/DL (ref 8.6–10.5)
CHLORIDE SERPL-SCNC: 88 MMOL/L (ref 98–107)
CO2 SERPL-SCNC: 22 MMOL/L (ref 22–29)
CREAT SERPL-MCNC: 2.14 MG/DL (ref 0.76–1.27)
CRP SERPL-MCNC: 20.65 MG/DL (ref 0–0.5)
DEPRECATED RDW RBC AUTO: 51.6 FL (ref 37–54)
EGFRCR SERPLBLD CKD-EPI 2021: 30.5 ML/MIN/1.73
EOSINOPHIL # BLD AUTO: 0 10*3/MM3 (ref 0–0.4)
EOSINOPHIL NFR BLD AUTO: 0 % (ref 0.3–6.2)
ERYTHROCYTE [DISTWIDTH] IN BLOOD BY AUTOMATED COUNT: 13.8 % (ref 12.3–15.4)
GLOBULIN UR ELPH-MCNC: 3.1 GM/DL
GLUCOSE BLDC GLUCOMTR-MCNC: 337 MG/DL (ref 70–130)
GLUCOSE BLDC GLUCOMTR-MCNC: 338 MG/DL (ref 70–130)
GLUCOSE BLDC GLUCOMTR-MCNC: 345 MG/DL (ref 70–130)
GLUCOSE BLDC GLUCOMTR-MCNC: 363 MG/DL (ref 70–130)
GLUCOSE BLDC GLUCOMTR-MCNC: 369 MG/DL (ref 70–130)
GLUCOSE SERPL-MCNC: 347 MG/DL (ref 65–99)
HCT VFR BLD AUTO: 40.5 % (ref 37.5–51)
HGB BLD-MCNC: 13 G/DL (ref 13–17.7)
IMM GRANULOCYTES # BLD AUTO: 0.2 10*3/MM3 (ref 0–0.05)
IMM GRANULOCYTES NFR BLD AUTO: 1.6 % (ref 0–0.5)
LYMPHOCYTES # BLD AUTO: 1.5 10*3/MM3 (ref 0.7–3.1)
LYMPHOCYTES NFR BLD AUTO: 11.9 % (ref 19.6–45.3)
MACROCYTES BLD QL SMEAR: NORMAL
MAGNESIUM SERPL-MCNC: 1.7 MG/DL (ref 1.6–2.4)
MCH RBC QN AUTO: 32.3 PG (ref 26.6–33)
MCHC RBC AUTO-ENTMCNC: 32.1 G/DL (ref 31.5–35.7)
MCV RBC AUTO: 100.7 FL (ref 79–97)
MONOCYTES # BLD AUTO: 0.27 10*3/MM3 (ref 0.1–0.9)
MONOCYTES NFR BLD AUTO: 2.1 % (ref 5–12)
NEUTROPHILS NFR BLD AUTO: 10.59 10*3/MM3 (ref 1.7–7)
NEUTROPHILS NFR BLD AUTO: 84 % (ref 42.7–76)
NRBC BLD AUTO-RTO: 0 /100 WBC (ref 0–0.2)
PHOSPHATE SERPL-MCNC: 6.3 MG/DL (ref 2.5–4.5)
PLATELET # BLD AUTO: 116 10*3/MM3 (ref 140–450)
PMV BLD AUTO: 12.1 FL (ref 6–12)
POTASSIUM SERPL-SCNC: 4.8 MMOL/L (ref 3.5–5.2)
PROT SERPL-MCNC: 5.7 G/DL (ref 6–8.5)
RBC # BLD AUTO: 4.02 10*6/MM3 (ref 4.14–5.8)
SMALL PLATELETS BLD QL SMEAR: NORMAL
SODIUM SERPL-SCNC: 131 MMOL/L (ref 136–145)
WBC MORPH BLD: NORMAL
WBC NRBC COR # BLD AUTO: 12.61 10*3/MM3 (ref 3.4–10.8)

## 2024-12-17 PROCEDURE — 25010000002 METHYLPREDNISOLONE PER 40 MG: Performed by: INTERNAL MEDICINE

## 2024-12-17 PROCEDURE — 86140 C-REACTIVE PROTEIN: CPT | Performed by: INTERNAL MEDICINE

## 2024-12-17 PROCEDURE — 99232 SBSQ HOSP IP/OBS MODERATE 35: CPT | Performed by: NURSE PRACTITIONER

## 2024-12-17 PROCEDURE — 80053 COMPREHEN METABOLIC PANEL: CPT | Performed by: INTERNAL MEDICINE

## 2024-12-17 PROCEDURE — P9047 ALBUMIN (HUMAN), 25%, 50ML: HCPCS | Performed by: INTERNAL MEDICINE

## 2024-12-17 PROCEDURE — 94761 N-INVAS EAR/PLS OXIMETRY MLT: CPT

## 2024-12-17 PROCEDURE — 63710000001 INSULIN LISPRO (HUMAN) PER 5 UNITS: Performed by: INTERNAL MEDICINE

## 2024-12-17 PROCEDURE — 25010000002 CEFTRIAXONE PER 250 MG: Performed by: INTERNAL MEDICINE

## 2024-12-17 PROCEDURE — 83735 ASSAY OF MAGNESIUM: CPT | Performed by: INTERNAL MEDICINE

## 2024-12-17 PROCEDURE — 25010000002 ALBUMIN HUMAN 25% PER 50 ML: Performed by: INTERNAL MEDICINE

## 2024-12-17 PROCEDURE — 94664 DEMO&/EVAL PT USE INHALER: CPT

## 2024-12-17 PROCEDURE — 99232 SBSQ HOSP IP/OBS MODERATE 35: CPT | Performed by: INTERNAL MEDICINE

## 2024-12-17 PROCEDURE — 85007 BL SMEAR W/DIFF WBC COUNT: CPT | Performed by: INTERNAL MEDICINE

## 2024-12-17 PROCEDURE — 94799 UNLISTED PULMONARY SVC/PX: CPT

## 2024-12-17 PROCEDURE — 82948 REAGENT STRIP/BLOOD GLUCOSE: CPT

## 2024-12-17 PROCEDURE — 85025 COMPLETE CBC W/AUTO DIFF WBC: CPT | Performed by: INTERNAL MEDICINE

## 2024-12-17 PROCEDURE — 84100 ASSAY OF PHOSPHORUS: CPT | Performed by: INTERNAL MEDICINE

## 2024-12-17 PROCEDURE — 63710000001 INSULIN GLARGINE PER 5 UNITS: Performed by: INTERNAL MEDICINE

## 2024-12-17 RX ORDER — POLYETHYLENE GLYCOL 3350 17 G/17G
17 POWDER, FOR SOLUTION ORAL DAILY PRN
Status: DISCONTINUED | OUTPATIENT
Start: 2024-12-17 | End: 2024-12-19 | Stop reason: HOSPADM

## 2024-12-17 RX ORDER — BISACODYL 10 MG
10 SUPPOSITORY, RECTAL RECTAL DAILY PRN
Status: DISCONTINUED | OUTPATIENT
Start: 2024-12-17 | End: 2024-12-19 | Stop reason: HOSPADM

## 2024-12-17 RX ORDER — BISACODYL 5 MG/1
5 TABLET, DELAYED RELEASE ORAL DAILY PRN
Status: DISCONTINUED | OUTPATIENT
Start: 2024-12-17 | End: 2024-12-19 | Stop reason: HOSPADM

## 2024-12-17 RX ORDER — ALBUMIN (HUMAN) 12.5 G/50ML
25 SOLUTION INTRAVENOUS ONCE
Status: DISCONTINUED | OUTPATIENT
Start: 2024-12-17 | End: 2024-12-17

## 2024-12-17 RX ORDER — TORSEMIDE 20 MG/1
20 TABLET ORAL ONCE
Status: DISCONTINUED | OUTPATIENT
Start: 2024-12-17 | End: 2024-12-17

## 2024-12-17 RX ORDER — AMIODARONE HYDROCHLORIDE 200 MG/1
TABLET ORAL
Qty: 110 TABLET | Refills: 0 | Status: SHIPPED | OUTPATIENT
Start: 2024-12-17 | End: 2025-03-22

## 2024-12-17 RX ORDER — MIDODRINE HYDROCHLORIDE 10 MG/1
10 TABLET ORAL
Status: DISCONTINUED | OUTPATIENT
Start: 2024-12-17 | End: 2024-12-19 | Stop reason: HOSPADM

## 2024-12-17 RX ORDER — AMOXICILLIN 250 MG
2 CAPSULE ORAL 2 TIMES DAILY PRN
Status: DISCONTINUED | OUTPATIENT
Start: 2024-12-17 | End: 2024-12-19 | Stop reason: HOSPADM

## 2024-12-17 RX ORDER — ALBUMIN (HUMAN) 12.5 G/50ML
50 SOLUTION INTRAVENOUS ONCE
Status: COMPLETED | OUTPATIENT
Start: 2024-12-17 | End: 2024-12-17

## 2024-12-17 RX ORDER — TORSEMIDE 20 MG/1
40 TABLET ORAL ONCE
Status: DISCONTINUED | OUTPATIENT
Start: 2024-12-17 | End: 2024-12-17

## 2024-12-17 RX ORDER — METOPROLOL TARTRATE 25 MG/1
25 TABLET, FILM COATED ORAL EVERY 12 HOURS SCHEDULED
Qty: 120 TABLET | Refills: 1 | Status: SHIPPED | OUTPATIENT
Start: 2024-12-17

## 2024-12-17 RX ADMIN — AMIODARONE HYDROCHLORIDE 400 MG: 200 TABLET ORAL at 09:07

## 2024-12-17 RX ADMIN — ROSUVASTATIN 10 MG: 10 TABLET, FILM COATED ORAL at 21:47

## 2024-12-17 RX ADMIN — INSULIN GLARGINE 64 UNITS: 100 INJECTION, SOLUTION SUBCUTANEOUS at 21:51

## 2024-12-17 RX ADMIN — INSULIN LISPRO 12 UNITS: 100 INJECTION, SOLUTION INTRAVENOUS; SUBCUTANEOUS at 21:49

## 2024-12-17 RX ADMIN — INSULIN LISPRO 22 UNITS: 100 INJECTION, SOLUTION INTRAVENOUS; SUBCUTANEOUS at 12:34

## 2024-12-17 RX ADMIN — SENNOSIDES AND DOCUSATE SODIUM 2 TABLET: 50; 8.6 TABLET ORAL at 09:06

## 2024-12-17 RX ADMIN — DOXYCYCLINE 100 MG: 100 CAPSULE ORAL at 09:07

## 2024-12-17 RX ADMIN — IPRATROPIUM BROMIDE AND ALBUTEROL SULFATE 3 ML: 2.5; .5 SOLUTION RESPIRATORY (INHALATION) at 08:48

## 2024-12-17 RX ADMIN — GUAIFENESIN 1200 MG: 600 TABLET ORAL at 21:46

## 2024-12-17 RX ADMIN — APIXABAN 2.5 MG: 2.5 TABLET, FILM COATED ORAL at 21:45

## 2024-12-17 RX ADMIN — APIXABAN 2.5 MG: 2.5 TABLET, FILM COATED ORAL at 09:07

## 2024-12-17 RX ADMIN — METHYLPREDNISOLONE SODIUM SUCCINATE 40 MG: 40 INJECTION, POWDER, FOR SOLUTION INTRAMUSCULAR; INTRAVENOUS at 04:24

## 2024-12-17 RX ADMIN — ASPIRIN 81 MG: 81 TABLET, COATED ORAL at 09:06

## 2024-12-17 RX ADMIN — DOXYCYCLINE 100 MG: 100 CAPSULE ORAL at 21:46

## 2024-12-17 RX ADMIN — AMIODARONE HYDROCHLORIDE 400 MG: 200 TABLET ORAL at 21:45

## 2024-12-17 RX ADMIN — IPRATROPIUM BROMIDE AND ALBUTEROL SULFATE 3 ML: 2.5; .5 SOLUTION RESPIRATORY (INHALATION) at 16:10

## 2024-12-17 RX ADMIN — MIDODRINE HYDROCHLORIDE 10 MG: 10 TABLET ORAL at 12:34

## 2024-12-17 RX ADMIN — INSULIN LISPRO 17 UNITS: 100 INJECTION, SOLUTION INTRAVENOUS; SUBCUTANEOUS at 17:32

## 2024-12-17 RX ADMIN — PANTOPRAZOLE SODIUM 40 MG: 40 TABLET, DELAYED RELEASE ORAL at 06:19

## 2024-12-17 RX ADMIN — ALBUMIN (HUMAN) 50 G: 0.25 INJECTION, SOLUTION INTRAVENOUS at 15:15

## 2024-12-17 RX ADMIN — GUAIFENESIN 1200 MG: 600 TABLET ORAL at 09:06

## 2024-12-17 RX ADMIN — IPRATROPIUM BROMIDE AND ALBUTEROL SULFATE 3 ML: 2.5; .5 SOLUTION RESPIRATORY (INHALATION) at 20:06

## 2024-12-17 RX ADMIN — METHYLPREDNISOLONE SODIUM SUCCINATE 40 MG: 40 INJECTION, POWDER, FOR SOLUTION INTRAMUSCULAR; INTRAVENOUS at 17:14

## 2024-12-17 RX ADMIN — INSULIN LISPRO 17 UNITS: 100 INJECTION, SOLUTION INTRAVENOUS; SUBCUTANEOUS at 09:08

## 2024-12-17 RX ADMIN — MIDODRINE HYDROCHLORIDE 10 MG: 10 TABLET ORAL at 17:14

## 2024-12-17 RX ADMIN — IPRATROPIUM BROMIDE AND ALBUTEROL SULFATE 3 ML: 2.5; .5 SOLUTION RESPIRATORY (INHALATION) at 13:08

## 2024-12-17 RX ADMIN — SODIUM CHLORIDE 2000 MG: 900 INJECTION INTRAVENOUS at 21:53

## 2024-12-17 NOTE — PLAN OF CARE
Goal Outcome Evaluation:  Plan of Care Reviewed With: patient        Progress: improving  Outcome Evaluation: Patient remained on 4L nasal canula overnight. Blood pressures soft over night, pm metoprolol held. Provider notified. Good urine output. Remains AFIB on tele. Continue plan of care.

## 2024-12-17 NOTE — CASE MANAGEMENT/SOCIAL WORK
Continued Stay Note  [facility]     Patient Name: Gabriela Mar  MRN: [id number]  Today's Date: 12/17/2024    Admit Date: 12/11/2024    Plan: [facility]   Discharge Plan       Row Name 12/17/24 1432       Plan    Plan [facility]    Plan Comments Per discussion in MDR, BP has been soft. Midodrine was added. He is on 3L NC (does not wear home O2). He desats with ambulation. [facility] can accept Pt tomorrow. [facility] transportation arranged for 12/18 @ 1430. Pt, daughter, provider and RN aware. CM will continue to follow.    Final Discharge Disposition Code 62 - inpatient rehab facility                   Discharge Codes    No documentation.                 Expected Discharge Date and Time       Expected Discharge Date Expected Discharge Time    Dec 19, 2024               Bernice Logan RN

## 2024-12-17 NOTE — PROGRESS NOTES
"          Clinical Nutrition Assessment     Patient Name: Gabriela Mar  YOB: 1945  MRN: [id number]  Date of Encounter: 12/17/24 15:09 EST  Admission date: 12/11/2024  Reason for Visit:  Interview for preferences per RN request    Assessment   Nutrition Assessment   Admission Diagnosis:  Multifocal pneumonia [J18.9]  Acute heart failure with preserved ejection fraction (HFpEF) [I50.31]    Problem List:    Multifocal pneumonia    Hyperlipidemia LDL goal <70    Essential hypertension    Chronic diastolic congestive heart failure    ZOHRA (acute kidney injury)    Coronary artery disease involving native coronary artery of native heart with angina pectoris    Type 2 diabetes mellitus with hyperglycemia, with long-term current use of insulin    Paroxysmal atrial fibrillation    Acute heart failure with preserved ejection fraction (HFpEF)      PMH:   He  has a past medical history of 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    PSH:  He  has a past surgical history that includes Vasectomy; Tonsillectomy; Wrist Ganglion Excision; Joint replacement; Submaxillary cyst  excision; Colonoscopy; Esophagogastroduodenoscopy; Total hip arthroplasty (Right, 03/07/2017); Eye surgery; and Colonoscopy (09/24/2024).    Applicable Nutrition History:       Anthropometrics     Height: Height: 177.8 cm (70\")  Last Filed Weight: Weight: 102 kg (224 lb 13.9 oz) (12/12/24 0927)  Method: Weight Method: Stated  BMI: BMI (Calculated): 32.3    UBW:  240lbs per pt report  Weight change: weight loss of 11 lbs (4.7%) over 3 month(s)    Significant?  No   Weight       Weight (kg) Weight (lbs) Weight Method Visit Report "   4/3/2023 85.276 kg  188 lb   --    5/24/2023 88.542 kg  195 lb 3.2 oz   --    8/1/2023 98.612 kg  217 lb 6.4 oz   --    10/4/2023 103.193 kg  227 lb 8 oz   --    10/9/2023 104.781 kg  231 lb   --    11/10/2023 106.414 kg  234 lb 9.6 oz   --    2/12/2024 108.863 kg  240 lb   --    4/10/2024 108.863 kg  240 lb   --    5/16/2024 109.498 kg  241 lb 6.4 oz   --    8/30/2024 108.863 kg  240 lb  Stated     9/18/2024 106.777 kg  235 lb 6.4 oz   --    10/9/2024 105.235 kg  232 lb   --    12/11/2024 104.327 kg  230 lb  Stated  --     104.327 kg  230 lb   --    12/12/2024 102 kg  224 lb 13.9 oz       102.3 kg  225 lb 8.5 oz        Nutrition Focused Physical Exam    Date: 12/17    Pt does not meet criteria for malnutrition diagnosis, at this time.      Subjective   Reported/Observed/Food/Nutrition Related History:     Pt tells me he doesn't eat well when he isn't feeling well, reports eating <50% of usual intake. Pt also states his family has been bringing outside meals like stir sheehan and vegetable soup-eating these items better. Pt also w/premier protein at bedside brought from home, states he is at least trying to get enough protein. Pt states UBW = 240lbs and endorses wt loss. NKFA.    Current Nutrition Prescription   PO: Diet: Regular/House, Cardiac, Diabetic; Healthy Heart (2-3 Na+); Consistent Carbohydrate; Fluid Consistency: Thin (IDDSI 0)  Oral Nutrition Supplement:   Intake:  10% x 8 meals    Assessment & Plan   Nutrition Diagnosis   Date:  12/17            Updated:    Problem Predicted inadequate nutrient intake    Etiology Clinical condition, food preferences   Signs/Symptoms Pt report, 10% intake x 8 meals   Status: New    Goal / Objectives:   Nutrition to support treatment and Increase intake      Nutrition Intervention      Follow treatment progress, Care plan reviewed, Interview for preferences, Encourage intake    Pt w/Premier Protein brought by family  Preferences obtained and communicated to kitchen  Measured  wt as able, RD zeroed bed scale    Monitoring/Evaluation:   Per protocol, PO intake, Pertinent labs    Shanique Ramsay, MS,RD,LD  Time Spent:30

## 2024-12-17 NOTE — PROGRESS NOTES
Follow-up for    [facility] Medicine Services  PROGRESS NOTE    Patient Name: Gabriela Mar  : 1945  MRN: [id number]    Date of Admission: 2024  Primary Care Physician: Krystina Gee MD    Subjective   Subjective     CC:  Follow-up for pneumonia    HPI:  Patient seen and examined this morning.  Continues to feel well today.  Oxygen comes down to 3 L/min after diuresis yesterday.  Still having productive cough.  No fever or chills.  No other acute complaints      Objective   Objective     Vital Signs:   Temp:  [97.5 °F (36.4 °C)-98.3 °F (36.8 °C)] 97.5 °F (36.4 °C)  Heart Rate:  [] 100  Resp:  [16-20] 16  BP: ()/(51-68) 90/55  Flow (L/min) (Oxygen Therapy):  [3-6] 3     Physical Exam:  General: Comfortable, not in distress, conversant and cooperative  Head: Atraumatic and normocephalic  Eyes: No Icterus. No pallor  Ears:  Ears appear intact with no abnormalities noted  Throat: No oral lesions, no thrush  Neck: Supple, trachea midline  Lungs: Bilateral coarse crackles both lung fields with scattered wheezing  Heart:  Normal S1 and S2, no murmur, no gallop, No JVD, 2+ lower extremity swelling  Abdomen:  Soft, no tenderness, no organomegaly, normal bowel sounds, no organomegaly  Extremities: pulses equal bilaterally  Skin: No bleeding, bruising or rash, normal skin turgor and elasticity  Neurologic: Cranial nerves appear intact with no evidence of facial asymmetry, normal motor and sensory functions in all 4 extremities  Psych: Alert and oriented x 3, normal mood    Results Reviewed:  LAB RESULTS:      Lab 24  0926 24  0242 12/15/24  1947 12/15/24  1329 12/15/24  0238 24  1336 24  0713 24  0539 24  0952 24  0217 24  1724 24  1510 24  1355 24  1355   WBC  --  17.66*  --   --  16.85*  --  15.90* 16.95*  --  10.93*  --   --   --   --  16.10*   HEMOGLOBIN  --  12.1*  --   --  11.9*  --   11.8* 12.6*  --  13.0  --   --   --   --  12.6*   HEMATOCRIT  --  36.9*  --   --  36.4*  --  36.2* 39.1  --  40.7  --   --   --   --  38.5   PLATELETS  --  123*  --   --  114*  --  131* 169  --  171  --   --   --   --  149   NEUTROS ABS  --  14.30*  --   --  14.22*  --  12.72* 14.20*  --  9.17*  --   --   --   --  12.43*   IMMATURE GRANS (ABS)  --  0.23*  --   --  0.15*  --  0.17* 0.19*  --  0.05  --   --   --   --  0.10*   LYMPHS ABS  --  2.13  --   --  1.71  --  2.11 1.69  --  1.48  --   --   --   --  2.04   MONOS ABS  --  0.91*  --   --  0.72  --  0.85 0.82  --  0.20  --   --   --   --  1.45*   EOS ABS  --  0.04  --   --  0.01  --  0.00 0.00  --  0.00  --   --   --   --  0.00   MCV  --  97.9*  --   --  98.6*  --  99.2* 100.5*  --  100.7*  --   --   --   --  99.2*   CRP  --  28.52*  --   --   --   --   --   --   --   --   --   --   --   --   --    PROCALCITONIN  --   --   --   --   --   --  0.77* 0.78*  --  0.90*  --   --   --   --   --    LACTATE  --   --   --   --   --   --  1.2  --   --   --   --  1.7  --   --  3.7*   PROTIME  --   --   --   --   --   --   --   --   --   --  17.1*  --   --   --   --    APTT  --   --   --   --   --   --   --   --   --   --  40.1*  --   --   --   --    HEPARIN ANTI-XA 0.31 0.29* 0.22* 0.28* 0.16*   < > >1.10* 0.36   < > 0.10* 0.10*  --   --    < >  --    D DIMER QUANT  --   --   --   --   --   --   --   --   --   --   --   --   --   --  3.21*   HSTROP T  --   --   --   --   --   --   --   --   --   --   --   --  70*  --  76*    < > = values in this interval not displayed.         Lab 12/16/24  0242 12/15/24  0427 12/14/24  0713 12/13/24  0539 12/12/24  0217 12/11/24  1355   SODIUM 134* 132* 131* 134* 131* 128*   POTASSIUM 4.5 4.2 4.4 5.1 4.7 5.2   CHLORIDE 95* 95* 95* 93* 92* 88*   CO2 29.0 24.0 25.0 22.0 22.0 25.0   ANION GAP 10.0 13.0 11.0 19.0* 17.0* 15.0   BUN 44* 52* 75* 82* 55* 53*   CREATININE 1.61* 1.62* 2.02* 2.45* 1.90* 1.99*   EGFR 43.0* 42.6* 32.7* 26.0* 35.2* 33.3*    GLUCOSE 156* 219* 174* 269* 431* 509*   CALCIUM 9.3 9.0 8.5* 8.9 9.2 9.6   MAGNESIUM 1.8  --   --  2.3  --  1.9   PHOSPHORUS 3.2  --  3.3 5.8*  --   --    HEMOGLOBIN A1C  --   --   --   --  8.50*  --    TSH  --   --   --   --  0.747  --          Lab 12/14/24  0713 12/13/24  0539 12/12/24 0217 12/11/24  1355   TOTAL PROTEIN  --  6.7 7.1 7.3   ALBUMIN 2.7* 3.1* 2.9* 3.5   GLOBULIN  --  3.6 4.2 3.8   ALT (SGPT)  --  24 22 23   AST (SGOT)  --  42* 23 27   BILIRUBIN  --  0.3 0.4 0.8   ALK PHOS  --  99 109 126*   LIPASE  --   --   --  21         Lab 12/16/24  0242 12/15/24  0427 12/14/24 0713 12/13/24  0539 12/11/24 2016 12/11/24  1510 12/11/24  1355   PROBNP 9,458.0* 8,911.0* 7,920.0* 8,519.0*  --   --  4,921.0*   HSTROP T  --   --   --   --   --  70* 76*   PROTIME  --   --   --   --  17.1*  --   --    INR  --   --   --   --  1.38*  --   --                  Lab 12/12/24  0023 12/11/24  1423   PH, ARTERIAL 7.246* 7.325*   PCO2, ARTERIAL 58.7* 49.9*   PO2 .0* 92.5   FIO2 100 36   HCO3 ART 25.5 26.0   BASE EXCESS ART -2.8* -0.6*   CARBOXYHEMOGLOBIN 0.7 1.2     Brief Urine Lab Results  (Last result in the past 365 days)        Color   Clarity   Blood   Leuk Est   Nitrite   Protein   CREAT   Urine HCG        09/18/24 1356             100                 Microbiology Results Abnormal       Procedure Component Value - Date/Time    COVID PRE-OP / PRE-PROCEDURE SCREENING ORDER (NO ISOLATION) - Swab, Nasopharynx [[id number]]  (Abnormal) Collected: 12/11/24 1418    Lab Status: Final result Specimen: Swab from Nasopharynx Updated: 12/11/24 1518    Narrative:      The following orders were created for panel order COVID PRE-OP / PRE-PROCEDURE SCREENING ORDER (NO ISOLATION) - Swab, Nasopharynx.  Procedure                               Abnormality         Status                     ---------                               -----------         ------                     Respiratory Panel PCR w/...[[id number]]  Abnormal             Final result                 Please view results for these tests on the individual orders.    Respiratory Panel PCR w/COVID-19(SARS-CoV-2) [facility] In-House, NP Swab in UTM/VTM, 2 HR TAT - Swab, Nasopharynx [[id number]]  (Abnormal) Collected: 12/11/24 1418    Lab Status: Final result Specimen: Swab from Nasopharynx Updated: 12/11/24 1515     ADENOVIRUS, PCR Not Detected     Coronavirus 229E Not Detected     Coronavirus HKU1 Not Detected     Coronavirus NL63 Not Detected     Coronavirus OC43 Not Detected     COVID19 Not Detected     Human Metapneumovirus Not Detected     Human Rhinovirus/Enterovirus Detected     Influenza A PCR Not Detected     Influenza B PCR Not Detected     Parainfluenza Virus 1 Not Detected     Parainfluenza Virus 2 Not Detected     Parainfluenza Virus 3 Not Detected     Parainfluenza Virus 4 Not Detected     RSV, PCR Not Detected     Bordetella pertussis pcr Not Detected     Bordetella parapertussis PCR Not Detected     Chlamydophila pneumoniae PCR Not Detected     Mycoplasma pneumo by PCR Not Detected    Narrative:      In the setting of a positive respiratory panel with a viral infection PLUS a negative procalcitonin without other underlying concern for bacterial infection, consider observing off antibiotics or discontinuation of antibiotics and continue supportive care. If the respiratory panel is positive for atypical bacterial infection (Bordetella pertussis, Chlamydophila pneumoniae, or Mycoplasma pneumoniae), consider antibiotic de-escalation to target atypical bacterial infection.            No radiology results from the last 24 hrs    Results for orders placed during the hospital encounter of 12/11/24    Adult Transthoracic Echo Complete w/ Color, Spectral and Contrast if Necessary Per Protocol    Interpretation Summary    Left ventricular systolic function is normal. Estimated left ventricular EF = 65%    The right ventricular cavity is mildly dilated.    No  hemodynamically significant valvular heart disease      Current medications:  Scheduled Meds:amiodarone, 400 mg, Oral, Q12H   Followed by  [START ON 12/22/2024] amiodarone, 200 mg, Oral, Q24H  apixaban, 2.5 mg, Oral, Q12H  aspirin, 81 mg, Oral, Daily  cefTRIAXone, 2,000 mg, Intravenous, Q24H  doxycycline, 100 mg, Oral, Q12H  guaiFENesin, 1,200 mg, Oral, Q12H  insulin glargine, 1-200 Units, Subcutaneous, Nightly - Glucommander  insulin lispro, 1-200 Units, Subcutaneous, 4x Daily With Meals & Nightly  ipratropium-albuterol, 3 mL, Nebulization, 4x Daily - RT  losartan, 25 mg, Oral, Daily  methylPREDNISolone sodium succinate, 40 mg, Intravenous, Q12H  metoprolol tartrate, 25 mg, Oral, Q12H  pantoprazole, 40 mg, Oral, Q AM  rosuvastatin, 10 mg, Oral, Nightly  senna-docusate sodium, 2 tablet, Oral, BID      Continuous Infusions:     PRN Meds:.  dextrose    dextrose    glucagon (human recombinant)    insulin lispro    ipratropium-albuterol    nitroglycerin    sodium chloride    Assessment & Plan   Assessment & Plan     Active Hospital Problems    Diagnosis  POA    **Multifocal pneumonia [J18.9]  Yes    Paroxysmal atrial fibrillation [I48.0]  No    Type 2 diabetes mellitus with hyperglycemia, with long-term current use of insulin [E11.65, Z79.4]  Not Applicable    Coronary artery disease involving native coronary artery of native heart with angina pectoris [I25.119]  Yes    ZOHRA (acute kidney injury) [N17.9]  Yes    Chronic diastolic congestive heart failure [I50.32]  Yes    Essential hypertension [I10]  Yes    Hyperlipidemia LDL goal <70 [E78.5]  Yes      Resolved Hospital Problems   No resolved problems to display.        Brief Hospital Course to date:  Gabriela Mar is a 79 y.o. male with past medical history of essential hypertension, type 2 diabetes, GERD, COPD, presented to the hospital with acute hypoxic respiratory failure, found to have bilateral pneumonia and A-fib with RVR.    Acute hypoxia  Severe COPD  exacerbation  Bilateral community-acquired pneumonia  Rhinovirus infection  Patient presented to the hospital with severe dyspnea and weakness.  He was found to be hypoxic satting 70% on room air  CTA chest with evidence of bilateral pneumonia.  No PE  Started on Rocephin and doxycycline  Continue Solu-Medrol  Continue Mucinex, DuoNebs and I-S  Diuresis with  Wean of oxygen supplementation.  Currently on 3 L/min    A-fib with RVR  Status post amiodarone drip.  Currently on p.o. amiodarone  Continue metoprolol   Status post heparin drip.  Currently on low-dose Eliquis 2.5 mg twice daily  Dr. Singer/cardiology following.  Plan for rate control strategy for now and after he recovers from pneumonia, plan for SULEMAN and cardioversion    Acute on chronic diastolic heart failure  Echo this admission normal LVEF and no valvular abnormality  As needed diuresis      Coronary artery disease/elevated troponins  NSTEMI, type I versus type II  Patient with no active chest pain or acute ischemic changes on EKG  Echo with normal ejection fraction  Seen by cardiology.  Plan for left heart cath as outpatient after he recovers from pneumonia  Currently on aspirin and Eliquis    Hypertension/hypotension  Continue losartan 25 mg daily     Dyslipidemia  Lipid panel 9/2024 total cholesterol 134, triglycerides 168, HDL 39, LDL 66  Continue rosuvastatin     Type 2 diabetes mellitus  A1c 8.5%  Continue insulin glargine and sliding scale per Glucomander     Acute kidney injury  Creatinine peaked, improving to 2.45 but now improving and 1.62  Monitor kidney functions    Expected Discharge Location and Transportation: [facility]/rehab  Expected Discharge   Expected Discharge Date: 12/19/2024; Expected Discharge Time:      VTE Prophylaxis:  Pharmacologic & mechanical VTE prophylaxis orders are present.         AM-PAC 6 Clicks Score (PT): 17 (12/16/24 2047)    CODE STATUS:   Code Status and Medical Interventions: CPR (Attempt to Resuscitate);  Full Support   Ordered at: 12/11/24 2051     Code Status (Patient has no pulse and is not breathing):    CPR (Attempt to Resuscitate)     Medical Interventions (Patient has pulse or is breathing):    Full Support       Jose J Townsend MD  12/17/24

## 2024-12-17 NOTE — PROGRESS NOTES
Patient has been initiated on Apixaban (Eliquis) during admission. Education provided on 12/17/2024 verbally and in writing. Information provided includes effects of medication, drug-drug and drug-food interactions, and signs/symptoms of bleeding and clotting. Patient/family verbalized understanding through teach back. All pertinent questions were answered by pharmacist.    Efrain Elkins [facility]  12/17/2024  11:27 EST

## 2024-12-17 NOTE — PROGRESS NOTES
Cardiology Progress Note      Reason for visit:    Atrial fibrillation with RVR    IDENTIFICATION:-year-old gentleman who resides in [place], [place]    Active Hospital Problems    Diagnosis  POA    **Multifocal pneumonia [J18.9]  Yes     Priority: High    Paroxysmal atrial fibrillation [I48.0]  No     Priority: High     A-fib with RVR in the setting of multifocal pneumonia, 12/12/2024  [id number] 5 (age >75, CAD, HTN, DM)      Type 2 diabetes mellitus with hyperglycemia, with long-term current use of insulin [E11.65, Z79.4]  Not Applicable     Priority: High    ZOHRA (acute kidney injury) [N17.9]  Yes     Priority: High    Coronary artery disease involving native coronary artery of native heart with angina pectoris [I25.119]  Yes     Priority: Medium     Pharmacologic nuclear stress (10/21/2020): apical ischemia.  Normal LVEF.  Coronary calcification on CT attenuation correction images.      Chronic diastolic congestive heart failure [I50.32]  Yes     Priority: Medium     Echocardiogram (01/04/2017): LVEF 50%. Trace MR and TR.   Echo (1/27/2021):LVEF = 70%.  Grade 2 diastolic dysfunction.  Cardiac valves anatomically and functionally normal.      Essential hypertension [I10]  Yes     Priority: Medium     Target blood pressure <130/80 mmHg      Hyperlipidemia LDL goal <70 [E78.5]  Yes     Priority: Low     High intensity statin therapy indicated given the presence of CAD              Patient sitting up in the chair on O2 at 3 L by nasal cannula with O2 saturations 93%.  He denies any chest pain.  He remains in atrial fibrillation with rates in the 90s.  He is unaware he is out of rhythm.           Vital Sign Min/Max for last 24 hours  Temp  Min: 97.3 °F (36.3 °C)  Max: 98.3 °F (36.8 °C)   BP  Min: 90/55  Max: 112/60   Pulse  Min: 90  Max: 116   Resp  Min: 16  Max: 20   SpO2  Min: 90 %  Max: 95 %   Flow (L/min) (Oxygen Therapy)  Min: 3  Max: 6      Intake/Output Summary (Last 24 hours) at 12/17/2024 0754  Last  data filed at 12/17/2024 0600  Gross per 24 hour   Intake 500 ml   Output 2150 ml   Net -1650 ml           Physical Exam  Constitutional:       General: He is awake.   Cardiovascular:      Rate and Rhythm: Normal rate. Rhythm irregularly irregular.      Heart sounds: No murmur heard.  Pulmonary:      Effort: Pulmonary effort is normal.      Breath sounds: Decreased breath sounds, wheezing and rhonchi present.   Musculoskeletal:      Right lower leg: No edema.      Left lower leg: No edema.   Skin:     General: Skin is warm and dry.   Neurological:      Mental Status: He is alert.   Psychiatric:         Behavior: Behavior is cooperative.       Tele: Atrial fibrillation     Results Review (reviewed the patient's recent labs in the electronic medical record):      EKG (12/13/2024): Atrial fibrillation with  bpm     CXR (12/15/2024): No change in multifocal pneumonia     ECHO (12/12/2024): Normal LVEF 65%.  No valvular abnormality      Results from last 7 days   Lab Units 12/16/24  0242 12/15/24  0427 12/14/24  0713 12/13/24  0539 12/12/24  0217 12/11/24  1355   SODIUM mmol/L 134* 132* 131* 134* 131* 128*   POTASSIUM mmol/L 4.5 4.2 4.4 5.1 4.7 5.2   CHLORIDE mmol/L 95* 95* 95* 93* 92* 88*   BUN mg/dL 44* 52* 75* 82* 55* 53*   CREATININE mg/dL 1.61* 1.62* 2.02* 2.45* 1.90* 1.99*   MAGNESIUM mg/dL 1.8  --   --  2.3  --  1.9     Results from last 7 days   Lab Units 12/11/24  1510 12/11/24  1355   HSTROP T ng/L 70* 76*     Results from last 7 days   Lab Units 12/17/24  0615 12/16/24  0242 12/15/24  0238   WBC 10*3/mm3 12.61* 17.66* 16.85*   HEMOGLOBIN g/dL 13.0 12.1* 11.9*   HEMATOCRIT % 40.5 36.9* 36.4*   PLATELETS 10*3/mm3 116* 123* 114*       Lab Results   Component Value Date    HGBA1C 8.50 (H) 12/12/2024       Lab Results   Component Value Date    CHOL 134 09/12/2024    CHLPL 125 06/06/2016    TRIG 168 (H) 09/12/2024    HDL 39 (L) 09/12/2024    LDL 66 09/12/2024              Atrial fibrillation with  RVR/paroxysmal atrial fibrillaton  New onset this admission.  Occurred in the setting of multifocal pneumonia.  Remains in atrial fibrillation that is rate control  Amiodarone 400 mg twice daily x 7 days followed by 200 mg daily thereafter  Metoprolol tartrate 25 mg twice daily  Eliquis 5 mg twice daily     Acute on chronic diastolic heart failure  Echo this admission normal LVEF and no valvular abnormality        Coronary artery disease/elevated troponins/NSTEMI  Has not seen cardiology since 2020.  Had stress test at that time with apical ischemia and coronary calcification on CT imaging  No cardiac catheterization performed due to lack of symptoms and medical management with antianginals recommended at that time  Troponins are elevated but flat.  Type I versus type II NSTEMI but likely type II from pneumonia/rhinovirus/sepsis  Echo this admission normal LVEF and no valvular abnormality  Will need ischemic eval by cardiac catheterization after recovers from pneumonia and rhinovirus but this will be planned as outpatient  Aspirin and statin therapy     Hypertension/hypotension  Current /56 losartan  25 mg daily  Metoprolol tartrate 25 mg twice daily     Hyperlipidemia  Last lipid panel 9/2024 total cholesterol 134, triglycerides 168, HDL 39, LDL 66  Was on Zocor at home now on Crestor 10 mg daily     Type 2 diabetes mellitus  Not well-controlled hemoglobin A1c 8.5        Multifocal pneumonia/positive for rhinovirus/sepsis  Management per primary team        Acute kidney injury  Creatinine worsened over 2 but improving and currently 1.61           Continue Eliquis for stroke prophylaxis  Continue to load with p.o. amiodarone  Nothing else to add from cardiology standpoint.  Patient will follow-up with cardiology in 2 weeks after discharge.  If remains in atrial fibrillation at that time we will schedule outpatient external cardioversion.  Please call cardiology with any further questions.    Electronically  signed by CALIN Santos, 12/17/24, 7:54 AM EST.

## 2024-12-18 ENCOUNTER — APPOINTMENT (OUTPATIENT)
Dept: GENERAL RADIOLOGY | Facility: HOSPITAL | Age: 79
End: 2024-12-18
Payer: MEDICARE

## 2024-12-18 LAB
ALBUMIN SERPL-MCNC: 2.7 G/DL (ref 3.5–5.2)
ALBUMIN/GLOB SERPL: 0.8 G/DL
ALP SERPL-CCNC: 74 U/L (ref 39–117)
ALT SERPL W P-5'-P-CCNC: 18 U/L (ref 1–41)
ANION GAP SERPL CALCULATED.3IONS-SCNC: 17 MMOL/L (ref 5–15)
AST SERPL-CCNC: 25 U/L (ref 1–40)
BASOPHILS # BLD AUTO: 0.02 10*3/MM3 (ref 0–0.2)
BASOPHILS NFR BLD AUTO: 0.2 % (ref 0–1.5)
BILIRUB SERPL-MCNC: 0.3 MG/DL (ref 0–1.2)
BUN SERPL-MCNC: 86 MG/DL (ref 8–23)
BUN/CREAT SERPL: 30.8 (ref 7–25)
CALCIUM SPEC-SCNC: 8.8 MG/DL (ref 8.6–10.5)
CHLORIDE SERPL-SCNC: 92 MMOL/L (ref 98–107)
CO2 SERPL-SCNC: 21 MMOL/L (ref 22–29)
CREAT SERPL-MCNC: 2.79 MG/DL (ref 0.76–1.27)
CRP SERPL-MCNC: 10.54 MG/DL (ref 0–0.5)
DEPRECATED RDW RBC AUTO: 49.3 FL (ref 37–54)
EGFRCR SERPLBLD CKD-EPI 2021: 22.2 ML/MIN/1.73
EOSINOPHIL # BLD AUTO: 0 10*3/MM3 (ref 0–0.4)
EOSINOPHIL NFR BLD AUTO: 0 % (ref 0.3–6.2)
ERYTHROCYTE [DISTWIDTH] IN BLOOD BY AUTOMATED COUNT: 13.8 % (ref 12.3–15.4)
GLOBULIN UR ELPH-MCNC: 3.5 GM/DL
GLUCOSE BLDC GLUCOMTR-MCNC: 183 MG/DL (ref 70–130)
GLUCOSE BLDC GLUCOMTR-MCNC: 253 MG/DL (ref 70–130)
GLUCOSE BLDC GLUCOMTR-MCNC: 376 MG/DL (ref 70–130)
GLUCOSE BLDC GLUCOMTR-MCNC: 445 MG/DL (ref 70–130)
GLUCOSE BLDC GLUCOMTR-MCNC: 478 MG/DL (ref 70–130)
GLUCOSE SERPL-MCNC: 367 MG/DL (ref 65–99)
HCT VFR BLD AUTO: 33.7 % (ref 37.5–51)
HGB BLD-MCNC: 11.3 G/DL (ref 13–17.7)
IMM GRANULOCYTES # BLD AUTO: 0.18 10*3/MM3 (ref 0–0.05)
IMM GRANULOCYTES NFR BLD AUTO: 1.4 % (ref 0–0.5)
LYMPHOCYTES # BLD AUTO: 1.18 10*3/MM3 (ref 0.7–3.1)
LYMPHOCYTES NFR BLD AUTO: 8.9 % (ref 19.6–45.3)
MAGNESIUM SERPL-MCNC: 1.9 MG/DL (ref 1.6–2.4)
MCH RBC QN AUTO: 32.7 PG (ref 26.6–33)
MCHC RBC AUTO-ENTMCNC: 33.5 G/DL (ref 31.5–35.7)
MCV RBC AUTO: 97.4 FL (ref 79–97)
MONOCYTES # BLD AUTO: 0.52 10*3/MM3 (ref 0.1–0.9)
MONOCYTES NFR BLD AUTO: 3.9 % (ref 5–12)
NEUTROPHILS NFR BLD AUTO: 11.39 10*3/MM3 (ref 1.7–7)
NEUTROPHILS NFR BLD AUTO: 85.6 % (ref 42.7–76)
NRBC BLD AUTO-RTO: 0 /100 WBC (ref 0–0.2)
PHOSPHATE SERPL-MCNC: 6.3 MG/DL (ref 2.5–4.5)
PLATELET # BLD AUTO: 128 10*3/MM3 (ref 140–450)
PMV BLD AUTO: 12.9 FL (ref 6–12)
POTASSIUM SERPL-SCNC: 4.8 MMOL/L (ref 3.5–5.2)
PROT SERPL-MCNC: 6.2 G/DL (ref 6–8.5)
RBC # BLD AUTO: 3.46 10*6/MM3 (ref 4.14–5.8)
SODIUM SERPL-SCNC: 130 MMOL/L (ref 136–145)
WBC NRBC COR # BLD AUTO: 13.29 10*3/MM3 (ref 3.4–10.8)

## 2024-12-18 PROCEDURE — 94664 DEMO&/EVAL PT USE INHALER: CPT

## 2024-12-18 PROCEDURE — 82948 REAGENT STRIP/BLOOD GLUCOSE: CPT

## 2024-12-18 PROCEDURE — 25010000002 CEFTRIAXONE PER 250 MG: Performed by: INTERNAL MEDICINE

## 2024-12-18 PROCEDURE — 63710000001 INSULIN LISPRO (HUMAN) PER 5 UNITS: Performed by: INTERNAL MEDICINE

## 2024-12-18 PROCEDURE — 25010000002 ALBUMIN HUMAN 25% PER 50 ML: Performed by: INTERNAL MEDICINE

## 2024-12-18 PROCEDURE — 99232 SBSQ HOSP IP/OBS MODERATE 35: CPT | Performed by: INTERNAL MEDICINE

## 2024-12-18 PROCEDURE — 63710000001 INSULIN GLARGINE PER 5 UNITS: Performed by: INTERNAL MEDICINE

## 2024-12-18 PROCEDURE — 85025 COMPLETE CBC W/AUTO DIFF WBC: CPT | Performed by: INTERNAL MEDICINE

## 2024-12-18 PROCEDURE — 94799 UNLISTED PULMONARY SVC/PX: CPT

## 2024-12-18 PROCEDURE — 71045 X-RAY EXAM CHEST 1 VIEW: CPT

## 2024-12-18 PROCEDURE — 83735 ASSAY OF MAGNESIUM: CPT | Performed by: INTERNAL MEDICINE

## 2024-12-18 PROCEDURE — 84100 ASSAY OF PHOSPHORUS: CPT | Performed by: INTERNAL MEDICINE

## 2024-12-18 PROCEDURE — 25010000002 METHYLPREDNISOLONE PER 40 MG: Performed by: INTERNAL MEDICINE

## 2024-12-18 PROCEDURE — P9047 ALBUMIN (HUMAN), 25%, 50ML: HCPCS | Performed by: INTERNAL MEDICINE

## 2024-12-18 PROCEDURE — 80053 COMPREHEN METABOLIC PANEL: CPT | Performed by: INTERNAL MEDICINE

## 2024-12-18 PROCEDURE — 86140 C-REACTIVE PROTEIN: CPT | Performed by: INTERNAL MEDICINE

## 2024-12-18 PROCEDURE — 94761 N-INVAS EAR/PLS OXIMETRY MLT: CPT

## 2024-12-18 RX ORDER — INSULIN LISPRO 100 [IU]/ML
15 INJECTION, SOLUTION INTRAVENOUS; SUBCUTANEOUS
Status: DISCONTINUED | OUTPATIENT
Start: 2024-12-18 | End: 2024-12-19 | Stop reason: HOSPADM

## 2024-12-18 RX ORDER — IBUPROFEN 600 MG/1
1 TABLET ORAL
Status: DISCONTINUED | OUTPATIENT
Start: 2024-12-18 | End: 2024-12-19 | Stop reason: HOSPADM

## 2024-12-18 RX ORDER — ALBUMIN (HUMAN) 12.5 G/50ML
25 SOLUTION INTRAVENOUS EVERY 8 HOURS
Status: DISCONTINUED | OUTPATIENT
Start: 2024-12-18 | End: 2024-12-19 | Stop reason: HOSPADM

## 2024-12-18 RX ORDER — NICOTINE POLACRILEX 4 MG
15 LOZENGE BUCCAL
Status: DISCONTINUED | OUTPATIENT
Start: 2024-12-18 | End: 2024-12-19 | Stop reason: HOSPADM

## 2024-12-18 RX ORDER — INSULIN LISPRO 100 [IU]/ML
3-14 INJECTION, SOLUTION INTRAVENOUS; SUBCUTANEOUS
Status: DISCONTINUED | OUTPATIENT
Start: 2024-12-18 | End: 2024-12-19 | Stop reason: HOSPADM

## 2024-12-18 RX ORDER — DEXTROSE MONOHYDRATE 25 G/50ML
25 INJECTION, SOLUTION INTRAVENOUS
Status: DISCONTINUED | OUTPATIENT
Start: 2024-12-18 | End: 2024-12-19 | Stop reason: HOSPADM

## 2024-12-18 RX ADMIN — INSULIN LISPRO 29 UNITS: 100 INJECTION, SOLUTION INTRAVENOUS; SUBCUTANEOUS at 09:31

## 2024-12-18 RX ADMIN — ALBUMIN (HUMAN) 25 G: 0.25 INJECTION, SOLUTION INTRAVENOUS at 22:35

## 2024-12-18 RX ADMIN — METHYLPREDNISOLONE SODIUM SUCCINATE 40 MG: 40 INJECTION, POWDER, FOR SOLUTION INTRAMUSCULAR; INTRAVENOUS at 05:15

## 2024-12-18 RX ADMIN — SODIUM CHLORIDE 2000 MG: 900 INJECTION INTRAVENOUS at 23:16

## 2024-12-18 RX ADMIN — AMIODARONE HYDROCHLORIDE 400 MG: 200 TABLET ORAL at 08:30

## 2024-12-18 RX ADMIN — METOPROLOL TARTRATE 25 MG: 25 TABLET, FILM COATED ORAL at 23:57

## 2024-12-18 RX ADMIN — INSULIN GLARGINE 50 UNITS: 100 INJECTION, SOLUTION SUBCUTANEOUS at 22:54

## 2024-12-18 RX ADMIN — AMIODARONE HYDROCHLORIDE 400 MG: 200 TABLET ORAL at 22:37

## 2024-12-18 RX ADMIN — METOPROLOL TARTRATE 25 MG: 25 TABLET, FILM COATED ORAL at 08:30

## 2024-12-18 RX ADMIN — MIDODRINE HYDROCHLORIDE 10 MG: 10 TABLET ORAL at 18:13

## 2024-12-18 RX ADMIN — INSULIN LISPRO 15 UNITS: 100 INJECTION, SOLUTION INTRAVENOUS; SUBCUTANEOUS at 18:13

## 2024-12-18 RX ADMIN — IPRATROPIUM BROMIDE AND ALBUTEROL SULFATE 3 ML: 2.5; .5 SOLUTION RESPIRATORY (INHALATION) at 13:44

## 2024-12-18 RX ADMIN — IPRATROPIUM BROMIDE AND ALBUTEROL SULFATE 3 ML: 2.5; .5 SOLUTION RESPIRATORY (INHALATION) at 20:16

## 2024-12-18 RX ADMIN — PANTOPRAZOLE SODIUM 40 MG: 40 TABLET, DELAYED RELEASE ORAL at 05:16

## 2024-12-18 RX ADMIN — DOXYCYCLINE 100 MG: 100 CAPSULE ORAL at 22:36

## 2024-12-18 RX ADMIN — INSULIN LISPRO 8 UNITS: 100 INJECTION, SOLUTION INTRAVENOUS; SUBCUTANEOUS at 18:14

## 2024-12-18 RX ADMIN — INSULIN LISPRO 43 UNITS: 100 INJECTION, SOLUTION INTRAVENOUS; SUBCUTANEOUS at 12:20

## 2024-12-18 RX ADMIN — MIDODRINE HYDROCHLORIDE 10 MG: 10 TABLET ORAL at 11:52

## 2024-12-18 RX ADMIN — ROSUVASTATIN 10 MG: 10 TABLET, FILM COATED ORAL at 22:36

## 2024-12-18 RX ADMIN — BISACODYL 5 MG: 5 TABLET, COATED ORAL at 08:31

## 2024-12-18 RX ADMIN — GUAIFENESIN 1200 MG: 600 TABLET ORAL at 22:36

## 2024-12-18 RX ADMIN — INSULIN LISPRO 15 UNITS: 100 INJECTION, SOLUTION INTRAVENOUS; SUBCUTANEOUS at 14:50

## 2024-12-18 RX ADMIN — POLYETHYLENE GLYCOL 3350 17 G: 17 POWDER, FOR SOLUTION ORAL at 00:25

## 2024-12-18 RX ADMIN — INSULIN LISPRO 3 UNITS: 100 INJECTION, SOLUTION INTRAVENOUS; SUBCUTANEOUS at 22:41

## 2024-12-18 RX ADMIN — ALBUMIN (HUMAN) 25 G: 0.25 INJECTION, SOLUTION INTRAVENOUS at 14:51

## 2024-12-18 RX ADMIN — MIDODRINE HYDROCHLORIDE 10 MG: 10 TABLET ORAL at 08:30

## 2024-12-18 RX ADMIN — INSULIN GLARGINE 50 UNITS: 100 INJECTION, SOLUTION SUBCUTANEOUS at 14:50

## 2024-12-18 RX ADMIN — APIXABAN 2.5 MG: 2.5 TABLET, FILM COATED ORAL at 22:36

## 2024-12-18 RX ADMIN — IPRATROPIUM BROMIDE AND ALBUTEROL SULFATE 3 ML: 2.5; .5 SOLUTION RESPIRATORY (INHALATION) at 07:52

## 2024-12-18 RX ADMIN — IPRATROPIUM BROMIDE AND ALBUTEROL SULFATE 3 ML: 2.5; .5 SOLUTION RESPIRATORY (INHALATION) at 16:47

## 2024-12-18 RX ADMIN — ASPIRIN 81 MG: 81 TABLET, COATED ORAL at 08:31

## 2024-12-18 RX ADMIN — APIXABAN 2.5 MG: 2.5 TABLET, FILM COATED ORAL at 08:31

## 2024-12-18 RX ADMIN — GUAIFENESIN 1200 MG: 600 TABLET ORAL at 08:30

## 2024-12-18 RX ADMIN — DOXYCYCLINE 100 MG: 100 CAPSULE ORAL at 08:30

## 2024-12-18 NOTE — PLAN OF CARE
Goal Outcome Evaluation:      BP soft this afternoon. Provider made aware. Albumin and Midodrine given per provider order. Afib on tele monitor with HR mostly between  BPM. Pt reports passing flatus. Denies pain, nausea, vomiting, lightheadedness, dizziness. Continues to be on 3 LPM through NC with oxygen saturation at 92%. Pt denies any needs at this time.

## 2024-12-18 NOTE — PLAN OF CARE
Goal Outcome Evaluation:  Plan of Care Reviewed With: patient        Progress: no change  Outcome Evaluation: Patient resting in chair with no signs and symptoms of distress noted. Blood pressure soft, md aware, otherwise VSS. 3L NC. Afib on the tele monitor. No acute changes since initial assessment and needs met all throughout the shift. Continue with POC.

## 2024-12-18 NOTE — CASE MANAGEMENT/SOCIAL WORK
Continued Stay Note  [facility]     Patient Name: Gabriela Mar  MRN: [id number]  Today's Date: 12/18/2024    Admit Date: 12/11/2024    Plan: [facility]   Discharge Plan       Row Name 12/18/24 1241       Plan    Plan [facility]    Plan Comments Today's discharge cancelled due to hyperglycemia (478) and elevated creatinine (2.79). April with [facility] was informed. [facility] van transportation rescheduled to tomorrow 12/19 @ 1130; MD, RN and Pt are aware.  will continue to follow for medical readiness.    Final Discharge Disposition Code 62 - inpatient rehab facility                   Discharge Codes    No documentation.                 Expected Discharge Date and Time       Expected Discharge Date Expected Discharge Time    Dec 19, 2024               Bernice Logan RN

## 2024-12-18 NOTE — CASE MANAGEMENT/SOCIAL WORK
Case Management Discharge Note      Final Note: Pt is discharging to [facility] today. CM confirmed with April, facility liaison, Pt can be accepted today. Facility will retrieve discharge summary from [facility]. RN to call report to [phone]. If accepting RN is unavailable for report, RN may call report to house supervisor at [phone]. [facility] transportation is arranged at 1430. Pt will need to be at the maternity entrance at 1420 for ; RN aware. Pt and daughter are aware and agreeable to plan. No other discharge needs identified.         Selected Continued Care - Admitted Since 12/11/2024       Destination Coordination complete.      Service Provider Services Address Phone Fax Patient Preferred    [facility] [address] [address] [phone] --              Durable Medical Equipment    No services have been selected for the patient.                Dialysis/Infusion    No services have been selected for the patient.                Home Medical Care    No services have been selected for the patient.                Therapy    No services have been selected for the patient.                Community Resources    No services have been selected for the patient.                Community & DME    No services have been selected for the patient.                    Transportation Services  Other: Other ([facility] van 12/18 @ 1430)    Final Discharge Disposition Code: 62 - inpatient rehab facility

## 2024-12-18 NOTE — PROGRESS NOTES
Follow-up for    [facility] Medicine Services  PROGRESS NOTE    Patient Name: Gabriela Mar  : 1945  MRN: [id number]    Date of Admission: 2024  Primary Care Physician: Krystina Gee MD    Subjective   Subjective     CC:  Follow-up for pneumonia    HPI:  Patient seen and examined this morning.  Continues to feel well.  Blood pressure somewhat improved after IV albumin and midodrine yesterday.  Oxygen saturation remains around 95% on 3 L nasal cannula.  Was supposed to go to [facility] today but discharge was postponed because of rising creatinine    Objective   Objective     Vital Signs:   Temp:  [97.6 °F (36.4 °C)-97.9 °F (36.6 °C)] 97.6 °F (36.4 °C)  Heart Rate:  [] 77  Resp:  [16-20] 16  BP: ()/(42-79) 96/68  Flow (L/min) (Oxygen Therapy):  [3] 3     Physical Exam:  General: Comfortable, not in distress, conversant and cooperative  Head: Atraumatic and normocephalic  Eyes: No Icterus. No pallor  Ears:  Ears appear intact with no abnormalities noted  Throat: No oral lesions, no thrush  Neck: Supple, trachea midline  Lungs: Bilateral coarse crackles both lung fields with scattered wheezing  Heart:  Normal S1 and S2, no murmur, no gallop, No JVD, 2+ lower extremity swelling  Abdomen:  Soft, no tenderness, no organomegaly, normal bowel sounds, no organomegaly  Extremities: pulses equal bilaterally  Skin: No bleeding, bruising or rash, normal skin turgor and elasticity  Neurologic: Cranial nerves appear intact with no evidence of facial asymmetry, normal motor and sensory functions in all 4 extremities  Psych: Alert and oriented x 3, normal mood    Results Reviewed:  LAB RESULTS:      Lab 24  0438 24  1248 24  0615 24  0926 24  0242 12/15/24  1947 12/15/24  1329 12/15/24  0238 24  1336 24  0713 24  0539 24  0952 24  0217 24  1724 24  1510 24  1355 24  1355   WBC  13.29*  --  12.61*  --  17.66*  --   --  16.85*  --  15.90* 16.95*  --  10.93*  --   --   --   --  16.10*   HEMOGLOBIN 11.3*  --  13.0  --  12.1*  --   --  11.9*  --  11.8* 12.6*  --  13.0  --   --   --   --  12.6*   HEMATOCRIT 33.7*  --  40.5  --  36.9*  --   --  36.4*  --  36.2* 39.1  --  40.7  --   --   --   --  38.5   PLATELETS 128*  --  116*  --  123*  --   --  114*  --  131* 169  --  171  --   --   --   --  149   NEUTROS ABS 11.39*  --  10.59*  --  14.30*  --   --  14.22*  --  12.72* 14.20*  --  9.17*  --   --   --   --  12.43*   IMMATURE GRANS (ABS) 0.18*  --  0.20*  --  0.23*  --   --  0.15*  --  0.17* 0.19*  --  0.05  --   --   --   --  0.10*   LYMPHS ABS 1.18  --  1.50  --  2.13  --   --  1.71  --  2.11 1.69  --  1.48  --   --   --   --  2.04   MONOS ABS 0.52  --  0.27  --  0.91*  --   --  0.72  --  0.85 0.82  --  0.20  --   --   --   --  1.45*   EOS ABS 0.00  --  0.00  --  0.04  --   --  0.01  --  0.00 0.00  --  0.00  --   --   --   --  0.00   MCV 97.4*  --  100.7*  --  97.9*  --   --  98.6*  --  99.2* 100.5*  --  100.7*  --   --   --   --  99.2*   CRP 10.54* 20.65*  --   --  28.52*  --   --   --   --   --   --   --   --   --   --   --   --   --    PROCALCITONIN  --   --   --   --   --   --   --   --   --  0.77* 0.78*  --  0.90*  --   --   --   --   --    LACTATE  --   --   --   --   --   --   --   --   --  1.2  --   --   --   --  1.7  --   --  3.7*   PROTIME  --   --   --   --   --   --   --   --   --   --   --   --   --  17.1*  --   --   --   --    APTT  --   --   --   --   --   --   --   --   --   --   --   --   --  40.1*  --   --   --   --    HEPARIN ANTI-XA  --   --   --  0.31 0.29* 0.22* 0.28* 0.16*   < > >1.10* 0.36   < > 0.10* 0.10*  --   --    < >  --    D DIMER QUANT  --   --   --   --   --   --   --   --   --   --   --   --   --   --   --   --   --  3.21*   HSTROP T  --   --   --   --   --   --   --   --   --   --   --   --   --   --   --  70*  --  76*    < > = values in this interval not  displayed.         Lab 12/18/24 0438 12/17/24  1248 12/16/24  0242 12/15/24  0427 12/14/24  0713 12/13/24  0539 12/12/24 0217 12/11/24  1355   SODIUM 130* 131* 134* 132* 131* 134* 131* 128*   POTASSIUM 4.8 4.8 4.5 4.2 4.4 5.1 4.7 5.2   CHLORIDE 92* 88* 95* 95* 95* 93* 92* 88*   CO2 21.0* 22.0 29.0 24.0 25.0 22.0 22.0 25.0   ANION GAP 17.0* 21.0* 10.0 13.0 11.0 19.0* 17.0* 15.0   BUN 86* 68* 44* 52* 75* 82* 55* 53*   CREATININE 2.79* 2.14* 1.61* 1.62* 2.02* 2.45* 1.90* 1.99*   EGFR 22.2* 30.5* 43.0* 42.6* 32.7* 26.0* 35.2* 33.3*   GLUCOSE 367* 347* 156* 219* 174* 269* 431* 509*   CALCIUM 8.8 8.9 9.3 9.0 8.5* 8.9 9.2 9.6   MAGNESIUM 1.9 1.7 1.8  --   --  2.3  --  1.9   PHOSPHORUS 6.3* 6.3* 3.2  --  3.3 5.8*  --   --    HEMOGLOBIN A1C  --   --   --   --   --   --  8.50*  --    TSH  --   --   --   --   --   --  0.747  --          Lab 12/18/24 0438 12/17/24 1248 12/14/24  0713 12/13/24  0539 12/12/24 0217 12/11/24  1355   TOTAL PROTEIN 6.2 5.7*  --  6.7 7.1 7.3   ALBUMIN 2.7* 2.6* 2.7* 3.1* 2.9* 3.5   GLOBULIN 3.5 3.1  --  3.6 4.2 3.8   ALT (SGPT) 18 21  --  24 22 23   AST (SGOT) 25 29  --  42* 23 27   BILIRUBIN 0.3 0.4  --  0.3 0.4 0.8   ALK PHOS 74 95  --  99 109 126*   LIPASE  --   --   --   --   --  21         Lab 12/16/24  0242 12/15/24  0427 12/14/24  0713 12/13/24  0539 12/11/24 2016 12/11/24  1510 12/11/24  1355   PROBNP 9,458.0* 8,911.0* 7,920.0* 8,519.0*  --   --  4,921.0*   HSTROP T  --   --   --   --   --  70* 76*   PROTIME  --   --   --   --  17.1*  --   --    INR  --   --   --   --  1.38*  --   --                  Lab 12/12/24  0023 12/11/24  1423   PH, ARTERIAL 7.246* 7.325*   PCO2, ARTERIAL 58.7* 49.9*   PO2 .0* 92.5   FIO2 100 36   HCO3 ART 25.5 26.0   BASE EXCESS ART -2.8* -0.6*   CARBOXYHEMOGLOBIN 0.7 1.2     Brief Urine Lab Results  (Last result in the past 365 days)        Color   Clarity   Blood   Leuk Est   Nitrite   Protein   CREAT   Urine HCG        09/18/24 1356             100                  Microbiology Results Abnormal       Procedure Component Value - Date/Time    COVID PRE-OP / PRE-PROCEDURE SCREENING ORDER (NO ISOLATION) - Swab, Nasopharynx [[id number]]  (Abnormal) Collected: 12/11/24 1418    Lab Status: Final result Specimen: Swab from Nasopharynx Updated: 12/11/24 1518    Narrative:      The following orders were created for panel order COVID PRE-OP / PRE-PROCEDURE SCREENING ORDER (NO ISOLATION) - Swab, Nasopharynx.  Procedure                               Abnormality         Status                     ---------                               -----------         ------                     Respiratory Panel PCR w/...[[id number]]  Abnormal            Final result                 Please view results for these tests on the individual orders.    Respiratory Panel PCR w/COVID-19(SARS-CoV-2) [facility] In-House, NP Swab in UTM/VTM, 2 HR TAT - Swab, Nasopharynx [[id number]]  (Abnormal) Collected: 12/11/24 1418    Lab Status: Final result Specimen: Swab from Nasopharynx Updated: 12/11/24 1518     ADENOVIRUS, PCR Not Detected     Coronavirus 229E Not Detected     Coronavirus HKU1 Not Detected     Coronavirus NL63 Not Detected     Coronavirus OC43 Not Detected     COVID19 Not Detected     Human Metapneumovirus Not Detected     Human Rhinovirus/Enterovirus Detected     Influenza A PCR Not Detected     Influenza B PCR Not Detected     Parainfluenza Virus 1 Not Detected     Parainfluenza Virus 2 Not Detected     Parainfluenza Virus 3 Not Detected     Parainfluenza Virus 4 Not Detected     RSV, PCR Not Detected     Bordetella pertussis pcr Not Detected     Bordetella parapertussis PCR Not Detected     Chlamydophila pneumoniae PCR Not Detected     Mycoplasma pneumo by PCR Not Detected    Narrative:      In the setting of a positive respiratory panel with a viral infection PLUS a negative procalcitonin without other underlying concern for bacterial infection, consider observing off antibiotics  or discontinuation of antibiotics and continue supportive care. If the respiratory panel is positive for atypical bacterial infection (Bordetella pertussis, Chlamydophila pneumoniae, or Mycoplasma pneumoniae), consider antibiotic de-escalation to target atypical bacterial infection.            XR Chest 1 View    Result Date: 12/18/2024  XR CHEST 1 VW Date of Exam: 12/18/2024 3:37 AM EST Indication: Follow-up pneumonia Comparison: Chest radiograph December 15, 2024 Findings: The heart is enlarged. There has been mild improvement in the patchy airspace opacity in the lateral right upper lobe. Bilateral basilar infiltrates persist. The left upper lobe remains clear.     Impression: Impression: Mild improvement in the right upper lobe infiltrate. Persistent basilar infiltrates. Electronically Signed: Cristo York MD  12/18/2024 7:00 AM EST  Workstation ID: [id number]     Results for orders placed during the hospital encounter of 12/11/24    Adult Transthoracic Echo Complete w/ Color, Spectral and Contrast if Necessary Per Protocol    Interpretation Summary    Left ventricular systolic function is normal. Estimated left ventricular EF = 65%    The right ventricular cavity is mildly dilated.    No hemodynamically significant valvular heart disease      Current medications:  Scheduled Meds:albumin human, 25 g, Intravenous, Q8H  amiodarone, 400 mg, Oral, Q12H   Followed by  [START ON 12/22/2024] amiodarone, 200 mg, Oral, Q24H  apixaban, 2.5 mg, Oral, Q12H  aspirin, 81 mg, Oral, Daily  cefTRIAXone, 2,000 mg, Intravenous, Q24H  doxycycline, 100 mg, Oral, Q12H  guaiFENesin, 1,200 mg, Oral, Q12H  insulin glargine, 50 Units, Subcutaneous, Q12H  Insulin Lispro, 15 Units, Subcutaneous, TID With Meals  insulin lispro, 3-14 Units, Subcutaneous, 4x Daily AC & at Bedtime  ipratropium-albuterol, 3 mL, Nebulization, 4x Daily - RT  [Held by provider] losartan, 25 mg, Oral, Daily  metoprolol tartrate, 25 mg, Oral, Q12H  midodrine, 10 mg,  Oral, TID AC  pantoprazole, 40 mg, Oral, Q AM  rosuvastatin, 10 mg, Oral, Nightly      Continuous Infusions:     PRN Meds:.  senna-docusate sodium **AND** polyethylene glycol **AND** bisacodyl **AND** bisacodyl    dextrose    dextrose    dextrose    dextrose    glucagon (human recombinant)    insulin lispro    ipratropium-albuterol    nitroglycerin    sodium chloride    Assessment & Plan   Assessment & Plan     Active Hospital Problems    Diagnosis  POA    **Multifocal pneumonia [J18.9]  Yes    Acute heart failure with preserved ejection fraction (HFpEF) [I50.31]  Yes    Paroxysmal atrial fibrillation [I48.0]  No    Type 2 diabetes mellitus with hyperglycemia, with long-term current use of insulin [E11.65, Z79.4]  Not Applicable    Coronary artery disease involving native coronary artery of native heart with angina pectoris [I25.119]  Yes    ZOHRA (acute kidney injury) [N17.9]  Yes    Chronic diastolic congestive heart failure [I50.32]  Yes    Essential hypertension [I10]  Yes    Hyperlipidemia LDL goal <70 [E78.5]  Yes      Resolved Hospital Problems   No resolved problems to display.        Brief Hospital Course to date:  Gabriela Mar is a 79 y.o. male with past medical history of essential hypertension, type 2 diabetes, GERD, COPD, presented to the hospital with acute hypoxic respiratory failure, found to have bilateral pneumonia and A-fib with RVR.    Acute hypoxia, improving  Severe COPD exacerbation  Bilateral community-acquired pneumonia, improving  Rhinovirus infection  Patient presented to the hospital with severe dyspnea and weakness.  He was found to be hypoxic satting 70% on room air  CTA chest with evidence of bilateral pneumonia.  No PE  Started on Rocephin and doxycycline  CRP trended down  Continue Solu-Medrol  Continue Mucinex, DuoNebs and I-S  Wean of oxygen supplementation.  Currently on 3 L/min    ZOHRA on CKD stage III  Baseline creatinine is around 1.3-1.6  Creatinine slowly trending up today at  2.79  ZOHRA is likely secondary to diuresis, losartan and hypotension  Losartan held as well as diuresis  Continue midodrine and IV albumin for hypotension  Monitor kidney function    A-fib with RVR  Status post amiodarone drip.  Currently on p.o. amiodarone  Continue metoprolol   Status post heparin drip.  Currently on low-dose Eliquis 2.5 mg twice daily  Dr. Singer/cardiology following.  Plan for rate control strategy for now and after he recovers from pneumonia, plan for SULEMAN and cardioversion    Acute on chronic diastolic heart failure  Echo this admission normal LVEF and no valvular abnormality  As needed diuresis      Coronary artery disease/elevated troponins  NSTEMI, type I versus type II  Patient with no active chest pain or acute ischemic changes on EKG  Echo with normal ejection fraction  Seen by cardiology.  Plan for left heart cath as outpatient after he recovers from pneumonia  Currently on aspirin and Eliquis    Hypertension/hypotension  Continue losartan 25 mg daily     Dyslipidemia  Lipid panel 9/2024 total cholesterol 134, triglycerides 168, HDL 39, LDL 66  Continue rosuvastatin     Type 2 diabetes mellitus  A1c 8.5%  Discontinue Glucomander  Start insulin glargine 50 units twice daily with Premeal insulin 15 units 3 times daily with moderate dose sliding scale    Expected Discharge Location and Transportation: [facility]/rehab  Expected Discharge   Expected Discharge Date: 12/19/2024; Expected Discharge Time:  9:00 AM     VTE Prophylaxis:  Pharmacologic & mechanical VTE prophylaxis orders are present.         AM-PAC 6 Clicks Score (PT): 17 (12/17/24 0900)    CODE STATUS:   Code Status and Medical Interventions: CPR (Attempt to Resuscitate); Full Support   Ordered at: 12/11/24 2051     Code Status (Patient has no pulse and is not breathing):    CPR (Attempt to Resuscitate)     Medical Interventions (Patient has pulse or is breathing):    Full Support       Jose J Townsend MD  12/18/24

## 2024-12-19 VITALS
SYSTOLIC BLOOD PRESSURE: 108 MMHG | HEART RATE: 91 BPM | TEMPERATURE: 97.6 F | WEIGHT: 224.87 LBS | HEIGHT: 70 IN | BODY MASS INDEX: 32.19 KG/M2 | RESPIRATION RATE: 16 BRPM | DIASTOLIC BLOOD PRESSURE: 76 MMHG | OXYGEN SATURATION: 95 %

## 2024-12-19 LAB
ALBUMIN SERPL-MCNC: 3.5 G/DL (ref 3.5–5.2)
ALBUMIN/GLOB SERPL: 1.2 G/DL
ALP SERPL-CCNC: 60 U/L (ref 39–117)
ALT SERPL W P-5'-P-CCNC: 18 U/L (ref 1–41)
ANION GAP SERPL CALCULATED.3IONS-SCNC: 16 MMOL/L (ref 5–15)
AST SERPL-CCNC: 21 U/L (ref 1–40)
BASOPHILS # BLD AUTO: 0.03 10*3/MM3 (ref 0–0.2)
BASOPHILS NFR BLD AUTO: 0.2 % (ref 0–1.5)
BILIRUB SERPL-MCNC: 0.3 MG/DL (ref 0–1.2)
BUN SERPL-MCNC: 90 MG/DL (ref 8–23)
BUN/CREAT SERPL: 40.4 (ref 7–25)
CALCIUM SPEC-SCNC: 9.1 MG/DL (ref 8.6–10.5)
CHLORIDE SERPL-SCNC: 96 MMOL/L (ref 98–107)
CO2 SERPL-SCNC: 24 MMOL/L (ref 22–29)
CREAT SERPL-MCNC: 2.23 MG/DL (ref 0.76–1.27)
DEPRECATED RDW RBC AUTO: 51 FL (ref 37–54)
EGFRCR SERPLBLD CKD-EPI 2021: 29.1 ML/MIN/1.73
EOSINOPHIL # BLD AUTO: 0 10*3/MM3 (ref 0–0.4)
EOSINOPHIL NFR BLD AUTO: 0 % (ref 0.3–6.2)
ERYTHROCYTE [DISTWIDTH] IN BLOOD BY AUTOMATED COUNT: 14 % (ref 12.3–15.4)
GLOBULIN UR ELPH-MCNC: 3 GM/DL
GLUCOSE BLDC GLUCOMTR-MCNC: 100 MG/DL (ref 70–130)
GLUCOSE SERPL-MCNC: 106 MG/DL (ref 65–99)
HCT VFR BLD AUTO: 33.4 % (ref 37.5–51)
HGB BLD-MCNC: 11 G/DL (ref 13–17.7)
IMM GRANULOCYTES # BLD AUTO: 0.12 10*3/MM3 (ref 0–0.05)
IMM GRANULOCYTES NFR BLD AUTO: 0.9 % (ref 0–0.5)
LYMPHOCYTES # BLD AUTO: 2.1 10*3/MM3 (ref 0.7–3.1)
LYMPHOCYTES NFR BLD AUTO: 16.5 % (ref 19.6–45.3)
MCH RBC QN AUTO: 32.3 PG (ref 26.6–33)
MCHC RBC AUTO-ENTMCNC: 32.9 G/DL (ref 31.5–35.7)
MCV RBC AUTO: 97.9 FL (ref 79–97)
MONOCYTES # BLD AUTO: 0.8 10*3/MM3 (ref 0.1–0.9)
MONOCYTES NFR BLD AUTO: 6.3 % (ref 5–12)
NEUTROPHILS NFR BLD AUTO: 76.1 % (ref 42.7–76)
NEUTROPHILS NFR BLD AUTO: 9.68 10*3/MM3 (ref 1.7–7)
NRBC BLD AUTO-RTO: 0 /100 WBC (ref 0–0.2)
PLATELET # BLD AUTO: 126 10*3/MM3 (ref 140–450)
PMV BLD AUTO: 12.4 FL (ref 6–12)
POTASSIUM SERPL-SCNC: 4.8 MMOL/L (ref 3.5–5.2)
PROT SERPL-MCNC: 6.5 G/DL (ref 6–8.5)
RBC # BLD AUTO: 3.41 10*6/MM3 (ref 4.14–5.8)
SODIUM SERPL-SCNC: 136 MMOL/L (ref 136–145)
WBC NRBC COR # BLD AUTO: 12.73 10*3/MM3 (ref 3.4–10.8)

## 2024-12-19 PROCEDURE — 63710000001 INSULIN LISPRO (HUMAN) PER 5 UNITS: Performed by: INTERNAL MEDICINE

## 2024-12-19 PROCEDURE — 63710000001 INSULIN GLARGINE PER 5 UNITS: Performed by: INTERNAL MEDICINE

## 2024-12-19 PROCEDURE — 94761 N-INVAS EAR/PLS OXIMETRY MLT: CPT

## 2024-12-19 PROCEDURE — 25010000002 ALBUMIN HUMAN 25% PER 50 ML: Performed by: INTERNAL MEDICINE

## 2024-12-19 PROCEDURE — P9047 ALBUMIN (HUMAN), 25%, 50ML: HCPCS | Performed by: INTERNAL MEDICINE

## 2024-12-19 PROCEDURE — 94799 UNLISTED PULMONARY SVC/PX: CPT

## 2024-12-19 PROCEDURE — 85025 COMPLETE CBC W/AUTO DIFF WBC: CPT | Performed by: INTERNAL MEDICINE

## 2024-12-19 PROCEDURE — 99239 HOSP IP/OBS DSCHRG MGMT >30: CPT | Performed by: INTERNAL MEDICINE

## 2024-12-19 PROCEDURE — 80053 COMPREHEN METABOLIC PANEL: CPT | Performed by: INTERNAL MEDICINE

## 2024-12-19 PROCEDURE — 82948 REAGENT STRIP/BLOOD GLUCOSE: CPT

## 2024-12-19 RX ORDER — ROSUVASTATIN CALCIUM 10 MG/1
10 TABLET, COATED ORAL NIGHTLY
Start: 2024-12-19

## 2024-12-19 RX ORDER — GUAIFENESIN 600 MG/1
1200 TABLET, EXTENDED RELEASE ORAL EVERY 12 HOURS SCHEDULED
Start: 2024-12-19

## 2024-12-19 RX ORDER — PANTOPRAZOLE SODIUM 40 MG/1
40 TABLET, DELAYED RELEASE ORAL
Start: 2024-12-20

## 2024-12-19 RX ORDER — CEFDINIR 300 MG/1
300 CAPSULE ORAL 2 TIMES DAILY
Start: 2024-12-19 | End: 2024-12-24

## 2024-12-19 RX ORDER — PREDNISONE 20 MG/1
20 TABLET ORAL DAILY
Start: 2024-12-19 | End: 2024-12-24

## 2024-12-19 RX ORDER — INSULIN LISPRO 100 [IU]/ML
15 INJECTION, SOLUTION INTRAVENOUS; SUBCUTANEOUS
Start: 2024-12-19

## 2024-12-19 RX ORDER — MIDODRINE HYDROCHLORIDE 10 MG/1
10 TABLET ORAL
Qty: 21 TABLET | Refills: 0 | Status: SHIPPED | OUTPATIENT
Start: 2024-12-19 | End: 2024-12-26

## 2024-12-19 RX ORDER — INSULIN LISPRO 100 [IU]/ML
3-14 INJECTION, SOLUTION INTRAVENOUS; SUBCUTANEOUS
Start: 2024-12-19

## 2024-12-19 RX ADMIN — AMIODARONE HYDROCHLORIDE 400 MG: 200 TABLET ORAL at 08:33

## 2024-12-19 RX ADMIN — ALBUMIN (HUMAN) 25 G: 0.25 INJECTION, SOLUTION INTRAVENOUS at 06:42

## 2024-12-19 RX ADMIN — IPRATROPIUM BROMIDE AND ALBUTEROL SULFATE 3 ML: 2.5; .5 SOLUTION RESPIRATORY (INHALATION) at 07:49

## 2024-12-19 RX ADMIN — APIXABAN 2.5 MG: 2.5 TABLET, FILM COATED ORAL at 08:33

## 2024-12-19 RX ADMIN — INSULIN GLARGINE 50 UNITS: 100 INJECTION, SOLUTION SUBCUTANEOUS at 08:33

## 2024-12-19 RX ADMIN — PANTOPRAZOLE SODIUM 40 MG: 40 TABLET, DELAYED RELEASE ORAL at 06:42

## 2024-12-19 RX ADMIN — MIDODRINE HYDROCHLORIDE 10 MG: 10 TABLET ORAL at 06:42

## 2024-12-19 RX ADMIN — METOPROLOL TARTRATE 25 MG: 25 TABLET, FILM COATED ORAL at 08:32

## 2024-12-19 RX ADMIN — GUAIFENESIN 1200 MG: 600 TABLET ORAL at 08:33

## 2024-12-19 RX ADMIN — INSULIN LISPRO 15 UNITS: 100 INJECTION, SOLUTION INTRAVENOUS; SUBCUTANEOUS at 08:33

## 2024-12-19 RX ADMIN — ASPIRIN 81 MG: 81 TABLET, COATED ORAL at 08:33

## 2024-12-19 NOTE — PLAN OF CARE
Goal Outcome Evaluation:              Outcome Evaluation: Pt is on 3L NC, a-fib on tele, VSS. Pt resting in chair with no complaints of SOA or pain this shift. Will continue w/ POC.

## 2024-12-19 NOTE — DISCHARGE SUMMARY
[facility] Medicine Services  DISCHARGE SUMMARY    Patient Name: Gabriela Mar  : 1945  MRN: [id number]    Date of Admission: 2024  1:39 PM  Date of Discharge:  2024  Primary Care Physician: Krystina Gee MD    Consults       Date and Time Order Name Status Description    2024 10:27 AM Inpatient Cardiology Consult Completed             Hospital Course     Presenting Problem:     Active Hospital Problems    Diagnosis  POA    **Multifocal pneumonia [J18.9]  Yes    Acute heart failure with preserved ejection fraction (HFpEF) [I50.31]  Yes    Paroxysmal atrial fibrillation [I48.0]  No    Type 2 diabetes mellitus with hyperglycemia, with long-term current use of insulin [E11.65, Z79.4]  Not Applicable    Coronary artery disease involving native coronary artery of native heart with angina pectoris [I25.119]  Yes    ZOHRA (acute kidney injury) [N17.9]  Yes    Chronic diastolic congestive heart failure [I50.32]  Yes    Essential hypertension [I10]  Yes    Hyperlipidemia LDL goal <70 [E78.5]  Yes      Resolved Hospital Problems   No resolved problems to display.      Hospital Course:  Gabriela Mar is a 79 y.o. male with past medical history of essential hypertension, type 2 diabetes, GERD, COPD, presented to the hospital with acute hypoxic respiratory failure, found to have bilateral pneumonia and A-fib with RVR.  He was admitted to the intensive care unit and started on IV Rocephin and doxycycline as well as steroids with marked improvement of his respiratory symptoms.  Moved to telemetry floor.  Gradually weaned of oxygen currently at 3 L/min.  Had ZOHRA on CKD stage III creatinine peaked at 2.79 but started trending down.  Losartan was discontinued.  Was borderline hypotensive and started on midodrine.  Seen by cardiology and started on loading dose amiodarone.  He will need ischemic workup as outpatient with Dr. Singer.  Discharged to [facility] for acute  rehab     Acute hypoxia, improving  Severe COPD exacerbation  Bilateral community-acquired pneumonia, improving  Rhinovirus infection  Patient presented to the hospital with severe dyspnea and weakness.  He was found to be hypoxic satting 70% on room air  CTA chest with evidence of bilateral pneumonia.  No PE  Started on Rocephin and doxycycline with marked improvement of his respiratory symptoms.  Weaned off BiPAP and currently on 3 L nasal cannula  CRP trended down  Status post IV Solu-Medrol.  Continue prednisone for 5 days continue Mucinex, DuoNebs and I-S  Continue to wean of oxygen supplementation.  Currently on 3 L/min     ZOHRA on CKD stage III  Baseline creatinine is around 1.3-1.6  Creatinine slowly trending up today at 2.79.  Today creatinine improved at2.2  ZOHRA is likely secondary to diuresis, losartan and hypotension  Losartan discontinued in the view of hypotension and ZOHRA   Status post IV albumin with marked improvement of his kidney functions  Monitor kidney function.  Will need repeat BMP in 1 week     A-fib with RVR  Status post amiodarone drip.  Currently on p.o. amiodarone loading and maintenance dose  Continue metoprolol   Status post heparin drip.  Currently on low-dose Eliquis 2.5 mg twice daily  Dr. Singer/cardiology following.  Plan for rate control strategy for now and after he recovers from pneumonia, plan for SULEMAN and cardioversion     Acute on chronic diastolic heart failure  Echo this admission normal LVEF and no valvular abnormality  As needed diuresis      Coronary artery disease/elevated troponins  NSTEMI, type I versus type II  Patient with no active chest pain or acute ischemic changes on EKG  Echo with normal ejection fraction  Seen by cardiology.  Plan for left heart cath as outpatient after he recovers from pneumonia  Currently on aspirin and Eliquis     History of hypertension  Noted to be hypotensive during this hospitalization and started on midodrine  Losartan  discontinued    Dyslipidemia  Lipid panel 9/2024 total cholesterol 134, triglycerides 168, HDL 39, LDL 66  Continue rosuvastatin     Type 2 diabetes mellitus  A1c 8.5%  Continue insulin NPH 50 units twice daily with Premeal insulin and SSI      Discharge Follow Up Recommendations for outpatient labs/diagnostics:  Facility physician in 1 to 2 days  Dr. Singer/Dilcia Wolf/cardiology in 2 weeks  BMP in 1 week to check his creatinine (was 2.2 discharge/baseline is 1.6)    Day of Discharge     HPI:   Patient seen and examined this morning.  Feeling great.  Shortness of breath markedly improved.  Oxygen requirement stable at 3 L.    Vital Signs:   Temp:  [97.5 °F (36.4 °C)-97.8 °F (36.6 °C)] 97.6 °F (36.4 °C)  Heart Rate:  [72-94] 91  Resp:  [16-20] 16  BP: ()/(53-76) 108/76  Flow (L/min) (Oxygen Therapy):  [3] 3      Physical Exam:  General: Comfortable, not in distress, conversant and cooperative  Head: Atraumatic and normocephalic  Eyes: No Icterus. No pallor  Ears:  Ears appear intact with no abnormalities noted  Throat: No oral lesions, no thrush  Neck: Supple, trachea midline  Lungs: Improved bilateral coarse crackles both lung fields with scattered wheezing  Heart:  Normal S1 and S2, no murmur, no gallop, No JVD, 2+ lower extremity swelling  Abdomen:  Soft, no tenderness, no organomegaly, normal bowel sounds, no organomegaly  Extremities: pulses equal bilaterally  Skin: No bleeding, bruising or rash, normal skin turgor and elasticity  Neurologic: Cranial nerves appear intact with no evidence of facial asymmetry, normal motor and sensory functions in all 4 extremities  Psych: Alert and oriented x 3, normal mood        Pertinent  and/or Most Recent Results     LAB RESULTS:      Lab 12/19/24  0434 12/18/24  0438 12/17/24  1248 12/17/24  0615 12/16/24  0926 12/16/24  0242 12/15/24  1947 12/15/24  1329 12/15/24  0238 12/14/24  1336 12/14/24  0713 12/13/24  0539   WBC 12.73* 13.29*  --  12.61*  --  17.66*  --    --  16.85*  --  15.90* 16.95*   HEMOGLOBIN 11.0* 11.3*  --  13.0  --  12.1*  --   --  11.9*  --  11.8* 12.6*   HEMATOCRIT 33.4* 33.7*  --  40.5  --  36.9*  --   --  36.4*  --  36.2* 39.1   PLATELETS 126* 128*  --  116*  --  123*  --   --  114*  --  131* 169   NEUTROS ABS 9.68* 11.39*  --  10.59*  --  14.30*  --   --  14.22*  --  12.72* 14.20*   IMMATURE GRANS (ABS) 0.12* 0.18*  --  0.20*  --  0.23*  --   --  0.15*  --  0.17* 0.19*   LYMPHS ABS 2.10 1.18  --  1.50  --  2.13  --   --  1.71  --  2.11 1.69   MONOS ABS 0.80 0.52  --  0.27  --  0.91*  --   --  0.72  --  0.85 0.82   EOS ABS 0.00 0.00  --  0.00  --  0.04  --   --  0.01  --  0.00 0.00   MCV 97.9* 97.4*  --  100.7*  --  97.9*  --   --  98.6*  --  99.2* 100.5*   CRP  --  10.54* 20.65*  --   --  28.52*  --   --   --   --   --   --    PROCALCITONIN  --   --   --   --   --   --   --   --   --   --  0.77* 0.78*   LACTATE  --   --   --   --   --   --   --   --   --   --  1.2  --    HEPARIN ANTI-XA  --   --   --   --  0.31 0.29* 0.22* 0.28* 0.16*   < > >1.10* 0.36    < > = values in this interval not displayed.         Lab 12/19/24  0434 12/18/24  0438 12/17/24  1248 12/16/24  0242 12/15/24  0427 12/14/24  0713 12/13/24  0539   SODIUM 136 130* 131* 134* 132* 131* 134*   POTASSIUM 4.8 4.8 4.8 4.5 4.2 4.4 5.1   CHLORIDE 96* 92* 88* 95* 95* 95* 93*   CO2 24.0 21.0* 22.0 29.0 24.0 25.0 22.0   ANION GAP 16.0* 17.0* 21.0* 10.0 13.0 11.0 19.0*   BUN 90* 86* 68* 44* 52* 75* 82*   CREATININE 2.23* 2.79* 2.14* 1.61* 1.62* 2.02* 2.45*   EGFR 29.1* 22.2* 30.5* 43.0* 42.6* 32.7* 26.0*   GLUCOSE 106* 367* 347* 156* 219* 174* 269*   CALCIUM 9.1 8.8 8.9 9.3 9.0 8.5* 8.9   MAGNESIUM  --  1.9 1.7 1.8  --   --  2.3   PHOSPHORUS  --  6.3* 6.3* 3.2  --  3.3 5.8*         Lab 12/19/24  0434 12/18/24  0438 12/17/24  1248 12/14/24  0713 12/13/24  0539   TOTAL PROTEIN 6.5 6.2 5.7*  --  6.7   ALBUMIN 3.5 2.7* 2.6* 2.7* 3.1*   GLOBULIN 3.0 3.5 3.1  --  3.6   ALT (SGPT) 18 18 21  --  24   AST  (SGOT) 21 25 29  --  42*   BILIRUBIN 0.3 0.3 0.4  --  0.3   ALK PHOS 60 74 95  --  99         Lab 12/16/24  0242 12/15/24  0427 12/14/24  0713 12/13/24  0539   PROBNP 9,458.0* 8,911.0* 7,920.0* 8,519.0*                 Brief Urine Lab Results  (Last result in the past 365 days)        Color   Clarity   Blood   Leuk Est   Nitrite   Protein   CREAT   Urine HCG        09/18/24 1356             100               Microbiology Results (last 10 days)       Procedure Component Value - Date/Time    MRSA Screen, PCR (Inpatient) - Swab, Nares [[id number]]  (Normal) Collected: 12/16/24 1011    Lab Status: Final result Specimen: Swab from Nares Updated: 12/16/24 1404     MRSA PCR Negative    Narrative:      The negative predictive value of this diagnostic test is high and should only be used to consider de-escalating anti-MRSA therapy. A positive result may indicate colonization with MRSA and must be correlated clinically.  MRSA Negative    S. Pneumo Ag Urine or CSF - Urine, Urine, Clean Catch [[id number]]  (Normal) Collected: 12/12/24 0048    Lab Status: Final result Specimen: Urine, Clean Catch Updated: 12/12/24 0938     Strep Pneumo Ag Negative    Legionella Antigen, Urine - Urine, Urine, Clean Catch [[id number]]  (Normal) Collected: 12/12/24 0048    Lab Status: Final result Specimen: Urine, Clean Catch Updated: 12/12/24 0938     LEGIONELLA ANTIGEN, URINE Negative    COVID PRE-OP / PRE-PROCEDURE SCREENING ORDER (NO ISOLATION) - Swab, Nasopharynx [[id number]]  (Abnormal) Collected: 12/11/24 1418    Lab Status: Final result Specimen: Swab from Nasopharynx Updated: 12/11/24 1518    Narrative:      The following orders were created for panel order COVID PRE-OP / PRE-PROCEDURE SCREENING ORDER (NO ISOLATION) - Swab, Nasopharynx.  Procedure                               Abnormality         Status                     ---------                               -----------         ------                     Respiratory Panel PCR  w/...[[id number]]  Abnormal            Final result                 Please view results for these tests on the individual orders.    Respiratory Panel PCR w/COVID-19(SARS-CoV-2) [facility] In-House, NP Swab in UTM/VTM, 2 HR TAT - Swab, Nasopharynx [[id number]]  (Abnormal) Collected: 12/11/24 1418    Lab Status: Final result Specimen: Swab from Nasopharynx Updated: 12/11/24 1518     ADENOVIRUS, PCR Not Detected     Coronavirus 229E Not Detected     Coronavirus HKU1 Not Detected     Coronavirus NL63 Not Detected     Coronavirus OC43 Not Detected     COVID19 Not Detected     Human Metapneumovirus Not Detected     Human Rhinovirus/Enterovirus Detected     Influenza A PCR Not Detected     Influenza B PCR Not Detected     Parainfluenza Virus 1 Not Detected     Parainfluenza Virus 2 Not Detected     Parainfluenza Virus 3 Not Detected     Parainfluenza Virus 4 Not Detected     RSV, PCR Not Detected     Bordetella pertussis pcr Not Detected     Bordetella parapertussis PCR Not Detected     Chlamydophila pneumoniae PCR Not Detected     Mycoplasma pneumo by PCR Not Detected    Narrative:      In the setting of a positive respiratory panel with a viral infection PLUS a negative procalcitonin without other underlying concern for bacterial infection, consider observing off antibiotics or discontinuation of antibiotics and continue supportive care. If the respiratory panel is positive for atypical bacterial infection (Bordetella pertussis, Chlamydophila pneumoniae, or Mycoplasma pneumoniae), consider antibiotic de-escalation to target atypical bacterial infection.    Blood Culture - Blood, Hand, Left [[id number]]  (Normal) Collected: 12/11/24 1415    Lab Status: Final result Specimen: Blood from Hand, Left Updated: 12/16/24 1430     Blood Culture No growth at 5 days    Narrative:      Less than seven (7) mL's of blood was collected.  Insufficient quantity may yield false negative results.    Blood Culture - Blood, Arm,  Right [[id number]]  (Normal) Collected: 12/11/24 1350    Lab Status: Final result Specimen: Blood from Arm, Right Updated: 12/16/24 1430     Blood Culture No growth at 5 days    Narrative:      Less than seven (7) mL's of blood was collected.  Insufficient quantity may yield false negative results.            XR Chest 1 View    Result Date: 12/18/2024  XR CHEST 1 VW Date of Exam: 12/18/2024 3:37 AM EST Indication: Follow-up pneumonia Comparison: Chest radiograph December 15, 2024 Findings: The heart is enlarged. There has been mild improvement in the patchy airspace opacity in the lateral right upper lobe. Bilateral basilar infiltrates persist. The left upper lobe remains clear.     Impression: Mild improvement in the right upper lobe infiltrate. Persistent basilar infiltrates. Electronically Signed: Cristo York MD  12/18/2024 7:00 AM EST  Workstation ID: [id number]    XR Chest 1 View    Result Date: 12/15/2024  XR CHEST 1 VW Date of Exam: 12/15/2024 5:00 AM EST Indication: f/u infiltrates Comparison: 12/14/2024 Findings: Persistent patchy multifocal nodular airspace disease throughout the lungs consistent with multifocal pneumonia not significantly changed from the prior study. Negative for pneumothorax. Heart size within normal limits for technique. No significant effusion. Osseous structures appear intact.     Impression: No significant change in multifocal pneumonia. Electronically Signed: Bunny Moeller MD  12/15/2024 8:50 AM EST  Workstation ID: [id number]    US Renal Limited    Result Date: 12/14/2024  US RENAL LIMITED Date of Exam: 12/14/2024 6:18 AM EST Indication: Oliguria. Comparison: CT abdomen pelvis 10/7/2024 Technique: Grayscale and color Doppler ultrasound evaluation of the kidneys and urinary bladder was performed. Findings: Right kidney measures 9.8 x 5.4 x 5.2 cm and the left 10.8 x 4.7 x 5.2 cm. Symmetric renal cortical thinning and lobular contour. Thinning favor senescent change. No hydronephrosis,  solid appearing mass or echogenic shadowing stone. Incidental increased hepatic echogenicity suggesting steatosis. No significant bladder wall thickening or layering debris.     Impression: Symmetric senescent renal cortical thinning. No hydronephrosis. Incidental hepatic steatosis. Electronically Signed: Sherman Momin MD  12/14/2024 10:21 AM EST  Workstation ID: [id number]    XR Chest 1 View    Result Date: 12/14/2024  XR CHEST 1 VW Date of Exam: 12/14/2024 2:45 AM EST Indication: f/u infiltrates Comparison: 12/13/2024 Findings: Cardiac size is similar to prior exam. Increasing patchy opacity within the right midlung. Mildly decreased patchy opacity in the left lower lung. No pleural effusion or pneumothorax. No evidence of acute osseous abnormalities. Visualized upper abdomen is unremarkable.     Impression: 1.Increasing patchy opacity within the right midlung concerning for infection. 2.Mildly decreased patchy opacity in the left lower lung may reflect infection or atelectasis. Electronically Signed: Devin Cody MD  12/14/2024 8:18 AM EST  Workstation ID: [id number]    XR Chest 1 View    Result Date: 12/13/2024  XR CHEST 1 VW Date of Exam: 12/13/2024 12:28 AM EST Indication: F/U Bilateral airspace disease Comparison: Chest radiograph and chest CT 12/11/2024. Findings: Cardiomediastinal silhouette is unchanged. Scattered patchy airspace opacities, not significantly changed. No sizable pleural effusion. No discrete pneumothorax. Osseous structures are unchanged.     Impression: Similar radiographic appearance of patient's multifocal pneumonia. Electronically Signed: Josh Avila MD  12/13/2024 8:06 AM EST  Workstation ID: [id number]    CT Angiogram Chest    Result Date: 12/11/2024  CT ANGIOGRAM CHEST Date of Exam: 12/11/2024 7:05 PM EST Indication: dyspnea/hypoxia. Comparison: None available. Technique: CTA of the chest was performed after the uneventful intravenous administration of 80 cc Isovue-370.  Reconstructed coronal and sagittal images were also obtained. In addition, a 3-D volume rendered image was created for interpretation. Automated exposure control and iterative reconstruction methods were used. Findings: PULMONARY VASCULATURE: Pulmonary arteries are widely patent without evidence of embolus.  Main pulmonary artery is normal in size. No evidence of right heart strain. MEDIASTINUM:Unremarkable. Aortic and heart size are normal. No aortic dissection identified. No mass nor pericardial effusion. CORONARY ARTERIES: There is calcified atherosclerotic disease. LUNGS: There is irregular airspace disease within the left lower lobe and posterior right upper lobe. There are scattered tree-in-bud opacities throughout the lungs otherwise. Findings are consistent with multifocal pneumonia. PLEURAL SPACE: Trace right effusion. LYMPH NODES: There are no pathologically enlarged lymph nodes. UPPER ABDOMEN: Unremarkable OSSEOUS STRUCTURES: Appropriate for age with no acute process identified. There is benign gynecomastia.     Impression: 1.No evidence of pulmonary embolism. 2.Multifocal pneumonia Electronically Signed: Hilario Gale MD  12/11/2024 7:18 PM EST  Workstation ID: [id number]    XR Chest 1 View    Result Date: 12/11/2024  XR CHEST 1 VW Date of Exam: 12/11/2024 1:46 PM EST Indication: SOA triage protocol Comparison: 8/30/2024 Findings: There is new, moderate airspace disease in the left lower lobe, consistent with pneumonia. There is increased patchy peribronchial thickening and multifocal interstitial disease of the lungs elsewhere which may reflect a more widespread bronchopneumonia pattern. No effusion or pneumothorax is seen. Heart and vasculature appear within normal months.     Impression: Moderately extensive left lower lobe pneumonia. Also, suspected more diffuse multifocal pneumonia elsewhere in both lungs. Electronically Signed: Bruce Monsalve MD  12/11/2024 2:43 PM EST  Workstation ID: [id number]              Results for orders placed during the hospital encounter of 12/11/24    Adult Transthoracic Echo Complete w/ Color, Spectral and Contrast if Necessary Per Protocol    Interpretation Summary    Left ventricular systolic function is normal. Estimated left ventricular EF = 65%    The right ventricular cavity is mildly dilated.    No hemodynamically significant valvular heart disease      Plan for Follow-up of Pending Labs/Results:     Discharge Details        Discharge Medications        New Medications        Instructions Start Date   amiodarone 200 MG tablet  Commonly known as: PACERONE   Take 2 tablets by mouth Every 12 Hours for 5 days; THEN take 1 tablet Daily for 90 days.   Start Date: December 17, 2024     apixaban 2.5 MG tablet tablet  Commonly known as: ELIQUIS   Take 1 tablet by mouth Every 12 (Twelve) Hours as directed for Atrial Fibrillation      cefdinir 300 MG capsule  Commonly known as: OMNICEF   300 mg, Oral, 2 Times Daily      guaiFENesin 600 MG 12 hr tablet  Commonly known as: MUCINEX   1,200 mg, Oral, Every 12 Hours Scheduled      Insulin Lispro 100 UNIT/ML injection  Commonly known as: humaLOG   3-14 Units, Subcutaneous, 4 Times Daily Before Meals & Nightly      Insulin Lispro 100 UNIT/ML injection  Commonly known as: humaLOG   15 Units, Subcutaneous, 3 Times Daily Before Meals      metoprolol tartrate 25 MG tablet  Commonly known as: LOPRESSOR   25 mg, Oral, Every 12 Hours Scheduled      midodrine 10 MG tablet  Commonly known as: PROAMATINE   10 mg, Oral, 3 Times Daily Before Meals      pantoprazole 40 MG EC tablet  Commonly known as: PROTONIX   40 mg, Oral, Every Early Morning   Start Date: December 20, 2024     predniSONE 20 MG tablet  Commonly known as: DELTASONE   20 mg, Oral, Daily      rosuvastatin 10 MG tablet  Commonly known as: CRESTOR   10 mg, Oral, Nightly             Changes to Medications        Instructions Start Date   insulin  UNIT/ML injection  Commonly known as:  humuLIN N,novoLIN N  What changed: how much to take   50 Units, Subcutaneous, 2 Times Daily Before Meals             Continue These Medications        Instructions Start Date   albuterol (2.5 MG/3ML) 0.083% nebulizer solution  Commonly known as: PROVENTIL   2.5 mg, Nebulization, Every 4 Hours PRN      albuterol sulfate  (90 Base) MCG/ACT inhaler  Commonly known as: PROVENTIL HFA;VENTOLIN HFA;PROAIR HFA   2 puffs, Inhalation, Every 4 Hours PRN      aspirin 81 MG EC tablet   81 mg, Oral, Daily      cholecalciferol 25 MCG (1000 UT) tablet  Commonly known as: VITAMIN D3   2,000 Units, Daily      glimepiride 4 MG tablet  Commonly known as: AMARYL   4 mg, Oral, Daily With Dinner      PreserVision AREDS 2 chewable tablet   1 tablet, 2 Times Daily      multivitamin with minerals tablet tablet   1 tablet, Daily      simvastatin 10 MG tablet  Commonly known as: ZOCOR   10 mg, Oral, Every Night at Bedtime      tiZANidine 2 MG tablet  Commonly known as: ZANAFLEX   2 mg, Oral, Nightly PRN             Stop These Medications      losartan 25 MG tablet  Commonly known as: Cozaar     metFORMIN  MG 24 hr tablet  Commonly known as: GLUCOPHAGE-XR     omeprazole 20 MG capsule  Commonly known as: priLOSEC              Allergies   Allergen Reactions    Bactrim [Sulfamethoxazole-Trimethoprim] Other (See Comments)     Joint pain    Sulfa Antibiotics GI Intolerance     Makes bones hurt      Tegaderm Chg Dressing [Chlorhexidine] Itching and Other (See Comments)     redness         Discharge Disposition:  Rehab Facility or Unit (DC - External)    Diet:  Hospital:  Diet Order   Procedures    Diet: Regular/House, Cardiac, Diabetic; Healthy Heart (2-3 Na+); Consistent Carbohydrate; Fluid Consistency: Thin (IDDSI 0)            Activity:  As tolerated    Restrictions or Other Recommendations:  None        CODE STATUS:    Code Status and Medical Interventions: CPR (Attempt to Resuscitate); Full Support   Ordered at: 12/11/24 2051      Code Status (Patient has no pulse and is not breathing):    CPR (Attempt to Resuscitate)     Medical Interventions (Patient has pulse or is breathing):    Full Support       Future Appointments   Date Time Provider Department Center   12/23/2024 10:15 AM Rianna Womack MD [facility]   5/21/2025 11:15 AM Ai Henderson MD [facility]                 Jose J Townsend MD  12/19/24      Time Spent on Discharge:  I spent  35  minutes on this discharge activity which included: face-to-face encounter with the patient, reviewing the data in the system, coordination of the care with the nursing staff as well as consultants, documentation, and entering orders.

## 2024-12-19 NOTE — CASE MANAGEMENT/SOCIAL WORK
Case Management Discharge Note      Final Note: Pt is discharging to [facility] today. CM confirmed with April, facility liaison, Pt can be accepted today. Facility will retrieve discharge summary from [facility]. RN to call report to [phone]. If accepting RN is unavailable for report, RN may call report to house supervisor at [phone]. [facility] transportation is arranged at 1130. Pt will need to be at the maternity entrance at 1120 for ; RN aware. Pt is aware and agreeable to plan. No other discharge needs identified.         Selected Continued Care - Admitted Since 12/11/2024       Destination Coordination complete.      Service Provider Services Address Phone Fax Patient Preferred    [facility] [address] [id number] [phone] --              Durable Medical Equipment    No services have been selected for the patient.                Dialysis/Infusion    No services have been selected for the patient.                Home Medical Care    No services have been selected for the patient.                Therapy    No services have been selected for the patient.                Community Resources    No services have been selected for the patient.                Community & DME    No services have been selected for the patient.                    Transportation Services  Other: Other ([facility] van 12/19 @ 1130)    Final Discharge Disposition Code: 62 - inpatient rehab facility

## 2024-12-22 LAB
QT INTERVAL: 324 MS
QT INTERVAL: 376 MS
QT INTERVAL: 392 MS
QTC INTERVAL: 474 MS
QTC INTERVAL: 511 MS
QTC INTERVAL: 533 MS

## 2025-01-02 DIAGNOSIS — E11.65 TYPE 2 DIABETES MELLITUS WITH HYPERGLYCEMIA, WITH LONG-TERM CURRENT USE OF INSULIN: ICD-10-CM

## 2025-01-02 DIAGNOSIS — Z79.4 TYPE 2 DIABETES MELLITUS WITH HYPERGLYCEMIA, WITH LONG-TERM CURRENT USE OF INSULIN: ICD-10-CM

## 2025-01-02 RX ORDER — GLIMEPIRIDE 4 MG/1
4 TABLET ORAL
Qty: 90 TABLET | Refills: 1 | Status: SHIPPED | OUTPATIENT
Start: 2025-01-02

## 2025-01-07 ENCOUNTER — PATIENT OUTREACH (OUTPATIENT)
Dept: CASE MANAGEMENT | Facility: OTHER | Age: 80
End: 2025-01-07
Payer: MEDICARE

## 2025-01-07 NOTE — OUTREACH NOTE
AMBULATORY CASE MANAGEMENT NOTE    Names and Relationships of Patient/Support Persons: Contact: Gabriela Mar; Relationship: Self -     Patient Outreach    Spoke with patient as a follow up call regarding rehab. Patient reports his son is with him. No current home health. Patient denied current needs and will follow up with his PCP tomorrow. [facility] closed.     Rosa Isela STALLINGS  Ambulatory Case Management    1/7/2025, 13:54 EST

## 2025-01-08 ENCOUNTER — LAB (OUTPATIENT)
Dept: LAB | Facility: HOSPITAL | Age: 80
End: 2025-01-08
Payer: MEDICARE

## 2025-01-08 ENCOUNTER — OFFICE VISIT (OUTPATIENT)
Dept: INTERNAL MEDICINE | Facility: CLINIC | Age: 80
End: 2025-01-08
Payer: MEDICARE

## 2025-01-08 VITALS
SYSTOLIC BLOOD PRESSURE: 132 MMHG | DIASTOLIC BLOOD PRESSURE: 50 MMHG | HEIGHT: 70 IN | HEART RATE: 82 BPM | OXYGEN SATURATION: 94 % | BODY MASS INDEX: 32.07 KG/M2 | TEMPERATURE: 97.7 F | WEIGHT: 224 LBS

## 2025-01-08 DIAGNOSIS — Z79.4 TYPE 2 DIABETES MELLITUS WITH HYPERGLYCEMIA, WITH LONG-TERM CURRENT USE OF INSULIN: ICD-10-CM

## 2025-01-08 DIAGNOSIS — Z76.89 ENCOUNTER FOR SUPPORT AND COORDINATION OF TRANSITION OF CARE: Primary | ICD-10-CM

## 2025-01-08 DIAGNOSIS — J18.9 PNEUMONIA OF BOTH LUNGS DUE TO INFECTIOUS ORGANISM, UNSPECIFIED PART OF LUNG: ICD-10-CM

## 2025-01-08 DIAGNOSIS — I50.32 CHRONIC DIASTOLIC CONGESTIVE HEART FAILURE: ICD-10-CM

## 2025-01-08 DIAGNOSIS — N17.9 AKI (ACUTE KIDNEY INJURY): ICD-10-CM

## 2025-01-08 DIAGNOSIS — E11.65 TYPE 2 DIABETES MELLITUS WITH HYPERGLYCEMIA, WITH LONG-TERM CURRENT USE OF INSULIN: ICD-10-CM

## 2025-01-08 LAB
ALBUMIN SERPL-MCNC: 4.1 G/DL (ref 3.5–5.2)
ALBUMIN/GLOB SERPL: 1.3 G/DL
ALP SERPL-CCNC: 76 U/L (ref 39–117)
ALT SERPL W P-5'-P-CCNC: 22 U/L (ref 1–41)
ANION GAP SERPL CALCULATED.3IONS-SCNC: 11.6 MMOL/L (ref 5–15)
AST SERPL-CCNC: 24 U/L (ref 1–40)
BILIRUB SERPL-MCNC: 0.3 MG/DL (ref 0–1.2)
BUN SERPL-MCNC: 29 MG/DL (ref 8–23)
BUN/CREAT SERPL: 18.8 (ref 7–25)
CALCIUM SPEC-SCNC: 8.9 MG/DL (ref 8.6–10.5)
CHLORIDE SERPL-SCNC: 103 MMOL/L (ref 98–107)
CO2 SERPL-SCNC: 27.4 MMOL/L (ref 22–29)
CREAT SERPL-MCNC: 1.54 MG/DL (ref 0.76–1.27)
DEPRECATED RDW RBC AUTO: 45.8 FL (ref 37–54)
EGFRCR SERPLBLD CKD-EPI 2021: 45.3 ML/MIN/1.73
ERYTHROCYTE [DISTWIDTH] IN BLOOD BY AUTOMATED COUNT: 13 % (ref 12.3–15.4)
GLOBULIN UR ELPH-MCNC: 3.2 GM/DL
GLUCOSE SERPL-MCNC: 49 MG/DL (ref 65–99)
HCT VFR BLD AUTO: 37.4 % (ref 37.5–51)
HGB BLD-MCNC: 12.6 G/DL (ref 13–17.7)
MCH RBC QN AUTO: 32.7 PG (ref 26.6–33)
MCHC RBC AUTO-ENTMCNC: 33.7 G/DL (ref 31.5–35.7)
MCV RBC AUTO: 97.1 FL (ref 79–97)
NRBC BLD AUTO-RTO: 0 /100 WBC (ref 0–0.2)
NT-PROBNP SERPL-MCNC: 309 PG/ML (ref 0–1800)
PLATELET # BLD AUTO: 153 10*3/MM3 (ref 140–450)
PMV BLD AUTO: 11.4 FL (ref 6–12)
POTASSIUM SERPL-SCNC: 4.6 MMOL/L (ref 3.5–5.2)
PROT SERPL-MCNC: 7.3 G/DL (ref 6–8.5)
RBC # BLD AUTO: 3.85 10*6/MM3 (ref 4.14–5.8)
SODIUM SERPL-SCNC: 142 MMOL/L (ref 136–145)
WBC NRBC COR # BLD AUTO: 8.98 10*3/MM3 (ref 3.4–10.8)

## 2025-01-08 PROCEDURE — 85007 BL SMEAR W/DIFF WBC COUNT: CPT

## 2025-01-08 PROCEDURE — 85025 COMPLETE CBC W/AUTO DIFF WBC: CPT

## 2025-01-08 PROCEDURE — 1159F MED LIST DOCD IN RCRD: CPT | Performed by: INTERNAL MEDICINE

## 2025-01-08 PROCEDURE — 80053 COMPREHEN METABOLIC PANEL: CPT

## 2025-01-08 PROCEDURE — 83880 ASSAY OF NATRIURETIC PEPTIDE: CPT

## 2025-01-08 PROCEDURE — 99495 TRANSJ CARE MGMT MOD F2F 14D: CPT | Performed by: INTERNAL MEDICINE

## 2025-01-08 PROCEDURE — 1160F RVW MEDS BY RX/DR IN RCRD: CPT | Performed by: INTERNAL MEDICINE

## 2025-01-08 PROCEDURE — 3078F DIAST BP <80 MM HG: CPT | Performed by: INTERNAL MEDICINE

## 2025-01-08 PROCEDURE — 3075F SYST BP GE 130 - 139MM HG: CPT | Performed by: INTERNAL MEDICINE

## 2025-01-08 PROCEDURE — 36415 COLL VENOUS BLD VENIPUNCTURE: CPT

## 2025-01-08 PROCEDURE — 1125F AMNT PAIN NOTED PAIN PRSNT: CPT | Performed by: INTERNAL MEDICINE

## 2025-01-08 PROCEDURE — 1111F DSCHRG MED/CURRENT MED MERGE: CPT | Performed by: INTERNAL MEDICINE

## 2025-01-08 RX ORDER — PNEUMOCOCCAL 21-VALENT CONJUGATE VACCINE 65; 4; 4; 4; 4; 4; 4; 4; 4; 4; 4; 4; 4; 4; 4; 4; 4; 4; 4; 4; 4; 4 UG/.5ML; UG/.5ML; UG/.5ML; UG/.5ML; UG/.5ML; UG/.5ML; UG/.5ML; UG/.5ML; UG/.5ML; UG/.5ML; UG/.5ML; UG/.5ML; UG/.5ML; UG/.5ML; UG/.5ML; UG/.5ML; UG/.5ML; UG/.5ML; UG/.5ML; UG/.5ML; UG/.5ML; UG/.5ML
0.5 INJECTION, SOLUTION INTRAMUSCULAR ONCE
Qty: 0.5 ML | Refills: 0 | Status: SHIPPED | OUTPATIENT
Start: 2025-01-08 | End: 2025-01-08

## 2025-01-08 NOTE — PROGRESS NOTES
Transitional Care Follow Up Visit  Subjective     Gabriela JOSHUA Mar is a 79 y.o. male who presents for a transitional care management visit.    Within 48 business hours after discharge our office contacted him via telephone to coordinate his care and needs.      I reviewed and discussed the details of that call along with the discharge summary, hospital problems, inpatient lab results, inpatient diagnostic studies, and consultation reports with Gabriela.     Current outpatient and discharge medications have been reconciled for the patient.  Reviewed by: Krystina Gee MD          5/30/2022     3:11 PM   Date of TCM Phone Call   [facility]   Date of Admission 5/26/2022   Date of Discharge 5/30/2022   Discharge Disposition Home or Self Care     Risk for Readmission (LACE) No data recorded    History of Present Illness   D/porfirio from [facility] on 12/30. Has felt good declined home oxygen and home health.  He was given insulin prefilled syringes., but does not like to take them.  Sugars have been better- 94 y'day and 108 today.  Was told he was in a-fib and Has appt with interventional cardiology next week  Plan for DCV for his A-fib and then followed by possible [facility] for valvular function.  Companied by caregiver.      Course During Hospital Stay:   Pt. presented to the hospital with acute hypoxic respiratory failure, found to have bilateral pneumonia and A-fib with RVR.  He was admitted to the intensive care unit and started on IV Rocephin and doxycycline as well as steroids with marked improvement of his respiratory symptoms.  Moved to telemetry floor.  Gradually weaned of oxygen currently at 3 L/min.  Had ZOHRA on CKD stage III creatinine peaked at 2.79 but started trending down.  Losartan was discontinued.  Was borderline hypotensive and started on midodrine.  Seen by cardiology and started on loading dose amiodarone.  He will need ischemic workup as outpatient with Dr. Singer.  Discharged to [facility] for acute  rehab     Acute hypoxia, improving  Severe COPD exacerbation  Bilateral community-acquired pneumonia, improving  Rhinovirus infection  Patient presented to the hospital with severe dyspnea and weakness.  He was found to be hypoxic satting 70% on room air  CTA chest with evidence of bilateral pneumonia.  No PE  Started on Rocephin and doxycycline with marked improvement of his respiratory symptoms.  Weaned off BiPAP and currently on 3 L nasal cannula  CRP trended down  Status post IV Solu-Medrol.  Continue prednisone for 5 days continue Mucinex, DuoNebs and I-S  Continue to wean of oxygen supplementation.  Currently on 3 L/min     ZOHRA on CKD stage III  Baseline creatinine is around 1.3-1.6  Creatinine slowly trending up today at 2.79.  Today creatinine improved at2.2  ZOHRA is likely secondary to diuresis, losartan and hypotension  Losartan discontinued in the view of hypotension and ZOHRA   Status post IV albumin with marked improvement of his kidney functions  Monitor kidney function.  Will need repeat BMP in 1 week     A-fib with RVR  Status post amiodarone drip.  Currently on p.o. amiodarone loading and maintenance dose  Continue metoprolol   Status post heparin drip.  Currently on low-dose Eliquis 2.5 mg twice daily  Dr. Singer/cardiology following.  Plan for rate control strategy for now and after he recovers from pneumonia, plan for SULEMAN and cardioversion     Acute on chronic diastolic heart failure  Echo this admission normal LVEF and no valvular abnormality  As needed diuresis      Coronary artery disease/elevated troponins  NSTEMI, type I versus type II  Patient with no active chest pain or acute ischemic changes on EKG  Echo with normal ejection fraction  Seen by cardiology.  Plan for left heart cath as outpatient after he recovers from pneumonia  Currently on aspirin and Eliquis     History of hypertension  Noted to be hypotensive during this hospitalization and started on midodrine  Losartan discontinued    "  Dyslipidemia  Lipid panel 9/2024 total cholesterol 134, triglycerides 168, HDL 39, LDL 66  Continue rosuvastatin     Type 2 diabetes mellitus  A1c 8.5%  Continue insulin NPH 50 units twice daily with Premeal insulin and SSI        Discharge Follow Up Recommendations for outpatient labs/diagnostics:  Facility physician in 1 to 2 days  Dr. Singer/Dilcia Wolf/cardiology in 2 weeks  BMP in 1 week to check his creatinine (was 2.2 discharge/baseline is 1.6)         The following portions of the patient's history were reviewed and updated as appropriate: allergies, current medications, past family history, past medical history, past social history, past surgical history, and problem list.    Review of Systems   Constitutional:  Positive for fatigue. Negative for chills and fever.   HENT:  Negative for ear discharge, ear pain, sinus pressure and sore throat.    Respiratory:  Negative for cough, chest tightness and shortness of breath.    Cardiovascular:  Negative for chest pain, palpitations and leg swelling.   Gastrointestinal:  Negative for diarrhea, nausea and vomiting.   Musculoskeletal:  Negative for arthralgias, back pain and myalgias.   Neurological:  Negative for dizziness, syncope and headaches.   Psychiatric/Behavioral:  Negative for confusion and sleep disturbance.        Objective   /50   Pulse 82   Temp 97.7 °F (36.5 °C)   Ht 177.8 cm (70\")   Wt 102 kg (224 lb)   SpO2 94% Comment: ra  BMI 32.14 kg/m²   Physical Exam  Vitals and nursing note reviewed.   Constitutional:       Appearance: He is well-developed.   HENT:      Head: Normocephalic and atraumatic.      Right Ear: External ear normal.      Left Ear: External ear normal.      Mouth/Throat:      Pharynx: No oropharyngeal exudate.   Eyes:      Conjunctiva/sclera: Conjunctivae normal.      Pupils: Pupils are equal, round, and reactive to light.   Neck:      Thyroid: No thyromegaly.   Cardiovascular:      Rate and Rhythm: Normal rate and " regular rhythm.      Pulses: Normal pulses.      Heart sounds: Normal heart sounds. No murmur heard.     No friction rub. No gallop.      Comments: RRR  Pulmonary:      Effort: Pulmonary effort is normal.      Breath sounds: Normal breath sounds.   Abdominal:      General: Bowel sounds are normal. There is no distension.      Palpations: Abdomen is soft.      Tenderness: There is no abdominal tenderness.   Musculoskeletal:      Cervical back: Neck supple.   Skin:     General: Skin is warm and dry.   Neurological:      Mental Status: He is alert and oriented to person, place, and time.      Cranial Nerves: No cranial nerve deficit.   Psychiatric:         Judgment: Judgment normal.               Current Outpatient Medications:     albuterol (PROVENTIL) (2.5 MG/3ML) 0.083% nebulizer solution, Take 2.5 mg by nebulization Every 4 (Four) Hours As Needed for Wheezing., Disp: 120 each, Rfl: 5    albuterol sulfate  (90 Base) MCG/ACT inhaler, Inhale 2 puffs Every 4 (Four) Hours As Needed for Wheezing., Disp: 8 g, Rfl: 3    apixaban (ELIQUIS) 2.5 MG tablet tablet, Take 1 tablet by mouth Every 12 (Twelve) Hours as directed for Atrial Fibrillation, Disp: 120 tablet, Rfl: 1    aspirin 81 MG EC tablet, Take 1 tablet by mouth Daily., Disp: 90 tablet, Rfl: 3    Cholecalciferol (VITAMIN D) 1000 UNITS tablet, Take 2 tablets by mouth Daily., Disp: , Rfl:     glimepiride (AMARYL) 4 MG tablet, TAKE 1 TABLET BY MOUTH DAILY WITH DINNER, Disp: 90 tablet, Rfl: 1    guaiFENesin (MUCINEX) 600 MG 12 hr tablet, Take 2 tablets by mouth Every 12 (Twelve) Hours., Disp: , Rfl:     Insulin Lispro (humaLOG) 100 UNIT/ML injection, Inject 3-14 Units under the skin into the appropriate area as directed 4 (Four) Times a Day Before Meals & at Bedtime., Disp: , Rfl:     Insulin Lispro (humaLOG) 100 UNIT/ML injection, Inject 15 Units under the skin into the appropriate area as directed 3 (Three) Times a Day Before Meals., Disp: , Rfl:     insulin NPH  "(humuLIN N,novoLIN N) 100 UNIT/ML injection, Inject 50 Units under the skin into the appropriate area as directed 2 (Two) Times a Day Before Meals., Disp: 60 mL, Rfl: 5    metoprolol tartrate (LOPRESSOR) 25 MG tablet, Take 1 tablet by mouth Every 12 (Twelve) Hours., Disp: 120 tablet, Rfl: 1    Multiple Vitamins-Minerals (MULTI VITAMIN/MINERALS PO), Take 1 tablet by mouth Daily., Disp: , Rfl:     Multiple Vitamins-Minerals (PreserVision AREDS 2) chewable tablet, Chew 1 tablet 2 (Two) Times a Day., Disp: , Rfl:     simvastatin (ZOCOR) 10 MG tablet, Take 1 tablet by mouth every night at bedtime., Disp: 90 tablet, Rfl: 3    amiodarone (PACERONE) 200 MG tablet, Take 2 tablets by mouth Every 12 Hours for 5 days; THEN take 1 tablet Daily for 90 days., Disp: 110 tablet, Rfl: 0    midodrine (PROAMATINE) 10 MG tablet, Take 1 tablet by mouth 3 (Three) Times a Day Before Meals for 7 days., Disp: 21 tablet, Rfl: 0    Pneumococcal 21-Fatou Conj Vacc (Capvaxive) 0.5 ML solution prefilled syringe, Inject 0.5 mL into the appropriate muscle as directed by prescriber 1 time for 1 dose., Disp: 0.5 mL, Rfl: 0      /50   Pulse 82   Temp 97.7 °F (36.5 °C)   Ht 177.8 cm (70\")   Wt 102 kg (224 lb)   SpO2 94% Comment: ra  BMI 32.14 kg/m²       Results for orders placed or performed during the hospital encounter of 12/11/24   ECG 12 Lead ED Triage Standing Order; SOA    Collection Time: 12/11/24  1:49 PM   Result Value Ref Range    QT Interval 392 ms    QTC Interval 533 ms   Blood Culture - Blood, Arm, Right    Collection Time: 12/11/24  1:50 PM    Specimen: Arm, Right; Blood   Result Value Ref Range    Blood Culture No growth at 5 days    Comprehensive Metabolic Panel    Collection Time: 12/11/24  1:55 PM    Specimen: Blood   Result Value Ref Range    Glucose 509 (C) 65 - 99 mg/dL    BUN 53 (H) 8 - 23 mg/dL    Creatinine 1.99 (H) 0.76 - 1.27 mg/dL    Sodium 128 (L) 136 - 145 mmol/L    Potassium 5.2 3.5 - 5.2 mmol/L    Chloride 88 " (L) 98 - 107 mmol/L    CO2 25.0 22.0 - 29.0 mmol/L    Calcium 9.6 8.6 - 10.5 mg/dL    Total Protein 7.3 6.0 - 8.5 g/dL    Albumin 3.5 3.5 - 5.2 g/dL    ALT (SGPT) 23 1 - 41 U/L    AST (SGOT) 27 1 - 40 U/L    Alkaline Phosphatase 126 (H) 39 - 117 U/L    Total Bilirubin 0.8 0.0 - 1.2 mg/dL    Globulin 3.8 gm/dL    A/G Ratio 0.9 g/dL    BUN/Creatinine Ratio 26.6 (H) 7.0 - 25.0    Anion Gap 15.0 5.0 - 15.0 mmol/L    eGFR 33.3 (L) >60.0 mL/min/1.73   BNP    Collection Time: 12/11/24  1:55 PM    Specimen: Blood   Result Value Ref Range    proBNP 4,921.0 (H) 0.0 - 1,800.0 pg/mL   High Sensitivity Troponin T    Collection Time: 12/11/24  1:55 PM    Specimen: Blood   Result Value Ref Range    HS Troponin T 76 (C) <22 ng/L   CBC Auto Differential    Collection Time: 12/11/24  1:55 PM    Specimen: Blood   Result Value Ref Range    WBC 16.10 (H) 3.40 - 10.80 10*3/mm3    RBC 3.88 (L) 4.14 - 5.80 10*6/mm3    Hemoglobin 12.6 (L) 13.0 - 17.7 g/dL    Hematocrit 38.5 37.5 - 51.0 %    MCV 99.2 (H) 79.0 - 97.0 fL    MCH 32.5 26.6 - 33.0 pg    MCHC 32.7 31.5 - 35.7 g/dL    RDW 13.4 12.3 - 15.4 %    RDW-SD 48.7 37.0 - 54.0 fl    MPV 11.5 6.0 - 12.0 fL    Platelets 149 140 - 450 10*3/mm3    Neutrophil % 77.2 (H) 42.7 - 76.0 %    Lymphocyte % 12.7 (L) 19.6 - 45.3 %    Monocyte % 9.0 5.0 - 12.0 %    Eosinophil % 0.0 (L) 0.3 - 6.2 %    Basophil % 0.5 0.0 - 1.5 %    Immature Grans % 0.6 (H) 0.0 - 0.5 %    Neutrophils, Absolute 12.43 (H) 1.70 - 7.00 10*3/mm3    Lymphocytes, Absolute 2.04 0.70 - 3.10 10*3/mm3    Monocytes, Absolute 1.45 (H) 0.10 - 0.90 10*3/mm3    Eosinophils, Absolute 0.00 0.00 - 0.40 10*3/mm3    Basophils, Absolute 0.08 0.00 - 0.20 10*3/mm3    Immature Grans, Absolute 0.10 (H) 0.00 - 0.05 10*3/mm3    nRBC 0.0 0.0 - 0.2 /100 WBC   D-dimer, Quantitative    Collection Time: 12/11/24  1:55 PM    Specimen: Blood   Result Value Ref Range    D-Dimer, Quantitative 3.21 (H) 0.00 - 0.80 MCGFEU/mL   Lactic Acid, Plasma    Collection  Time: 12/11/24  1:55 PM    Specimen: Blood   Result Value Ref Range    Lactate 3.7 (C) 0.5 - 2.0 mmol/L   Lipase    Collection Time: 12/11/24  1:55 PM    Specimen: Blood   Result Value Ref Range    Lipase 21 13 - 60 U/L   Magnesium    Collection Time: 12/11/24  1:55 PM    Specimen: Blood   Result Value Ref Range    Magnesium 1.9 1.6 - 2.4 mg/dL   Green Top (Gel)    Collection Time: 12/11/24  1:55 PM   Result Value Ref Range    Extra Tube Hold for add-ons.    Lavender Top    Collection Time: 12/11/24  1:55 PM   Result Value Ref Range    Extra Tube hold for add-on    Gold Top - SST    Collection Time: 12/11/24  1:55 PM   Result Value Ref Range    Extra Tube Hold for add-ons.    Gray Top    Collection Time: 12/11/24  1:55 PM   Result Value Ref Range    Extra Tube Hold for add-ons.    Light Blue Top    Collection Time: 12/11/24  1:55 PM   Result Value Ref Range    Extra Tube Hold for add-ons.    Blood Culture - Blood, Hand, Left    Collection Time: 12/11/24  2:15 PM    Specimen: Hand, Left; Blood   Result Value Ref Range    Blood Culture No growth at 5 days    Respiratory Panel PCR w/COVID-19(SARS-CoV-2) [facility] In-House, NP Swab in UTM/VTM, 2 HR TAT - Swab, Nasopharynx    Collection Time: 12/11/24  2:18 PM    Specimen: Nasopharynx; Swab   Result Value Ref Range    ADENOVIRUS, PCR Not Detected Not Detected    Coronavirus 229E Not Detected Not Detected    Coronavirus HKU1 Not Detected Not Detected    Coronavirus NL63 Not Detected Not Detected    Coronavirus OC43 Not Detected Not Detected    COVID19 Not Detected Not Detected - Ref. Range    Human Metapneumovirus Not Detected Not Detected    Human Rhinovirus/Enterovirus Detected (A) Not Detected    Influenza A PCR Not Detected Not Detected    Influenza B PCR Not Detected Not Detected    Parainfluenza Virus 1 Not Detected Not Detected    Parainfluenza Virus 2 Not Detected Not Detected    Parainfluenza Virus 3 Not Detected Not Detected    Parainfluenza Virus  4 Not Detected Not Detected    RSV, PCR Not Detected Not Detected    Bordetella pertussis pcr Not Detected Not Detected    Bordetella parapertussis PCR Not Detected Not Detected    Chlamydophila pneumoniae PCR Not Detected Not Detected    Mycoplasma pneumo by PCR Not Detected Not Detected   Blood Gas, Arterial With Co-Ox    Collection Time: 12/11/24  2:23 PM    Specimen: Arterial Blood   Result Value Ref Range    Site Left Radial     Sunny's Test N/A     pH, Arterial 7.325 (L) 7.350 - 7.450 pH units    pCO2, Arterial 49.9 (H) 35.0 - 45.0 mm Hg    pO2, Arterial 92.5 83.0 - 108.0 mm Hg    HCO3, Arterial 26.0 20.0 - 26.0 mmol/L    Base Excess, Arterial -0.6 (L) 0.0 - 2.0 mmol/L    Hemoglobin, Blood Gas 12.6 (L) 13.5 - 17.5 g/dL    Hematocrit, Blood Gas 38.5 38.0 - 51.0 %    Oxyhemoglobin 94.3 94 - 99 %    Methemoglobin 0.90 0.00 - 1.50 %    Carboxyhemoglobin 1.2 0 - 2 %    CO2 Content 27.5 22 - 33 mmol/L    Temperature 37.0     Barometric Pressure for Blood Gas      Modality Nasal Cannula     FIO2 36 %    Rate 0 Breaths/minute    PIP 0 cmH2O    IPAP 0     EPAP 0     pH, Temp Corrected 7.325 pH Units    pCO2, Temperature Corrected 49.9 (H) 35 - 48 mm Hg    pO2, Temperature Corrected 92.5 83 - 108 mm Hg   High Sensitivity Troponin T 1Hr    Collection Time: 12/11/24  3:10 PM    Specimen: Blood   Result Value Ref Range    HS Troponin T 70 (C) <22 ng/L    Troponin T Numeric Delta -6 ng/L    Troponin T % Delta -8 Abnormal if >/= 20% %   ECG 12 Lead Rhythm Change    Collection Time: 12/11/24  4:02 PM   Result Value Ref Range    QT Interval 376 ms    QTC Interval 511 ms   STAT Lactic Acid, Reflex    Collection Time: 12/11/24  5:24 PM    Specimen: Blood   Result Value Ref Range    Lactate 1.7 0.5 - 2.0 mmol/L   ECG 12 Lead Tachycardia    Collection Time: 12/11/24  7:30 PM   Result Value Ref Range    QT Interval 324 ms    QTC Interval 474 ms   Heparin Anti-Xa    Collection Time: 12/11/24  8:16 PM    Specimen: Arm, Left; Blood    Result Value Ref Range    Heparin Anti-Xa (UFH) 0.10 (L) 0.30 - 0.70 IU/ml   Protime-INR    Collection Time: 12/11/24  8:16 PM    Specimen: Arm, Left; Blood   Result Value Ref Range    Protime 17.1 (H) 12.2 - 14.5 Seconds    INR 1.38 (H) 0.89 - 1.12   aPTT    Collection Time: 12/11/24  8:16 PM    Specimen: Arm, Left; Blood   Result Value Ref Range    PTT 40.1 (L) 60.0 - 90.0 seconds   POC Glucose Once    Collection Time: 12/11/24  9:47 PM    Specimen: Blood   Result Value Ref Range    Glucose 472 (C) 70 - 130 mg/dL   POC Glucose Once    Collection Time: 12/11/24  9:48 PM    Specimen: Blood   Result Value Ref Range    Glucose 464 (C) 70 - 130 mg/dL   POC Glucose Once    Collection Time: 12/11/24 11:59 PM    Specimen: Blood   Result Value Ref Range    Glucose 496 (C) 70 - 130 mg/dL   POC Glucose Once    Collection Time: 12/12/24 12:00 AM    Specimen: Blood   Result Value Ref Range    Glucose 525 (C) 70 - 130 mg/dL   Blood Gas, Arterial With Co-Ox    Collection Time: 12/12/24 12:23 AM    Specimen: Arterial Blood   Result Value Ref Range    Site Right Radial     Sunny's Test Positive     pH, Arterial 7.246 (L) 7.350 - 7.450 pH units    pCO2, Arterial 58.7 (H) 35.0 - 45.0 mm Hg    pO2, Arterial 131.0 (H) 83.0 - 108.0 mm Hg    HCO3, Arterial 25.5 20.0 - 26.0 mmol/L    Base Excess, Arterial -2.8 (L) 0.0 - 2.0 mmol/L    Hemoglobin, Blood Gas 13.1 (L) 13.5 - 17.5 g/dL    Hematocrit, Blood Gas 40.3 38.0 - 51.0 %    Oxyhemoglobin 96.3 94 - 99 %    Methemoglobin 0.90 0.00 - 1.50 %    Carboxyhemoglobin 0.7 0 - 2 %    CO2 Content 27.3 22 - 33 mmol/L    Temperature 37.0     Barometric Pressure for Blood Gas      Modality Heated HFNC     FIO2 100 %    Rate 0 Breaths/minute    PIP 0 cmH2O    IPAP 0     EPAP 0     pH, Temp Corrected 7.246 pH Units    pCO2, Temperature Corrected 58.7 (H) 35 - 48 mm Hg    pO2, Temperature Corrected 131 (H) 83 - 108 mm Hg   POC Glucose Once    Collection Time: 12/12/24 12:44 AM    Specimen: Blood    Result Value Ref Range    Glucose 515 (C) 70 - 130 mg/dL   POC Glucose Once    Collection Time: 12/12/24 12:44 AM    Specimen: Blood   Result Value Ref Range    Glucose 554 (C) 70 - 130 mg/dL   S. Pneumo Ag Urine or CSF - Urine, Urine, Clean Catch    Collection Time: 12/12/24 12:48 AM    Specimen: Urine, Clean Catch   Result Value Ref Range    Strep Pneumo Ag Negative Negative   Legionella Antigen, Urine - Urine, Urine, Clean Catch    Collection Time: 12/12/24 12:48 AM    Specimen: Urine, Clean Catch   Result Value Ref Range    LEGIONELLA ANTIGEN, URINE Negative Negative   POC Glucose Once    Collection Time: 12/12/24  1:48 AM    Specimen: Blood   Result Value Ref Range    Glucose 444 (C) 70 - 130 mg/dL   POC Glucose Once    Collection Time: 12/12/24  1:50 AM    Specimen: Blood   Result Value Ref Range    Glucose 505 (C) 70 - 130 mg/dL   Comprehensive Metabolic Panel    Collection Time: 12/12/24  2:17 AM    Specimen: Blood   Result Value Ref Range    Glucose 431 (C) 65 - 99 mg/dL    BUN 55 (H) 8 - 23 mg/dL    Creatinine 1.90 (H) 0.76 - 1.27 mg/dL    Sodium 131 (L) 136 - 145 mmol/L    Potassium 4.7 3.5 - 5.2 mmol/L    Chloride 92 (L) 98 - 107 mmol/L    CO2 22.0 22.0 - 29.0 mmol/L    Calcium 9.2 8.6 - 10.5 mg/dL    Total Protein 7.1 6.0 - 8.5 g/dL    Albumin 2.9 (L) 3.5 - 5.2 g/dL    ALT (SGPT) 22 1 - 41 U/L    AST (SGOT) 23 1 - 40 U/L    Alkaline Phosphatase 109 39 - 117 U/L    Total Bilirubin 0.4 0.0 - 1.2 mg/dL    Globulin 4.2 gm/dL    A/G Ratio 0.7 g/dL    BUN/Creatinine Ratio 28.9 (H) 7.0 - 25.0    Anion Gap 17.0 (H) 5.0 - 15.0 mmol/L    eGFR 35.2 (L) >60.0 mL/min/1.73   Hemoglobin A1c    Collection Time: 12/12/24  2:17 AM    Specimen: Blood   Result Value Ref Range    Hemoglobin A1C 8.50 (H) 4.80 - 5.60 %   Procalcitonin    Collection Time: 12/12/24  2:17 AM    Specimen: Blood   Result Value Ref Range    Procalcitonin 0.90 (H) 0.00 - 0.25 ng/mL   TSH    Collection Time: 12/12/24  2:17 AM    Specimen: Blood    Result Value Ref Range    TSH 0.747 0.270 - 4.200 uIU/mL   Heparin Anti-Xa    Collection Time: 12/12/24  2:17 AM    Specimen: Blood   Result Value Ref Range    Heparin Anti-Xa (UFH) 0.10 (L) 0.30 - 0.70 IU/ml   Beta Hydroxybutyrate Quantitative    Collection Time: 12/12/24  2:17 AM    Specimen: Blood   Result Value Ref Range    Beta-Hydroxybutyrate Quant 0.465 (H) 0.020 - 0.270 mmol/L   CBC Auto Differential    Collection Time: 12/12/24  2:17 AM    Specimen: Blood   Result Value Ref Range    WBC 10.93 (H) 3.40 - 10.80 10*3/mm3    RBC 4.04 (L) 4.14 - 5.80 10*6/mm3    Hemoglobin 13.0 13.0 - 17.7 g/dL    Hematocrit 40.7 37.5 - 51.0 %    .7 (H) 79.0 - 97.0 fL    MCH 32.2 26.6 - 33.0 pg    MCHC 31.9 31.5 - 35.7 g/dL    RDW 13.4 12.3 - 15.4 %    RDW-SD 50.3 37.0 - 54.0 fl    MPV 11.7 6.0 - 12.0 fL    Platelets 171 140 - 450 10*3/mm3    Neutrophil % 83.9 (H) 42.7 - 76.0 %    Lymphocyte % 13.5 (L) 19.6 - 45.3 %    Monocyte % 1.8 (L) 5.0 - 12.0 %    Eosinophil % 0.0 (L) 0.3 - 6.2 %    Basophil % 0.3 0.0 - 1.5 %    Immature Grans % 0.5 0.0 - 0.5 %    Neutrophils, Absolute 9.17 (H) 1.70 - 7.00 10*3/mm3    Lymphocytes, Absolute 1.48 0.70 - 3.10 10*3/mm3    Monocytes, Absolute 0.20 0.10 - 0.90 10*3/mm3    Eosinophils, Absolute 0.00 0.00 - 0.40 10*3/mm3    Basophils, Absolute 0.03 0.00 - 0.20 10*3/mm3    Immature Grans, Absolute 0.05 0.00 - 0.05 10*3/mm3    nRBC 0.0 0.0 - 0.2 /100 WBC   POC Glucose Once    Collection Time: 12/12/24  2:58 AM    Specimen: Blood   Result Value Ref Range    Glucose 369 (H) 70 - 130 mg/dL   POC Glucose Once    Collection Time: 12/12/24  3:30 AM    Specimen: Blood   Result Value Ref Range    Glucose 319 (H) 70 - 130 mg/dL   POC Glucose Once    Collection Time: 12/12/24  4:36 AM    Specimen: Blood   Result Value Ref Range    Glucose 267 (H) 70 - 130 mg/dL   POC Glucose Once    Collection Time: 12/12/24  5:43 AM    Specimen: Blood   Result Value Ref Range    Glucose 266 (H) 70 - 130 mg/dL   POC  Glucose Once    Collection Time: 12/12/24  6:47 AM    Specimen: Blood   Result Value Ref Range    Glucose 224 (H) 70 - 130 mg/dL   POC Glucose Once    Collection Time: 12/12/24  8:02 AM    Specimen: Blood   Result Value Ref Range    Glucose 204 (H) 70 - 130 mg/dL   POC Glucose Once    Collection Time: 12/12/24  9:04 AM    Specimen: Blood   Result Value Ref Range    Glucose 184 (H) 70 - 130 mg/dL   Adult Transthoracic Echo Complete w/ Color, Spectral and Contrast if Necessary Per Protocol    Collection Time: 12/12/24  9:27 AM   Result Value Ref Range    EF(MOD-bp) 53.7 %    LVIDd 4.0 cm    LVIDs 3.1 cm    IVSd 0.90 cm    LVPWd 1.10 cm    FS 22.5 %    IVS/LVPW 0.82 cm    ESV(cubed) 29.8 ml    EDV(cubed) 64.0 ml    LV mass(C)d 127.1 grams    LVOT area 3.1 cm2    LVOT diam 2.00 cm    EDV(MOD-sp2) 33.8 ml    EDV(MOD-sp4) 72.1 ml    ESV(MOD-sp2) 17.3 ml    ESV(MOD-sp4) 32.6 ml    SV(MOD-sp2) 16.5 ml    SV(MOD-sp4) 39.5 ml    EF(MOD-sp2) 48.8 %    EF(MOD-sp4) 54.8 %    MV E max marlo 70.7 cm/sec    MV dec time 0.12 sec    Lat Peak E' Marlo 10.6 cm/sec    TR max marlo 249.7 cm/sec    SV(LVOT) 45.6 ml    RV Base 4.4 cm    RV Mid 3.7 cm    RV Length 6.2 cm    TAPSE (>1.6) 1.27 cm    RV S' 4.7 cm/sec    LA dimension (2D)  2.5 cm    LV V1 max 75.0 cm/sec    LV V1 max PG 2.25 mmHg    LV V1 mean PG 1.00 mmHg    LV V1 VTI 14.5 cm    Ao pk marlo 90.5 cm/sec    Ao max PG 3.3 mmHg    Ao mean PG 2.00 mmHg    Ao V2 VTI 12.3 cm    MARINO(I,D) 3.7 cm2    MV max PG 3.3 mmHg    MV mean PG 1.25 mmHg    MV V2 VTI 17.7 cm    MV P1/2t 52.3 msec    MVA(P1/2t) 4.2 cm2    MVA(VTI) 2.6 cm2    MV dec slope 557.5 cm/sec2    TR max PG 25.0 mmHg    PA acc time 0.11 sec    Ao root diam 3.1 cm    RVSP(TR) 33 mmHg    RAP systole 8 mmHg    Echo EF Estimated 65.0 %   Heparin Anti-Xa    Collection Time: 12/12/24  9:52 AM    Specimen: Blood   Result Value Ref Range    Heparin Anti-Xa (UFH) 0.17 (L) 0.30 - 0.70 IU/ml   POC Glucose Once    Collection Time: 12/12/24 10:07  AM    Specimen: Blood   Result Value Ref Range    Glucose 148 (H) 70 - 130 mg/dL   POC Glucose Once    Collection Time: 12/12/24 10:54 AM    Specimen: Blood   Result Value Ref Range    Glucose 126 70 - 130 mg/dL   POC Glucose Once    Collection Time: 12/12/24 12:03 PM    Specimen: Blood   Result Value Ref Range    Glucose 136 (H) 70 - 130 mg/dL   POC Glucose Once    Collection Time: 12/12/24  1:00 PM    Specimen: Blood   Result Value Ref Range    Glucose 124 70 - 130 mg/dL   POC Glucose Once    Collection Time: 12/12/24  2:18 PM    Specimen: Blood   Result Value Ref Range    Glucose 149 (H) 70 - 130 mg/dL   POC Glucose Once    Collection Time: 12/12/24  3:00 PM    Specimen: Blood   Result Value Ref Range    Glucose 150 (H) 70 - 130 mg/dL   POC Glucose Once    Collection Time: 12/12/24  4:02 PM    Specimen: Blood   Result Value Ref Range    Glucose 164 (H) 70 - 130 mg/dL   Heparin Anti-Xa    Collection Time: 12/12/24  4:38 PM    Specimen: Blood   Result Value Ref Range    Heparin Anti-Xa (UFH) 0.29 (L) 0.30 - 0.70 IU/ml   POC Glucose Once    Collection Time: 12/12/24  5:08 PM    Specimen: Blood   Result Value Ref Range    Glucose 157 (H) 70 - 130 mg/dL   POC Glucose Once    Collection Time: 12/12/24  6:58 PM    Specimen: Blood   Result Value Ref Range    Glucose 166 (H) 70 - 130 mg/dL   POC Glucose Once    Collection Time: 12/12/24  8:28 PM    Specimen: Blood   Result Value Ref Range    Glucose 157 (H) 70 - 130 mg/dL   POC Glucose Once    Collection Time: 12/12/24 10:56 PM    Specimen: Blood   Result Value Ref Range    Glucose 264 (H) 70 - 130 mg/dL   POC Glucose Once    Collection Time: 12/12/24 11:20 PM    Specimen: Blood   Result Value Ref Range    Glucose 280 (H) 70 - 130 mg/dL   POC Glucose Once    Collection Time: 12/13/24 12:25 AM    Specimen: Blood   Result Value Ref Range    Glucose 277 (H) 70 - 130 mg/dL   Heparin Anti-Xa    Collection Time: 12/13/24 12:35 AM    Specimen: Blood   Result Value Ref Range     Heparin Anti-Xa (UFH) 0.40 0.30 - 0.70 IU/ml   POC Glucose Once    Collection Time: 12/13/24  1:26 AM    Specimen: Blood   Result Value Ref Range    Glucose 311 (H) 70 - 130 mg/dL   POC Glucose Once    Collection Time: 12/13/24  2:56 AM    Specimen: Blood   Result Value Ref Range    Glucose 248 (H) 70 - 130 mg/dL   POC Glucose Once    Collection Time: 12/13/24  5:06 AM    Specimen: Blood   Result Value Ref Range    Glucose 280 (H) 70 - 130 mg/dL   Comprehensive Metabolic Panel    Collection Time: 12/13/24  5:39 AM    Specimen: Blood   Result Value Ref Range    Glucose 269 (H) 65 - 99 mg/dL    BUN 82 (H) 8 - 23 mg/dL    Creatinine 2.45 (H) 0.76 - 1.27 mg/dL    Sodium 134 (L) 136 - 145 mmol/L    Potassium 5.1 3.5 - 5.2 mmol/L    Chloride 93 (L) 98 - 107 mmol/L    CO2 22.0 22.0 - 29.0 mmol/L    Calcium 8.9 8.6 - 10.5 mg/dL    Total Protein 6.7 6.0 - 8.5 g/dL    Albumin 3.1 (L) 3.5 - 5.2 g/dL    ALT (SGPT) 24 1 - 41 U/L    AST (SGOT) 42 (H) 1 - 40 U/L    Alkaline Phosphatase 99 39 - 117 U/L    Total Bilirubin 0.3 0.0 - 1.2 mg/dL    Globulin 3.6 gm/dL    A/G Ratio 0.9 g/dL    BUN/Creatinine Ratio 33.5 (H) 7.0 - 25.0    Anion Gap 19.0 (H) 5.0 - 15.0 mmol/L    eGFR 26.0 (L) >60.0 mL/min/1.73   Magnesium    Collection Time: 12/13/24  5:39 AM    Specimen: Blood   Result Value Ref Range    Magnesium 2.3 1.6 - 2.4 mg/dL   Phosphorus    Collection Time: 12/13/24  5:39 AM    Specimen: Blood   Result Value Ref Range    Phosphorus 5.8 (H) 2.5 - 4.5 mg/dL   proBNP    Collection Time: 12/13/24  5:39 AM    Specimen: Blood   Result Value Ref Range    proBNP 8,519.0 (H) 0.0 - 1,800.0 pg/mL   Procalcitonin    Collection Time: 12/13/24  5:39 AM    Specimen: Blood   Result Value Ref Range    Procalcitonin 0.78 (H) 0.00 - 0.25 ng/mL   CBC Auto Differential    Collection Time: 12/13/24  5:39 AM    Specimen: Blood   Result Value Ref Range    WBC 16.95 (H) 3.40 - 10.80 10*3/mm3    RBC 3.89 (L) 4.14 - 5.80 10*6/mm3    Hemoglobin 12.6 (L) 13.0  - 17.7 g/dL    Hematocrit 39.1 37.5 - 51.0 %    .5 (H) 79.0 - 97.0 fL    MCH 32.4 26.6 - 33.0 pg    MCHC 32.2 31.5 - 35.7 g/dL    RDW 13.4 12.3 - 15.4 %    RDW-SD 49.7 37.0 - 54.0 fl    MPV 12.0 6.0 - 12.0 fL    Platelets 169 140 - 450 10*3/mm3    Neutrophil % 83.8 (H) 42.7 - 76.0 %    Lymphocyte % 10.0 (L) 19.6 - 45.3 %    Monocyte % 4.8 (L) 5.0 - 12.0 %    Eosinophil % 0.0 (L) 0.3 - 6.2 %    Basophil % 0.3 0.0 - 1.5 %    Immature Grans % 1.1 (H) 0.0 - 0.5 %    Neutrophils, Absolute 14.20 (H) 1.70 - 7.00 10*3/mm3    Lymphocytes, Absolute 1.69 0.70 - 3.10 10*3/mm3    Monocytes, Absolute 0.82 0.10 - 0.90 10*3/mm3    Eosinophils, Absolute 0.00 0.00 - 0.40 10*3/mm3    Basophils, Absolute 0.05 0.00 - 0.20 10*3/mm3    Immature Grans, Absolute 0.19 (H) 0.00 - 0.05 10*3/mm3    nRBC 0.0 0.0 - 0.2 /100 WBC   Heparin Anti-Xa    Collection Time: 12/13/24  5:39 AM    Specimen: Blood   Result Value Ref Range    Heparin Anti-Xa (UFH) 0.36 0.30 - 0.70 IU/ml   ECG 12 Lead QT Measurement    Collection Time: 12/13/24  6:19 AM   Result Value Ref Range    QT Interval 346 ms    QTC Interval 468 ms   POC Glucose Once    Collection Time: 12/13/24  7:09 AM    Specimen: Blood   Result Value Ref Range    Glucose 286 (H) 70 - 130 mg/dL   POC Glucose Once    Collection Time: 12/13/24 11:36 AM    Specimen: Blood   Result Value Ref Range    Glucose 276 (H) 70 - 130 mg/dL   POC Glucose Once    Collection Time: 12/13/24  5:05 PM    Specimen: Blood   Result Value Ref Range    Glucose 253 (H) 70 - 130 mg/dL   POC Glucose Once    Collection Time: 12/13/24  7:47 PM    Specimen: Blood   Result Value Ref Range    Glucose 286 (H) 70 - 130 mg/dL   POC Glucose Once    Collection Time: 12/14/24  6:27 AM    Specimen: Blood   Result Value Ref Range    Glucose 197 (H) 70 - 130 mg/dL   Heparin Anti-Xa    Collection Time: 12/14/24  7:13 AM    Specimen: Blood   Result Value Ref Range    Heparin Anti-Xa (UFH) >1.10 (C) 0.30 - 0.70 IU/ml   Lactic Acid,  Plasma    Collection Time: 12/14/24  7:13 AM    Specimen: Blood   Result Value Ref Range    Lactate 1.2 0.5 - 2.0 mmol/L   Renal Function Panel    Collection Time: 12/14/24  7:13 AM    Specimen: Blood   Result Value Ref Range    Glucose 174 (H) 65 - 99 mg/dL    BUN 75 (H) 8 - 23 mg/dL    Creatinine 2.02 (H) 0.76 - 1.27 mg/dL    Sodium 131 (L) 136 - 145 mmol/L    Potassium 4.4 3.5 - 5.2 mmol/L    Chloride 95 (L) 98 - 107 mmol/L    CO2 25.0 22.0 - 29.0 mmol/L    Calcium 8.5 (L) 8.6 - 10.5 mg/dL    Albumin 2.7 (L) 3.5 - 5.2 g/dL    Phosphorus 3.3 2.5 - 4.5 mg/dL    Anion Gap 11.0 5.0 - 15.0 mmol/L    BUN/Creatinine Ratio 37.1 (H) 7.0 - 25.0    eGFR 32.7 (L) >60.0 mL/min/1.73   Procalcitonin    Collection Time: 12/14/24  7:13 AM    Specimen: Blood   Result Value Ref Range    Procalcitonin 0.77 (H) 0.00 - 0.25 ng/mL   proBNP    Collection Time: 12/14/24  7:13 AM    Specimen: Blood   Result Value Ref Range    proBNP 7,920.0 (H) 0.0 - 1,800.0 pg/mL   CBC Auto Differential    Collection Time: 12/14/24  7:13 AM    Specimen: Blood   Result Value Ref Range    WBC 15.90 (H) 3.40 - 10.80 10*3/mm3    RBC 3.65 (L) 4.14 - 5.80 10*6/mm3    Hemoglobin 11.8 (L) 13.0 - 17.7 g/dL    Hematocrit 36.2 (L) 37.5 - 51.0 %    MCV 99.2 (H) 79.0 - 97.0 fL    MCH 32.3 26.6 - 33.0 pg    MCHC 32.6 31.5 - 35.7 g/dL    RDW 13.6 12.3 - 15.4 %    RDW-SD 49.6 37.0 - 54.0 fl    MPV 11.2 6.0 - 12.0 fL    Platelets 131 (L) 140 - 450 10*3/mm3    Neutrophil % 80.0 (H) 42.7 - 76.0 %    Lymphocyte % 13.3 (L) 19.6 - 45.3 %    Monocyte % 5.3 5.0 - 12.0 %    Eosinophil % 0.0 (L) 0.3 - 6.2 %    Basophil % 0.3 0.0 - 1.5 %    Immature Grans % 1.1 (H) 0.0 - 0.5 %    Neutrophils, Absolute 12.72 (H) 1.70 - 7.00 10*3/mm3    Lymphocytes, Absolute 2.11 0.70 - 3.10 10*3/mm3    Monocytes, Absolute 0.85 0.10 - 0.90 10*3/mm3    Eosinophils, Absolute 0.00 0.00 - 0.40 10*3/mm3    Basophils, Absolute 0.05 0.00 - 0.20 10*3/mm3    Immature Grans, Absolute 0.17 (H) 0.00 - 0.05  10*3/mm3    nRBC 0.0 0.0 - 0.2 /100 WBC   POC Glucose Once    Collection Time: 12/14/24  7:30 AM    Specimen: Blood   Result Value Ref Range    Glucose 169 (H) 70 - 130 mg/dL   POC Glucose Once    Collection Time: 12/14/24 11:39 AM    Specimen: Blood   Result Value Ref Range    Glucose 119 70 - 130 mg/dL   POC Glucose Once    Collection Time: 12/14/24  1:01 PM    Specimen: Blood   Result Value Ref Range    Glucose 139 (H) 70 - 130 mg/dL   Heparin Anti-Xa    Collection Time: 12/14/24  1:36 PM    Specimen: Blood   Result Value Ref Range    Heparin Anti-Xa (UFH) 0.14 (L) 0.30 - 0.70 IU/ml   POC Glucose Once    Collection Time: 12/14/24  4:33 PM    Specimen: Blood   Result Value Ref Range    Glucose 152 (H) 70 - 130 mg/dL   POC Glucose Once    Collection Time: 12/14/24  7:44 PM    Specimen: Blood   Result Value Ref Range    Glucose 286 (H) 70 - 130 mg/dL   Heparin Anti-Xa    Collection Time: 12/14/24  9:10 PM    Specimen: Blood   Result Value Ref Range    Heparin Anti-Xa (UFH) 0.35 0.30 - 0.70 IU/ml   Heparin Anti-Xa    Collection Time: 12/15/24  2:38 AM    Specimen: Blood   Result Value Ref Range    Heparin Anti-Xa (UFH) 0.16 (L) 0.30 - 0.70 IU/ml   CBC Auto Differential    Collection Time: 12/15/24  2:38 AM    Specimen: Blood   Result Value Ref Range    WBC 16.85 (H) 3.40 - 10.80 10*3/mm3    RBC 3.69 (L) 4.14 - 5.80 10*6/mm3    Hemoglobin 11.9 (L) 13.0 - 17.7 g/dL    Hematocrit 36.4 (L) 37.5 - 51.0 %    MCV 98.6 (H) 79.0 - 97.0 fL    MCH 32.2 26.6 - 33.0 pg    MCHC 32.7 31.5 - 35.7 g/dL    RDW 13.7 12.3 - 15.4 %    RDW-SD 49.7 37.0 - 54.0 fl    MPV 11.8 6.0 - 12.0 fL    Platelets 114 (L) 140 - 450 10*3/mm3    Neutrophil % 84.4 (H) 42.7 - 76.0 %    Lymphocyte % 10.1 (L) 19.6 - 45.3 %    Monocyte % 4.3 (L) 5.0 - 12.0 %    Eosinophil % 0.1 (L) 0.3 - 6.2 %    Basophil % 0.2 0.0 - 1.5 %    Immature Grans % 0.9 (H) 0.0 - 0.5 %    Neutrophils, Absolute 14.22 (H) 1.70 - 7.00 10*3/mm3    Lymphocytes, Absolute 1.71 0.70 - 3.10  10*3/mm3    Monocytes, Absolute 0.72 0.10 - 0.90 10*3/mm3    Eosinophils, Absolute 0.01 0.00 - 0.40 10*3/mm3    Basophils, Absolute 0.04 0.00 - 0.20 10*3/mm3    Immature Grans, Absolute 0.15 (H) 0.00 - 0.05 10*3/mm3    nRBC 0.0 0.0 - 0.2 /100 WBC   POC Glucose Once    Collection Time: 12/15/24  4:24 AM    Specimen: Blood   Result Value Ref Range    Glucose 199 (H) 70 - 130 mg/dL   Basic Metabolic Panel    Collection Time: 12/15/24  4:27 AM    Specimen: Blood   Result Value Ref Range    Glucose 219 (H) 65 - 99 mg/dL    BUN 52 (H) 8 - 23 mg/dL    Creatinine 1.62 (H) 0.76 - 1.27 mg/dL    Sodium 132 (L) 136 - 145 mmol/L    Potassium 4.2 3.5 - 5.2 mmol/L    Chloride 95 (L) 98 - 107 mmol/L    CO2 24.0 22.0 - 29.0 mmol/L    Calcium 9.0 8.6 - 10.5 mg/dL    BUN/Creatinine Ratio 32.1 (H) 7.0 - 25.0    Anion Gap 13.0 5.0 - 15.0 mmol/L    eGFR 42.6 (L) >60.0 mL/min/1.73   proBNP    Collection Time: 12/15/24  4:27 AM    Specimen: Blood   Result Value Ref Range    proBNP 8,911.0 (H) 0.0 - 1,800.0 pg/mL   POC Glucose Once    Collection Time: 12/15/24  8:06 AM    Specimen: Blood   Result Value Ref Range    Glucose 178 (H) 70 - 130 mg/dL   POC Glucose Once    Collection Time: 12/15/24 11:55 AM    Specimen: Blood   Result Value Ref Range    Glucose 258 (H) 70 - 130 mg/dL   Heparin Anti-Xa    Collection Time: 12/15/24  1:29 PM    Specimen: Blood   Result Value Ref Range    Heparin Anti-Xa (UFH) 0.28 (L) 0.30 - 0.70 IU/ml   POC Glucose Once    Collection Time: 12/15/24  5:21 PM    Specimen: Blood   Result Value Ref Range    Glucose 284 (H) 70 - 130 mg/dL   Heparin Anti-Xa    Collection Time: 12/15/24  7:47 PM    Specimen: Blood   Result Value Ref Range    Heparin Anti-Xa (UFH) 0.22 (L) 0.30 - 0.70 IU/ml   POC Glucose Once    Collection Time: 12/15/24  8:28 PM    Specimen: Blood   Result Value Ref Range    Glucose 240 (H) 70 - 130 mg/dL   Heparin Anti-Xa    Collection Time: 12/16/24  2:42 AM    Specimen: Blood   Result Value Ref Range     Heparin Anti-Xa (UFH) 0.29 (L) 0.30 - 0.70 IU/ml   proBNP    Collection Time: 12/16/24  2:42 AM    Specimen: Blood   Result Value Ref Range    proBNP 9,458.0 (H) 0.0 - 1,800.0 pg/mL   Basic Metabolic Panel    Collection Time: 12/16/24  2:42 AM    Specimen: Blood   Result Value Ref Range    Glucose 156 (H) 65 - 99 mg/dL    BUN 44 (H) 8 - 23 mg/dL    Creatinine 1.61 (H) 0.76 - 1.27 mg/dL    Sodium 134 (L) 136 - 145 mmol/L    Potassium 4.5 3.5 - 5.2 mmol/L    Chloride 95 (L) 98 - 107 mmol/L    CO2 29.0 22.0 - 29.0 mmol/L    Calcium 9.3 8.6 - 10.5 mg/dL    BUN/Creatinine Ratio 27.3 (H) 7.0 - 25.0    Anion Gap 10.0 5.0 - 15.0 mmol/L    eGFR 43.0 (L) >60.0 mL/min/1.73   Magnesium    Collection Time: 12/16/24  2:42 AM    Specimen: Blood   Result Value Ref Range    Magnesium 1.8 1.6 - 2.4 mg/dL   Phosphorus    Collection Time: 12/16/24  2:42 AM    Specimen: Blood   Result Value Ref Range    Phosphorus 3.2 2.5 - 4.5 mg/dL   CBC Auto Differential    Collection Time: 12/16/24  2:42 AM    Specimen: Blood   Result Value Ref Range    WBC 17.66 (H) 3.40 - 10.80 10*3/mm3    RBC 3.77 (L) 4.14 - 5.80 10*6/mm3    Hemoglobin 12.1 (L) 13.0 - 17.7 g/dL    Hematocrit 36.9 (L) 37.5 - 51.0 %    MCV 97.9 (H) 79.0 - 97.0 fL    MCH 32.1 26.6 - 33.0 pg    MCHC 32.8 31.5 - 35.7 g/dL    RDW 13.3 12.3 - 15.4 %    RDW-SD 47.7 37.0 - 54.0 fl    MPV 12.2 (H) 6.0 - 12.0 fL    Platelets 123 (L) 140 - 450 10*3/mm3    Neutrophil % 80.9 (H) 42.7 - 76.0 %    Lymphocyte % 12.1 (L) 19.6 - 45.3 %    Monocyte % 5.2 5.0 - 12.0 %    Eosinophil % 0.2 (L) 0.3 - 6.2 %    Basophil % 0.3 0.0 - 1.5 %    Immature Grans % 1.3 (H) 0.0 - 0.5 %    Neutrophils, Absolute 14.30 (H) 1.70 - 7.00 10*3/mm3    Lymphocytes, Absolute 2.13 0.70 - 3.10 10*3/mm3    Monocytes, Absolute 0.91 (H) 0.10 - 0.90 10*3/mm3    Eosinophils, Absolute 0.04 0.00 - 0.40 10*3/mm3    Basophils, Absolute 0.05 0.00 - 0.20 10*3/mm3    Immature Grans, Absolute 0.23 (H) 0.00 - 0.05 10*3/mm3    nRBC 0.0  0.0 - 0.2 /100 WBC   C-reactive Protein    Collection Time: 12/16/24  2:42 AM    Specimen: Blood   Result Value Ref Range    C-Reactive Protein 28.52 (H) 0.00 - 0.50 mg/dL   POC Glucose Once    Collection Time: 12/16/24  7:29 AM    Specimen: Blood   Result Value Ref Range    Glucose 157 (H) 70 - 130 mg/dL   Heparin Anti-Xa    Collection Time: 12/16/24  9:26 AM    Specimen: Blood   Result Value Ref Range    Heparin Anti-Xa (UFH) 0.31 0.30 - 0.70 IU/ml   MRSA Screen, PCR (Inpatient) - Swab, Nares    Collection Time: 12/16/24 10:11 AM    Specimen: Nares; Swab   Result Value Ref Range    MRSA PCR Negative Negative   POC Glucose Once    Collection Time: 12/16/24 11:01 AM    Specimen: Blood   Result Value Ref Range    Glucose 138 (H) 70 - 130 mg/dL   POC Glucose Once    Collection Time: 12/16/24  3:56 PM    Specimen: Blood   Result Value Ref Range    Glucose 170 (H) 70 - 130 mg/dL   POC Glucose Once    Collection Time: 12/16/24  7:49 PM    Specimen: Blood   Result Value Ref Range    Glucose 158 (H) 70 - 130 mg/dL   CBC Auto Differential    Collection Time: 12/17/24  6:15 AM    Specimen: Blood   Result Value Ref Range    WBC 12.61 (H) 3.40 - 10.80 10*3/mm3    RBC 4.02 (L) 4.14 - 5.80 10*6/mm3    Hemoglobin 13.0 13.0 - 17.7 g/dL    Hematocrit 40.5 37.5 - 51.0 %    .7 (H) 79.0 - 97.0 fL    MCH 32.3 26.6 - 33.0 pg    MCHC 32.1 31.5 - 35.7 g/dL    RDW 13.8 12.3 - 15.4 %    RDW-SD 51.6 37.0 - 54.0 fl    MPV 12.1 (H) 6.0 - 12.0 fL    Platelets 116 (L) 140 - 450 10*3/mm3    Neutrophil % 84.0 (H) 42.7 - 76.0 %    Lymphocyte % 11.9 (L) 19.6 - 45.3 %    Monocyte % 2.1 (L) 5.0 - 12.0 %    Eosinophil % 0.0 (L) 0.3 - 6.2 %    Basophil % 0.4 0.0 - 1.5 %    Immature Grans % 1.6 (H) 0.0 - 0.5 %    Neutrophils, Absolute 10.59 (H) 1.70 - 7.00 10*3/mm3    Lymphocytes, Absolute 1.50 0.70 - 3.10 10*3/mm3    Monocytes, Absolute 0.27 0.10 - 0.90 10*3/mm3    Eosinophils, Absolute 0.00 0.00 - 0.40 10*3/mm3    Basophils, Absolute 0.05 0.00 -  0.20 10*3/mm3    Immature Grans, Absolute 0.20 (H) 0.00 - 0.05 10*3/mm3    nRBC 0.0 0.0 - 0.2 /100 WBC   Scan Slide    Collection Time: 12/17/24  6:15 AM    Specimen: Blood   Result Value Ref Range    Macrocytes Slight/1+ None Seen    WBC Morphology Normal Normal    Platelet Estimate Decreased Normal   POC Glucose Once    Collection Time: 12/17/24  7:11 AM    Specimen: Blood   Result Value Ref Range    Glucose 338 (H) 70 - 130 mg/dL   POC Glucose Once    Collection Time: 12/17/24 11:21 AM    Specimen: Blood   Result Value Ref Range    Glucose 345 (H) 70 - 130 mg/dL   Comprehensive Metabolic Panel    Collection Time: 12/17/24 12:48 PM    Specimen: Blood   Result Value Ref Range    Glucose 347 (H) 65 - 99 mg/dL    BUN 68 (H) 8 - 23 mg/dL    Creatinine 2.14 (H) 0.76 - 1.27 mg/dL    Sodium 131 (L) 136 - 145 mmol/L    Potassium 4.8 3.5 - 5.2 mmol/L    Chloride 88 (L) 98 - 107 mmol/L    CO2 22.0 22.0 - 29.0 mmol/L    Calcium 8.9 8.6 - 10.5 mg/dL    Total Protein 5.7 (L) 6.0 - 8.5 g/dL    Albumin 2.6 (L) 3.5 - 5.2 g/dL    ALT (SGPT) 21 1 - 41 U/L    AST (SGOT) 29 1 - 40 U/L    Alkaline Phosphatase 95 39 - 117 U/L    Total Bilirubin 0.4 0.0 - 1.2 mg/dL    Globulin 3.1 gm/dL    A/G Ratio 0.8 g/dL    BUN/Creatinine Ratio 31.8 (H) 7.0 - 25.0    Anion Gap 21.0 (H) 5.0 - 15.0 mmol/L    eGFR 30.5 (L) >60.0 mL/min/1.73   Magnesium    Collection Time: 12/17/24 12:48 PM    Specimen: Blood   Result Value Ref Range    Magnesium 1.7 1.6 - 2.4 mg/dL   Phosphorus    Collection Time: 12/17/24 12:48 PM    Specimen: Blood   Result Value Ref Range    Phosphorus 6.3 (H) 2.5 - 4.5 mg/dL   C-reactive Protein    Collection Time: 12/17/24 12:48 PM    Specimen: Blood   Result Value Ref Range    C-Reactive Protein 20.65 (H) 0.00 - 0.50 mg/dL   POC Glucose Once    Collection Time: 12/17/24  4:10 PM    Specimen: Blood   Result Value Ref Range    Glucose 337 (H) 70 - 130 mg/dL   POC Glucose Once    Collection Time: 12/17/24  8:18 PM    Specimen: Blood    Result Value Ref Range    Glucose 369 (H) 70 - 130 mg/dL   POC Glucose Once    Collection Time: 12/17/24  9:30 PM    Specimen: Blood   Result Value Ref Range    Glucose 363 (H) 70 - 130 mg/dL   Comprehensive Metabolic Panel    Collection Time: 12/18/24  4:38 AM    Specimen: Blood   Result Value Ref Range    Glucose 367 (H) 65 - 99 mg/dL    BUN 86 (H) 8 - 23 mg/dL    Creatinine 2.79 (H) 0.76 - 1.27 mg/dL    Sodium 130 (L) 136 - 145 mmol/L    Potassium 4.8 3.5 - 5.2 mmol/L    Chloride 92 (L) 98 - 107 mmol/L    CO2 21.0 (L) 22.0 - 29.0 mmol/L    Calcium 8.8 8.6 - 10.5 mg/dL    Total Protein 6.2 6.0 - 8.5 g/dL    Albumin 2.7 (L) 3.5 - 5.2 g/dL    ALT (SGPT) 18 1 - 41 U/L    AST (SGOT) 25 1 - 40 U/L    Alkaline Phosphatase 74 39 - 117 U/L    Total Bilirubin 0.3 0.0 - 1.2 mg/dL    Globulin 3.5 gm/dL    A/G Ratio 0.8 g/dL    BUN/Creatinine Ratio 30.8 (H) 7.0 - 25.0    Anion Gap 17.0 (H) 5.0 - 15.0 mmol/L    eGFR 22.2 (L) >60.0 mL/min/1.73   Magnesium    Collection Time: 12/18/24  4:38 AM    Specimen: Blood   Result Value Ref Range    Magnesium 1.9 1.6 - 2.4 mg/dL   Phosphorus    Collection Time: 12/18/24  4:38 AM    Specimen: Blood   Result Value Ref Range    Phosphorus 6.3 (H) 2.5 - 4.5 mg/dL   C-reactive Protein    Collection Time: 12/18/24  4:38 AM    Specimen: Blood   Result Value Ref Range    C-Reactive Protein 10.54 (H) 0.00 - 0.50 mg/dL   CBC Auto Differential    Collection Time: 12/18/24  4:38 AM    Specimen: Blood   Result Value Ref Range    WBC 13.29 (H) 3.40 - 10.80 10*3/mm3    RBC 3.46 (L) 4.14 - 5.80 10*6/mm3    Hemoglobin 11.3 (L) 13.0 - 17.7 g/dL    Hematocrit 33.7 (L) 37.5 - 51.0 %    MCV 97.4 (H) 79.0 - 97.0 fL    MCH 32.7 26.6 - 33.0 pg    MCHC 33.5 31.5 - 35.7 g/dL    RDW 13.8 12.3 - 15.4 %    RDW-SD 49.3 37.0 - 54.0 fl    MPV 12.9 (H) 6.0 - 12.0 fL    Platelets 128 (L) 140 - 450 10*3/mm3    Neutrophil % 85.6 (H) 42.7 - 76.0 %    Lymphocyte % 8.9 (L) 19.6 - 45.3 %    Monocyte % 3.9 (L) 5.0 - 12.0 %     Eosinophil % 0.0 (L) 0.3 - 6.2 %    Basophil % 0.2 0.0 - 1.5 %    Immature Grans % 1.4 (H) 0.0 - 0.5 %    Neutrophils, Absolute 11.39 (H) 1.70 - 7.00 10*3/mm3    Lymphocytes, Absolute 1.18 0.70 - 3.10 10*3/mm3    Monocytes, Absolute 0.52 0.10 - 0.90 10*3/mm3    Eosinophils, Absolute 0.00 0.00 - 0.40 10*3/mm3    Basophils, Absolute 0.02 0.00 - 0.20 10*3/mm3    Immature Grans, Absolute 0.18 (H) 0.00 - 0.05 10*3/mm3    nRBC 0.0 0.0 - 0.2 /100 WBC   POC Glucose Once    Collection Time: 12/18/24  7:25 AM    Specimen: Blood   Result Value Ref Range    Glucose 376 (H) 70 - 130 mg/dL   POC Glucose Once    Collection Time: 12/18/24 11:31 AM    Specimen: Blood   Result Value Ref Range    Glucose 445 (C) 70 - 130 mg/dL   POC Glucose Once    Collection Time: 12/18/24 11:33 AM    Specimen: Blood   Result Value Ref Range    Glucose 478 (C) 70 - 130 mg/dL   POC Glucose Once    Collection Time: 12/18/24  4:15 PM    Specimen: Blood   Result Value Ref Range    Glucose 253 (H) 70 - 130 mg/dL   POC Glucose Once    Collection Time: 12/18/24  8:45 PM    Specimen: Blood   Result Value Ref Range    Glucose 183 (H) 70 - 130 mg/dL   Comprehensive Metabolic Panel    Collection Time: 12/19/24  4:34 AM    Specimen: Blood   Result Value Ref Range    Glucose 106 (H) 65 - 99 mg/dL    BUN 90 (H) 8 - 23 mg/dL    Creatinine 2.23 (H) 0.76 - 1.27 mg/dL    Sodium 136 136 - 145 mmol/L    Potassium 4.8 3.5 - 5.2 mmol/L    Chloride 96 (L) 98 - 107 mmol/L    CO2 24.0 22.0 - 29.0 mmol/L    Calcium 9.1 8.6 - 10.5 mg/dL    Total Protein 6.5 6.0 - 8.5 g/dL    Albumin 3.5 3.5 - 5.2 g/dL    ALT (SGPT) 18 1 - 41 U/L    AST (SGOT) 21 1 - 40 U/L    Alkaline Phosphatase 60 39 - 117 U/L    Total Bilirubin 0.3 0.0 - 1.2 mg/dL    Globulin 3.0 gm/dL    A/G Ratio 1.2 g/dL    BUN/Creatinine Ratio 40.4 (H) 7.0 - 25.0    Anion Gap 16.0 (H) 5.0 - 15.0 mmol/L    eGFR 29.1 (L) >60.0 mL/min/1.73   CBC Auto Differential    Collection Time: 12/19/24  4:34 AM    Specimen: Blood    Result Value Ref Range    WBC 12.73 (H) 3.40 - 10.80 10*3/mm3    RBC 3.41 (L) 4.14 - 5.80 10*6/mm3    Hemoglobin 11.0 (L) 13.0 - 17.7 g/dL    Hematocrit 33.4 (L) 37.5 - 51.0 %    MCV 97.9 (H) 79.0 - 97.0 fL    MCH 32.3 26.6 - 33.0 pg    MCHC 32.9 31.5 - 35.7 g/dL    RDW 14.0 12.3 - 15.4 %    RDW-SD 51.0 37.0 - 54.0 fl    MPV 12.4 (H) 6.0 - 12.0 fL    Platelets 126 (L) 140 - 450 10*3/mm3    Neutrophil % 76.1 (H) 42.7 - 76.0 %    Lymphocyte % 16.5 (L) 19.6 - 45.3 %    Monocyte % 6.3 5.0 - 12.0 %    Eosinophil % 0.0 (L) 0.3 - 6.2 %    Basophil % 0.2 0.0 - 1.5 %    Immature Grans % 0.9 (H) 0.0 - 0.5 %    Neutrophils, Absolute 9.68 (H) 1.70 - 7.00 10*3/mm3    Lymphocytes, Absolute 2.10 0.70 - 3.10 10*3/mm3    Monocytes, Absolute 0.80 0.10 - 0.90 10*3/mm3    Eosinophils, Absolute 0.00 0.00 - 0.40 10*3/mm3    Basophils, Absolute 0.03 0.00 - 0.20 10*3/mm3    Immature Grans, Absolute 0.12 (H) 0.00 - 0.05 10*3/mm3    nRBC 0.0 0.0 - 0.2 /100 WBC   POC Glucose Once    Collection Time: 12/19/24  7:46 AM    Specimen: Blood   Result Value Ref Range    Glucose 100 70 - 130 mg/dL             Assessment & Plan   Diagnoses and all orders for this visit:    Encounter for support and coordination of transition of care    Pneumonia of both lungs due to infectious organism, unspecified part of lung  -     CBC & Differential; Future  -     Comprehensive Metabolic Panel; Future  -     Pneumococcal 21-Fatou Conj Vacc (Capvaxive) 0.5 ML solution prefilled syringe; Inject 0.5 mL into the appropriate muscle as directed by prescriber 1 time for 1 dose.    ZOHRA (acute kidney injury)  -     Comprehensive Metabolic Panel; Future    Type 2 diabetes mellitus with hyperglycemia, with long-term current use of insulin  Comments:  A1c-8.5.  Sugars at home better.  Check labs.    Chronic diastolic congestive heart failure  Comments:  Off amiodarone, currently in sinus, await cardiology recs.  Orders:  -     proBNP; Future            Current outpatient and  discharge medications have been reconciled for the patient.  Reviewed by: Krystina Gee MD      Return in about 6 weeks (around 2/19/2025).    Electronically signed by:    Krystina Gee MD

## 2025-01-09 ENCOUNTER — TELEPHONE (OUTPATIENT)
Dept: INTERNAL MEDICINE | Facility: CLINIC | Age: 80
End: 2025-01-09
Payer: MEDICARE

## 2025-01-09 LAB
BASOPHILS # BLD MANUAL: 0 10*3/MM3 (ref 0–0.2)
BASOPHILS NFR BLD MANUAL: 0 % (ref 0–1.5)
EOSINOPHIL # BLD MANUAL: 0.09 10*3/MM3 (ref 0–0.4)
EOSINOPHIL NFR BLD MANUAL: 1 % (ref 0.3–6.2)
LYMPHOCYTES # BLD MANUAL: 4.3 10*3/MM3 (ref 0.7–3.1)
LYMPHOCYTES NFR BLD MANUAL: 7.3 % (ref 5–12)
METAMYELOCYTES NFR BLD MANUAL: 1 % (ref 0–0)
MONOCYTES # BLD: 0.66 10*3/MM3 (ref 0.1–0.9)
MYELOCYTES NFR BLD MANUAL: 1 % (ref 0–0)
NEUTROPHILS # BLD AUTO: 3.74 10*3/MM3 (ref 1.7–7)
NEUTROPHILS NFR BLD MANUAL: 41.7 % (ref 42.7–76)
PLAT MORPH BLD: NORMAL
RBC MORPH BLD: NORMAL
VARIANT LYMPHS NFR BLD MANUAL: 47.9 % (ref 19.6–45.3)
WBC MORPH BLD: NORMAL

## 2025-01-09 NOTE — TELEPHONE ENCOUNTER
----- Message from Krystina Gee sent at 1/8/2025 11:53 PM EST -----  Please make sure patient has seen the following [employer] message:    Labs show that your kidney function has significantly improved and is back to baseline.  Blood counts and heart failure enzyme is better as well.  Sugars are very low, as you had indicated.  You may need to cut back on your insulin and make sure to eat frequent small meals.  It was very good to see you today.  Hope you able to get the new pneumonia vaccine at the pharmacy.

## 2025-01-09 NOTE — TELEPHONE ENCOUNTER
Pt returned call to MA. MA gave message from provider. Pt stated he was feeling great today and reported that his BS reading was 133 today. Pt voiced appreciation for Provider's concern.      MA gave results from PCP. Pt voiced understanding and deep appreciation for PCP's care.

## 2025-01-09 NOTE — TELEPHONE ENCOUNTER
Called and left VM per Provider to check on pt to see how he was feeling and to check Blood sugar reading today.

## 2025-01-09 NOTE — TELEPHONE ENCOUNTER
Critical lab called for glucose level of 49. MA called and discussed with patient who reported he is feeling great and his current glucose level is in the 130s.

## 2025-01-15 PROBLEM — I50.31 ACUTE HEART FAILURE WITH PRESERVED EJECTION FRACTION (HFPEF): Status: RESOLVED | Noted: 2024-12-17 | Resolved: 2025-01-15

## 2025-01-15 NOTE — PROGRESS NOTES
Cardiology Outpatient Visit      Identification: Gabriela Mar is a 79 y.o. male who resides in [place], [place]    Reason for visit:  Atrial Fibrillation with RVR (HFU)      Subjective      Patient is an 79-year-old gentleman who returns today for follow-up of his coronary artery disease, chronic diastolic heart failure, paroxysmal atrial fibrillaton and cardiac risk factors.  Patient was hospitalized last month for multifocal pneumonia.  In the setting of this patient went into atrial fibrillation with RVR.  He was started on amiodarone loading and Eliquis for stroke prophylaxis.  The patient had not seen cardiology since 2020.  The patient had a stress test back in 2020 that did not show any ischemia but there was coronary calcifications noted on the CT imaging.  During this last hospitalization his troponins were elevated but felt to be likely type II from his pneumonia/rhinovirus and sepsis however type I cannot be ruled out.  The patient was discharged in rate controlled atrial fibrillation with follow-up scheduled and plans to pursue an external cardioversion if remained out of rhythm.  Since discharge the patient has recovered.  He denies any chest pain or shortness of breath.  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  He is on amiodarone daily and tolerating Eliquis without reports of active or overt bleeding.  He is supposed to be on metoprolol but does not think he is taking it.  Systolic blood pressure is elevated at 150 mmHg today                    Review of Systems   Constitutional: Negative for malaise/fatigue.   Eyes:  Negative for vision loss in left eye and vision loss in right eye.   Cardiovascular:  Negative for chest pain, dyspnea on exertion, near-syncope, orthopnea, palpitations, paroxysmal nocturnal dyspnea and syncope.   Musculoskeletal:  Negative for myalgias.   Neurological:  Negative for brief paralysis, excessive daytime sleepiness, focal weakness, numbness, paresthesias and  "weakness.   All other systems reviewed and are negative.      Allergies   Allergen Reactions    Bactrim [Sulfamethoxazole-Trimethoprim] Other (See Comments)     Joint pain    Sulfa Antibiotics GI Intolerance     Makes bones hurt      Tegaderm Chg Dressing [Chlorhexidine] Itching and Other (See Comments)     redness         Current Outpatient Medications   Medication Instructions    albuterol (PROVENTIL) 2.5 mg, Nebulization, Every 4 Hours PRN    albuterol sulfate  (90 Base) MCG/ACT inhaler 2 puffs, Inhalation, Every 4 Hours PRN    amiodarone (PACERONE) 100 mg, Oral, Daily    apixaban (ELIQUIS) 2.5 MG tablet tablet Take 1 tablet by mouth Every 12 (Twelve) Hours as directed for Atrial Fibrillation    aspirin 81 mg, Oral, Daily    cholecalciferol (VITAMIN D3) 2,000 Units, Daily    glimepiride (AMARYL) 4 mg, Oral, Daily With Dinner    insulin NPH (HUMULIN N,NOVOLIN N) 50 Units, Subcutaneous, 2 Times Daily Before Meals    metoprolol tartrate (LOPRESSOR) 25 mg, Oral, Every 12 Hours Scheduled    midodrine (PROAMATINE) 10 mg, Oral, 3 Times Daily Before Meals    Multiple Vitamins-Minerals (MULTI VITAMIN/MINERALS PO) 1 tablet, Daily    Multiple Vitamins-Minerals (PreserVision AREDS 2) chewable tablet 1 tablet, 2 Times Daily    simvastatin (ZOCOR) 10 mg, Oral, Every Night at Bedtime         Objective     /58 (BP Location: Right arm, Patient Position: Sitting, Cuff Size: Adult)   Pulse 88   Ht 177.8 cm (70\")   Wt 104 kg (230 lb)   SpO2 93%   BMI 33.00 kg/m²       Constitutional:       Appearance: Healthy appearance. Well-developed.   Eyes:      General: Lids are normal. No scleral icterus.     Conjunctiva/sclera: Conjunctivae normal.   HENT:      Head: Normocephalic and atraumatic.   Neck:      Thyroid: No thyromegaly.      Vascular: No carotid bruit or JVD.   Pulmonary:      Effort: Pulmonary effort is normal.      Breath sounds: Normal breath sounds. No wheezing. No rhonchi. No rales.   Cardiovascular:      " Normal rate. Regular rhythm.      Murmurs: There is no murmur.      No gallop.  No rub.   Pulses:     Intact distal pulses.   Edema:     Peripheral edema absent.   Abdominal:      General: There is no distension.      Palpations: Abdomen is soft. There is no abdominal mass.   Musculoskeletal:      Cervical back: Normal range of motion. Skin:     General: Skin is warm and dry.      Findings: No rash.   Neurological:      General: No focal deficit present.      Mental Status: Alert and oriented to person, place, and time.      Gait: Gait is intact.   Psychiatric:         Attention and Perception: Attention normal.         Mood and Affect: Mood normal.         Behavior: Behavior normal.         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    Clinical impression: abnormal EKG  Comments: QT/QTc 448/5 10M           Lab Results   Component Value Date    GLUCOSE 49 (C) 01/08/2025    BUN 29 (H) 01/08/2025    CREATININE 1.54 (H) 01/08/2025     01/08/2025    K 4.6 01/08/2025     01/08/2025    CALCIUM 8.9 01/08/2025    PROTEINTOT 7.3 01/08/2025    ALBUMIN 4.1 01/08/2025    ALT 22 01/08/2025    AST 24 01/08/2025    ALKPHOS 76 01/08/2025    BILITOT 0.3 01/08/2025    GLOB 3.2 01/08/2025    AGRATIO 1.3 01/08/2025    BCR 18.8 01/08/2025    ANIONGAP 11.6 01/08/2025    EGFR 45.3 (L) 01/08/2025     Lab Results   Component Value Date    WBC 8.98 01/08/2025    HGB 12.6 (L) 01/08/2025    HCT 37.4 (L) 01/08/2025    MCV 97.1 (H) 01/08/2025     01/08/2025     Lab Results   Component Value Date    CHOL 134 09/12/2024     CHLPL 125 06/06/2016    TRIG 168 (H) 09/12/2024    HDL 39 (L) 09/12/2024    LDL 66 09/12/2024     Lab Results   Component Value Date    HGBA1C 8.50 (H) 12/12/2024             Assessment     Diagnoses and all orders for this visit:    1. Coronary artery disease involving native coronary artery of native heart with angina pectoris (Primary)  Overview:  Pharmacologic nuclear stress (10/21/2020): apical ischemia.  Normal LVEF.  Coronary calcification on CT attenuation correction images.    Assessment & Plan:  Patient is not having chest pain but will obtain myocardial perfusion study due to new onset of atrial fibrillation    Orders:  -     ECG 12 Lead    2. Chronic diastolic congestive heart failure  Overview:  Echocardiogram (01/04/2017): LVEF 50%. Trace MR and TR.   Echo (1/27/2021):LVEF = 70%.  Grade 2 diastolic dysfunction.  Cardiac valves anatomically and functionally normal.    Assessment & Plan:  Stable NYHA class I symptoms          3. Paroxysmal atrial fibrillation  Overview:  A-fib with RVR in the setting of multifocal pneumonia, 12/12/2024  [id number] 5 (age >75, CAD, HTN, DM)  Echo (12/12/2024): LVEF = 65%. No hemodynamically significant valvular heart diseas    Assessment & Plan:  Patient is now in normal sinus rhythm  Decrease amiodarone to 100 mg daily. Can likely discontinue at next visit  Continue metoprolol tartrate 25 mg twice daily  Continue Eliquis for stroke prophylaxis    Orders:  -     ECG 12 Lead    4. Essential hypertension  Overview:  Target blood pressure <130/80 mmHg    Assessment & Plan:  Hypertension is not well-controlled  Restart metoprolol tartrate 25 mg twice daily      5. Hyperlipidemia LDL goal <70  Overview:  High intensity statin therapy indicated given the presence of CAD    Assessment & Plan:   Continue Zocor 10 mg daily      Other orders  -     metoprolol tartrate (LOPRESSOR) 25 MG tablet; Take 1 tablet by mouth Every 12 (Twelve) Hours.  Dispense: 120 tablet; Refill: 1  -      amiodarone (PACERONE) 200 MG tablet; Take 0.5 tablets by mouth Daily.  Dispense: 90 tablet; Refill: 0          Plan   Patient has converted to normal sinus rhythm no need for external cardioversion  Restart metoprolol tartrate 25 mg twice daily  Decrease amiodarone to 100 mg daily.  Can likely discontinue at next visit  Will obtain myocardial perfusion study due to the new onset of atrial fibrillation as well as abnormal EKG with a right bundle branch block and left anterior fascicular block  Will contact patient with results of stress test once available      Follow-up   Return in about 6 months (around 7/23/2025), or if symptoms worsen or fail to improve, for Follow-up with Dr. Singer next visit.        Mirtha Wolf, APRN  1/23/2025

## 2025-01-23 ENCOUNTER — OFFICE VISIT (OUTPATIENT)
Dept: CARDIOLOGY | Facility: CLINIC | Age: 80
End: 2025-01-23
Payer: MEDICARE

## 2025-01-23 VITALS
HEIGHT: 70 IN | OXYGEN SATURATION: 93 % | DIASTOLIC BLOOD PRESSURE: 58 MMHG | BODY MASS INDEX: 32.93 KG/M2 | WEIGHT: 230 LBS | SYSTOLIC BLOOD PRESSURE: 150 MMHG | HEART RATE: 88 BPM

## 2025-01-23 DIAGNOSIS — I48.0 PAROXYSMAL ATRIAL FIBRILLATION: ICD-10-CM

## 2025-01-23 DIAGNOSIS — E78.5 HYPERLIPIDEMIA LDL GOAL <70: ICD-10-CM

## 2025-01-23 DIAGNOSIS — I25.119 CORONARY ARTERY DISEASE INVOLVING NATIVE CORONARY ARTERY OF NATIVE HEART WITH ANGINA PECTORIS: Primary | ICD-10-CM

## 2025-01-23 DIAGNOSIS — I10 ESSENTIAL HYPERTENSION: ICD-10-CM

## 2025-01-23 DIAGNOSIS — I50.32 CHRONIC DIASTOLIC CONGESTIVE HEART FAILURE: ICD-10-CM

## 2025-01-23 RX ORDER — METOPROLOL TARTRATE 25 MG/1
25 TABLET, FILM COATED ORAL EVERY 12 HOURS SCHEDULED
Qty: 120 TABLET | Refills: 1 | Status: SHIPPED | OUTPATIENT
Start: 2025-01-23

## 2025-01-23 RX ORDER — AMIODARONE HYDROCHLORIDE 200 MG/1
100 TABLET ORAL DAILY
Qty: 90 TABLET | Refills: 0 | Status: SHIPPED | OUTPATIENT
Start: 2025-01-23

## 2025-01-23 NOTE — ASSESSMENT & PLAN NOTE
Patient is not having chest pain but will obtain myocardial perfusion study due to new onset of atrial fibrillation

## 2025-01-23 NOTE — ASSESSMENT & PLAN NOTE
Patient is now in normal sinus rhythm  Decrease amiodarone to 100 mg daily. Can likely discontinue at next visit  Continue metoprolol tartrate 25 mg twice daily  Continue Eliquis for stroke prophylaxis

## 2025-01-27 NOTE — TELEPHONE ENCOUNTER
Caller: Gabriela Mar    Relationship: Self    Best call back number: [phone]    Requested Prescriptions:   Requested Prescriptions     Pending Prescriptions Disp Refills    apixaban (ELIQUIS) 2.5 MG tablet tablet 120 tablet 1     Sig: Take 1 tablet by mouth Every 12 (Twelve) Hours as directed for Atrial Fibrillation        Pharmacy where request should be sent: [facility] [id number]  AT [facility] [address] [place] [address] - [phone]  - [phone] FX     Last office visit with prescribing clinician: 1/23/2025   Last telemedicine visit with prescribing clinician: Visit date not found   Next office visit with prescribing clinician: Visit date not found     Additional details provided by patient:     Does the patient have less than a 3 day supply:  [x] Yes  [] No        Flora Campos Rep   01/27/25 09:34 EST

## 2025-02-07 ENCOUNTER — TELEPHONE (OUTPATIENT)
Dept: CARDIOLOGY | Facility: CLINIC | Age: 80
End: 2025-02-07

## 2025-02-07 NOTE — TELEPHONE ENCOUNTER
Patient will  samples in [place]. Patient assistance forms will be provided for him to fill out.

## 2025-02-07 NOTE — TELEPHONE ENCOUNTER
Caller: Gabriela Mar    Relationship: Self    Best call back number: [phone]    Which medication are you concerned about: ANGELINA    Who prescribed you this medication: ORIGINALLY FROM THE HOSPITAL    When did you start taking this medication: DECEMBER 2024    What are your concerns: PTS COPAY IS $500 PT STATES HE ALSO HAS INFORMATION ON HOW TO GET THE MEDICATION FOR FREE. WOULD LIKE ADVISE ON THAT.    How long have you had these concerns: WHEN GOING TO  REFILL

## 2025-02-24 ENCOUNTER — OFFICE VISIT (OUTPATIENT)
Dept: INTERNAL MEDICINE | Facility: CLINIC | Age: 80
End: 2025-02-24
Payer: MEDICARE

## 2025-02-24 VITALS
OXYGEN SATURATION: 94 % | HEART RATE: 57 BPM | HEIGHT: 70 IN | SYSTOLIC BLOOD PRESSURE: 140 MMHG | DIASTOLIC BLOOD PRESSURE: 60 MMHG | BODY MASS INDEX: 34.5 KG/M2 | TEMPERATURE: 97.5 F | WEIGHT: 241 LBS

## 2025-02-24 DIAGNOSIS — E11.65 TYPE 2 DIABETES MELLITUS WITH HYPERGLYCEMIA, WITH LONG-TERM CURRENT USE OF INSULIN: Primary | ICD-10-CM

## 2025-02-24 DIAGNOSIS — J18.9 RECURRENT PNEUMONIA: ICD-10-CM

## 2025-02-24 DIAGNOSIS — Z79.4 TYPE 2 DIABETES MELLITUS WITH HYPERGLYCEMIA, WITH LONG-TERM CURRENT USE OF INSULIN: Primary | ICD-10-CM

## 2025-02-24 DIAGNOSIS — Z23 ENCOUNTER FOR IMMUNIZATION: ICD-10-CM

## 2025-02-24 LAB
EXPIRATION DATE: ABNORMAL
HBA1C MFR BLD: 7.7 % (ref 4.5–5.7)
Lab: ABNORMAL

## 2025-02-24 PROCEDURE — 99213 OFFICE O/P EST LOW 20 MIN: CPT | Performed by: INTERNAL MEDICINE

## 2025-02-24 PROCEDURE — 90677 PCV20 VACCINE IM: CPT | Performed by: INTERNAL MEDICINE

## 2025-02-24 PROCEDURE — G2211 COMPLEX E/M VISIT ADD ON: HCPCS | Performed by: INTERNAL MEDICINE

## 2025-02-24 PROCEDURE — G0009 ADMIN PNEUMOCOCCAL VACCINE: HCPCS | Performed by: INTERNAL MEDICINE

## 2025-02-24 PROCEDURE — 3078F DIAST BP <80 MM HG: CPT | Performed by: INTERNAL MEDICINE

## 2025-02-24 PROCEDURE — 3077F SYST BP >= 140 MM HG: CPT | Performed by: INTERNAL MEDICINE

## 2025-02-24 PROCEDURE — 1126F AMNT PAIN NOTED NONE PRSNT: CPT | Performed by: INTERNAL MEDICINE

## 2025-02-24 PROCEDURE — 3051F HG A1C>EQUAL 7.0%<8.0%: CPT | Performed by: INTERNAL MEDICINE

## 2025-02-24 NOTE — PROGRESS NOTES
Diabetes    Subjective   Gabriela Mar is a 79 y.o. male is here today for follow-up.    History of Present Illness  6 weeks since he had the pneumonia.  Here for a f/u on his DM2, htn.  Seen by Cardiology, was told back to normal rhythm. On toprol, and eliquis and low dose pacerone. On 100mg daily.      Current Outpatient Medications:     albuterol (PROVENTIL) (2.5 MG/3ML) 0.083% nebulizer solution, Take 2.5 mg by nebulization Every 4 (Four) Hours As Needed for Wheezing., Disp: 120 each, Rfl: 5    albuterol sulfate  (90 Base) MCG/ACT inhaler, Inhale 2 puffs Every 4 (Four) Hours As Needed for Wheezing., Disp: 8 g, Rfl: 3    amiodarone (PACERONE) 200 MG tablet, Take 0.5 tablets by mouth Daily., Disp: 90 tablet, Rfl: 0    apixaban (ELIQUIS) 2.5 MG tablet tablet, Take 1 tablet by mouth Every 12 (Twelve) Hours as directed for Atrial Fibrillation, Disp: 180 tablet, Rfl: 1    aspirin 81 MG EC tablet, Take 1 tablet by mouth Daily., Disp: 90 tablet, Rfl: 3    Cholecalciferol (VITAMIN D) 1000 UNITS tablet, Take 2 tablets by mouth Daily., Disp: , Rfl:     glimepiride (AMARYL) 4 MG tablet, TAKE 1 TABLET BY MOUTH DAILY WITH DINNER, Disp: 90 tablet, Rfl: 1    insulin NPH (humuLIN N,novoLIN N) 100 UNIT/ML injection, Inject 50 Units under the skin into the appropriate area as directed 2 (Two) Times a Day Before Meals., Disp: 60 mL, Rfl: 5    metoprolol tartrate (LOPRESSOR) 25 MG tablet, Take 1 tablet by mouth Every 12 (Twelve) Hours., Disp: 120 tablet, Rfl: 1    Multiple Vitamins-Minerals (MULTI VITAMIN/MINERALS PO), Take 1 tablet by mouth Daily., Disp: , Rfl:     Multiple Vitamins-Minerals (PreserVision AREDS 2) chewable tablet, Chew 1 tablet 2 (Two) Times a Day., Disp: , Rfl:     simvastatin (ZOCOR) 10 MG tablet, Take 1 tablet by mouth every night at bedtime., Disp: 90 tablet, Rfl: 3    midodrine (PROAMATINE) 10 MG tablet, Take 1 tablet by mouth 3 (Three) Times a Day Before Meals for 7 days., Disp: 21 tablet, Rfl:  "0      The following portions of the patient's history were reviewed and updated as appropriate: allergies, current medications, past family history, past medical history, past social history, past surgical history and problem list.    Review of Systems   Constitutional:  Positive for fatigue. Negative for chills and fever.   HENT:  Negative for ear discharge, ear pain, sinus pressure and sore throat.    Respiratory:  Negative for cough, chest tightness and shortness of breath.    Cardiovascular:  Negative for chest pain, palpitations and leg swelling.   Gastrointestinal:  Negative for diarrhea, nausea and vomiting.   Musculoskeletal:  Negative for arthralgias, back pain and myalgias.   Neurological:  Negative for dizziness, syncope and headaches.   Psychiatric/Behavioral:  Negative for confusion and sleep disturbance.        Objective   /60   Pulse 57   Temp 97.5 °F (36.4 °C)   Ht 177.8 cm (70\")   Wt 109 kg (241 lb)   SpO2 94% Comment: ra  BMI 34.58 kg/m²   Physical Exam  Vitals and nursing note reviewed.   Constitutional:       Appearance: He is well-developed.      Comments: Appears a little weak.   HENT:      Head: Normocephalic and atraumatic.      Right Ear: External ear normal.      Left Ear: External ear normal.      Mouth/Throat:      Pharynx: No oropharyngeal exudate.   Eyes:      Conjunctiva/sclera: Conjunctivae normal.      Pupils: Pupils are equal, round, and reactive to light.   Neck:      Thyroid: No thyromegaly.   Cardiovascular:      Rate and Rhythm: Normal rate and regular rhythm.      Pulses: Normal pulses.      Heart sounds: Normal heart sounds. No murmur heard.     No friction rub. No gallop.   Pulmonary:      Effort: Pulmonary effort is normal.      Breath sounds: Normal breath sounds.   Abdominal:      General: Bowel sounds are normal. There is no distension.      Palpations: Abdomen is soft.      Tenderness: There is no abdominal tenderness.   Musculoskeletal:      Cervical back: " Neck supple.   Skin:     General: Skin is warm and dry.   Neurological:      Mental Status: He is alert and oriented to person, place, and time.      Cranial Nerves: No cranial nerve deficit.   Psychiatric:         Judgment: Judgment normal.                 Results for orders placed or performed in visit on 02/24/25   POC Glycosylated Hemoglobin (Hb A1C)    Collection Time: 02/24/25 11:37 AM    Specimen: Blood   Result Value Ref Range    Hemoglobin A1C 7.7 (A) 4.5 - 5.7 %    Lot Number [id number]     Expiration Date 10/18/26              Assessment & Plan   Diagnoses and all orders for this visit:    Type 2 diabetes mellitus with hyperglycemia, with long-term current use of insulin  -     POC Glycosylated Hemoglobin (Hb A1C)  -     Pneumococcal Conjugate Vaccine 20-Valent (PCV20)    Encounter for immunization  -     Pneumococcal Conjugate Vaccine 20-Valent (PCV20)    Recurrent pneumonia  -     Pneumococcal Conjugate Vaccine 20-Valent (PCV20)             Encouraged to keep working on his sugars., take 1/2 dose of insulin when glucose normal in the day, and full dose when high.  Defers Dexcomm G7.    Return in about 3 months (around 5/24/2025) for Recheck- ok to overbook a 45 min physical/ wellness.    Electronically signed by:    Krystina Gee MD

## 2025-04-05 DIAGNOSIS — E11.65 TYPE 2 DIABETES MELLITUS WITH HYPERGLYCEMIA, WITH LONG-TERM CURRENT USE OF INSULIN: ICD-10-CM

## 2025-04-05 DIAGNOSIS — Z79.4 TYPE 2 DIABETES MELLITUS WITH HYPERGLYCEMIA, WITH LONG-TERM CURRENT USE OF INSULIN: ICD-10-CM

## 2025-04-05 RX ORDER — LOSARTAN POTASSIUM 25 MG/1
25 TABLET ORAL DAILY
Qty: 90 TABLET | Refills: 1 | OUTPATIENT
Start: 2025-04-05

## 2025-04-14 DIAGNOSIS — Z79.4 TYPE 2 DIABETES MELLITUS WITH HYPERGLYCEMIA, WITH LONG-TERM CURRENT USE OF INSULIN: ICD-10-CM

## 2025-04-14 DIAGNOSIS — E11.65 TYPE 2 DIABETES MELLITUS WITH HYPERGLYCEMIA, WITH LONG-TERM CURRENT USE OF INSULIN: ICD-10-CM

## 2025-04-14 RX ORDER — LOSARTAN POTASSIUM 25 MG/1
25 TABLET ORAL DAILY
Qty: 90 TABLET | Refills: 1 | OUTPATIENT
Start: 2025-04-14

## 2025-04-15 NOTE — TELEPHONE ENCOUNTER
HARIS CALLED BACK AND I RELAYED MONTEZ'S MESSAGE TO HIM, HE VERBALIZED  UNDERSTANDING . NO FURTHER CALLBACK IS NEEDED.

## 2025-05-02 ENCOUNTER — TELEPHONE (OUTPATIENT)
Dept: ONCOLOGY | Facility: CLINIC | Age: 80
End: 2025-05-02
Payer: MEDICARE

## 2025-05-02 NOTE — TELEPHONE ENCOUNTER
RN returned call to pt and let him no he does not need any further scans. Pt verbalized understanding.

## 2025-05-02 NOTE — TELEPHONE ENCOUNTER
Caller: Gabriela Mar    Relationship: Self    Best call back number: [phone]      What was the call regarding: PATIENT WANTED TO KNOW IF HE WAS SUPPOSED TO GET A CT SCAN BEFORE HIS APPOINTMENT

## 2025-05-23 ENCOUNTER — LAB (OUTPATIENT)
Dept: LAB | Facility: HOSPITAL | Age: 80
End: 2025-05-23
Payer: MEDICARE

## 2025-05-23 ENCOUNTER — OFFICE VISIT (OUTPATIENT)
Dept: INTERNAL MEDICINE | Facility: CLINIC | Age: 80
End: 2025-05-23
Payer: MEDICARE

## 2025-05-23 VITALS
HEART RATE: 70 BPM | TEMPERATURE: 97.1 F | OXYGEN SATURATION: 93 % | DIASTOLIC BLOOD PRESSURE: 60 MMHG | SYSTOLIC BLOOD PRESSURE: 112 MMHG | BODY MASS INDEX: 33.87 KG/M2 | HEIGHT: 70 IN | WEIGHT: 236.6 LBS

## 2025-05-23 DIAGNOSIS — Z79.4 TYPE 2 DIABETES MELLITUS WITH HYPERGLYCEMIA, WITH LONG-TERM CURRENT USE OF INSULIN: Primary | ICD-10-CM

## 2025-05-23 DIAGNOSIS — N18.32 STAGE 3B CHRONIC KIDNEY DISEASE: ICD-10-CM

## 2025-05-23 DIAGNOSIS — C84.71 ANAPLASTIC ALK-NEGATIVE LARGE CELL LYMPHOMA OF LYMPH NODE OF NECK: ICD-10-CM

## 2025-05-23 DIAGNOSIS — E78.5 HYPERLIPIDEMIA LDL GOAL <70: ICD-10-CM

## 2025-05-23 DIAGNOSIS — E11.65 TYPE 2 DIABETES MELLITUS WITH HYPERGLYCEMIA, WITH LONG-TERM CURRENT USE OF INSULIN: Primary | ICD-10-CM

## 2025-05-23 LAB
ALBUMIN SERPL-MCNC: 3.6 G/DL (ref 3.5–5.2)
ALBUMIN/GLOB SERPL: 1.2 G/DL
ALP SERPL-CCNC: 80 U/L (ref 39–117)
ALT SERPL W P-5'-P-CCNC: 23 U/L (ref 1–41)
ANION GAP SERPL CALCULATED.3IONS-SCNC: 14 MMOL/L (ref 5–15)
AST SERPL-CCNC: 24 U/L (ref 1–40)
BASOPHILS # BLD AUTO: 0.04 10*3/MM3 (ref 0–0.2)
BASOPHILS NFR BLD AUTO: 0.5 % (ref 0–1.5)
BILIRUB SERPL-MCNC: 0.4 MG/DL (ref 0–1.2)
BUN SERPL-MCNC: 16 MG/DL (ref 8–23)
BUN/CREAT SERPL: 11.4 (ref 7–25)
CALCIUM SPEC-SCNC: 8.9 MG/DL (ref 8.6–10.5)
CHLORIDE SERPL-SCNC: 103 MMOL/L (ref 98–107)
CO2 SERPL-SCNC: 24 MMOL/L (ref 22–29)
CREAT SERPL-MCNC: 1.4 MG/DL (ref 0.76–1.27)
DEPRECATED RDW RBC AUTO: 49.7 FL (ref 37–54)
EGFRCR SERPLBLD CKD-EPI 2021: 50.8 ML/MIN/1.73
EOSINOPHIL # BLD AUTO: 0.19 10*3/MM3 (ref 0–0.4)
EOSINOPHIL NFR BLD AUTO: 2.5 % (ref 0.3–6.2)
ERYTHROCYTE [DISTWIDTH] IN BLOOD BY AUTOMATED COUNT: 13.9 % (ref 12.3–15.4)
EXPIRATION DATE: ABNORMAL
GLOBULIN UR ELPH-MCNC: 3 GM/DL
GLUCOSE SERPL-MCNC: 133 MG/DL (ref 65–99)
HBA1C MFR BLD: 8.2 % (ref 4.5–5.7)
HCT VFR BLD AUTO: 38.8 % (ref 37.5–51)
HGB BLD-MCNC: 12.6 G/DL (ref 13–17.7)
IMM GRANULOCYTES # BLD AUTO: 0.04 10*3/MM3 (ref 0–0.05)
IMM GRANULOCYTES NFR BLD AUTO: 0.5 % (ref 0–0.5)
LYMPHOCYTES # BLD AUTO: 3.04 10*3/MM3 (ref 0.7–3.1)
LYMPHOCYTES NFR BLD AUTO: 40.5 % (ref 19.6–45.3)
Lab: ABNORMAL
MCH RBC QN AUTO: 31.7 PG (ref 26.6–33)
MCHC RBC AUTO-ENTMCNC: 32.5 G/DL (ref 31.5–35.7)
MCV RBC AUTO: 97.5 FL (ref 79–97)
MONOCYTES # BLD AUTO: 0.71 10*3/MM3 (ref 0.1–0.9)
MONOCYTES NFR BLD AUTO: 9.5 % (ref 5–12)
NEUTROPHILS NFR BLD AUTO: 3.49 10*3/MM3 (ref 1.7–7)
NEUTROPHILS NFR BLD AUTO: 46.5 % (ref 42.7–76)
NRBC BLD AUTO-RTO: 0 /100 WBC (ref 0–0.2)
PLATELET # BLD AUTO: 119 10*3/MM3 (ref 140–450)
PMV BLD AUTO: 12.1 FL (ref 6–12)
POTASSIUM SERPL-SCNC: 5.3 MMOL/L (ref 3.5–5.2)
PROT SERPL-MCNC: 6.6 G/DL (ref 6–8.5)
RBC # BLD AUTO: 3.98 10*6/MM3 (ref 4.14–5.8)
SODIUM SERPL-SCNC: 141 MMOL/L (ref 136–145)
WBC NRBC COR # BLD AUTO: 7.51 10*3/MM3 (ref 3.4–10.8)

## 2025-05-23 PROCEDURE — 80053 COMPREHEN METABOLIC PANEL: CPT

## 2025-05-23 PROCEDURE — 85025 COMPLETE CBC W/AUTO DIFF WBC: CPT

## 2025-05-23 PROCEDURE — 36415 COLL VENOUS BLD VENIPUNCTURE: CPT

## 2025-05-23 RX ORDER — SIMVASTATIN 10 MG
10 TABLET ORAL
Qty: 90 TABLET | Refills: 3 | Status: SHIPPED | OUTPATIENT
Start: 2025-05-23

## 2025-05-23 RX ORDER — GLIMEPIRIDE 4 MG/1
4 TABLET ORAL
Qty: 90 TABLET | Refills: 1 | Status: SHIPPED | OUTPATIENT
Start: 2025-05-23

## 2025-05-23 RX ORDER — METOPROLOL TARTRATE 25 MG/1
25 TABLET, FILM COATED ORAL EVERY 12 HOURS SCHEDULED
Qty: 120 TABLET | Refills: 1 | Status: SHIPPED | OUTPATIENT
Start: 2025-05-23

## 2025-05-23 NOTE — PROGRESS NOTES
Diabetes    Subjective   Gabriela Mar is a 79 y.o. male is here today for follow-up.    Diabetes  Associated symptoms:     no chest pain    Hypoglycemia symptoms:     no confusion, no dizziness and no headaches      Does nt have acrds appt until next year.  Had t go on abx and prednisone in May. Sugars were very high then.  This am Glu- was 138, and has been running  lower - occ in the 90s.  Takes 40 unts am and 70-75    Tooth pain and issues.    Current Outpatient Medications:     albuterol (PROVENTIL) (2.5 MG/3ML) 0.083% nebulizer solution, Take 2.5 mg by nebulization Every 4 (Four) Hours As Needed for Wheezing., Disp: 120 each, Rfl: 5    albuterol sulfate  (90 Base) MCG/ACT inhaler, Inhale 2 puffs Every 4 (Four) Hours As Needed for Wheezing., Disp: 8 g, Rfl: 3    albuterol sulfate  (90 Base) MCG/ACT inhaler, Inhale 2 puffs Every 6 (Six) Hours As Needed for Wheezing for up to 30 days., Disp: 18 g, Rfl: 0    amiodarone (PACERONE) 200 MG tablet, Take 0.5 tablets by mouth Daily., Disp: 90 tablet, Rfl: 0    apixaban (ELIQUIS) 2.5 MG tablet tablet, Take 1 tablet by mouth Every 12 (Twelve) Hours as directed for Atrial Fibrillation, Disp: 180 tablet, Rfl: 1    aspirin 81 MG EC tablet, Take 1 tablet by mouth Daily., Disp: 90 tablet, Rfl: 3    Cholecalciferol (VITAMIN D) 1000 UNITS tablet, Take 2 tablets by mouth Daily., Disp: , Rfl:     glimepiride (AMARYL) 4 MG tablet, Take 1 tablet by mouth Daily With Dinner., Disp: 90 tablet, Rfl: 1    insulin NPH (humuLIN N,novoLIN N) 100 UNIT/ML injection, Inject 50 Units under the skin into the appropriate area as directed 2 (Two) Times a Day Before Meals., Disp: 60 mL, Rfl: 5    metoprolol tartrate (LOPRESSOR) 25 MG tablet, TAKE ONE TABLET BY MOUTH EVERY 12 HOURS, Disp: 120 tablet, Rfl: 1    Multiple Vitamins-Minerals (MULTI VITAMIN/MINERALS PO), Take 1 tablet by mouth Daily., Disp: , Rfl:     Multiple Vitamins-Minerals (PreserVision AREDS 2) chewable tablet, Chew  "1 tablet 2 (Two) Times a Day., Disp: , Rfl:     simvastatin (ZOCOR) 10 MG tablet, Take 1 tablet by mouth every night at bedtime., Disp: 90 tablet, Rfl: 3      The following portions of the patient's history were reviewed and updated as appropriate: allergies, current medications, past family history, past medical history, past social history, past surgical history and problem list.    Review of Systems   Constitutional: Negative.  Negative for chills and fever.   HENT:  Positive for dental problem. Negative for ear discharge, ear pain, sinus pressure and sore throat.    Respiratory:  Negative for cough, chest tightness and shortness of breath.    Cardiovascular:  Negative for chest pain, palpitations and leg swelling.   Gastrointestinal:  Negative for diarrhea, nausea and vomiting.   Musculoskeletal:  Negative for arthralgias, back pain and myalgias.   Neurological:  Negative for dizziness, syncope and headaches.   Psychiatric/Behavioral:  Negative for confusion and sleep disturbance.        Objective   /60   Pulse 70   Temp 97.1 °F (36.2 °C)   Ht 177.8 cm (70\")   Wt 107 kg (236 lb 9.6 oz)   SpO2 93% Comment: ra  BMI 33.95 kg/m²   Physical Exam  Vitals and nursing note reviewed.   Constitutional:       Appearance: He is well-developed.   HENT:      Head: Normocephalic and atraumatic.      Right Ear: External ear normal.      Left Ear: External ear normal.      Mouth/Throat:      Pharynx: No oropharyngeal exudate.   Eyes:      Conjunctiva/sclera: Conjunctivae normal.      Pupils: Pupils are equal, round, and reactive to light.   Neck:      Thyroid: No thyromegaly.   Cardiovascular:      Rate and Rhythm: Normal rate and regular rhythm.      Pulses: Normal pulses.      Heart sounds: Normal heart sounds. No murmur heard.     No friction rub. No gallop.   Pulmonary:      Effort: Pulmonary effort is normal.      Breath sounds: Normal breath sounds.   Abdominal:      General: Bowel sounds are normal. There is " no distension.      Palpations: Abdomen is soft.      Tenderness: There is no abdominal tenderness.   Musculoskeletal:      Cervical back: Neck supple.   Skin:     General: Skin is warm and dry.   Neurological:      Mental Status: He is alert and oriented to person, place, and time.      Cranial Nerves: No cranial nerve deficit.   Psychiatric:         Judgment: Judgment normal.                 Results for orders placed or performed in visit on 05/23/25   POC Glycosylated Hemoglobin (Hb A1C)    Collection Time: 05/23/25 11:02 AM    Specimen: Blood   Result Value Ref Range    Hemoglobin A1C 8.2 (A) 4.5 - 5.7 %    Lot Number [id number]     Expiration Date 2/17/27              Assessment & Plan   Diagnoses and all orders for this visit:    Type 2 diabetes mellitus with hyperglycemia, with long-term current use of insulin  -     POC Glycosylated Hemoglobin (Hb A1C)  -     glimepiride (AMARYL) 4 MG tablet; Take 1 tablet by mouth Daily With Dinner.    Hyperlipidemia LDL goal <70  -     simvastatin (ZOCOR) 10 MG tablet; Take 1 tablet by mouth every night at bedtime.    Stage 3b chronic kidney disease  -     Basic Metabolic Panel; Future    A1C higher likely due to recent prednisone intake.  Breathing is better.  Will need dental extraction       Adv to move night time insulin to 1 hr after dinner insteads of midnight.    Return for Medicare Wellness as scheduled.    Electronically signed by:    Krystina Gee MD

## 2025-05-29 ENCOUNTER — OFFICE VISIT (OUTPATIENT)
Dept: ONCOLOGY | Facility: CLINIC | Age: 80
End: 2025-05-29
Payer: MEDICARE

## 2025-05-29 VITALS
DIASTOLIC BLOOD PRESSURE: 72 MMHG | BODY MASS INDEX: 33.64 KG/M2 | TEMPERATURE: 96.9 F | HEIGHT: 70 IN | RESPIRATION RATE: 16 BRPM | SYSTOLIC BLOOD PRESSURE: 167 MMHG | HEART RATE: 62 BPM | WEIGHT: 235 LBS | OXYGEN SATURATION: 98 %

## 2025-05-29 DIAGNOSIS — C84.71 ANAPLASTIC ALK-NEGATIVE LARGE CELL LYMPHOMA OF LYMPH NODE OF NECK: Primary | ICD-10-CM

## 2025-05-29 PROCEDURE — 3077F SYST BP >= 140 MM HG: CPT | Performed by: INTERNAL MEDICINE

## 2025-05-29 PROCEDURE — 1126F AMNT PAIN NOTED NONE PRSNT: CPT | Performed by: INTERNAL MEDICINE

## 2025-05-29 PROCEDURE — 99213 OFFICE O/P EST LOW 20 MIN: CPT | Performed by: INTERNAL MEDICINE

## 2025-05-29 PROCEDURE — 3078F DIAST BP <80 MM HG: CPT | Performed by: INTERNAL MEDICINE

## 2025-05-29 NOTE — PROGRESS NOTES
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         REASON FOR VISIT: Management of mild lymphoma    HISTORY OF PRESENT ILLNESS:   79 y.o.  male presents today for follow-up of his lymphoma.  He completed 6 of 6 cycles of CHP-Brentuximab and completed radiotherapy for consolidation in August 2022.   Clinically doing reasonably well.  Have an upper respiratory infection in early May.  Seems to recovered from that.  No major changes since I last saw him.  Denies any issues with nausea or vomiting.  No issues with pain.  No night sweats.  Still continues to have mild neuropathy. .  For now it has been 3 years since completed treatment.    Past medical history, social history and family history was reviewed 05/29/25 and unchanged from prior visit.    Review of Systems:    Review of Systems   Constitutional:  Negative for appetite change and fatigue.   HENT:  Negative.     Eyes: Negative.    Respiratory: Negative.     Cardiovascular: Negative.    Gastrointestinal: Negative.    Endocrine: Negative.    Genitourinary: Negative.     Skin: Negative.    Neurological: Negative.    Hematological: Negative.    Psychiatric/Behavioral: Negative.  Negative for sleep disturbance.             Medications:        Current Outpatient Medications:     albuterol (PROVENTIL) (2.5 MG/3ML) 0.083%  nebulizer solution, Take 2.5 mg by nebulization Every 4 (Four) Hours As Needed for Wheezing., Disp: 120 each, Rfl: 5    albuterol sulfate  (90 Base) MCG/ACT inhaler, Inhale 2 puffs Every 4 (Four) Hours As Needed for Wheezing., Disp: 8 g, Rfl: 3    albuterol sulfate  (90 Base) MCG/ACT inhaler, Inhale 2 puffs Every 6 (Six) Hours As Needed for Wheezing for up to 30 days., Disp: 18 g, Rfl: 0    amiodarone (PACERONE) 200 MG tablet, Take 0.5 tablets by mouth Daily., Disp: 90 tablet, Rfl: 0    apixaban (ELIQUIS) 2.5 MG tablet tablet, Take 1 tablet by mouth Every 12 (Twelve) Hours as directed for Atrial Fibrillation, Disp: 180 tablet, Rfl: 1    aspirin 81 MG EC tablet, Take 1 tablet by mouth Daily., Disp: 90 tablet, Rfl: 3    Cholecalciferol (VITAMIN D) 1000 UNITS tablet, Take 2 tablets by mouth Daily., Disp: , Rfl:     glimepiride (AMARYL) 4 MG tablet, Take 1 tablet by mouth Daily With Dinner., Disp: 90 tablet, Rfl: 1    insulin NPH (humuLIN N,novoLIN N) 100 UNIT/ML injection, Inject 50 Units under the skin into the appropriate area as directed 2 (Two) Times a Day Before Meals., Disp: 60 mL, Rfl: 5    metoprolol tartrate (LOPRESSOR) 25 MG tablet, TAKE ONE TABLET BY MOUTH EVERY 12 HOURS, Disp: 120 tablet, Rfl: 1    Multiple Vitamins-Minerals (MULTI VITAMIN/MINERALS PO), Take 1 tablet by mouth Daily., Disp: , Rfl:     Multiple Vitamins-Minerals (PreserVision AREDS 2) chewable tablet, Chew 1 tablet 2 (Two) Times a Day., Disp: , Rfl:     simvastatin (ZOCOR) 10 MG tablet, Take 1 tablet by mouth every night at bedtime., Disp: 90 tablet, Rfl: 3    Pain Medications              aspirin 81 MG EC tablet Take 1 tablet by mouth Daily.               ALLERGIES:    Allergies   Allergen Reactions    Bactrim [Sulfamethoxazole-Trimethoprim] Other (See Comments)     Joint pain    Sulfa Antibiotics GI Intolerance     Makes bones hurt      Tegaderm Chg Dressing [Chlorhexidine] Itching and Other (See Comments)     redness  "        Physical Exam    VITAL SIGNS:  /72 Comment: LUE  Pulse 62   Temp 96.9 °F (36.1 °C) (Temporal)   Resp 16   Ht 177.8 cm (70\")   Wt 107 kg (235 lb)   SpO2 98% Comment: RA  BMI 33.72 kg/m²     ECOG score: 0           Wt Readings from Last 3 Encounters:   05/29/25 107 kg (235 lb)   05/23/25 107 kg (236 lb 9.6 oz)   05/05/25 109 kg (241 lb)       Body mass index is 33.72 kg/m². Body surface area is 2.24 meters squared.    Pain Score    05/29/25 1044   PainSc: 0-No pain           Performance Status: 0    General: well appearing, in no acute distress  HEENT: Swelling on the right side of his face consistent with his lymphomatous process.  Lymphatics: no cervical, supraclavicular, or axillary adenopathy  Cardiovascular: regular rate and rhythm, no murmurs, rubs or gallops  Lungs: clear to auscultation bilaterally  Abdomen: soft, nontender, nondistended.  No palpable organomegaly  Extremities: no lower extremity edema  Skin: no rashes, lesions, bruising, or petechiae  Msk:  Shows no weakness of the large muscle groups  Psych: Mood is stable        RECENT LABS:    Lab Results   Component Value Date    HGB 12.6 (L) 05/23/2025    HCT 38.8 05/23/2025    MCV 97.5 (H) 05/23/2025     (L) 05/23/2025    WBC 7.51 05/23/2025    NEUTROABS 3.49 05/23/2025    LYMPHSABS 3.04 05/23/2025    MONOSABS 0.71 05/23/2025    EOSABS 0.19 05/23/2025    BASOSABS 0.04 05/23/2025       Lab Results   Component Value Date    GLUCOSE 133 (H) 05/23/2025    BUN 16 05/23/2025    CREATININE 1.40 (H) 05/23/2025     05/23/2025    K 5.3 (H) 05/23/2025     05/23/2025    CO2 24.0 05/23/2025    CALCIUM 8.9 05/23/2025    PROTEINTOT 6.6 05/23/2025    ALBUMIN 3.6 05/23/2025    BILITOT 0.4 05/23/2025    ALKPHOS 80 05/23/2025    AST 24 05/23/2025    ALT 23 05/23/2025     CT Abdomen Pelvis With Contrast    Result Date: 10/7/2024  Impression: Stable CT appearance of the neck, chest, abdomen and pelvis as above. There is no evidence of " new worrisome adenopathy to specifically suggest lymphomatous disease recurrence or progression. No unexpected or acute findings are present. Electronically Signed: Kenn Carrero MD  10/7/2024 12:23 PM EDT  Workstation ID: [id number]    CT Chest With Contrast Diagnostic    Result Date: 10/7/2024  Impression: Stable CT appearance of the neck, chest, abdomen and pelvis as above. There is no evidence of new worrisome adenopathy to specifically suggest lymphomatous disease recurrence or progression. No unexpected or acute findings are present. Electronically Signed: Kenn Carrero MD  10/7/2024 12:23 PM EDT  Workstation ID: [id number]    CT Soft Tissue Neck With Contrast    Result Date: 10/7/2024  Impression: Stable CT appearance of the neck, chest, abdomen and pelvis as above. There is no evidence of new worrisome adenopathy to specifically suggest lymphomatous disease recurrence or progression. No unexpected or acute findings are present. Electronically Signed: Kenn Carrero MD  10/7/2024 12:23 PM EDT  Workstation ID: [id number]    XR Chest 2 View    Result Date: 8/30/2024  Impression: No active disease Electronically Signed: Samir Ramirez MD  8/30/2024 1:11 PM EDT  Workstation ID: [id number]        Assessment/Plan    1.  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  He has completed 6 cycles of treatment with A + CHP.  Completed consolidative radiotherapy in August 2022.  he is 3 years out he does not need any further scans unless he is symptomatic.  I will see him in summer 2025 to make sure he is doing well.  No labs necessary.  Routine follow-up with primary care      2.  Neuropathy of the lower extremities.  Still present.  No significant change there.  .        Ai Henderson MD  Psychiatric Hematology and Oncology    Return in (Approximately): 1 year    No orders of the defined types were placed in this encounter.      5/29/2025

## 2025-06-17 DIAGNOSIS — J44.1 COPD WITH EXACERBATION: ICD-10-CM

## 2025-06-17 DIAGNOSIS — J18.9 PNEUMONIA DUE TO INFECTIOUS ORGANISM, UNSPECIFIED LATERALITY, UNSPECIFIED PART OF LUNG: ICD-10-CM

## 2025-06-17 RX ORDER — ALBUTEROL SULFATE 0.83 MG/ML
2.5 SOLUTION RESPIRATORY (INHALATION) EVERY 4 HOURS PRN
Qty: 120 EACH | Refills: 5 | Status: SHIPPED | OUTPATIENT
Start: 2025-06-17

## 2025-06-17 NOTE — TELEPHONE ENCOUNTER
Caller: Gabriela Mar    Relationship: Self    Best call back number: [phone]    Requested Prescriptions:   Requested Prescriptions     Pending Prescriptions Disp Refills    albuterol (PROVENTIL) (2.5 MG/3ML) 0.083% nebulizer solution 120 each 5     Sig: Take 2.5 mg by nebulization Every 4 (Four) Hours As Needed for Wheezing.        Pharmacy where request should be sent:    [facility] [phone]  Last office visit with prescribing clinician: 5/23/2025   Last telemedicine visit with prescribing clinician: Visit date not found   Next office visit with prescribing clinician: 9/19/2025     Additional details provided by patient: PATIENT HAS 4-5 DAYS LEFT OF MEDICATION    Does the patient have less than a 3 day supply:  [] Yes  [x] No    Would you like a call back once the refill request has been completed: [] Yes [x] No    If the office needs to give you a call back, can they leave a voicemail: [] Yes [x] No    Flora Sanderson   06/17/25 10:03 EDT

## 2025-06-18 DIAGNOSIS — E11.65 TYPE 2 DIABETES MELLITUS WITH HYPERGLYCEMIA, WITH LONG-TERM CURRENT USE OF INSULIN: ICD-10-CM

## 2025-06-18 DIAGNOSIS — Z79.4 TYPE 2 DIABETES MELLITUS WITH HYPERGLYCEMIA, WITH LONG-TERM CURRENT USE OF INSULIN: ICD-10-CM

## 2025-06-18 RX ORDER — METFORMIN HYDROCHLORIDE 500 MG/1
500 TABLET, EXTENDED RELEASE ORAL
Qty: 90 TABLET | Refills: 1 | OUTPATIENT
Start: 2025-06-18

## 2025-06-18 NOTE — TELEPHONE ENCOUNTER
HUB to relay:   Metformin ER: Not on Medication List: The original prescription was discontinued on 12/19/2024 by Jose J Townsend MD for the following reason: Stop Taking at Discharge. Renewing this prescription may not be appropriate.

## 2025-06-27 ENCOUNTER — TELEPHONE (OUTPATIENT)
Dept: CARDIOLOGY | Facility: CLINIC | Age: 80
End: 2025-06-27
Payer: MEDICARE

## 2025-06-27 NOTE — TELEPHONE ENCOUNTER
Laila with patient's dental office is calling for an Eliquis hold. He is having an upcoming dental extraction. Asking to hold Eliquis 2 days prior. Okay to proceed?     A-fib with RVR in the setting of multifocal pneumonia, 12/12/2024  [id number] (age >75, CAD, HTN, DM)  Echo (12/12/2024): LVEF = 65%. No hemodynamically significant valvular heart disease

## 2025-06-27 NOTE — LETTER
06/27/25      Fax # - [phone]    Re: Gabriela Mar, 1945   Procedure/Surgery - dental extraction          Gabriela Mar, 1945 may proceed with a scheduled procedure/surgery from a cardiac/EP standpoint.  Any questions please call [phone].      [x]  Hold Eliquis  48 hours              Sincerely,          CALIN Santos

## 2025-07-21 RX ORDER — AMIODARONE HYDROCHLORIDE 200 MG/1
TABLET ORAL
Qty: 90 TABLET | Refills: 0 | Status: SHIPPED | OUTPATIENT
Start: 2025-07-21

## 2025-07-22 ENCOUNTER — TELEPHONE (OUTPATIENT)
Dept: CARDIOLOGY | Facility: CLINIC | Age: 80
End: 2025-07-22

## 2025-07-22 RX ORDER — AMIODARONE HYDROCHLORIDE 200 MG/1
TABLET ORAL
Qty: 90 TABLET | Refills: 0 | Status: CANCELLED | OUTPATIENT
Start: 2025-07-22

## 2025-08-22 RX ORDER — METOPROLOL TARTRATE 25 MG/1
25 TABLET, FILM COATED ORAL EVERY 12 HOURS SCHEDULED
Qty: 180 TABLET | Refills: 3 | Status: SHIPPED | OUTPATIENT
Start: 2025-08-22